# Patient Record
Sex: FEMALE | Race: WHITE | Employment: OTHER | URBAN - METROPOLITAN AREA
[De-identification: names, ages, dates, MRNs, and addresses within clinical notes are randomized per-mention and may not be internally consistent; named-entity substitution may affect disease eponyms.]

---

## 2017-11-16 PROBLEM — G82.50 SPASTIC QUADRIPARESIS (HCC): Status: ACTIVE | Noted: 2017-11-16

## 2018-03-12 RX ORDER — DIMETHYL FUMARATE 240 MG/1
240 CAPSULE ORAL 2 TIMES DAILY
Qty: 180 CAPSULE | Refills: 3 | Status: SHIPPED | OUTPATIENT
Start: 2018-03-12 | End: 2018-11-28 | Stop reason: SDUPTHER

## 2018-05-16 ENCOUNTER — OFFICE VISIT (OUTPATIENT)
Dept: NEUROLOGY | Age: 73
End: 2018-05-16
Payer: MEDICARE

## 2018-05-16 VITALS
HEIGHT: 60 IN | SYSTOLIC BLOOD PRESSURE: 125 MMHG | DIASTOLIC BLOOD PRESSURE: 59 MMHG | HEART RATE: 81 BPM | WEIGHT: 161 LBS | RESPIRATION RATE: 18 BRPM | BODY MASS INDEX: 31.61 KG/M2 | TEMPERATURE: 98 F

## 2018-05-16 VITALS
HEIGHT: 61 IN | DIASTOLIC BLOOD PRESSURE: 59 MMHG | HEART RATE: 81 BPM | BODY MASS INDEX: 30.4 KG/M2 | WEIGHT: 161 LBS | TEMPERATURE: 98.8 F | RESPIRATION RATE: 18 BRPM | SYSTOLIC BLOOD PRESSURE: 125 MMHG

## 2018-05-16 DIAGNOSIS — G82.50 SPASTIC QUADRIPARESIS (HCC): ICD-10-CM

## 2018-05-16 DIAGNOSIS — G35 MS (MULTIPLE SCLEROSIS) (HCC): Primary | ICD-10-CM

## 2018-05-16 PROCEDURE — 1036F TOBACCO NON-USER: CPT | Performed by: PHYSICAL MEDICINE & REHABILITATION

## 2018-05-16 PROCEDURE — 1123F ACP DISCUSS/DSCN MKR DOCD: CPT | Performed by: PHYSICAL MEDICINE & REHABILITATION

## 2018-05-16 PROCEDURE — G8417 CALC BMI ABV UP PARAM F/U: HCPCS | Performed by: CLINICAL NURSE SPECIALIST

## 2018-05-16 PROCEDURE — G8400 PT W/DXA NO RESULTS DOC: HCPCS | Performed by: CLINICAL NURSE SPECIALIST

## 2018-05-16 PROCEDURE — G8417 CALC BMI ABV UP PARAM F/U: HCPCS | Performed by: PHYSICAL MEDICINE & REHABILITATION

## 2018-05-16 PROCEDURE — 4040F PNEUMOC VAC/ADMIN/RCVD: CPT | Performed by: PHYSICAL MEDICINE & REHABILITATION

## 2018-05-16 PROCEDURE — G8427 DOCREV CUR MEDS BY ELIG CLIN: HCPCS | Performed by: PHYSICAL MEDICINE & REHABILITATION

## 2018-05-16 PROCEDURE — 99213 OFFICE O/P EST LOW 20 MIN: CPT | Performed by: CLINICAL NURSE SPECIALIST

## 2018-05-16 PROCEDURE — 4040F PNEUMOC VAC/ADMIN/RCVD: CPT | Performed by: CLINICAL NURSE SPECIALIST

## 2018-05-16 PROCEDURE — 99213 OFFICE O/P EST LOW 20 MIN: CPT | Performed by: PHYSICAL MEDICINE & REHABILITATION

## 2018-05-16 PROCEDURE — G8427 DOCREV CUR MEDS BY ELIG CLIN: HCPCS | Performed by: CLINICAL NURSE SPECIALIST

## 2018-05-16 PROCEDURE — 99214 OFFICE O/P EST MOD 30 MIN: CPT | Performed by: CLINICAL NURSE SPECIALIST

## 2018-05-16 PROCEDURE — 3017F COLORECTAL CA SCREEN DOC REV: CPT | Performed by: CLINICAL NURSE SPECIALIST

## 2018-05-16 PROCEDURE — G8400 PT W/DXA NO RESULTS DOC: HCPCS | Performed by: PHYSICAL MEDICINE & REHABILITATION

## 2018-05-16 PROCEDURE — 1090F PRES/ABSN URINE INCON ASSESS: CPT | Performed by: PHYSICAL MEDICINE & REHABILITATION

## 2018-05-16 PROCEDURE — 1090F PRES/ABSN URINE INCON ASSESS: CPT | Performed by: CLINICAL NURSE SPECIALIST

## 2018-05-16 PROCEDURE — 3017F COLORECTAL CA SCREEN DOC REV: CPT | Performed by: PHYSICAL MEDICINE & REHABILITATION

## 2018-05-16 PROCEDURE — 1036F TOBACCO NON-USER: CPT | Performed by: CLINICAL NURSE SPECIALIST

## 2018-05-16 PROCEDURE — 1123F ACP DISCUSS/DSCN MKR DOCD: CPT | Performed by: CLINICAL NURSE SPECIALIST

## 2018-05-16 RX ORDER — LEVOTHYROXINE SODIUM 0.07 MG/1
75 TABLET ORAL
COMMUNITY
End: 2019-07-31 | Stop reason: SDUPTHER

## 2018-05-16 RX ORDER — LANOLIN ALCOHOL/MO/W.PET/CERES
1000 CREAM (GRAM) TOPICAL
COMMUNITY
End: 2019-03-20

## 2018-05-16 RX ORDER — MULTIVITAMIN WITH IRON
250 TABLET ORAL EVERY MORNING
COMMUNITY

## 2018-09-19 ENCOUNTER — OFFICE VISIT (OUTPATIENT)
Dept: NEUROLOGY | Age: 73
End: 2018-09-19
Payer: MEDICARE

## 2018-09-19 VITALS
BODY MASS INDEX: 31.44 KG/M2 | SYSTOLIC BLOOD PRESSURE: 135 MMHG | DIASTOLIC BLOOD PRESSURE: 60 MMHG | HEART RATE: 87 BPM | RESPIRATION RATE: 18 BRPM | OXYGEN SATURATION: 94 % | HEIGHT: 60 IN | TEMPERATURE: 98.1 F

## 2018-09-19 VITALS
BODY MASS INDEX: 31.61 KG/M2 | OXYGEN SATURATION: 95 % | SYSTOLIC BLOOD PRESSURE: 135 MMHG | TEMPERATURE: 98.7 F | HEIGHT: 60 IN | HEART RATE: 85 BPM | DIASTOLIC BLOOD PRESSURE: 60 MMHG | WEIGHT: 161 LBS

## 2018-09-19 DIAGNOSIS — G82.50 SPASTIC QUADRIPARESIS (HCC): ICD-10-CM

## 2018-09-19 DIAGNOSIS — G35 MS (MULTIPLE SCLEROSIS) (HCC): Primary | ICD-10-CM

## 2018-09-19 LAB
ALBUMIN SERPL-MCNC: 4.4 G/DL
ALP BLD-CCNC: NORMAL U/L
ALT SERPL-CCNC: NORMAL U/L
ANION GAP SERPL CALCULATED.3IONS-SCNC: NORMAL MMOL/L
AST SERPL-CCNC: NORMAL U/L
BASOPHILS ABSOLUTE: 0.1 /ΜL
BASOPHILS RELATIVE PERCENT: 1 %
BILIRUB SERPL-MCNC: NORMAL MG/DL (ref 0.1–1.4)
BUN BLDV-MCNC: 14 MG/DL
CALCIUM SERPL-MCNC: 9.7 MG/DL
CHLORIDE BLD-SCNC: 103 MMOL/L
CO2: 28 MMOL/L
CREAT SERPL-MCNC: 0.6 MG/DL
EOSINOPHILS ABSOLUTE: 0.1 /ΜL
EOSINOPHILS RELATIVE PERCENT: 2.3 %
GFR CALCULATED: NORMAL
GLUCOSE BLD-MCNC: 104 MG/DL
HCT VFR BLD CALC: 40.7 % (ref 36–46)
HEMOGLOBIN: 13.6 G/DL (ref 12–16)
LYMPHOCYTES ABSOLUTE: 1 /ΜL
LYMPHOCYTES RELATIVE PERCENT: 19.6 %
MCH RBC QN AUTO: NORMAL PG
MCHC RBC AUTO-ENTMCNC: NORMAL G/DL
MCV RBC AUTO: 95.1 FL
MONOCYTES ABSOLUTE: 0.4 /ΜL
MONOCYTES RELATIVE PERCENT: 7.4 %
NEUTROPHILS ABSOLUTE: 3.4 /ΜL
NEUTROPHILS RELATIVE PERCENT: 69.7 %
PDW BLD-RTO: NORMAL %
PLATELET # BLD: 259 K/ΜL
PMV BLD AUTO: NORMAL FL
POTASSIUM SERPL-SCNC: 4.4 MMOL/L
RBC # BLD: 4.28 10^6/ΜL
SODIUM BLD-SCNC: 140 MMOL/L
TOTAL PROTEIN: 7.5
WBC # BLD: 4.9 10^3/ML

## 2018-09-19 PROCEDURE — 99213 OFFICE O/P EST LOW 20 MIN: CPT | Performed by: PSYCHIATRY & NEUROLOGY

## 2018-09-19 PROCEDURE — 1036F TOBACCO NON-USER: CPT | Performed by: PSYCHIATRY & NEUROLOGY

## 2018-09-19 PROCEDURE — 3017F COLORECTAL CA SCREEN DOC REV: CPT | Performed by: PSYCHIATRY & NEUROLOGY

## 2018-09-19 PROCEDURE — 1123F ACP DISCUSS/DSCN MKR DOCD: CPT | Performed by: PHYSICAL MEDICINE & REHABILITATION

## 2018-09-19 PROCEDURE — G8400 PT W/DXA NO RESULTS DOC: HCPCS | Performed by: PHYSICAL MEDICINE & REHABILITATION

## 2018-09-19 PROCEDURE — G8427 DOCREV CUR MEDS BY ELIG CLIN: HCPCS | Performed by: PSYCHIATRY & NEUROLOGY

## 2018-09-19 PROCEDURE — 1090F PRES/ABSN URINE INCON ASSESS: CPT | Performed by: PHYSICAL MEDICINE & REHABILITATION

## 2018-09-19 PROCEDURE — 1101F PT FALLS ASSESS-DOCD LE1/YR: CPT | Performed by: PHYSICAL MEDICINE & REHABILITATION

## 2018-09-19 PROCEDURE — 1101F PT FALLS ASSESS-DOCD LE1/YR: CPT | Performed by: PSYCHIATRY & NEUROLOGY

## 2018-09-19 PROCEDURE — 4040F PNEUMOC VAC/ADMIN/RCVD: CPT | Performed by: PHYSICAL MEDICINE & REHABILITATION

## 2018-09-19 PROCEDURE — G8417 CALC BMI ABV UP PARAM F/U: HCPCS | Performed by: PHYSICAL MEDICINE & REHABILITATION

## 2018-09-19 PROCEDURE — G8400 PT W/DXA NO RESULTS DOC: HCPCS | Performed by: PSYCHIATRY & NEUROLOGY

## 2018-09-19 PROCEDURE — 1123F ACP DISCUSS/DSCN MKR DOCD: CPT | Performed by: PSYCHIATRY & NEUROLOGY

## 2018-09-19 PROCEDURE — 1036F TOBACCO NON-USER: CPT | Performed by: PHYSICAL MEDICINE & REHABILITATION

## 2018-09-19 PROCEDURE — 99215 OFFICE O/P EST HI 40 MIN: CPT | Performed by: PSYCHIATRY & NEUROLOGY

## 2018-09-19 PROCEDURE — G8427 DOCREV CUR MEDS BY ELIG CLIN: HCPCS | Performed by: PHYSICAL MEDICINE & REHABILITATION

## 2018-09-19 PROCEDURE — 4040F PNEUMOC VAC/ADMIN/RCVD: CPT | Performed by: PSYCHIATRY & NEUROLOGY

## 2018-09-19 PROCEDURE — 1090F PRES/ABSN URINE INCON ASSESS: CPT | Performed by: PSYCHIATRY & NEUROLOGY

## 2018-09-19 PROCEDURE — 99213 OFFICE O/P EST LOW 20 MIN: CPT | Performed by: PHYSICAL MEDICINE & REHABILITATION

## 2018-09-19 PROCEDURE — 3017F COLORECTAL CA SCREEN DOC REV: CPT | Performed by: PHYSICAL MEDICINE & REHABILITATION

## 2018-09-19 PROCEDURE — G8417 CALC BMI ABV UP PARAM F/U: HCPCS | Performed by: PSYCHIATRY & NEUROLOGY

## 2018-09-24 DIAGNOSIS — G35 MS (MULTIPLE SCLEROSIS) (HCC): ICD-10-CM

## 2018-09-27 DIAGNOSIS — G35 MS (MULTIPLE SCLEROSIS) (HCC): ICD-10-CM

## 2018-11-13 ENCOUNTER — TELEPHONE (OUTPATIENT)
Dept: NEUROLOGY | Age: 73
End: 2018-11-13

## 2018-11-13 NOTE — TELEPHONE ENCOUNTER
Patient's  Kristen Mistry called in wanting results of the MRI cervical and brain done on 9/24/2018. He is wanting to know if his wife should stop taking her Tecf[employer]a, if they should come in sooner than their appointment in March 2019 or what they should do. They would like a call back ASAP so they know what is going on and what they should do. Kristen Mistry said that Dr. Trenton Hernandez wanted to take her off of the Tecfidera pending the results of the MRIs. MA informed patient that this would be forwarded to Dr. Trenton Hernandez for advisement. Kristen Mistry can be reached at [phone].

## 2018-11-28 DIAGNOSIS — G35 MULTIPLE SCLEROSIS (HCC): Primary | ICD-10-CM

## 2018-11-28 RX ORDER — DIMETHYL FUMARATE 240 MG/1
240 CAPSULE ORAL DAILY
Qty: 30 CAPSULE | Refills: 0 | Status: SHIPPED | OUTPATIENT
Start: 2018-11-28 | End: 2019-03-20 | Stop reason: ALTCHOICE

## 2019-03-12 LAB
ALBUMIN SERPL-MCNC: 4.1 G/DL
ALP BLD-CCNC: 99 U/L
ALT SERPL-CCNC: 81 U/L
ANION GAP SERPL CALCULATED.3IONS-SCNC: NORMAL MMOL/L
AST SERPL-CCNC: 100 U/L
BASOPHILS ABSOLUTE: 0.1 /ΜL
BASOPHILS RELATIVE PERCENT: 0.9 %
BILIRUB SERPL-MCNC: 0.7 MG/DL (ref 0.1–1.4)
BUN BLDV-MCNC: 15 MG/DL
CALCIUM SERPL-MCNC: 9.6 MG/DL
CHLORIDE BLD-SCNC: 101 MMOL/L
CHOLESTEROL, TOTAL: 201 MG/DL
CHOLESTEROL/HDL RATIO: NORMAL
CO2: 28 MMOL/L
CREAT SERPL-MCNC: 0.7 MG/DL
EOSINOPHILS ABSOLUTE: 0.1 /ΜL
EOSINOPHILS RELATIVE PERCENT: 2.2 %
FOLATE: NORMAL
GFR CALCULATED: 82
GLUCOSE BLD-MCNC: 118 MG/DL
HCT VFR BLD CALC: 40.3 % (ref 36–46)
HDLC SERPL-MCNC: 45 MG/DL (ref 35–70)
HEMOGLOBIN: 13.9 G/DL (ref 12–16)
LDL CHOLESTEROL CALCULATED: 131 MG/DL (ref 0–160)
LYMPHOCYTES ABSOLUTE: 1.2 /ΜL
LYMPHOCYTES RELATIVE PERCENT: 18.9 %
MCH RBC QN AUTO: 33.4 PG
MCHC RBC AUTO-ENTMCNC: 34.4 G/DL
MCV RBC AUTO: 97 FL
MONOCYTES ABSOLUTE: 0.4 /ΜL
MONOCYTES RELATIVE PERCENT: 6.1 %
NEUTROPHILS ABSOLUTE: 4.6 /ΜL
NEUTROPHILS RELATIVE PERCENT: 71.9 %
PLATELET # BLD: 262 K/ΜL
PMV BLD AUTO: 8.9 FL
POTASSIUM SERPL-SCNC: 4.3 MMOL/L
RBC # BLD: 4.16 10^6/ΜL
SODIUM BLD-SCNC: 140 MMOL/L
TOTAL PROTEIN: 7.3
TRIGL SERPL-MCNC: 282 MG/DL
TSH SERPL DL<=0.05 MIU/L-ACNC: 2.91 UIU/ML
VITAMIN B-12: 691
VLDLC SERPL CALC-MCNC: NORMAL MG/DL
WBC # BLD: 6.4 10^3/ML

## 2019-03-20 ENCOUNTER — TELEPHONE (OUTPATIENT)
Dept: NEUROLOGY | Age: 74
End: 2019-03-20

## 2019-03-20 ENCOUNTER — APPOINTMENT (OUTPATIENT)
Dept: GENERAL RADIOLOGY | Age: 74
DRG: 481 | End: 2019-03-20
Payer: MEDICARE

## 2019-03-20 ENCOUNTER — HOSPITAL ENCOUNTER (INPATIENT)
Age: 74
LOS: 3 days | Discharge: NURSING, REHAB OR LONG-TERM CARE | DRG: 481 | End: 2019-03-23
Attending: EMERGENCY MEDICINE | Admitting: INTERNAL MEDICINE
Payer: MEDICARE

## 2019-03-20 ENCOUNTER — ANESTHESIA EVENT (OUTPATIENT)
Dept: OPERATING ROOM | Age: 74
DRG: 481 | End: 2019-03-20
Payer: MEDICARE

## 2019-03-20 DIAGNOSIS — S72.002A CLOSED FRACTURE OF LEFT HIP, INITIAL ENCOUNTER (HCC): Primary | ICD-10-CM

## 2019-03-20 DIAGNOSIS — G35 MULTIPLE SCLEROSIS (HCC): ICD-10-CM

## 2019-03-20 LAB
ABO/RH: NORMAL
ABO/RH: NORMAL
ANION GAP SERPL CALCULATED.3IONS-SCNC: 13 MMOL/L (ref 7–16)
ANTIBODY SCREEN: NORMAL
BASOPHILS ABSOLUTE: 0.04 E9/L (ref 0–0.2)
BASOPHILS RELATIVE PERCENT: 0.3 % (ref 0–2)
BUN BLDV-MCNC: 21 MG/DL (ref 8–23)
CALCIUM SERPL-MCNC: 10.7 MG/DL (ref 8.6–10.2)
CHLORIDE BLD-SCNC: 98 MMOL/L (ref 98–107)
CO2: 29 MMOL/L (ref 22–29)
CREAT SERPL-MCNC: 0.8 MG/DL (ref 0.5–1)
EOSINOPHILS ABSOLUTE: 0.24 E9/L (ref 0.05–0.5)
EOSINOPHILS RELATIVE PERCENT: 1.9 % (ref 0–6)
GFR AFRICAN AMERICAN: >60
GFR NON-AFRICAN AMERICAN: >60 ML/MIN/1.73
GLUCOSE BLD-MCNC: 166 MG/DL (ref 74–99)
HCT VFR BLD CALC: 30.3 % (ref 34–48)
HEMOGLOBIN: 9.5 G/DL (ref 11.5–15.5)
IMMATURE GRANULOCYTES #: 0.04 E9/L
IMMATURE GRANULOCYTES %: 0.3 % (ref 0–5)
INR BLD: 1
LYMPHOCYTES ABSOLUTE: 1.33 E9/L (ref 1.5–4)
LYMPHOCYTES RELATIVE PERCENT: 10.6 % (ref 20–42)
MCH RBC QN AUTO: 32.5 PG (ref 26–35)
MCHC RBC AUTO-ENTMCNC: 31.4 % (ref 32–34.5)
MCV RBC AUTO: 103.8 FL (ref 80–99.9)
MONOCYTES ABSOLUTE: 0.91 E9/L (ref 0.1–0.95)
MONOCYTES RELATIVE PERCENT: 7.2 % (ref 2–12)
NEUTROPHILS ABSOLUTE: 10 E9/L (ref 1.8–7.3)
NEUTROPHILS RELATIVE PERCENT: 79.7 % (ref 43–80)
PDW BLD-RTO: 14.2 FL (ref 11.5–15)
PLATELET # BLD: 286 E9/L (ref 130–450)
PMV BLD AUTO: 11 FL (ref 7–12)
POTASSIUM SERPL-SCNC: 4.8 MMOL/L (ref 3.5–5)
PROTHROMBIN TIME: 11.7 SEC (ref 9.3–12.4)
RBC # BLD: 2.92 E12/L (ref 3.5–5.5)
SODIUM BLD-SCNC: 140 MMOL/L (ref 132–146)
WBC # BLD: 12.6 E9/L (ref 4.5–11.5)

## 2019-03-20 PROCEDURE — 73560 X-RAY EXAM OF KNEE 1 OR 2: CPT

## 2019-03-20 PROCEDURE — 93005 ELECTROCARDIOGRAM TRACING: CPT | Performed by: EMERGENCY MEDICINE

## 2019-03-20 PROCEDURE — 80048 BASIC METABOLIC PNL TOTAL CA: CPT

## 2019-03-20 PROCEDURE — 71045 X-RAY EXAM CHEST 1 VIEW: CPT

## 2019-03-20 PROCEDURE — 86901 BLOOD TYPING SEROLOGIC RH(D): CPT

## 2019-03-20 PROCEDURE — 99222 1ST HOSP IP/OBS MODERATE 55: CPT | Performed by: INTERNAL MEDICINE

## 2019-03-20 PROCEDURE — 86850 RBC ANTIBODY SCREEN: CPT

## 2019-03-20 PROCEDURE — 6370000000 HC RX 637 (ALT 250 FOR IP): Performed by: EMERGENCY MEDICINE

## 2019-03-20 PROCEDURE — 36415 COLL VENOUS BLD VENIPUNCTURE: CPT

## 2019-03-20 PROCEDURE — 85610 PROTHROMBIN TIME: CPT

## 2019-03-20 PROCEDURE — 73552 X-RAY EXAM OF FEMUR 2/>: CPT

## 2019-03-20 PROCEDURE — 51702 INSERT TEMP BLADDER CATH: CPT

## 2019-03-20 PROCEDURE — 85025 COMPLETE CBC W/AUTO DIFF WBC: CPT

## 2019-03-20 PROCEDURE — 6360000002 HC RX W HCPCS: Performed by: ORTHOPAEDIC SURGERY

## 2019-03-20 PROCEDURE — 86900 BLOOD TYPING SEROLOGIC ABO: CPT

## 2019-03-20 PROCEDURE — APPSS45 APP SPLIT SHARED TIME 31-45 MINUTES: Performed by: PHYSICIAN ASSISTANT

## 2019-03-20 PROCEDURE — 73502 X-RAY EXAM HIP UNI 2-3 VIEWS: CPT

## 2019-03-20 PROCEDURE — 99285 EMERGENCY DEPT VISIT HI MDM: CPT

## 2019-03-20 PROCEDURE — 1200000000 HC SEMI PRIVATE

## 2019-03-20 RX ORDER — ONDANSETRON 2 MG/ML
4 INJECTION INTRAMUSCULAR; INTRAVENOUS EVERY 6 HOURS PRN
Status: DISCONTINUED | OUTPATIENT
Start: 2019-03-20 | End: 2019-03-21 | Stop reason: SDUPTHER

## 2019-03-20 RX ORDER — MELATONIN
1 EVERY MORNING
COMMUNITY

## 2019-03-20 RX ORDER — NEOMYCIN/POLYMYXIN B/PRAMOXINE 3.5-10K-1
1 CREAM (GRAM) TOPICAL NIGHTLY
Status: ON HOLD | COMMUNITY
End: 2020-05-01 | Stop reason: HOSPADM

## 2019-03-20 RX ORDER — BACLOFEN 20 MG/1
20 TABLET ORAL 3 TIMES DAILY
Status: DISCONTINUED | OUTPATIENT
Start: 2019-03-20 | End: 2019-03-23 | Stop reason: HOSPADM

## 2019-03-20 RX ORDER — BACLOFEN 10 MG/1
20 TABLET ORAL 4 TIMES DAILY
Status: DISCONTINUED | OUTPATIENT
Start: 2019-03-20 | End: 2019-03-20

## 2019-03-20 RX ORDER — CEFAZOLIN SODIUM 2 G/50ML
2 SOLUTION INTRAVENOUS
Status: COMPLETED | OUTPATIENT
Start: 2019-03-20 | End: 2019-03-20

## 2019-03-20 RX ORDER — HEPARIN SODIUM 10000 [USP'U]/ML
5000 INJECTION, SOLUTION INTRAVENOUS; SUBCUTANEOUS ONCE
Status: DISCONTINUED | OUTPATIENT
Start: 2019-03-20 | End: 2019-03-20

## 2019-03-20 RX ORDER — DANTROLENE SODIUM 25 MG/1
25 CAPSULE ORAL EVERY MORNING
Status: DISCONTINUED | OUTPATIENT
Start: 2019-03-21 | End: 2019-03-23 | Stop reason: HOSPADM

## 2019-03-20 RX ORDER — ACETAMINOPHEN 325 MG/1
650 TABLET ORAL EVERY 4 HOURS PRN
Status: DISCONTINUED | OUTPATIENT
Start: 2019-03-20 | End: 2019-03-23 | Stop reason: HOSPADM

## 2019-03-20 RX ORDER — EZETIMIBE 10 MG/1
10 TABLET ORAL EVERY MORNING
Status: DISCONTINUED | OUTPATIENT
Start: 2019-03-21 | End: 2019-03-23 | Stop reason: HOSPADM

## 2019-03-20 RX ORDER — AMOXICILLIN 250 MG
1 CAPSULE ORAL NIGHTLY
COMMUNITY
End: 2019-06-19 | Stop reason: ALTCHOICE

## 2019-03-20 RX ORDER — NORTRIPTYLINE HYDROCHLORIDE 25 MG/1
25 CAPSULE ORAL EVERY MORNING
Status: DISCONTINUED | OUTPATIENT
Start: 2019-03-21 | End: 2019-03-23 | Stop reason: HOSPADM

## 2019-03-20 RX ORDER — POLYETHYLENE GLYCOL 3350 17 G/17G
7.3 POWDER, FOR SOLUTION ORAL DAILY
COMMUNITY

## 2019-03-20 RX ORDER — MULTIVITAMIN WITH IRON
250 TABLET ORAL DAILY
Status: DISCONTINUED | OUTPATIENT
Start: 2019-03-21 | End: 2019-03-23 | Stop reason: HOSPADM

## 2019-03-20 RX ORDER — ATORVASTATIN CALCIUM 10 MG/1
10 TABLET, FILM COATED ORAL
Status: DISCONTINUED | OUTPATIENT
Start: 2019-03-22 | End: 2019-03-23 | Stop reason: HOSPADM

## 2019-03-20 RX ORDER — LYSINE HCL 500 MG
1 TABLET ORAL NIGHTLY
COMMUNITY

## 2019-03-20 RX ORDER — LANOLIN ALCOHOL/MO/W.PET/CERES
400 CREAM (GRAM) TOPICAL EVERY MORNING
Status: DISCONTINUED | OUTPATIENT
Start: 2019-03-21 | End: 2019-03-20

## 2019-03-20 RX ORDER — LEVOTHYROXINE SODIUM 0.07 MG/1
75 TABLET ORAL
Status: DISCONTINUED | OUTPATIENT
Start: 2019-03-21 | End: 2019-03-23 | Stop reason: HOSPADM

## 2019-03-20 RX ORDER — CITALOPRAM 20 MG/1
20 TABLET ORAL EVERY MORNING
Status: DISCONTINUED | OUTPATIENT
Start: 2019-03-21 | End: 2019-03-23 | Stop reason: HOSPADM

## 2019-03-20 RX ORDER — SODIUM CHLORIDE 0.9 % (FLUSH) 0.9 %
10 SYRINGE (ML) INJECTION PRN
Status: DISCONTINUED | OUTPATIENT
Start: 2019-03-20 | End: 2019-03-21 | Stop reason: SDUPTHER

## 2019-03-20 RX ORDER — OYSTER SHELL CALCIUM WITH VITAMIN D 500; 200 MG/1; [IU]/1
1 TABLET, FILM COATED ORAL NIGHTLY
Status: DISCONTINUED | OUTPATIENT
Start: 2019-03-20 | End: 2019-03-20

## 2019-03-20 RX ORDER — DANTROLENE SODIUM 25 MG/1
75 CAPSULE ORAL NIGHTLY
COMMUNITY
End: 2019-05-08 | Stop reason: SDUPTHER

## 2019-03-20 RX ORDER — SODIUM CHLORIDE 0.9 % (FLUSH) 0.9 %
10 SYRINGE (ML) INJECTION EVERY 12 HOURS SCHEDULED
Status: DISCONTINUED | OUTPATIENT
Start: 2019-03-20 | End: 2019-03-21 | Stop reason: SDUPTHER

## 2019-03-20 RX ORDER — DANTROLENE SODIUM 25 MG/1
75 CAPSULE ORAL NIGHTLY
Status: DISCONTINUED | OUTPATIENT
Start: 2019-03-20 | End: 2019-03-23 | Stop reason: HOSPADM

## 2019-03-20 RX ORDER — ACETAMINOPHEN 500 MG
1000 TABLET ORAL ONCE
Status: COMPLETED | OUTPATIENT
Start: 2019-03-20 | End: 2019-03-20

## 2019-03-20 RX ADMIN — ACETAMINOPHEN 1000 MG: 500 TABLET ORAL at 13:13

## 2019-03-20 RX ADMIN — CEFAZOLIN SODIUM 2 G: 2 SOLUTION INTRAVENOUS at 14:30

## 2019-03-20 ASSESSMENT — PAIN DESCRIPTION - FREQUENCY: FREQUENCY: CONTINUOUS

## 2019-03-20 ASSESSMENT — PAIN - FUNCTIONAL ASSESSMENT: PAIN_FUNCTIONAL_ASSESSMENT: PREVENTS OR INTERFERES WITH MANY ACTIVE NOT PASSIVE ACTIVITIES

## 2019-03-20 ASSESSMENT — PAIN DESCRIPTION - ONSET: ONSET: SUDDEN

## 2019-03-20 ASSESSMENT — PAIN DESCRIPTION - PROGRESSION: CLINICAL_PROGRESSION: GRADUALLY WORSENING

## 2019-03-20 ASSESSMENT — PAIN SCALES - GENERAL
PAINLEVEL_OUTOF10: 0
PAINLEVEL_OUTOF10: 5
PAINLEVEL_OUTOF10: 0

## 2019-03-20 ASSESSMENT — PAIN DESCRIPTION - DESCRIPTORS: DESCRIPTORS: ACHING

## 2019-03-20 ASSESSMENT — PAIN DESCRIPTION - ORIENTATION: ORIENTATION: LEFT

## 2019-03-20 ASSESSMENT — PAIN DESCRIPTION - PAIN TYPE: TYPE: ACUTE PAIN

## 2019-03-21 ENCOUNTER — ANESTHESIA (OUTPATIENT)
Dept: OPERATING ROOM | Age: 74
DRG: 481 | End: 2019-03-21
Payer: MEDICARE

## 2019-03-21 ENCOUNTER — TELEPHONE (OUTPATIENT)
Dept: ORTHOPEDIC SURGERY | Age: 74
End: 2019-03-21

## 2019-03-21 ENCOUNTER — APPOINTMENT (OUTPATIENT)
Dept: GENERAL RADIOLOGY | Age: 74
DRG: 481 | End: 2019-03-21
Payer: MEDICARE

## 2019-03-21 VITALS
SYSTOLIC BLOOD PRESSURE: 101 MMHG | TEMPERATURE: 97.2 F | DIASTOLIC BLOOD PRESSURE: 52 MMHG | OXYGEN SATURATION: 100 % | RESPIRATION RATE: 4 BRPM

## 2019-03-21 LAB
ALBUMIN SERPL-MCNC: 3.1 G/DL (ref 3.5–5.2)
ALP BLD-CCNC: 86 U/L (ref 35–104)
ALT SERPL-CCNC: 22 U/L (ref 0–32)
ANION GAP SERPL CALCULATED.3IONS-SCNC: 13 MMOL/L (ref 7–16)
AST SERPL-CCNC: 28 U/L (ref 0–31)
BILIRUB SERPL-MCNC: 0.7 MG/DL (ref 0–1.2)
BUN BLDV-MCNC: 25 MG/DL (ref 8–23)
CALCIUM SERPL-MCNC: 9.3 MG/DL (ref 8.6–10.2)
CHLORIDE BLD-SCNC: 101 MMOL/L (ref 98–107)
CO2: 24 MMOL/L (ref 22–29)
CREAT SERPL-MCNC: 0.8 MG/DL (ref 0.5–1)
EKG ATRIAL RATE: 36 BPM
EKG Q-T INTERVAL: 524 MS
EKG QRS DURATION: 68 MS
EKG QTC CALCULATION (BAZETT): 679 MS
EKG R AXIS: -3 DEGREES
EKG T AXIS: 78 DEGREES
EKG VENTRICULAR RATE: 101 BPM
GFR AFRICAN AMERICAN: >60
GFR NON-AFRICAN AMERICAN: >60 ML/MIN/1.73
GLUCOSE BLD-MCNC: 170 MG/DL (ref 74–99)
HCT VFR BLD CALC: 25.8 % (ref 34–48)
HEMOGLOBIN: 8.3 G/DL (ref 11.5–15.5)
LV EF: 60 %
LVEF MODALITY: NORMAL
POTASSIUM REFLEX MAGNESIUM: 4 MMOL/L (ref 3.5–5)
SODIUM BLD-SCNC: 138 MMOL/L (ref 132–146)
TOTAL PROTEIN: 5.9 G/DL (ref 6.4–8.3)

## 2019-03-21 PROCEDURE — 6360000002 HC RX W HCPCS: Performed by: STUDENT IN AN ORGANIZED HEALTH CARE EDUCATION/TRAINING PROGRAM

## 2019-03-21 PROCEDURE — 6360000002 HC RX W HCPCS: Performed by: NURSE ANESTHETIST, CERTIFIED REGISTERED

## 2019-03-21 PROCEDURE — 3700000000 HC ANESTHESIA ATTENDED CARE: Performed by: ORTHOPAEDIC SURGERY

## 2019-03-21 PROCEDURE — 85018 HEMOGLOBIN: CPT

## 2019-03-21 PROCEDURE — 80053 COMPREHEN METABOLIC PANEL: CPT

## 2019-03-21 PROCEDURE — 36415 COLL VENOUS BLD VENIPUNCTURE: CPT

## 2019-03-21 PROCEDURE — 2580000003 HC RX 258: Performed by: INTERNAL MEDICINE

## 2019-03-21 PROCEDURE — 2500000003 HC RX 250 WO HCPCS: Performed by: NURSE ANESTHETIST, CERTIFIED REGISTERED

## 2019-03-21 PROCEDURE — 7100000001 HC PACU RECOVERY - ADDTL 15 MIN: Performed by: ORTHOPAEDIC SURGERY

## 2019-03-21 PROCEDURE — 3600000013 HC SURGERY LEVEL 3 ADDTL 15MIN: Performed by: ORTHOPAEDIC SURGERY

## 2019-03-21 PROCEDURE — 85014 HEMATOCRIT: CPT

## 2019-03-21 PROCEDURE — 27245 TREAT THIGH FRACTURE: CPT | Performed by: ORTHOPAEDIC SURGERY

## 2019-03-21 PROCEDURE — 2580000003 HC RX 258: Performed by: NURSE ANESTHETIST, CERTIFIED REGISTERED

## 2019-03-21 PROCEDURE — 99222 1ST HOSP IP/OBS MODERATE 55: CPT | Performed by: ORTHOPAEDIC SURGERY

## 2019-03-21 PROCEDURE — 73552 X-RAY EXAM OF FEMUR 2/>: CPT

## 2019-03-21 PROCEDURE — 7100000000 HC PACU RECOVERY - FIRST 15 MIN: Performed by: ORTHOPAEDIC SURGERY

## 2019-03-21 PROCEDURE — 1200000000 HC SEMI PRIVATE

## 2019-03-21 PROCEDURE — 6370000000 HC RX 637 (ALT 250 FOR IP): Performed by: STUDENT IN AN ORGANIZED HEALTH CARE EDUCATION/TRAINING PROGRAM

## 2019-03-21 PROCEDURE — 3209999900 FLUORO FOR SURGICAL PROCEDURES

## 2019-03-21 PROCEDURE — 93306 TTE W/DOPPLER COMPLETE: CPT

## 2019-03-21 PROCEDURE — 2500000003 HC RX 250 WO HCPCS: Performed by: ORTHOPAEDIC SURGERY

## 2019-03-21 PROCEDURE — 87081 CULTURE SCREEN ONLY: CPT

## 2019-03-21 PROCEDURE — C1713 ANCHOR/SCREW BN/BN,TIS/BN: HCPCS | Performed by: ORTHOPAEDIC SURGERY

## 2019-03-21 PROCEDURE — 2709999900 HC NON-CHARGEABLE SUPPLY: Performed by: ORTHOPAEDIC SURGERY

## 2019-03-21 PROCEDURE — 3600000003 HC SURGERY LEVEL 3 BASE: Performed by: ORTHOPAEDIC SURGERY

## 2019-03-21 PROCEDURE — C1769 GUIDE WIRE: HCPCS | Performed by: ORTHOPAEDIC SURGERY

## 2019-03-21 PROCEDURE — 0QS706Z REPOSITION LEFT UPPER FEMUR WITH INTRAMEDULLARY INTERNAL FIXATION DEVICE, OPEN APPROACH: ICD-10-PCS | Performed by: ORTHOPAEDIC SURGERY

## 2019-03-21 PROCEDURE — 6360000002 HC RX W HCPCS: Performed by: ANESTHESIOLOGY

## 2019-03-21 PROCEDURE — 99232 SBSQ HOSP IP/OBS MODERATE 35: CPT | Performed by: INTERNAL MEDICINE

## 2019-03-21 PROCEDURE — 6360000002 HC RX W HCPCS: Performed by: PHYSICIAN ASSISTANT

## 2019-03-21 PROCEDURE — 3700000001 HC ADD 15 MINUTES (ANESTHESIA): Performed by: ORTHOPAEDIC SURGERY

## 2019-03-21 PROCEDURE — C1776 JOINT DEVICE (IMPLANTABLE): HCPCS | Performed by: ORTHOPAEDIC SURGERY

## 2019-03-21 PROCEDURE — 2720000010 HC SURG SUPPLY STERILE: Performed by: ORTHOPAEDIC SURGERY

## 2019-03-21 PROCEDURE — 73502 X-RAY EXAM HIP UNI 2-3 VIEWS: CPT

## 2019-03-21 DEVICE — SCREW BNE L50MM DIA5MM COR DIA4.3MM TIB LT GRN TI ALLY ST: Type: IMPLANTABLE DEVICE | Site: HIP | Status: FUNCTIONAL

## 2019-03-21 DEVICE — SCREW BNE L75MM DIA7.3MM THRD L32MM CANC S STL SELF DRL ST: Type: IMPLANTABLE DEVICE | Site: HIP | Status: FUNCTIONAL

## 2019-03-21 DEVICE — SCREW BNE L95MM DIA10.35MM PROX FEM G TI CANN FOR TFN ADV: Type: IMPLANTABLE DEVICE | Site: HIP | Status: FUNCTIONAL

## 2019-03-21 DEVICE — NAIL IM L340MM DIA11MM 125DEG LNG L PROX FEM TROCHANTERIC: Type: IMPLANTABLE DEVICE | Site: HIP | Status: FUNCTIONAL

## 2019-03-21 DEVICE — SCREW BNE L42MM DIA5MM TIB LT GRN TI ST CANN LOK FULL THRD: Type: IMPLANTABLE DEVICE | Site: HIP | Status: FUNCTIONAL

## 2019-03-21 RX ORDER — MORPHINE SULFATE 2 MG/ML
4 INJECTION, SOLUTION INTRAMUSCULAR; INTRAVENOUS EVERY 4 HOURS PRN
Status: DISCONTINUED | OUTPATIENT
Start: 2019-03-21 | End: 2019-03-23 | Stop reason: HOSPADM

## 2019-03-21 RX ORDER — MEPERIDINE HYDROCHLORIDE 25 MG/ML
12.5 INJECTION INTRAMUSCULAR; INTRAVENOUS; SUBCUTANEOUS EVERY 5 MIN PRN
Status: DISCONTINUED | OUTPATIENT
Start: 2019-03-21 | End: 2019-03-21 | Stop reason: HOSPADM

## 2019-03-21 RX ORDER — FENTANYL CITRATE 50 UG/ML
INJECTION, SOLUTION INTRAMUSCULAR; INTRAVENOUS PRN
Status: DISCONTINUED | OUTPATIENT
Start: 2019-03-21 | End: 2019-03-21 | Stop reason: SDUPTHER

## 2019-03-21 RX ORDER — MORPHINE SULFATE 2 MG/ML
2 INJECTION, SOLUTION INTRAMUSCULAR; INTRAVENOUS EVERY 4 HOURS PRN
Status: DISCONTINUED | OUTPATIENT
Start: 2019-03-21 | End: 2019-03-23 | Stop reason: HOSPADM

## 2019-03-21 RX ORDER — DIPHENHYDRAMINE HYDROCHLORIDE 50 MG/ML
12.5 INJECTION INTRAMUSCULAR; INTRAVENOUS
Status: DISCONTINUED | OUTPATIENT
Start: 2019-03-21 | End: 2019-03-21 | Stop reason: HOSPADM

## 2019-03-21 RX ORDER — MIDAZOLAM HYDROCHLORIDE 1 MG/ML
INJECTION INTRAMUSCULAR; INTRAVENOUS PRN
Status: DISCONTINUED | OUTPATIENT
Start: 2019-03-21 | End: 2019-03-21 | Stop reason: SDUPTHER

## 2019-03-21 RX ORDER — SODIUM CHLORIDE, SODIUM LACTATE, POTASSIUM CHLORIDE, CALCIUM CHLORIDE 600; 310; 30; 20 MG/100ML; MG/100ML; MG/100ML; MG/100ML
INJECTION, SOLUTION INTRAVENOUS ONCE
Status: COMPLETED | OUTPATIENT
Start: 2019-03-21 | End: 2019-03-21

## 2019-03-21 RX ORDER — ONDANSETRON 2 MG/ML
INJECTION INTRAMUSCULAR; INTRAVENOUS PRN
Status: DISCONTINUED | OUTPATIENT
Start: 2019-03-21 | End: 2019-03-21 | Stop reason: SDUPTHER

## 2019-03-21 RX ORDER — HYDROCODONE BITARTRATE AND ACETAMINOPHEN 5; 325 MG/1; MG/1
1 TABLET ORAL EVERY 4 HOURS PRN
Status: DISCONTINUED | OUTPATIENT
Start: 2019-03-21 | End: 2019-03-23 | Stop reason: HOSPADM

## 2019-03-21 RX ORDER — PROMETHAZINE HYDROCHLORIDE 25 MG/ML
6.25 INJECTION, SOLUTION INTRAMUSCULAR; INTRAVENOUS
Status: DISCONTINUED | OUTPATIENT
Start: 2019-03-21 | End: 2019-03-21 | Stop reason: HOSPADM

## 2019-03-21 RX ORDER — SODIUM CHLORIDE 9 MG/ML
INJECTION, SOLUTION INTRAVENOUS CONTINUOUS PRN
Status: DISCONTINUED | OUTPATIENT
Start: 2019-03-21 | End: 2019-03-21 | Stop reason: SDUPTHER

## 2019-03-21 RX ORDER — DOCUSATE SODIUM 100 MG/1
100 CAPSULE, LIQUID FILLED ORAL 2 TIMES DAILY
Status: DISCONTINUED | OUTPATIENT
Start: 2019-03-21 | End: 2019-03-23 | Stop reason: HOSPADM

## 2019-03-21 RX ORDER — NEOSTIGMINE METHYLSULFATE 1 MG/ML
INJECTION, SOLUTION INTRAVENOUS PRN
Status: DISCONTINUED | OUTPATIENT
Start: 2019-03-21 | End: 2019-03-21 | Stop reason: SDUPTHER

## 2019-03-21 RX ORDER — FENTANYL CITRATE 50 UG/ML
25 INJECTION, SOLUTION INTRAMUSCULAR; INTRAVENOUS EVERY 5 MIN PRN
Status: COMPLETED | OUTPATIENT
Start: 2019-03-21 | End: 2019-03-21

## 2019-03-21 RX ORDER — CEFAZOLIN SODIUM 2 G/50ML
2 SOLUTION INTRAVENOUS ONCE
Status: COMPLETED | OUTPATIENT
Start: 2019-03-21 | End: 2019-03-21

## 2019-03-21 RX ORDER — LIDOCAINE HYDROCHLORIDE 20 MG/ML
INJECTION, SOLUTION EPIDURAL; INFILTRATION; INTRACAUDAL; PERINEURAL PRN
Status: DISCONTINUED | OUTPATIENT
Start: 2019-03-21 | End: 2019-03-21 | Stop reason: SDUPTHER

## 2019-03-21 RX ORDER — ONDANSETRON 2 MG/ML
4 INJECTION INTRAMUSCULAR; INTRAVENOUS EVERY 6 HOURS PRN
Status: DISCONTINUED | OUTPATIENT
Start: 2019-03-21 | End: 2019-03-23 | Stop reason: HOSPADM

## 2019-03-21 RX ORDER — PROPOFOL 10 MG/ML
INJECTION, EMULSION INTRAVENOUS PRN
Status: DISCONTINUED | OUTPATIENT
Start: 2019-03-21 | End: 2019-03-21 | Stop reason: SDUPTHER

## 2019-03-21 RX ORDER — HYDROCODONE BITARTRATE AND ACETAMINOPHEN 5; 325 MG/1; MG/1
1 TABLET ORAL EVERY 4 HOURS PRN
Qty: 42 TABLET | Refills: 0 | Status: SHIPPED | OUTPATIENT
Start: 2019-03-21 | End: 2019-03-23 | Stop reason: SDUPTHER

## 2019-03-21 RX ORDER — OXYCODONE HYDROCHLORIDE AND ACETAMINOPHEN 5; 325 MG/1; MG/1
1 TABLET ORAL
Status: DISCONTINUED | OUTPATIENT
Start: 2019-03-21 | End: 2019-03-21 | Stop reason: HOSPADM

## 2019-03-21 RX ORDER — FENTANYL CITRATE 50 UG/ML
50 INJECTION, SOLUTION INTRAMUSCULAR; INTRAVENOUS EVERY 5 MIN PRN
Status: DISCONTINUED | OUTPATIENT
Start: 2019-03-21 | End: 2019-03-21 | Stop reason: HOSPADM

## 2019-03-21 RX ORDER — DEXAMETHASONE SODIUM PHOSPHATE 4 MG/ML
INJECTION, SOLUTION INTRA-ARTICULAR; INTRALESIONAL; INTRAMUSCULAR; INTRAVENOUS; SOFT TISSUE PRN
Status: DISCONTINUED | OUTPATIENT
Start: 2019-03-21 | End: 2019-03-21 | Stop reason: SDUPTHER

## 2019-03-21 RX ORDER — SODIUM CHLORIDE 0.9 % (FLUSH) 0.9 %
10 SYRINGE (ML) INJECTION PRN
Status: DISCONTINUED | OUTPATIENT
Start: 2019-03-21 | End: 2019-03-23 | Stop reason: HOSPADM

## 2019-03-21 RX ORDER — SODIUM CHLORIDE 0.9 % (FLUSH) 0.9 %
10 SYRINGE (ML) INJECTION EVERY 12 HOURS SCHEDULED
Status: DISCONTINUED | OUTPATIENT
Start: 2019-03-21 | End: 2019-03-23 | Stop reason: HOSPADM

## 2019-03-21 RX ORDER — CEFAZOLIN SODIUM 2 G/50ML
2 SOLUTION INTRAVENOUS EVERY 8 HOURS
Status: COMPLETED | OUTPATIENT
Start: 2019-03-21 | End: 2019-03-22

## 2019-03-21 RX ORDER — FENTANYL CITRATE 50 UG/ML
25 INJECTION, SOLUTION INTRAMUSCULAR; INTRAVENOUS EVERY 5 MIN PRN
Status: DISCONTINUED | OUTPATIENT
Start: 2019-03-21 | End: 2019-03-21 | Stop reason: HOSPADM

## 2019-03-21 RX ORDER — ASPIRIN 325 MG
325 TABLET, DELAYED RELEASE (ENTERIC COATED) ORAL 2 TIMES DAILY
Qty: 56 TABLET | Refills: 0 | Status: SHIPPED | OUTPATIENT
Start: 2019-03-21 | End: 2019-05-08

## 2019-03-21 RX ORDER — ROCURONIUM BROMIDE 10 MG/ML
INJECTION, SOLUTION INTRAVENOUS PRN
Status: DISCONTINUED | OUTPATIENT
Start: 2019-03-21 | End: 2019-03-21 | Stop reason: SDUPTHER

## 2019-03-21 RX ORDER — HYDROCODONE BITARTRATE AND ACETAMINOPHEN 5; 325 MG/1; MG/1
2 TABLET ORAL EVERY 4 HOURS PRN
Status: DISCONTINUED | OUTPATIENT
Start: 2019-03-21 | End: 2019-03-23 | Stop reason: HOSPADM

## 2019-03-21 RX ORDER — GLYCOPYRROLATE 1 MG/5 ML
SYRINGE (ML) INTRAVENOUS PRN
Status: DISCONTINUED | OUTPATIENT
Start: 2019-03-21 | End: 2019-03-21 | Stop reason: SDUPTHER

## 2019-03-21 RX ADMIN — Medication 3 MG: at 13:26

## 2019-03-21 RX ADMIN — PHENYLEPHRINE HYDROCHLORIDE 100 MCG: 10 INJECTION INTRAVENOUS at 13:12

## 2019-03-21 RX ADMIN — CEFAZOLIN SODIUM 2 G: 2 SOLUTION INTRAVENOUS at 10:49

## 2019-03-21 RX ADMIN — FENTANYL CITRATE 50 MCG: 50 INJECTION, SOLUTION INTRAMUSCULAR; INTRAVENOUS at 11:57

## 2019-03-21 RX ADMIN — MIDAZOLAM HYDROCHLORIDE 1 MG: 1 INJECTION, SOLUTION INTRAMUSCULAR; INTRAVENOUS at 10:49

## 2019-03-21 RX ADMIN — PHENYLEPHRINE HYDROCHLORIDE 100 MCG: 10 INJECTION INTRAVENOUS at 12:54

## 2019-03-21 RX ADMIN — FENTANYL CITRATE 25 MCG: 50 INJECTION, SOLUTION INTRAMUSCULAR; INTRAVENOUS at 09:57

## 2019-03-21 RX ADMIN — ROCURONIUM BROMIDE 30 MG: 10 SOLUTION INTRAVENOUS at 10:58

## 2019-03-21 RX ADMIN — ONDANSETRON HYDROCHLORIDE 4 MG: 2 INJECTION, SOLUTION INTRAMUSCULAR; INTRAVENOUS at 12:56

## 2019-03-21 RX ADMIN — ASPIRIN 325 MG: 325 TABLET, DELAYED RELEASE ORAL at 21:10

## 2019-03-21 RX ADMIN — FENTANYL CITRATE 25 MCG: 50 INJECTION, SOLUTION INTRAMUSCULAR; INTRAVENOUS at 09:24

## 2019-03-21 RX ADMIN — SODIUM CHLORIDE, POTASSIUM CHLORIDE, SODIUM LACTATE AND CALCIUM CHLORIDE: 600; 310; 30; 20 INJECTION, SOLUTION INTRAVENOUS at 15:53

## 2019-03-21 RX ADMIN — Medication 0.6 MG: at 13:26

## 2019-03-21 RX ADMIN — DOCUSATE SODIUM 100 MG: 100 CAPSULE, LIQUID FILLED ORAL at 21:10

## 2019-03-21 RX ADMIN — SODIUM CHLORIDE: 9 INJECTION, SOLUTION INTRAVENOUS at 12:56

## 2019-03-21 RX ADMIN — SODIUM CHLORIDE: 9 INJECTION, SOLUTION INTRAVENOUS at 10:30

## 2019-03-21 RX ADMIN — PHENYLEPHRINE HYDROCHLORIDE 100 MCG: 10 INJECTION INTRAVENOUS at 11:48

## 2019-03-21 RX ADMIN — CEFAZOLIN SODIUM 2 G: 2 SOLUTION INTRAVENOUS at 18:24

## 2019-03-21 RX ADMIN — DANTROLENE SODIUM 75 MG: 25 CAPSULE ORAL at 21:10

## 2019-03-21 RX ADMIN — LIDOCAINE HYDROCHLORIDE 80 MG: 20 INJECTION, SOLUTION EPIDURAL; INFILTRATION; INTRACAUDAL; PERINEURAL at 10:57

## 2019-03-21 RX ADMIN — PROPOFOL 100 MG: 10 INJECTION, EMULSION INTRAVENOUS at 10:57

## 2019-03-21 RX ADMIN — FENTANYL CITRATE 50 MCG: 50 INJECTION, SOLUTION INTRAMUSCULAR; INTRAVENOUS at 10:57

## 2019-03-21 RX ADMIN — DEXAMETHASONE SODIUM PHOSPHATE 8 MG: 4 INJECTION, SOLUTION INTRAMUSCULAR; INTRAVENOUS at 11:15

## 2019-03-21 RX ADMIN — PHENYLEPHRINE HYDROCHLORIDE 100 MCG: 10 INJECTION INTRAVENOUS at 12:06

## 2019-03-21 RX ADMIN — BACLOFEN 20 MG: 20 TABLET ORAL at 21:10

## 2019-03-21 RX ADMIN — PHENYLEPHRINE HYDROCHLORIDE 100 MCG: 10 INJECTION INTRAVENOUS at 11:06

## 2019-03-21 ASSESSMENT — PULMONARY FUNCTION TESTS
PIF_VALUE: 27
PIF_VALUE: 18
PIF_VALUE: 3
PIF_VALUE: 19
PIF_VALUE: 20
PIF_VALUE: 24
PIF_VALUE: 27
PIF_VALUE: 26
PIF_VALUE: 25
PIF_VALUE: 26
PIF_VALUE: 19
PIF_VALUE: 22
PIF_VALUE: 18
PIF_VALUE: 22
PIF_VALUE: 23
PIF_VALUE: 15
PIF_VALUE: 26
PIF_VALUE: 23
PIF_VALUE: 23
PIF_VALUE: 31
PIF_VALUE: 23
PIF_VALUE: 15
PIF_VALUE: 19
PIF_VALUE: 22
PIF_VALUE: 4
PIF_VALUE: 26
PIF_VALUE: 20
PIF_VALUE: 0
PIF_VALUE: 23
PIF_VALUE: 26
PIF_VALUE: 25
PIF_VALUE: 25
PIF_VALUE: 26
PIF_VALUE: 22
PIF_VALUE: 22
PIF_VALUE: 25
PIF_VALUE: 22
PIF_VALUE: 26
PIF_VALUE: 22
PIF_VALUE: 19
PIF_VALUE: 0
PIF_VALUE: 32
PIF_VALUE: 23
PIF_VALUE: 19
PIF_VALUE: 24
PIF_VALUE: 26
PIF_VALUE: 26
PIF_VALUE: 16
PIF_VALUE: 22
PIF_VALUE: 19
PIF_VALUE: 25
PIF_VALUE: 16
PIF_VALUE: 19
PIF_VALUE: 24
PIF_VALUE: 22
PIF_VALUE: 24
PIF_VALUE: 20
PIF_VALUE: 26
PIF_VALUE: 24
PIF_VALUE: 15
PIF_VALUE: 23
PIF_VALUE: 24
PIF_VALUE: 26
PIF_VALUE: 22
PIF_VALUE: 22
PIF_VALUE: 24
PIF_VALUE: 26
PIF_VALUE: 16
PIF_VALUE: 22
PIF_VALUE: 0
PIF_VALUE: 26
PIF_VALUE: 23
PIF_VALUE: 22
PIF_VALUE: 26
PIF_VALUE: 18
PIF_VALUE: 27
PIF_VALUE: 21
PIF_VALUE: 18
PIF_VALUE: 3
PIF_VALUE: 16
PIF_VALUE: 30
PIF_VALUE: 3
PIF_VALUE: 18
PIF_VALUE: 23
PIF_VALUE: 23
PIF_VALUE: 20
PIF_VALUE: 26
PIF_VALUE: 27
PIF_VALUE: 23
PIF_VALUE: 23
PIF_VALUE: 26
PIF_VALUE: 25
PIF_VALUE: 22
PIF_VALUE: 23
PIF_VALUE: 26
PIF_VALUE: 26
PIF_VALUE: 18
PIF_VALUE: 26
PIF_VALUE: 27
PIF_VALUE: 14
PIF_VALUE: 14
PIF_VALUE: 23
PIF_VALUE: 16
PIF_VALUE: 2
PIF_VALUE: 27
PIF_VALUE: 23
PIF_VALUE: 25
PIF_VALUE: 22
PIF_VALUE: 26
PIF_VALUE: 26
PIF_VALUE: 22
PIF_VALUE: 19
PIF_VALUE: 22
PIF_VALUE: 0
PIF_VALUE: 22
PIF_VALUE: 14
PIF_VALUE: 14
PIF_VALUE: 22
PIF_VALUE: 20
PIF_VALUE: 26
PIF_VALUE: 26
PIF_VALUE: 20
PIF_VALUE: 13
PIF_VALUE: 26
PIF_VALUE: 22
PIF_VALUE: 1
PIF_VALUE: 23
PIF_VALUE: 26
PIF_VALUE: 19
PIF_VALUE: 16
PIF_VALUE: 19
PIF_VALUE: 22
PIF_VALUE: 23
PIF_VALUE: 26
PIF_VALUE: 14
PIF_VALUE: 18
PIF_VALUE: 20
PIF_VALUE: 19
PIF_VALUE: 22
PIF_VALUE: 28
PIF_VALUE: 26
PIF_VALUE: 20
PIF_VALUE: 24
PIF_VALUE: 19
PIF_VALUE: 23
PIF_VALUE: 22
PIF_VALUE: 0
PIF_VALUE: 31
PIF_VALUE: 25
PIF_VALUE: 23
PIF_VALUE: 20
PIF_VALUE: 26
PIF_VALUE: 19
PIF_VALUE: 22
PIF_VALUE: 23
PIF_VALUE: 24
PIF_VALUE: 20
PIF_VALUE: 26
PIF_VALUE: 26
PIF_VALUE: 0
PIF_VALUE: 19
PIF_VALUE: 23
PIF_VALUE: 20
PIF_VALUE: 24
PIF_VALUE: 22
PIF_VALUE: 20
PIF_VALUE: 16
PIF_VALUE: 19
PIF_VALUE: 19
PIF_VALUE: 22
PIF_VALUE: 26
PIF_VALUE: 20
PIF_VALUE: 21
PIF_VALUE: 26
PIF_VALUE: 26
PIF_VALUE: 24
PIF_VALUE: 20
PIF_VALUE: 23
PIF_VALUE: 20
PIF_VALUE: 26
PIF_VALUE: 22

## 2019-03-21 ASSESSMENT — PAIN SCALES - GENERAL
PAINLEVEL_OUTOF10: 0
PAINLEVEL_OUTOF10: 0
PAINLEVEL_OUTOF10: 10
PAINLEVEL_OUTOF10: 0
PAINLEVEL_OUTOF10: 10
PAINLEVEL_OUTOF10: 0

## 2019-03-21 ASSESSMENT — ENCOUNTER SYMPTOMS: SHORTNESS OF BREATH: 1

## 2019-03-22 LAB
ANION GAP SERPL CALCULATED.3IONS-SCNC: 11 MMOL/L (ref 7–16)
BASOPHILS ABSOLUTE: 0.01 E9/L (ref 0–0.2)
BASOPHILS RELATIVE PERCENT: 0.1 % (ref 0–2)
BLOOD BANK DISPENSE STATUS: NORMAL
BLOOD BANK DISPENSE STATUS: NORMAL
BLOOD BANK PRODUCT CODE: NORMAL
BLOOD BANK PRODUCT CODE: NORMAL
BPU ID: NORMAL
BPU ID: NORMAL
BUN BLDV-MCNC: 22 MG/DL (ref 8–23)
CALCIUM SERPL-MCNC: 8.2 MG/DL (ref 8.6–10.2)
CHLORIDE BLD-SCNC: 103 MMOL/L (ref 98–107)
CO2: 26 MMOL/L (ref 22–29)
CREAT SERPL-MCNC: 0.7 MG/DL (ref 0.5–1)
DESCRIPTION BLOOD BANK: NORMAL
DESCRIPTION BLOOD BANK: NORMAL
EOSINOPHILS ABSOLUTE: 0 E9/L (ref 0.05–0.5)
EOSINOPHILS RELATIVE PERCENT: 0 % (ref 0–6)
GFR AFRICAN AMERICAN: >60
GFR NON-AFRICAN AMERICAN: >60 ML/MIN/1.73
GLUCOSE BLD-MCNC: 185 MG/DL (ref 74–99)
HCT VFR BLD CALC: 20.2 % (ref 34–48)
HEMOGLOBIN: 6.4 G/DL (ref 11.5–15.5)
IMMATURE GRANULOCYTES #: 0.07 E9/L
IMMATURE GRANULOCYTES %: 0.7 % (ref 0–5)
LYMPHOCYTES ABSOLUTE: 1.32 E9/L (ref 1.5–4)
LYMPHOCYTES RELATIVE PERCENT: 13.7 % (ref 20–42)
MCH RBC QN AUTO: 33 PG (ref 26–35)
MCHC RBC AUTO-ENTMCNC: 31.7 % (ref 32–34.5)
MCV RBC AUTO: 104.1 FL (ref 80–99.9)
MONOCYTES ABSOLUTE: 0.85 E9/L (ref 0.1–0.95)
MONOCYTES RELATIVE PERCENT: 8.8 % (ref 2–12)
MRSA CULTURE ONLY: NORMAL
NEUTROPHILS ABSOLUTE: 7.38 E9/L (ref 1.8–7.3)
NEUTROPHILS RELATIVE PERCENT: 76.7 % (ref 43–80)
PDW BLD-RTO: 14.7 FL (ref 11.5–15)
PLATELET # BLD: 339 E9/L (ref 130–450)
PMV BLD AUTO: 10.9 FL (ref 7–12)
POTASSIUM SERPL-SCNC: 4.7 MMOL/L (ref 3.5–5)
RBC # BLD: 1.94 E12/L (ref 3.5–5.5)
SODIUM BLD-SCNC: 140 MMOL/L (ref 132–146)
WBC # BLD: 9.6 E9/L (ref 4.5–11.5)

## 2019-03-22 PROCEDURE — 36430 TRANSFUSION BLD/BLD COMPNT: CPT

## 2019-03-22 PROCEDURE — 2580000003 HC RX 258: Performed by: STUDENT IN AN ORGANIZED HEALTH CARE EDUCATION/TRAINING PROGRAM

## 2019-03-22 PROCEDURE — 97530 THERAPEUTIC ACTIVITIES: CPT

## 2019-03-22 PROCEDURE — 80048 BASIC METABOLIC PNL TOTAL CA: CPT

## 2019-03-22 PROCEDURE — 6360000002 HC RX W HCPCS: Performed by: INTERNAL MEDICINE

## 2019-03-22 PROCEDURE — 2580000003 HC RX 258: Performed by: INTERNAL MEDICINE

## 2019-03-22 PROCEDURE — 1200000000 HC SEMI PRIVATE

## 2019-03-22 PROCEDURE — 6360000002 HC RX W HCPCS: Performed by: STUDENT IN AN ORGANIZED HEALTH CARE EDUCATION/TRAINING PROGRAM

## 2019-03-22 PROCEDURE — 99233 SBSQ HOSP IP/OBS HIGH 50: CPT | Performed by: INTERNAL MEDICINE

## 2019-03-22 PROCEDURE — 97166 OT EVAL MOD COMPLEX 45 MIN: CPT

## 2019-03-22 PROCEDURE — 36415 COLL VENOUS BLD VENIPUNCTURE: CPT

## 2019-03-22 PROCEDURE — 97161 PT EVAL LOW COMPLEX 20 MIN: CPT

## 2019-03-22 PROCEDURE — P9016 RBC LEUKOCYTES REDUCED: HCPCS

## 2019-03-22 PROCEDURE — 6370000000 HC RX 637 (ALT 250 FOR IP): Performed by: STUDENT IN AN ORGANIZED HEALTH CARE EDUCATION/TRAINING PROGRAM

## 2019-03-22 PROCEDURE — 86923 COMPATIBILITY TEST ELECTRIC: CPT

## 2019-03-22 PROCEDURE — 85025 COMPLETE CBC W/AUTO DIFF WBC: CPT

## 2019-03-22 RX ORDER — 0.9 % SODIUM CHLORIDE 0.9 %
250 INTRAVENOUS SOLUTION INTRAVENOUS ONCE
Status: COMPLETED | OUTPATIENT
Start: 2019-03-22 | End: 2019-03-22

## 2019-03-22 RX ORDER — 0.9 % SODIUM CHLORIDE 0.9 %
250 INTRAVENOUS SOLUTION INTRAVENOUS ONCE
Status: COMPLETED | OUTPATIENT
Start: 2019-03-22 | End: 2019-03-23

## 2019-03-22 RX ORDER — FUROSEMIDE 10 MG/ML
10 INJECTION INTRAMUSCULAR; INTRAVENOUS ONCE
Status: COMPLETED | OUTPATIENT
Start: 2019-03-22 | End: 2019-03-22

## 2019-03-22 RX ADMIN — BACLOFEN 20 MG: 20 TABLET ORAL at 20:44

## 2019-03-22 RX ADMIN — CITALOPRAM 20 MG: 20 TABLET ORAL at 10:01

## 2019-03-22 RX ADMIN — FUROSEMIDE 10 MG: 10 INJECTION, SOLUTION INTRAVENOUS at 14:08

## 2019-03-22 RX ADMIN — DOCUSATE SODIUM 100 MG: 100 CAPSULE, LIQUID FILLED ORAL at 08:58

## 2019-03-22 RX ADMIN — SODIUM CHLORIDE 250 ML: 9 INJECTION, SOLUTION INTRAVENOUS at 11:49

## 2019-03-22 RX ADMIN — Medication 250 MG: at 10:01

## 2019-03-22 RX ADMIN — NORTRIPTYLINE HYDROCHLORIDE 25 MG: 25 CAPSULE ORAL at 10:00

## 2019-03-22 RX ADMIN — CEFAZOLIN SODIUM 2 G: 2 SOLUTION INTRAVENOUS at 02:59

## 2019-03-22 RX ADMIN — EZETIMIBE 10 MG: 10 TABLET ORAL at 10:02

## 2019-03-22 RX ADMIN — BACLOFEN 20 MG: 20 TABLET ORAL at 10:00

## 2019-03-22 RX ADMIN — DOCUSATE SODIUM 100 MG: 100 CAPSULE, LIQUID FILLED ORAL at 20:44

## 2019-03-22 RX ADMIN — LEVOTHYROXINE SODIUM 75 MCG: 0.07 TABLET ORAL at 10:02

## 2019-03-22 RX ADMIN — ASPIRIN 325 MG: 325 TABLET, DELAYED RELEASE ORAL at 20:45

## 2019-03-22 RX ADMIN — BACLOFEN 20 MG: 20 TABLET ORAL at 14:15

## 2019-03-22 RX ADMIN — DANTROLENE SODIUM 25 MG: 25 CAPSULE ORAL at 09:58

## 2019-03-22 RX ADMIN — DANTROLENE SODIUM 75 MG: 25 CAPSULE ORAL at 20:44

## 2019-03-22 RX ADMIN — ASPIRIN 325 MG: 325 TABLET, DELAYED RELEASE ORAL at 09:00

## 2019-03-22 RX ADMIN — SODIUM CHLORIDE 250 ML: 9 INJECTION, SOLUTION INTRAVENOUS at 07:30

## 2019-03-22 RX ADMIN — ATORVASTATIN CALCIUM 10 MG: 10 TABLET, FILM COATED ORAL at 10:02

## 2019-03-22 RX ADMIN — Medication 10 ML: at 20:45

## 2019-03-22 RX ADMIN — HYDROCODONE BITARTRATE AND ACETAMINOPHEN 1 TABLET: 5; 325 TABLET ORAL at 06:06

## 2019-03-22 ASSESSMENT — PAIN SCALES - GENERAL: PAINLEVEL_OUTOF10: 6

## 2019-03-23 VITALS
BODY MASS INDEX: 38.5 KG/M2 | SYSTOLIC BLOOD PRESSURE: 130 MMHG | HEIGHT: 60 IN | RESPIRATION RATE: 16 BRPM | WEIGHT: 196.1 LBS | DIASTOLIC BLOOD PRESSURE: 56 MMHG | OXYGEN SATURATION: 94 % | HEART RATE: 107 BPM | TEMPERATURE: 97.8 F

## 2019-03-23 LAB
ANION GAP SERPL CALCULATED.3IONS-SCNC: 12 MMOL/L (ref 7–16)
BASOPHILS ABSOLUTE: 0.03 E9/L (ref 0–0.2)
BASOPHILS RELATIVE PERCENT: 0.4 % (ref 0–2)
BUN BLDV-MCNC: 20 MG/DL (ref 8–23)
CALCIUM SERPL-MCNC: 8.3 MG/DL (ref 8.6–10.2)
CHLORIDE BLD-SCNC: 102 MMOL/L (ref 98–107)
CO2: 25 MMOL/L (ref 22–29)
CREAT SERPL-MCNC: 0.6 MG/DL (ref 0.5–1)
EOSINOPHILS ABSOLUTE: 0.13 E9/L (ref 0.05–0.5)
EOSINOPHILS RELATIVE PERCENT: 1.7 % (ref 0–6)
GFR AFRICAN AMERICAN: >60
GFR NON-AFRICAN AMERICAN: >60 ML/MIN/1.73
GLUCOSE BLD-MCNC: 154 MG/DL (ref 74–99)
HCT VFR BLD CALC: 32.1 % (ref 34–48)
HEMOGLOBIN: 10.6 G/DL (ref 11.5–15.5)
IMMATURE GRANULOCYTES #: 0.1 E9/L
IMMATURE GRANULOCYTES %: 1.3 % (ref 0–5)
LYMPHOCYTES ABSOLUTE: 0.89 E9/L (ref 1.5–4)
LYMPHOCYTES RELATIVE PERCENT: 12 % (ref 20–42)
MCH RBC QN AUTO: 31.6 PG (ref 26–35)
MCHC RBC AUTO-ENTMCNC: 33 % (ref 32–34.5)
MCV RBC AUTO: 95.8 FL (ref 80–99.9)
MONOCYTES ABSOLUTE: 0.48 E9/L (ref 0.1–0.95)
MONOCYTES RELATIVE PERCENT: 6.5 % (ref 2–12)
NEUTROPHILS ABSOLUTE: 5.8 E9/L (ref 1.8–7.3)
NEUTROPHILS RELATIVE PERCENT: 78.1 % (ref 43–80)
PDW BLD-RTO: 17.2 FL (ref 11.5–15)
PLATELET # BLD: 311 E9/L (ref 130–450)
PMV BLD AUTO: 10.6 FL (ref 7–12)
POTASSIUM SERPL-SCNC: 3.6 MMOL/L (ref 3.5–5)
RBC # BLD: 3.35 E12/L (ref 3.5–5.5)
SODIUM BLD-SCNC: 139 MMOL/L (ref 132–146)
WBC # BLD: 7.4 E9/L (ref 4.5–11.5)

## 2019-03-23 PROCEDURE — 99239 HOSP IP/OBS DSCHRG MGMT >30: CPT | Performed by: INTERNAL MEDICINE

## 2019-03-23 PROCEDURE — 6370000000 HC RX 637 (ALT 250 FOR IP): Performed by: INTERNAL MEDICINE

## 2019-03-23 PROCEDURE — APPSS45 APP SPLIT SHARED TIME 31-45 MINUTES: Performed by: PHYSICIAN ASSISTANT

## 2019-03-23 PROCEDURE — 2580000003 HC RX 258: Performed by: STUDENT IN AN ORGANIZED HEALTH CARE EDUCATION/TRAINING PROGRAM

## 2019-03-23 PROCEDURE — 6370000000 HC RX 637 (ALT 250 FOR IP): Performed by: STUDENT IN AN ORGANIZED HEALTH CARE EDUCATION/TRAINING PROGRAM

## 2019-03-23 PROCEDURE — 85025 COMPLETE CBC W/AUTO DIFF WBC: CPT

## 2019-03-23 PROCEDURE — 80048 BASIC METABOLIC PNL TOTAL CA: CPT

## 2019-03-23 PROCEDURE — 36415 COLL VENOUS BLD VENIPUNCTURE: CPT

## 2019-03-23 RX ORDER — HYDROCODONE BITARTRATE AND ACETAMINOPHEN 5; 325 MG/1; MG/1
1 TABLET ORAL EVERY 6 HOURS PRN
Qty: 20 TABLET | Refills: 0 | Status: SHIPPED | OUTPATIENT
Start: 2019-03-23 | End: 2019-03-28

## 2019-03-23 RX ORDER — POTASSIUM CHLORIDE 20 MEQ/1
20 TABLET, EXTENDED RELEASE ORAL DAILY
Status: DISCONTINUED | OUTPATIENT
Start: 2019-03-23 | End: 2019-03-23 | Stop reason: HOSPADM

## 2019-03-23 RX ORDER — FUROSEMIDE 20 MG/1
20 TABLET ORAL DAILY
Status: DISCONTINUED | OUTPATIENT
Start: 2019-03-23 | End: 2019-03-23 | Stop reason: HOSPADM

## 2019-03-23 RX ORDER — DANTROLENE SODIUM 25 MG/1
CAPSULE ORAL
Qty: 360 CAPSULE | Refills: 0 | Status: SHIPPED | OUTPATIENT
Start: 2019-03-23 | End: 2019-05-08

## 2019-03-23 RX ADMIN — EZETIMIBE 10 MG: 10 TABLET ORAL at 09:02

## 2019-03-23 RX ADMIN — POTASSIUM CHLORIDE 20 MEQ: 20 TABLET, EXTENDED RELEASE ORAL at 09:43

## 2019-03-23 RX ADMIN — FUROSEMIDE 20 MG: 20 TABLET ORAL at 09:43

## 2019-03-23 RX ADMIN — NORTRIPTYLINE HYDROCHLORIDE 25 MG: 25 CAPSULE ORAL at 09:03

## 2019-03-23 RX ADMIN — Medication 10 ML: at 09:04

## 2019-03-23 RX ADMIN — ASPIRIN 325 MG: 325 TABLET, DELAYED RELEASE ORAL at 09:02

## 2019-03-23 RX ADMIN — BACLOFEN 20 MG: 20 TABLET ORAL at 09:03

## 2019-03-23 RX ADMIN — CITALOPRAM 20 MG: 20 TABLET ORAL at 09:03

## 2019-03-23 RX ADMIN — Medication 250 MG: at 09:02

## 2019-03-23 RX ADMIN — DANTROLENE SODIUM 25 MG: 25 CAPSULE ORAL at 09:03

## 2019-03-23 RX ADMIN — DOCUSATE SODIUM 100 MG: 100 CAPSULE, LIQUID FILLED ORAL at 09:02

## 2019-03-24 ENCOUNTER — APPOINTMENT (OUTPATIENT)
Dept: GENERAL RADIOLOGY | Age: 74
DRG: 193 | End: 2019-03-24
Payer: MEDICARE

## 2019-03-24 ENCOUNTER — APPOINTMENT (OUTPATIENT)
Dept: ULTRASOUND IMAGING | Age: 74
DRG: 193 | End: 2019-03-24
Payer: MEDICARE

## 2019-03-24 ENCOUNTER — APPOINTMENT (OUTPATIENT)
Dept: CT IMAGING | Age: 74
DRG: 193 | End: 2019-03-24
Payer: MEDICARE

## 2019-03-24 ENCOUNTER — HOSPITAL ENCOUNTER (INPATIENT)
Age: 74
LOS: 3 days | Discharge: NURSING, REHAB OR LONG-TERM CARE | DRG: 193 | End: 2019-03-27
Attending: EMERGENCY MEDICINE | Admitting: INTERNAL MEDICINE
Payer: MEDICARE

## 2019-03-24 DIAGNOSIS — J11.1 INFLUENZA: Primary | ICD-10-CM

## 2019-03-24 DIAGNOSIS — R91.8 LUNG MASS: ICD-10-CM

## 2019-03-24 LAB
ALBUMIN SERPL-MCNC: 3.7 G/DL (ref 3.5–5.2)
ALP BLD-CCNC: 118 U/L (ref 35–104)
ALT SERPL-CCNC: 71 U/L (ref 0–32)
ANION GAP SERPL CALCULATED.3IONS-SCNC: 11 MMOL/L (ref 7–16)
ANISOCYTOSIS: ABNORMAL
APTT: 22.2 SEC (ref 24.5–35.1)
AST SERPL-CCNC: 406 U/L (ref 0–31)
BASOPHILIC STIPPLING: ABNORMAL
BASOPHILS ABSOLUTE: 0.03 E9/L (ref 0–0.2)
BASOPHILS RELATIVE PERCENT: 0.5 % (ref 0–2)
BILIRUB SERPL-MCNC: 0.9 MG/DL (ref 0–1.2)
BUN BLDV-MCNC: 15 MG/DL (ref 8–23)
CALCIUM SERPL-MCNC: 8.7 MG/DL (ref 8.6–10.2)
CHLORIDE BLD-SCNC: 101 MMOL/L (ref 98–107)
CO2: 25 MMOL/L (ref 22–29)
CREAT SERPL-MCNC: 0.6 MG/DL (ref 0.5–1)
D DIMER: 1724 NG/ML DDU
EOSINOPHILS ABSOLUTE: 0.03 E9/L (ref 0.05–0.5)
EOSINOPHILS RELATIVE PERCENT: 0.5 % (ref 0–6)
GFR AFRICAN AMERICAN: >60
GFR NON-AFRICAN AMERICAN: >60 ML/MIN/1.73
GLUCOSE BLD-MCNC: 205 MG/DL (ref 74–99)
HCT VFR BLD CALC: 33.3 % (ref 34–48)
HEMOGLOBIN: 11 G/DL (ref 11.5–15.5)
IMMATURE GRANULOCYTES #: 0.07 E9/L
IMMATURE GRANULOCYTES %: 1.2 % (ref 0–5)
INFLUENZA A BY PCR: DETECTED
INFLUENZA B BY PCR: NOT DETECTED
INR BLD: 1
LACTIC ACID: 1.4 MMOL/L (ref 0.5–2.2)
LYMPHOCYTES ABSOLUTE: 0.54 E9/L (ref 1.5–4)
LYMPHOCYTES RELATIVE PERCENT: 8.9 % (ref 20–42)
MCH RBC QN AUTO: 32.3 PG (ref 26–35)
MCHC RBC AUTO-ENTMCNC: 33 % (ref 32–34.5)
MCV RBC AUTO: 97.7 FL (ref 80–99.9)
MONOCYTES ABSOLUTE: 0.31 E9/L (ref 0.1–0.95)
MONOCYTES RELATIVE PERCENT: 5.1 % (ref 2–12)
NEUTROPHILS ABSOLUTE: 5.08 E9/L (ref 1.8–7.3)
NEUTROPHILS RELATIVE PERCENT: 83.8 % (ref 43–80)
PDW BLD-RTO: 16.5 FL (ref 11.5–15)
PLATELET # BLD: 308 E9/L (ref 130–450)
PMV BLD AUTO: 9.7 FL (ref 7–12)
POLYCHROMASIA: ABNORMAL
POTASSIUM SERPL-SCNC: 4.6 MMOL/L (ref 3.5–5)
PRO-BNP: 252 PG/ML (ref 0–125)
PROTHROMBIN TIME: 11.1 SEC (ref 9.3–12.4)
RBC # BLD: 3.41 E12/L (ref 3.5–5.5)
SODIUM BLD-SCNC: 137 MMOL/L (ref 132–146)
TOTAL PROTEIN: 6.4 G/DL (ref 6.4–8.3)
TROPONIN: <0.01 NG/ML (ref 0–0.03)
WBC # BLD: 6.1 E9/L (ref 4.5–11.5)

## 2019-03-24 PROCEDURE — 6360000004 HC RX CONTRAST MEDICATION: Performed by: RADIOLOGY

## 2019-03-24 PROCEDURE — 85378 FIBRIN DEGRADE SEMIQUANT: CPT

## 2019-03-24 PROCEDURE — 85610 PROTHROMBIN TIME: CPT

## 2019-03-24 PROCEDURE — 83880 ASSAY OF NATRIURETIC PEPTIDE: CPT

## 2019-03-24 PROCEDURE — 2580000003 HC RX 258: Performed by: RADIOLOGY

## 2019-03-24 PROCEDURE — 99285 EMERGENCY DEPT VISIT HI MDM: CPT

## 2019-03-24 PROCEDURE — 87040 BLOOD CULTURE FOR BACTERIA: CPT

## 2019-03-24 PROCEDURE — 93005 ELECTROCARDIOGRAM TRACING: CPT

## 2019-03-24 PROCEDURE — 71275 CT ANGIOGRAPHY CHEST: CPT

## 2019-03-24 PROCEDURE — 1200000000 HC SEMI PRIVATE

## 2019-03-24 PROCEDURE — 2700000000 HC OXYGEN THERAPY PER DAY

## 2019-03-24 PROCEDURE — 84484 ASSAY OF TROPONIN QUANT: CPT

## 2019-03-24 PROCEDURE — 85730 THROMBOPLASTIN TIME PARTIAL: CPT

## 2019-03-24 PROCEDURE — 36415 COLL VENOUS BLD VENIPUNCTURE: CPT

## 2019-03-24 PROCEDURE — 83605 ASSAY OF LACTIC ACID: CPT

## 2019-03-24 PROCEDURE — 85025 COMPLETE CBC W/AUTO DIFF WBC: CPT

## 2019-03-24 PROCEDURE — 87502 INFLUENZA DNA AMP PROBE: CPT

## 2019-03-24 PROCEDURE — 93970 EXTREMITY STUDY: CPT

## 2019-03-24 PROCEDURE — 71045 X-RAY EXAM CHEST 1 VIEW: CPT

## 2019-03-24 PROCEDURE — 6370000000 HC RX 637 (ALT 250 FOR IP): Performed by: EMERGENCY MEDICINE

## 2019-03-24 PROCEDURE — 80053 COMPREHEN METABOLIC PANEL: CPT

## 2019-03-24 RX ORDER — SODIUM CHLORIDE 0.9 % (FLUSH) 0.9 %
10 SYRINGE (ML) INJECTION PRN
Status: COMPLETED | OUTPATIENT
Start: 2019-03-24 | End: 2019-03-24

## 2019-03-24 RX ORDER — 0.9 % SODIUM CHLORIDE 0.9 %
1000 INTRAVENOUS SOLUTION INTRAVENOUS ONCE
Status: DISCONTINUED | OUTPATIENT
Start: 2019-03-24 | End: 2019-03-27 | Stop reason: HOSPADM

## 2019-03-24 RX ORDER — OSELTAMIVIR PHOSPHATE 75 MG/1
75 CAPSULE ORAL ONCE
Status: COMPLETED | OUTPATIENT
Start: 2019-03-24 | End: 2019-03-24

## 2019-03-24 RX ADMIN — Medication 10 ML: at 18:24

## 2019-03-24 RX ADMIN — OSELTAMIVIR PHOSPHATE 75 MG: 75 CAPSULE ORAL at 19:16

## 2019-03-24 RX ADMIN — IOPAMIDOL 70 ML: 755 INJECTION, SOLUTION INTRAVENOUS at 18:27

## 2019-03-24 NOTE — ED PROVIDER NOTES
HISTORY OF PRESENT ILLNESS:  (Nurses Notes Reviewed)    Chief Complaint:  Shortness of Breath (discharged yesterday for recent femur sx on 3/21/19; left femur; started having SOB this morning)         Maria Ines Prince is a 69 y.o. old female with history of severe progressive MS for which she is bedbound and wheelchair-bound. The patient had femur fracture 2 weeks ago due to with ORIF. She is presenting to the emergency department for new onset shortness of breath started last night associated with orthopnea and wheezing, which began 1 day(s) prior to arrival.  The patient stated that she was not placed on any anticoagulation after the surgery as her hemoglobin dropped during the surgery and she was hypotensive. Since onset the symptoms have been persistent and moderate in severity. There was no history of cough, fever, chills, pleuritic chest pain or dizziness. The patient denies any sputum production. No hematemesis. REVIEW OF SYSTEMS  Unless otherwise stated in this report or unable to obtain because of the patient's clinical or mental status as evidenced by the medical record, this patients's positive and negative responses for Review of Systems, constitutional, psych, eyes, ENT, cardiovascular, respiratory, gastrointestinal, neurological, genitourinary, musculoskeletal, integument systems and systems related to the presenting problem are either stated in the preceding or were not pertinent or were negative for the symptoms and/or complaints related to the medical problem. At least 10 organ systems were reviewed and pertinent negative findings below:    Consitutional: No fever chills change or unexplained weight loss.   Eye: No vision changes or diplopia  ENT: No dysphagia or ear drainage  Head: No head injury, rashes or headaches  Neck No neck rigidity deformity or masses  Lungs: No cough wheezing or hemoptysis  Heart: no palpitation diaphoresis or syncope  Abdomen No Abdominal pain or hematemesis. Genitourinary: No discharge or genital lesions reported. Neurologic: No weakness numbness or gait abnormalities. Psychiatric; No hallucination, delusion homicidal or suicidal thoughts. Physical Exam:    General:  No acute distress noted. Patient is well nourished and well developed. Vital signs and nurses assesement reviewed. Head: Normocephalic, atraumatic, oral exam showed no abnormalities. Eyes: PERRLA, EOMI, No scleral icterus or papliedema. Neck: No thyroid masses, carotid bruits, tracheal deviation or significant adenopathy. Lungs: Clear to auscultation bilaterally. No rales, rhonchi or pleural rubs. Heart: Normal PMI, No gallop, murmurs, rubs or abnormal heart sounds. Abdomen: Soft without tenderness rigidity, rebound tenderness, organ enlargement or masses. Normal bowel sounds activity. Extremities: No dependent edema, cyanosis, calf tenderness or deformities. Edmund's sign negative bilaterally. Peripheral pulses were normal and symmetrical.    Neurologic: Awake and alert, with normal speech and comprehension. Cranial nerve grossly intact, normal muscle power, tone and deep tendon reflexes. No sensory loss. Normal gait and co-ordination. Skin: No rashes, ulcers, cyanosis or pallor. Capillary refill normal.          --------------------------------------------- PAST HISTORY ---------------------------------------------      Past Medical History:  has a past medical history of Asthma, Hepatitis, High cholesterol, Hypertension, Hypothyroidism, MS (multiple sclerosis) ([facility] [address] Osteoporosis, and Sarcoidosis. Past Surgical History:  has a past surgical history that includes knee surgery; Hysterectomy; First rib removal; Cholecystectomy (1990s); and Femur fracture surgery (Left, 3/21/2019). Social History:  reports that she has never smoked. She has never used smokeless tobacco. She reports that she does not drink alcohol or use drugs.     Family History: family history includes Cancer in her father; Heart Disease in her mother; Stroke in her mother. The patients home medications have been reviewed. Allergies: Patient has no known allergies.     -------------------------------------------------- RESULTS -------------------------------------------------      Results for orders placed or performed during the hospital encounter of 03/24/19   Rapid influenza A/B antigens   Result Value Ref Range    Influenza A by PCR DETECTED (A) Not Detected    Influenza B by PCR Not Detected Not Detected   CBC Auto Differential   Result Value Ref Range    WBC 6.1 4.5 - 11.5 E9/L    RBC 3.41 (L) 3.50 - 5.50 E12/L    Hemoglobin 11.0 (L) 11.5 - 15.5 g/dL    Hematocrit 33.3 (L) 34.0 - 48.0 %    MCV 97.7 80.0 - 99.9 fL    MCH 32.3 26.0 - 35.0 pg    MCHC 33.0 32.0 - 34.5 %    RDW 16.5 (H) 11.5 - 15.0 fL    Platelets 992 566 - 340 E9/L    MPV 9.7 7.0 - 12.0 fL    Neutrophils % 83.8 (H) 43.0 - 80.0 %    Immature Granulocytes % 1.2 0.0 - 5.0 %    Lymphocytes % 8.9 (L) 20.0 - 42.0 %    Monocytes % 5.1 2.0 - 12.0 %    Eosinophils % 0.5 0.0 - 6.0 %    Basophils % 0.5 0.0 - 2.0 %    Neutrophils # 5.08 1.80 - 7.30 E9/L    Immature Granulocytes # 0.07 E9/L    Lymphocytes # 0.54 (L) 1.50 - 4.00 E9/L    Monocytes # 0.31 0.10 - 0.95 E9/L    Eosinophils # 0.03 (L) 0.05 - 0.50 E9/L    Basophils # 0.03 0.00 - 0.20 E9/L    Anisocytosis 1+     Polychromasia 1+     Basophilic Stippling 1+    Comprehensive Metabolic Panel   Result Value Ref Range    Sodium 137 132 - 146 mmol/L    Potassium 4.6 3.5 - 5.0 mmol/L    Chloride 101 98 - 107 mmol/L    CO2 25 22 - 29 mmol/L    Anion Gap 11 7 - 16 mmol/L    Glucose 205 (H) 74 - 99 mg/dL    BUN 15 8 - 23 mg/dL    CREATININE 0.6 0.5 - 1.0 mg/dL    GFR Non-African American >60 >=60 mL/min/1.73    GFR African American >60     Calcium 8.7 8.6 - 10.2 mg/dL    Total Protein 6.4 6.4 - 8.3 g/dL    Alb 3.7 3.5 - 5.2 g/dL    Total Bilirubin 0.9 0.0 - 1.2 mg/dL    Alkaline Phosphatase 118 (H) 35 - 104 U/L    ALT 71 (H) 0 - 32 U/L     (H) 0 - 31 U/L   Protime-INR   Result Value Ref Range    Protime 11.1 9.3 - 12.4 sec    INR 1.0    APTT   Result Value Ref Range    aPTT 22.2 (L) 24.5 - 35.1 sec   Brain Natriuretic Peptide   Result Value Ref Range    Pro- (H) 0 - 125 pg/mL   D-Dimer, Quantitative   Result Value Ref Range    D-Dimer, Quant 1724 ng/mL DDU   Lactic Acid, Plasma   Result Value Ref Range    Lactic Acid 1.4 0.5 - 2.2 mmol/L   Troponin   Result Value Ref Range    Troponin <0.01 0.00 - 0.03 ng/mL   EKG 12 Lead   Result Value Ref Range    Ventricular Rate 107 BPM    Atrial Rate 107 BPM    P-R Interval 174 ms    QRS Duration 98 ms    Q-T Interval 344 ms    QTc Calculation (Bazett) 459 ms    P Axis 49 degrees    R Axis -2 degrees    T Axis 73 degrees     CTA PULMONARY W CONTRAST   Final Result      1. NORMAL CT OF THE THORAX WITH CTA OF THE PULMONARY ARTERIES WITH   CONTRAST ENHANCEMENT. 2. NO EVIDENCE OF PULMONARY EMBOLUS. 3. Right apical spiculated mass measuring 1.7 x 1.6 cm. Cyst is   amenable for CT-guided biopsy for histological sampling. US DUP LOWER EXTREMITIES BILATERAL VENOUS   Final Result   PATENT DEEP VENOUS SYSTEM OF THE BILATERAL LOWER   EXTREMITY. NO EVIDENCE FOR DVT. XR CHEST PORTABLE   Final Result      Mild pulmonary venous congestion      No evidence of airspace consolidation or pulmonary venous congestion. EKG Interpretation    Interpreted by me    Rhythm: normal sinus   Rate: normal  Axis: normal  Ectopy: none  Conduction: normal  ST Segments: no acute change  T Waves: no acute change  Q Waves: none    Clinical Impression: no acute changes and normal EKG      All above test results were reviewed in details. ------------------------- NURSING NOTES AND VITALS REVIEWED ---------------------------   The nursing notes within the ED encounter and vital signs as below have been reviewed.    /62   Pulse 97

## 2019-03-24 NOTE — ED NOTES
Assumed care of patient. Pt lying in bed in no apparent distress.  and daughter at bedside.      Ajit Lopes RN  03/24/19 1919

## 2019-03-25 ENCOUNTER — APPOINTMENT (OUTPATIENT)
Dept: GENERAL RADIOLOGY | Age: 74
DRG: 193 | End: 2019-03-25
Payer: MEDICARE

## 2019-03-25 ENCOUNTER — TELEPHONE (OUTPATIENT)
Dept: ORTHOPEDIC SURGERY | Age: 74
End: 2019-03-25

## 2019-03-25 PROBLEM — E03.9 HYPOTHYROIDISM: Chronic | Status: ACTIVE | Noted: 2019-03-25

## 2019-03-25 PROBLEM — Z86.2 H/O SARCOIDOSIS: Chronic | Status: ACTIVE | Noted: 2019-03-25

## 2019-03-25 PROBLEM — G82.50 SPASTIC QUADRIPARESIS (HCC): Chronic | Status: ACTIVE | Noted: 2017-11-16

## 2019-03-25 PROBLEM — I10 ESSENTIAL HYPERTENSION: Chronic | Status: ACTIVE | Noted: 2019-03-25

## 2019-03-25 PROBLEM — J10.1 INFLUENZA A: Status: ACTIVE | Noted: 2019-03-25

## 2019-03-25 LAB
ANION GAP SERPL CALCULATED.3IONS-SCNC: 12 MMOL/L (ref 7–16)
BASOPHILS ABSOLUTE: 0.03 E9/L (ref 0–0.2)
BASOPHILS RELATIVE PERCENT: 0.5 % (ref 0–2)
BUN BLDV-MCNC: 16 MG/DL (ref 8–23)
CALCIUM SERPL-MCNC: 8.3 MG/DL (ref 8.6–10.2)
CHLORIDE BLD-SCNC: 104 MMOL/L (ref 98–107)
CO2: 25 MMOL/L (ref 22–29)
CREAT SERPL-MCNC: 0.6 MG/DL (ref 0.5–1)
EOSINOPHILS ABSOLUTE: 0.09 E9/L (ref 0.05–0.5)
EOSINOPHILS RELATIVE PERCENT: 1.6 % (ref 0–6)
GFR AFRICAN AMERICAN: >60
GFR NON-AFRICAN AMERICAN: >60 ML/MIN/1.73
GLUCOSE BLD-MCNC: 116 MG/DL (ref 74–99)
HCT VFR BLD CALC: 33 % (ref 34–48)
HEMOGLOBIN: 10.3 G/DL (ref 11.5–15.5)
IMMATURE GRANULOCYTES #: 0.06 E9/L
IMMATURE GRANULOCYTES %: 1.1 % (ref 0–5)
LYMPHOCYTES ABSOLUTE: 1.22 E9/L (ref 1.5–4)
LYMPHOCYTES RELATIVE PERCENT: 22.1 % (ref 20–42)
MCH RBC QN AUTO: 31.5 PG (ref 26–35)
MCHC RBC AUTO-ENTMCNC: 31.2 % (ref 32–34.5)
MCV RBC AUTO: 100.9 FL (ref 80–99.9)
MONOCYTES ABSOLUTE: 0.49 E9/L (ref 0.1–0.95)
MONOCYTES RELATIVE PERCENT: 8.9 % (ref 2–12)
NEUTROPHILS ABSOLUTE: 3.63 E9/L (ref 1.8–7.3)
NEUTROPHILS RELATIVE PERCENT: 65.8 % (ref 43–80)
PDW BLD-RTO: 16.6 FL (ref 11.5–15)
PLATELET # BLD: 337 E9/L (ref 130–450)
PMV BLD AUTO: 10.1 FL (ref 7–12)
POTASSIUM REFLEX MAGNESIUM: 3.8 MMOL/L (ref 3.5–5)
PROCALCITONIN: 0.27 NG/ML (ref 0–0.08)
RBC # BLD: 3.27 E12/L (ref 3.5–5.5)
SODIUM BLD-SCNC: 141 MMOL/L (ref 132–146)
WBC # BLD: 5.5 E9/L (ref 4.5–11.5)

## 2019-03-25 PROCEDURE — 94640 AIRWAY INHALATION TREATMENT: CPT

## 2019-03-25 PROCEDURE — 6370000000 HC RX 637 (ALT 250 FOR IP): Performed by: INTERNAL MEDICINE

## 2019-03-25 PROCEDURE — 36415 COLL VENOUS BLD VENIPUNCTURE: CPT

## 2019-03-25 PROCEDURE — 6360000002 HC RX W HCPCS: Performed by: INTERNAL MEDICINE

## 2019-03-25 PROCEDURE — 85025 COMPLETE CBC W/AUTO DIFF WBC: CPT

## 2019-03-25 PROCEDURE — 1200000000 HC SEMI PRIVATE

## 2019-03-25 PROCEDURE — 94667 MNPJ CHEST WALL 1ST: CPT

## 2019-03-25 PROCEDURE — 2580000003 HC RX 258: Performed by: INTERNAL MEDICINE

## 2019-03-25 PROCEDURE — 99222 1ST HOSP IP/OBS MODERATE 55: CPT | Performed by: INTERNAL MEDICINE

## 2019-03-25 PROCEDURE — 84145 PROCALCITONIN (PCT): CPT

## 2019-03-25 PROCEDURE — 2700000000 HC OXYGEN THERAPY PER DAY

## 2019-03-25 PROCEDURE — 71045 X-RAY EXAM CHEST 1 VIEW: CPT

## 2019-03-25 PROCEDURE — 80048 BASIC METABOLIC PNL TOTAL CA: CPT

## 2019-03-25 RX ORDER — M-VIT,TX,IRON,MINS/CALC/FOLIC 27MG-0.4MG
1 TABLET ORAL DAILY
Status: DISCONTINUED | OUTPATIENT
Start: 2019-03-26 | End: 2019-03-27 | Stop reason: HOSPADM

## 2019-03-25 RX ORDER — LEVOTHYROXINE SODIUM 0.07 MG/1
75 TABLET ORAL
Status: DISCONTINUED | OUTPATIENT
Start: 2019-03-26 | End: 2019-03-27 | Stop reason: HOSPADM

## 2019-03-25 RX ORDER — MULTIVITAMIN/IRON/FOLIC ACID 18MG-0.4MG
250 TABLET ORAL EVERY MORNING
Status: DISCONTINUED | OUTPATIENT
Start: 2019-03-25 | End: 2019-03-27 | Stop reason: HOSPADM

## 2019-03-25 RX ORDER — LYSINE HCL 500 MG
1 TABLET ORAL NIGHTLY
Status: DISCONTINUED | OUTPATIENT
Start: 2019-03-25 | End: 2019-03-27 | Stop reason: HOSPADM

## 2019-03-25 RX ORDER — ALBUTEROL SULFATE 2.5 MG/3ML
2.5 SOLUTION RESPIRATORY (INHALATION) EVERY 4 HOURS
Status: DISCONTINUED | OUTPATIENT
Start: 2019-03-25 | End: 2019-03-27 | Stop reason: HOSPADM

## 2019-03-25 RX ORDER — SODIUM CHLORIDE 0.9 % (FLUSH) 0.9 %
10 SYRINGE (ML) INJECTION PRN
Status: DISCONTINUED | OUTPATIENT
Start: 2019-03-25 | End: 2019-03-27 | Stop reason: HOSPADM

## 2019-03-25 RX ORDER — BACLOFEN 20 MG/1
20 TABLET ORAL 4 TIMES DAILY
Status: DISCONTINUED | OUTPATIENT
Start: 2019-03-25 | End: 2019-03-27 | Stop reason: HOSPADM

## 2019-03-25 RX ORDER — ATORVASTATIN CALCIUM 10 MG/1
10 TABLET, FILM COATED ORAL
Status: DISCONTINUED | OUTPATIENT
Start: 2019-03-25 | End: 2019-03-27 | Stop reason: HOSPADM

## 2019-03-25 RX ORDER — ONDANSETRON 2 MG/ML
4 INJECTION INTRAMUSCULAR; INTRAVENOUS EVERY 6 HOURS PRN
Status: DISCONTINUED | OUTPATIENT
Start: 2019-03-25 | End: 2019-03-27 | Stop reason: HOSPADM

## 2019-03-25 RX ORDER — ACETAMINOPHEN 325 MG/1
650 TABLET ORAL EVERY 4 HOURS PRN
Status: DISCONTINUED | OUTPATIENT
Start: 2019-03-25 | End: 2019-03-27 | Stop reason: HOSPADM

## 2019-03-25 RX ORDER — DANTROLENE SODIUM 25 MG/1
25 CAPSULE ORAL DAILY
Status: DISCONTINUED | OUTPATIENT
Start: 2019-03-25 | End: 2019-03-27 | Stop reason: HOSPADM

## 2019-03-25 RX ORDER — MULTIVITAMIN WITH IRON
250 TABLET ORAL EVERY MORNING
Status: DISCONTINUED | OUTPATIENT
Start: 2019-03-25 | End: 2019-03-25 | Stop reason: CLARIF

## 2019-03-25 RX ORDER — CITALOPRAM 20 MG/1
20 TABLET ORAL EVERY MORNING
Status: DISCONTINUED | OUTPATIENT
Start: 2019-03-25 | End: 2019-03-27 | Stop reason: HOSPADM

## 2019-03-25 RX ORDER — SODIUM CHLORIDE 0.9 % (FLUSH) 0.9 %
10 SYRINGE (ML) INJECTION EVERY 12 HOURS SCHEDULED
Status: DISCONTINUED | OUTPATIENT
Start: 2019-03-25 | End: 2019-03-27 | Stop reason: HOSPADM

## 2019-03-25 RX ORDER — NORTRIPTYLINE HYDROCHLORIDE 25 MG/1
25 CAPSULE ORAL EVERY MORNING
Status: DISCONTINUED | OUTPATIENT
Start: 2019-03-25 | End: 2019-03-27 | Stop reason: HOSPADM

## 2019-03-25 RX ORDER — POLYETHYLENE GLYCOL 3350 17 G/17G
17 POWDER, FOR SOLUTION ORAL DAILY PRN
Status: DISCONTINUED | OUTPATIENT
Start: 2019-03-25 | End: 2019-03-27 | Stop reason: HOSPADM

## 2019-03-25 RX ORDER — DOXYCYCLINE HYCLATE 100 MG/1
100 CAPSULE ORAL EVERY 12 HOURS SCHEDULED
Status: DISCONTINUED | OUTPATIENT
Start: 2019-03-25 | End: 2019-03-27 | Stop reason: HOSPADM

## 2019-03-25 RX ORDER — NEOMYCIN/POLYMYXIN B/PRAMOXINE 3.5-10K-1
1 CREAM (GRAM) TOPICAL NIGHTLY
Status: DISCONTINUED | OUTPATIENT
Start: 2019-03-25 | End: 2019-03-25 | Stop reason: CLARIF

## 2019-03-25 RX ORDER — DANTROLENE SODIUM 25 MG/1
75 CAPSULE ORAL NIGHTLY
Status: DISCONTINUED | OUTPATIENT
Start: 2019-03-25 | End: 2019-03-27 | Stop reason: HOSPADM

## 2019-03-25 RX ORDER — FAMOTIDINE 20 MG/1
20 TABLET, FILM COATED ORAL 2 TIMES DAILY
Status: DISCONTINUED | OUTPATIENT
Start: 2019-03-25 | End: 2019-03-27 | Stop reason: HOSPADM

## 2019-03-25 RX ORDER — BACLOFEN 10 MG/1
20 TABLET ORAL 4 TIMES DAILY
Status: DISCONTINUED | OUTPATIENT
Start: 2019-03-25 | End: 2019-03-25 | Stop reason: CLARIF

## 2019-03-25 RX ORDER — OYSTER SHELL CALCIUM WITH VITAMIN D 500; 200 MG/1; [IU]/1
1 TABLET, FILM COATED ORAL NIGHTLY
Status: DISCONTINUED | OUTPATIENT
Start: 2019-03-25 | End: 2019-03-25 | Stop reason: SDUPTHER

## 2019-03-25 RX ORDER — SODIUM CHLORIDE 9 MG/ML
INJECTION, SOLUTION INTRAVENOUS CONTINUOUS
Status: DISCONTINUED | OUTPATIENT
Start: 2019-03-25 | End: 2019-03-25

## 2019-03-25 RX ORDER — M-VIT,TX,IRON,MINS/CALC/FOLIC 27MG-0.4MG
1 TABLET ORAL EVERY MORNING
Status: DISCONTINUED | OUTPATIENT
Start: 2019-03-25 | End: 2019-03-25

## 2019-03-25 RX ORDER — PREDNISONE 10 MG/1
TABLET ORAL
Status: ON HOLD | COMMUNITY
End: 2019-03-27 | Stop reason: HOSPADM

## 2019-03-25 RX ORDER — SENNA AND DOCUSATE SODIUM 50; 8.6 MG/1; MG/1
1 TABLET, FILM COATED ORAL NIGHTLY
Status: DISCONTINUED | OUTPATIENT
Start: 2019-03-25 | End: 2019-03-27 | Stop reason: HOSPADM

## 2019-03-25 RX ORDER — LYSINE HCL 500 MG
1 TABLET ORAL NIGHTLY
Status: DISCONTINUED | OUTPATIENT
Start: 2019-03-25 | End: 2019-03-25 | Stop reason: CLARIF

## 2019-03-25 RX ADMIN — Medication 10 ML: at 21:00

## 2019-03-25 RX ADMIN — DANTROLENE SODIUM 25 MG: 25 CAPSULE ORAL at 12:49

## 2019-03-25 RX ADMIN — FAMOTIDINE 20 MG: 20 TABLET ORAL at 20:59

## 2019-03-25 RX ADMIN — ALBUTEROL SULFATE 2.5 MG: 2.5 SOLUTION RESPIRATORY (INHALATION) at 15:59

## 2019-03-25 RX ADMIN — SODIUM CHLORIDE: 9 INJECTION, SOLUTION INTRAVENOUS at 11:29

## 2019-03-25 RX ADMIN — BACLOFEN 20 MG: 20 TABLET ORAL at 20:59

## 2019-03-25 RX ADMIN — Medication 10 ML: at 11:29

## 2019-03-25 RX ADMIN — Medication 250 MG: at 12:50

## 2019-03-25 RX ADMIN — FAMOTIDINE 20 MG: 20 TABLET ORAL at 12:51

## 2019-03-25 RX ADMIN — DANTROLENE SODIUM 75 MG: 25 CAPSULE ORAL at 20:59

## 2019-03-25 RX ADMIN — ATORVASTATIN CALCIUM 10 MG: 10 TABLET, FILM COATED ORAL at 12:50

## 2019-03-25 RX ADMIN — ASPIRIN 325 MG: 325 TABLET, DELAYED RELEASE ORAL at 12:51

## 2019-03-25 RX ADMIN — DOXYCYCLINE HYCLATE 100 MG: 100 CAPSULE ORAL at 20:59

## 2019-03-25 RX ADMIN — CITALOPRAM 20 MG: 20 TABLET ORAL at 12:50

## 2019-03-25 RX ADMIN — ASPIRIN 325 MG: 325 TABLET, DELAYED RELEASE ORAL at 20:59

## 2019-03-25 RX ADMIN — NORTRIPTYLINE HYDROCHLORIDE 25 MG: 25 CAPSULE ORAL at 12:50

## 2019-03-25 RX ADMIN — ENOXAPARIN SODIUM 40 MG: 40 INJECTION SUBCUTANEOUS at 12:51

## 2019-03-25 RX ADMIN — BACLOFEN 20 MG: 10 TABLET ORAL at 12:50

## 2019-03-25 RX ADMIN — BACLOFEN 20 MG: 10 TABLET ORAL at 17:00

## 2019-03-25 RX ADMIN — SENNOSIDES AND DOCUSATE SODIUM 1 TABLET: 8.6; 5 TABLET ORAL at 20:59

## 2019-03-25 RX ADMIN — ALBUTEROL SULFATE 2.5 MG: 2.5 SOLUTION RESPIRATORY (INHALATION) at 11:43

## 2019-03-25 RX ADMIN — Medication 1 TABLET: at 20:59

## 2019-03-25 RX ADMIN — ALBUTEROL SULFATE 2.5 MG: 2.5 SOLUTION RESPIRATORY (INHALATION) at 19:35

## 2019-03-25 ASSESSMENT — PAIN SCALES - GENERAL
PAINLEVEL_OUTOF10: 0

## 2019-03-25 NOTE — PROGRESS NOTES
Patient and family would like us to use home medications that they brought in. Per Dr. Kat Williamson it is fine that pt uses home meds. Will send down to pharmacy to verify.

## 2019-03-25 NOTE — CONSULTS
[facility]                                                                 Division of Pulmonary, [address] [facility]                                                                       Pulmonary &health [address] [facility]                                                                   Pulmonary Consult Note            Patient: Bhavana Tom  MRN: [id number]  : 1945      Date of Admission: .3/24/2019  3:10 PM        Reason for Consultation:Lung nodule   CC :   HPI:   Bhavana Tom is a 69 y.o. y/o female with past medical history significant for MS and sarcoid and she has left hip pain after fall that happen last   and she went to rehab and had surgery before that mat rehab started cough and SOB and not feeling well and she was brought to [facility]    She was diagnosed with sarcoid  and she is not on any bronchoscopy and biopsy     She did not smoke but had second smoking more than 10 years from parents       PAST MEDICAL HISTORY:   Past Medical History:   Diagnosis Date    Asthma     Hepatitis     High cholesterol     Hypertension     Hypothyroidism     MS [facility]multiple sclerosis) ([facility] [address]     Osteoporosis     Sarcoidosis        PAST SURGICAL HISTORY:   Past Surgical History:   Procedure Laterality Date    CHOLECYSTECTOMY  36s    FEMUR FRACTURE SURGERY Left 3/21/2019    LEFT FEMUR CEPHALLOMEDULLARY NAIL performed by Manan Aguilar MD at [facility]      KNEE SURGERY      plate in lt knee       FAMILY HISTORY:   Family History   Problem Relation Age of Onset    Stroke Mother     Heart Disease Mother     Cancer Father        SOCIAL HISTORY:   Social History     Socioeconomic History    Marital status:      Spouse name: Not on file    Number of children: Not on file    Years of education: Not on file    Highest education level: Not on file   Occupational History    Not on file   Social Needs    Financial resource strain: Not on file    Food insecurity:     Worry: Not on file     Inability: Not on file    Transportation needs:     Medical: Not on file     Non-medical: Not on file   Tobacco Use    Smoking status: Never Smoker    Smokeless tobacco: Never Used   Substance and Sexual Activity    Alcohol use: No     Alcohol/week: 0.0 oz     Comment: Ocassionally    Drug use: No    Sexual activity: Yes     Partners: Male   Lifestyle    Physical activity:     Days per week: Not on file     Minutes per session: Not on file    Stress: Not on file   Relationships    Social connections:     Talks on phone: Not on file     Gets together: Not on file     Attends Congregational service: Not on file     Active member of club or organization: Not on file     Attends meetings of clubs or organizations: Not on file     Relationship status: Not on file    Intimate partner violence:     Fear of current or ex partner: Not on file     Emotionally abused: Not on file     Physically abused: Not on file     Forced sexual activity: Not on file   Other Topics Concern    Not on file   Social History Narrative    Not on file     Social History     Tobacco Use   Smoking Status Never Smoker   Smokeless Tobacco Never Used     Social History     Substance and Sexual Activity   Alcohol Use No    Alcohol/week: 0.0 oz    Comment: Ocassionally     Social History     Substance and Sexual Activity   Drug Use No       OCCUPATIONAL HISTORY:    - no asbestos, silica, beryllium exposures     HISTORY:    HOME MEDICATIONS:  Prior to Admission medications    Medication Sig Start Date End Date Taking?  Authorizing Provider   predniSONE 10 MG (21) TBPK Take by mouth   Yes Historical Provider, MD   dantrolene (DANTRIUM) 25 MG capsule TAKE 1 CAPSULE BY MOUTH EVERY MORNING 3/23/19  Yes Troy Roman PA-C   Calcium Carbonate-Vit D-Min (CALCIUM 600+D PLUS MINERALS) 600-400 MG-UNIT TABS Take 1 tablet by mouth nightly    Yes Historical Provider, MD   Mirabegron ER (MYRBETRIQ) 25 MG TB24 Take 2 tablets by mouth every morning    Yes Historical Provider, MD   dantrolene (DANTRIUM) 25 MG capsule Take 75 mg by mouth nightly   Yes Historical Provider, MD   Cholecalciferol (VITAMIN D3) 5000 units TABS Take 1 tablet by mouth every morning   Yes Historical Provider, MD   Potassium 99 MG TABS Take 1 tablet by mouth every morning   Yes Historical Provider, MD   Omega-3 Fatty Acids (FISH OIL PEARLS) 300 MG CAPS Take 1 capsule by mouth nightly   Yes Historical Provider, MD   senna-docusate (PERICOLACE) 8.6-50 MG per tablet Take 1 tablet by mouth nightly   Yes Historical Provider, MD   levothyroxine (SYNTHROID) 75 MCG tablet Take 75 mcg by mouth every morning (before breakfast)    Yes Historical Provider, MD   magnesium (MAGNESIUM-OXIDE) 250 MG TABS tablet Take 250 mg by mouth every morning    Yes Historical Provider, MD   atorvastatin (LIPITOR) 10 MG tablet Take 10 mg by mouth three times a week Indications: Monday, Wednesday, Friday  10/30/17  Yes Historical Provider, MD   baclofen (LIORESAL) 20 MG tablet Take 1 tablet by mouth 4 times daily. 4/30/14  Yes Luis Antonio Strange MD   ezetimibe (ZETIA) 10 MG tablet Take 10 mg by mouth every morning    Yes Historical Provider, MD   citalopram (CELEXA) 20 MG tablet Take 20 mg by mouth every morning  10/27/10  Yes Historical Provider, MD   nortriptyline (PAMELOR) 25 MG capsule Take 25 mg by mouth every morning  11/22/10  Yes Historical Provider, MD   Multiple Vitamins-Minerals (MULTIVITAMIN & MINERAL PO) Take 1 tablet by mouth every morning    Yes Historical Provider, MD   HYDROcodone-acetaminophen (NORCO) 5-325 MG per tablet Take 1 tablet by mouth every 6 hours as needed for Pain for up to 5 days. Intended supply: 7 days.  Take lowest dose possible to manage pain 3/23/19 3/28/19  Emma Riedel, MD   aspirin 325 MG EC tablet Take 1 tablet by mouth 2 times SYSTEMS:  General ROS:  No weight loss ,no fatigue     ENT ROS:   No Sore throat ,no lymphoadenopathy,no nasal stuffiness     Hematological and Lymphatic ROS:   No ecchymosis ,no tendency to bleed  Respiratory ROS:   Cough ,SOB   Cardiovascular ROS:   No CP,No Palpitation   Gastrointestinal ROS:   No Gi bleed,no nausea or vomiting      - Musculoskeletal ROS:      - no joint swelling ,no joint pain   Neurological ROS:     -no weakness or numbness    Dermatological ROS:   No skin rash ,no urticaria     PHYSICAL EXAMINATION:     VITAL SIGNS:  BP (!) 98/42   Pulse 104   Temp 98.1 °F (36.7 °C) (Oral)   Resp 18   Ht 5' (1.524 m)   Wt 186 lb 8 oz (84.6 kg)   SpO2 98%   BMI 36.42 kg/m²   Wt Readings from Last 3 Encounters:   03/25/19 186 lb 8 oz (84.6 kg)   03/23/19 196 lb 1.6 oz (89 kg)   09/19/18 161 lb (73 kg)     Temp Readings from Last 3 Encounters:   03/25/19 98.1 °F (36.7 °C) (Oral)   03/23/19 97.8 °F (36.6 °C) (Oral)   03/21/19 97.2 °F (36.2 °C)     TMAX:  BP Readings from Last 3 Encounters:   03/25/19 (!) 98/42   03/23/19 (!) 130/56   03/21/19 (!) 101/52     Pulse Readings from Last 3 Encounters:   03/25/19 104   03/23/19 107   09/19/18 85           INTAKE/OUTPUTS:  I/O last 3 completed shifts:   In: 180 [P.O.:180]  Out: 1100 [Urine:1100]    Intake/Output Summary (Last 24 hours) at 3/25/2019 1542  Last data filed at 3/25/2019 1238  Gross per 24 hour   Intake 180 ml   Output 1100 ml   Net -920 ml       General Appearance: alert and oriented to person, place and time, well-developed and   well-nourished, in no acute distress   Eyes: pupils equal, round, and reactive to light, extraocular eye movements intact, conjunctivae normal and sclera anicteric   Neck: neck supple and non tender without mass, no thyromegaly, no thyroid nodules and no cervical adenopathy   Pulmonary/Chest:rhonchi bilateral  Cardiovascular: normal rate, regular rhythm, normal S1 and S2, no murmurs, rubs, clicks or gallops, distal pulses intact, no carotid bruits, no murmurs, no gallops, no carotid bruits and no JVD   Abdomen: obese, soft, non-tender, non-distended, normal bowel sounds, no masses or organomegaly   Extremities:no edema   Musculoskeletal: normal range of motion, no joint swelling, deformity or tenderness   Neurologic: reflexes normal and symmetric, no cranial nerve deficit noted    LABS/IMAGING:    CBC:  Lab Results   Component Value Date    WBC 5.5 03/25/2019    HGB 10.3 (L) 03/25/2019    HCT 33.0 (L) 03/25/2019    .9 (H) 03/25/2019     03/25/2019    LYMPHOPCT 22.1 03/25/2019    RBC 3.27 (L) 03/25/2019    MCH 31.5 03/25/2019    MCHC 31.2 (L) 03/25/2019    RDW 16.6 (H) 03/25/2019    NEUTOPHILPCT 65.8 03/25/2019    MONOPCT 8.9 03/25/2019    EOSPCT 2.2 03/12/2019    BASOPCT 0.5 03/25/2019    NEUTROABS 3.63 03/25/2019    LYMPHSABS 1.22 (L) 03/25/2019    MONOSABS 0.49 03/25/2019    EOSABS 0.09 03/25/2019    BASOSABS 0.03 03/25/2019       Recent Labs     03/25/19  1140 03/24/19  1559 03/23/19  0800   WBC 5.5 6.1 7.4   HGB 10.3* 11.0* 10.6*   HCT 33.0* 33.3* 32.1*   .9* 97.7 95.8    308 311       BMP:   Recent Labs     03/23/19  0800 03/24/19  1559 03/25/19  1140    137 141   K 3.6 4.6 3.8    101 104   CO2 25 25 25   BUN 20 15 16   CREATININE 0.6 0.6 0.6       MG: No results found for: MG  Ca/Phos:   Lab Results   Component Value Date    CALCIUM 8.3 (L) 03/25/2019     Amylase: No results found for: AMYLASE  Lipase: No results found for: LIPASE  LIVER PROFILE:   Recent Labs     03/24/19  1559   *   ALT 71*   BILITOT 0.9   ALKPHOS 118*       PT/INR:   Recent Labs     03/24/19  1559   PROTIME 11.1   INR 1.0     APTT:   Recent Labs     03/24/19  1559   APTT 22.2*       Cardiac Enzymes:  Lab Results   Component Value Date    TROPONINI <0.01 03/24/2019       Hgb A1C: No results found for: LABA1C  No results found for: EAG  BARRY: No results found for: BARRY  ESR: No results found for: SEDRATE  CRP: No results found for: CRP  D Dimer:   Lab Results   Component Value Date    DDIMER 1724 03/24/2019     Folate and B12:   Lab Results   Component Value Date    VCPGRLTR31 566 03/12/2019   , No results found for: FOLATE              PROBLEM LIST:  Patient Active Problem List   Diagnosis    Gait abnormality    Hepatitis    MS (multiple sclerosis) ([address]    Spastic quadriparesis ([address]    Closed fracture of left hip ([address]    Lung mass    Influenza A    Essential hypertension    Hypothyroidism    H/O sarcoidosis               ASSESSMENT:  1.) Flu A  2.)Trachebronchitis   3.)Sarcoid ,lung  4. )RUL scar /noule   5.)Hip fracture /left  ^ MS       PLAN:  *-The nodule seems more scar than malignancy and I believe  it could be from her sarcoid,no images to compare=  *-will need PET scan as OP and if + then I will do Navigation and biopsy ,if negative then follow up in 3-6 CT chest   *-Erika Flu   *-BD   *_incentive spirometer  *-Watch fr secondary infection wean Fiow  *- Flutter   *- add doxy,X 5 days   *- procalcitonin     Thank you very much for allowing me to participate in the care of this pleasant patient , should you have any questions ,please do not hesitate to contact me    Majo  and [facility]

## 2019-03-25 NOTE — PROGRESS NOTES
Wilma Crews was ordered Mirabegron ER (Myrbetriq) which is a nonformulary medication. The patient has indicated that the home supply of this medication will be brought in to the hospital for inpatient use. If the medication has not been administered by 1400 on the following day from the time the order was placed, a pharmacist will follow-up with the nurse of the patient to assess the capability of the patient to bring in the medication. If it is determined that the patient cannot supply the medication and it is not available to be dispensed from the pharmacy, a call will be placed to the ordering provider to discuss alternative options.       Cesar Carballo, Jean-PierreD, [facility] 3/25/2019 11:05 AM

## 2019-03-25 NOTE — PROGRESS NOTES
Pharmacy Note    Aria Douglas was ordered [employer]. As per the [address], herbals and certain dietary supplements will be discontinued.   The herbal or dietary supplement may be continued after discharge from the hospital.    Adam Doan, PharmD, [facility] 3/25/2019 10:59 AM

## 2019-03-25 NOTE — ED NOTES
LORENAAR faxed to [facility] ; receipt confirmed with Milena and fax confirmation. Updated that pt is Flu A+.      Ajit Lopes RN  03/24/19 [id number]

## 2019-03-25 NOTE — PATIENT CARE CONFERENCE
[facility] Quality Flow/Interdisciplinary Rounds Progress Note        Quality Flow Rounds held on March 25, 2019    Disciplines Attending:  Bedside Nurse, ,  and Nursing Unit Leadership    Ofelia Lucas was admitted on 3/24/2019  3:10 PM    Anticipated Discharge Date:  Expected Discharge Date: 03/29/19    Disposition:    Gary Score:  Gary Scale Score: 17    Readmission Risk              Risk of Unplanned Readmission:        11           Discussed patient goal for the day, patient clinical progression, and barriers to discharge.   The following Goal(s) of the Day/Commitment(s) have been identified:  Diagnostics - Report Results      Alexander Cordova  March 25, 2019

## 2019-03-25 NOTE — DISCHARGE INSTR - COC
Continuity of Care Form    Patient Name: Michelle Villafana   :  1945  MRN:  [id number]    Admit date:  3/24/2019  Discharge date:  2019    Code Status Order: Prior   Advance Directives:   [facility] Documentation     Date/Time Healthcare Directive Type of Healthcare Directive Copy in [facility] [address] Agent's Name Healthcare Agent's Phone Number    [phone]  Yes, patient has an advance directive for healthcare treatment  Living will;Durable power of  for health care; Health care treatment directive  --  Spouse  Abhilash Smith  --          Admitting Physician:  Laine Ventura MD  PCP: Dom Castro MD    Discharging Nurse: Faina Landeros RN  [address] Unit/Room#: 9544/5305-O  Discharging Unit Phone Number: [phone]    Emergency Contact:   Extended Emergency Contact Information  Primary Emergency Contact: Owen Douglas  Address: [address] [place] [address] [facility] Phone: [phone]  Mobile Phone: [phone]  Relation: Spouse   needed? No  Secondary Emergency Contact: Gate Phone: [phone]  Mobile Phone: [phone]  Relation: Child   needed?  No    Past Surgical History:  Past Surgical History:   Procedure Laterality Date    CHOLECYSTECTOMY  1990s    FEMUR FRACTURE SURGERY Left 3/21/2019    LEFT FEMUR CEPHALLOMEDULLARY NAIL performed by Dianna Freedman MD at [facility]      KNEE SURGERY      plate in  knee       Immunization History:   Immunization History   Administered Date(s) Administered    Influenza Virus Vaccine 12/15/2010, 10/27/2014    Influenza Whole 10/21/2015    Influenza, Orvis Ruth, 3 Years and older, IM (Fluzone 3 yrs and older or Afluria 5 yrs and older) 10/02/2017    Pneumococcal 13-valent Conjugate ([id number]) 2015    Pneumococcal Polysaccharide ([id number]) 10/02/2013 Active Problems:  Patient Active Problem List   Diagnosis Code    Gait abnormality R26.9    Hepatitis K75.9    MS (multiple sclerosis) ([facility] [address] G35    Spastic quadriparesis (HCC) G82.50    Closed fracture of left hip ([facility] [address] S72.002A    Lung mass R91.8       Isolation/Infection:   Isolation          Droplet        Patient Infection Status     Infection Encounter Level? Onset Date Added Added By Resolved Resolved By Review Date    INFLUENZA Yes 03/24/19 03/24/19 Rapid influenza A/B antigens   03/31/19          Nurse Assessment:  Last Vital Signs: BP (!) 122/56   Pulse 101   Temp 98.4 °F (36.9 °C) (Oral)   Resp 18   Ht 5' (1.524 m)   Wt 186 lb 8 oz (84.6 kg)   SpO2 96%   BMI 36.42 kg/m²     Last documented pain score (0-10 scale): Pain Level: 0  Last Weight:   Wt Readings from Last 1 Encounters:   03/25/19 186 lb 8 oz (84.6 kg)     Mental Status:  oriented and alert    IV Access:  - None    Nursing Mobility/ADLs:  Walking   Dependent  Transfer  Dependent  Bathing  Dependent  Dressing  Dependent  Toileting  Dependent  Feeding  Assisted  Med Admin  Assisted  Med Delivery   whole    Wound Care Documentation and Therapy:        Elimination:  Continence:   · Bowel: Yes  · Bladder: Yes  Urinary Catheter: Insertion Date: 03/20/2019   Colostomy/Ileostomy/Ileal Conduit: No       Date of Last BM: 03/27/2019    Intake/Output Summary (Last 24 hours) at 3/25/2019 0941  Last data filed at 3/25/2019 0835  Gross per 24 hour   Intake --   Output 1100 ml   Net -1100 ml     No intake/output data recorded.     Safety Concerns:     History of Falls (last 30 days)    Impairments/Disabilities:      Contractures - BLE    Nutrition Therapy:  Current Nutrition Therapy:   - Oral Diet:  General    Routes of Feeding: Oral  Liquids: No Restrictions  Daily Fluid Restriction: no  Last Modified Barium Swallow with Video (Video Swallowing Test): not done    Treatments at the Time of Hospital Discharge:   Respiratory Treatments:

## 2019-03-25 NOTE — PATIENT CARE CONFERENCE
[facility] Quality Flow/Interdisciplinary Rounds Progress Note        Quality Flow Rounds held on March 25, 2019    Disciplines Attending:  Bedside Nurse, ,  and Nursing Unit Leadership    Prakash Graza was admitted on 3/24/2019  3:10 PM    Anticipated Discharge Date:  Expected Discharge Date: 03/29/19    Disposition:    Gary Score:  Gary Scale Score: 17    Readmission Risk              Risk of Unplanned Readmission:        11           Discussed patient goal for the day, patient clinical progression, and barriers to discharge.   The following Goal(s) of the Day/Commitment(s) have been identified:  Pulmonology consult        Miranda Ocampo  March 25, 2019

## 2019-03-25 NOTE — H&P
Hypertension     Hypothyroidism     MS (multiple sclerosis) ([facility] [address]     Osteoporosis     Sarcoidosis      Past Surgical History:   Procedure Laterality Date    CHOLECYSTECTOMY  1990s   Arv[place] FEMUR FRACTURE SURGERY Left 3/21/2019    LEFT FEMUR CEPHALLOMEDULLARY NAIL performed by Faina Wilburn MD at [facility]      KNEE SURGERY      plate in lt knee     Medications Prior to Admission:    Medications Prior to Admission: predniSONE 10 MG (21) TBPK, Take by mouth  dantrolene (DANTRIUM) 25 MG capsule, TAKE 1 CAPSULE BY MOUTH EVERY MORNING  Calcium Carbonate-Vit D-Min (CALCIUM 600+D PLUS MINERALS) 600-400 MG-UNIT TABS, Take 1 tablet by mouth nightly   Mirabegron ER (MYRBETRIQ) 25 MG TB24, Take 2 tablets by mouth every morning   dantrolene (DANTRIUM) 25 MG capsule, Take 75 mg by mouth nightly  Cholecalciferol (VITAMIN D3) 5000 units TABS, Take 1 tablet by mouth every morning  Potassium 99 MG TABS, Take 1 tablet by mouth every morning  Omega-3 Fatty Acids (FISH OIL PEARLS) 300 MG CAPS, Take 1 capsule by mouth nightly  senna-docusate (PERICOLACE) 8.6-50 MG per tablet, Take 1 tablet by mouth nightly  levothyroxine (SYNTHROID) 75 MCG tablet, Take 75 mcg by mouth every morning (before breakfast)   magnesium (MAGNESIUM-OXIDE) 250 MG TABS tablet, Take 250 mg by mouth every morning   atorvastatin (LIPITOR) 10 MG tablet, Take 10 mg by mouth three times a week Indications: Monday, Wednesday, Friday   baclofen (LIORESAL) 20 MG tablet, Take 1 tablet by mouth 4 times daily.   ezetimibe (ZETIA) 10 MG tablet, Take 10 mg by mouth every morning   citalopram (CELEXA) 20 MG tablet, Take 20 mg by mouth every morning   nortriptyline (PAMELOR) 25 MG capsule, Take 25 mg by mouth every morning   Multiple Vitamins-Minerals (MULTIVITAMIN & MINERAL PO), Take 1 tablet by mouth every morning   HYDROcodone-acetaminophen (NORCO) 5-325 MG per tablet, Take 1 tablet by mouth every 6 hours as needed for Pain for up to 5 days. Intended supply: 7 days. Take lowest dose possible to manage pain  aspirin 325 MG EC tablet, Take 1 tablet by mouth 2 times daily for 28 days  polyethylene glycol (MIRALAX) PACK packet, Take 17 g by mouth daily as needed     Allergies:    Patient has no known allergies. Social History:    reports that she has never smoked. She has never used smokeless tobacco. She reports that she does not drink alcohol or use drugs. Housewife. Family History:   family history includes Cancer in her father; Heart Disease in her mother; Stroke in her mother. Father was a smoker and  of lung cancer. Sibs: One younger sister  of multiple sclerosis. Other sibs are ok. Children: 4, ok. PHYSICAL EXAM:    Vitals:  BP (!) 122/56   Pulse 101   Temp 98.4 °F (36.9 °C) (Oral)   Resp 18   Ht 5' (1.524 m)   Wt 186 lb 8 oz (84.6 kg)   SpO2 96%   BMI 36.42 kg/m²     At my exam, P 92/m, normal volume, regular; Resp unlabored. General condition: Middle aged female lying comfortably in bed in no acute distress. HEENT: Atraumatic. Pupils equal, round, reactive to light. No pallor, icterus or cataract. No conjunctival injection. No nasal congestion or discharge. No pharyngeal erythema or pus points. Faucial aperture: Mallampati 3. Neck:  Supple. Normal RoM. No JVD, hepato-jugular reflux, lymphadenopathy, thyroid enlargement, tracheal deviation, bruit. Chest: Symmetric. Equal AE b/l with vesicular breath sounds. No rales. No rhonchi. Heart: Distant, muffled, regular ht sounds. No murmur. No rub or gallop. Abdomen: Non-distended; obese; soft; non-tender; no palpable organomegaly or mass; bowel sounds present and normal.  Extremities: LLE s/p surgery. Heels in protective foam boots. No clubbing, cyanosis, edema or calf tenderness. Feet dry and warm. Skin: Warm and dry, no open lesions or rash. Neuro: Pt is awake, alert and oriented to time, place and person.  Speech and cognition normal. Cranial nerves intact Us Dup Lower Extremities Bilateral Venous  Result Date: 3/24/2019  Patient MRN:  [id number] : 1945 Age: 69 years Gender: Female Order Date:  3/24/2019 3:45 PM EXAM: US DUP LOWER EXTREMITIES BILATERAL VENOUS NUMBER OF IMAGES:  43 INDICATION:  post op SOB post op SOB COMPARISON: None TECHNIQUE:Multiple gray scale and color Doppler images were obtained of the deep venous system of the bilateral  lower extremity. Doppler wave forms and augmentation were performed. FINDINGS: There is patency of the bilateral external iliac veins. There is normal compressibility, phasic flow pattern, and augmentation demonstrated for the bilateral common femoral, superficial femoral, and popliteal veins. There is patency of the veins in the calf. PATENT DEEP VENOUS SYSTEM OF THE BILATERAL LOWER EXTREMITY. NO EVIDENCE FOR DVT. EKG 3/24/2019 1654 hrs: Sinus tachycardia, 107/m. Nonspecific ST and T wave abnormality. Abnormal ECG. When compared with ECG of 20-MAR-2019 14:15, NJ interval has increased; QRS duration has increased; QT has shortened. ASSESSMENT:      Principal Problem:    Lung mass  Active Problems:    Influenza A    H/O sarcoidosis    Closed fracture of left hip (HCC)    MS (multiple sclerosis) (HCC)    Spastic quadriparesis (HCC)    Essential hypertension    Hypothyroidism  Resolved Problems:    * No resolved hospital problems. *      PLAN:    · Pulm consult re further investigation of lung mass. · Influenza A: Continue Tamiflu. · IVF  mL/h and f/u I/O, labs. · S/p femoral / hip fx: PT / OT and pain control. · Multiple sclerosis and related issues: Continue meds as prior. · General supportive care wrt quadriparesis. · HTN: Continue meds as prior. F/u BP, renal fx. · Hypothyroidism: Continue levothyroxine. · DVT prophylaxis - Lovenox. · GI bleed prophylaxis - Pepcid. Pt was evaluated in the presence of her .   Please note that over 50 minutes was spent in evaluating the

## 2019-03-26 ENCOUNTER — APPOINTMENT (OUTPATIENT)
Dept: GENERAL RADIOLOGY | Age: 74
DRG: 193 | End: 2019-03-26
Payer: MEDICARE

## 2019-03-26 PROBLEM — J11.1 INFLUENZA: Status: ACTIVE | Noted: 2019-03-25

## 2019-03-26 LAB
ANION GAP SERPL CALCULATED.3IONS-SCNC: 10 MMOL/L (ref 7–16)
BASOPHILS ABSOLUTE: 0.03 E9/L (ref 0–0.2)
BASOPHILS RELATIVE PERCENT: 0.5 % (ref 0–2)
BUN BLDV-MCNC: 18 MG/DL (ref 8–23)
CALCIUM SERPL-MCNC: 8.4 MG/DL (ref 8.6–10.2)
CHLORIDE BLD-SCNC: 105 MMOL/L (ref 98–107)
CO2: 25 MMOL/L (ref 22–29)
CREAT SERPL-MCNC: 0.6 MG/DL (ref 0.5–1)
EOSINOPHILS ABSOLUTE: 0.1 E9/L (ref 0.05–0.5)
EOSINOPHILS RELATIVE PERCENT: 1.8 % (ref 0–6)
GFR AFRICAN AMERICAN: >60
GFR NON-AFRICAN AMERICAN: >60 ML/MIN/1.73
GLUCOSE BLD-MCNC: 127 MG/DL (ref 74–99)
HCT VFR BLD CALC: 31.8 % (ref 34–48)
HEMOGLOBIN: 10.1 G/DL (ref 11.5–15.5)
IMMATURE GRANULOCYTES #: 0.05 E9/L
IMMATURE GRANULOCYTES %: 0.9 % (ref 0–5)
LYMPHOCYTES ABSOLUTE: 1.59 E9/L (ref 1.5–4)
LYMPHOCYTES RELATIVE PERCENT: 28.1 % (ref 20–42)
MAGNESIUM: 2.2 MG/DL (ref 1.6–2.6)
MCH RBC QN AUTO: 31.8 PG (ref 26–35)
MCHC RBC AUTO-ENTMCNC: 31.8 % (ref 32–34.5)
MCV RBC AUTO: 100 FL (ref 80–99.9)
MONOCYTES ABSOLUTE: 0.56 E9/L (ref 0.1–0.95)
MONOCYTES RELATIVE PERCENT: 9.9 % (ref 2–12)
NEUTROPHILS ABSOLUTE: 3.33 E9/L (ref 1.8–7.3)
NEUTROPHILS RELATIVE PERCENT: 58.8 % (ref 43–80)
PDW BLD-RTO: 16.4 FL (ref 11.5–15)
PLATELET # BLD: 333 E9/L (ref 130–450)
PMV BLD AUTO: 9.7 FL (ref 7–12)
POTASSIUM REFLEX MAGNESIUM: 4 MMOL/L (ref 3.5–5)
RBC # BLD: 3.18 E12/L (ref 3.5–5.5)
SODIUM BLD-SCNC: 140 MMOL/L (ref 132–146)
WBC # BLD: 5.7 E9/L (ref 4.5–11.5)

## 2019-03-26 PROCEDURE — 97162 PT EVAL MOD COMPLEX 30 MIN: CPT

## 2019-03-26 PROCEDURE — 83735 ASSAY OF MAGNESIUM: CPT

## 2019-03-26 PROCEDURE — 74018 RADEX ABDOMEN 1 VIEW: CPT

## 2019-03-26 PROCEDURE — 99233 SBSQ HOSP IP/OBS HIGH 50: CPT | Performed by: INTERNAL MEDICINE

## 2019-03-26 PROCEDURE — 6370000000 HC RX 637 (ALT 250 FOR IP): Performed by: INTERNAL MEDICINE

## 2019-03-26 PROCEDURE — 2700000000 HC OXYGEN THERAPY PER DAY

## 2019-03-26 PROCEDURE — 97166 OT EVAL MOD COMPLEX 45 MIN: CPT

## 2019-03-26 PROCEDURE — APPSS30 APP SPLIT SHARED TIME 16-30 MINUTES: Performed by: PHYSICIAN ASSISTANT

## 2019-03-26 PROCEDURE — 85025 COMPLETE CBC W/AUTO DIFF WBC: CPT

## 2019-03-26 PROCEDURE — 94668 MNPJ CHEST WALL SBSQ: CPT

## 2019-03-26 PROCEDURE — 6360000002 HC RX W HCPCS: Performed by: INTERNAL MEDICINE

## 2019-03-26 PROCEDURE — 1200000000 HC SEMI PRIVATE

## 2019-03-26 PROCEDURE — 36415 COLL VENOUS BLD VENIPUNCTURE: CPT

## 2019-03-26 PROCEDURE — 94640 AIRWAY INHALATION TREATMENT: CPT

## 2019-03-26 PROCEDURE — 80048 BASIC METABOLIC PNL TOTAL CA: CPT

## 2019-03-26 PROCEDURE — 94669 MECHANICAL CHEST WALL OSCILL: CPT

## 2019-03-26 PROCEDURE — 2580000003 HC RX 258: Performed by: INTERNAL MEDICINE

## 2019-03-26 PROCEDURE — 97110 THERAPEUTIC EXERCISES: CPT

## 2019-03-26 RX ADMIN — Medication 1 TABLET: at 09:19

## 2019-03-26 RX ADMIN — BACLOFEN 20 MG: 20 TABLET ORAL at 09:14

## 2019-03-26 RX ADMIN — POLYETHYLENE GLYCOL 3350 17 G: 17 POWDER, FOR SOLUTION ORAL at 13:28

## 2019-03-26 RX ADMIN — Medication 1 TABLET: at 21:20

## 2019-03-26 RX ADMIN — DANTROLENE SODIUM 75 MG: 25 CAPSULE ORAL at 21:20

## 2019-03-26 RX ADMIN — BACLOFEN 20 MG: 20 TABLET ORAL at 21:19

## 2019-03-26 RX ADMIN — BACLOFEN 20 MG: 20 TABLET ORAL at 13:23

## 2019-03-26 RX ADMIN — ALBUTEROL SULFATE 2.5 MG: 2.5 SOLUTION RESPIRATORY (INHALATION) at 23:50

## 2019-03-26 RX ADMIN — NORTRIPTYLINE HYDROCHLORIDE 25 MG: 25 CAPSULE ORAL at 09:16

## 2019-03-26 RX ADMIN — LEVOTHYROXINE SODIUM 75 MCG: 0.07 TABLET ORAL at 06:27

## 2019-03-26 RX ADMIN — ASPIRIN 325 MG: 325 TABLET, DELAYED RELEASE ORAL at 21:21

## 2019-03-26 RX ADMIN — FAMOTIDINE 20 MG: 20 TABLET ORAL at 21:19

## 2019-03-26 RX ADMIN — SENNOSIDES AND DOCUSATE SODIUM 1 TABLET: 8.6; 5 TABLET ORAL at 21:19

## 2019-03-26 RX ADMIN — Medication 10 ML: at 09:18

## 2019-03-26 RX ADMIN — ALBUTEROL SULFATE 2.5 MG: 2.5 SOLUTION RESPIRATORY (INHALATION) at 16:40

## 2019-03-26 RX ADMIN — ALBUTEROL SULFATE 2.5 MG: 2.5 SOLUTION RESPIRATORY (INHALATION) at 04:01

## 2019-03-26 RX ADMIN — ASPIRIN 325 MG: 325 TABLET, DELAYED RELEASE ORAL at 09:13

## 2019-03-26 RX ADMIN — ALBUTEROL SULFATE 2.5 MG: 2.5 SOLUTION RESPIRATORY (INHALATION) at 12:13

## 2019-03-26 RX ADMIN — ALBUTEROL SULFATE 2.5 MG: 2.5 SOLUTION RESPIRATORY (INHALATION) at 20:10

## 2019-03-26 RX ADMIN — Medication 250 MG: at 09:16

## 2019-03-26 RX ADMIN — FAMOTIDINE 20 MG: 20 TABLET ORAL at 09:20

## 2019-03-26 RX ADMIN — ALBUTEROL SULFATE 2.5 MG: 2.5 SOLUTION RESPIRATORY (INHALATION) at 00:40

## 2019-03-26 RX ADMIN — BACLOFEN 20 MG: 20 TABLET ORAL at 17:29

## 2019-03-26 RX ADMIN — CITALOPRAM 20 MG: 20 TABLET ORAL at 09:14

## 2019-03-26 RX ADMIN — DANTROLENE SODIUM 25 MG: 25 CAPSULE ORAL at 09:14

## 2019-03-26 RX ADMIN — DOXYCYCLINE HYCLATE 100 MG: 100 CAPSULE ORAL at 09:20

## 2019-03-26 RX ADMIN — DOXYCYCLINE HYCLATE 100 MG: 100 CAPSULE ORAL at 21:19

## 2019-03-26 RX ADMIN — ENOXAPARIN SODIUM 40 MG: 40 INJECTION SUBCUTANEOUS at 09:20

## 2019-03-26 RX ADMIN — ALBUTEROL SULFATE 2.5 MG: 2.5 SOLUTION RESPIRATORY (INHALATION) at 08:36

## 2019-03-26 RX ADMIN — Medication 10 ML: at 21:21

## 2019-03-26 ASSESSMENT — PAIN SCALES - GENERAL: PAINLEVEL_OUTOF10: 0

## 2019-03-26 NOTE — PROGRESS NOTES
Hearing: WFL   Sensation:  No c/o numbness or tingling                             Comments/Treatment: Upon arrival, patient lyign in bed . At end of session, patient in bed  with call light and phone within reach, all lines and tubes intact. Pt would benefit from continued skilled OT to increase safety and independence with completion of ADL/IADL tasks for functional independence and quality of life. ALARM ON     Eval Complexity: Mod  · History: Expanded review of medical records and additional review of physical, cognitive, or psychosocial history related to current functional performance  ·     Exam: 3+ performance deficits, MS, weight bearing precautions, history of fall at home, Dependent level status for transfers, risk of caregiver/pt injury with transfers  · Assistance/Modification: max  assistance or modifications required to perform tasks. May have comorbidities that affect occupational performance.           Assessment of current deficits   Functional mobility [x]  ADLs [x] Strength [x]  Cognition []  Functional transfers  [x] IADLs [x] Safety Awareness [x]  Endurance [x]  Fine Motor Coordination [] Balance [x] Vision/perception [] Sensation []   Gross Motor Coordination [] ROM [] Delirium []                  Motor Control []    Plan of Care:   ADL retraining [x]   Equipment needs [x]   Neuromuscular re-education [x] Energy Conservation Techniques [x]  Functional Transfer training [x] Patient and/or Family Education [x]  Functional Mobility training [x]  Environmental Modifications [x]  Cognitive re-training []   Compensatory techniques for ADLs [x]  Splinting Needs []   Positioning to improve overall function [x]   Therapeutic Activity [x]  Therapeutic Exercise  [x]  Visual/Perceptual: []    Delirium prevention/treatment  []   Other:  []    Rehab Potential: Good for established goals     Patient / Family Goal: return home      Patient and/or family were instructed on functional diagnosis,

## 2019-03-26 NOTE — PROGRESS NOTES
Physical Therapy    Facility/Department: [facility] MED SURG  Initial Assessment    NAME: Jason Huggins  : 1945  MRN: [id number]    Date of Service: 3/26/2019    Evaluating Therapist: Amina Molina. Lena Mclaughlin, P.T. Room #: 0611/4026-H  DIAGNOSIS: Lung Mass, Influenza A  PMH: 3/21/19 L femur ORIF, Multiple Sclerosis (has not walked for about 14 years)  PRECAUTIONS: Droplet precautions, WB on LLE for transfers only, otherwise NWB LLE    Social:  Pt lives with  in a 1 floor plan no steps and no rails to enter. Prior to admission pt is non-ambulatory for about 14 years now and is dependent for all transfers.  just lifts the pt. She has and uses a transfer board for car transfers. Initial Evaluation  Date: 3/26/19 Treatment      Short Term/ Long Term   Goals   Was pt agreeable to Eval/treatment? yes     Does pt have pain? No c/o pain     Bed Mobility  Rolling: dependent  Supine to sit: dependent  Sit to supine: dependent  Scooting: dependent  Dependent   Transfers Sit to stand: NA  Stand to sit: NA  Stand pivot: NA  Dependent   Ambulation    NA  NA   Stair negotiation: ascended and descended NA  NA   AM-PAC Raw Score  24       BLE ROM is WFL. BLE strength is grossly 0/5. Balance: sitting EOB MIN A/MOD A  Endurance: fair     ASSESSMENT  Pt displays functional ability as noted in the objective portion of this evaluation. Comments/Treatment:  Pt has been WC dependent for about 14 years now and is dependent at home for all transfers. She recently fractured her L proximal femur and had ORIF 3/21/19. Pt is at her functional baseline for mobility at this time, but will keep pt on caseload to continue to perform core strengthening, BLE ROM, and postioning to decreased risk for bed ulcers and help promote bowel motility by sitting upright.    Pt was instructed in/performed the following exercises:  She sat EOB x 10 min working on sitting balance and core strength, while lying in bed PROM BLE heel slides and quad sets were performed 7 reps each, L hip ABD/ADD was performed 8x, and B ankle PROM DF/PF with gentle stretch into DF was performed 5x each. Pt while sitting in chair position was shown/performed in front of her  how to reach her UE across her body to opposite bed rail to stretch her arm and low back. Pt was left sitting up in chair position in bed with call light in reach and  present. Patient education  Pt educated on above exercises. Patient response to education:   Pt verbalized understanding Pt demonstrated skill Pt requires further education in this area   yes Yes with assist yes     Rehab potential is Good for reaching above PT goals. Pts/ family goals   1. To feel better and go home. Patient and or family understand(s) diagnosis, prognosis, and plan of care. PLAN  PT care will be provided in accordance with the objectives noted above. Whenever appropriate, clear delegation orders will be provided for nursing staff. Exercises and functional mobility practice will be used as well as appropriate assistive devices or modalities to obtain goals. Patient and family education will also be administered as needed. Frequency of treatments will be 2-5x/week x 5-7 days. Time in: 11:05  Time out: 11:35    Kenn Valles., P.T.    License number:  [id number]

## 2019-03-26 NOTE — PROGRESS NOTES
[facility] Hospitalist   Progress Note    Admitting Date and Time: 3/24/2019  3:10 PM  Admit Dx: Lung mass [R91.8]  Lung mass [R91.8]    Subjective: patient lying in bed, in no acute distress.  at bedside. Patient denies any pain this am. She states she still feels short of breath at this time. On room air, in no respiratory distress. Patient was admitted with Lung mass [R91.8]  Lung mass [R91.8]. ROS: denies fever, chills, cp, sob, n/v, HA unless stated above.      aspirin  325 mg Oral BID    atorvastatin  10 mg Oral Once per day on Mon Wed Fri    citalopram  20 mg Oral QAM    dantrolene  75 mg Oral Nightly    dantrolene  25 mg Oral Daily    levothyroxine  75 mcg Oral QAM AC    nortriptyline  25 mg Oral QAM    sennosides-docusate sodium  1 tablet Oral Nightly    sodium chloride flush  10 mL Intravenous 2 times per day    enoxaparin  40 mg Subcutaneous Daily    famotidine  20 mg Oral BID    albuterol  2.5 mg Nebulization Q4H    Magnesium Oxide  250 mg Oral QAM    CALCIUM 600+D PLUS MINERALS  1 tablet Oral Nightly    Mirabegron ER  50 mg Oral QAM    therapeutic multivitamin-minerals  1 tablet Oral Daily    doxycycline  100 mg Oral 2 times per day    baclofen  20 mg Oral 4x Daily    sodium chloride  1,000 mL Intravenous Once       polyethylene glycol 17 g Daily PRN   sodium chloride flush 10 mL PRN   magnesium hydroxide 30 mL Daily PRN   ondansetron 4 mg Q6H PRN   acetaminophen 650 mg Q4H PRN        Objective:    /60   Pulse 95   Temp 98.2 °F (36.8 °C) (Oral)   Resp 18   Ht 5' (1.524 m)   Wt 189 lb 4.8 oz (85.9 kg)   SpO2 96%   BMI 36.97 kg/m²   General Appearance: alert and oriented to person, place and time, well-developed and well-nourished, in no acute distress  Skin: warm and dry, no rash or erythema  Head: normocephalic and atraumatic  Pulmonary/Chest: diffuse rhonchi bilaterally   Cardiovascular: normal rate, normal S1 and S2, no gallops and intact distal pulses  Abdomen: soft, non-tender, non-distended, normal bowel sounds, no masses or organomegaly  Extremities: no cyanosis, no clubbing and no edema  Musculoskeletal: normal range of motion, no joint swelling, deformity or tenderness  Neurologic: cranial nerves grossly intact, speech normal       Recent Labs     03/24/19  1559 03/25/19  1140 03/26/19  0440    141 140   K 4.6 3.8 4.0    104 105   CO2 25 25 25   BUN 15 16 18   CREATININE 0.6 0.6 0.6   GLUCOSE 205* 116* 127*   CALCIUM 8.7 8.3* 8.4*       Recent Labs     03/24/19  1559 03/25/19  1140 03/26/19  0440   WBC 6.1 5.5 5.7   RBC 3.41* 3.27* 3.18*   HGB 11.0* 10.3* 10.1*   HCT 33.3* 33.0* 31.8*   MCV 97.7 100.9* 100.0*   MCH 32.3 31.5 31.8   MCHC 33.0 31.2* 31.8*   RDW 16.5* 16.6* 16.4*    337 333   MPV 9.7 10.1 9.7            Radiology:   XR CHEST PORTABLE   Final Result   No acute cardiopulmonary findings. CTA PULMONARY W CONTRAST   Final Result      1. NORMAL CT OF THE THORAX WITH CTA OF THE PULMONARY ARTERIES WITH   CONTRAST ENHANCEMENT. 2. NO EVIDENCE OF PULMONARY EMBOLUS. 3. Right apical spiculated mass measuring 1.7 x 1.6 cm. Cyst is   amenable for CT-guided biopsy for histological sampling. US DUP LOWER EXTREMITIES BILATERAL VENOUS   Final Result   PATENT DEEP VENOUS SYSTEM OF THE BILATERAL LOWER   EXTREMITY. NO EVIDENCE FOR DVT. XR CHEST PORTABLE   Final Result      Mild pulmonary venous congestion      No evidence of airspace consolidation or pulmonary venous congestion. Assessment:    Principal Problem:    Lung mass  Active Problems:    Influenza A    H/O sarcoidosis    Essential hypertension    Hypothyroidism    MS (multiple sclerosis) (HCC)    Spastic quadriparesis (HCC)    Closed fracture of left hip (HCC)  Resolved Problems:    * No resolved hospital problems. *      Plan:  1. Influenza A +: continue tamiflu day 2. Supportive treatment.  Chest vest. No respiratory distress, remains on room air. 2. Lung mass: pulm following. Nodule more likely scar per pulm. Will need PET scan as outpatient for further workup. 3. MS: continue home medications   4. History of left hip fracture: continue pain control. PT/OT. 5. HTN: continue home meds. BP controlled   6. Dispo: social work following for placement to acute rehab.  Will continue to follow        Electronically signed by ANTHONY Murphy on 3/26/2019 at 10:19 AM

## 2019-03-26 NOTE — PLAN OF CARE
Problem: Falls - Risk of:  Goal: Will remain free from falls  Description  Will remain free from falls  Outcome: Met This Shift     Problem: Risk for Impaired Skin Integrity  Goal: Tissue integrity - skin and mucous membranes  Description  Structural intactness and normal physiological function of skin and  mucous membranes.   Outcome: Met This Shift     Problem: Breathing Pattern - Ineffective:  Goal: Ability to achieve and maintain a regular respiratory rate will improve  Description  Ability to achieve and maintain a regular respiratory rate will improve  Outcome: Ongoing

## 2019-03-26 NOTE — PATIENT CARE CONFERENCE
[facility] Quality Flow/Interdisciplinary Rounds Progress Note        Quality Flow Rounds held on March 26, 2019    Disciplines Attending:  Bedside Nurse, ,  and Nursing Unit Leadership    Hever Ochoa was admitted on 3/24/2019  3:10 PM    Anticipated Discharge Date:  Expected Discharge Date: 03/29/19    Disposition:    Gary Score:  Gary Scale Score: 18    Readmission Risk              Risk of Unplanned Readmission:        19           Discussed patient goal for the day, patient clinical progression, and barriers to discharge.   The following Goal(s) of the Day/Commitment(s) have been identified:  Labs - Report Results      Shahida Chin  March 26, 2019

## 2019-03-27 ENCOUNTER — TELEPHONE (OUTPATIENT)
Dept: PULMONOLOGY | Age: 74
End: 2019-03-27

## 2019-03-27 VITALS
DIASTOLIC BLOOD PRESSURE: 61 MMHG | SYSTOLIC BLOOD PRESSURE: 117 MMHG | HEIGHT: 60 IN | WEIGHT: 181.31 LBS | OXYGEN SATURATION: 94 % | TEMPERATURE: 98.5 F | HEART RATE: 97 BPM | RESPIRATION RATE: 20 BRPM | BODY MASS INDEX: 35.6 KG/M2

## 2019-03-27 LAB
ANION GAP SERPL CALCULATED.3IONS-SCNC: 10 MMOL/L (ref 7–16)
BASOPHILS ABSOLUTE: 0.04 E9/L (ref 0–0.2)
BASOPHILS RELATIVE PERCENT: 0.6 % (ref 0–2)
BUN BLDV-MCNC: 16 MG/DL (ref 8–23)
CALCIUM SERPL-MCNC: 8.7 MG/DL (ref 8.6–10.2)
CHLORIDE BLD-SCNC: 104 MMOL/L (ref 98–107)
CO2: 24 MMOL/L (ref 22–29)
CREAT SERPL-MCNC: 0.5 MG/DL (ref 0.5–1)
EOSINOPHILS ABSOLUTE: 0.13 E9/L (ref 0.05–0.5)
EOSINOPHILS RELATIVE PERCENT: 2.1 % (ref 0–6)
GFR AFRICAN AMERICAN: >60
GFR NON-AFRICAN AMERICAN: >60 ML/MIN/1.73
GLUCOSE BLD-MCNC: 145 MG/DL (ref 74–99)
HCT VFR BLD CALC: 33 % (ref 34–48)
HEMOGLOBIN: 10.3 G/DL (ref 11.5–15.5)
IMMATURE GRANULOCYTES #: 0.07 E9/L
IMMATURE GRANULOCYTES %: 1.1 % (ref 0–5)
LYMPHOCYTES ABSOLUTE: 1.47 E9/L (ref 1.5–4)
LYMPHOCYTES RELATIVE PERCENT: 23.3 % (ref 20–42)
MCH RBC QN AUTO: 32.1 PG (ref 26–35)
MCHC RBC AUTO-ENTMCNC: 31.2 % (ref 32–34.5)
MCV RBC AUTO: 102.8 FL (ref 80–99.9)
MONOCYTES ABSOLUTE: 0.54 E9/L (ref 0.1–0.95)
MONOCYTES RELATIVE PERCENT: 8.6 % (ref 2–12)
NEUTROPHILS ABSOLUTE: 4.06 E9/L (ref 1.8–7.3)
NEUTROPHILS RELATIVE PERCENT: 64.3 % (ref 43–80)
PDW BLD-RTO: 16.5 FL (ref 11.5–15)
PLATELET # BLD: 340 E9/L (ref 130–450)
PMV BLD AUTO: 9.9 FL (ref 7–12)
POTASSIUM REFLEX MAGNESIUM: 4 MMOL/L (ref 3.5–5)
RBC # BLD: 3.21 E12/L (ref 3.5–5.5)
SODIUM BLD-SCNC: 138 MMOL/L (ref 132–146)
WBC # BLD: 6.3 E9/L (ref 4.5–11.5)

## 2019-03-27 PROCEDURE — 6370000000 HC RX 637 (ALT 250 FOR IP): Performed by: INTERNAL MEDICINE

## 2019-03-27 PROCEDURE — 97110 THERAPEUTIC EXERCISES: CPT

## 2019-03-27 PROCEDURE — 94669 MECHANICAL CHEST WALL OSCILL: CPT

## 2019-03-27 PROCEDURE — 97530 THERAPEUTIC ACTIVITIES: CPT

## 2019-03-27 PROCEDURE — 94640 AIRWAY INHALATION TREATMENT: CPT

## 2019-03-27 PROCEDURE — 85025 COMPLETE CBC W/AUTO DIFF WBC: CPT

## 2019-03-27 PROCEDURE — 2580000003 HC RX 258: Performed by: INTERNAL MEDICINE

## 2019-03-27 PROCEDURE — 99239 HOSP IP/OBS DSCHRG MGMT >30: CPT | Performed by: INTERNAL MEDICINE

## 2019-03-27 PROCEDURE — 6360000002 HC RX W HCPCS: Performed by: INTERNAL MEDICINE

## 2019-03-27 PROCEDURE — 80048 BASIC METABOLIC PNL TOTAL CA: CPT

## 2019-03-27 PROCEDURE — 36415 COLL VENOUS BLD VENIPUNCTURE: CPT

## 2019-03-27 RX ORDER — LACTULOSE 10 G/15ML
20 SOLUTION ORAL ONCE
Status: COMPLETED | OUTPATIENT
Start: 2019-03-27 | End: 2019-03-27

## 2019-03-27 RX ORDER — DOXYCYCLINE HYCLATE 100 MG/1
100 CAPSULE ORAL EVERY 12 HOURS SCHEDULED
Qty: 14 CAPSULE | Refills: 0 | Status: SHIPPED | OUTPATIENT
Start: 2019-03-27 | End: 2019-04-03

## 2019-03-27 RX ORDER — OSELTAMIVIR PHOSPHATE 75 MG/1
75 CAPSULE ORAL 2 TIMES DAILY
Status: DISCONTINUED | OUTPATIENT
Start: 2019-03-27 | End: 2019-03-27 | Stop reason: HOSPADM

## 2019-03-27 RX ORDER — OSELTAMIVIR PHOSPHATE 75 MG/1
75 CAPSULE ORAL 2 TIMES DAILY
Qty: 6 CAPSULE | Refills: 0 | Status: SHIPPED | OUTPATIENT
Start: 2019-03-27 | End: 2019-03-30

## 2019-03-27 RX ORDER — ALBUTEROL SULFATE 2.5 MG/3ML
2.5 SOLUTION RESPIRATORY (INHALATION) EVERY 4 HOURS
Qty: 120 EACH | Refills: 3 | Status: SHIPPED | OUTPATIENT
Start: 2019-03-27 | End: 2019-05-15

## 2019-03-27 RX ADMIN — ATORVASTATIN CALCIUM 10 MG: 10 TABLET, FILM COATED ORAL at 09:01

## 2019-03-27 RX ADMIN — Medication 250 MG: at 08:58

## 2019-03-27 RX ADMIN — CITALOPRAM 20 MG: 20 TABLET ORAL at 08:58

## 2019-03-27 RX ADMIN — LEVOTHYROXINE SODIUM 75 MCG: 0.07 TABLET ORAL at 06:39

## 2019-03-27 RX ADMIN — NORTRIPTYLINE HYDROCHLORIDE 25 MG: 25 CAPSULE ORAL at 08:58

## 2019-03-27 RX ADMIN — BACLOFEN 20 MG: 20 TABLET ORAL at 17:44

## 2019-03-27 RX ADMIN — FAMOTIDINE 20 MG: 20 TABLET ORAL at 08:56

## 2019-03-27 RX ADMIN — ALBUTEROL SULFATE 2.5 MG: 2.5 SOLUTION RESPIRATORY (INHALATION) at 03:42

## 2019-03-27 RX ADMIN — DANTROLENE SODIUM 25 MG: 25 CAPSULE ORAL at 08:58

## 2019-03-27 RX ADMIN — Medication 10 ML: at 09:00

## 2019-03-27 RX ADMIN — POLYETHYLENE GLYCOL 3350 17 G: 17 POWDER, FOR SOLUTION ORAL at 08:56

## 2019-03-27 RX ADMIN — BACLOFEN 20 MG: 20 TABLET ORAL at 09:01

## 2019-03-27 RX ADMIN — OSELTAMIVIR PHOSPHATE 75 MG: 75 CAPSULE ORAL at 10:38

## 2019-03-27 RX ADMIN — BACLOFEN 20 MG: 20 TABLET ORAL at 13:20

## 2019-03-27 RX ADMIN — ENOXAPARIN SODIUM 40 MG: 40 INJECTION SUBCUTANEOUS at 09:00

## 2019-03-27 RX ADMIN — LACTULOSE 20 G: 20 SOLUTION ORAL at 10:38

## 2019-03-27 RX ADMIN — Medication 1 TABLET: at 08:56

## 2019-03-27 RX ADMIN — DOXYCYCLINE HYCLATE 100 MG: 100 CAPSULE ORAL at 08:56

## 2019-03-27 RX ADMIN — ASPIRIN 325 MG: 325 TABLET, DELAYED RELEASE ORAL at 08:56

## 2019-03-27 RX ADMIN — ALBUTEROL SULFATE 2.5 MG: 2.5 SOLUTION RESPIRATORY (INHALATION) at 15:47

## 2019-03-27 RX ADMIN — ACETAMINOPHEN 650 MG: 325 TABLET ORAL at 16:21

## 2019-03-27 RX ADMIN — ACETAMINOPHEN 650 MG: 325 TABLET ORAL at 06:39

## 2019-03-27 ASSESSMENT — PAIN SCALES - GENERAL
PAINLEVEL_OUTOF10: 7
PAINLEVEL_OUTOF10: 9
PAINLEVEL_OUTOF10: 8
PAINLEVEL_OUTOF10: 0

## 2019-03-27 ASSESSMENT — PAIN DESCRIPTION - LOCATION: LOCATION: HIP;KNEE;LEG

## 2019-03-27 ASSESSMENT — PAIN DESCRIPTION - PAIN TYPE: TYPE: SURGICAL PAIN

## 2019-03-27 ASSESSMENT — PAIN DESCRIPTION - DESCRIPTORS: DESCRIPTORS: ACHING;SORE;SPASM

## 2019-03-27 ASSESSMENT — PAIN DESCRIPTION - ORIENTATION: ORIENTATION: LEFT

## 2019-03-27 ASSESSMENT — PAIN DESCRIPTION - FREQUENCY: FREQUENCY: INTERMITTENT

## 2019-03-27 ASSESSMENT — PAIN DESCRIPTION - ONSET: ONSET: ON-GOING

## 2019-03-27 ASSESSMENT — PAIN - FUNCTIONAL ASSESSMENT: PAIN_FUNCTIONAL_ASSESSMENT: PREVENTS OR INTERFERES SOME ACTIVE ACTIVITIES AND ADLS

## 2019-03-27 ASSESSMENT — PAIN DESCRIPTION - PROGRESSION: CLINICAL_PROGRESSION: GRADUALLY WORSENING

## 2019-03-27 NOTE — CARE COORDINATION
Social Work / Discharge Planning : SW spoke to Jessica Llamas from [place]  to cordinate patient coming to [place] [facility] today. Patient will need a pet scan and per patient/ family, they are in agreement to schedule PET scan after patient is discharged home from her [facility]. stay. Jessica Llamas from [place] will need Doctor to document OK for patient to have PET SCAN after she has finished her ST rehab. from [place]. Charge RN updated and await documentation. Once received, then SW can set up ambulance for transport. SW to follow. Electronically signed by STEVENSON Castorena on 3/27/19 at 10:17 AM    Addendum :CM has made several calls to get PET SCAN clarified through DR DOAN. CM await return call. [place] [id number] SNF will NOT accept until they have documentation regarding PET SCAN. Jessica Princeeling from [place] also updated that still waiting on clarification and their requested documentation. SW to follow. Electronically signed by STEVENSON Castorena on 3/27/19 at 12:30 PM      Addendum : CM received call from DR office and CM discussed what is being requested in order for [address] to accept patient. Dr. Good Manan be available in an hour or so. Will follow up then per office. Await documentation. SW to follow. Electronically signed by STEVENSON Castorena on 3/27/19 at 2:05 PM    Addendum :EDWIN updated DR argentina add information requested by [place]. Jessica Llamas from [place] updated. Patient will be discharged to  [address] today at 5:30 via [facility] ambulance. Patient, spouse, charge RN, RN and Brentlea PrinceMuriel from [place] updated on discharge and time of transport. SW to follow.  Electronically signed by STEVENSON Castorena on 3/27/19 at 2:24 PM

## 2019-03-27 NOTE — PROGRESS NOTES
Left saldana cath in at d/c bc pt and  stated she follows with a uroligist, Dr Elida Moreno, in [place] [address] and they have an appt on 4/8/19 to possibly take out or replace saldana.  Electronically signed by Hawa Benitez RN on 3/27/2019 at 5:22 PM

## 2019-03-27 NOTE — PATIENT CARE CONFERENCE
[facility] Quality Flow/Interdisciplinary Rounds Progress Note        Quality Flow Rounds held on March 27, 2019    Disciplines Attending:  Bedside Nurse, ,  and Nursing Unit Leadership    Marielle Chatterjee was admitted on 3/24/2019  3:10 PM    Anticipated Discharge Date:  Expected Discharge Date: 03/29/19    Disposition:    Gary Score:  Gary Scale Score: 16    Readmission Risk              Risk of Unplanned Readmission:        17           Discussed patient goal for the day, patient clinical progression, and barriers to discharge.   The following Goal(s) of the Day/Commitment(s) have been identified:  Discharge - Obtain Order      Jaya Colorado  March 27, 2019

## 2019-03-27 NOTE — PROGRESS NOTES
Physical Therapy    Facility/Department: [facility] MED SURG  Treatment Note  NAME: Bhavana Tom  : 1945  MRN: [id number]    Date of Service: 3/27/2019    Evaluating Therapist: Mirna Mina. Mary Benton P.THari Room #: 0062/9001-Q  DIAGNOSIS: Lung Mass, Influenza A  PMH: 3/21/19 L femur ORIF, Multiple Sclerosis (has not walked for about 14 years)  PRECAUTIONS: Droplet precautions, WB on LLE for transfers only, otherwise NWB LLE    Social:  Pt lives with  in a 1 floor plan no steps and no rails to enter. Prior to admission pt is non-ambulatory for about 14 years now and is dependent for all transfers.  just lifts the pt. She has and uses a transfer board for car transfers. Initial Evaluation  Date: 3/26/19 Treatment      Short Term/ Long Term   Goals   Was pt agreeable to Eval/treatment? yes yes    Does pt have pain? No c/o pain No c/o pain    Bed Mobility  Rolling: dependent  Supine to sit: dependent  Sit to supine: dependent  Scooting: dependent Rolling: dependent  Supine to sit: dependent  Sit to supine: dependent  Scooting: dependent Dependent   Transfers Sit to stand: NA  Stand to sit: NA  Stand pivot: NA NA Dependent   Ambulation    NA NA NA   Stair negotiation: ascended and descended NA NA NA   AM-PAC Raw Score        BLE ROM is WFL. BLE strength is grossly 0/5. Balance: sitting EOB MIN A and using BUE at times for support. Endurance: fair    Pt was instructed in/performed the following exercises:  She sat EOB x 12 min working on sitting dynamic and static balance and core strength, while lying supine in bed PROM BLE heel slides and quad sets were performed 2x10 reps each, L hip ABD/ADD was performed 2x10, and B ankle PROM DF/PF with gentle stretch into DF was performed 8x each. Pt while sitting in chair position performed reaching her UE across her body to opposite bed rail to stretch her arm and low back 3x each side.        Patient education  Pt educated on above exercises. Patient response to education:   Pt verbalized understanding Pt demonstrated skill Pt requires further education in this area   yes Yes with assist yes     Additional Comments: Pt's sitting balance was improved compared to yesterday and did not need as much assist to maintain her balance. Pt was left sitting up in chair position in bed with call light in reach and  present. Time in: 09:45  Time out: 10:15    Pt is making good progress toward established Physical Therapy goals. Continue with physical therapy current plan of care. Lorna Pierson Manual., P.T.   License Number: [id number]

## 2019-03-27 NOTE — DISCHARGE SUMMARY
[facility] Physician Discharge Summary       MD [address] [place] [address]  [phone]          [address] [place] [address] [address] MD  [address] [facility] [address]  Gee Ogden MD  [address] [facility] MD Shant Basilio [facility] [address]  [phone]            Activity level: as tolerated    Diet: DIET GENERAL; No Caffeine    Dispo: SONIA     Condition on discharge: satmaxi    Patient ID:  Ruddy Meyer  [id number]  69 y.o.  1945    Admit date: 3/24/2019    Discharge date and time:  3/27/2019  6:04 PM    Admission Diagnoses: Principal Problem:    Lung mass  Active Problems:    Influenza    H/O sarcoidosis    Essential hypertension    Hypothyroidism    MS (multiple sclerosis) ([address]    Spastic quadriparesis ([address]    Closed fracture of left hip ([address]  Resolved Problems:    * No resolved hospital problems. *      Discharge Diagnoses: Principal Problem:    Lung mass  Active Problems:    Influenza    H/O sarcoidosis    Essential hypertension    Hypothyroidism    MS (multiple sclerosis) (HCC)    Spastic quadriparesis (HCC)    Closed fracture of left hip (HCC)  Resolved Problems:    * No resolved hospital problems. *      Consults:  IP CONSULT TO PULMONOLOGY    Hospital Course:   Patient Ruddy Meyer is a 69 y.o. presented with shortness of breath Lung mass [R91.8]  Lung mass [R91.8]    1.  Influenza A bronchitis, continue on tamiflu, chest vest  2.  Lung mass, for a PET scan as outpatient to be scheduled at some point in the future by pulmonary. 3.  Left hip fracture, recent, will need SONIA  4. Hx of MS, bedbound and at baseline. 5.  HTN, BP is acceptable, continue home meds  6. Dispo, SONIA appreciate PT/OT input.           Discharge Exam:    General Appearance: alert and oriented to person, spiculated mass measuring 1.7 x 1.6 cm. Cyst is amenable for CT-guided biopsy for histological sampling. Us Dup Lower Extremities Bilateral Venous    Result Date: 3/24/2019  Patient MRN:  [id number] : 1945 Age: 69 years Gender: Female Order Date:  3/24/2019 3:45 PM EXAM: US DUP LOWER EXTREMITIES BILATERAL VENOUS NUMBER OF IMAGES:  43 INDICATION:  post op SOB post op SOB COMPARISON: None TECHNIQUE:Multiple gray scale and color Doppler images were obtained of the deep venous system of the bilateral  lower extremity. Doppler wave forms and augmentation were performed. FINDINGS: There is patency of the bilateral external iliac veins. There is normal compressibility, phasic flow pattern, and augmentation demonstrated for the bilateral common femoral, superficial femoral, and popliteal veins. There is patency of the veins in the calf. PATENT DEEP VENOUS SYSTEM OF THE BILATERAL LOWER EXTREMITY. NO EVIDENCE FOR DVT. Patient Instructions:      Medication List      START taking these medications    albuterol (2.5 MG/3ML) 0.083% nebulizer solution  Commonly known as:  PROVENTIL  Take 3 mLs by nebulization every 4 hours     doxycycline hyclate 100 MG capsule  Commonly known as:  VIBRAMYCIN  Take 1 capsule by mouth every 12 hours for 7 days     oseltamivir 75 MG capsule  Commonly known as:  TAMIFLU  Take 1 capsule by mouth 2 times daily for 3 days        CONTINUE taking these medications    aspirin 325 MG EC tablet  Take 1 tablet by mouth 2 times daily for 28 days     atorvastatin 10 MG tablet  Commonly known as:  LIPITOR     baclofen 20 MG tablet  Commonly known as:  LIORESAL  Take 1 tablet by mouth 4 times daily.      CALCIUM 600+D PLUS MINERALS 600-400 MG-UNIT Tabs     citalopram 20 MG tablet  Commonly known as:  CELEXA     * dantrolene 25 MG capsule  Commonly known as:  DANTRIUM  TAKE 1 CAPSULE BY MOUTH EVERY MORNING     * dantrolene 25 MG capsule  Commonly known as:  DANTRIUM     ezetimibe 10 MG

## 2019-03-28 LAB
EKG ATRIAL RATE: 107 BPM
EKG P AXIS: 49 DEGREES
EKG P-R INTERVAL: 174 MS
EKG Q-T INTERVAL: 344 MS
EKG QRS DURATION: 98 MS
EKG QTC CALCULATION (BAZETT): 459 MS
EKG R AXIS: -2 DEGREES
EKG T AXIS: 73 DEGREES
EKG VENTRICULAR RATE: 107 BPM

## 2019-03-29 LAB
BLOOD CULTURE, ROUTINE: NORMAL
CULTURE, BLOOD 2: NORMAL

## 2019-04-03 ENCOUNTER — OFFICE VISIT (OUTPATIENT)
Dept: ORTHOPEDIC SURGERY | Age: 74
End: 2019-04-03

## 2019-04-03 VITALS
DIASTOLIC BLOOD PRESSURE: 62 MMHG | SYSTOLIC BLOOD PRESSURE: 110 MMHG | HEIGHT: 60 IN | TEMPERATURE: 98.1 F | BODY MASS INDEX: 36.12 KG/M2 | WEIGHT: 184 LBS | HEART RATE: 92 BPM

## 2019-04-03 DIAGNOSIS — Z87.81 S/P LEFT HIP FRACTURE: Primary | ICD-10-CM

## 2019-04-03 DIAGNOSIS — Z87.81 S/P LEFT HIP FRACTURE: ICD-10-CM

## 2019-04-03 PROCEDURE — 99024 POSTOP FOLLOW-UP VISIT: CPT | Performed by: ORTHOPAEDIC SURGERY

## 2019-04-03 NOTE — PROGRESS NOTES
Post Operative Visit     Surgery: Left hip ORIF  Date: 3/21/2019    Subjective:    Cris Douglas is here for follow up visit s/p above procedure. She is doing well. She has been at a nursing facility. She was readmitted briefly for pneumonia. She is feeling much better. She is having no pain in her hip    Controlled Substances Monitoring:        Physical Exam:    Height: 5' (1.524 m), Weight: 184 lb (83.5 kg), BP: 110/62    On exam of the hip, incisions are intact. Passive range of motion is not painful. There is mild swelling which is resolving      Imaging: X-rays of left hip and femur were obtained including 3 views of the hip and 2 views of the femur. These show maintained alignment of her fracture status post ORIF with long cephalo-medullary nail. Impression: Maintained alignment status post ORIF with long cephalo-medullary nail    Assessment and Plan:  Two-week status post left hip ORIF  She is doing well. She is nonambulatory at baseline but she can be toe touch for transfers as needed. Incisions can be left open to air.   We will check her back in 6 weeks for repeat x-rays of the left hip    Enzo Taylor MD  Orthopaedic Surgery   4/3/19  11:20 AM

## 2019-04-16 ENCOUNTER — TELEPHONE (OUTPATIENT)
Dept: NEUROLOGY | Age: 74
End: 2019-04-16

## 2019-04-16 NOTE — TELEPHONE ENCOUNTER
Pt  called requesting to speak with Dr Mikala Reed regarding the patients medication Tecfidera 240mg 1 capsule PO daily. Dr Mikala Reed took patient off of this medication in December 2018 and requested she have an MRI. Patient had since fallen and was placed into a nursing home. She is now back home with visiting nurses.  would like to know if he should put her back on the Tecfidera or not?  states he has been trying to reach someone at the [facility] for a few weeks with no return phone calls. MA will schedule at the first available appt.

## 2019-04-17 NOTE — TELEPHONE ENCOUNTER
Please have Michaela stay off Tecfidera---most patients in their 76s do not need any drugs for MS. We do not need to see her right away.  Thanks

## 2019-04-29 DIAGNOSIS — R91.1 LUNG NODULE: Primary | ICD-10-CM

## 2019-04-30 ENCOUNTER — TELEPHONE (OUTPATIENT)
Dept: PULMONOLOGY | Age: 74
End: 2019-04-30

## 2019-04-30 NOTE — TELEPHONE ENCOUNTER
Mailed a letter to patient informing her that her CT Chest is scheduled for 6-12-19 at 9:00 am at the [facility]. She must arrive by 8:30 am. There is no prep for this test

## 2019-05-01 ENCOUNTER — TELEPHONE (OUTPATIENT)
Dept: PULMONOLOGY | Age: 74
End: 2019-05-01

## 2019-05-01 DIAGNOSIS — R91.1 LUNG NODULE: Primary | ICD-10-CM

## 2019-05-01 DIAGNOSIS — Z86.2 H/O SARCOIDOSIS: Chronic | ICD-10-CM

## 2019-05-02 ENCOUNTER — TELEPHONE (OUTPATIENT)
Dept: PULMONOLOGY | Age: 74
End: 2019-05-02

## 2019-05-02 NOTE — TELEPHONE ENCOUNTER
Mailed a letter to patient informing her that her PET scan is scheduled for 5-9-19 at 10:00 am at the [facility]. She must arrive by 9:30 am. I included a tip sheet for her diet.

## 2019-05-08 ENCOUNTER — OFFICE VISIT (OUTPATIENT)
Dept: ORTHOPEDIC SURGERY | Age: 74
End: 2019-05-08

## 2019-05-08 ENCOUNTER — OFFICE VISIT (OUTPATIENT)
Dept: NEUROLOGY | Age: 74
End: 2019-05-08
Payer: MEDICARE

## 2019-05-08 VITALS
DIASTOLIC BLOOD PRESSURE: 62 MMHG | OXYGEN SATURATION: 96 % | RESPIRATION RATE: 18 BRPM | HEART RATE: 103 BPM | BODY MASS INDEX: 35.53 KG/M2 | SYSTOLIC BLOOD PRESSURE: 128 MMHG | HEIGHT: 60 IN | WEIGHT: 181 LBS

## 2019-05-08 VITALS
DIASTOLIC BLOOD PRESSURE: 72 MMHG | SYSTOLIC BLOOD PRESSURE: 124 MMHG | HEART RATE: 100 BPM | BODY MASS INDEX: 36.12 KG/M2 | WEIGHT: 184 LBS | HEIGHT: 60 IN

## 2019-05-08 DIAGNOSIS — Z87.81 S/P LEFT HIP FRACTURE: ICD-10-CM

## 2019-05-08 DIAGNOSIS — G82.50 SPASTIC QUADRIPARESIS (HCC): ICD-10-CM

## 2019-05-08 DIAGNOSIS — S72.302S CLOSED FRACTURE OF SHAFT OF LEFT FEMUR, UNSPECIFIED FRACTURE MORPHOLOGY, SEQUELA: ICD-10-CM

## 2019-05-08 DIAGNOSIS — G35 MULTIPLE SCLEROSIS (HCC): Primary | ICD-10-CM

## 2019-05-08 DIAGNOSIS — Z87.81 S/P LEFT HIP FRACTURE: Primary | ICD-10-CM

## 2019-05-08 PROCEDURE — 1123F ACP DISCUSS/DSCN MKR DOCD: CPT | Performed by: PSYCHIATRY & NEUROLOGY

## 2019-05-08 PROCEDURE — G8417 CALC BMI ABV UP PARAM F/U: HCPCS | Performed by: PSYCHIATRY & NEUROLOGY

## 2019-05-08 PROCEDURE — G8427 DOCREV CUR MEDS BY ELIG CLIN: HCPCS | Performed by: PSYCHIATRY & NEUROLOGY

## 2019-05-08 PROCEDURE — 3017F COLORECTAL CA SCREEN DOC REV: CPT | Performed by: PSYCHIATRY & NEUROLOGY

## 2019-05-08 PROCEDURE — G8400 PT W/DXA NO RESULTS DOC: HCPCS | Performed by: PSYCHIATRY & NEUROLOGY

## 2019-05-08 PROCEDURE — 1036F TOBACCO NON-USER: CPT | Performed by: PSYCHIATRY & NEUROLOGY

## 2019-05-08 PROCEDURE — 99215 OFFICE O/P EST HI 40 MIN: CPT | Performed by: PSYCHIATRY & NEUROLOGY

## 2019-05-08 PROCEDURE — 1090F PRES/ABSN URINE INCON ASSESS: CPT | Performed by: PSYCHIATRY & NEUROLOGY

## 2019-05-08 PROCEDURE — 4040F PNEUMOC VAC/ADMIN/RCVD: CPT | Performed by: PSYCHIATRY & NEUROLOGY

## 2019-05-08 PROCEDURE — 99024 POSTOP FOLLOW-UP VISIT: CPT | Performed by: ORTHOPAEDIC SURGERY

## 2019-05-08 RX ORDER — DANTROLENE SODIUM 50 MG/1
50 CAPSULE ORAL
Qty: 120 CAPSULE | Refills: 11 | Status: SHIPPED
Start: 2019-05-08 | End: 2020-03-17 | Stop reason: SDUPTHER

## 2019-05-08 RX ORDER — IPRATROPIUM BROMIDE AND ALBUTEROL SULFATE 2.5; .5 MG/3ML; MG/3ML
1 SOLUTION RESPIRATORY (INHALATION) EVERY 4 HOURS
COMMUNITY
End: 2019-10-30 | Stop reason: SDUPTHER

## 2019-05-08 NOTE — PROGRESS NOTES
Post Operative Visit     Surgery: Left hip ORIF  Date: 3/21/2019      Subjective:    Missy Douglas is here for follow up visit s/p above procedure. She is doing well. She is not experiencing much in terms of pain. She is nonweightbearing at baseline. Controlled Substances Monitoring:        Physical Exam:    Height: 5' (1.524 m), Weight: 184 lb (83.5 kg), BP: 124/72    On exam of the hip, there is no pain on range of motion. Incisions are healed. There is no tenderness around the hip      Imaging: X-rays of the hip including AP pelvis, AP and lateral hip, as well as AP and lateral of the femur were obtained. These show good alignment of her hardware. There is interval callus formation. There is no evidence of subsidence or loosening. Impression: Maintained alignment of hardware with interval callus formation    Assessment and Plan:  She is 6 weeks out from left hip ORIF for subtrochanteric fracture  She is doing well. She will continue with exercises. We will see her back in 6 weeks with repeat x-rays.     Nik Wilson MD  Orthopaedic Surgery   5/8/19  11:09 AM

## 2019-05-08 NOTE — PROGRESS NOTES
Subjective:     Bhavana Tom is a 69 y.o. right handed woman who suffers from long-standing multiple sclerosis. Her EDSS is 8.5. She continues well off Tecfidera without additional neurological decline. She also takes Dantrium and baclofen for spasticity. Baclofen was reduced in 20 mg 3 times daily as 4 times daily by her PCP. Afterwards, her spasms worsened. She now only transferred by sliding board. She is unable to stand or with her 's help. Unfortunately, 6 weeks ago she broke her left femur after falling off the toilet. She required a shanti and a hip replacement. She is now recovering from that surgery. A catheter was placed in her bladder since her  no longer easily put her on the toilet. She denies other neurological issues. She denies new medical issues. She suffers from sarcoidosis---she is finally seeing a local pulmonologist for that disorder. A PET scan has been scheduled. She continues well at home with her 's assistance. Her  is finding it more difficult to care for. Review of systems is otherwise negative. Prior to Visit Medications    Medication Sig Taking?  Authorizing Provider   albuterol (PROVENTIL) (2.5 MG/3ML) 0.083% nebulizer solution Take 3 mLs by nebulization every 4 hours  Eva Camarena MD   dantrolene (DANTRIUM) 25 MG capsule TAKE 1 CAPSULE BY MOUTH EVERY MORNING  Darcy Garcia PA-C   Calcium Carbonate-Vit D-Min (CALCIUM 600+D PLUS MINERALS) 600-400 MG-UNIT TABS Take 1 tablet by mouth nightly   Historical Provider, MD   polyethylene glycol (MIRALAX) PACK packet Take 17 g by mouth daily as needed   Historical Provider, MD   Mirabegron ER (MYRBETRIQ) 25 MG TB24 Take 2 tablets by mouth every morning   Historical Provider, MD   dantrolene (DANTRIUM) 25 MG capsule Take 75 mg by mouth nightly  Historical Provider, MD   Cholecalciferol (VITAMIN D3) 5000 units TABS Take 1 tablet by mouth every morning  Historical Provider, MD   Potassium 99 MG TABS Take 1 tablet by mouth every morning  Historical Provider, MD   Omega-3 Fatty Acids (FISH OIL PEARLS) 300 MG CAPS Take 1 capsule by mouth nightly  Historical Provider, MD   senna-docusate (Diana Blower) 8.6-50 MG per tablet Take 1 tablet by mouth nightly  Historical Provider, MD   levothyroxine (SYNTHROID) 75 MCG tablet Take 75 mcg by mouth every morning (before breakfast)   Historical Provider, MD   magnesium (MAGNESIUM-OXIDE) 250 MG TABS tablet Take 250 mg by mouth every morning   Historical Provider, MD   atorvastatin (LIPITOR) 10 MG tablet Take 10 mg by mouth three times a week Indications: Monday, Wednesday, Friday   Historical Provider, MD   baclofen (LIORESAL) 20 MG tablet Take 1 tablet by mouth 4 times daily.   Genesis Lemus MD   ezetimibe (ZETIA) 10 MG tablet Take 10 mg by mouth every morning   Historical Provider, MD   citalopram (CELEXA) 20 MG tablet Take 20 mg by mouth every morning   Historical Provider, MD   nortriptyline (PAMELOR) 25 MG capsule Take 25 mg by mouth every morning   Historical Provider, MD   Multiple Vitamins-Minerals (MULTIVITAMIN & MINERAL PO) Take 1 tablet by mouth every morning   Historical Provider, MD       Objective:     /62 (Site: Right Upper Arm, Position: Sitting, Cuff Size: Medium Adult)   Pulse 103   Resp 18   Ht 5' (1.524 m)   Wt 181 lb (82.1 kg)   SpO2 96%   BMI 35.35 kg/m²      Head: normocephalic, without obvious abnormality, atraumatic---she is wheelchair bound; she is heavier and previously  Throat: lips, mucosa, and tongue normal; teeth and gums normal  Neck: no adenopathy, no carotid bruit, no JVD, supple, symmetrical, trachea midline and thyroid not enlarged, symmetric, no tenderness/mass/nodules full ROM of neck without tenderness  Lungs: clear to auscultation bilaterally  Heart: regular rate and rhythm, S1, S2 normal, no murmur, click, rub or gallop  Pulses: 1+ and symmetric  Skin: color, texture, turgor normal; no rashes or lesions Detailed Neurological Exam:    Mental Status: alert, oriented, thought content appropriate; pleasant---I find no cognitive issues    Cranial Nerves:  I: smell    II: visual acuity     II: visual fields Full   II: pupils DAX   III,VII: ptosis none   III,IV,VI: extraocular muscles  Full ROM   V: mastication Normal   V: facial light touch sensation  Normal   V,VII: corneal reflex  Present   VII: facial muscle function - upper     VII: facial muscle function - lower Normal   VIII: hearing Normal   IX: soft palate elevation  Normal   IX,X: gag reflex Present   XI: trapezius strength  5/5   XI: sternocleidomastoid strength 5/5   XI: neck flexion strength  5/5   XII: tongue strength  Normal     No red desaturation  No ADRIA pupil    Motor:  No drift in arms  Right   +4/5              Left   4/5               Right Bicep  4/5           Left Bicep  4/5              Right Triceps   4/5       Left Trceps  4/5          Right Deltoid  4/5     Left Deltoid  4/5         Right IPS  0/5            Left IPS  0/5               Right Quadriceps  0/5          Left Quadriceps    0/5           Right Gastrocnemius    0/5    Left Gastrocnemius   0/5  Right Ant Tibialis   0/5  Left Ant Tibialis   0/5    Normal bulk  Moderate to marked spasticity in both legs---especially the thigh adductors    Sensory:  Slightly decreased vibratory sensation to both knees  Sensory level not present over her spinous processes    Gait:  In a wheelchair ----unwilling to stand    Coordination:   FFM and GERARD slightly decreased in both arms  FN decreased right side     DTR:   Right Brachioradialis reflex 1+  Left Brachioradialis reflex 1+  Right Biceps reflex 1+  Left Biceps reflex 1+  +2 at both knees but absent at the ankles    Her neurological examination now displays increasing spasticity in both legs    Recent CMP was unremarkable; CBC with differential was also unrevealing---with normal absolute lymphocyte counts since discontinuing

## 2019-05-09 ENCOUNTER — HOSPITAL ENCOUNTER (OUTPATIENT)
Dept: PET IMAGING | Age: 74
Discharge: HOME | End: 2019-05-11
Payer: MEDICARE

## 2019-05-09 DIAGNOSIS — R91.1 LUNG NODULE: ICD-10-CM

## 2019-05-09 DIAGNOSIS — Z86.2 H/O SARCOIDOSIS: Chronic | ICD-10-CM

## 2019-05-09 LAB
METER GLUCOSE: 144 MG/DL (ref 74–99)
METER GLUCOSE: 144 MG/DL (ref 74–99)

## 2019-05-09 PROCEDURE — 82962 GLUCOSE BLOOD TEST: CPT

## 2019-05-09 PROCEDURE — A9552 F18 FDG: HCPCS | Performed by: RADIOLOGY

## 2019-05-09 PROCEDURE — 78815 PET IMAGE W/CT SKULL-THIGH: CPT

## 2019-05-09 PROCEDURE — 3430000000 HC RX DIAGNOSTIC RADIOPHARMACEUTICAL: Performed by: RADIOLOGY

## 2019-05-09 RX ORDER — FLUDEOXYGLUCOSE F 18 200 MCI/ML
15 INJECTION, SOLUTION INTRAVENOUS
Status: COMPLETED | OUTPATIENT
Start: 2019-05-09 | End: 2019-05-09

## 2019-05-09 RX ADMIN — FLUDEOXYGLUCOSE F 18 15 MILLICURIE: 200 INJECTION, SOLUTION INTRAVENOUS at 11:05

## 2019-05-15 ENCOUNTER — OFFICE VISIT (OUTPATIENT)
Dept: PULMONOLOGY | Age: 74
End: 2019-05-15
Payer: MEDICARE

## 2019-05-15 VITALS
BODY MASS INDEX: 35.53 KG/M2 | HEART RATE: 101 BPM | SYSTOLIC BLOOD PRESSURE: 133 MMHG | RESPIRATION RATE: 18 BRPM | HEIGHT: 60 IN | WEIGHT: 181 LBS | DIASTOLIC BLOOD PRESSURE: 62 MMHG

## 2019-05-15 DIAGNOSIS — R91.8 LUNG MASS: ICD-10-CM

## 2019-05-15 DIAGNOSIS — Z86.2 H/O SARCOIDOSIS: Chronic | ICD-10-CM

## 2019-05-15 PROCEDURE — 99213 OFFICE O/P EST LOW 20 MIN: CPT | Performed by: INTERNAL MEDICINE

## 2019-05-15 PROCEDURE — G8400 PT W/DXA NO RESULTS DOC: HCPCS | Performed by: INTERNAL MEDICINE

## 2019-05-15 PROCEDURE — 99214 OFFICE O/P EST MOD 30 MIN: CPT | Performed by: INTERNAL MEDICINE

## 2019-05-15 PROCEDURE — 3017F COLORECTAL CA SCREEN DOC REV: CPT | Performed by: INTERNAL MEDICINE

## 2019-05-15 PROCEDURE — G8417 CALC BMI ABV UP PARAM F/U: HCPCS | Performed by: INTERNAL MEDICINE

## 2019-05-15 PROCEDURE — G8427 DOCREV CUR MEDS BY ELIG CLIN: HCPCS | Performed by: INTERNAL MEDICINE

## 2019-05-15 PROCEDURE — 1090F PRES/ABSN URINE INCON ASSESS: CPT | Performed by: INTERNAL MEDICINE

## 2019-05-15 PROCEDURE — 4040F PNEUMOC VAC/ADMIN/RCVD: CPT | Performed by: INTERNAL MEDICINE

## 2019-05-15 PROCEDURE — 1123F ACP DISCUSS/DSCN MKR DOCD: CPT | Performed by: INTERNAL MEDICINE

## 2019-05-15 PROCEDURE — 1036F TOBACCO NON-USER: CPT | Performed by: INTERNAL MEDICINE

## 2019-05-15 RX ORDER — IPRATROPIUM BROMIDE AND ALBUTEROL SULFATE 2.5; .5 MG/3ML; MG/3ML
1 SOLUTION RESPIRATORY (INHALATION) EVERY 6 HOURS PRN
Qty: 360 ML | Refills: 6 | Status: SHIPPED
Start: 2019-05-15 | End: 2020-09-23 | Stop reason: SDUPTHER

## 2019-05-15 NOTE — PROGRESS NOTES
Hospital follow up in office today. Pt here with  and states she is feeling better since last hospitalization. Pt given script for Duoneb aerosols and encouraged to use. Plan for trip out [address] and pt to take nebulizer meds. Dr. Medley Res discussed Lovenox for travel; pt declined and was advised to move every 2 hours during car ride and air travel for trip planned. Follow up in office in 6 mos; appt given.

## 2019-05-15 NOTE — PROGRESS NOTES
[facility]                                                                 Division of Pulmonary, [address] [facility]                                                                       Pulmonary &health [facility]                                                                   Pulmonary Consult Note            Patient: Lesa Niño  MRN: [id number]  : 1945      Date of Admission: . No admission date for patient encounter.         Reason for Consultation:Lung nodule   CC :   HPI:   Lesa Niño is a 69 y.o. y/o female with past medical history significant for MS and sarcoid and she has left hip pain after fall that happen around 2019 and she went to rehab and had surgery before that mat rehab started cough and SOB and not feeling well and she was brought to Psychiatric and she ahs been doing well ,no cough and SOB and Duo neb helps a lot     She was diagnosed with sarcoid  and she is not on any bronchoscopy and biopsy ,iN [facility] but it has been stable as Per CT/PET     She did not smoke but had second smoking more than 10 years from parents     She had recent PET scan  shows  Just scar but no active disease and small Right hilar lymph nodes that is calcified      She is going for Trip across the country       PAST MEDICAL HISTORY:     Past Medical History:   Diagnosis Date    Asthma     Hepatitis     High cholesterol     Hypertension     Hypothyroidism     MS (multiple sclerosis) ([facility] [address]     Osteoporosis     Sarcoidosis        PAST SURGICAL HISTORY:   Past Surgical History:   Procedure Laterality Date    CHOLECYSTECTOMY  36s    FEMUR FRACTURE SURGERY Left 3/21/2019    LEFT FEMUR CEPHALLOMEDULLARY NAIL performed by Fox Murray MD at [address] [facility] [address]      plate in lt knee       FAMILY HISTORY:   Family History   Problem Relation Age of Onset    Stroke Mother     Heart Disease Mother     Cancer Father        SOCIAL HISTORY:   Social History     Socioeconomic History    Marital status:      Spouse name: Not on file    Number of children: Not on file    Years of education: Not on file    Highest education level: Not on file   Occupational History    Not on file   Social Needs    Financial resource strain: Not on file    Food insecurity:     Worry: Not on file     Inability: Not on file    Transportation needs:     Medical: Not on file     Non-medical: Not on file   Tobacco Use    Smoking status: Never Smoker    Smokeless tobacco: Never Used   Substance and Sexual Activity    Alcohol use: No     Alcohol/week: 0.0 oz     Comment: Ocassionally    Drug use: No    Sexual activity: Yes     Partners: Male   Lifestyle    Physical activity:     Days per week: Not on file     Minutes per session: Not on file    Stress: Not on file   Relationships    Social connections:     Talks on phone: Not on file     Gets together: Not on file     Attends Holiness service: Not on file     Active member of club or organization: Not on file     Attends meetings of clubs or organizations: Not on file     Relationship status: Not on file    Intimate partner violence:     Fear of current or ex partner: Not on file     Emotionally abused: Not on file     Physically abused: Not on file     Forced sexual activity: Not on file   Other Topics Concern    Not on file   Social History Narrative    Not on file     Social History     Tobacco Use   Smoking Status Never Smoker   Smokeless Tobacco Never Used     Social History     Substance and Sexual Activity   Alcohol Use No    Alcohol/week: 0.0 oz    Comment: Ocassionally     Social History     Substance and Sexual Activity   Drug Use No       OCCUPATIONAL HISTORY:    - no asbestos, silica, beryllium exposures     HISTORY:    HOME MEDICATIONS:  Prior to Admission medications Medication Sig Start Date End Date Taking? Authorizing Provider   dantrolene (DANTRIUM) 50 MG capsule Take 1 capsule by mouth 5 times daily 5/8/19  Yes Nikita Schmitz MD   Calcium Carbonate-Vit D-Min (CALCIUM 600+D PLUS MINERALS) 600-400 MG-UNIT TABS Take 1 tablet by mouth nightly    Yes Historical Provider, MD   polyethylene glycol (MIRALAX) PACK packet Take 17 g by mouth daily as needed    Yes Historical Provider, MD   Mirabegron ER (MYRBETRIQ) 25 MG TB24 Take 2 tablets by mouth every morning    Yes Historical Provider, MD   Cholecalciferol (VITAMIN D3) 5000 units TABS Take 1 tablet by mouth every morning   Yes Historical Provider, MD   Potassium 99 MG TABS Take 1 tablet by mouth every morning   Yes Historical Provider, MD   Omega-3 Fatty Acids (FISH OIL PEARLS) 300 MG CAPS Take 1 capsule by mouth nightly   Yes Historical Provider, MD   senna-docusate (PERICOLACE) 8.6-50 MG per tablet Take 1 tablet by mouth nightly   Yes Historical Provider, MD   levothyroxine (SYNTHROID) 75 MCG tablet Take 75 mcg by mouth every morning (before breakfast)    Yes Historical Provider, MD   magnesium (MAGNESIUM-OXIDE) 250 MG TABS tablet Take 250 mg by mouth every morning    Yes Historical Provider, MD   atorvastatin (LIPITOR) 10 MG tablet Take 10 mg by mouth three times a week Indications: Monday, Wednesday, Friday  10/30/17  Yes Historical Provider, MD   baclofen (LIORESAL) 20 MG tablet Take 1 tablet by mouth 4 times daily.  4/30/14  Yes Brad Berumen MD   ezetimibe (ZETIA) 10 MG tablet Take 10 mg by mouth every morning    Yes Historical Provider, MD   citalopram (CELEXA) 20 MG tablet Take 20 mg by mouth every morning  10/27/10  Yes Historical Provider, MD   Multiple Vitamins-Minerals (MULTIVITAMIN & MINERAL PO) Take 1 tablet by mouth every morning    Yes Historical Provider, MD   ipratropium-albuterol (DUONEB) 0.5-2.5 (3) MG/3ML SOLN nebulizer solution Inhale 1 vial into the lungs every 4 hours    Historical Provider, MD       CURRENT MEDICATIONS:  No current facility-administered medications for this visit. IV MEDICATIONS:      ALLERGIES:  No Known Allergies    REVIEW OF SYSTEMS:  General ROS:  No weight loss ,no fatigue     ENT ROS:   No Sore throat ,no lymphoadenopathy,no nasal stuffiness     Hematological and Lymphatic ROS:   No ecchymosis ,no tendency to bleed  Respiratory ROS:   Cough ,SOB   Cardiovascular ROS:   No CP,No Palpitation   Gastrointestinal ROS:   No Gi bleed,no nausea or vomiting      - Musculoskeletal ROS:      - no joint swelling ,no joint pain   Neurological ROS:     -no weakness or numbness    Dermatological ROS:   No skin rash ,no urticaria     PHYSICAL EXAMINATION:     VITAL SIGNS:  /62   Pulse 101   Resp 18   Ht 5' (1.524 m)   Wt 181 lb (82.1 kg)   BMI 35.35 kg/m²   Wt Readings from Last 3 Encounters:   05/15/19 181 lb (82.1 kg)   05/08/19 181 lb (82.1 kg)   05/08/19 184 lb (83.5 kg)     Temp Readings from Last 3 Encounters:   04/03/19 98.1 °F (36.7 °C)   03/27/19 98.5 °F (36.9 °C) (Oral)   03/23/19 97.8 °F (36.6 °C) (Oral)     TMAX:  BP Readings from Last 3 Encounters:   05/15/19 133/62   05/08/19 128/62   05/08/19 124/72     Pulse Readings from Last 3 Encounters:   05/15/19 101   05/08/19 103   05/08/19 100           INTAKE/OUTPUTS:  No intake/output data recorded.   No intake or output data in the 24 hours ending 05/15/19 1105    General Appearance: alert and oriented to person, place and time, well-developed and   well-nourished, in no acute distress   Eyes: pupils equal, round, and reactive to light, extraocular eye movements intact, conjunctivae normal and sclera anicteric   Neck: neck supple and non tender without mass, no thyromegaly, no thyroid nodules and no cervical adenopathy   Pulmonary/Chest:rhonchi bilateral  Cardiovascular: normal rate, regular rhythm, normal S1 and S2, no murmurs, rubs, clicks or gallops, distal pulses intact, no carotid bruits, no murmurs, no gallops, no carotid bruits and no JVD   Abdomen: obese, soft, non-tender, non-distended, normal bowel sounds, no masses or organomegaly   Extremities:no edema   Musculoskeletal: normal range of motion, no joint swelling, deformity or tenderness   Neurologic: reflexes normal and symmetric, no cranial nerve deficit noted    LABS/IMAGING:    CBC:  Lab Results   Component Value Date    WBC 6.3 03/27/2019    HGB 10.3 (L) 03/27/2019    HCT 33.0 (L) 03/27/2019    .8 (H) 03/27/2019     03/27/2019    LYMPHOPCT 23.3 03/27/2019    RBC 3.21 (L) 03/27/2019    MCH 32.1 03/27/2019    MCHC 31.2 (L) 03/27/2019    RDW 16.5 (H) 03/27/2019    NEUTOPHILPCT 64.3 03/27/2019    MONOPCT 8.6 03/27/2019    EOSPCT 2.2 03/12/2019    BASOPCT 0.6 03/27/2019    NEUTROABS 4.06 03/27/2019    LYMPHSABS 1.47 (L) 03/27/2019    MONOSABS 0.54 03/27/2019    EOSABS 0.13 03/27/2019    BASOSABS 0.04 03/27/2019       No results for input(s): WBC, HGB, HCT, MCV, PLT in the last 720 hours. BMP:   No results for input(s): NA, K, CL, CO2, PHOS, BUN, CREATININE in the last 72 hours. Invalid input(s): CA    MG:   Lab Results   Component Value Date    MG 2.2 03/26/2019     Ca/Phos:   Lab Results   Component Value Date    CALCIUM 8.7 03/27/2019     Amylase: No results found for: AMYLASE  Lipase: No results found for: LIPASE  LIVER PROFILE:   No results for input(s): AST, ALT, LIPASE, BILIDIR, BILITOT, ALKPHOS in the last 72 hours. Invalid input(s): AMYLASE,  ALB    PT/INR:   No results for input(s): PROTIME, INR in the last 72 hours. APTT:   No results for input(s): APTT in the last 72 hours.     Cardiac Enzymes:  Lab Results   Component Value Date    TROPONINI <0.01 03/24/2019       Hgb A1C: No results found for: LABA1C  No results found for: EAG  BARRY: No results found for: BARRY  ESR: No results found for: SEDRATE  CRP: No results found for: CRP  D Dimer:   Lab Results   Component Value Date    DDIMER 1724 03/24/2019     Folate and B12:   Lab Results

## 2019-05-16 PROBLEM — J44.9 COPD (CHRONIC OBSTRUCTIVE PULMONARY DISEASE) (HCC): Status: ACTIVE | Noted: 2019-05-15

## 2019-05-16 PROBLEM — J45.909 ASTHMA: Status: ACTIVE | Noted: 2019-05-15

## 2019-05-24 PROBLEM — J10.1 INFLUENZA A: Status: RESOLVED | Noted: 2019-03-25 | Resolved: 2019-05-24

## 2019-06-06 ENCOUNTER — TELEPHONE (OUTPATIENT)
Dept: FAMILY MEDICINE CLINIC | Age: 74
End: 2019-06-06

## 2019-06-06 DIAGNOSIS — G35 MS (MULTIPLE SCLEROSIS) (HCC): Primary | Chronic | ICD-10-CM

## 2019-06-06 DIAGNOSIS — S72.002S CLOSED FRACTURE OF LEFT HIP, SEQUELA: ICD-10-CM

## 2019-06-06 NOTE — TELEPHONE ENCOUNTER
Osiris Montaño called from [facility]. They have been seeing the pt for PT post left femur Fx. He would like to know if you would order a brace to hold the left leg in the proper position. This is an issue not only b/c of the Fx but also b.c of pt's MS.      Please advise      Fax [phone]

## 2019-06-07 NOTE — TELEPHONE ENCOUNTER
I am ok with this - not sure where to go to get this - would maybe recommend [facility] - they may be able to help get correct brace

## 2019-06-10 ENCOUNTER — TELEPHONE (OUTPATIENT)
Dept: FAMILY MEDICINE CLINIC | Age: 74
End: 2019-06-10

## 2019-06-10 NOTE — TELEPHONE ENCOUNTER
I believe that the physical therapist wanted the order faxed to them b/c he provided a fax #. Can you put in an order for me to fax?

## 2019-06-11 ENCOUNTER — TELEPHONE (OUTPATIENT)
Dept: NEUROLOGY | Age: 74
End: 2019-06-11

## 2019-06-11 NOTE — TELEPHONE ENCOUNTER
Patients  Kendall Mercado called in stating he believes patient is going through a flare up of her MS. Kendall Mercado states patient is \"not making much sense when she speaks\" also she is having problems with her movement in arms and hands. Kendall Mercado states patient has stopped feeding herself also. Kendall Mercado states he is concerned and wants to know what he should do for her. MA routing message to Dr Darleen Barone for advisement.     Electronically signed by Juan Carlos Soriano on 6/11/19 at 8:25 AM

## 2019-06-13 NOTE — TELEPHONE ENCOUNTER
I informed her  that I doubt recurrent MS. She is already better. If needed, MRIs can be repeated. He will call if her issues continue.

## 2019-06-19 ENCOUNTER — OFFICE VISIT (OUTPATIENT)
Dept: ORTHOPEDIC SURGERY | Age: 74
End: 2019-06-19

## 2019-06-19 VITALS
WEIGHT: 184 LBS | HEIGHT: 60 IN | SYSTOLIC BLOOD PRESSURE: 108 MMHG | DIASTOLIC BLOOD PRESSURE: 65 MMHG | BODY MASS INDEX: 36.12 KG/M2 | HEART RATE: 96 BPM

## 2019-06-19 DIAGNOSIS — Z87.81 S/P LEFT HIP FRACTURE: Primary | ICD-10-CM

## 2019-06-19 DIAGNOSIS — Z87.81 S/P LEFT HIP FRACTURE: ICD-10-CM

## 2019-06-19 PROCEDURE — 99024 POSTOP FOLLOW-UP VISIT: CPT | Performed by: ORTHOPAEDIC SURGERY

## 2019-06-19 RX ORDER — NORTRIPTYLINE HYDROCHLORIDE 25 MG/1
25 CAPSULE ORAL NIGHTLY
COMMUNITY
End: 2019-12-20 | Stop reason: SDUPTHER

## 2019-06-19 RX ORDER — DOCUSATE SODIUM 100 MG/1
100 CAPSULE, LIQUID FILLED ORAL 2 TIMES DAILY
COMMUNITY
End: 2019-10-30 | Stop reason: ALTCHOICE

## 2019-06-19 NOTE — PROGRESS NOTES
Post Operative Visit     Surgery: Left hip ORIF  Date: 3/21/2019      Subjective:    Linda Senior is here for follow up visit s/p above procedure. She is doing well. She has been tolerating transfers which is her baseline    Controlled Substances Monitoring:        Physical Exam:    Height: 5' (1.524 m), Weight: 184 lb (83.5 kg), BP: 108/65    On exam, there is no tenderness around the thigh. There is good range of motion without pain. There is intact motor and sensory function of the foot      Imaging: X-rays of the left hip including AP pelvis and AP and lateral of the hip, as well as 2 views of the femur were obtained and reviewed. These show maintained alignment of her long cephalo-medullary nail for subtrochanteric fracture. There is interval healing with abundant bone formation    Impression: Interval healing with maintained hardware alignment of intertrochanteric/subtrochanteric fracture    Assessment and Plan: She is 3 months out from her left hip fracture managed with long cephalo-medullary nail  She is doing well. She will continue with transfers as tolerated and can place her weight of her leg on the ground as needed.   We will see her back in 3 months for final x-ray    Katherine Chow MD  Orthopaedic Surgery   6/19/19  10:35 AM

## 2019-06-20 ENCOUNTER — TELEPHONE (OUTPATIENT)
Dept: FAMILY MEDICINE CLINIC | Age: 74
End: 2019-06-20

## 2019-06-20 ENCOUNTER — OFFICE VISIT (OUTPATIENT)
Dept: FAMILY MEDICINE CLINIC | Age: 74
End: 2019-06-20
Payer: MEDICARE

## 2019-06-20 VITALS
HEIGHT: 60 IN | OXYGEN SATURATION: 99 % | SYSTOLIC BLOOD PRESSURE: 124 MMHG | BODY MASS INDEX: 35.53 KG/M2 | HEART RATE: 100 BPM | DIASTOLIC BLOOD PRESSURE: 82 MMHG | WEIGHT: 181 LBS

## 2019-06-20 DIAGNOSIS — Z00.00 ROUTINE GENERAL MEDICAL EXAMINATION AT A HEALTH CARE FACILITY: Primary | ICD-10-CM

## 2019-06-20 PROCEDURE — G0438 PPPS, INITIAL VISIT: HCPCS | Performed by: PHYSICIAN ASSISTANT

## 2019-06-20 ASSESSMENT — PATIENT HEALTH QUESTIONNAIRE - PHQ9
SUM OF ALL RESPONSES TO PHQ QUESTIONS 1-9: 2
2. FEELING DOWN, DEPRESSED OR HOPELESS: 1
SUM OF ALL RESPONSES TO PHQ9 QUESTIONS 1 & 2: 2
SUM OF ALL RESPONSES TO PHQ QUESTIONS 1-9: 2
1. LITTLE INTEREST OR PLEASURE IN DOING THINGS: 1

## 2019-06-20 ASSESSMENT — ANXIETY QUESTIONNAIRES: GAD7 TOTAL SCORE: 0

## 2019-06-20 ASSESSMENT — LIFESTYLE VARIABLES: HOW OFTEN DO YOU HAVE A DRINK CONTAINING ALCOHOL: 0

## 2019-06-20 NOTE — TELEPHONE ENCOUNTER
Can we please get a copy of her most recent mammogram done at [facility] in 2018, ordered by prev PCP, Dr. Lloyd Stallings?   Thanks

## 2019-06-20 NOTE — PROGRESS NOTES
Medicare Annual Wellness Visit  Name: Rin Eric Date: 2019   MRN: [id number] Sex: Female   Age: 69 y.o. Ethnicity: Non-/Non    : 1945 Race: Terri Kohler is here for [employer]    She lives with her , Is rarely alone and carries a cell to call for help if she were to fall  She has fallen, has just completed PT and continues with HEP  Admits mild depression. PHQ2=2 today. She requires help with her ADLS and home management,  \"does it all\"  She is safe in home and car  She has an Advanced Directive in place, will bring a copy for our records to her next OV  Her last mammogram was in 2018 at [facility] for Dr. Jada Neil. We will call for a copy of that report  She had a DEXA \"long ago\", it is due  Last colonoscopy was , is due this year  Labs are UTD  She has had both Pneumonia vaccinations, is due for Shingrix and Tetanus    Screenings for behavioral, psychosocial and functional/safety risks, and cognitive dysfunction are all negative except as indicated below. These results, as well as other patient data from the [address] form, are documented in Flowsheets linked to this Encounter. No Known Allergies  Prior to Visit Medications    Medication Sig Taking?  Authorizing Provider   nortriptyline (PAMELOR) 25 MG capsule Take 25 mg by mouth nightly Yes Historical Provider, MD   docusate sodium (COLACE) 100 MG capsule Take 100 mg by mouth 2 times daily Yes Historical Provider, MD   Cyanocobalamin (VITAMIN B 12 PO) Take by mouth Yes Historical Provider, MD   ipratropium-albuterol (DUONEB) 0.5-2.5 (3) MG/3ML SOLN nebulizer solution Inhale 3 mLs into the lungs every 6 hours as needed for Shortness of Breath Yes Jose Brice MD   ipratropium-albuterol (DUONEB) 0.5-2.5 (3) MG/3ML SOLN nebulizer solution Inhale 1 vial into the lungs every 4 hours Yes Historical Provider, MD   dantrolene (DANTRIUM) 50 MG capsule Take 1 capsule by mouth 5 times daily Yes Samm Fairchild MD   Calcium Carbonate-Vit D-Min (CALCIUM 600+D PLUS MINERALS) 600-400 MG-UNIT TABS Take 1 tablet by mouth nightly  Yes Historical Provider, MD   polyethylene glycol (MIRALAX) PACK packet Take 17 g by mouth daily as needed  Yes Historical Provider, MD   Mirabegron ER (MYRBETRIQ) 25 MG TB24 Take 2 tablets by mouth every morning  Yes Historical Provider, MD   Cholecalciferol (VITAMIN D3) 5000 units TABS Take 1 tablet by mouth every morning Yes Historical Provider, MD   Potassium 99 MG TABS Take 1 tablet by mouth every morning Yes Historical Provider, MD   Omega-3 Fatty Acids (FISH OIL PEARLS) 300 MG CAPS Take 1 capsule by mouth nightly Yes Historical Provider, MD   levothyroxine (SYNTHROID) 75 MCG tablet Take 75 mcg by mouth every morning (before breakfast)  Yes Historical Provider, MD   magnesium (MAGNESIUM-OXIDE) 250 MG TABS tablet Take 250 mg by mouth every morning  Yes Historical Provider, MD   atorvastatin (LIPITOR) 10 MG tablet Take 10 mg by mouth three times a week Indications: Monday, Wednesday, Friday  Yes Historical Provider, MD   baclofen (LIORESAL) 20 MG tablet Take 1 tablet by mouth 4 times daily.  Yes Fabio Godfrey MD   ezetimibe (ZETIA) 10 MG tablet Take 10 mg by mouth every morning  Yes Historical Provider, MD   citalopram (CELEXA) 20 MG tablet Take 20 mg by mouth every morning  Yes Historical Provider, MD   Multiple Vitamins-Minerals (MULTIVITAMIN & MINERAL PO) Take 1 tablet by mouth every morning  Yes Historical Provider, MD     Past Medical History:   Diagnosis Date    Hepatitis     High cholesterol     Hypertension     Hypothyroidism     MS (multiple sclerosis) ([facility] [address]     Osteoporosis     Sarcoidosis      Past Surgical History:   Procedure Laterality Date    CHOLECYSTECTOMY  1990s    FEMUR FRACTURE SURGERY Left 3/21/2019    LEFT FEMUR CEPHALLOMEDULLARY NAIL [facility]cide [facility]worth[facility]     HYSTERECTOMY      KNEE SURGERY      plate in lt knee     Family History   Problem Relation Age of Onset    Stroke Mother     Heart Disease Mother     Cancer Father         lung    Mult Sclerosis Sister     No Known Problems Brother     No Known Problems Sister     Arthritis Sister     Cervical Cancer Sister        CareTeam (Including outside providers/suppliers regularly involved in providing care):   Patient Care Team:  Alfredo Manuel MD as PCP - General (Orthopedic Surgery)  Krystyna Fletcher MD as PCP - [facility] Empaneled Provider  Daphney Zhu MD as Consulting Physician (Pulmonology)  Henrique Edmonds MD as Consulting Physician (Neurology)    Wt Readings from Last 3 Encounters:   06/20/19 181 lb (82.1 kg)   06/19/19 184 lb (83.5 kg)   05/15/19 181 lb (82.1 kg)     Vitals:    06/20/19 1336   BP: 124/82   Pulse: 100   SpO2: 99%   Weight: 181 lb (82.1 kg)   Height: 5' (1.524 m)     Body mass index is 35.35 kg/m². Based upon direct observation of the patient, evaluation of cognition reveals difficulty with STM. Could recall 1/3 items. Resp:  Respirations are regular and unlabored. Respirations rate is normal Lungs are clear bilaterally  Cv: Rate is regular. Rhythm is regular. S1 is normal. S2 is normal.  Extremities: Mild edema of the BLE, L>R  Musculoskeletal: unable to ambulate, is wheelchair bound  Neuro: Displays comfort and cooperation during the encounter. Affect is normal.  Alert and Oriented x3. Able to name current President. Was able to recall 1/3 objects with accuracy. Clock drawing test=2      Patient's complete Health Risk Assessment and screening values have been reviewed and are found in Flowsheets. The following problems were reviewed today and where indicated follow up appointments were made and/or referrals ordered.     Positive Risk Factor Screenings with Interventions:     Health Habits/Nutrition:  Health Habits/Nutrition  Do you exercise for at least 20 minutes 2-3 times per week?: Yes  Have you lost any weight without trying in the past 3 months?: No  Do you eat fewer than 2 meals per day?: No  Have you seen a dentist within the past year?: Yes  Body mass index is 35.35 kg/m². Health Habits/Nutrition Interventions:  · She is continuing her HEP as designed by PT    Hearing/Vision:  Hearing/Vision  Do you or your family notice any trouble with your hearing?: No  Do you have difficulty driving, watching TV, or doing any of your daily activities because of your eyesight?: No  Have you had an eye exam within the past year?: (!) No  Hearing/Vision Interventions:  · Vision concerns:  patient encouraged to make appointment with his/her eye specialist    ADL:  ADLs  In the past 7 days, did you need help from others to perform any of the following everyday activities? Eating, dressing, grooming, bathing, toileting, or walking/balance?: (!) Bathing, Eating, Dressing, Grooming, Walking/Balance, Toileting  In the past 7 days, did you need help from others to take care of any of the following?  Laundry, housekeeping, banking/finances, shopping, telephone use, food preparation, transportation, or taking medications?: [employer], Housekeeping, Banking/Finances, Shopping, Telephone Use, Food Preparation, Transportation  ADL Interventions:  · Patient declines any further evaluation/treatment for this issue  · Her  helps with ADLS and home management    Personalized Preventive Plan   Current Health Maintenance Status  Immunization History   Administered Date(s) Administered    Influenza 12/15/2010    Influenza Virus Vaccine 10/27/2014    Influenza Whole 10/21/2015    Influenza, Meg Hayden, 3 Years and older, IM (Fluzone 3 yrs and older or Afluria 5 yrs and older) 10/02/2017    Influenza, Triv, inactivated, subunit, adjuvanted, IM (Fluad 65 yrs and older) 09/19/2018    Pneumococcal Conjugate 13-valent (Janene Roberts) 09/21/2015, 09/18/2018, 09/19/2018    Pneumococcal Polysaccharide ([id number]) 09/18/2013        Health Maintenance   Topic Date Due    TSH testing  1945    DTaP/Tdap/Td vaccine (1 - Tdap) 03/05/1965    Lipid screen  03/05/1986    Breast cancer screen  03/05/1996    Shingles Vaccine (1 of 2) 03/05/1996    Colon cancer screen colonoscopy  03/05/1996    Annual Wellness Visit (AWV)  03/05/2009    DEXA (modify frequency per FRAX score)  03/05/2011    Potassium monitoring  03/27/2020    Creatinine monitoring  03/27/2020    Flu vaccine  Completed    Pneumococcal 65+ years Vaccine  Completed    Hepatitis C screen  Completed     Recommendations for Preventive Services Due: see orders and patient instructions/AVS.  .   Recommended screening schedule for the next 5-10 years is provided to the patient in written form: see Patient Instructions/AVS.

## 2019-06-20 NOTE — PATIENT INSTRUCTIONS
Personalized Preventive Plan for Ruby Handy - 6/20/2019  Medicare offers a range of preventive health benefits. Some of the tests and screenings are paid in full while other may be subject to a deductible, co-insurance, and/or copay. Some of these benefits include a comprehensive review of your medical history including lifestyle, illnesses that may run in your family, and various assessments and screenings as appropriate. After reviewing your medical record and screening and assessments performed today your provider may have ordered immunizations, labs, imaging, and/or referrals for you. A list of these orders (if applicable) as well as your Preventive Care list are included within your After Visit Summary for your review. Other Preventive Recommendations:    · A preventive eye exam performed by an eye specialist is recommended every 1-2 years to screen for glaucoma; cataracts, macular degeneration, and other eye disorders. · A preventive dental visit is recommended every 6 months. · Try to get at least 150 minutes of exercise per week or 10,000 steps per day on a pedometer . · Order or download the FREE \"Exercise & Physical Activity: Your Everyday Guide\" from [employer]. Call 0-381.488.8603 or search [employer] online. · You need 8753-8347 mg of calcium and 8941-0031 IU of vitamin D per day. It is possible to meet your calcium requirement with diet alone, but a vitamin D supplement is usually necessary to meet this goal.  · When exposed to the sun, use a sunscreen that protects against both UVA and UVB radiation with an SPF of 30 or greater. Reapply every 2 to 3 hours or after sweating, drying off with a towel, or swimming. · Always wear a seat belt when traveling in a car. Always wear a helmet when riding a bicycle or motorcycle.   · Please continue with Home exercise program developed by your Physical Therapist  · Please bring a copy of your Living Will to your next appointment  · Please schedule an eye exam at your convenience  · We will call for a copy of your most recent mammogram at [address] in 2018  · You are due for DEXA and colonoscopy  · Please check with your Pharmacy about the Shingrix (Shingles) and tetanus vaccinations

## 2019-07-02 ENCOUNTER — TELEPHONE (OUTPATIENT)
Dept: FAMILY MEDICINE CLINIC | Age: 74
End: 2019-07-02

## 2019-07-02 DIAGNOSIS — E78.2 MIXED HYPERLIPIDEMIA: Primary | ICD-10-CM

## 2019-07-23 ENCOUNTER — TELEPHONE (OUTPATIENT)
Dept: FAMILY MEDICINE CLINIC | Age: 74
End: 2019-07-23

## 2019-07-23 NOTE — TELEPHONE ENCOUNTER
left message asking we fax lab orders to [facility] [phone]. I printed orders and hand faxed.  I informed

## 2019-07-24 DIAGNOSIS — E78.2 MIXED HYPERLIPIDEMIA: ICD-10-CM

## 2019-07-24 LAB
ALBUMIN SERPL-MCNC: 4.1 G/DL
ALP BLD-CCNC: 167 U/L
ALT SERPL-CCNC: 26 U/L
ANION GAP SERPL CALCULATED.3IONS-SCNC: NORMAL MMOL/L
AST SERPL-CCNC: 31 U/L
BILIRUB SERPL-MCNC: 1.1 MG/DL (ref 0.1–1.4)
BUN BLDV-MCNC: 14 MG/DL
CALCIUM SERPL-MCNC: 10.1 MG/DL
CHLORIDE BLD-SCNC: 97 MMOL/L
CHOLESTEROL, FASTING: 170
CO2: 30 MMOL/L
CREAT SERPL-MCNC: 0.7 MG/DL
GFR CALCULATED: NORMAL
GLUCOSE BLD-MCNC: 189 MG/DL
HCT VFR BLD CALC: 38.9 % (ref 36–46)
HDLC SERPL-MCNC: 41 MG/DL (ref 35–70)
HEMOGLOBIN: 13.3 G/DL (ref 12–16)
LDL CHOLESTEROL CALCULATED: 101 MG/DL (ref 0–160)
MAGNESIUM: 1.6 MG/DL
PLATELET # BLD: 305 K/ΜL
POTASSIUM SERPL-SCNC: 4.4 MMOL/L
SODIUM BLD-SCNC: 137 MMOL/L
TOTAL PROTEIN: 7.9
TRIGLYCERIDE, FASTING: 220
WBC # BLD: 10.8 10^3/ML

## 2019-07-25 DIAGNOSIS — R73.01 IMPAIRED FASTING GLUCOSE: Primary | ICD-10-CM

## 2019-07-26 LAB
AVERAGE GLUCOSE: NORMAL
HBA1C MFR BLD: 7.8 %

## 2019-07-31 ENCOUNTER — OFFICE VISIT (OUTPATIENT)
Dept: FAMILY MEDICINE CLINIC | Age: 74
End: 2019-07-31
Payer: MEDICARE

## 2019-07-31 ENCOUNTER — HOSPITAL ENCOUNTER (OUTPATIENT)
Age: 74
Discharge: HOME | End: 2019-08-02
Payer: MEDICARE

## 2019-07-31 VITALS
SYSTOLIC BLOOD PRESSURE: 120 MMHG | HEART RATE: 100 BPM | BODY MASS INDEX: 33.11 KG/M2 | TEMPERATURE: 98.5 F | HEIGHT: 62 IN | OXYGEN SATURATION: 95 % | DIASTOLIC BLOOD PRESSURE: 76 MMHG

## 2019-07-31 DIAGNOSIS — N31.9 NEUROGENIC BLADDER: ICD-10-CM

## 2019-07-31 DIAGNOSIS — K59.09 OTHER CONSTIPATION: ICD-10-CM

## 2019-07-31 DIAGNOSIS — E03.9 HYPOTHYROIDISM, UNSPECIFIED TYPE: Primary | Chronic | ICD-10-CM

## 2019-07-31 DIAGNOSIS — G35 MS (MULTIPLE SCLEROSIS) (HCC): Chronic | ICD-10-CM

## 2019-07-31 DIAGNOSIS — F33.0 MILD EPISODE OF RECURRENT MAJOR DEPRESSIVE DISORDER (HCC): ICD-10-CM

## 2019-07-31 DIAGNOSIS — E03.9 HYPOTHYROIDISM, UNSPECIFIED TYPE: Chronic | ICD-10-CM

## 2019-07-31 DIAGNOSIS — M85.89 OSTEOPENIA OF MULTIPLE SITES: ICD-10-CM

## 2019-07-31 DIAGNOSIS — Z12.11 SCREEN FOR COLON CANCER: ICD-10-CM

## 2019-07-31 DIAGNOSIS — R73.01 IMPAIRED FASTING GLUCOSE: ICD-10-CM

## 2019-07-31 DIAGNOSIS — K75.81 NASH (NONALCOHOLIC STEATOHEPATITIS): ICD-10-CM

## 2019-07-31 DIAGNOSIS — E78.2 MIXED HYPERLIPIDEMIA: ICD-10-CM

## 2019-07-31 LAB
HBA1C MFR BLD: 7.7 % (ref 4–5.6)
TSH SERPL DL<=0.05 MIU/L-ACNC: 15.68 UIU/ML (ref 0.27–4.2)

## 2019-07-31 PROCEDURE — 83036 HEMOGLOBIN GLYCOSYLATED A1C: CPT

## 2019-07-31 PROCEDURE — 99214 OFFICE O/P EST MOD 30 MIN: CPT | Performed by: INTERNAL MEDICINE

## 2019-07-31 PROCEDURE — 36415 COLL VENOUS BLD VENIPUNCTURE: CPT

## 2019-07-31 PROCEDURE — 84443 ASSAY THYROID STIM HORMONE: CPT

## 2019-07-31 RX ORDER — LEVOTHYROXINE SODIUM 0.07 MG/1
75 TABLET ORAL
Qty: 90 TABLET | Refills: 1 | Status: SHIPPED | OUTPATIENT
Start: 2019-07-31 | End: 2019-11-18 | Stop reason: SDUPTHER

## 2019-07-31 RX ORDER — ATORVASTATIN CALCIUM 10 MG/1
10 TABLET, FILM COATED ORAL DAILY
Qty: 90 TABLET | Refills: 1 | Status: SHIPPED | OUTPATIENT
Start: 2019-07-31 | End: 2020-01-17 | Stop reason: SDUPTHER

## 2019-07-31 RX ORDER — CITALOPRAM 20 MG/1
20 TABLET ORAL EVERY MORNING
Qty: 90 TABLET | Refills: 1 | Status: SHIPPED
Start: 2019-07-31 | End: 2020-05-18 | Stop reason: SDUPTHER

## 2019-07-31 ASSESSMENT — ENCOUNTER SYMPTOMS
CONSTIPATION: 0
DIARRHEA: 0
COUGH: 0
SORE THROAT: 0
BLOOD IN STOOL: 0
SHORTNESS OF BREATH: 0
CHEST TIGHTNESS: 0
EYE PAIN: 0
VOMITING: 0
RHINORRHEA: 0
NAUSEA: 0
ABDOMINAL PAIN: 0

## 2019-07-31 NOTE — PROGRESS NOTES
[facility]) Physicians   Internal Medicine     2019  Cielo Douglas : 1945 Sex: female  Age:77 y.o. Chief Complaint   Patient presents with    Hyperlipidemia        HPI:   Patient presents to office for review and management of chronic medical conditions.    - States had hip fracture s/p left ORIF. States has been to physical therapy. Not walking. States having issues with left knee and leg. To follow up with ortho. - States found lung mass and needs pulmonary follow up 2019     - States has multiple sclerosis. States follows with neurology. States stopped ticferdia. Follow up every 6 months. States on baclofen 20mg 4x per day and dantrolene 50mg 5x per day. - States has overactive bladder. Stopped oxybutynin. now has Barger cath. Following with urology.    - States has high cholesterol. States on Zetia and Lipitor. No reported side effects.    - States has hypothyroidism. on levothyroxine.    - States has had elevated liver function tests. States has seen GI. Has had biopsy. Last lab stable. - States has depression. On citalopram. Stable on medications. No reported side effects. No suicidal thoughts.    - States has constipation. On senokot. On miralax as needed     Health Maintenance   - immunizations:   Influenza Vaccination - (2018)  Pneumonia Vaccination - (2013) - Pneumococcal. (2018) - prevnar, RA salem  Shingrix Vaccine (Shingles) - declines currently  Tetanus Vaccination    - Screenings:   Bone Density Scan   Pelvic/Pap Exam  Mammogram - (12/10/2018)    Colonoscopy  - due 2019    ROS:  Review of Systems   Constitutional: Negative for appetite change, chills, fever and unexpected weight change. HENT: Negative for congestion, rhinorrhea and sore throat. Eyes: Negative for pain and visual disturbance. Respiratory: Negative for cough, chest tightness and shortness of breath. Cardiovascular: Negative for chest pain and palpitations.    Gastrointestinal: Negative for units TABS, Take 1 tablet by mouth every morning, Disp: , Rfl:     Potassium 99 MG TABS, Take 1 tablet by mouth every morning, Disp: , Rfl:     Omega-3 Fatty Acids (FISH OIL PEARLS) 300 MG CAPS, Take 1 capsule by mouth nightly, Disp: , Rfl:     magnesium (MAGNESIUM-OXIDE) 250 MG TABS tablet, Take 250 mg by mouth every morning , Disp: , Rfl:     baclofen (LIORESAL) 20 MG tablet, Take 1 tablet by mouth 4 times daily. , Disp: 120 tablet, Rfl: 5    ezetimibe (ZETIA) 10 MG tablet, Take 10 mg by mouth every morning , Disp: , Rfl:     Multiple Vitamins-Minerals (MULTIVITAMIN & MINERAL PO), Take 1 tablet by mouth every morning , Disp: , Rfl:     No Known Allergies    Past Medical History:   Diagnosis Date    Hepatitis     High cholesterol     Hypertension     Hypothyroidism     MS (multiple sclerosis) ([facility] [address]     Osteoporosis     Sarcoidosis        Past Surgical History:   Procedure Laterality Date    CHOLECYSTECTOMY  1990s    FEMUR FRACTURE SURGERY Left 3/21/2019    LEFT FEMUR CEPHALLOMEDULLARY NAIL performed by [facility] Bilateral     HYSTERECTOMY      KNEE SURGERY      plate in lt knee       Family History   Problem Relation Age of Onset    Stroke Mother     Heart Disease Mother     Cancer Father         lung    Mult Sclerosis Sister     No Known Problems Brother     No Known Problems Sister     Arthritis Sister     Cervical Cancer Sister        Social History     Socioeconomic History    Marital status:      Spouse name: Not on file    Number of children: Not on file    Years of education: Not on file    Highest education level: Not on file   Occupational History    Not on file   Social Needs    Financial resource strain: Not on file    Food insecurity:     Worry: Not on file     Inability: Not on file    Transportation needs:     Medical: Not on file     Non-medical: Not on file   Tobacco Use    Smoking status: Never Smoker   

## 2019-08-01 ENCOUNTER — TELEPHONE (OUTPATIENT)
Dept: PRIMARY CARE CLINIC | Age: 74
End: 2019-08-01

## 2019-08-01 DIAGNOSIS — E03.8 OTHER SPECIFIED HYPOTHYROIDISM: Primary | Chronic | ICD-10-CM

## 2019-09-09 RX ORDER — BACLOFEN 20 MG/1
20 TABLET ORAL 4 TIMES DAILY
Qty: 120 TABLET | Refills: 5 | Status: SHIPPED | OUTPATIENT
Start: 2019-09-09 | End: 2019-10-07 | Stop reason: SDUPTHER

## 2019-09-09 NOTE — TELEPHONE ENCOUNTER
Daniel Bermudez calling to get a new script for Baclofen send to [facility].     Last Appointment:  Visit date not found  Future Appointments   Date Time Provider Marisol Richardsoni   9/18/2019 11:10 AM Ekta Vasquez MD [facility]   9/18/2019  2:40 PM Samy Perez MD [facility]   10/30/2019  2:15 PM Nelli Walsh MD [facility]   11/15/2019 11:00 AM Yamil Morley MD [facility]

## 2019-09-18 ENCOUNTER — OFFICE VISIT (OUTPATIENT)
Dept: NEUROLOGY | Age: 74
End: 2019-09-18
Payer: MEDICARE

## 2019-09-18 ENCOUNTER — OFFICE VISIT (OUTPATIENT)
Dept: ORTHOPEDIC SURGERY | Age: 74
End: 2019-09-18
Payer: MEDICARE

## 2019-09-18 VITALS
BODY MASS INDEX: 36.32 KG/M2 | WEIGHT: 185 LBS | DIASTOLIC BLOOD PRESSURE: 60 MMHG | SYSTOLIC BLOOD PRESSURE: 134 MMHG | HEART RATE: 100 BPM | HEIGHT: 60 IN

## 2019-09-18 VITALS — WEIGHT: 185 LBS | HEIGHT: 60 IN | BODY MASS INDEX: 36.32 KG/M2

## 2019-09-18 DIAGNOSIS — Z87.81 S/P LEFT HIP FRACTURE: Primary | ICD-10-CM

## 2019-09-18 DIAGNOSIS — Z87.81 S/P LEFT HIP FRACTURE: ICD-10-CM

## 2019-09-18 DIAGNOSIS — G35 MULTIPLE SCLEROSIS (HCC): Primary | ICD-10-CM

## 2019-09-18 DIAGNOSIS — Z72.820 SLEEP DEPRIVATION: ICD-10-CM

## 2019-09-18 PROCEDURE — G8417 CALC BMI ABV UP PARAM F/U: HCPCS | Performed by: ORTHOPAEDIC SURGERY

## 2019-09-18 PROCEDURE — 1090F PRES/ABSN URINE INCON ASSESS: CPT | Performed by: PSYCHIATRY & NEUROLOGY

## 2019-09-18 PROCEDURE — G8400 PT W/DXA NO RESULTS DOC: HCPCS | Performed by: ORTHOPAEDIC SURGERY

## 2019-09-18 PROCEDURE — 99213 OFFICE O/P EST LOW 20 MIN: CPT | Performed by: ORTHOPAEDIC SURGERY

## 2019-09-18 PROCEDURE — 4040F PNEUMOC VAC/ADMIN/RCVD: CPT | Performed by: ORTHOPAEDIC SURGERY

## 2019-09-18 PROCEDURE — G8417 CALC BMI ABV UP PARAM F/U: HCPCS | Performed by: PSYCHIATRY & NEUROLOGY

## 2019-09-18 PROCEDURE — 4040F PNEUMOC VAC/ADMIN/RCVD: CPT | Performed by: PSYCHIATRY & NEUROLOGY

## 2019-09-18 PROCEDURE — 1090F PRES/ABSN URINE INCON ASSESS: CPT | Performed by: ORTHOPAEDIC SURGERY

## 2019-09-18 PROCEDURE — G8427 DOCREV CUR MEDS BY ELIG CLIN: HCPCS | Performed by: PSYCHIATRY & NEUROLOGY

## 2019-09-18 PROCEDURE — 1123F ACP DISCUSS/DSCN MKR DOCD: CPT | Performed by: PSYCHIATRY & NEUROLOGY

## 2019-09-18 PROCEDURE — G8427 DOCREV CUR MEDS BY ELIG CLIN: HCPCS | Performed by: ORTHOPAEDIC SURGERY

## 2019-09-18 PROCEDURE — 3017F COLORECTAL CA SCREEN DOC REV: CPT | Performed by: PSYCHIATRY & NEUROLOGY

## 2019-09-18 PROCEDURE — 3017F COLORECTAL CA SCREEN DOC REV: CPT | Performed by: ORTHOPAEDIC SURGERY

## 2019-09-18 PROCEDURE — 99215 OFFICE O/P EST HI 40 MIN: CPT | Performed by: PSYCHIATRY & NEUROLOGY

## 2019-09-18 PROCEDURE — 1123F ACP DISCUSS/DSCN MKR DOCD: CPT | Performed by: ORTHOPAEDIC SURGERY

## 2019-09-18 PROCEDURE — G8400 PT W/DXA NO RESULTS DOC: HCPCS | Performed by: PSYCHIATRY & NEUROLOGY

## 2019-09-18 PROCEDURE — 1036F TOBACCO NON-USER: CPT | Performed by: ORTHOPAEDIC SURGERY

## 2019-09-18 PROCEDURE — 1036F TOBACCO NON-USER: CPT | Performed by: PSYCHIATRY & NEUROLOGY

## 2019-09-18 PROCEDURE — 3014F SCREEN MAMMO DOC REV: CPT | Performed by: ORTHOPAEDIC SURGERY

## 2019-09-18 PROCEDURE — 3014F SCREEN MAMMO DOC REV: CPT | Performed by: PSYCHIATRY & NEUROLOGY

## 2019-09-18 RX ORDER — ALBUTEROL SULFATE 2.5 MG/3ML
SOLUTION RESPIRATORY (INHALATION)
COMMUNITY
End: 2019-10-30

## 2019-10-07 RX ORDER — BACLOFEN 20 MG/1
20 TABLET ORAL 4 TIMES DAILY
Qty: 360 TABLET | Refills: 0 | Status: SHIPPED | OUTPATIENT
Start: 2019-10-07 | End: 2019-12-20 | Stop reason: SDUPTHER

## 2019-10-30 ENCOUNTER — HOSPITAL ENCOUNTER (OUTPATIENT)
Age: 74
Discharge: HOME | End: 2019-11-01
Payer: MEDICARE

## 2019-10-30 ENCOUNTER — OFFICE VISIT (OUTPATIENT)
Dept: FAMILY MEDICINE CLINIC | Age: 74
End: 2019-10-30
Payer: MEDICARE

## 2019-10-30 VITALS
OXYGEN SATURATION: 99 % | SYSTOLIC BLOOD PRESSURE: 120 MMHG | TEMPERATURE: 97.7 F | DIASTOLIC BLOOD PRESSURE: 72 MMHG | HEART RATE: 97 BPM

## 2019-10-30 DIAGNOSIS — F33.0 MILD EPISODE OF RECURRENT MAJOR DEPRESSIVE DISORDER (HCC): ICD-10-CM

## 2019-10-30 DIAGNOSIS — K75.81 NASH (NONALCOHOLIC STEATOHEPATITIS): ICD-10-CM

## 2019-10-30 DIAGNOSIS — K59.09 OTHER CONSTIPATION: ICD-10-CM

## 2019-10-30 DIAGNOSIS — E11.65 TYPE 2 DIABETES MELLITUS WITH HYPERGLYCEMIA, WITHOUT LONG-TERM CURRENT USE OF INSULIN (HCC): ICD-10-CM

## 2019-10-30 DIAGNOSIS — G35 MS (MULTIPLE SCLEROSIS) (HCC): Chronic | ICD-10-CM

## 2019-10-30 DIAGNOSIS — E78.2 MIXED HYPERLIPIDEMIA: ICD-10-CM

## 2019-10-30 DIAGNOSIS — J44.9 CHRONIC OBSTRUCTIVE PULMONARY DISEASE, UNSPECIFIED COPD TYPE (HCC): ICD-10-CM

## 2019-10-30 DIAGNOSIS — Z86.2 H/O SARCOIDOSIS: Chronic | ICD-10-CM

## 2019-10-30 DIAGNOSIS — E03.8 OTHER SPECIFIED HYPOTHYROIDISM: Chronic | ICD-10-CM

## 2019-10-30 DIAGNOSIS — I10 ESSENTIAL HYPERTENSION: Chronic | ICD-10-CM

## 2019-10-30 DIAGNOSIS — M85.89 OSTEOPENIA OF MULTIPLE SITES: Primary | ICD-10-CM

## 2019-10-30 DIAGNOSIS — N31.9 NEUROGENIC BLADDER: ICD-10-CM

## 2019-10-30 DIAGNOSIS — R91.8 LUNG MASS: ICD-10-CM

## 2019-10-30 PROBLEM — J45.909 ASTHMA: Status: RESOLVED | Noted: 2019-05-15 | Resolved: 2019-10-30

## 2019-10-30 LAB
ALBUMIN SERPL-MCNC: 4 G/DL (ref 3.5–5.2)
ALP BLD-CCNC: 155 U/L (ref 35–104)
ALT SERPL-CCNC: 36 U/L (ref 0–32)
ANION GAP SERPL CALCULATED.3IONS-SCNC: 15 MMOL/L (ref 7–16)
AST SERPL-CCNC: 56 U/L (ref 0–31)
BASOPHILS ABSOLUTE: 0.04 E9/L (ref 0–0.2)
BASOPHILS RELATIVE PERCENT: 0.5 % (ref 0–2)
BILIRUB SERPL-MCNC: 0.3 MG/DL (ref 0–1.2)
BILIRUBIN URINE: NEGATIVE
BLOOD, URINE: ABNORMAL
BUN BLDV-MCNC: 14 MG/DL (ref 8–23)
CALCIUM SERPL-MCNC: 9.5 MG/DL (ref 8.6–10.2)
CHLORIDE BLD-SCNC: 97 MMOL/L (ref 98–107)
CHOLESTEROL, FASTING: 225 MG/DL (ref 0–199)
CLARITY: ABNORMAL
CO2: 21 MMOL/L (ref 22–29)
COLOR: YELLOW
CREAT SERPL-MCNC: 0.6 MG/DL (ref 0.5–1)
EOSINOPHILS ABSOLUTE: 0.12 E9/L (ref 0.05–0.5)
EOSINOPHILS RELATIVE PERCENT: 1.6 % (ref 0–6)
GFR AFRICAN AMERICAN: >60
GFR NON-AFRICAN AMERICAN: >60 ML/MIN/1.73
GLUCOSE BLD-MCNC: 197 MG/DL (ref 74–99)
GLUCOSE URINE: 100 MG/DL
HBA1C MFR BLD: 7 % (ref 4–5.6)
HCT VFR BLD CALC: 44.1 % (ref 34–48)
HDLC SERPL-MCNC: 34 MG/DL
HEMOGLOBIN: 13.7 G/DL (ref 11.5–15.5)
IMMATURE GRANULOCYTES #: 0.05 E9/L
IMMATURE GRANULOCYTES %: 0.7 % (ref 0–5)
KETONES, URINE: NEGATIVE MG/DL
LDL CHOLESTEROL CALCULATED: 115 MG/DL (ref 0–99)
LEUKOCYTE ESTERASE, URINE: ABNORMAL
LYMPHOCYTES ABSOLUTE: 1.35 E9/L (ref 1.5–4)
LYMPHOCYTES RELATIVE PERCENT: 17.8 % (ref 20–42)
MAGNESIUM: 2.4 MG/DL (ref 1.6–2.6)
MCH RBC QN AUTO: 31.9 PG (ref 26–35)
MCHC RBC AUTO-ENTMCNC: 31.1 % (ref 32–34.5)
MCV RBC AUTO: 102.6 FL (ref 80–99.9)
MICROALBUMIN UR-MCNC: 49.5 MG/L
MONOCYTES ABSOLUTE: 0.39 E9/L (ref 0.1–0.95)
MONOCYTES RELATIVE PERCENT: 5.1 % (ref 2–12)
NEUTROPHILS ABSOLUTE: 5.65 E9/L (ref 1.8–7.3)
NEUTROPHILS RELATIVE PERCENT: 74.3 % (ref 43–80)
NITRITE, URINE: NEGATIVE
PDW BLD-RTO: 15.2 FL (ref 11.5–15)
PH UA: 7 (ref 5–9)
PLATELET # BLD: 322 E9/L (ref 130–450)
PMV BLD AUTO: 12.2 FL (ref 7–12)
POTASSIUM SERPL-SCNC: 4.4 MMOL/L (ref 3.5–5)
PROTEIN UA: ABNORMAL MG/DL
RBC # BLD: 4.3 E12/L (ref 3.5–5.5)
SODIUM BLD-SCNC: 133 MMOL/L (ref 132–146)
SPECIFIC GRAVITY UA: <=1.005 (ref 1–1.03)
TOTAL PROTEIN: 7.5 G/DL (ref 6.4–8.3)
TRIGLYCERIDE, FASTING: 380 MG/DL (ref 0–149)
TSH SERPL DL<=0.05 MIU/L-ACNC: 6.94 UIU/ML (ref 0.27–4.2)
UROBILINOGEN, URINE: 0.2 E.U./DL
VLDLC SERPL CALC-MCNC: 76 MG/DL
WBC # BLD: 7.6 E9/L (ref 4.5–11.5)

## 2019-10-30 PROCEDURE — 82044 UR ALBUMIN SEMIQUANTITATIVE: CPT

## 2019-10-30 PROCEDURE — 36415 COLL VENOUS BLD VENIPUNCTURE: CPT

## 2019-10-30 PROCEDURE — G8484 FLU IMMUNIZE NO ADMIN: HCPCS | Performed by: INTERNAL MEDICINE

## 2019-10-30 PROCEDURE — 83036 HEMOGLOBIN GLYCOSYLATED A1C: CPT

## 2019-10-30 PROCEDURE — G8417 CALC BMI ABV UP PARAM F/U: HCPCS | Performed by: INTERNAL MEDICINE

## 2019-10-30 PROCEDURE — 1036F TOBACCO NON-USER: CPT | Performed by: INTERNAL MEDICINE

## 2019-10-30 PROCEDURE — G9899 SCRN MAM PERF RSLTS DOC: HCPCS | Performed by: INTERNAL MEDICINE

## 2019-10-30 PROCEDURE — 84443 ASSAY THYROID STIM HORMONE: CPT

## 2019-10-30 PROCEDURE — 3023F SPIROM DOC REV: CPT | Performed by: INTERNAL MEDICINE

## 2019-10-30 PROCEDURE — 3017F COLORECTAL CA SCREEN DOC REV: CPT | Performed by: INTERNAL MEDICINE

## 2019-10-30 PROCEDURE — 1123F ACP DISCUSS/DSCN MKR DOCD: CPT | Performed by: INTERNAL MEDICINE

## 2019-10-30 PROCEDURE — G8926 SPIRO NO PERF OR DOC: HCPCS | Performed by: INTERNAL MEDICINE

## 2019-10-30 PROCEDURE — 1090F PRES/ABSN URINE INCON ASSESS: CPT | Performed by: INTERNAL MEDICINE

## 2019-10-30 PROCEDURE — 81001 URINALYSIS AUTO W/SCOPE: CPT

## 2019-10-30 PROCEDURE — 2022F DILAT RTA XM EVC RTNOPTHY: CPT | Performed by: INTERNAL MEDICINE

## 2019-10-30 PROCEDURE — G8400 PT W/DXA NO RESULTS DOC: HCPCS | Performed by: INTERNAL MEDICINE

## 2019-10-30 PROCEDURE — 99214 OFFICE O/P EST MOD 30 MIN: CPT | Performed by: INTERNAL MEDICINE

## 2019-10-30 PROCEDURE — 80061 LIPID PANEL: CPT

## 2019-10-30 PROCEDURE — G8427 DOCREV CUR MEDS BY ELIG CLIN: HCPCS | Performed by: INTERNAL MEDICINE

## 2019-10-30 PROCEDURE — 80053 COMPREHEN METABOLIC PANEL: CPT

## 2019-10-30 PROCEDURE — 85025 COMPLETE CBC W/AUTO DIFF WBC: CPT

## 2019-10-30 PROCEDURE — 4040F PNEUMOC VAC/ADMIN/RCVD: CPT | Performed by: INTERNAL MEDICINE

## 2019-10-30 PROCEDURE — 83735 ASSAY OF MAGNESIUM: CPT

## 2019-10-30 RX ORDER — BACLOFEN 20 MG/1
20 TABLET ORAL 4 TIMES DAILY
Qty: 360 TABLET | Refills: 0 | Status: CANCELLED | OUTPATIENT
Start: 2019-10-30

## 2019-10-30 ASSESSMENT — ENCOUNTER SYMPTOMS
SORE THROAT: 0
BLOOD IN STOOL: 0
CONSTIPATION: 1
CHEST TIGHTNESS: 0
RHINORRHEA: 0
ABDOMINAL PAIN: 0
SHORTNESS OF BREATH: 0
COUGH: 0
NAUSEA: 0
DIARRHEA: 0
VOMITING: 0
EYE PAIN: 0

## 2019-10-31 LAB
BACTERIA: ABNORMAL /HPF
EPITHELIAL CELLS, UA: ABNORMAL /HPF
RBC UA: ABNORMAL /HPF (ref 0–2)
WBC UA: >20 /HPF (ref 0–5)

## 2019-11-02 ENCOUNTER — TELEPHONE (OUTPATIENT)
Dept: FAMILY MEDICINE CLINIC | Age: 74
End: 2019-11-02

## 2019-11-05 ENCOUNTER — TELEPHONE (OUTPATIENT)
Dept: FAMILY MEDICINE CLINIC | Age: 74
End: 2019-11-05

## 2019-11-15 ENCOUNTER — OFFICE VISIT (OUTPATIENT)
Dept: PULMONOLOGY | Age: 74
End: 2019-11-15
Payer: MEDICARE

## 2019-11-15 VITALS
RESPIRATION RATE: 14 BRPM | HEART RATE: 90 BPM | BODY MASS INDEX: 31.61 KG/M2 | WEIGHT: 161 LBS | SYSTOLIC BLOOD PRESSURE: 127 MMHG | HEIGHT: 60 IN | DIASTOLIC BLOOD PRESSURE: 66 MMHG | OXYGEN SATURATION: 100 % | TEMPERATURE: 97 F

## 2019-11-15 DIAGNOSIS — R91.8 LUNG MASS: ICD-10-CM

## 2019-11-15 DIAGNOSIS — Z86.2 H/O SARCOIDOSIS: Chronic | ICD-10-CM

## 2019-11-15 PROCEDURE — G8400 PT W/DXA NO RESULTS DOC: HCPCS | Performed by: INTERNAL MEDICINE

## 2019-11-15 PROCEDURE — G8484 FLU IMMUNIZE NO ADMIN: HCPCS | Performed by: INTERNAL MEDICINE

## 2019-11-15 PROCEDURE — G9899 SCRN MAM PERF RSLTS DOC: HCPCS | Performed by: INTERNAL MEDICINE

## 2019-11-15 PROCEDURE — 1123F ACP DISCUSS/DSCN MKR DOCD: CPT | Performed by: INTERNAL MEDICINE

## 2019-11-15 PROCEDURE — 4040F PNEUMOC VAC/ADMIN/RCVD: CPT | Performed by: INTERNAL MEDICINE

## 2019-11-15 PROCEDURE — 1090F PRES/ABSN URINE INCON ASSESS: CPT | Performed by: INTERNAL MEDICINE

## 2019-11-15 PROCEDURE — 1036F TOBACCO NON-USER: CPT | Performed by: INTERNAL MEDICINE

## 2019-11-15 PROCEDURE — 99213 OFFICE O/P EST LOW 20 MIN: CPT | Performed by: INTERNAL MEDICINE

## 2019-11-15 PROCEDURE — G8427 DOCREV CUR MEDS BY ELIG CLIN: HCPCS | Performed by: INTERNAL MEDICINE

## 2019-11-15 PROCEDURE — 3017F COLORECTAL CA SCREEN DOC REV: CPT | Performed by: INTERNAL MEDICINE

## 2019-11-15 PROCEDURE — G8417 CALC BMI ABV UP PARAM F/U: HCPCS | Performed by: INTERNAL MEDICINE

## 2019-11-18 RX ORDER — LEVOTHYROXINE SODIUM 0.07 MG/1
75 TABLET ORAL DAILY
Qty: 30 TABLET | Refills: 2 | Status: SHIPPED | OUTPATIENT
Start: 2019-11-18 | End: 2020-01-17 | Stop reason: DRUGHIGH

## 2019-12-12 RX ORDER — LEVOTHYROXINE SODIUM 88 UG/1
88 TABLET ORAL DAILY
Qty: 90 TABLET | Refills: 1 | Status: SHIPPED
Start: 2019-12-12 | End: 2020-03-17 | Stop reason: SDUPTHER

## 2019-12-20 RX ORDER — NORTRIPTYLINE HYDROCHLORIDE 25 MG/1
25 CAPSULE ORAL NIGHTLY
Qty: 90 CAPSULE | Refills: 0 | Status: SHIPPED | OUTPATIENT
Start: 2019-12-20 | End: 2020-01-17 | Stop reason: SDUPTHER

## 2019-12-20 RX ORDER — BACLOFEN 20 MG/1
20 TABLET ORAL 4 TIMES DAILY
Qty: 360 TABLET | Refills: 0 | Status: SHIPPED | OUTPATIENT
Start: 2019-12-20 | End: 2020-01-17 | Stop reason: SDUPTHER

## 2020-01-10 ENCOUNTER — TELEPHONE (OUTPATIENT)
Dept: FAMILY MEDICINE CLINIC | Age: 75
End: 2020-01-10

## 2020-01-10 NOTE — TELEPHONE ENCOUNTER
Orders Placed This Encounter   Procedures   Dominick Reyes MD, Endocrinology, [place]     Referral Priority:   Routine     Referral Type:   Eval and Treat     Referral Reason:   Specialty Services Required     Referred to Provider:   Iliana Yousif MD     Requested Specialty:   Endocrinology     Number of Visits Requested:   1     Referral placed

## 2020-01-10 NOTE — TELEPHONE ENCOUNTER
Jaison Alexandre calling about a referral.  He was sure that you wanted her to see someone for her diabetes, but cant remember who. Please advise.

## 2020-01-17 ENCOUNTER — OFFICE VISIT (OUTPATIENT)
Dept: FAMILY MEDICINE CLINIC | Age: 75
End: 2020-01-17
Payer: MEDICARE

## 2020-01-17 VITALS
HEART RATE: 97 BPM | OXYGEN SATURATION: 98 % | BODY MASS INDEX: 31.44 KG/M2 | SYSTOLIC BLOOD PRESSURE: 114 MMHG | HEIGHT: 60 IN | TEMPERATURE: 97.9 F | DIASTOLIC BLOOD PRESSURE: 62 MMHG

## 2020-01-17 PROCEDURE — G9899 SCRN MAM PERF RSLTS DOC: HCPCS | Performed by: INTERNAL MEDICINE

## 2020-01-17 PROCEDURE — 3017F COLORECTAL CA SCREEN DOC REV: CPT | Performed by: INTERNAL MEDICINE

## 2020-01-17 PROCEDURE — G8400 PT W/DXA NO RESULTS DOC: HCPCS | Performed by: INTERNAL MEDICINE

## 2020-01-17 PROCEDURE — 1036F TOBACCO NON-USER: CPT | Performed by: INTERNAL MEDICINE

## 2020-01-17 PROCEDURE — G8484 FLU IMMUNIZE NO ADMIN: HCPCS | Performed by: INTERNAL MEDICINE

## 2020-01-17 PROCEDURE — 3046F HEMOGLOBIN A1C LEVEL >9.0%: CPT | Performed by: INTERNAL MEDICINE

## 2020-01-17 PROCEDURE — G8417 CALC BMI ABV UP PARAM F/U: HCPCS | Performed by: INTERNAL MEDICINE

## 2020-01-17 PROCEDURE — 4040F PNEUMOC VAC/ADMIN/RCVD: CPT | Performed by: INTERNAL MEDICINE

## 2020-01-17 PROCEDURE — G8427 DOCREV CUR MEDS BY ELIG CLIN: HCPCS | Performed by: INTERNAL MEDICINE

## 2020-01-17 PROCEDURE — 1090F PRES/ABSN URINE INCON ASSESS: CPT | Performed by: INTERNAL MEDICINE

## 2020-01-17 PROCEDURE — 1123F ACP DISCUSS/DSCN MKR DOCD: CPT | Performed by: INTERNAL MEDICINE

## 2020-01-17 PROCEDURE — 99214 OFFICE O/P EST MOD 30 MIN: CPT | Performed by: INTERNAL MEDICINE

## 2020-01-17 PROCEDURE — 2022F DILAT RTA XM EVC RTNOPTHY: CPT | Performed by: INTERNAL MEDICINE

## 2020-01-17 RX ORDER — ATORVASTATIN CALCIUM 10 MG/1
10 TABLET, FILM COATED ORAL DAILY
Qty: 90 TABLET | Refills: 1 | Status: SHIPPED
Start: 2020-01-17 | End: 2020-06-23 | Stop reason: SDUPTHER

## 2020-01-17 RX ORDER — NORTRIPTYLINE HYDROCHLORIDE 25 MG/1
25 CAPSULE ORAL NIGHTLY
Qty: 90 CAPSULE | Refills: 1 | Status: SHIPPED
Start: 2020-01-17 | End: 2020-03-17 | Stop reason: SDUPTHER

## 2020-01-17 RX ORDER — BACLOFEN 20 MG/1
20 TABLET ORAL 4 TIMES DAILY
Qty: 360 TABLET | Refills: 1 | Status: SHIPPED
Start: 2020-01-17 | End: 2020-03-17 | Stop reason: SDUPTHER

## 2020-01-17 ASSESSMENT — ENCOUNTER SYMPTOMS
CHEST TIGHTNESS: 0
ABDOMINAL PAIN: 0
EYE PAIN: 0
CONSTIPATION: 1
VOMITING: 0
BLOOD IN STOOL: 0
SHORTNESS OF BREATH: 0
RHINORRHEA: 0
DIARRHEA: 0
SORE THROAT: 0
NAUSEA: 0
COUGH: 0

## 2020-01-17 NOTE — PROGRESS NOTES
[facility]) Physicians   Internal Medicine     2020  Sunitha Douglas : 1945 Sex: female  Age:77 y.o. Chief Complaint   Patient presents with    Hypothyroidism    Depression     Pt stopped Celexa \"awhile\" ago. States it was causing weight gain.  Other     Pt no longer using a cath. Started on Myrbetriq and also had a Botox injection.  Diabetes     Has an appt in Endocrinology in April         HPI:   Patient presents to office for review and management of chronic medical conditions.    - States having dizziness. States worse when lying down. States room spins. States some nausea. - States had bladder issues and overactive bladder. Stopped oxybutynin. now has Barger cath. Following with urology. States needed chronic catheter. States was following with urology. Was given botox treatment and catheter was removed 1 month. States placed on myrbetric. States for follow up. - Last lab showed elevated A1c to 7.7, most recent A1c (10/2019) was 7.0.  - not known to be diabetic, Suggested metformin but declined. To see endocrinology.     - States had hip fracture s/p left ORIF. States has been to physical therapy. Not walking. States having issues with left knee and leg. Released from ortho. - States found lung mass and needs pulmonary follow up yearly (10/2020)      - States has multiple sclerosis. States follows with neurology. States stopped ticferdia. Follow up every 6 months. States on baclofen 20mg 4x per day and dantrolene 50mg 4x per day. Spasms stable. States weakness to hands b/l - unable to feed self at times.     - States has high cholesterol. States Lipitor. No reported side effects. Stopped zetia. - States has hypothyroidism. on levothyroxine. More compliant.     - States has had elevated liver function tests. States has seen GI. Has had biopsy. Last lab stable. - States has depression. On citalopram. Stable on medications. No reported side effects.  No suicidal mouth daily , Disp: , Rfl:     ipratropium-albuterol (DUONEB) 0.5-2.5 (3) MG/3ML SOLN nebulizer solution, Inhale 3 mLs into the lungs every 6 hours as needed for Shortness of Breath, Disp: 360 mL, Rfl: 6    dantrolene (DANTRIUM) 50 MG capsule, Take 1 capsule by mouth 5 times daily (Patient taking differently: Take 50 mg by mouth 1 po q am, 1 po q noon, 2 po q pm), Disp: 120 capsule, Rfl: 11    Calcium Carbonate-Vit D-Min (CALCIUM 600+D PLUS MINERALS) 600-400 MG-UNIT TABS, Take 1 tablet by mouth nightly , Disp: , Rfl:     polyethylene glycol (MIRALAX) PACK packet, Take 17 g by mouth daily as needed , Disp: , Rfl:     Cholecalciferol (VITAMIN D3) 5000 units TABS, Take 1 tablet by mouth every morning, Disp: , Rfl:     Potassium 99 MG TABS, Take 1 tablet by mouth every morning, Disp: , Rfl:     Omega-3 Fatty Acids (FISH OIL PEARLS) 300 MG CAPS, Take 1 capsule by mouth nightly, Disp: , Rfl:     magnesium (MAGNESIUM-OXIDE) 250 MG TABS tablet, Take 250 mg by mouth every morning , Disp: , Rfl:     Multiple Vitamins-Minerals (MULTIVITAMIN & MINERAL PO), Take 1 tablet by mouth every morning , Disp: , Rfl:     citalopram (CELEXA) 20 MG tablet, Take 1 tablet by mouth every morning (Patient not taking: Reported on 1/17/2020), Disp: 90 tablet, Rfl: 1    No Known Allergies    Past Medical History:   Diagnosis Date    Hepatitis     High cholesterol     Hypertension     Hypothyroidism     MS (multiple sclerosis) ([facility] [address]     Osteoporosis     Sarcoidosis     Type 2 diabetes mellitus with hyperglycemia, without long-term current use of insulin ([facility] [address] 10/30/2019       Past Surgical History:   Procedure Laterality Date    CHOLECYSTECTOMY  1990s    FEMUR FRACTURE SURGERY Left 3/21/2019    LEFT FEMUR CEPHALLOMEDULLARY NAIL [facility] Bilateral     HYSTERECTOMY      KNEE SURGERY      plate in lt knee       Family History   Problem Relation Age of Onset    Stroke Mother     Heart Disease Mother     Cancer Father         lung    Mult Sclerosis Sister     No Known Problems Brother     No Known Problems Sister     Arthritis Sister     Cervical Cancer Sister        Social History     Socioeconomic History    Marital status:      Spouse name: Not on file    Number of children: Not on file    Years of education: Not on file    Highest education level: Not on file   Occupational History    Not on file   Social Needs    Financial resource strain: Not on file    Food insecurity:     Worry: Not on file     Inability: Not on file    Transportation needs:     Medical: Not on file     Non-medical: Not on file   Tobacco Use    Smoking status: Never Smoker    Smokeless tobacco: Never Used   Substance and Sexual Activity    Alcohol use: No     Alcohol/week: 0.0 standard drinks     Comment: Ocassionally    Drug use: No    Sexual activity: Yes     Partners: Male   Lifestyle    Physical activity:     Days per week: Not on file     Minutes per session: Not on file    Stress: Not on file   Relationships    Social connections:     Talks on phone: Not on file     Gets together: Not on file     Attends Sikh service: Not on file     Active member of club or organization: Not on file     Attends meetings of clubs or organizations: Not on file     Relationship status: Not on file    Intimate partner violence:     Fear of current or ex partner: Not on file     Emotionally abused: Not on file     Physically abused: Not on file     Forced sexual activity: Not on file   Other Topics Concern    Not on file   Social History Narrative    Not on file       Vitals:    01/17/20 1406   BP: 114/62   Site: Left Upper Arm   Cuff Size: Medium Adult   Pulse: 97   Temp: 97.9 °F (36.6 °C)   SpO2: 98%   Weight: Comment: wc   Height: 5' (1.524 m)       Exam:  Physical Exam  Constitutional:       Appearance: She is well-developed. HENT:      Head: Normocephalic and atraumatic. Right Ear: External ear normal.      Left Ear: External ear normal.   Eyes:      Pupils: Pupils are equal, round, and reactive to light. Neck:      Musculoskeletal: Normal range of motion and neck supple. Thyroid: No thyromegaly. Cardiovascular:      Rate and Rhythm: Normal rate and regular rhythm. Heart sounds: Normal heart sounds. No murmur. Pulmonary:      Effort: Pulmonary effort is normal.      Breath sounds: Normal breath sounds. No wheezing. Abdominal:      General: Bowel sounds are normal. There is no distension. Palpations: Abdomen is soft. Tenderness: There is no tenderness. There is no guarding or rebound. Musculoskeletal: Normal range of motion. Lymphadenopathy:      Cervical: No cervical adenopathy. Skin:     General: Skin is warm and dry. Neurological:      Mental Status: She is alert and oriented to person, place, and time. Cranial Nerves: No cranial nerve deficit. Motor: Abnormal muscle tone present. Comments: Paralysis  Nonambulatory - in wheelchair    Psychiatric:         Judgment: Judgment normal.         Assessment and Plan:    Raiza Sanchez was seen today for hyperlipidemia.     Diagnoses and all orders for this visit:    Type 2 diabetes mellitus with hyperglycemia, without long-term current use of insulin ([facility] [address]  - Continue present treatment   - watch diet   - declined medications   - last A1c was 7.0  - recheck levels     Hypothyroidism, unspecified type  - continue present treatment  - on levothyroxine  - labs were underactive (8/2019) - declines increase (8/2019)   - recheck labs    MS (multiple sclerosis) ([facility] [address]  - continue present treatment  - ticierda on hold  - on baclofen and dantrolene  - non ambulatory  - stable    Spastic quadriparesis ([address]  - following with neurology   - on  baclofen and dantrolene    Mixed hyperlipidemia  - continue present treatment  - watch diet  - on atrovastatin  - follow up labs    Neurogenic bladder  - continue present

## 2020-03-17 RX ORDER — LEVOTHYROXINE SODIUM 88 UG/1
88 TABLET ORAL DAILY
Qty: 90 TABLET | Refills: 0 | Status: SHIPPED
Start: 2020-03-17 | End: 2020-10-26 | Stop reason: SDUPTHER

## 2020-03-17 RX ORDER — DANTROLENE SODIUM 50 MG/1
50 CAPSULE ORAL
Qty: 120 CAPSULE | Refills: 0 | Status: SHIPPED
Start: 2020-03-17 | End: 2020-05-11 | Stop reason: SDUPTHER

## 2020-03-17 RX ORDER — BACLOFEN 20 MG/1
20 TABLET ORAL 4 TIMES DAILY
Qty: 360 TABLET | Refills: 0 | Status: SHIPPED
Start: 2020-03-17 | End: 2020-05-04 | Stop reason: DRUGHIGH

## 2020-03-17 RX ORDER — NORTRIPTYLINE HYDROCHLORIDE 25 MG/1
25 CAPSULE ORAL NIGHTLY
Qty: 90 CAPSULE | Refills: 0 | Status: SHIPPED
Start: 2020-03-17 | End: 2021-01-20 | Stop reason: SDUPTHER

## 2020-04-21 ENCOUNTER — APPOINTMENT (OUTPATIENT)
Dept: CT IMAGING | Age: 75
End: 2020-04-21
Payer: MEDICARE

## 2020-04-21 ENCOUNTER — HOSPITAL ENCOUNTER (EMERGENCY)
Age: 75
Discharge: ANOTHER HOSPITAL | End: 2020-04-21
Attending: EMERGENCY MEDICINE
Payer: MEDICARE

## 2020-04-21 ENCOUNTER — APPOINTMENT (OUTPATIENT)
Dept: GENERAL RADIOLOGY | Age: 75
End: 2020-04-21
Payer: MEDICARE

## 2020-04-21 ENCOUNTER — APPOINTMENT (OUTPATIENT)
Dept: CT IMAGING | Age: 75
DRG: 246 | End: 2020-04-21
Attending: INTERNAL MEDICINE
Payer: MEDICARE

## 2020-04-21 ENCOUNTER — HOSPITAL ENCOUNTER (INPATIENT)
Age: 75
LOS: 10 days | Discharge: HOME HEALTH | DRG: 246 | End: 2020-05-01
Attending: INTERNAL MEDICINE | Admitting: INTERNAL MEDICINE
Payer: MEDICARE

## 2020-04-21 ENCOUNTER — TELEPHONE (OUTPATIENT)
Dept: ADMINISTRATIVE | Age: 75
End: 2020-04-21

## 2020-04-21 ENCOUNTER — HOSPITAL ENCOUNTER (OUTPATIENT)
Age: 75
Discharge: HOME | End: 2020-04-21
Payer: MEDICARE

## 2020-04-21 VITALS
DIASTOLIC BLOOD PRESSURE: 62 MMHG | RESPIRATION RATE: 18 BRPM | SYSTOLIC BLOOD PRESSURE: 122 MMHG | BODY MASS INDEX: 31.61 KG/M2 | HEIGHT: 60 IN | TEMPERATURE: 98.2 F | OXYGEN SATURATION: 98 % | WEIGHT: 161 LBS | HEART RATE: 104 BPM

## 2020-04-21 PROBLEM — M85.89 OSTEOPENIA OF MULTIPLE SITES: Status: RESOLVED | Noted: 2019-07-31 | Resolved: 2020-04-21

## 2020-04-21 PROBLEM — S72.002A CLOSED FRACTURE OF LEFT HIP (HCC): Status: RESOLVED | Noted: 2019-03-20 | Resolved: 2020-04-21

## 2020-04-21 PROBLEM — F33.0 MILD EPISODE OF RECURRENT MAJOR DEPRESSIVE DISORDER (HCC): Status: RESOLVED | Noted: 2019-07-31 | Resolved: 2020-04-21

## 2020-04-21 PROBLEM — I25.10 CAD (CORONARY ARTERY DISEASE): Status: ACTIVE | Noted: 2020-04-21

## 2020-04-21 PROBLEM — I21.3 STEMI (ST ELEVATION MYOCARDIAL INFARCTION) (HCC): Status: ACTIVE | Noted: 2020-04-21

## 2020-04-21 PROBLEM — K59.09 OTHER CONSTIPATION: Status: RESOLVED | Noted: 2019-07-31 | Resolved: 2020-04-21

## 2020-04-21 PROBLEM — R91.8 LUNG MASS: Status: RESOLVED | Noted: 2019-03-24 | Resolved: 2020-04-21

## 2020-04-21 PROBLEM — E78.5 HYPERLIPIDEMIA LDL GOAL <100: Chronic | Status: ACTIVE | Noted: 2019-07-31

## 2020-04-21 PROBLEM — Z20.822 SUSPECTED COVID-19 VIRUS INFECTION: Status: ACTIVE | Noted: 2020-04-21

## 2020-04-21 LAB
ABO/RH: NORMAL
ALBUMIN SERPL-MCNC: 3.5 G/DL (ref 3.5–5.2)
ALP BLD-CCNC: 115 U/L (ref 35–104)
ALT SERPL-CCNC: 18 U/L (ref 0–32)
ANION GAP SERPL CALCULATED.3IONS-SCNC: 16 MMOL/L (ref 7–16)
ANION GAP SERPL CALCULATED.3IONS-SCNC: 16 MMOL/L (ref 7–16)
ANTIBODY SCREEN: NORMAL
AST SERPL-CCNC: 67 U/L (ref 0–31)
BASOPHILS ABSOLUTE: 0.05 E9/L (ref 0–0.2)
BASOPHILS ABSOLUTE: 0.06 E9/L (ref 0–0.2)
BASOPHILS RELATIVE PERCENT: 0.3 % (ref 0–2)
BASOPHILS RELATIVE PERCENT: 0.5 % (ref 0–2)
BILIRUB SERPL-MCNC: 0.4 MG/DL (ref 0–1.2)
BUN BLDV-MCNC: 15 MG/DL (ref 8–23)
BUN BLDV-MCNC: 15 MG/DL (ref 8–23)
CALCIUM SERPL-MCNC: 10.2 MG/DL (ref 8.6–10.2)
CALCIUM SERPL-MCNC: 8.8 MG/DL (ref 8.6–10.2)
CHLORIDE BLD-SCNC: 102 MMOL/L (ref 98–107)
CHLORIDE BLD-SCNC: 96 MMOL/L (ref 98–107)
CK MB: 27.7 NG/ML (ref 0–4.3)
CO2: 19 MMOL/L (ref 22–29)
CO2: 24 MMOL/L (ref 22–29)
CREAT SERPL-MCNC: 0.6 MG/DL (ref 0.5–1)
CREAT SERPL-MCNC: 0.6 MG/DL (ref 0.5–1)
EOSINOPHILS ABSOLUTE: 0.11 E9/L (ref 0.05–0.5)
EOSINOPHILS ABSOLUTE: 0.18 E9/L (ref 0.05–0.5)
EOSINOPHILS RELATIVE PERCENT: 1 % (ref 0–6)
EOSINOPHILS RELATIVE PERCENT: 1.2 % (ref 0–6)
GFR AFRICAN AMERICAN: >60
GFR AFRICAN AMERICAN: >60
GFR NON-AFRICAN AMERICAN: >60 ML/MIN/1.73
GFR NON-AFRICAN AMERICAN: >60 ML/MIN/1.73
GLUCOSE BLD-MCNC: 255 MG/DL (ref 74–99)
GLUCOSE BLD-MCNC: 351 MG/DL (ref 74–99)
HCT VFR BLD CALC: 39.5 % (ref 34–48)
HCT VFR BLD CALC: 47.5 % (ref 34–48)
HEMOGLOBIN: 12.3 G/DL (ref 11.5–15.5)
HEMOGLOBIN: 15.4 G/DL (ref 11.5–15.5)
IMMATURE GRANULOCYTES #: 0.03 E9/L
IMMATURE GRANULOCYTES #: 0.1 E9/L
IMMATURE GRANULOCYTES %: 0.3 % (ref 0–5)
IMMATURE GRANULOCYTES %: 0.5 % (ref 0–5)
INR BLD: 1
LACTIC ACID: 3.9 MMOL/L (ref 0.5–2.2)
LIPASE: 49 U/L (ref 13–60)
LYMPHOCYTES ABSOLUTE: 1.89 E9/L (ref 1.5–4)
LYMPHOCYTES ABSOLUTE: 3.4 E9/L (ref 1.5–4)
LYMPHOCYTES RELATIVE PERCENT: 18.6 % (ref 20–42)
LYMPHOCYTES RELATIVE PERCENT: 20.3 % (ref 20–42)
MAGNESIUM: 2.2 MG/DL (ref 1.6–2.6)
MCH RBC QN AUTO: 31.5 PG (ref 26–35)
MCH RBC QN AUTO: 31.8 PG (ref 26–35)
MCHC RBC AUTO-ENTMCNC: 31.1 % (ref 32–34.5)
MCHC RBC AUTO-ENTMCNC: 32.4 % (ref 32–34.5)
MCV RBC AUTO: 101 FL (ref 80–99.9)
MCV RBC AUTO: 97.9 FL (ref 80–99.9)
METER GLUCOSE: 153 MG/DL (ref 74–99)
MONOCYTES ABSOLUTE: 0.55 E9/L (ref 0.1–0.95)
MONOCYTES ABSOLUTE: 0.77 E9/L (ref 0.1–0.95)
MONOCYTES RELATIVE PERCENT: 4.2 % (ref 2–12)
MONOCYTES RELATIVE PERCENT: 5.9 % (ref 2–12)
NEUTROPHILS ABSOLUTE: 13.81 E9/L (ref 1.8–7.3)
NEUTROPHILS ABSOLUTE: 6.67 E9/L (ref 1.8–7.3)
NEUTROPHILS RELATIVE PERCENT: 71.8 % (ref 43–80)
NEUTROPHILS RELATIVE PERCENT: 75.4 % (ref 43–80)
PDW BLD-RTO: 14.5 FL (ref 11.5–15)
PDW BLD-RTO: 14.6 FL (ref 11.5–15)
PHOSPHORUS: 4.9 MG/DL (ref 2.5–4.5)
PLATELET # BLD: 392 E9/L (ref 130–450)
PLATELET # BLD: 441 E9/L (ref 130–450)
PMV BLD AUTO: 11.3 FL (ref 7–12)
PMV BLD AUTO: 11.4 FL (ref 7–12)
POTASSIUM SERPL-SCNC: 5 MMOL/L (ref 3.5–5)
POTASSIUM SERPL-SCNC: 5.1 MMOL/L (ref 3.5–5)
PRO-BNP: 8107 PG/ML (ref 0–450)
PROTHROMBIN TIME: 11.5 SEC (ref 9.3–12.4)
RBC # BLD: 3.91 E12/L (ref 3.5–5.5)
RBC # BLD: 4.85 E12/L (ref 3.5–5.5)
REASON FOR REJECTION: NORMAL
REJECTED TEST: NORMAL
SODIUM BLD-SCNC: 136 MMOL/L (ref 132–146)
SODIUM BLD-SCNC: 137 MMOL/L (ref 132–146)
TOTAL CK: 260 U/L (ref 20–180)
TOTAL PROTEIN: 6.4 G/DL (ref 6.4–8.3)
TROPONIN: 0.12 NG/ML (ref 0–0.03)
TROPONIN: 0.83 NG/ML (ref 0–0.03)
WBC # BLD: 18.3 E9/L (ref 4.5–11.5)
WBC # BLD: 9.3 E9/L (ref 4.5–11.5)

## 2020-04-21 PROCEDURE — 36592 COLLECT BLOOD FROM PICC: CPT

## 2020-04-21 PROCEDURE — 71275 CT ANGIOGRAPHY CHEST: CPT

## 2020-04-21 PROCEDURE — 6370000000 HC RX 637 (ALT 250 FOR IP): Performed by: EMERGENCY MEDICINE

## 2020-04-21 PROCEDURE — 83880 ASSAY OF NATRIURETIC PEPTIDE: CPT

## 2020-04-21 PROCEDURE — 82553 CREATINE MB FRACTION: CPT

## 2020-04-21 PROCEDURE — 85025 COMPLETE CBC W/AUTO DIFF WBC: CPT

## 2020-04-21 PROCEDURE — 6370000000 HC RX 637 (ALT 250 FOR IP): Performed by: STUDENT IN AN ORGANIZED HEALTH CARE EDUCATION/TRAINING PROGRAM

## 2020-04-21 PROCEDURE — 36620 INSERTION CATHETER ARTERY: CPT

## 2020-04-21 PROCEDURE — B2151ZZ FLUOROSCOPY OF LEFT HEART USING LOW OSMOLAR CONTRAST: ICD-10-PCS | Performed by: INTERNAL MEDICINE

## 2020-04-21 PROCEDURE — 84145 PROCALCITONIN (PCT): CPT

## 2020-04-21 PROCEDURE — 80053 COMPREHEN METABOLIC PANEL: CPT

## 2020-04-21 PROCEDURE — 6370000000 HC RX 637 (ALT 250 FOR IP)

## 2020-04-21 PROCEDURE — C9606 PERC D-E COR REVASC W AMI S: HCPCS | Performed by: INTERNAL MEDICINE

## 2020-04-21 PROCEDURE — 2580000003 HC RX 258: Performed by: INTERNAL MEDICINE

## 2020-04-21 PROCEDURE — 96375 TX/PRO/DX INJ NEW DRUG ADDON: CPT

## 2020-04-21 PROCEDURE — 2500000003 HC RX 250 WO HCPCS

## 2020-04-21 PROCEDURE — A0427 ALS1-EMERGENCY: HCPCS

## 2020-04-21 PROCEDURE — 6360000002 HC RX W HCPCS

## 2020-04-21 PROCEDURE — C1769 GUIDE WIRE: HCPCS

## 2020-04-21 PROCEDURE — 85347 COAGULATION TIME ACTIVATED: CPT

## 2020-04-21 PROCEDURE — 4A133J1 MONITORING OF ARTERIAL PULSE, PERIPHERAL, PERCUTANEOUS APPROACH: ICD-10-PCS | Performed by: SURGERY

## 2020-04-21 PROCEDURE — 93458 L HRT ARTERY/VENTRICLE ANGIO: CPT | Performed by: INTERNAL MEDICINE

## 2020-04-21 PROCEDURE — C1894 INTRO/SHEATH, NON-LASER: HCPCS

## 2020-04-21 PROCEDURE — 2580000003 HC RX 258

## 2020-04-21 PROCEDURE — 84484 ASSAY OF TROPONIN QUANT: CPT

## 2020-04-21 PROCEDURE — 2709999900 HC NON-CHARGEABLE SUPPLY

## 2020-04-21 PROCEDURE — 2500000003 HC RX 250 WO HCPCS: Performed by: STUDENT IN AN ORGANIZED HEALTH CARE EDUCATION/TRAINING PROGRAM

## 2020-04-21 PROCEDURE — 93005 ELECTROCARDIOGRAM TRACING: CPT | Performed by: STUDENT IN AN ORGANIZED HEALTH CARE EDUCATION/TRAINING PROGRAM

## 2020-04-21 PROCEDURE — 93005 ELECTROCARDIOGRAM TRACING: CPT | Performed by: INTERNAL MEDICINE

## 2020-04-21 PROCEDURE — A0425 GROUND MILEAGE: HCPCS

## 2020-04-21 PROCEDURE — 4A133B1 MONITORING OF ARTERIAL PRESSURE, PERIPHERAL, PERCUTANEOUS APPROACH: ICD-10-PCS | Performed by: SURGERY

## 2020-04-21 PROCEDURE — 85610 PROTHROMBIN TIME: CPT

## 2020-04-21 PROCEDURE — 36415 COLL VENOUS BLD VENIPUNCTURE: CPT

## 2020-04-21 PROCEDURE — 83690 ASSAY OF LIPASE: CPT

## 2020-04-21 PROCEDURE — 82962 GLUCOSE BLOOD TEST: CPT

## 2020-04-21 PROCEDURE — 99291 CRITICAL CARE FIRST HOUR: CPT | Performed by: SURGERY

## 2020-04-21 PROCEDURE — 93005 ELECTROCARDIOGRAM TRACING: CPT | Performed by: NURSE PRACTITIONER

## 2020-04-21 PROCEDURE — C1725 CATH, TRANSLUMIN NON-LASER: HCPCS

## 2020-04-21 PROCEDURE — 2580000003 HC RX 258: Performed by: RADIOLOGY

## 2020-04-21 PROCEDURE — 83735 ASSAY OF MAGNESIUM: CPT

## 2020-04-21 PROCEDURE — 2580000003 HC RX 258: Performed by: STUDENT IN AN ORGANIZED HEALTH CARE EDUCATION/TRAINING PROGRAM

## 2020-04-21 PROCEDURE — C1874 STENT, COATED/COV W/DEL SYS: HCPCS

## 2020-04-21 PROCEDURE — 2000000000 HC ICU R&B

## 2020-04-21 PROCEDURE — 84100 ASSAY OF PHOSPHORUS: CPT

## 2020-04-21 PROCEDURE — 86850 RBC ANTIBODY SCREEN: CPT

## 2020-04-21 PROCEDURE — 86900 BLOOD TYPING SEROLOGIC ABO: CPT

## 2020-04-21 PROCEDURE — 99285 EMERGENCY DEPT VISIT HI MDM: CPT

## 2020-04-21 PROCEDURE — 71045 X-RAY EXAM CHEST 1 VIEW: CPT

## 2020-04-21 PROCEDURE — U0002 COVID-19 LAB TEST NON-CDC: HCPCS

## 2020-04-21 PROCEDURE — 83605 ASSAY OF LACTIC ACID: CPT

## 2020-04-21 PROCEDURE — 96365 THER/PROPH/DIAG IV INF INIT: CPT

## 2020-04-21 PROCEDURE — 6360000004 HC RX CONTRAST MEDICATION: Performed by: RADIOLOGY

## 2020-04-21 PROCEDURE — 36620 INSERTION CATHETER ARTERY: CPT | Performed by: SURGERY

## 2020-04-21 PROCEDURE — 92928 PRQ TCAT PLMT NTRAC ST 1 LES: CPT | Performed by: INTERNAL MEDICINE

## 2020-04-21 PROCEDURE — B2111ZZ FLUOROSCOPY OF MULTIPLE CORONARY ARTERIES USING LOW OSMOLAR CONTRAST: ICD-10-PCS | Performed by: INTERNAL MEDICINE

## 2020-04-21 PROCEDURE — 36556 INSERT NON-TUNNEL CV CATH: CPT

## 2020-04-21 PROCEDURE — 82550 ASSAY OF CK (CPK): CPT

## 2020-04-21 PROCEDURE — 86901 BLOOD TYPING SEROLOGIC RH(D): CPT

## 2020-04-21 PROCEDURE — C1887 CATHETER, GUIDING: HCPCS

## 2020-04-21 PROCEDURE — 4A023N7 MEASUREMENT OF CARDIAC SAMPLING AND PRESSURE, LEFT HEART, PERCUTANEOUS APPROACH: ICD-10-PCS | Performed by: INTERNAL MEDICINE

## 2020-04-21 PROCEDURE — 027034Z DILATION OF CORONARY ARTERY, ONE ARTERY WITH DRUG-ELUTING INTRALUMINAL DEVICE, PERCUTANEOUS APPROACH: ICD-10-PCS | Performed by: INTERNAL MEDICINE

## 2020-04-21 PROCEDURE — 80048 BASIC METABOLIC PNL TOTAL CA: CPT

## 2020-04-21 PROCEDURE — 36556 INSERT NON-TUNNEL CV CATH: CPT | Performed by: SURGERY

## 2020-04-21 PROCEDURE — 2580000003 HC RX 258: Performed by: NURSE PRACTITIONER

## 2020-04-21 PROCEDURE — 6360000002 HC RX W HCPCS: Performed by: STUDENT IN AN ORGANIZED HEALTH CARE EDUCATION/TRAINING PROGRAM

## 2020-04-21 PROCEDURE — 96376 TX/PRO/DX INJ SAME DRUG ADON: CPT

## 2020-04-21 PROCEDURE — 99291 CRITICAL CARE FIRST HOUR: CPT | Performed by: INTERNAL MEDICINE

## 2020-04-21 PROCEDURE — 74174 CTA ABD&PLVS W/CONTRAST: CPT

## 2020-04-21 RX ORDER — SODIUM CHLORIDE 0.9 % (FLUSH) 0.9 %
10 SYRINGE (ML) INJECTION PRN
Status: DISCONTINUED | OUTPATIENT
Start: 2020-04-21 | End: 2020-05-01 | Stop reason: HOSPADM

## 2020-04-21 RX ORDER — ACETAMINOPHEN 325 MG/1
650 TABLET ORAL EVERY 4 HOURS PRN
Status: DISCONTINUED | OUTPATIENT
Start: 2020-04-21 | End: 2020-04-21 | Stop reason: SDUPTHER

## 2020-04-21 RX ORDER — ETOMIDATE 2 MG/ML
INJECTION INTRAVENOUS
Status: COMPLETED
Start: 2020-04-21 | End: 2020-04-22

## 2020-04-21 RX ORDER — BACLOFEN 10 MG/1
20 TABLET ORAL 4 TIMES DAILY
Status: DISCONTINUED | OUTPATIENT
Start: 2020-04-21 | End: 2020-05-01 | Stop reason: HOSPADM

## 2020-04-21 RX ORDER — HEPARIN SODIUM 1000 [USP'U]/ML
4000 INJECTION, SOLUTION INTRAVENOUS; SUBCUTANEOUS PRN
Status: DISCONTINUED | OUTPATIENT
Start: 2020-04-21 | End: 2020-04-21 | Stop reason: HOSPADM

## 2020-04-21 RX ORDER — SODIUM CHLORIDE 9 MG/ML
INJECTION, SOLUTION INTRAVENOUS CONTINUOUS
Status: ACTIVE | OUTPATIENT
Start: 2020-04-21 | End: 2020-04-22

## 2020-04-21 RX ORDER — MIDAZOLAM HYDROCHLORIDE 1 MG/ML
INJECTION INTRAMUSCULAR; INTRAVENOUS
Status: DISPENSED
Start: 2020-04-21 | End: 2020-04-22

## 2020-04-21 RX ORDER — DEXTROSE MONOHYDRATE 50 MG/ML
100 INJECTION, SOLUTION INTRAVENOUS PRN
Status: DISCONTINUED | OUTPATIENT
Start: 2020-04-21 | End: 2020-05-01 | Stop reason: HOSPADM

## 2020-04-21 RX ORDER — NICOTINE POLACRILEX 4 MG
15 LOZENGE BUCCAL PRN
Status: DISCONTINUED | OUTPATIENT
Start: 2020-04-21 | End: 2020-05-01 | Stop reason: HOSPADM

## 2020-04-21 RX ORDER — ASPIRIN 81 MG/1
TABLET, CHEWABLE ORAL DAILY PRN
Status: COMPLETED | OUTPATIENT
Start: 2020-04-21 | End: 2020-04-21

## 2020-04-21 RX ORDER — SODIUM CHLORIDE 0.9 % (FLUSH) 0.9 %
10 SYRINGE (ML) INJECTION EVERY 12 HOURS SCHEDULED
Status: DISCONTINUED | OUTPATIENT
Start: 2020-04-21 | End: 2020-05-01 | Stop reason: HOSPADM

## 2020-04-21 RX ORDER — BUDESONIDE AND FORMOTEROL FUMARATE DIHYDRATE 80; 4.5 UG/1; UG/1
2 AEROSOL RESPIRATORY (INHALATION) 2 TIMES DAILY
Status: DISCONTINUED | OUTPATIENT
Start: 2020-04-21 | End: 2020-05-01 | Stop reason: HOSPADM

## 2020-04-21 RX ORDER — ASPIRIN 81 MG/1
162 TABLET, CHEWABLE ORAL ONCE
Status: COMPLETED | OUTPATIENT
Start: 2020-04-21 | End: 2020-04-21

## 2020-04-21 RX ORDER — LEVOTHYROXINE SODIUM 88 UG/1
88 TABLET ORAL DAILY
Status: DISCONTINUED | OUTPATIENT
Start: 2020-04-22 | End: 2020-05-01 | Stop reason: HOSPADM

## 2020-04-21 RX ORDER — DANTROLENE SODIUM 25 MG/1
50 CAPSULE ORAL
Status: DISCONTINUED | OUTPATIENT
Start: 2020-04-21 | End: 2020-05-01 | Stop reason: HOSPADM

## 2020-04-21 RX ORDER — DEXTROSE MONOHYDRATE 25 G/50ML
12.5 INJECTION, SOLUTION INTRAVENOUS PRN
Status: DISCONTINUED | OUTPATIENT
Start: 2020-04-21 | End: 2020-05-01 | Stop reason: HOSPADM

## 2020-04-21 RX ORDER — LISINOPRIL 5 MG/1
2.5 TABLET ORAL DAILY
Status: DISCONTINUED | OUTPATIENT
Start: 2020-04-21 | End: 2020-05-01 | Stop reason: HOSPADM

## 2020-04-21 RX ORDER — FENTANYL CITRATE 50 UG/ML
25 INJECTION, SOLUTION INTRAMUSCULAR; INTRAVENOUS ONCE
Status: COMPLETED | OUTPATIENT
Start: 2020-04-21 | End: 2020-04-21

## 2020-04-21 RX ORDER — ATORVASTATIN CALCIUM 10 MG/1
10 TABLET, FILM COATED ORAL DAILY
Status: DISCONTINUED | OUTPATIENT
Start: 2020-04-22 | End: 2020-04-21 | Stop reason: SDUPTHER

## 2020-04-21 RX ORDER — MORPHINE SULFATE 2 MG/ML
INJECTION, SOLUTION INTRAMUSCULAR; INTRAVENOUS
Status: COMPLETED
Start: 2020-04-21 | End: 2020-04-21

## 2020-04-21 RX ORDER — HEPARIN SODIUM 10000 [USP'U]/100ML
12 INJECTION, SOLUTION INTRAVENOUS CONTINUOUS
Status: DISCONTINUED | OUTPATIENT
Start: 2020-04-21 | End: 2020-04-21

## 2020-04-21 RX ORDER — ONDANSETRON 2 MG/ML
4 INJECTION INTRAMUSCULAR; INTRAVENOUS EVERY 6 HOURS PRN
Status: DISCONTINUED | OUTPATIENT
Start: 2020-04-21 | End: 2020-04-22

## 2020-04-21 RX ORDER — OXYCODONE HYDROCHLORIDE AND ACETAMINOPHEN 5; 325 MG/1; MG/1
1 TABLET ORAL EVERY 4 HOURS PRN
Status: DISCONTINUED | OUTPATIENT
Start: 2020-04-21 | End: 2020-05-01 | Stop reason: HOSPADM

## 2020-04-21 RX ORDER — ACETAMINOPHEN 650 MG/1
650 SUPPOSITORY RECTAL EVERY 6 HOURS PRN
Status: DISCONTINUED | OUTPATIENT
Start: 2020-04-21 | End: 2020-05-01 | Stop reason: HOSPADM

## 2020-04-21 RX ORDER — MORPHINE SULFATE 2 MG/ML
1 INJECTION, SOLUTION INTRAMUSCULAR; INTRAVENOUS ONCE
Status: COMPLETED | OUTPATIENT
Start: 2020-04-21 | End: 2020-04-21

## 2020-04-21 RX ORDER — HYDROXYCHLOROQUINE SULFATE 200 MG/1
400 TABLET, FILM COATED ORAL EVERY 12 HOURS
Status: DISCONTINUED | OUTPATIENT
Start: 2020-04-21 | End: 2020-04-22

## 2020-04-21 RX ORDER — METOPROLOL SUCCINATE 50 MG/1
25 TABLET, EXTENDED RELEASE ORAL DAILY
Status: DISCONTINUED | OUTPATIENT
Start: 2020-04-21 | End: 2020-04-24

## 2020-04-21 RX ORDER — ATORVASTATIN CALCIUM 40 MG/1
40 TABLET, FILM COATED ORAL NIGHTLY
Status: DISCONTINUED | OUTPATIENT
Start: 2020-04-21 | End: 2020-05-01 | Stop reason: HOSPADM

## 2020-04-21 RX ORDER — HYDROXYCHLOROQUINE SULFATE 200 MG/1
200 TABLET, FILM COATED ORAL EVERY 12 HOURS
Status: DISCONTINUED | OUTPATIENT
Start: 2020-04-22 | End: 2020-04-22

## 2020-04-21 RX ORDER — SODIUM CHLORIDE 0.9 % (FLUSH) 0.9 %
10 SYRINGE (ML) INJECTION ONCE
Status: COMPLETED | OUTPATIENT
Start: 2020-04-21 | End: 2020-04-21

## 2020-04-21 RX ORDER — ACETAMINOPHEN 325 MG/1
650 TABLET ORAL EVERY 6 HOURS PRN
Status: DISCONTINUED | OUTPATIENT
Start: 2020-04-21 | End: 2020-05-01 | Stop reason: HOSPADM

## 2020-04-21 RX ORDER — NORTRIPTYLINE HYDROCHLORIDE 25 MG/1
25 CAPSULE ORAL NIGHTLY
Status: DISCONTINUED | OUTPATIENT
Start: 2020-04-21 | End: 2020-05-01 | Stop reason: HOSPADM

## 2020-04-21 RX ORDER — PROMETHAZINE HYDROCHLORIDE 25 MG/1
12.5 TABLET ORAL EVERY 6 HOURS PRN
Status: DISCONTINUED | OUTPATIENT
Start: 2020-04-21 | End: 2020-04-22

## 2020-04-21 RX ORDER — HEPARIN SODIUM 1000 [USP'U]/ML
2000 INJECTION, SOLUTION INTRAVENOUS; SUBCUTANEOUS PRN
Status: DISCONTINUED | OUTPATIENT
Start: 2020-04-21 | End: 2020-04-21 | Stop reason: HOSPADM

## 2020-04-21 RX ORDER — HEPARIN SODIUM 1000 [USP'U]/ML
4000 INJECTION, SOLUTION INTRAVENOUS; SUBCUTANEOUS ONCE
Status: COMPLETED | OUTPATIENT
Start: 2020-04-21 | End: 2020-04-21

## 2020-04-21 RX ORDER — NITROGLYCERIN 0.4 MG/1
0.4 TABLET SUBLINGUAL EVERY 5 MIN PRN
Status: DISCONTINUED | OUTPATIENT
Start: 2020-04-21 | End: 2020-04-21 | Stop reason: HOSPADM

## 2020-04-21 RX ORDER — AZITHROMYCIN 250 MG/1
250 TABLET, FILM COATED ORAL DAILY
Status: DISCONTINUED | OUTPATIENT
Start: 2020-04-22 | End: 2020-04-22

## 2020-04-21 RX ORDER — ASPIRIN 81 MG/1
81 TABLET ORAL DAILY
Status: DISCONTINUED | OUTPATIENT
Start: 2020-04-21 | End: 2020-05-01 | Stop reason: HOSPADM

## 2020-04-21 RX ORDER — PROPOFOL 10 MG/ML
INJECTION, EMULSION INTRAVENOUS
Status: COMPLETED
Start: 2020-04-21 | End: 2020-04-22

## 2020-04-21 RX ORDER — 0.9 % SODIUM CHLORIDE 0.9 %
1000 INTRAVENOUS SOLUTION INTRAVENOUS ONCE
Status: COMPLETED | OUTPATIENT
Start: 2020-04-21 | End: 2020-04-21

## 2020-04-21 RX ADMIN — MORPHINE SULFATE 1 MG: 2 INJECTION, SOLUTION INTRAMUSCULAR; INTRAVENOUS at 23:22

## 2020-04-21 RX ADMIN — Medication 10 ML: at 23:00

## 2020-04-21 RX ADMIN — ASPIRIN 162 MG: 81 TABLET, CHEWABLE ORAL at 16:20

## 2020-04-21 RX ADMIN — IOPAMIDOL 110 ML: 755 INJECTION, SOLUTION INTRAVENOUS at 23:49

## 2020-04-21 RX ADMIN — ASPIRIN 81 MG 162 MG: 81 TABLET ORAL at 16:29

## 2020-04-21 RX ADMIN — HEPARIN SODIUM 4000 UNITS: 1000 INJECTION INTRAVENOUS; SUBCUTANEOUS at 16:22

## 2020-04-21 RX ADMIN — SODIUM CHLORIDE: 9 INJECTION, SOLUTION INTRAVENOUS at 21:16

## 2020-04-21 RX ADMIN — FENTANYL CITRATE 25 MCG: 50 INJECTION, SOLUTION INTRAMUSCULAR; INTRAVENOUS at 16:35

## 2020-04-21 RX ADMIN — Medication 10 ML: at 23:49

## 2020-04-21 RX ADMIN — HEPARIN SODIUM 12 UNITS/KG/HR: 10000 INJECTION, SOLUTION INTRAVENOUS at 16:27

## 2020-04-21 RX ADMIN — Medication 10 MCG/MIN: at 23:00

## 2020-04-21 RX ADMIN — METOPROLOL TARTRATE 25 MG: 25 TABLET ORAL at 16:30

## 2020-04-21 RX ADMIN — SODIUM CHLORIDE 1000 ML: 9 INJECTION, SOLUTION INTRAVENOUS at 15:12

## 2020-04-21 ASSESSMENT — ENCOUNTER SYMPTOMS
COUGH: 0
CONSTIPATION: 0
NAUSEA: 0
COLOR CHANGE: 0
DIARRHEA: 0
SHORTNESS OF BREATH: 0
WHEEZING: 0
VOMITING: 0
ABDOMINAL PAIN: 0

## 2020-04-21 ASSESSMENT — PAIN SCALES - GENERAL
PAINLEVEL_OUTOF10: 5
PAINLEVEL_OUTOF10: 3
PAINLEVEL_OUTOF10: 3
PAINLEVEL_OUTOF10: 0

## 2020-04-21 ASSESSMENT — PAIN DESCRIPTION - PAIN TYPE: TYPE: ACUTE PAIN

## 2020-04-21 ASSESSMENT — PAIN DESCRIPTION - LOCATION: LOCATION: CHEST

## 2020-04-21 ASSESSMENT — PAIN DESCRIPTION - ORIENTATION: ORIENTATION: ANTERIOR

## 2020-04-21 NOTE — ED PROVIDER NOTES
Attending; patient was seen along with the emergency medicine resident. Patient was stable initially complained of mild chest discomfort. Initial EKG did not show any ST segment elevation. Troponin returned elevated at 0.12. Repeat EKG was obtained. This was consistent with acute anteroseptal MI. There was ST elevation in the anterior leads especially V2 and V3. Patient reported more chest discomfort initially rating it a 5. Patient was given aspirin in addition to the aspirin she was given at home. Patient was started on low-dose heparin protocol. Interventional cardiology was called. Patient was also given beta-blocker and fentanyl for pain. Patient remained stable. Patient was transferred by mobile intensive care unit to the Cath Lab at the [facility]. Dr. Juanita Renae was excepting interventional cardiologist.  .Please note that the withdrawal or failure to initiate urgent interventions for this patient would likely result in a life threatening deterioration or permanent disability. Accordingly this patient received 30 minutes of critical care time, excluding separately billable procedures. Please note that the withdrawal or failure to initiate urgent interventions for this patient would likely result in a life threatening deterioration or permanent disability. Accordingly this patient received 30 minutes of critical care time, excluding separately billable procedures.      Jennifer Agustin MD  05/01/20 4300

## 2020-04-21 NOTE — TELEPHONE ENCOUNTER
Pt's  called in, Pete Johnson is having chest pains, he wanted to bring her to [facility] [place], I advised him she needs to go to ER and he was compliant.

## 2020-04-21 NOTE — ED PROVIDER NOTES
systems reviewed and are negative. Physical Exam  Vitals signs and nursing note reviewed. Constitutional:       General: She is not in acute distress. Appearance: She is well-developed. She is not diaphoretic. HENT:      Head: Normocephalic and atraumatic. Right Ear: External ear normal.      Left Ear: External ear normal.      Mouth/Throat:      Pharynx: No oropharyngeal exudate. Eyes:      Pupils: Pupils are equal, round, and reactive to light. Neck:      Musculoskeletal: Normal range of motion. Cardiovascular:      Rate and Rhythm: Normal rate and regular rhythm. Heart sounds: Normal heart sounds. No murmur. No friction rub. No gallop. Pulmonary:      Effort: Pulmonary effort is normal. No respiratory distress. Breath sounds: Normal breath sounds. No wheezing or rales. Chest:      Chest wall: No tenderness. Abdominal:      General: Bowel sounds are normal. There is no distension. Palpations: Abdomen is soft. There is no mass. Tenderness: There is no abdominal tenderness. There is no guarding or rebound. Hernia: No hernia is present. Musculoskeletal: Normal range of motion. General: No tenderness or deformity. Lymphadenopathy:      Cervical: No cervical adenopathy. Skin:     General: Skin is warm and dry. Capillary Refill: Capillary refill takes less than 2 seconds. Coloration: Skin is not pale. Findings: No erythema or rash. Neurological:      Mental Status: She is alert and oriented to person, place, and time. Cranial Nerves: No cranial nerve deficit. Psychiatric:         Behavior: Behavior normal.         Thought Content: Thought content normal.         Judgment: Judgment normal.          Procedures   ED Course as of Apr 21 1643   Tue Apr 21, 2020   1539 EKG: This EKG is signed and interpreted by me.     Rate: 111  Rhythm: Sinus  Interpretation: sinus tachycardia, maybe some mild T wave inversions in the high lateral leads. Comparison: changes compared to previous EKG      [KS]   7917 Patient resting comfortably. Discussed with findings with patient. Currently obtaining a PT/INR. Repeat EKG in process. [KS]   9612 Discussed with Dr. Karo Klein who states to place patient on lopressor. Start sublingual nitro, heparin and hold on brilinta. [KS]   9963 Call for mobile cath. [KS]      ED Course User Index  [KS] Estela Dodge, DO       --------------------------------------------- PAST HISTORY ---------------------------------------------  Past Medical History:  has a past medical history of Hepatitis, High cholesterol, Hypertension, Hypothyroidism, MS (multiple sclerosis) ([facility] [address] Osteoporosis, Sarcoidosis, and Type 2 diabetes mellitus with hyperglycemia, without long-term current use of insulin ([facility] [address] Past Surgical History:  has a past surgical history that includes knee surgery; Hysterectomy; First rib removal (Bilateral); Cholecystectomy (1990s); and Femur fracture surgery (Left, 3/21/2019). Social History:  reports that she has never smoked. She has never used smokeless tobacco. She reports that she does not drink alcohol or use drugs. Family History: family history includes Arthritis in her sister; Cancer in her father; Cervical Cancer in her sister; Heart Disease in her mother; Mult Sclerosis in her sister; No Known Problems in her brother and sister; Stroke in her mother. The patients home medications have been reviewed. Allergies: Patient has no known allergies.     -------------------------------------------------- RESULTS -------------------------------------------------    Lab  Results for orders placed or performed during the hospital encounter of 04/21/20   CBC auto differential   Result Value Ref Range    WBC 9.3 4.5 - 11.5 E9/L    RBC 4.85 3.50 - 5.50 E12/L    Hemoglobin 15.4 11.5 - 15.5 g/dL    Hematocrit 47.5 34.0 - 48.0 %    MCV 97.9 80.0 - 99.9 fL    MCH 31.8 26.0 - 35.0 pg    MCHC 32.4 disposition, multiple bedside re-evaluations, IV medications, cardiac monitoring, continuous pulse oximetry and complex medical decision making and emergency management    This patient has remained hemodynamically stable during their ED course. Please note that the withdrawal or failure to initiate urgent interventions for this patient would likely result in a life threatening deterioration or permanent disability. Accordingly this patient received 30 minutes of critical care time, excluding separately billable procedures. Clinical Impression  1. Chest pain, unspecified type    2. ST elevation myocardial infarction involving left anterior descending (LAD) coronary artery ([address]          Disposition  Patient's disposition: Transfer to [facility]. Transferred by: Mobile intensive. Patient's condition is serious. MDM  Number of Diagnoses or Management Options  Chest pain, unspecified type:   ST elevation myocardial infarction involving left anterior descending (LAD) coronary artery [facility]):   Diagnosis management comments: Patient is a 43-year-old female who presents the ED for chest pain. Patient evaluated, found to have a septal STEMI, and initially when she came in, did not have any evidence of acute STEMI on EKG. Patient continued to have worsening chest pain, and repeat EKG showed that she had an acute STEMI. Patient was started on heparin, Lopressor, and given sublingual nitrogen as well as fentanyl. Patient will be transferred at this time to [facility] for cardiac catheterization. Agreeable with cardiology, to transfer the patient. Second IV line was placed, and patient was started on heparin bolus as well as IV drip. Chest x-ray did show some bilateral infiltrates, however does appear to be chronic and stable. She does not meet any COVID-19 other risk factors. However, patient will be swab for COVID-19 to rule out as well. Patient otherwise resting comfortably.   CTA was

## 2020-04-22 ENCOUNTER — ANESTHESIA EVENT (OUTPATIENT)
Dept: ICU | Age: 75
DRG: 246 | End: 2020-04-22
Payer: MEDICARE

## 2020-04-22 ENCOUNTER — APPOINTMENT (OUTPATIENT)
Dept: GENERAL RADIOLOGY | Age: 75
DRG: 246 | End: 2020-04-22
Attending: INTERNAL MEDICINE
Payer: MEDICARE

## 2020-04-22 ENCOUNTER — ANESTHESIA (OUTPATIENT)
Dept: ICU | Age: 75
DRG: 246 | End: 2020-04-22
Payer: MEDICARE

## 2020-04-22 PROBLEM — N13.30 HYDRONEPHROSIS: Status: ACTIVE | Noted: 2020-04-22

## 2020-04-22 LAB
AADO2: 232.7 MMHG
AADO2: 367.2 MMHG
ADENOVIRUS BY PCR: NOT DETECTED
APTT: 28.8 SEC (ref 24.5–35.1)
APTT: 58.8 SEC (ref 24.5–35.1)
APTT: 68.9 SEC (ref 24.5–35.1)
B.E.: -4.4 MMOL/L (ref -3–3)
B.E.: -6.1 MMOL/L (ref -3–3)
BACTERIA: ABNORMAL /HPF
BILIRUBIN URINE: NEGATIVE
BLOOD, URINE: ABNORMAL
BORDETELLA PARAPERTUSSIS BY PCR: NOT DETECTED
BORDETELLA PERTUSSIS BY PCR: NOT DETECTED
CHLAMYDOPHILIA PNEUMONIAE BY PCR: NOT DETECTED
CK MB: 18.6 NG/ML (ref 0–4.3)
CK MB: 31.3 NG/ML (ref 0–4.3)
CLARITY: ABNORMAL
COHB: 0 % (ref 0–1.5)
COHB: 0.5 % (ref 0–1.5)
COLOR: YELLOW
CORONAVIRUS 229E BY PCR: NOT DETECTED
CORONAVIRUS HKU1 BY PCR: NOT DETECTED
CORONAVIRUS NL63 BY PCR: NOT DETECTED
CORONAVIRUS OC43 BY PCR: NOT DETECTED
CORTISOL TOTAL: 24.79 MCG/DL (ref 2.68–18.4)
CRITICAL: ABNORMAL
CRITICAL: ABNORMAL
DATE ANALYZED: ABNORMAL
DATE ANALYZED: ABNORMAL
DATE OF COLLECTION: ABNORMAL
DATE OF COLLECTION: ABNORMAL
EKG ATRIAL RATE: 104 BPM
EKG ATRIAL RATE: 111 BPM
EKG ATRIAL RATE: 79 BPM
EKG ATRIAL RATE: 86 BPM
EKG P AXIS: 57 DEGREES
EKG P AXIS: 57 DEGREES
EKG P AXIS: 58 DEGREES
EKG P AXIS: 59 DEGREES
EKG P-R INTERVAL: 194 MS
EKG P-R INTERVAL: 196 MS
EKG P-R INTERVAL: 208 MS
EKG P-R INTERVAL: 216 MS
EKG Q-T INTERVAL: 318 MS
EKG Q-T INTERVAL: 364 MS
EKG Q-T INTERVAL: 438 MS
EKG Q-T INTERVAL: 472 MS
EKG QRS DURATION: 100 MS
EKG QRS DURATION: 104 MS
EKG QRS DURATION: 88 MS
EKG QRS DURATION: 98 MS
EKG QTC CALCULATION (BAZETT): 432 MS
EKG QTC CALCULATION (BAZETT): 478 MS
EKG QTC CALCULATION (BAZETT): 524 MS
EKG QTC CALCULATION (BAZETT): 541 MS
EKG R AXIS: -8 DEGREES
EKG R AXIS: 1 DEGREES
EKG R AXIS: 25 DEGREES
EKG R AXIS: 27 DEGREES
EKG T AXIS: 102 DEGREES
EKG T AXIS: 123 DEGREES
EKG T AXIS: 124 DEGREES
EKG T AXIS: 99 DEGREES
EKG VENTRICULAR RATE: 104 BPM
EKG VENTRICULAR RATE: 111 BPM
EKG VENTRICULAR RATE: 79 BPM
EKG VENTRICULAR RATE: 86 BPM
FIO2: 100 %
FIO2: 60 %
GLUCOSE URINE: NEGATIVE MG/DL
HCO3: 18.8 MMOL/L (ref 22–26)
HCO3: 20.7 MMOL/L (ref 22–26)
HCT VFR BLD CALC: 40.2 % (ref 34–48)
HEMOGLOBIN: 13.2 G/DL (ref 11.5–15.5)
HHB: 0.8 % (ref 0–5)
HHB: 1.4 % (ref 0–5)
HUMAN METAPNEUMOVIRUS BY PCR: NOT DETECTED
HUMAN RHINOVIRUS/ENTEROVIRUS BY PCR: NOT DETECTED
INFLUENZA A BY PCR: NOT DETECTED
INFLUENZA B BY PCR: NOT DETECTED
KETONES, URINE: NEGATIVE MG/DL
LAB: ABNORMAL
LAB: ABNORMAL
LACTIC ACID: 2 MMOL/L (ref 0.5–2.2)
LACTIC ACID: 2.4 MMOL/L (ref 0.5–2.2)
LEUKOCYTE ESTERASE, URINE: ABNORMAL
LV EF: 23 %
LVEF MODALITY: NORMAL
Lab: ABNORMAL
Lab: ABNORMAL
MCH RBC QN AUTO: 31.9 PG (ref 26–35)
MCHC RBC AUTO-ENTMCNC: 32.8 % (ref 32–34.5)
MCV RBC AUTO: 97.1 FL (ref 80–99.9)
METER GLUCOSE: 128 MG/DL (ref 74–99)
METER GLUCOSE: 130 MG/DL (ref 74–99)
METER GLUCOSE: 178 MG/DL (ref 74–99)
METER GLUCOSE: 182 MG/DL (ref 74–99)
METER GLUCOSE: 185 MG/DL (ref 74–99)
METER GLUCOSE: 281 MG/DL (ref 74–99)
METHB: 0.6 % (ref 0–1.5)
METHB: 0.7 % (ref 0–1.5)
MODE: AC
MODE: AC
MYCOPLASMA PNEUMONIAE BY PCR: NOT DETECTED
NITRITE, URINE: NEGATIVE
O2 CONTENT: 17.5 ML/DL
O2 CONTENT: 19.8 ML/DL
O2 SATURATION: 98.6 % (ref 92–98.5)
O2 SATURATION: 99.2 % (ref 92–98.5)
O2HB: 98 % (ref 94–97)
O2HB: 98 % (ref 94–97)
OPERATOR ID: 1023
OPERATOR ID: ABNORMAL
PARAINFLUENZA VIRUS 1 BY PCR: NOT DETECTED
PARAINFLUENZA VIRUS 2 BY PCR: NOT DETECTED
PARAINFLUENZA VIRUS 3 BY PCR: NOT DETECTED
PARAINFLUENZA VIRUS 4 BY PCR: NOT DETECTED
PATIENT TEMP: 37 C
PATIENT TEMP: 37 C
PCO2: 29.5 MMHG (ref 35–45)
PCO2: 46 MMHG (ref 35–45)
PDW BLD-RTO: 14.6 FL (ref 11.5–15)
PEEP/CPAP: 8 CMH2O
PEEP/CPAP: 8 CMH2O
PFO2: 2.46 MMHG/%
PFO2: 2.75 MMHG/%
PH BLOOD GAS: 7.27 (ref 7.35–7.45)
PH BLOOD GAS: 7.42 (ref 7.35–7.45)
PH UA: 5 (ref 5–9)
PLATELET # BLD: 453 E9/L (ref 130–450)
PMV BLD AUTO: 11.1 FL (ref 7–12)
PO2: 147.7 MMHG (ref 75–100)
PO2: 274.8 MMHG (ref 75–100)
POC ACT LR: 234 SECONDS
POC ACT LR: 239 SECONDS
POC ACT LR: 288 SECONDS
PRO-BNP: ABNORMAL PG/ML (ref 0–450)
PROCALCITONIN: 0.19 NG/ML (ref 0–0.08)
PROTEIN UA: NEGATIVE MG/DL
RBC # BLD: 4.14 E12/L (ref 3.5–5.5)
RBC UA: ABNORMAL /HPF (ref 0–2)
RESPIRATORY SYNCYTIAL VIRUS BY PCR: NOT DETECTED
RI(T): 1.34
RI(T): 1.58
RR MECHANICAL: 14 B/MIN
RR MECHANICAL: 18 B/MIN
SARS-COV-2, NAAT: NOT DETECTED
SARS-COV-2, NAAT: NOT DETECTED
SOURCE, BLOOD GAS: ABNORMAL
SOURCE, BLOOD GAS: ABNORMAL
SPECIFIC GRAVITY UA: 1.01 (ref 1–1.03)
THB: 12.5 G/DL (ref 11.5–16.5)
THB: 13.9 G/DL (ref 11.5–16.5)
TIME ANALYZED: 345
TIME ANALYZED: 859
TOTAL CK: 204 U/L (ref 20–180)
TOTAL CK: 294 U/L (ref 20–180)
TROPONIN: 1.15 NG/ML (ref 0–0.03)
TROPONIN: 1.17 NG/ML (ref 0–0.03)
UROBILINOGEN, URINE: 0.2 E.U./DL
VT MECHANICAL: 400 ML
VT MECHANICAL: 400 ML
WBC # BLD: 18.2 E9/L (ref 4.5–11.5)
WBC UA: >20 /HPF (ref 0–5)

## 2020-04-22 PROCEDURE — 0D9670Z DRAINAGE OF STOMACH WITH DRAINAGE DEVICE, VIA NATURAL OR ARTIFICIAL OPENING: ICD-10-PCS | Performed by: INTERNAL MEDICINE

## 2020-04-22 PROCEDURE — 6370000000 HC RX 637 (ALT 250 FOR IP): Performed by: NURSE PRACTITIONER

## 2020-04-22 PROCEDURE — 87040 BLOOD CULTURE FOR BACTERIA: CPT

## 2020-04-22 PROCEDURE — 93005 ELECTROCARDIOGRAM TRACING: CPT | Performed by: INTERNAL MEDICINE

## 2020-04-22 PROCEDURE — 6370000000 HC RX 637 (ALT 250 FOR IP): Performed by: UROLOGY

## 2020-04-22 PROCEDURE — 83605 ASSAY OF LACTIC ACID: CPT

## 2020-04-22 PROCEDURE — 82550 ASSAY OF CK (CPK): CPT

## 2020-04-22 PROCEDURE — 2500000003 HC RX 250 WO HCPCS

## 2020-04-22 PROCEDURE — 6370000000 HC RX 637 (ALT 250 FOR IP): Performed by: INTERNAL MEDICINE

## 2020-04-22 PROCEDURE — 83880 ASSAY OF NATRIURETIC PEPTIDE: CPT

## 2020-04-22 PROCEDURE — 74018 RADEX ABDOMEN 1 VIEW: CPT

## 2020-04-22 PROCEDURE — 99223 1ST HOSP IP/OBS HIGH 75: CPT | Performed by: INTERNAL MEDICINE

## 2020-04-22 PROCEDURE — 87186 SC STD MICRODIL/AGAR DIL: CPT

## 2020-04-22 PROCEDURE — 2580000003 HC RX 258: Performed by: NURSE PRACTITIONER

## 2020-04-22 PROCEDURE — 0100U HC RESPIRPTHGN MULT REV TRANS & AMP PRB TECH 21 TRGT: CPT

## 2020-04-22 PROCEDURE — 51702 INSERT TEMP BLADDER CATH: CPT

## 2020-04-22 PROCEDURE — 81001 URINALYSIS AUTO W/SCOPE: CPT

## 2020-04-22 PROCEDURE — 93010 ELECTROCARDIOGRAM REPORT: CPT | Performed by: INTERNAL MEDICINE

## 2020-04-22 PROCEDURE — 82553 CREATINE MB FRACTION: CPT

## 2020-04-22 PROCEDURE — 31500 INSERT EMERGENCY AIRWAY: CPT | Performed by: ANESTHESIOLOGY

## 2020-04-22 PROCEDURE — 82962 GLUCOSE BLOOD TEST: CPT

## 2020-04-22 PROCEDURE — 82533 TOTAL CORTISOL: CPT

## 2020-04-22 PROCEDURE — 94002 VENT MGMT INPAT INIT DAY: CPT

## 2020-04-22 PROCEDURE — 87147 CULTURE TYPE IMMUNOLOGIC: CPT

## 2020-04-22 PROCEDURE — 5A1955Z RESPIRATORY VENTILATION, GREATER THAN 96 CONSECUTIVE HOURS: ICD-10-PCS | Performed by: ANESTHESIOLOGY

## 2020-04-22 PROCEDURE — 71045 X-RAY EXAM CHEST 1 VIEW: CPT

## 2020-04-22 PROCEDURE — 85730 THROMBOPLASTIN TIME PARTIAL: CPT

## 2020-04-22 PROCEDURE — 6360000002 HC RX W HCPCS: Performed by: UROLOGY

## 2020-04-22 PROCEDURE — 6360000002 HC RX W HCPCS: Performed by: INTERNAL MEDICINE

## 2020-04-22 PROCEDURE — 84484 ASSAY OF TROPONIN QUANT: CPT

## 2020-04-22 PROCEDURE — C9113 INJ PANTOPRAZOLE SODIUM, VIA: HCPCS | Performed by: INTERNAL MEDICINE

## 2020-04-22 PROCEDURE — 94003 VENT MGMT INPAT SUBQ DAY: CPT

## 2020-04-22 PROCEDURE — 36415 COLL VENOUS BLD VENIPUNCTURE: CPT

## 2020-04-22 PROCEDURE — 87206 SMEAR FLUORESCENT/ACID STAI: CPT

## 2020-04-22 PROCEDURE — 2580000003 HC RX 258: Performed by: INTERNAL MEDICINE

## 2020-04-22 PROCEDURE — 82805 BLOOD GASES W/O2 SATURATION: CPT

## 2020-04-22 PROCEDURE — 94640 AIRWAY INHALATION TREATMENT: CPT

## 2020-04-22 PROCEDURE — 37799 UNLISTED PX VASCULAR SURGERY: CPT

## 2020-04-22 PROCEDURE — 87088 URINE BACTERIA CULTURE: CPT

## 2020-04-22 PROCEDURE — 2500000003 HC RX 250 WO HCPCS: Performed by: INTERNAL MEDICINE

## 2020-04-22 PROCEDURE — 2500000003 HC RX 250 WO HCPCS: Performed by: NURSE ANESTHETIST, CERTIFIED REGISTERED

## 2020-04-22 PROCEDURE — 93306 TTE W/DOPPLER COMPLETE: CPT

## 2020-04-22 PROCEDURE — 31500 INSERT EMERGENCY AIRWAY: CPT

## 2020-04-22 PROCEDURE — 85027 COMPLETE CBC AUTOMATED: CPT

## 2020-04-22 PROCEDURE — 87450 HC DIRECT STREP B ANTIGEN: CPT

## 2020-04-22 PROCEDURE — 2580000003 HC RX 258: Performed by: UROLOGY

## 2020-04-22 PROCEDURE — 87070 CULTURE OTHR SPECIMN AEROBIC: CPT

## 2020-04-22 PROCEDURE — 2000000000 HC ICU R&B

## 2020-04-22 PROCEDURE — 6360000002 HC RX W HCPCS

## 2020-04-22 PROCEDURE — 2500000003 HC RX 250 WO HCPCS: Performed by: STUDENT IN AN ORGANIZED HEALTH CARE EDUCATION/TRAINING PROGRAM

## 2020-04-22 PROCEDURE — 0BH17EZ INSERTION OF ENDOTRACHEAL AIRWAY INTO TRACHEA, VIA NATURAL OR ARTIFICIAL OPENING: ICD-10-PCS | Performed by: ANESTHESIOLOGY

## 2020-04-22 PROCEDURE — 6360000004 HC RX CONTRAST MEDICATION: Performed by: INTERNAL MEDICINE

## 2020-04-22 RX ORDER — FUROSEMIDE 10 MG/ML
40 INJECTION INTRAMUSCULAR; INTRAVENOUS ONCE
Status: COMPLETED | OUTPATIENT
Start: 2020-04-22 | End: 2020-04-22

## 2020-04-22 RX ORDER — 0.9 % SODIUM CHLORIDE 0.9 %
500 INTRAVENOUS SOLUTION INTRAVENOUS ONCE
Status: COMPLETED | OUTPATIENT
Start: 2020-04-22 | End: 2020-04-22

## 2020-04-22 RX ORDER — ETOMIDATE 2 MG/ML
INJECTION, SOLUTION INTRAVENOUS PRN
Status: DISCONTINUED | OUTPATIENT
Start: 2020-04-22 | End: 2020-04-22

## 2020-04-22 RX ORDER — HEPARIN SODIUM 1000 [USP'U]/ML
60 INJECTION, SOLUTION INTRAVENOUS; SUBCUTANEOUS ONCE
Status: COMPLETED | OUTPATIENT
Start: 2020-04-22 | End: 2020-04-22

## 2020-04-22 RX ORDER — ROCURONIUM BROMIDE 10 MG/ML
7 INJECTION, SOLUTION INTRAVENOUS ONCE
Status: COMPLETED | OUTPATIENT
Start: 2020-04-22 | End: 2020-04-22

## 2020-04-22 RX ORDER — SODIUM CHLORIDE 9 MG/ML
10 INJECTION INTRAVENOUS DAILY
Status: DISCONTINUED | OUTPATIENT
Start: 2020-04-22 | End: 2020-04-28

## 2020-04-22 RX ORDER — SODIUM CHLORIDE 0.9 % (FLUSH) 0.9 %
10 SYRINGE (ML) INJECTION PRN
Status: DISCONTINUED | OUTPATIENT
Start: 2020-04-22 | End: 2020-04-23 | Stop reason: HOSPADM

## 2020-04-22 RX ORDER — HEPARIN SODIUM 1000 [USP'U]/ML
30 INJECTION, SOLUTION INTRAVENOUS; SUBCUTANEOUS PRN
Status: DISCONTINUED | OUTPATIENT
Start: 2020-04-22 | End: 2020-04-23 | Stop reason: ALTCHOICE

## 2020-04-22 RX ORDER — PANTOPRAZOLE SODIUM 40 MG/10ML
40 INJECTION, POWDER, LYOPHILIZED, FOR SOLUTION INTRAVENOUS DAILY
Status: DISCONTINUED | OUTPATIENT
Start: 2020-04-22 | End: 2020-04-28

## 2020-04-22 RX ORDER — PROPOFOL 10 MG/ML
10 INJECTION, EMULSION INTRAVENOUS
Status: DISCONTINUED | OUTPATIENT
Start: 2020-04-22 | End: 2020-04-24

## 2020-04-22 RX ORDER — ROCURONIUM BROMIDE 10 MG/ML
INJECTION, SOLUTION INTRAVENOUS PRN
Status: DISCONTINUED | OUTPATIENT
Start: 2020-04-22 | End: 2020-04-22

## 2020-04-22 RX ORDER — MIDAZOLAM HYDROCHLORIDE 1 MG/ML
4 INJECTION INTRAMUSCULAR; INTRAVENOUS ONCE
Status: DISCONTINUED | OUTPATIENT
Start: 2020-04-22 | End: 2020-04-27

## 2020-04-22 RX ORDER — SODIUM CHLORIDE 0.9 % (FLUSH) 0.9 %
10 SYRINGE (ML) INJECTION EVERY 12 HOURS SCHEDULED
Status: DISCONTINUED | OUTPATIENT
Start: 2020-04-22 | End: 2020-04-23 | Stop reason: HOSPADM

## 2020-04-22 RX ORDER — CHLORHEXIDINE GLUCONATE 0.12 MG/ML
15 RINSE ORAL 2 TIMES DAILY
Status: DISCONTINUED | OUTPATIENT
Start: 2020-04-22 | End: 2020-04-27 | Stop reason: ALTCHOICE

## 2020-04-22 RX ORDER — 0.9 % SODIUM CHLORIDE 0.9 %
1000 INTRAVENOUS SOLUTION INTRAVENOUS ONCE
Status: COMPLETED | OUTPATIENT
Start: 2020-04-22 | End: 2020-04-22

## 2020-04-22 RX ORDER — CIPROFLOXACIN 2 MG/ML
400 INJECTION, SOLUTION INTRAVENOUS
Status: COMPLETED | OUTPATIENT
Start: 2020-04-22 | End: 2020-04-22

## 2020-04-22 RX ORDER — HEPARIN SODIUM 10000 [USP'U]/100ML
12 INJECTION, SOLUTION INTRAVENOUS CONTINUOUS
Status: DISCONTINUED | OUTPATIENT
Start: 2020-04-22 | End: 2020-04-23

## 2020-04-22 RX ORDER — HEPARIN SODIUM 1000 [USP'U]/ML
60 INJECTION, SOLUTION INTRAVENOUS; SUBCUTANEOUS PRN
Status: DISCONTINUED | OUTPATIENT
Start: 2020-04-22 | End: 2020-04-23 | Stop reason: ALTCHOICE

## 2020-04-22 RX ADMIN — AZITHROMYCIN MONOHYDRATE 250 MG: 250 TABLET ORAL at 08:34

## 2020-04-22 RX ADMIN — PERFLUTREN 1.65 MG: 6.52 INJECTION, SUSPENSION INTRAVENOUS at 16:31

## 2020-04-22 RX ADMIN — SODIUM CHLORIDE 500 ML: 9 INJECTION, SOLUTION INTRAVENOUS at 17:53

## 2020-04-22 RX ADMIN — CHLORHEXIDINE GLUCONATE 0.12% ORAL RINSE 15 ML: 1.2 LIQUID ORAL at 14:09

## 2020-04-22 RX ADMIN — TICAGRELOR 90 MG: 90 TABLET ORAL at 20:39

## 2020-04-22 RX ADMIN — INSULIN LISPRO 6 UNITS: 100 INJECTION, SOLUTION INTRAVENOUS; SUBCUTANEOUS at 03:29

## 2020-04-22 RX ADMIN — DANTROLENE SODIUM 50 MG: 25 CAPSULE ORAL at 10:47

## 2020-04-22 RX ADMIN — DANTROLENE SODIUM 50 MG: 25 CAPSULE ORAL at 23:09

## 2020-04-22 RX ADMIN — BACLOFEN 20 MG: 10 TABLET ORAL at 20:09

## 2020-04-22 RX ADMIN — LEVOTHYROXINE SODIUM 88 MCG: 0.09 TABLET ORAL at 08:36

## 2020-04-22 RX ADMIN — PROPOFOL INJECTABLE EMULSION 10 MG: 10 INJECTION, EMULSION INTRAVENOUS at 00:38

## 2020-04-22 RX ADMIN — DANTROLENE SODIUM 50 MG: 25 CAPSULE ORAL at 08:35

## 2020-04-22 RX ADMIN — FUROSEMIDE 40 MG: 10 INJECTION, SOLUTION INTRAMUSCULAR; INTRAVENOUS at 02:43

## 2020-04-22 RX ADMIN — SODIUM CHLORIDE 500 ML: 9 INJECTION, SOLUTION INTRAVENOUS at 12:57

## 2020-04-22 RX ADMIN — SODIUM CHLORIDE 500 ML: 9 INJECTION, SOLUTION INTRAVENOUS at 16:53

## 2020-04-22 RX ADMIN — PANTOPRAZOLE SODIUM 40 MG: 40 INJECTION, POWDER, FOR SOLUTION INTRAVENOUS at 14:10

## 2020-04-22 RX ADMIN — SODIUM CHLORIDE, PRESERVATIVE FREE 10 ML: 5 INJECTION INTRAVENOUS at 14:10

## 2020-04-22 RX ADMIN — BUDESONIDE AND FORMOTEROL FUMARATE DIHYDRATE 2 PUFF: 80; 4.5 AEROSOL RESPIRATORY (INHALATION) at 21:24

## 2020-04-22 RX ADMIN — PROPOFOL 10 MG: 10 INJECTION, EMULSION INTRAVENOUS at 00:38

## 2020-04-22 RX ADMIN — HEPARIN SODIUM 4080 UNITS: 1000 INJECTION, SOLUTION INTRAVENOUS; SUBCUTANEOUS at 03:15

## 2020-04-22 RX ADMIN — SODIUM CHLORIDE, PRESERVATIVE FREE 10 ML: 5 INJECTION INTRAVENOUS at 20:23

## 2020-04-22 RX ADMIN — INSULIN LISPRO 2 UNITS: 100 INJECTION, SOLUTION INTRAVENOUS; SUBCUTANEOUS at 23:58

## 2020-04-22 RX ADMIN — INSULIN LISPRO 2 UNITS: 100 INJECTION, SOLUTION INTRAVENOUS; SUBCUTANEOUS at 11:25

## 2020-04-22 RX ADMIN — BACLOFEN 20 MG: 10 TABLET ORAL at 12:37

## 2020-04-22 RX ADMIN — ROCURONIUM BROMIDE 7 MG: 10 INJECTION INTRAVENOUS at 01:17

## 2020-04-22 RX ADMIN — ROCURONIUM BROMIDE 70 MG: 10 INJECTION INTRAVENOUS at 00:09

## 2020-04-22 RX ADMIN — BUDESONIDE AND FORMOTEROL FUMARATE DIHYDRATE 2 PUFF: 80; 4.5 AEROSOL RESPIRATORY (INHALATION) at 09:45

## 2020-04-22 RX ADMIN — ACETAMINOPHEN 650 MG: 325 TABLET, FILM COATED ORAL at 15:27

## 2020-04-22 RX ADMIN — INSULIN LISPRO 2 UNITS: 100 INJECTION, SOLUTION INTRAVENOUS; SUBCUTANEOUS at 20:23

## 2020-04-22 RX ADMIN — Medication 10 ML: at 20:28

## 2020-04-22 RX ADMIN — ETOMIDATE 20 MG: 20 INJECTION, SOLUTION INTRAVENOUS at 00:00

## 2020-04-22 RX ADMIN — HEPARIN SODIUM 12 UNITS/KG/HR: 10000 INJECTION, SOLUTION INTRAVENOUS at 03:14

## 2020-04-22 RX ADMIN — ATORVASTATIN CALCIUM 40 MG: 40 TABLET, FILM COATED ORAL at 20:09

## 2020-04-22 RX ADMIN — Medication 10 ML: at 08:53

## 2020-04-22 RX ADMIN — HYDROXYCHLOROQUINE SULFATE 400 MG: 200 TABLET, FILM COATED ORAL at 08:36

## 2020-04-22 RX ADMIN — CEFTRIAXONE SODIUM 1 G: 1 INJECTION, POWDER, FOR SOLUTION INTRAMUSCULAR; INTRAVENOUS at 01:30

## 2020-04-22 RX ADMIN — BACLOFEN 20 MG: 10 TABLET ORAL at 08:33

## 2020-04-22 RX ADMIN — DANTROLENE SODIUM 50 MG: 25 CAPSULE ORAL at 18:10

## 2020-04-22 RX ADMIN — ETOMIDATE 14 MG: 2 INJECTION, SOLUTION INTRAVENOUS at 00:09

## 2020-04-22 RX ADMIN — CHLORHEXIDINE GLUCONATE 0.12% ORAL RINSE 15 ML: 1.2 LIQUID ORAL at 20:18

## 2020-04-22 RX ADMIN — PROPOFOL 15 MCG/KG/MIN: 10 INJECTION, EMULSION INTRAVENOUS at 14:03

## 2020-04-22 RX ADMIN — CIPROFLOXACIN 400 MG: 2 INJECTION, SOLUTION INTRAVENOUS at 20:18

## 2020-04-22 RX ADMIN — SODIUM CHLORIDE 1000 ML: 9 INJECTION, SOLUTION INTRAVENOUS at 10:44

## 2020-04-22 RX ADMIN — DANTROLENE SODIUM 50 MG: 25 CAPSULE ORAL at 14:06

## 2020-04-22 RX ADMIN — ASPIRIN 81 MG: 81 TABLET, COATED ORAL at 08:35

## 2020-04-22 RX ADMIN — BACLOFEN 20 MG: 10 TABLET ORAL at 16:42

## 2020-04-22 RX ADMIN — TICAGRELOR 90 MG: 90 TABLET ORAL at 08:33

## 2020-04-22 ASSESSMENT — PULMONARY FUNCTION TESTS
PIF_VALUE: 23
PIF_VALUE: 22
PIF_VALUE: 23
PIF_VALUE: 22
PIF_VALUE: 26
PIF_VALUE: 22
PIF_VALUE: 24
PIF_VALUE: 28
PIF_VALUE: 21
PIF_VALUE: 24
PIF_VALUE: 24
PIF_VALUE: 22
PIF_VALUE: 26
PIF_VALUE: 27
PIF_VALUE: 23
PIF_VALUE: 22
PIF_VALUE: 23
PIF_VALUE: 22
PIF_VALUE: 21
PIF_VALUE: 23
PIF_VALUE: 22
PIF_VALUE: 22
PIF_VALUE: 23
PIF_VALUE: 23
PIF_VALUE: 18
PIF_VALUE: 25

## 2020-04-22 ASSESSMENT — PAIN SCALES - GENERAL
PAINLEVEL_OUTOF10: 0

## 2020-04-22 ASSESSMENT — ENCOUNTER SYMPTOMS: SHORTNESS OF BREATH: 1

## 2020-04-22 NOTE — PROGRESS NOTES
Physical Therapy    PT consult to evaluate/treat received and appreciated. Pt chart reviewed and evaluation attempted. Pt is currently on hold per nursing. Will check back as able. Thank you.         Star Wilkinson, PT, DPT   [id number]

## 2020-04-22 NOTE — FLOWSHEET NOTE
Pt continues to pull at ETT despite verbal redirection. Bilateral soft wrist restraints applied. Will continue restraints for patient safety.  notified.

## 2020-04-22 NOTE — PROCEDURES
[address]                  [address] [facility],  [facility]                            CARDIAC CATHETERIZATION    PATIENT NAME: Jamey Page                    :        1945  MED REC NO:   [id number]                            ROOM:       6422  ACCOUNT NO:   [de-identified]                           ADMIT DATE: 2020  PROVIDER:     Tamera Sheppard DO    DATE OF PROCEDURE:  2020    HEART CATHETERIZATION, ANGIOPLASTY, STENTING PROCEDURE NOTE    PROCEDURES:  1. Left heart catheterization. 2.  Percutaneous coronary intervention with a 3.0 x 22 mm drug-eluting  stent to the proximal LAD. The proximal to mid section was postdilated  with a 3.5 mm noncompliant balloon. This all resulted in 0% residual  stenosis and excellent TAM-3 flow. COMPLICATIONS:  None. SEDATION:  Intravenous Versed. ANESTHESIA:  2% Xylocaine locally over the right radial artery,  intravenous fentanyl. SEDATION TIME:  I was present for sedation administration at 1635 and I  ended sedation at 1830 for a total face-to-face sedation time of _____  minutes. HEMODYNAMICS:  1. Opening aortic pressure 96/60 with a mean of 75.  2.  Left ventricular systolic pressure 324. 3.  LVEDP 32.  4.  No significant gradient across the aortic valve. ANGIOGRAPHIC RESULTS:  LEFT MAIN:  No angiographically significant stenosis. LAD:  Proximal 100% occlusion with TAM-0 flow. CIRCUMFLEX:  Mild diffuse luminal irregularities. OM1:  Tiny vessel. OM2:  Proximal sequential diffuse 30% stenoses. Distal 90% stenosis. This is a 1.7 mm vessel at this location. OM3:  Tiny vessel. OM4:  Small vessel. No angiographically significant stenosis. OM5:  Small vessel. No angiographically significant stenosis. RIGHT CORONARY ARTERY:  Mid diffuse 90% stenosis with TAM-1 flow. There are left-to-right collaterals supplying the PDA and posterolateral  branches.     INTERVENTION RESULTS:  1. Successful pre-dilatation of the proximal LAD with a 2.5 mm balloon. 2.  Successful stenting of the proximal LAD with a 3.0 x 22 mm Gordon  drug-eluting stent. The proximal and midportions were postdilated with  a 3.5 mm noncompliant balloon. This all resulted in 0% residual  stenosis and excellent TAM-3 flow. SUMMARY OF PROCEDURE:  The patient presented to [facility]  Emergency Room initially pain-free with a non-ST-elevation myocardial  infarction and no ST elevations. She subsequently developed recurrent  chest discomfort and then ST elevations later after she had been in the  emergency room for a while. Therefore, total balloon time is negated. She was then transferred to our cath lab. Upon arrival here, she was  actually pain-free once again. The area of the right radial artery was  prepped and draped in sterile fashion. With ultrasound guidance, it was  demonstrated that her right radial and right ulnar arteries were both  very small caliber. Therefore, I called for a 5-Haitian Slender sheath  and checked the lab to see if we had any 5-Haitian guides. We did have  some 5-Haitian guides. Therefore, I proceeded with a 5-Haitian Slender  Benjamin  in the right radial artery using a micropuncture technique. However, this further delayed her total balloon time. A 5-Haitian L3.5  guiding catheter was then advanced over the wire to the root of the  aorta. This was aspirated and flushed with saline. Pressures were  obtained. This was then manipulated into the left ventricle. Pressures  were obtained. Pullback pressures were then obtained across the aortic  valve. This was then unable to manipulate into the left main coronary  artery further delaying balloon time. We then tried an EBU 3.5 guiding catheter. This actually initially  manipulated into just at the ostium of the right coronary artery and an  Bulgarian projection of the right coronary artery was obtained.   After several  maneuvers, this was able to manipulate into the left main coronary  artery. Four orthogonal views were obtained. ACT was maintained  greater than 250. She was loaded with Brilinta and was already on  aspirin. A Luge wire was successfully manipulated across the occluded  LAD and into the apical LAD. The lesion was then crossed and predilated  with a 2.5 mm balloon. The lesion was then crossed and stented with a  3.0 x 22 mm drug-eluting stent to 14 atmospheres of pressure. The area  where the proximal stent was had some aneurysmal formation, appeared to  be a little bit larger than the diameter of the stent. Therefore, the  proximal portions of the stent were postdilated with a 3.5 mm balloon. The balloon and wire were removed. Followup angiogram was performed. This demonstrated 0% residual stenosis and excellent TAM-3 flow. A 4-Serbian R4 diagnostic catheter was advanced and manipulated in the  right coronary artery. This caused some dampening because the vessel  was a small caliber vessel. An KEANU projection was obtained with a  shooting at the very ostium of the vessels so as not to dampen. This  demonstrated a subtotal mid right coronary artery. However, she did  have left-to-right collaterals and was completely asymptomatic. The  caliber of this vessel appears to be about 1.8- to 2-mm vessel. Therefore, given all this, it was decided to treat this medically at  this time. The catheter was removed. A 4-Serbian angled pigtail was  then advanced and manipulated into the left ventricle. The catheter was  aspirated and flushed with saline once again. Pressures were obtained. This demonstrated an elevated LVEDP. Therefore, no left ventriculogram  was performed. Pullback pressures across the aortic valve were  obtained. The right radial artery sheath has been removed and a TR Band  placed successfully with good patent hemostasis.   She tolerated the  procedure well with no complications.         Juan J العلي DO    D: 04/21/2020 19:08:08       T: 04/21/2020 19:59:06     [email or web address]  Job#: [id number]     Doc#: [id number]    CC:  Lynda Rainey MD

## 2020-04-22 NOTE — CONSULTS
[facility] cardiology/the [address]    Name: Irene Mcclure    Age: 77 y.o. Date of Admission: 4/21/2020  7:46 PM    Date of Service: 4/22/2020    Reason for Consultation: Anterior wall myocardial infarction April 21, 2020    Referring Physician:     History of Present Illness: The patient is a 77y.o. year old female presented to the [facility] emergency room yesterday with a 5 to 7-day history of intermittent chest symptoms and back discomfort with a known history of sarcoid, hyperlipidemia. Initial EKG yesterday did not show distinct ST elevation although there was subtle J-point elevation in the precordial leads but then with recurrent chest pain had more pronounced ST elevation especially in lead V2 she went to the Cath Lab yesterday afternoon and had a drug-eluting stent placed to an occluded proximal LAD. The right coronary artery is small caliber vessel approximately 1.6 mm and had a 95% mid stenosis. Last evening  respiratory decompensation and ended up being placed on the ventilator. Earlier this on levo fed infusion and this morning 8 mics but currently 2 mics a minute. Appears to be comfortable, sedated on the ventilator. Past Medical History: Sarcoidosis and hyperlipidemia. Past surgical history: Hysterectomy, cholecystectomy. Review of Systems:     Constitutional: No fever, chills, sweats  Cardiac: As per HPI  Pulmonary: No cough, wheeze, hemoptysis  HEENT: No visual disturbances or difficult swallowing  GI: No nausea, vomiting, diarrhea, abdominal pain, rectal bleeding  : No dysuria or hematuria  Endocrine: No excessive thirst, heat or cold intolerance. Musculoskeletal: No joint pain or muscle aches.  No claudication  Skin: No skin breakdown or rashes  Neuro: No headache, confusion, or seizures  Psych: No depression, anxiety      Family History:  Family History   Problem Relation Age of Onset    Stroke Mother     Heart Disease Mother     Cancer Father         lung    Mult Sclerosis Sister     No Known Problems Brother     No Known Problems Sister     Arthritis Sister     Cervical Cancer Sister        Social History:  Social History     Socioeconomic History    Marital status:      Spouse name: Not on file    Number of children: Not on file    Years of education: Not on file    Highest education level: Not on file   Occupational History    Not on file   Social Needs    Financial resource strain: Not on file    Food insecurity     Worry: Not on file     Inability: Not on file   [employer] needs     Medical: Not on file     Non-medical: Not on file   Tobacco Use    Smoking status: Never Smoker    Smokeless tobacco: Never Used   Substance and Sexual Activity    Alcohol use: No     Alcohol/week: 0.0 standard drinks     Comment: Ocassionally    Drug use: No    Sexual activity: Yes     Partners: Male   Lifestyle    Physical activity     Days per week: Not on file     Minutes per session: Not on file    Stress: Not on file   Relationships    Social connections     Talks on phone: Not on file     Gets together: Not on file     Attends Yarsanism service: Not on file     Active member of club or organization: Not on file     Attends meetings of clubs or organizations: Not on file     Relationship status: Not on file    Intimate partner violence     Fear of current or ex partner: Not on file     Emotionally abused: Not on file     Physically abused: Not on file     Forced sexual activity: Not on file   Other Topics Concern    Not on file   Social History Narrative    Not on file       Allergies:  No Known Allergies    Current Medications:  Current Facility-Administered Medications   Medication Dose Route Frequency Provider Last Rate Last Dose    propofol injection  10 mcg/kg/min Intravenous Titrated Theresa Ballard MD 6.1 mL/hr at 04/22/20 0400 15 mcg/kg/min at 04/22/20 0400    cefTRIAXone (ROCEPHIN) 1 g in sterile Lorastella ELIE - CRNA   14 mg at 04/22/20 0009    rocuronium [facility]) injection    PRN Sohan MuñizELIE - CRNA   70 mg at 04/22/20 0009      propofol 15 mcg/kg/min (04/22/20 0400)    heparin (porcine) 12 Units/kg/hr (04/22/20 0400)    dextrose      norepinephrine 4 mcg/min (04/22/20 1132)       Physical Exam:  /68   Pulse 90   Temp 100.2 °F (37.9 °C) (Bladder)   Resp 18   Wt 149 lb 14.6 oz (68 kg)   SpO2 100%   BMI 29.28 kg/m²   Weight change: Wt Readings from Last 3 Encounters:   04/22/20 149 lb 14.6 oz (68 kg)   04/21/20 161 lb (73 kg)   11/15/19 161 lb (73 kg)         General: Awake, alert, oriented x3, no acute distress  HEENT: Unremarkable  Neck: No JVD or bruits. Cardiac: Regular rate and rhythm, normal S1 and S2, no extra heart sounds, murmurs, heaves, thrills  Resp: Lungs clear without wheezing or crackles. No accessory muscle use or retraction  Abdomen: soft, nontender, nondistended, no gross organomegaly or mass  Skin: Warm and dry, no cyanosis. Musculoskeletal: normal tone and strength in the upper and lower extremities bilaterally  Neuro: Grossly unremarkable  Psych: Cooperative, and normal affect    Intake/Output:    Intake/Output Summary (Last 24 hours) at 4/22/2020 1218  Last data filed at 4/22/2020 1200  Gross per 24 hour   Intake 326 ml   Output 1120 ml   Net -794 ml     I/O this shift:   In: 90 [NG/GT:90]  Out: 220 [Urine:220]    Laboratory Tests:  Lab Results   Component Value Date    CREATININE 0.6 04/21/2020    BUN 15 04/21/2020     04/21/2020    K 5.0 04/21/2020     04/21/2020    CO2 19 (L) 04/21/2020     Recent Labs     04/21/20  2300 04/22/20  0630 04/22/20  1130   CKTOTAL 260* 294* 204*   CKMB 27.7* 31.3* 18.6*     No results found for: BNP  Lab Results   Component Value Date    WBC 18.2 04/22/2020    RBC 4.14 04/22/2020    HGB 13.2 04/22/2020    HCT 40.2 04/22/2020    MCV 97.1 04/22/2020    MCH 31.9 04/22/2020    MCHC 32.8 04/22/2020    RDW 14.6 04/22/2020     04/22/2020    MPV 11.1 04/22/2020     Recent Labs     04/21/20  2300   ALKPHOS 115*   ALT 18   AST 67*   PROT 6.4   BILITOT 0.4   LABALBU 3.5     Lab Results   Component Value Date    MG 2.2 04/21/2020     Lab Results   Component Value Date    PROTIME 11.5 04/21/2020    INR 1.0 04/21/2020     Lab Results   Component Value Date    TSH 6.940 10/30/2019     No components found for: CHLPL  Lab Results   Component Value Date    TRIG 282 03/12/2019     Lab Results   Component Value Date    HDL 34 10/30/2019    HDL 41 07/24/2019    HDL 45 03/12/2019     Lab Results   Component Value Date    LDLCALC 115 (H) 10/30/2019    LDLCALC 101 07/24/2019    LDLCALC 131 03/12/2019     Lab Results   Component Value Date    INR 1.0 04/21/2020       All EKGs I have personally reviewed and EKG this morning shows as expected evolution of precordial T wave changes but no indication of acute ST elevation. I personally reviewed her telemetry and it shows normal sinus rhythm heart rate in the 90s this morning. ASSESSMENT / PLAN:    1. Late presentation anterior wall myocardial infarction and at heart catheterization had a proximally occluded LAD and underwent placement of a 3 mm x 22 mm drug-eluting tu resolute stent. Currently Brilinta, aspirin 81 mg daily and heparin infusion. Plan statin, ACE inhibitor and beta-blocker as blood pressure tolerates. Awaiting echocardiogram.  As expected troponin trended upward with LAD occlusion and I am pleasantly surprised that the CPK this morning is only 204 with an MB of 18.6 which is actually lower than I would think it would be. #2 hypotension and concern of evolving urologic considerations with white count yesterday of 9 up to 18 today. She would be considered high risk to go for urologic procedure but if deemed to be necessary from potential sepsis risk and ongoing hypotension would proceed with doing it.   Will defer timing to urology possibly later this

## 2020-04-22 NOTE — H&P
[address] [facility]  History and Physical      CHIEF COMPLAINT:     STEMI (ST elevation myocardial infarction) ([facility] [address]    History of Present Illness: The patient is intubated so I could not obtain history from her. Apparently she has had on and off chest pressure for the last month but she was afraid to seek medical attention due to coronavirus. This gradually worsened over the last couple of days and she presented to the hospital yesterday was found to have anterior STEMI. She went to the Cath Lab where she had high-grade lesion of the proximal LAD. She received drug-eluting stent to LAD. She was also found to have a high-grade lesion of the RCA but there was some collateral flow seen. She is currently intubated on vasopressors. Past Medical History:   Diagnosis Date    CAD (coronary artery disease) 4/21/2020    Hepatitis     High cholesterol     Hypertension     Hypothyroidism     MI (myocardial infarction) ([facility] [address] 04/21/2020    MS (multiple sclerosis) ([facility] [address] [facility]     Osteoporosis     Sarcoidosis     Type 2 diabetes mellitus with hyperglycemia, without long-term current use of insulin ([facility] [address] 10/30/2019         Past Surgical History:   Procedure Laterality Date    CHOLECYSTECTOMY  1990s    CORONARY ANGIOPLASTY WITH STENT PLACEMENT  04/21/2020    Dr. Savannah Conway   3.0 x 22mm Resolute MAAME to Prox LAD.   No LV    FEMUR FRACTURE SURGERY Left 3/21/2019    LEFT FEMUR CEPHALLOMEDULLARY NAIL performed by Antonino Tai MD at [facility] Bilateral     HYSTERECTOMY      KNEE SURGERY      plate in lt knee       Medications Prior to Admission:    Medications Prior to Admission: dantrolene (DANTRIUM) 50 MG capsule, Take 1 capsule by mouth 5 times daily  levothyroxine (SYNTHROID) 88 MCG tablet, Take 1 tablet by mouth daily  baclofen (LIORESAL) 20 MG tablet, Take 1 tablet by mouth 4 times daily  nortriptyline (PAMELOR) 25 MG capsule, Take 1 capsule by mouth nightly  atorvastatin

## 2020-04-22 NOTE — CONSULTS
ABDOMEN PELVIS W CONTRAST   Final Result      1. No evidence of abdominal aortic aneurysm or dissection. 2. Contrast material is present within the renal collecting systems on   unenhanced CT limiting this examination for assessment of renal   calculus, however, there is left hydronephrosis and two calculi   located within proximal left ureter. Short-term follow-up unenhanced   CT of abdomen and pelvis recommended for further evaluation. 3. Large amount of stool seen throughout colon. XR CHEST PORTABLE    (Results Pending)   XR ABDOMEN FOR NG/OG/NE TUBE PLACEMENT    (Results Pending)        ASSESSMENT  And PLAN:     1. Acute hypoxic respiratory failure  2. Chest pain  3. Shock-cardiogenic vs infectious  4. STEMI s/p PCI w/ stent placement in LAD  5. CAD  6. Obstructive renal calculi  7. Multiple Sclerosis  8. DM II  9. Hypothyoroidism  10.  HTN/HLD    Neurologic  · Intubated, sedated  · Currently on versed, propofol  · One dose rocuronium given for paralysis during intubation    Cardiovascular  STEMI s/p PCI w/ stent placement LAD/ CAD  · STEMI 4/21, no previous CAD history  · 100% stenosis LAD, PCI with stent placement  · RCA dominant 95% stenosis w/ collaterals, no stent placement  · Start ASA, Lasix  · Home med Lipitor increased from 10 mg QD to 40 mg  · Lisinopril, Toprol XL ordered but held following hypotension  · Start Brilinta  · Low dose heparin drip started    Chest pain  · Began eight hours after PCI with radiation to back, diaphoresis  · Patient also became SOB, hypoxic--> RRT called  · Patient transferred to MICU-1 at this time  · U/S negative for cardiac tamponade, pericardial effusion  · CTA chest, abdomen/pelvis negative for aortic aneurysm/dissection; does show pulmonary edema  · Repeat EKG in morning    HTN  · Not on medication at home    HLD  · Home Lipitor increased from 10 mg daily to 40 mg HS    Shock  · Patient noted to have hypotension when chest pain started on floor  · Cardiac

## 2020-04-22 NOTE — PROGRESS NOTES
Stage  CI HR MAP TPRI SVI   Baseline 2.4 100   24   Challenge 3.1 101   32   Result (%Ä) 28.1 1.3   33.1

## 2020-04-22 NOTE — BRIEF OP NOTE
Brief Postoperative Note      Patient: Mary Verdin  YOB: 1945  MRN: [id number]    Date of Procedure: 4/22/2020    Pre-Op Diagnosis: left ureteral calc, UTI with sepsis    Post-Op Diagnosis: Same       Procedure(s):  CYSTOSCOPY STENT INSERTION    Simon Avila DO    Assistant:  * No surgical staff found *    Anesthesia: Monitor Anesthesia Care    Estimated Blood Loss (mL): Minimal    Complications: None    Specimens:   * No specimens in log *    Implants:  * No implants in log *      Drains:   NG/OG/NJ/NE Tube Orogastric Center mouth (Active)   Surrounding Skin Dry; Intact 4/22/2020 11:00 AM   Securement device Yes 4/22/2020 11:00 AM   Status Clamped 4/22/2020 11:00 AM   Placement Verified by X-Ray (Initial) 4/22/2020 11:00 AM   NG/OG/NJ/NE External Measurement (cm) 61 cm 4/22/2020 11:00 AM   Drainage Appearance Other (Comment) 4/22/2020 12:00 AM   Free Water Flush (mL) 90 mL 4/22/2020 10:00 AM   Residual Volume (ml) 0 ml 4/22/2020 12:00 AM       Urethral Catheter (Active)   Catheter Indications Need for fluid management in critically ill patients in a critical care setting not able to be managed by other means such as BSC with hat, bedpan, urinal, condom catheter, or short term intermittent urethral catherization 4/22/2020 11:00 AM   Site Assessment No urethral drainage 4/22/2020 11:00 AM   Urine Color Yellow 4/22/2020 11:00 AM   Urine Appearance Clear 4/22/2020 11:00 AM   Output (mL) 100 mL 4/22/2020 10:00 AM       [REMOVED] Urethral Catheter Non-latex;Straight-tip 16 fr (Removed)       Findings: see operative report    Electronically signed by Pam Avila DO on 4/22/2020 at 11:45 AM

## 2020-04-22 NOTE — ANESTHESIA PRE PROCEDURE
Department of Anesthesiology  Preprocedure Note       Name:  Mary Verdin   Age:  77 y.o.  :  1945                                          MRN:  [id number]         Date:  2020      Surgeon: Desmond Mathews):  Simon Avila,     Procedure: CYSTOSCOPY RETROGRADE PYELOGRAM STENT INSERTION (Left )    Medications prior to admission:   Prior to Admission medications    Medication Sig Start Date End Date Taking?  Authorizing Provider   dantrolene (DANTRIUM) 50 MG capsule Take 1 capsule by mouth 5 times daily 3/17/20   Romina Stewart MD   levothyroxine (SYNTHROID) 88 MCG tablet Take 1 tablet by mouth daily 3/17/20   Romina Stewart MD   baclofen (LIORESAL) 20 MG tablet Take 1 tablet by mouth 4 times daily 3/17/20   Romina Stewart MD   nortriptyline [facility]) 25 MG capsule Take 1 capsule by mouth nightly 3/17/20 6/15/20  Romina Stewart MD   atorvastatin (LIPITOR) 10 MG tablet Take 1 tablet by mouth daily 20   Romina Stewart MD   Catheters (URETHRAL CATHETER) MISC by Does not apply route    Historical Provider, MD   citalopram (CELEXA) 20 MG tablet Take 1 tablet by mouth every morning  Patient not taking: Reported on 2020   Romina Stewart MD   Cyanocobalamin (VITAMIN B 12 PO) Take 500 mg by mouth daily     Historical Provider, MD   ipratropium-albuterol (DUONEB) 0.5-2.5 (3) MG/3ML SOLN nebulizer solution Inhale 3 mLs into the lungs every 6 hours as needed for Shortness of Breath 5/15/19   Slava Bear MD   Calcium Carbonate-Vit D-Min (CALCIUM 600+D PLUS MINERALS) 600-400 MG-UNIT TABS Take 1 tablet by mouth nightly     Historical Provider, MD   polyethylene glycol (MIRALAX) PACK packet Take 17 g by mouth daily as needed     Historical Provider, MD   Cholecalciferol (VITAMIN D3) 5000 units TABS Take 1 tablet by mouth every morning    Historical Provider, MD   Potassium 99 MG TABS Take 1 tablet by mouth every morning    Historical Provider, MD   Omega-3 Fatty Acids (FISH OIL PEARLS) 300 MG levothyroxine (SYNTHROID) tablet 88 mcg  88 mcg Oral Daily Dick Daly, APRN - CNP   88 mcg at 04/22/20 0836    nortriptyline (PAMELOR) capsule 25 mg  25 mg Oral Nightly Mikala N [place], APRN - CNP        sodium chloride flush 0.9 % injection 10 mL  10 mL Intravenous 2 times per day Dick Dulcedonna, APRN - CNP   10 mL at 04/22/20 0853    sodium chloride flush 0.9 % injection 10 mL  10 mL Intravenous PRN [address] [place], APRN - CNP        acetaminophen (TYLENOL) tablet 650 mg  650 mg Oral Q6H PRN [address] [place], APRN - CNP        Or    acetaminophen (TYLENOL) suppository 650 mg  650 mg Rectal Q6H PRN [address] [place], APRN - CNP        magnesium hydroxide (MILK OF MAGNESIA) 400 MG/5ML suspension 30 mL  30 mL Oral Daily PRN Mikala N [place], APRN - CNP        promethazine (PHENERGAN) tablet 12.5 mg  12.5 mg Oral Q6H PRN Mikala N [place], APRN - CNP        Or    ondansetron (ZOFRAN) injection 4 mg  4 mg Intravenous Q6H PRN Mikala N [place], APRN - CNP        glucose (GLUTOSE) 40 % oral gel 15 g  15 g Oral PRN Mikala N [place], APRN - CNP        dextrose 50 % IV solution  12.5 g Intravenous PRN Mikala N [place], APRN - CNP        glucagon (rDNA) injection 1 mg  1 mg Intramuscular PRN Mikala N [place], APRN - CNP        dextrose 5 % solution  100 mL/hr Intravenous PRN Mikala N [place], APRN - CNP        hydroxychloroquine (PLAQUENIL) tablet 400 mg  400 mg Oral Q12H Mikala N [place], APRN - CNP   400 mg at 04/22/20 6822    Followed by   Jie Stu hydroxychloroquine (PLAQUENIL) tablet 200 mg  200 mg Oral Q12H Mikala N [place], APRN - CNP        azithromycin (ZITHROMAX) tablet 250 mg  250 mg Oral Daily [address] [place], APRN - CNP   250 mg at 04/22/20 0834    budesonide-formoterol (SYMBICORT) 80-4.5 MCG/ACT inhaler 2 puff  2 puff Inhalation BID [address] [place], APRN - CNP   2 puff at 04/22/20 0945    atorvastatin (LIPITOR) tablet 40 mg

## 2020-04-22 NOTE — CONSULTS
4/22/2020 10:30 AM  Service: Urology  Group: [facility] ([place])    Luis Alberto Douglas  [id number]     Chief Complaint:    Left ureteral obstructing stone x 2     History of Present Illness: The patient is a 77 y.o. female patient who has PMH of multiple sclerosis, spastic quadriplegia, and sarcoidosis, and suspected COVID. She presented to the ED one day ago with complaints of chest pain radiating into her back. She was found to have STEMI and transferred from [facility] ED to cath lab in [facility] for catheterization. Following catheterization she was found to be hypotensive, began experiencing chest pain, back pain, and shortness of breath and an RRT was called. She was transferred to MICU, intubated, sedated, and placed on vasopressors. COVID negative x 1. She had CTA Abdomen Pelvis performed that shows left hydronephrosis and two obstructing left ureteral calculi and we were asked to evaluate. She is noted to have fever, leukocytosis, and creatinine is stable. She appears to be a patient of Dr. Corrine Martini with [facility]. Has history of urge incontinence and received bladder botox November 2019. Past Medical History:   Diagnosis Date    CAD (coronary artery disease) 4/21/2020    Hepatitis     High cholesterol     Hypertension     Hypothyroidism     MI (myocardial infarction) ([address] 04/21/2020    [facility]     Osteoporosis     Sarcoidosis     Type 2 diabetes mellitus with hyperglycemia, without long-term current use of insulin ([address] 10/30/2019         Past Surgical History:   Procedure Laterality Date    CHOLECYSTECTOMY  1990s    CORONARY ANGIOPLASTY WITH STENT PLACEMENT  04/21/2020    Dr. Juanita Renae   3.0 x 22mm Resolute MAAME to Prox LAD.   No LV    FEMUR FRACTURE SURGERY Left 3/21/2019    LEFT FEMUR CEPHALLOMEDULLARY NAIL performed by Gaurang Hayes MD at [facility] Bilateral     HYSTERECTOMY      KNEE SURGERY      plate in lt knee

## 2020-04-23 ENCOUNTER — TELEPHONE (OUTPATIENT)
Dept: NEUROLOGY | Age: 75
End: 2020-04-23

## 2020-04-23 ENCOUNTER — APPOINTMENT (OUTPATIENT)
Dept: GENERAL RADIOLOGY | Age: 75
DRG: 246 | End: 2020-04-23
Attending: INTERNAL MEDICINE
Payer: MEDICARE

## 2020-04-23 ENCOUNTER — ANESTHESIA (OUTPATIENT)
Dept: OPERATING ROOM | Age: 75
DRG: 246 | End: 2020-04-23
Payer: MEDICARE

## 2020-04-23 ENCOUNTER — ANESTHESIA EVENT (OUTPATIENT)
Dept: OPERATING ROOM | Age: 75
DRG: 246 | End: 2020-04-23
Payer: MEDICARE

## 2020-04-23 VITALS — OXYGEN SATURATION: 99 %

## 2020-04-23 PROBLEM — I25.5 ISCHEMIC CARDIOMYOPATHY: Status: ACTIVE | Noted: 2020-04-23

## 2020-04-23 PROBLEM — I50.22 CHRONIC SYSTOLIC (CONGESTIVE) HEART FAILURE (HCC): Chronic | Status: ACTIVE | Noted: 2020-04-23

## 2020-04-23 PROBLEM — R65.21 SEPTIC SHOCK (HCC): Status: ACTIVE | Noted: 2020-04-23

## 2020-04-23 PROBLEM — I51.3 LV (LEFT VENTRICULAR) MURAL THROMBUS: Chronic | Status: ACTIVE | Noted: 2020-04-23

## 2020-04-23 PROBLEM — A41.9 SEPTIC SHOCK (HCC): Status: ACTIVE | Noted: 2020-04-23

## 2020-04-23 LAB
AADO2: 105.7 MMHG
ALBUMIN SERPL-MCNC: 2.6 G/DL (ref 3.5–5.2)
ALP BLD-CCNC: 95 U/L (ref 35–104)
ALT SERPL-CCNC: 12 U/L (ref 0–32)
ANION GAP SERPL CALCULATED.3IONS-SCNC: 12 MMOL/L (ref 7–16)
ANION GAP SERPL CALCULATED.3IONS-SCNC: 14 MMOL/L (ref 7–16)
APTT: 55.3 SEC (ref 24.5–35.1)
AST SERPL-CCNC: 26 U/L (ref 0–31)
B.E.: -2.9 MMOL/L (ref -3–3)
BILIRUB SERPL-MCNC: 0.3 MG/DL (ref 0–1.2)
BUN BLDV-MCNC: 11 MG/DL (ref 8–23)
BUN BLDV-MCNC: 12 MG/DL (ref 8–23)
CALCIUM SERPL-MCNC: 7.7 MG/DL (ref 8.6–10.2)
CALCIUM SERPL-MCNC: 8.1 MG/DL (ref 8.6–10.2)
CHLORIDE BLD-SCNC: 104 MMOL/L (ref 98–107)
CHLORIDE BLD-SCNC: 106 MMOL/L (ref 98–107)
CO2: 17 MMOL/L (ref 22–29)
CO2: 19 MMOL/L (ref 22–29)
COHB: 0.3 % (ref 0–1.5)
CREAT SERPL-MCNC: 0.5 MG/DL (ref 0.5–1)
CREAT SERPL-MCNC: 0.6 MG/DL (ref 0.5–1)
CRITICAL: ABNORMAL
DATE ANALYZED: ABNORMAL
DATE OF COLLECTION: ABNORMAL
EKG ATRIAL RATE: 102 BPM
EKG ATRIAL RATE: 102 BPM
EKG P AXIS: 53 DEGREES
EKG P AXIS: 62 DEGREES
EKG P-R INTERVAL: 162 MS
EKG P-R INTERVAL: 174 MS
EKG Q-T INTERVAL: 318 MS
EKG Q-T INTERVAL: 374 MS
EKG QRS DURATION: 82 MS
EKG QRS DURATION: 90 MS
EKG QTC CALCULATION (BAZETT): 414 MS
EKG QTC CALCULATION (BAZETT): 487 MS
EKG R AXIS: 35 DEGREES
EKG R AXIS: 36 DEGREES
EKG T AXIS: 102 DEGREES
EKG T AXIS: 95 DEGREES
EKG VENTRICULAR RATE: 102 BPM
EKG VENTRICULAR RATE: 102 BPM
FIO2: 40 %
GFR AFRICAN AMERICAN: >60
GFR AFRICAN AMERICAN: >60
GFR NON-AFRICAN AMERICAN: >60 ML/MIN/1.73
GFR NON-AFRICAN AMERICAN: >60 ML/MIN/1.73
GLUCOSE BLD-MCNC: 136 MG/DL (ref 74–99)
GLUCOSE BLD-MCNC: 168 MG/DL (ref 74–99)
HCO3: 19.8 MMOL/L (ref 22–26)
HCT VFR BLD CALC: 35 % (ref 34–48)
HEMOGLOBIN: 11.6 G/DL (ref 11.5–15.5)
HHB: 1.5 % (ref 0–5)
L. PNEUMOPHILA SEROGP 1 UR AG: NORMAL
LAB: ABNORMAL
LACTATE DEHYDROGENASE: 233 U/L (ref 135–214)
LACTIC ACID: 1.2 MMOL/L (ref 0.5–2.2)
Lab: ABNORMAL
MAGNESIUM: 2 MG/DL (ref 1.6–2.6)
MCH RBC QN AUTO: 31.7 PG (ref 26–35)
MCHC RBC AUTO-ENTMCNC: 33.1 % (ref 32–34.5)
MCV RBC AUTO: 95.6 FL (ref 80–99.9)
METER GLUCOSE: 108 MG/DL (ref 74–99)
METER GLUCOSE: 127 MG/DL (ref 74–99)
METER GLUCOSE: 142 MG/DL (ref 74–99)
METER GLUCOSE: 147 MG/DL (ref 74–99)
METHB: 0.5 % (ref 0–1.5)
MODE: AC
O2 CONTENT: 17.8 ML/DL
O2 SATURATION: 98.5 % (ref 92–98.5)
O2HB: 97.7 % (ref 94–97)
OPERATOR ID: ABNORMAL
PATIENT TEMP: 37 C
PCO2: 29 MMHG (ref 35–45)
PDW BLD-RTO: 14.6 FL (ref 11.5–15)
PEEP/CPAP: 8 CMH2O
PFO2: 3.4 MMHG/%
PH BLOOD GAS: 7.45 (ref 7.35–7.45)
PLATELET # BLD: 310 E9/L (ref 130–450)
PMV BLD AUTO: 10.6 FL (ref 7–12)
PO2: 136.2 MMHG (ref 75–100)
POTASSIUM SERPL-SCNC: 3.2 MMOL/L (ref 3.5–5)
POTASSIUM SERPL-SCNC: 3.6 MMOL/L (ref 3.5–5)
RBC # BLD: 3.66 E12/L (ref 3.5–5.5)
RI(T): 0.78
RR MECHANICAL: 18 B/MIN
SODIUM BLD-SCNC: 135 MMOL/L (ref 132–146)
SODIUM BLD-SCNC: 137 MMOL/L (ref 132–146)
SOURCE, BLOOD GAS: ABNORMAL
STREP PNEUMONIAE ANTIGEN, URINE: NORMAL
THB: 12.8 G/DL (ref 11.5–16.5)
TIME ANALYZED: 425
TOTAL PROTEIN: 5.3 G/DL (ref 6.4–8.3)
VT MECHANICAL: 400 ML
WBC # BLD: 14.9 E9/L (ref 4.5–11.5)

## 2020-04-23 PROCEDURE — 6360000002 HC RX W HCPCS: Performed by: UROLOGY

## 2020-04-23 PROCEDURE — 6370000000 HC RX 637 (ALT 250 FOR IP): Performed by: INTERNAL MEDICINE

## 2020-04-23 PROCEDURE — 94640 AIRWAY INHALATION TREATMENT: CPT

## 2020-04-23 PROCEDURE — 83735 ASSAY OF MAGNESIUM: CPT

## 2020-04-23 PROCEDURE — 3600000013 HC SURGERY LEVEL 3 ADDTL 15MIN: Performed by: UROLOGY

## 2020-04-23 PROCEDURE — 85730 THROMBOPLASTIN TIME PARTIAL: CPT

## 2020-04-23 PROCEDURE — 3700000000 HC ANESTHESIA ATTENDED CARE: Performed by: UROLOGY

## 2020-04-23 PROCEDURE — 80053 COMPREHEN METABOLIC PANEL: CPT

## 2020-04-23 PROCEDURE — 3700000001 HC ADD 15 MINUTES (ANESTHESIA): Performed by: UROLOGY

## 2020-04-23 PROCEDURE — 85027 COMPLETE CBC AUTOMATED: CPT

## 2020-04-23 PROCEDURE — 83605 ASSAY OF LACTIC ACID: CPT

## 2020-04-23 PROCEDURE — 93005 ELECTROCARDIOGRAM TRACING: CPT | Performed by: UROLOGY

## 2020-04-23 PROCEDURE — C1769 GUIDE WIRE: HCPCS | Performed by: UROLOGY

## 2020-04-23 PROCEDURE — 82805 BLOOD GASES W/O2 SATURATION: CPT

## 2020-04-23 PROCEDURE — 83615 LACTATE (LD) (LDH) ENZYME: CPT

## 2020-04-23 PROCEDURE — C9113 INJ PANTOPRAZOLE SODIUM, VIA: HCPCS | Performed by: INTERNAL MEDICINE

## 2020-04-23 PROCEDURE — 6370000000 HC RX 637 (ALT 250 FOR IP): Performed by: UROLOGY

## 2020-04-23 PROCEDURE — 6360000002 HC RX W HCPCS: Performed by: NURSE ANESTHETIST, CERTIFIED REGISTERED

## 2020-04-23 PROCEDURE — C2617 STENT, NON-COR, TEM W/O DEL: HCPCS | Performed by: UROLOGY

## 2020-04-23 PROCEDURE — 0T778DZ DILATION OF LEFT URETER WITH INTRALUMINAL DEVICE, VIA NATURAL OR ARTIFICIAL OPENING ENDOSCOPIC: ICD-10-PCS | Performed by: UROLOGY

## 2020-04-23 PROCEDURE — 2580000003 HC RX 258: Performed by: UROLOGY

## 2020-04-23 PROCEDURE — 74420 UROGRAPHY RTRGR +-KUB: CPT

## 2020-04-23 PROCEDURE — 6360000002 HC RX W HCPCS: Performed by: STUDENT IN AN ORGANIZED HEALTH CARE EDUCATION/TRAINING PROGRAM

## 2020-04-23 PROCEDURE — C1758 CATHETER, URETERAL: HCPCS | Performed by: UROLOGY

## 2020-04-23 PROCEDURE — 80048 BASIC METABOLIC PNL TOTAL CA: CPT

## 2020-04-23 PROCEDURE — 99291 CRITICAL CARE FIRST HOUR: CPT | Performed by: INTERNAL MEDICINE

## 2020-04-23 PROCEDURE — 6360000002 HC RX W HCPCS: Performed by: INTERNAL MEDICINE

## 2020-04-23 PROCEDURE — 93010 ELECTROCARDIOGRAM REPORT: CPT | Performed by: INTERNAL MEDICINE

## 2020-04-23 PROCEDURE — 37799 UNLISTED PX VASCULAR SURGERY: CPT

## 2020-04-23 PROCEDURE — 3600000003 HC SURGERY LEVEL 3 BASE: Performed by: UROLOGY

## 2020-04-23 PROCEDURE — 2580000003 HC RX 258: Performed by: INTERNAL MEDICINE

## 2020-04-23 PROCEDURE — BT1F1ZZ FLUOROSCOPY OF LEFT KIDNEY, URETER AND BLADDER USING LOW OSMOLAR CONTRAST: ICD-10-PCS | Performed by: UROLOGY

## 2020-04-23 PROCEDURE — 6370000000 HC RX 637 (ALT 250 FOR IP)

## 2020-04-23 PROCEDURE — 2580000003 HC RX 258: Performed by: NURSE ANESTHETIST, CERTIFIED REGISTERED

## 2020-04-23 PROCEDURE — 87186 SC STD MICRODIL/AGAR DIL: CPT

## 2020-04-23 PROCEDURE — 87088 URINE BACTERIA CULTURE: CPT

## 2020-04-23 PROCEDURE — 36415 COLL VENOUS BLD VENIPUNCTURE: CPT

## 2020-04-23 PROCEDURE — 82962 GLUCOSE BLOOD TEST: CPT

## 2020-04-23 PROCEDURE — 94003 VENT MGMT INPAT SUBQ DAY: CPT

## 2020-04-23 PROCEDURE — 2000000000 HC ICU R&B

## 2020-04-23 PROCEDURE — 87077 CULTURE AEROBIC IDENTIFY: CPT

## 2020-04-23 PROCEDURE — 2709999900 HC NON-CHARGEABLE SUPPLY: Performed by: UROLOGY

## 2020-04-23 DEVICE — UNIVERSA FIRM URETERAL STENT AND POSITIONER WITH HYDROPHILIC COATING
Type: IMPLANTABLE DEVICE | Status: FUNCTIONAL
Brand: UNIVERSA

## 2020-04-23 RX ORDER — POTASSIUM CHLORIDE 29.8 MG/ML
40 INJECTION INTRAVENOUS ONCE
Status: DISCONTINUED | OUTPATIENT
Start: 2020-04-23 | End: 2020-04-23

## 2020-04-23 RX ORDER — HEPARIN SODIUM 1000 [USP'U]/ML
2000 INJECTION, SOLUTION INTRAVENOUS; SUBCUTANEOUS PRN
Status: DISCONTINUED | OUTPATIENT
Start: 2020-04-23 | End: 2020-04-24 | Stop reason: ALTCHOICE

## 2020-04-23 RX ORDER — FENTANYL CITRATE 50 UG/ML
INJECTION, SOLUTION INTRAMUSCULAR; INTRAVENOUS PRN
Status: DISCONTINUED | OUTPATIENT
Start: 2020-04-23 | End: 2020-04-23 | Stop reason: SDUPTHER

## 2020-04-23 RX ORDER — POTASSIUM CHLORIDE 29.8 MG/ML
40 INJECTION INTRAVENOUS ONCE
Status: COMPLETED | OUTPATIENT
Start: 2020-04-23 | End: 2020-04-23

## 2020-04-23 RX ORDER — ASPIRIN 81 MG/1
TABLET, CHEWABLE ORAL
Status: COMPLETED
Start: 2020-04-23 | End: 2020-04-23

## 2020-04-23 RX ORDER — SODIUM CHLORIDE 9 MG/ML
INJECTION, SOLUTION INTRAVENOUS CONTINUOUS PRN
Status: DISCONTINUED | OUTPATIENT
Start: 2020-04-23 | End: 2020-04-23 | Stop reason: SDUPTHER

## 2020-04-23 RX ORDER — HEPARIN SODIUM 1000 [USP'U]/ML
4000 INJECTION, SOLUTION INTRAVENOUS; SUBCUTANEOUS PRN
Status: DISCONTINUED | OUTPATIENT
Start: 2020-04-23 | End: 2020-04-24 | Stop reason: ALTCHOICE

## 2020-04-23 RX ORDER — DIMETHICONE, OXYBENZONE, AND PADIMATE O 2; 2.5; 6.6 G/100G; G/100G; G/100G
STICK TOPICAL
Status: COMPLETED
Start: 2020-04-23 | End: 2020-04-23

## 2020-04-23 RX ORDER — HEPARIN SODIUM 10000 [USP'U]/100ML
12 INJECTION, SOLUTION INTRAVENOUS CONTINUOUS
Status: DISCONTINUED | OUTPATIENT
Start: 2020-04-23 | End: 2020-04-24

## 2020-04-23 RX ADMIN — CEFTRIAXONE SODIUM 1 G: 1 INJECTION, POWDER, FOR SOLUTION INTRAMUSCULAR; INTRAVENOUS at 02:29

## 2020-04-23 RX ADMIN — ASPIRIN 81 MG 81 MG: 81 TABLET ORAL at 11:03

## 2020-04-23 RX ADMIN — DANTROLENE SODIUM 50 MG: 25 CAPSULE ORAL at 14:13

## 2020-04-23 RX ADMIN — SODIUM CHLORIDE, PRESERVATIVE FREE 10 ML: 5 INJECTION INTRAVENOUS at 08:20

## 2020-04-23 RX ADMIN — DANTROLENE SODIUM 50 MG: 25 CAPSULE ORAL at 20:32

## 2020-04-23 RX ADMIN — PHENYLEPHRINE HYDROCHLORIDE 100 MCG: 10 INJECTION INTRAVENOUS at 09:34

## 2020-04-23 RX ADMIN — BACLOFEN 20 MG: 10 TABLET ORAL at 17:00

## 2020-04-23 RX ADMIN — TICAGRELOR 90 MG: 90 TABLET ORAL at 11:03

## 2020-04-23 RX ADMIN — PHENYLEPHRINE HYDROCHLORIDE 200 MCG: 10 INJECTION INTRAVENOUS at 09:40

## 2020-04-23 RX ADMIN — BUDESONIDE AND FORMOTEROL FUMARATE DIHYDRATE 2 PUFF: 80; 4.5 AEROSOL RESPIRATORY (INHALATION) at 21:39

## 2020-04-23 RX ADMIN — DANTROLENE SODIUM 50 MG: 25 CAPSULE ORAL at 06:03

## 2020-04-23 RX ADMIN — BUDESONIDE AND FORMOTEROL FUMARATE DIHYDRATE 2 PUFF: 80; 4.5 AEROSOL RESPIRATORY (INHALATION) at 08:26

## 2020-04-23 RX ADMIN — PHENYLEPHRINE HYDROCHLORIDE 100 MCG: 10 INJECTION INTRAVENOUS at 09:42

## 2020-04-23 RX ADMIN — POTASSIUM CHLORIDE 40 MEQ: 29.8 INJECTION, SOLUTION INTRAVENOUS at 05:55

## 2020-04-23 RX ADMIN — PANTOPRAZOLE SODIUM 40 MG: 40 INJECTION, POWDER, FOR SOLUTION INTRAVENOUS at 08:20

## 2020-04-23 RX ADMIN — LEVOTHYROXINE SODIUM 88 MCG: 0.09 TABLET ORAL at 11:04

## 2020-04-23 RX ADMIN — INSULIN LISPRO 2 UNITS: 100 INJECTION, SOLUTION INTRAVENOUS; SUBCUTANEOUS at 04:47

## 2020-04-23 RX ADMIN — BACLOFEN 20 MG: 10 TABLET ORAL at 11:04

## 2020-04-23 RX ADMIN — PHENYLEPHRINE HYDROCHLORIDE 100 MCG: 10 INJECTION INTRAVENOUS at 09:44

## 2020-04-23 RX ADMIN — SODIUM CHLORIDE, PRESERVATIVE FREE 10 ML: 5 INJECTION INTRAVENOUS at 08:21

## 2020-04-23 RX ADMIN — TICAGRELOR 90 MG: 90 TABLET ORAL at 20:33

## 2020-04-23 RX ADMIN — CHLORHEXIDINE GLUCONATE 0.12% ORAL RINSE 15 ML: 1.2 LIQUID ORAL at 22:06

## 2020-04-23 RX ADMIN — FENTANYL CITRATE 50 MCG: 50 INJECTION, SOLUTION INTRAMUSCULAR; INTRAVENOUS at 09:25

## 2020-04-23 RX ADMIN — DANTROLENE SODIUM 50 MG: 25 CAPSULE ORAL at 11:04

## 2020-04-23 RX ADMIN — Medication: at 14:13

## 2020-04-23 RX ADMIN — BACLOFEN 20 MG: 10 TABLET ORAL at 20:32

## 2020-04-23 RX ADMIN — HEPARIN SODIUM 12 UNITS/KG/HR: 10000 INJECTION, SOLUTION INTRAVENOUS at 05:49

## 2020-04-23 RX ADMIN — PHENYLEPHRINE HYDROCHLORIDE 100 MCG: 10 INJECTION INTRAVENOUS at 09:38

## 2020-04-23 RX ADMIN — FENTANYL CITRATE 25 MCG: 50 INJECTION, SOLUTION INTRAMUSCULAR; INTRAVENOUS at 09:50

## 2020-04-23 RX ADMIN — PHENYLEPHRINE HYDROCHLORIDE 100 MCG: 10 INJECTION INTRAVENOUS at 09:36

## 2020-04-23 RX ADMIN — ATORVASTATIN CALCIUM 40 MG: 40 TABLET, FILM COATED ORAL at 22:06

## 2020-04-23 RX ADMIN — FENTANYL CITRATE 25 MCG: 50 INJECTION, SOLUTION INTRAMUSCULAR; INTRAVENOUS at 09:51

## 2020-04-23 RX ADMIN — PROPOFOL 10 MCG/KG/MIN: 10 INJECTION, EMULSION INTRAVENOUS at 16:50

## 2020-04-23 RX ADMIN — CHLORHEXIDINE GLUCONATE 0.12% ORAL RINSE 15 ML: 1.2 LIQUID ORAL at 08:19

## 2020-04-23 RX ADMIN — PROPOFOL 15 MCG/KG/MIN: 10 INJECTION, EMULSION INTRAVENOUS at 05:49

## 2020-04-23 RX ADMIN — Medication 10 ML: at 20:26

## 2020-04-23 RX ADMIN — SODIUM CHLORIDE: 9 INJECTION, SOLUTION INTRAVENOUS at 09:20

## 2020-04-23 ASSESSMENT — PULMONARY FUNCTION TESTS
PIF_VALUE: 13
PIF_VALUE: 24
PIF_VALUE: 19
PIF_VALUE: 22
PIF_VALUE: 0
PIF_VALUE: 13
PIF_VALUE: 22
PIF_VALUE: 12
PIF_VALUE: 22
PIF_VALUE: 13
PIF_VALUE: 13
PIF_VALUE: 12
PIF_VALUE: 19
PIF_VALUE: 22
PIF_VALUE: 19
PIF_VALUE: 21
PIF_VALUE: 13
PIF_VALUE: 23
PIF_VALUE: 19
PIF_VALUE: 13
PIF_VALUE: 25
PIF_VALUE: 13
PIF_VALUE: 27
PIF_VALUE: 23
PIF_VALUE: 19
PIF_VALUE: 23
PIF_VALUE: 2
PIF_VALUE: 13
PIF_VALUE: 19
PIF_VALUE: 19
PIF_VALUE: 0
PIF_VALUE: 0
PIF_VALUE: 12
PIF_VALUE: 12
PIF_VALUE: 19
PIF_VALUE: 21
PIF_VALUE: 13
PIF_VALUE: 24
PIF_VALUE: 0
PIF_VALUE: 11
PIF_VALUE: 23
PIF_VALUE: 22
PIF_VALUE: 1
PIF_VALUE: 19
PIF_VALUE: 24
PIF_VALUE: 22
PIF_VALUE: 25
PIF_VALUE: 22
PIF_VALUE: 21
PIF_VALUE: 21
PIF_VALUE: 24
PIF_VALUE: 19
PIF_VALUE: 24
PIF_VALUE: 22
PIF_VALUE: 22
PIF_VALUE: 25
PIF_VALUE: 12
PIF_VALUE: 23
PIF_VALUE: 21
PIF_VALUE: 13
PIF_VALUE: 19
PIF_VALUE: 12
PIF_VALUE: 19
PIF_VALUE: 21
PIF_VALUE: 45
PIF_VALUE: 21
PIF_VALUE: 26
PIF_VALUE: 23

## 2020-04-23 ASSESSMENT — PAIN SCALES - GENERAL
PAINLEVEL_OUTOF10: 0
PAINLEVEL_OUTOF10: 2
PAINLEVEL_OUTOF10: 0

## 2020-04-23 ASSESSMENT — ENCOUNTER SYMPTOMS: SHORTNESS OF BREATH: 1

## 2020-04-23 NOTE — PROGRESS NOTES
[facility] Cardiology    INPATIENT CARDIOLOGY FOLLOW-UP    Name: Klaudia Alvarez    Age: 77 y.o. Date of Admission: 4/21/2020  7:46 PM    Date of Service: 4/23/2020    Chief Complaint: Follow-up for anterior STEMI, cardiogenic/septic shock, ischemic cardiomyopathy, left ventricular thrombus. Interim History:  Remains on ventilator. Norepinephrine tapered off. Underwent left ureteral stent earlier today. Telemetry  reviewed and showed sinus rhythm  Creatinine 0.6, potassium 3.2. Peak troponin 1.2  Echo as below.       Review of Systems:   Unable to obtain due to ventilator status      Allergies:  No Known Allergies    Current Medications:  Current Facility-Administered Medications   Medication Dose Route Frequency Provider Last Rate Last Dose    medicated lip balm (BLISTEX/CARMEX) 2-2.5-6.6 % stick             aspirin 81 MG chewable tablet             propofol injection  10 mcg/kg/min Intravenous Titrated Viktor A [place], DO 6.1 mL/hr at 04/23/20 1000 15 mcg/kg/min at 04/23/20 1000    cefTRIAXone (ROCEPHIN) 1 g in sterile water 10 mL IV syringe  1 g Intravenous Q24H Viktor A [place], DO   1 g at 04/23/20 0229    midazolam (VERSED) injection 4 mg  4 mg Intravenous Once Viktor A [place], DO        insulin lispro (HUMALOG) injection vial 0-12 Units  0-12 Units Subcutaneous Q4H Viktor A [place], DO   Stopped at 04/23/20 0819    pantoprazole (PROTONIX) injection 40 mg  40 mg Intravenous Daily Norris Lewis MD   40 mg at 04/23/20 0820    And    sodium chloride (PF) 0.9 % injection 10 mL  10 mL Intravenous Daily Norris Lewis MD   10 mL at 04/23/20 0820    chlorhexidine (PERIDEX) 0.12 % solution 15 mL  15 mL Mouth/Throat BID Norris Lewis MD   15 mL at 04/23/20 0819    baclofen (LIORESAL) tablet 20 mg  20 mg Oral 4x Daily Viktor A [place], DO   Stopped at 04/23/20 0819    dantrolene (DANTRIUM) capsule 50 mg  50 mg Oral 5x Daily Viktor A [place], DO   50 mg at 04/23/20 0603    levothyroxine (SYNTHROID) tablet 88 mcg 0. 4   LABALBU 3.5     Lab Results   Component Value Date    MG 2.0 04/23/2020     Lab Results   Component Value Date    PROTIME 11.5 04/21/2020    INR 1.0 04/21/2020     Lab Results   Component Value Date    TSH 6.940 10/30/2019     No components found for: CHLPL  Lab Results   Component Value Date    TRIG 282 03/12/2019     Lab Results   Component Value Date    HDL 34 10/30/2019    HDL 41 07/24/2019    HDL 45 03/12/2019     Lab Results   Component Value Date    LDLCALC 115 (H) 10/30/2019    LDLCALC 101 07/24/2019    [address] 131 03/12/2019       Radiology:  XR CHEST PORTABLE   Final Result      1. Endotracheal tube is present with distal tip 3.4 cm above the   marlen. 2. New interstitial pulmonary edema. XR ABDOMEN FOR NG/OG/NE TUBE PLACEMENT   Final Result      Satisfactory placement of nasogastric tube. CTA CHEST W CONTRAST   Final Result      1. No evidence of thoracic aortic dissection or aneurysm. 2. Interstitial pulmonary edema. CTA ABDOMEN PELVIS W CONTRAST   Final Result      1. No evidence of abdominal aortic aneurysm or dissection. 2. Contrast material is present within the renal collecting systems on   unenhanced CT limiting this examination for assessment of renal   calculus, however, there is left hydronephrosis and two calculi   located within proximal left ureter. Short-term follow-up unenhanced   CT of abdomen and pelvis recommended for further evaluation. 3. Large amount of stool seen throughout colon. FL RETROGRADE PYELOGRAM W WO KUB    (Results Pending)      Echo summary   Severe anterior and septal hypo-akinesia, with apical dyskinesia. Ejection fraction is visually estimated at 20-25%. Stage I diastolic dysfunction. Small apical LV thrombus   Mild to moderate mitral regurgitation is present.       Signature      ----------------------------------------------------------------   Electronically signed by Milla Rivera MD(Interpreting

## 2020-04-23 NOTE — ANESTHESIA PRE PROCEDURE
Department of Anesthesiology  Preprocedure Note       Name:  Momo Diane   Age:  77 y.o.  :  1945                                          MRN:  [id number]         Date:  2020      Surgeon: Naye Nguyen):  DO Latrice Garcia MD    Procedure: CYSTOSCOPY LEFT RETROGRADE PYELOGRAM STENT INSERTION, STRATTON CATHETER (Left )    Medications prior to admission:   Prior to Admission medications    Medication Sig Start Date End Date Taking?  Authorizing Provider   dantrolene (DANTRIUM) 50 MG capsule Take 1 capsule by mouth 5 times daily 3/17/20   Thai Chamorro MD   levothyroxine (SYNTHROID) 88 MCG tablet Take 1 tablet by mouth daily 3/17/20   Thai Chamorro MD   baclofen (LIORESAL) 20 MG tablet Take 1 tablet by mouth 4 times daily 3/17/20   Thai Chamorro MD   nortriptyline [facility] AND JOINT [facility]) 25 MG capsule Take 1 capsule by mouth nightly 3/17/20 6/15/20  Thai Chamorro MD   atorvastatin (LIPITOR) 10 MG tablet Take 1 tablet by mouth daily 20   Thai Chamorro MD   Catheters (URETHRAL CATHETER) MISC by Does not apply route    Historical Provider, MD   citalopram (CELEXA) 20 MG tablet Take 1 tablet by mouth every morning  Patient not taking: Reported on 2020   Thai Chamorro MD   Cyanocobalamin (VITAMIN B 12 PO) Take 500 mg by mouth daily     Historical Provider, MD   ipratropium-albuterol (DUONEB) 0.5-2.5 (3) MG/3ML SOLN nebulizer solution Inhale 3 mLs into the lungs every 6 hours as needed for Shortness of Breath 5/15/19   Sophia Lopez MD   Calcium Carbonate-Vit D-Min (CALCIUM 600+D PLUS MINERALS) 600-400 MG-UNIT TABS Take 1 tablet by mouth nightly     Historical Provider, MD   polyethylene glycol (MIRALAX) PACK packet Take 17 g by mouth daily as needed     Historical Provider, MD   Cholecalciferol (VITAMIN D3) 5000 units TABS Take 1 tablet by mouth every morning    Historical Provider, MD   Potassium 99 MG TABS Take 1 tablet by mouth every morning    Historical Provider, MD  H/O sarcoidosis Z86.2    Hyperlipidemia LDL goal <100 E78.5    Neurogenic bladder N31.9    HUDSON (nonalcoholic steatohepatitis) K75.81    Type 2 diabetes mellitus with hyperglycemia, without long-term current use of insulin (HCC) E11.65    STEMI (ST elevation myocardial infarction) (HCC) I21.3    Suspected COVID-19 virus infection R68.89    CAD (coronary artery disease) I25.10    Postprocedural hypotension I95.81    Acute midline thoracic back pain M54.6    Cardiogenic shock (HCC) R57.0    Hydronephrosis N13.30    Chronic systolic (congestive) heart failure (HCC) I50.22    Left ventricular thrombus I51.3    Septic shock ([facility]) A41.9, R65.21    Ischemic cardiomyopathy I25.5       Past Medical History:        Diagnosis Date    CAD (coronary artery disease) 4/21/2020    Hepatitis     High cholesterol     Hypertension     Hypothyroidism     [facility]) [facility] 04/21/2020    MS (multiple sclerosis) ([facility]     Osteoporosis     Sarcoidosis     Type 2 diabetes mellitus with hyperglycemia, without long-term current use of insulin ([facility] 10/30/2019       Past Surgical History:        Procedure Laterality Date    CHOLECYSTECTOMY  1990s    CORONARY ANGIOPLASTY WITH STENT PLACEMENT  04/21/2020    Dr. Tashi Larkin   3.0 x 22mm Resolute MAAME to Prox LAD.   No LV    CYSTOSCOPY INSERTION / REMOVAL STENT / STONE Left 4/23/2020    CYSTOSCOPY LEFT RETROGRADE PYELOGRAM STENT INSERTION, STRATTON CATHETER performed by Saurav Avila DO at [address] [place] [address] Left 3/21/2019    LEFT FEMUR CEPHALLOMEDULLARY NAIL performed by Miki Villasenor MD at [facility] Bilateral     HYSTERECTOMY      KNEE SURGERY      plate in lt knee       Social History:    Social History     Tobacco Use    Smoking status: Never Smoker    Smokeless tobacco: Never Used   Substance Use Topics    Alcohol use: No     Alcohol/week: 0.0 standard drinks     Comment: Hany Galvan

## 2020-04-23 NOTE — PROGRESS NOTES
Physical Therapy    PT consult to evaluate/treat received and appreciated. Pt chart reviewed and evaluation attempted. Pt in OR at time of attempt. Will check back as able. Thank you.         Hayden Aragon, PT, DPT   [id number]

## 2020-04-23 NOTE — PROGRESS NOTES
Occupational Therapy    OT consult received to eval/treat and chart review complete. Patient in OR upon attempt. OT to re-attempt at a later time as able. Thank you.      Alta Najjar, OTR/L  [id number]

## 2020-04-23 NOTE — FLOWSHEET NOTE
Patient continues to reach for lines and tubes when able. Will continue restraint order at this time for patient safety.

## 2020-04-23 NOTE — TELEPHONE ENCOUNTER
Patient's  called in stating that [address] is in [address] [place] [address]. She had a heart attack and had a stent put in. He stated that she is in ICU. He just wanted to let Dr. Orville Steele know.

## 2020-04-23 NOTE — PROGRESS NOTES
Chief Complaint:  STEMI (ST elevation myocardial infarction) ([facility]     Subjective:    She remains intubated    Objective:    BP (!) 98/48   Pulse 104   Temp 99.7 °F (37.6 °C) (Bladder)   Resp 22   Wt 154 lb 9.6 oz (70.1 kg)   SpO2 97%   BMI 30.19 kg/m²     Current medications that patient is taking have been reviewed. General appearance: Intubated, nontoxic-appearing, drowsy but arousable to voice, makes eye contact  HEENT: AT/NC, MMM  Neck: FROM, supple  Lungs: Clear to auscultation anteriorly  CV: RRR, no MRGs  Abdomen: Soft, non-tender; no masses or HSM, +BS  Extremities: No peripheral edema or digital cyanosis  Skin: no rash, lesions or ulcers  Psych: Calm and cooperative  Neuro: RASS -1  TTE:     Severe anterior and septal hypo-akinesia, with apical dyskinesia.   Ejection fraction is visually estimated at 20-25%.   Stage I diastolic dysfunction.   Small apical LV thrombus   Mild to moderate mitral regurgitation is present.     Labs:  CBC:   Lab Results   Component Value Date    WBC 14.9 04/23/2020    RBC 3.66 04/23/2020    HGB 11.6 04/23/2020    HCT 35.0 04/23/2020    MCV 95.6 04/23/2020    MCH 31.7 04/23/2020    MCHC 33.1 04/23/2020    RDW 14.6 04/23/2020     04/23/2020    MPV 10.6 04/23/2020     BMP:    Lab Results   Component Value Date     04/23/2020    K 3.2 04/23/2020    K 4.0 03/27/2019     04/23/2020    CO2 19 04/23/2020    BUN 12 04/23/2020    LABALBU 3.5 04/21/2020    CREATININE 0.6 04/23/2020    CALCIUM 8.1 04/23/2020    GFRAA >60 04/23/2020    LABGLOM >60 04/23/2020    GLUCOSE 168 04/23/2020        Assessment/Plan:  Principal Problem:    STEMI (ST elevation myocardial infarction) ([facility]  Active Problems:    Essential hypertension    Hypothyroidism    MS (multiple sclerosis) ([facility]    Hyperlipidemia LDL goal <100    Neurogenic bladder    Type 2 diabetes mellitus with hyperglycemia, without long-term current use of insulin (HCC)    CAD (coronary artery disease) Postprocedural hypotension    Acute midline thoracic back pain    Shock (HCC)    Hydronephrosis    Chronic systolic (congestive) heart failure (HCC)    LV (left ventricular) mural thrombus  Resolved Problems:    * No resolved hospital problems. *     Continue aspirin, atorvastatin, Brilinta  Will need to be on anticoagulation for LV thrombus. Defer to cardiology team regarding selection of agent. Glucoses are well controlled    We will go for urologic stent due to hydronephrosis    Continue antibiotics for possible pneumonia    Ejection fraction is 20 to 25%. When dust settles, will start Entresto and beta-blocker.     Requires continued inpatient level of care   Crow Monroy    9:44 AM  4/23/2020  Cell: [phone]

## 2020-04-23 NOTE — PROGRESS NOTES
and PLan     Assessment:  1. STEMI s/p [facility], PCI with IRINA to LAD 4/21/2020  · Followed by cardiologist from [address] [place] [address], on aspirin, Brilinta, and heparin gtt. 2. Acute hypoxic respiratory failure 2/2 acute pulmonary edema from CHF after MI (COVID negative x2)  · Intubated and mechanically ventilated day #2, ABGs acceptable  3. Acute systolic heart failure with small apical LV thrombus  · 2D echo with EF 20 to 25%, anterior and septal hypokinesia with apical dyskinesia  · On ASA, statin, (ACE inhibitor and beta-blocker to be initiated as blood pressure tolerates)  4. Cardiogenic vs septic shock-resolving, now off pressors  5. Left hydronephrosis 2/2 2 left-sided stones   · S/p cystoscopy, retrograde pyelogram with left ureteral stenting 4/23/2020  6. Hypothyroidism  7. Hyperlipidemia  8. Hx of MS   9. Hx of sarcoidosis      Plan:  1. Discussed with cardiology, to resume dual antiplatelet therapy. 2. Also discussed with urology NP and okay to resume heparin GTT   3. Continue to monitor respiratory status, daily ABGs, monitor chest x-ray  4. Start ACE inhibitors and beta-blocker once blood pressure tolerates  5. Daily CBC, CMP  6. Continue all other meds    DVT/GI prophylaxis: Heparin/Protonix  Disposition: ONEAL Stauffer M.D. Internal Medicine Resident -PGY2    Attending Physician: Dr. Kenny Davis  Department of Internal Medicine  Division of Pulmonary, Critical Care & Sleep Medicine  ICU Attending    This patient has a high probability of sudden clinically significant deterioration, which requires the highest level of physician preparedness to intervene urgently. I managed/supervised life or organ supporting interventions that required frequent physician assessment. I devoted my full attention to the direct care of this patient for the period of time indicated below.  Time I spent with family of surrogate(s) is included only if the patient was incapable of providing the necessary information or participating in medical decision making. Time devoted to teaching and to any procedure. CCT 41 minutes    In addition Michaela Douglas was seen and examined. All imaging was independently reviewed. Patient discussed during comprehensive ICU rounds. Medications and testing was reviewed. Above findings corroborated, plan discussed and where needed changes made.   SHANNON

## 2020-04-23 NOTE — ANESTHESIA PRE PROCEDURE
OIL PEARLS) 300 MG CAPS Take 1 capsule by mouth nightly    Historical Provider, MD   magnesium (MAGNESIUM-OXIDE) 250 MG TABS tablet Take 250 mg by mouth every morning     Historical Provider, MD   Multiple Vitamins-Minerals (MULTIVITAMIN & MINERAL PO) Take 1 tablet by mouth every morning     Historical Provider, MD       Current medications:    No current facility-administered medications for this visit. No current outpatient medications on file.      Facility-Administered Medications Ordered in Other Visits   Medication Dose Route Frequency Provider Last Rate Last Dose    potassium chloride 40 mEq in 100 mL IVPB (Central Line)  40 mEq Intravenous Once [employer], DO   40 mEq at 04/23/20 0555    propofol injection  10 mcg/kg/min Intravenous Titrated Viktor A Austin, DO 6.1 mL/hr at 04/23/20 0549 15 mcg/kg/min at 04/23/20 0549    cefTRIAXone (ROCEPHIN) 1 g in sterile water 10 mL IV syringe  1 g Intravenous Q24H Viktor A Austin, DO   1 g at 04/23/20 0229    midazolam (VERSED) injection 4 mg  4 mg Intravenous Once Adelina Altamiranoe, DO        insulin lispro (HUMALOG) injection vial 0-12 Units  0-12 Units Subcutaneous Q4H Vikotr A Austin, DO   Stopped at 04/23/20 0819    sodium chloride flush 0.9 % injection 10 mL  10 mL Intravenous 2 times per day Viktor A Austin, DO   10 mL at 04/23/20 6115    sodium chloride flush 0.9 % injection 10 mL  10 mL Intravenous PRN Viktor A Austin, DO        pantoprazole (PROTONIX) injection 40 mg  40 mg Intravenous Daily Scott Jackson MD   40 mg at 04/23/20 0820    And    sodium chloride (PF) 0.9 % injection 10 mL  10 mL Intravenous Daily Scott Jackson MD   10 mL at 04/23/20 0820    chlorhexidine (PERIDEX) 0.12 % solution 15 mL  15 mL Mouth/Throat BID Scott Jackson MD   15 mL at 04/23/20 0819    baclofen (LIORESAL) tablet 20 mg  20 mg Oral 4x Daily Viktor A Austin, DO   Stopped at 04/23/20 6271    dantrolene (DANTRIUM) capsule 50 mg  50 mg Oral 5x Daily Viktor A Austin, DO   50 mg at 04/23/20 steatohepatitis):, liver disease:, renal disease: kidney stones,           Endo/Other:    (+) DiabetesType II DM, , hypothyroidism, blood dyscrasia ( on heparin gtt)::., .                  ROS comment: Osteoporosis   Abdominal:   (+) obese,         Vascular: negative vascular ROS. Anesthesia Plan      MAC     ASA 4 - emergent       Induction: intravenous. MIPS: Postoperative opioids intended and Prophylactic antiemetics administered. Plan/risks discussed with: pt intubated, no family present. Plan discussed with CRNA. ELIE Donovan - PAOLA   4/23/2020      Pt seen, examined, chart reviewed, plan discussed.   Renetta Link  4/23/2020  10:22 AM

## 2020-04-23 NOTE — FLOWSHEET NOTE
Patient continues to pull at lines and tubes when able. Will continue restraint order at this time for patient safety.

## 2020-04-24 ENCOUNTER — TELEPHONE (OUTPATIENT)
Dept: ADMINISTRATIVE | Age: 75
End: 2020-04-24

## 2020-04-24 ENCOUNTER — APPOINTMENT (OUTPATIENT)
Dept: GENERAL RADIOLOGY | Age: 75
DRG: 246 | End: 2020-04-24
Attending: INTERNAL MEDICINE
Payer: MEDICARE

## 2020-04-24 ENCOUNTER — TELEPHONE (OUTPATIENT)
Dept: PRIMARY CARE CLINIC | Age: 75
End: 2020-04-24

## 2020-04-24 LAB
AADO2: 136 MMHG
ALBUMIN SERPL-MCNC: 2.6 G/DL (ref 3.5–5.2)
ALP BLD-CCNC: 100 U/L (ref 35–104)
ALT SERPL-CCNC: 10 U/L (ref 0–32)
ANION GAP SERPL CALCULATED.3IONS-SCNC: 13 MMOL/L (ref 7–16)
APTT: 52.1 SEC (ref 24.5–35.1)
AST SERPL-CCNC: 21 U/L (ref 0–31)
B.E.: -3.2 MMOL/L (ref -3–3)
BILIRUB SERPL-MCNC: 0.2 MG/DL (ref 0–1.2)
BUN BLDV-MCNC: 9 MG/DL (ref 8–23)
CALCIUM SERPL-MCNC: 7.8 MG/DL (ref 8.6–10.2)
CHLORIDE BLD-SCNC: 104 MMOL/L (ref 98–107)
CO2: 18 MMOL/L (ref 22–29)
COHB: 0.3 % (ref 0–1.5)
CREAT SERPL-MCNC: 0.6 MG/DL (ref 0.5–1)
CRITICAL: ABNORMAL
CULTURE, RESPIRATORY: NORMAL
DATE ANALYZED: ABNORMAL
DATE OF COLLECTION: ABNORMAL
EKG ATRIAL RATE: 95 BPM
EKG P AXIS: 62 DEGREES
EKG P-R INTERVAL: 164 MS
EKG Q-T INTERVAL: 370 MS
EKG QRS DURATION: 92 MS
EKG QTC CALCULATION (BAZETT): 464 MS
EKG R AXIS: 32 DEGREES
EKG T AXIS: 106 DEGREES
EKG VENTRICULAR RATE: 95 BPM
FIO2: 40 %
GFR AFRICAN AMERICAN: >60
GFR NON-AFRICAN AMERICAN: >60 ML/MIN/1.73
GLUCOSE BLD-MCNC: 131 MG/DL (ref 74–99)
HCO3: 20 MMOL/L (ref 22–26)
HCT VFR BLD CALC: 31.3 % (ref 34–48)
HCT VFR BLD CALC: 31.9 % (ref 34–48)
HEMOGLOBIN: 10.1 G/DL (ref 11.5–15.5)
HEMOGLOBIN: 10.3 G/DL (ref 11.5–15.5)
HHB: 2.3 % (ref 0–5)
LAB: ABNORMAL
Lab: ABNORMAL
MAGNESIUM: 2 MG/DL (ref 1.6–2.6)
MCH RBC QN AUTO: 31.4 PG (ref 26–35)
MCHC RBC AUTO-ENTMCNC: 32.3 % (ref 32–34.5)
MCV RBC AUTO: 97.2 FL (ref 80–99.9)
METER GLUCOSE: 115 MG/DL (ref 74–99)
METER GLUCOSE: 117 MG/DL (ref 74–99)
METER GLUCOSE: 132 MG/DL (ref 74–99)
METER GLUCOSE: 157 MG/DL (ref 74–99)
METER GLUCOSE: 159 MG/DL (ref 74–99)
METER GLUCOSE: 171 MG/DL (ref 74–99)
METHB: 0.6 % (ref 0–1.5)
MODE: AC
O2 CONTENT: 15.7 ML/DL
O2 SATURATION: 97.7 % (ref 92–98.5)
O2HB: 96.8 % (ref 94–97)
OPERATOR ID: 2962
ORGANISM: ABNORMAL
PATIENT TEMP: 37 C
PCO2: 30 MMHG (ref 35–45)
PDW BLD-RTO: 14.8 FL (ref 11.5–15)
PEEP/CPAP: 8 CMH2O
PFO2: 2.62 MMHG/%
PH BLOOD GAS: 7.44 (ref 7.35–7.45)
PLATELET # BLD: 253 E9/L (ref 130–450)
PMV BLD AUTO: 11.3 FL (ref 7–12)
PO2: 104.7 MMHG (ref 75–100)
POTASSIUM SERPL-SCNC: 3.8 MMOL/L (ref 3.5–5)
RBC # BLD: 3.22 E12/L (ref 3.5–5.5)
RI(T): 1.3
RR MECHANICAL: 18 B/MIN
SMEAR, RESPIRATORY: NORMAL
SODIUM BLD-SCNC: 135 MMOL/L (ref 132–146)
SOURCE, BLOOD GAS: ABNORMAL
THB: 11.4 G/DL (ref 11.5–16.5)
TIME ANALYZED: 431
TOTAL PROTEIN: 5.5 G/DL (ref 6.4–8.3)
URINE CULTURE, ROUTINE: ABNORMAL
VT MECHANICAL: 400 ML
WBC # BLD: 12.1 E9/L (ref 4.5–11.5)

## 2020-04-24 PROCEDURE — 6360000002 HC RX W HCPCS: Performed by: UROLOGY

## 2020-04-24 PROCEDURE — 2580000003 HC RX 258: Performed by: INTERNAL MEDICINE

## 2020-04-24 PROCEDURE — 80053 COMPREHEN METABOLIC PANEL: CPT

## 2020-04-24 PROCEDURE — 93010 ELECTROCARDIOGRAM REPORT: CPT | Performed by: INTERNAL MEDICINE

## 2020-04-24 PROCEDURE — 36415 COLL VENOUS BLD VENIPUNCTURE: CPT

## 2020-04-24 PROCEDURE — 94640 AIRWAY INHALATION TREATMENT: CPT

## 2020-04-24 PROCEDURE — 2580000003 HC RX 258

## 2020-04-24 PROCEDURE — 6370000000 HC RX 637 (ALT 250 FOR IP): Performed by: UROLOGY

## 2020-04-24 PROCEDURE — 83735 ASSAY OF MAGNESIUM: CPT

## 2020-04-24 PROCEDURE — 2580000003 HC RX 258: Performed by: UROLOGY

## 2020-04-24 PROCEDURE — C9113 INJ PANTOPRAZOLE SODIUM, VIA: HCPCS | Performed by: UROLOGY

## 2020-04-24 PROCEDURE — 85027 COMPLETE CBC AUTOMATED: CPT

## 2020-04-24 PROCEDURE — 94003 VENT MGMT INPAT SUBQ DAY: CPT

## 2020-04-24 PROCEDURE — 2500000003 HC RX 250 WO HCPCS

## 2020-04-24 PROCEDURE — 37799 UNLISTED PX VASCULAR SURGERY: CPT

## 2020-04-24 PROCEDURE — 2000000000 HC ICU R&B

## 2020-04-24 PROCEDURE — 85014 HEMATOCRIT: CPT

## 2020-04-24 PROCEDURE — 93005 ELECTROCARDIOGRAM TRACING: CPT | Performed by: UROLOGY

## 2020-04-24 PROCEDURE — 71045 X-RAY EXAM CHEST 1 VIEW: CPT

## 2020-04-24 PROCEDURE — 99291 CRITICAL CARE FIRST HOUR: CPT | Performed by: INTERNAL MEDICINE

## 2020-04-24 PROCEDURE — 6360000002 HC RX W HCPCS

## 2020-04-24 PROCEDURE — 6360000002 HC RX W HCPCS: Performed by: STUDENT IN AN ORGANIZED HEALTH CARE EDUCATION/TRAINING PROGRAM

## 2020-04-24 PROCEDURE — 6370000000 HC RX 637 (ALT 250 FOR IP): Performed by: INTERNAL MEDICINE

## 2020-04-24 PROCEDURE — 82805 BLOOD GASES W/O2 SATURATION: CPT

## 2020-04-24 PROCEDURE — 82962 GLUCOSE BLOOD TEST: CPT

## 2020-04-24 PROCEDURE — 2500000003 HC RX 250 WO HCPCS: Performed by: INTERNAL MEDICINE

## 2020-04-24 PROCEDURE — 85018 HEMOGLOBIN: CPT

## 2020-04-24 PROCEDURE — 85730 THROMBOPLASTIN TIME PARTIAL: CPT

## 2020-04-24 RX ORDER — POTASSIUM CHLORIDE 29.8 MG/ML
20 INJECTION INTRAVENOUS ONCE
Status: COMPLETED | OUTPATIENT
Start: 2020-04-24 | End: 2020-04-24

## 2020-04-24 RX ORDER — METOPROLOL SUCCINATE 50 MG/1
25 TABLET, EXTENDED RELEASE ORAL DAILY
Status: DISCONTINUED | OUTPATIENT
Start: 2020-04-25 | End: 2020-04-24 | Stop reason: ALTCHOICE

## 2020-04-24 RX ORDER — CLOPIDOGREL BISULFATE 75 MG/1
75 TABLET ORAL DAILY
Status: COMPLETED | OUTPATIENT
Start: 2020-04-24 | End: 2020-04-29

## 2020-04-24 RX ORDER — 0.9 % SODIUM CHLORIDE 0.9 %
250 INTRAVENOUS SOLUTION INTRAVENOUS ONCE
Status: COMPLETED | OUTPATIENT
Start: 2020-04-24 | End: 2020-04-24

## 2020-04-24 RX ORDER — MIDODRINE HYDROCHLORIDE 5 MG/1
2.5 TABLET ORAL
Status: DISCONTINUED | OUTPATIENT
Start: 2020-04-24 | End: 2020-04-24 | Stop reason: ALTCHOICE

## 2020-04-24 RX ORDER — CARVEDILOL 3.12 MG/1
3.12 TABLET ORAL 2 TIMES DAILY
Status: DISCONTINUED | OUTPATIENT
Start: 2020-04-25 | End: 2020-04-25

## 2020-04-24 RX ADMIN — Medication 50 MCG/HR: at 17:27

## 2020-04-24 RX ADMIN — Medication 2 MCG/MIN: at 22:41

## 2020-04-24 RX ADMIN — DANTROLENE SODIUM 50 MG: 25 CAPSULE ORAL at 06:17

## 2020-04-24 RX ADMIN — INSULIN LISPRO 2 UNITS: 100 INJECTION, SOLUTION INTRAVENOUS; SUBCUTANEOUS at 00:23

## 2020-04-24 RX ADMIN — DANTROLENE SODIUM 50 MG: 25 CAPSULE ORAL at 00:25

## 2020-04-24 RX ADMIN — CEFTRIAXONE SODIUM 1 G: 1 INJECTION, POWDER, FOR SOLUTION INTRAMUSCULAR; INTRAVENOUS at 00:52

## 2020-04-24 RX ADMIN — ATORVASTATIN CALCIUM 40 MG: 40 TABLET, FILM COATED ORAL at 19:57

## 2020-04-24 RX ADMIN — APIXABAN 5 MG: 5 TABLET, FILM COATED ORAL at 19:57

## 2020-04-24 RX ADMIN — DANTROLENE SODIUM 50 MG: 25 CAPSULE ORAL at 18:02

## 2020-04-24 RX ADMIN — SODIUM CHLORIDE 250 ML: 9 INJECTION, SOLUTION INTRAVENOUS at 21:34

## 2020-04-24 RX ADMIN — Medication 10 ML: at 19:57

## 2020-04-24 RX ADMIN — LEVOTHYROXINE SODIUM 88 MCG: 0.09 TABLET ORAL at 08:06

## 2020-04-24 RX ADMIN — Medication 10 ML: at 08:06

## 2020-04-24 RX ADMIN — ASPIRIN 81 MG: 81 TABLET, COATED ORAL at 08:05

## 2020-04-24 RX ADMIN — PROPOFOL 10 MCG/KG/MIN: 10 INJECTION, EMULSION INTRAVENOUS at 16:07

## 2020-04-24 RX ADMIN — SODIUM CHLORIDE, PRESERVATIVE FREE 10 ML: 5 INJECTION INTRAVENOUS at 08:06

## 2020-04-24 RX ADMIN — DANTROLENE SODIUM 50 MG: 25 CAPSULE ORAL at 14:30

## 2020-04-24 RX ADMIN — INSULIN LISPRO 2 UNITS: 100 INJECTION, SOLUTION INTRAVENOUS; SUBCUTANEOUS at 20:45

## 2020-04-24 RX ADMIN — BACLOFEN 20 MG: 10 TABLET ORAL at 08:05

## 2020-04-24 RX ADMIN — LISINOPRIL 2.5 MG: 5 TABLET ORAL at 11:10

## 2020-04-24 RX ADMIN — TICAGRELOR 90 MG: 90 TABLET ORAL at 08:06

## 2020-04-24 RX ADMIN — DANTROLENE SODIUM 50 MG: 25 CAPSULE ORAL at 11:09

## 2020-04-24 RX ADMIN — BACLOFEN 20 MG: 10 TABLET ORAL at 11:09

## 2020-04-24 RX ADMIN — BACLOFEN 20 MG: 10 TABLET ORAL at 19:57

## 2020-04-24 RX ADMIN — BACLOFEN 20 MG: 10 TABLET ORAL at 16:06

## 2020-04-24 RX ADMIN — BUDESONIDE AND FORMOTEROL FUMARATE DIHYDRATE 2 PUFF: 80; 4.5 AEROSOL RESPIRATORY (INHALATION) at 21:27

## 2020-04-24 RX ADMIN — CHLORHEXIDINE GLUCONATE 0.12% ORAL RINSE 15 ML: 1.2 LIQUID ORAL at 19:56

## 2020-04-24 RX ADMIN — PANTOPRAZOLE SODIUM 40 MG: 40 INJECTION, POWDER, FOR SOLUTION INTRAVENOUS at 08:05

## 2020-04-24 RX ADMIN — POTASSIUM CHLORIDE 20 MEQ: 400 INJECTION, SOLUTION INTRAVENOUS at 06:15

## 2020-04-24 RX ADMIN — APIXABAN 5 MG: 5 TABLET, FILM COATED ORAL at 12:15

## 2020-04-24 RX ADMIN — INSULIN LISPRO 2 UNITS: 100 INJECTION, SOLUTION INTRAVENOUS; SUBCUTANEOUS at 08:12

## 2020-04-24 RX ADMIN — CLOPIDOGREL 75 MG: 75 TABLET, FILM COATED ORAL at 19:57

## 2020-04-24 RX ADMIN — BUDESONIDE AND FORMOTEROL FUMARATE DIHYDRATE 2 PUFF: 80; 4.5 AEROSOL RESPIRATORY (INHALATION) at 10:34

## 2020-04-24 RX ADMIN — CHLORHEXIDINE GLUCONATE 0.12% ORAL RINSE 15 ML: 1.2 LIQUID ORAL at 08:07

## 2020-04-24 ASSESSMENT — PULMONARY FUNCTION TESTS
PIF_VALUE: 26
PIF_VALUE: 17
PIF_VALUE: 26
PIF_VALUE: 24
PIF_VALUE: 18
PIF_VALUE: 22
PIF_VALUE: 17
PIF_VALUE: 21
PIF_VALUE: 21
PIF_VALUE: 17
PIF_VALUE: 17
PIF_VALUE: 22
PIF_VALUE: 17
PIF_VALUE: 21
PIF_VALUE: 26
PIF_VALUE: 30
PIF_VALUE: 24
PIF_VALUE: 22
PIF_VALUE: 23
PIF_VALUE: 26
PIF_VALUE: 22
PIF_VALUE: 37
PIF_VALUE: 17
PIF_VALUE: 26
PIF_VALUE: 23
PIF_VALUE: 22
PIF_VALUE: 22
PIF_VALUE: 23
PIF_VALUE: 17

## 2020-04-24 ASSESSMENT — PAIN SCALES - GENERAL
PAINLEVEL_OUTOF10: 0

## 2020-04-24 NOTE — TELEPHONE ENCOUNTER
Thank you for the update. Please let the  know that I was following hospital course. Was aware of the heart attack heart cardiac catheterization and subsequent complication with the respiratory status and being placed on a ventilator and moved to the ICU. Please have him keep us updated, please send our thoughts and her continued prayers that she has a good outcome and stays pain-free.   Thanks

## 2020-04-24 NOTE — PROGRESS NOTES
-- 97.2 °F (36.2 °C) -- 93 14 100 % -- --   04/23/20 1100 -- -- -- 93 18 100 % -- --         Intake/Output Summary (Last 24 hours) at 4/24/2020 1053  Last data filed at 4/24/2020 1000  Gross per 24 hour   Intake 833 ml   Output 1290 ml   Net -457 ml       Wt Readings from Last 3 Encounters:   04/24/20 151 lb (68.5 kg)   04/21/20 161 lb (73 kg)   11/15/19 161 lb (73 kg)       Telemetry: I personally reviewed and shows normal sinus rhythm heart rate in the 80s. Current meds: Scheduled Meds:   [Held by provider] midodrine  2.5 mg Oral TID WC    cefTRIAXone (ROCEPHIN) IV  1 g Intravenous Q24H    midazolam  4 mg Intravenous Once    insulin lispro  0-12 Units Subcutaneous Q4H    pantoprazole  40 mg Intravenous Daily    And    sodium chloride (PF)  10 mL Intravenous Daily    chlorhexidine  15 mL Mouth/Throat BID    baclofen  20 mg Oral 4x Daily    dantrolene  50 mg Oral 5x Daily    levothyroxine  88 mcg Oral Daily    [Held by provider] nortriptyline  25 mg Oral Nightly    sodium chloride flush  10 mL Intravenous 2 times per day    budesonide-formoterol  2 puff Inhalation BID    atorvastatin  40 mg Oral Nightly    aspirin  81 mg Oral Daily    ticagrelor  90 mg Oral BID    [Held by provider] lisinopril  2.5 mg Oral Daily    [Held by provider] metoprolol succinate  25 mg Oral Daily     Continuous Infusions:   heparin (porcine) 12 Units/kg/hr (04/24/20 0220)    propofol 10 mcg/kg/min (04/23/20 1650)    dextrose      norepinephrine 2 mcg/min (04/23/20 1050)     PRN Meds:.heparin (porcine), heparin (porcine), sodium chloride flush, acetaminophen **OR** acetaminophen, magnesium hydroxide, glucose, dextrose, glucagon (rDNA), dextrose, oxyCODONE-acetaminophen    Allergies: Patient has no known allergies.       Labs:   Recent Labs     04/22/20  0630 04/23/20  0358 04/24/20  0501   WBC 18.2* 14.9* 12.1*   HGB 13.2 11.6 10.1*   HCT 40.2 35.0 31.3*   MCV 97.1 95.6 97.2   * 310 253       Labs:   Recent Labs     04/21/20  2300 04/23/20  0358 04/23/20  1720 04/24/20  0400    135 137 135   K 5.0 3.2* 3.6 3.8    104 106 104   CO2 19* 19* 17* 18*   PHOS 4.9*  --   --   --    BUN 15 12 11 9   CREATININE 0.6 0.6 0.5 0.6       Labs:   Recent Labs     04/21/20  2300 04/22/20  0630 04/22/20  1130   CKTOTAL 260* 294* 204*   CKMB 27.7* 31.3* 18.6*   TROPONINI 0.83* 1.15* 1.17*       Labs: No results for input(s): BNP in the last 72 hours. Labs: No results for input(s): CHOL, HDL, TRIG in the last 72 hours. Invalid input(s): CHOLHDLR, LDLCALCU    Labs:   Recent Labs     04/21/20  1557 04/21/20  2300 04/23/20  1720 04/24/20  0400   PROT  --  6.4 5.3* 5.5*   INR 1.0  --   --   --        Review of systems: No reported significant weight gain or weight loss. no dysuria or frequency, no dizziness, falls or trauma, no change in bowel or bladder habits, no hematochezia, hemoptysis or hematuria. No fevers, chills, nausea or vomiting reported. No significant wheezing or sputum production. No headache or visual changes. The remainder of the 10 review of systems otherwise negative. Exam      General: Patient comfortable in no distress  on the ventilator currently. HEENT: Face symmetrical and no apparent cranial nerve deficit. Neck: No jugular venous distention, carotid bruit or thyromegaly. Lungs: Clear bilaterally without rales, wheezes or dullness. Cardiac: Regular rate and rhythm, no S3, S4, no rub or gallop. Abdomen: No rebound or guarding, no hepatosplenomegaly. Extremities: Without significant clubbing , cyanosis, or edema. Neuro:  No focal motor or sensory deficit apparent. Skin: No petechiae, no significant bruising. Assessment: See plan below        Plan: #1 late presentation AWMI. Significant LV systolic dysfunction by echocardiogram EF 25%. Continue supportive measures and wean ventilator as tolerated. Avoid volume overloading her.   If creatinine

## 2020-04-24 NOTE — PROGRESS NOTES
Chief Complaint:  STEMI (ST elevation myocardial infarction) ([facility]     Subjective:    She remains intubated. She is awake and interactive on the vent. She shakes her head no when I asked if she has any chest pain or abdominal pain. Objective:    BP (!) 106/46   Pulse 91   Temp 98.1 °F (36.7 °C)   Resp 21   Ht 5' (1.524 m)   Wt 151 lb (68.5 kg)   SpO2 100%   BMI 29.49 kg/m²     Current medications that patient is taking have been reviewed. General appearance: Intubated, awake, nontoxic and comfortable appearing  HEENT: AT/NC, MMM  Neck: FROM, supple  Lungs: Clear to auscultation anteriorly  CV: RRR, no MRGs  Abdomen: Soft, non-tender; no masses or HSM, +BS  Extremities: No peripheral edema or digital cyanosis  Skin: no rash, lesions or ulcers  Psych: Calm and cooperative  Neuro: RASS 0, communicative    TTE:     Severe anterior and septal hypo-akinesia, with apical dyskinesia.   Ejection fraction is visually estimated at 20-25%.   Stage I diastolic dysfunction.   Small apical LV thrombus   Mild to moderate mitral regurgitation is present.     Labs:  CBC:   Lab Results   Component Value Date    WBC 12.1 04/24/2020    RBC 3.22 04/24/2020    HGB 10.1 04/24/2020    HCT 31.3 04/24/2020    MCV 97.2 04/24/2020    MCH 31.4 04/24/2020    MCHC 32.3 04/24/2020    RDW 14.8 04/24/2020     04/24/2020    MPV 11.3 04/24/2020     BMP:    Lab Results   Component Value Date     04/24/2020    K 3.8 04/24/2020    K 4.0 03/27/2019     04/24/2020    CO2 18 04/24/2020    BUN 9 04/24/2020    LABALBU 2.6 04/24/2020    CREATININE 0.6 04/24/2020    CALCIUM 7.8 04/24/2020    GFRAA >60 04/24/2020    LABGLOM >60 04/24/2020    GLUCOSE 131 04/24/2020        Assessment/Plan:  Principal Problem:    STEMI (ST elevation myocardial infarction) ([facility] [address]  Active Problems:    Essential hypertension    Hypothyroidism    MS (multiple sclerosis) (HCC)    Hyperlipidemia LDL goal <100    Neurogenic bladder    Type 2 diabetes mellitus with hyperglycemia, without long-term current use of insulin (HCC)    CAD (coronary artery disease)    Postprocedural hypotension    Acute midline thoracic back pain    Cardiogenic shock (HCC)    Hydronephrosis    Chronic systolic (congestive) heart failure (HCC)    Left ventricular thrombus    Septic shock (HCC)    Ischemic cardiomyopathy  Resolved Problems:    * No resolved hospital problems. *     Continue aspirin, atorvastatin, Plavix for STEMI    Continue apixaban for LV thrombus    Glucoses are well controlled    Status post cystoscopy for hydronephrosis with multiple kidney stones. Continue ceftriaxone for UTI. Urine growing GBS. Ejection fraction is 20 to 25%. Cardiology started lisinopril and metoprolol. Would be candidate for Entresto down the road.     Requires continued inpatient level of care   Amanda Segovia    11:18 AM  4/24/2020  Cell: [phone]

## 2020-04-24 NOTE — CARE COORDINATION
Patient remains in MICU on vent with prop; current vent settings: PS, P5. Cystopanendoscopy with L stone manipulation performed 4/23. Covid (-) X 2. Discharge plan is home with [facility] vs [facility] at [facility] in [place] vs Jovanny Jeffries at [facility]. CM/SW will continue to follow for discharge planning.

## 2020-04-24 NOTE — TELEPHONE ENCOUNTER
Pt's  left voicemail message in the Pre Service Inbox 4/23/20; wanted Dr. Lolita Clay to know, patient had a heart attack, admitted to [facility], in ICU on a ventilator, not doing good.  Call him if you have any questions

## 2020-04-24 NOTE — PROGRESS NOTES
[facility]   Department of Internal Medicine   Internal Medicine Residency  MICU Progress Note    Patient:  Shira Shah 77 y.o. female   MRN: [id number]       Date of Service: 4/24/2020    Allergy: Patient has no known allergies. Subjective   Overnight events: Remains off pressors but mean arterial pressure remained between 64-65. As a result, neither ACE inhibitors nor ARBs were initiated. Had a bout of hematuria, repeat Hb down to 10.1 from 11.6. Patient seen and examined this morning. She is awake alert and oriented x3, she has no new complaints, remains chest pain-free. Vital signs are stable, labs are grossly unremarkable, ABG with pH 7.442, PCO2 30, PO2 104.7, HCO3 20,  on AC VC/400/18/8/40. CXR shows improving bilateral infiltrates. Objective     Vitals:    04/24/20 1000 04/24/20 1038 04/24/20 1100 04/24/20 1200   BP: (!) 106/46  (!) 106/47 (!) 116/52   Pulse: 89 91 90 89   Resp: 18 21 18 18   Temp: 98.4 °F (36.9 °C)  98.1 °F (36.7 °C) 98.4 °F (36.9 °C)   TempSrc: Bladder  Bladder Bladder   SpO2: 100% 100% 100% 100%   Weight:       Height:           Physical Exam:  I & O - 24hr:I/O this shift:  In: 190 [I.V.:10; NG/GT:180]  · Out: 710 [Urine:710]   Physical Exam  Constitutional:       General: She is not in acute distress. Appearance: She is well-developed. She is not diaphoretic. HENT:      Head: Normocephalic and atraumatic. Eyes:      General: No scleral icterus. Conjunctiva/sclera: Conjunctivae normal.      Pupils: Pupils are equal, round, and reactive to light. Cardiovascular:      Rate and Rhythm: Normal rate and regular rhythm. Pulses: Normal pulses. Heart sounds: Normal heart sounds, S1 normal and S2 normal. No murmur. Pulmonary:      Effort: Pulmonary effort is normal. No respiratory distress. Breath sounds: Normal breath sounds. No wheezing, rhonchi or rales.       Comments: Intubated and mechanically ventilated  Abdominal: General: Bowel sounds are normal. There is no distension. Palpations: Abdomen is soft. There is no mass. Tenderness: There is no abdominal tenderness. There is no guarding. Musculoskeletal:         General: No swelling or tenderness. Skin:     General: Skin is warm and dry. Coloration: Skin is not jaundiced or pale. Neurological:      Mental Status: She is alert. Cranial Nerves: No cranial nerve deficit. Motor: Weakness present.       Comments: Able to follow all commands  B/L LE extremity weakness (hx of MS)         Medications     Continuous Infusions:   propofol 5 mcg/kg/min (04/24/20 1113)    dextrose       Scheduled Meds:   [Held by provider] metoprolol succinate  25 mg Oral Daily    clopidogrel  75 mg Oral Daily    apixaban  5 mg Oral BID    cefTRIAXone (ROCEPHIN) IV  1 g Intravenous Q24H    midazolam  4 mg Intravenous Once    insulin lispro  0-12 Units Subcutaneous Q4H    pantoprazole  40 mg Intravenous Daily    And    sodium chloride (PF)  10 mL Intravenous Daily    chlorhexidine  15 mL Mouth/Throat BID    baclofen  20 mg Oral 4x Daily    dantrolene  50 mg Oral 5x Daily    levothyroxine  88 mcg Oral Daily    [Held by provider] nortriptyline  25 mg Oral Nightly    sodium chloride flush  10 mL Intravenous 2 times per day    budesonide-formoterol  2 puff Inhalation BID    atorvastatin  40 mg Oral Nightly    aspirin  81 mg Oral Daily    lisinopril  2.5 mg Oral Daily     PRN Meds: sodium chloride flush, acetaminophen **OR** acetaminophen, magnesium hydroxide, glucose, dextrose, glucagon (rDNA), dextrose, oxyCODONE-acetaminophen  Nutrition:       Labs and Imaging Studies     CBC:   Recent Labs     04/22/20  0630 04/23/20  0358 04/24/20  0501   WBC 18.2* 14.9* 12.1*   RBC 4.14 3.66 3.22*   HGB 13.2 11.6 10.1*   HCT 40.2 35.0 31.3*   MCV 97.1 95.6 97.2   MCH 31.9 31.7 31.4   MCHC 32.8 33.1 32.3   RDW 14.6 14.6 14.8   * 310 253   MPV 11.1 10.6 11.3       BMP: Recent Labs     04/21/20  2300 04/23/20  0358 04/23/20  1720 04/24/20  0400    135 137 135   K 5.0 3.2* 3.6 3.8    104 106 104   CO2 19* 19* 17* 18*   BUN 15 12 11 9   CREATININE 0.6 0.6 0.5 0.6   GLUCOSE 351* 168* 136* 131*   CALCIUM 8.8 8.1* 7.7* 7.8*   PROT 6.4  --  5.3* 5.5*   LABALBU 3.5  --  2.6* 2.6*   BILITOT 0.4  --  0.3 0.2   ALKPHOS 115*  --  95 100   AST 67*  --  26 21   ALT 18  --  12 10       LIVER PROFILE:   Recent Labs     04/21/20  2300 04/23/20  1720 04/24/20  0400   AST 67* 26 21   ALT 18 12 10   LIPASE 49  --   --    BILITOT 0.4 0.3 0.2   ALKPHOS 115* 95 100       PT/INR:   Recent Labs     04/21/20  1557   PROTIME 11.5   INR 1.0       APTT:   Recent Labs     04/22/20 2007 04/23/20  0210 04/24/20  0149   APTT 58.8* 55.3* 52.1*       Fasting Lipid Panel:    Lab Results   Component Value Date    CHOL 201 03/12/2019    TRIG 282 03/12/2019    HDL 34 10/30/2019       Cardiac Enzymes:    Lab Results   Component Value Date    CKTOTAL 204 (H) 04/22/2020    CKTOTAL 294 (H) 04/22/2020    CKTOTAL 260 (H) 04/21/2020    CKMB 18.6 (H) 04/22/2020    CKMB 31.3 (H) 04/22/2020    CKMB 27.7 (H) 04/21/2020    TROPONINI 1.17 (H) 04/22/2020    TROPONINI 1.15 (H) 04/22/2020    TROPONINI 0.83 (H) 04/21/2020       Notable Cultures:      Blood cultures   Blood Culture, Routine   Date Value Ref Range Status   04/22/2020 24 Hours- no growth  Preliminary     Respiratory cultures No results found for: RESPCULTURE No results found for: LABGRAM  Urine   Urine Culture, Routine   Date Value Ref Range Status   04/23/2020   Preliminary    Growth present, evaluating for:  Gram positive organism       Legionella No results found for: LABLEGI  C Diff PCR No results found for: CDIFPCR  Wound culture/abscess: No results for input(s): WNDABS in the last 72 hours. Tip culture:No results for input(s): CXCATHTIP in the last 72 hours.      Antibiotic  Days  Day started   Rocephin  3  4/22                           Oxygen:

## 2020-04-24 NOTE — PROGRESS NOTES
Nutrition Assessment    Type and Reason for Visit: Initial, Consult    Nutrition Recommendations: Start TF   Noted consult for cardiac-friendly formula w/ decreased volume  Recommend 1.5 Calorie w/ Fiber @ 35 ml/hr x 23 hr (hold 30 minutes before/after synthroid) + 1 protein modular daily to provide 805 ml tv, 1208 kcal, 51 gm pro, 612 ml free water (1312 kcal, 77 gm pro w/ daily pro mod)  Will provide 1365 total kcal w/ current propofol rate of 2.0 ml/hr    Nutrition Assessment: Pt nutritionally at risk w/ inability for PO intake 2/2 intubation s/p STEMI. Noted h/o MS, sarcoidosis. Will provide updated TF recs and monitor    Malnutrition Assessment:  · Malnutrition Status: Insufficient data  · Findings of the 6 clinical characteristics of malnutrition (Minimum of 2 out of 6 clinical characteristics is required to make the diagnosis of moderate or severe Protein Calorie Malnutrition based on AND/ASPEN Guidelines):  1. Energy Intake-Less than or equal to 50% of estimated energy requirement, (x3 days since adm)    2. Weight Loss-Unable to assess    3. Fat Loss-Unable to assess    4. Muscle Loss-Unable to assess    5. Fluid Accumulation-No significant fluid accumulation    6.   Strength-Not measured    Nutrition Risk Level: High    Nutrition Needs:  · Estimated Daily Total Kcal: (PS3B Tmax 37.6, MV 6.99, RMR 1339)  · Estimated Daily Protein (g): 70-80(1.5-1.8 gm/kg IBW)  · Estimated Daily Fluid (ml/day): per critical care management    Nutrition Diagnosis:   · Problem: Inadequate oral intake  · Etiology: related to Impaired respiratory function-inability to consume food     Signs and symptoms:  as evidenced by NPO status due to medical condition, Intubation, Nutrition support - EN    Objective Information:  · Nutrition-Focused Physical Findings: sedated on vent, MAP 79 off pressor, abd distention, hypoactive BS, +102 edema, +I/Os, OGT clamped pending EN initiation  · Wound Type: None  · Current

## 2020-04-25 ENCOUNTER — APPOINTMENT (OUTPATIENT)
Dept: GENERAL RADIOLOGY | Age: 75
DRG: 246 | End: 2020-04-25
Attending: INTERNAL MEDICINE
Payer: MEDICARE

## 2020-04-25 PROBLEM — I25.5 ISCHEMIC CARDIOMYOPATHY: Status: RESOLVED | Noted: 2020-04-23 | Resolved: 2020-04-25

## 2020-04-25 PROBLEM — Z20.822 SUSPECTED COVID-19 VIRUS INFECTION: Status: RESOLVED | Noted: 2020-04-21 | Resolved: 2020-04-25

## 2020-04-25 PROBLEM — J96.01 ACUTE RESPIRATORY FAILURE WITH HYPOXIA (HCC): Status: ACTIVE | Noted: 2020-04-25

## 2020-04-25 LAB
AADO2: 139.3 MMHG
AADO2: 150.6 MMHG
ALBUMIN SERPL-MCNC: 2.6 G/DL (ref 3.5–5.2)
ALP BLD-CCNC: 108 U/L (ref 35–104)
ALT SERPL-CCNC: 8 U/L (ref 0–32)
ANION GAP SERPL CALCULATED.3IONS-SCNC: 11 MMOL/L (ref 7–16)
AST SERPL-CCNC: 14 U/L (ref 0–31)
B.E.: -1 MMOL/L (ref -3–3)
B.E.: -4.3 MMOL/L (ref -3–3)
BASOPHILS ABSOLUTE: 0.04 E9/L (ref 0–0.2)
BASOPHILS RELATIVE PERCENT: 0.3 % (ref 0–2)
BILIRUB SERPL-MCNC: 0.2 MG/DL (ref 0–1.2)
BUN BLDV-MCNC: 8 MG/DL (ref 8–23)
CALCIUM SERPL-MCNC: 8.2 MG/DL (ref 8.6–10.2)
CHLORIDE BLD-SCNC: 107 MMOL/L (ref 98–107)
CO2: 19 MMOL/L (ref 22–29)
COHB: 0.2 % (ref 0–1.5)
COHB: 0.5 % (ref 0–1.5)
CREAT SERPL-MCNC: 0.5 MG/DL (ref 0.5–1)
CRITICAL: ABNORMAL
CRITICAL: NORMAL
DATE ANALYZED: ABNORMAL
DATE ANALYZED: NORMAL
DATE OF COLLECTION: ABNORMAL
DATE OF COLLECTION: NORMAL
EKG ATRIAL RATE: 99 BPM
EKG P AXIS: 69 DEGREES
EKG P-R INTERVAL: 200 MS
EKG Q-T INTERVAL: 338 MS
EKG QRS DURATION: 96 MS
EKG QTC CALCULATION (BAZETT): 433 MS
EKG R AXIS: 31 DEGREES
EKG T AXIS: 117 DEGREES
EKG VENTRICULAR RATE: 99 BPM
EOSINOPHILS ABSOLUTE: 0.2 E9/L (ref 0.05–0.5)
EOSINOPHILS RELATIVE PERCENT: 1.6 % (ref 0–6)
FIO2: 40 %
FIO2: 40 %
GFR AFRICAN AMERICAN: >60
GFR NON-AFRICAN AMERICAN: >60 ML/MIN/1.73
GLUCOSE BLD-MCNC: 195 MG/DL (ref 74–99)
HCO3: 19.6 MMOL/L (ref 22–26)
HCO3: 23.2 MMOL/L (ref 22–26)
HCT VFR BLD CALC: 30.2 % (ref 34–48)
HCT VFR BLD CALC: 31.6 % (ref 34–48)
HEMOGLOBIN: 10.1 G/DL (ref 11.5–15.5)
HEMOGLOBIN: 9.8 G/DL (ref 11.5–15.5)
HHB: 2.4 % (ref 0–5)
HHB: 3.5 % (ref 0–5)
IMMATURE GRANULOCYTES #: 0.05 E9/L
IMMATURE GRANULOCYTES %: 0.4 % (ref 0–5)
LAB: ABNORMAL
LAB: NORMAL
LYMPHOCYTES ABSOLUTE: 0.94 E9/L (ref 1.5–4)
LYMPHOCYTES RELATIVE PERCENT: 7.7 % (ref 20–42)
Lab: ABNORMAL
Lab: NORMAL
MCH RBC QN AUTO: 31.5 PG (ref 26–35)
MCHC RBC AUTO-ENTMCNC: 32 % (ref 32–34.5)
MCV RBC AUTO: 98.4 FL (ref 80–99.9)
METER GLUCOSE: 107 MG/DL (ref 74–99)
METER GLUCOSE: 148 MG/DL (ref 74–99)
METER GLUCOSE: 167 MG/DL (ref 74–99)
METER GLUCOSE: 192 MG/DL (ref 74–99)
METER GLUCOSE: 201 MG/DL (ref 74–99)
METHB: 0.4 % (ref 0–1.5)
METHB: 0.6 % (ref 0–1.5)
MODE: AC
MODE: NORMAL
MONOCYTES ABSOLUTE: 0.75 E9/L (ref 0.1–0.95)
MONOCYTES RELATIVE PERCENT: 6.1 % (ref 2–12)
NEUTROPHILS ABSOLUTE: 10.22 E9/L (ref 1.8–7.3)
NEUTROPHILS RELATIVE PERCENT: 83.9 % (ref 43–80)
O2 CONTENT: 15.9 ML/DL
O2 CONTENT: 16.3 ML/DL
O2 SATURATION: 96.5 % (ref 92–98.5)
O2 SATURATION: 97.6 % (ref 92–98.5)
O2HB: 95.9 % (ref 94–97)
O2HB: 96.5 % (ref 94–97)
OPERATOR ID: 1632
OPERATOR ID: 925
PATIENT TEMP: 37 C
PATIENT TEMP: 37 C
PCO2: 32.6 MMHG (ref 35–45)
PCO2: 37 MMHG (ref 35–45)
PDW BLD-RTO: 15.1 FL (ref 11.5–15)
PEEP/CPAP: 5 CMH2O
PEEP/CPAP: 5 CMH2O
PFO2: 2.05 MMHG/%
PFO2: 2.46 MMHG/%
PH BLOOD GAS: 7.4 (ref 7.35–7.45)
PH BLOOD GAS: 7.42 (ref 7.35–7.45)
PLATELET # BLD: 279 E9/L (ref 130–450)
PMV BLD AUTO: 11.1 FL (ref 7–12)
PO2: 82.1 MMHG (ref 75–100)
PO2: 98.4 MMHG (ref 75–100)
POTASSIUM SERPL-SCNC: 3.3 MMOL/L (ref 3.5–5)
PS: 5 CMH20
RBC # BLD: 3.21 E12/L (ref 3.5–5.5)
RI(T): 1.42
RI(T): 1.83
RR MECHANICAL: 18 B/MIN
SODIUM BLD-SCNC: 137 MMOL/L (ref 132–146)
SOURCE, BLOOD GAS: ABNORMAL
SOURCE, BLOOD GAS: NORMAL
THB: 11.7 G/DL (ref 11.5–16.5)
THB: 11.9 G/DL (ref 11.5–16.5)
TIME ANALYZED: 1738
TIME ANALYZED: 443
TOTAL PROTEIN: 5.5 G/DL (ref 6.4–8.3)
VT MECHANICAL: 400 ML
WBC # BLD: 12.5 E9/L (ref 4.5–11.5)

## 2020-04-25 PROCEDURE — 6360000002 HC RX W HCPCS

## 2020-04-25 PROCEDURE — 93010 ELECTROCARDIOGRAM REPORT: CPT | Performed by: INTERNAL MEDICINE

## 2020-04-25 PROCEDURE — 71045 X-RAY EXAM CHEST 1 VIEW: CPT

## 2020-04-25 PROCEDURE — 6370000000 HC RX 637 (ALT 250 FOR IP): Performed by: INTERNAL MEDICINE

## 2020-04-25 PROCEDURE — 6370000000 HC RX 637 (ALT 250 FOR IP): Performed by: UROLOGY

## 2020-04-25 PROCEDURE — 2580000003 HC RX 258: Performed by: UROLOGY

## 2020-04-25 PROCEDURE — 82805 BLOOD GASES W/O2 SATURATION: CPT

## 2020-04-25 PROCEDURE — 36415 COLL VENOUS BLD VENIPUNCTURE: CPT

## 2020-04-25 PROCEDURE — 6360000002 HC RX W HCPCS: Performed by: INTERNAL MEDICINE

## 2020-04-25 PROCEDURE — 36592 COLLECT BLOOD FROM PICC: CPT

## 2020-04-25 PROCEDURE — 94640 AIRWAY INHALATION TREATMENT: CPT

## 2020-04-25 PROCEDURE — 85014 HEMATOCRIT: CPT

## 2020-04-25 PROCEDURE — 2580000003 HC RX 258: Performed by: INTERNAL MEDICINE

## 2020-04-25 PROCEDURE — 85025 COMPLETE CBC W/AUTO DIFF WBC: CPT

## 2020-04-25 PROCEDURE — 36591 DRAW BLOOD OFF VENOUS DEVICE: CPT

## 2020-04-25 PROCEDURE — C9113 INJ PANTOPRAZOLE SODIUM, VIA: HCPCS | Performed by: UROLOGY

## 2020-04-25 PROCEDURE — 94003 VENT MGMT INPAT SUBQ DAY: CPT

## 2020-04-25 PROCEDURE — 85018 HEMOGLOBIN: CPT

## 2020-04-25 PROCEDURE — 6360000002 HC RX W HCPCS: Performed by: UROLOGY

## 2020-04-25 PROCEDURE — 82962 GLUCOSE BLOOD TEST: CPT

## 2020-04-25 PROCEDURE — 80053 COMPREHEN METABOLIC PANEL: CPT

## 2020-04-25 PROCEDURE — 99291 CRITICAL CARE FIRST HOUR: CPT | Performed by: INTERNAL MEDICINE

## 2020-04-25 PROCEDURE — 2000000000 HC ICU R&B

## 2020-04-25 PROCEDURE — 93005 ELECTROCARDIOGRAM TRACING: CPT | Performed by: UROLOGY

## 2020-04-25 RX ORDER — FUROSEMIDE 10 MG/ML
INJECTION INTRAMUSCULAR; INTRAVENOUS
Status: COMPLETED
Start: 2020-04-25 | End: 2020-04-25

## 2020-04-25 RX ORDER — POTASSIUM CHLORIDE 29.8 MG/ML
40 INJECTION INTRAVENOUS ONCE
Status: COMPLETED | OUTPATIENT
Start: 2020-04-25 | End: 2020-04-25

## 2020-04-25 RX ORDER — FUROSEMIDE 10 MG/ML
40 INJECTION INTRAMUSCULAR; INTRAVENOUS ONCE
Status: COMPLETED | OUTPATIENT
Start: 2020-04-25 | End: 2020-04-25

## 2020-04-25 RX ORDER — CARVEDILOL 3.12 MG/1
3.12 TABLET ORAL 2 TIMES DAILY
Status: DISCONTINUED | OUTPATIENT
Start: 2020-04-25 | End: 2020-04-30

## 2020-04-25 RX ORDER — 0.9 % SODIUM CHLORIDE 0.9 %
250 INTRAVENOUS SOLUTION INTRAVENOUS ONCE
Status: COMPLETED | OUTPATIENT
Start: 2020-04-25 | End: 2020-04-26

## 2020-04-25 RX ADMIN — CHLORHEXIDINE GLUCONATE 0.12% ORAL RINSE 15 ML: 1.2 LIQUID ORAL at 11:24

## 2020-04-25 RX ADMIN — LEVOTHYROXINE SODIUM 88 MCG: 0.09 TABLET ORAL at 09:06

## 2020-04-25 RX ADMIN — PANTOPRAZOLE SODIUM 40 MG: 40 INJECTION, POWDER, FOR SOLUTION INTRAVENOUS at 09:07

## 2020-04-25 RX ADMIN — DANTROLENE SODIUM 50 MG: 25 CAPSULE ORAL at 09:07

## 2020-04-25 RX ADMIN — CHLORHEXIDINE GLUCONATE 0.12% ORAL RINSE 15 ML: 1.2 LIQUID ORAL at 21:48

## 2020-04-25 RX ADMIN — BACLOFEN 20 MG: 10 TABLET ORAL at 11:28

## 2020-04-25 RX ADMIN — INSULIN LISPRO 2 UNITS: 100 INJECTION, SOLUTION INTRAVENOUS; SUBCUTANEOUS at 00:25

## 2020-04-25 RX ADMIN — INSULIN LISPRO 2 UNITS: 100 INJECTION, SOLUTION INTRAVENOUS; SUBCUTANEOUS at 16:53

## 2020-04-25 RX ADMIN — APIXABAN 5 MG: 5 TABLET, FILM COATED ORAL at 11:25

## 2020-04-25 RX ADMIN — CARVEDILOL 3.12 MG: 3.12 TABLET, FILM COATED ORAL at 16:51

## 2020-04-25 RX ADMIN — BACLOFEN 20 MG: 10 TABLET ORAL at 16:51

## 2020-04-25 RX ADMIN — Medication 10 ML: at 11:47

## 2020-04-25 RX ADMIN — CLOPIDOGREL 75 MG: 75 TABLET, FILM COATED ORAL at 09:06

## 2020-04-25 RX ADMIN — DANTROLENE SODIUM 50 MG: 25 CAPSULE ORAL at 06:36

## 2020-04-25 RX ADMIN — APIXABAN 5 MG: 5 TABLET, FILM COATED ORAL at 21:47

## 2020-04-25 RX ADMIN — Medication 10 ML: at 21:48

## 2020-04-25 RX ADMIN — BACLOFEN 20 MG: 10 TABLET ORAL at 21:47

## 2020-04-25 RX ADMIN — DANTROLENE SODIUM 50 MG: 25 CAPSULE ORAL at 16:52

## 2020-04-25 RX ADMIN — BUDESONIDE AND FORMOTEROL FUMARATE DIHYDRATE 2 PUFF: 80; 4.5 AEROSOL RESPIRATORY (INHALATION) at 22:31

## 2020-04-25 RX ADMIN — INSULIN LISPRO 2 UNITS: 100 INJECTION, SOLUTION INTRAVENOUS; SUBCUTANEOUS at 05:43

## 2020-04-25 RX ADMIN — CEFTRIAXONE SODIUM 1 G: 1 INJECTION, POWDER, FOR SOLUTION INTRAMUSCULAR; INTRAVENOUS at 01:50

## 2020-04-25 RX ADMIN — POTASSIUM CHLORIDE 40 MEQ: 29.8 INJECTION, SOLUTION INTRAVENOUS at 16:59

## 2020-04-25 RX ADMIN — SODIUM CHLORIDE 250 ML: 9 INJECTION, SOLUTION INTRAVENOUS at 22:00

## 2020-04-25 RX ADMIN — POTASSIUM CHLORIDE 40 MEQ: 29.8 INJECTION, SOLUTION INTRAVENOUS at 06:41

## 2020-04-25 RX ADMIN — ASPIRIN 81 MG: 81 TABLET, COATED ORAL at 09:07

## 2020-04-25 RX ADMIN — BUDESONIDE AND FORMOTEROL FUMARATE DIHYDRATE 2 PUFF: 80; 4.5 AEROSOL RESPIRATORY (INHALATION) at 08:56

## 2020-04-25 RX ADMIN — BACLOFEN 20 MG: 10 TABLET ORAL at 09:06

## 2020-04-25 RX ADMIN — DANTROLENE SODIUM 50 MG: 25 CAPSULE ORAL at 00:04

## 2020-04-25 RX ADMIN — ATORVASTATIN CALCIUM 40 MG: 40 TABLET, FILM COATED ORAL at 21:48

## 2020-04-25 RX ADMIN — INSULIN LISPRO 4 UNITS: 100 INJECTION, SOLUTION INTRAVENOUS; SUBCUTANEOUS at 11:33

## 2020-04-25 RX ADMIN — FUROSEMIDE 40 MG: 10 INJECTION, SOLUTION INTRAMUSCULAR; INTRAVENOUS at 12:33

## 2020-04-25 RX ADMIN — FUROSEMIDE 40 MG: 10 INJECTION INTRAMUSCULAR; INTRAVENOUS at 12:33

## 2020-04-25 RX ADMIN — DANTROLENE SODIUM 50 MG: 25 CAPSULE ORAL at 21:47

## 2020-04-25 ASSESSMENT — PULMONARY FUNCTION TESTS
PIF_VALUE: 14
PIF_VALUE: 16
PIF_VALUE: 24
PIF_VALUE: 16
PIF_VALUE: 33
PIF_VALUE: 51
PIF_VALUE: 16
PIF_VALUE: 34
PIF_VALUE: 16
PIF_VALUE: 32
PIF_VALUE: 23
PIF_VALUE: 26
PIF_VALUE: 39
PIF_VALUE: 16
PIF_VALUE: 27
PIF_VALUE: 16
PIF_VALUE: 30
PIF_VALUE: 26
PIF_VALUE: 26
PIF_VALUE: 30
PIF_VALUE: 35
PIF_VALUE: 15
PIF_VALUE: 28
PIF_VALUE: 16
PIF_VALUE: 37
PIF_VALUE: 39
PIF_VALUE: 28
PIF_VALUE: 15
PIF_VALUE: 11
PIF_VALUE: 16
PIF_VALUE: 24
PIF_VALUE: 26
PIF_VALUE: 21
PIF_VALUE: 11
PIF_VALUE: 16
PIF_VALUE: 25
PIF_VALUE: 15
PIF_VALUE: 16
PIF_VALUE: 35
PIF_VALUE: 29
PIF_VALUE: 15
PIF_VALUE: 16
PIF_VALUE: 15

## 2020-04-25 ASSESSMENT — PAIN SCALES - GENERAL
PAINLEVEL_OUTOF10: 0

## 2020-04-25 NOTE — PROGRESS NOTES
Oral 5x Daily    levothyroxine  88 mcg Oral Daily    [Held by provider] nortriptyline  25 mg Oral Nightly    sodium chloride flush  10 mL Intravenous 2 times per day    budesonide-formoterol  2 puff Inhalation BID    atorvastatin  40 mg Oral Nightly    aspirin  81 mg Oral Daily    [Held by provider] lisinopril  2.5 mg Oral Daily     PRN Meds: sodium chloride flush, acetaminophen **OR** acetaminophen, magnesium hydroxide, glucose, dextrose, glucagon (rDNA), dextrose, oxyCODONE-acetaminophen  Nutrition:       Labs and Imaging Studies     CBC:   Recent Labs     04/23/20  0358 04/24/20  0501 04/24/20  1420 04/25/20  0154 04/25/20  0420   WBC 14.9* 12.1*  --   --  12.5*   RBC 3.66 3.22*  --   --  3.21*   HGB 11.6 10.1* 10.3* 9.8* 10.1*   HCT 35.0 31.3* 31.9* 30.2* 31.6*   MCV 95.6 97.2  --   --  98.4   MCH 31.7 31.4  --   --  31.5   MCHC 33.1 32.3  --   --  32.0   RDW 14.6 14.8  --   --  15.1*    253  --   --  279   MPV 10.6 11.3  --   --  11.1       BMP:    Recent Labs     04/23/20  1720 04/24/20  0400 04/25/20  0420    135 137   K 3.6 3.8 3.3*    104 107   CO2 17* 18* 19*   BUN 11 9 8   CREATININE 0.5 0.6 0.5   GLUCOSE 136* 131* 195*   CALCIUM 7.7* 7.8* 8.2*   PROT 5.3* 5.5* 5.5*   LABALBU 2.6* 2.6* 2.6*   BILITOT 0.3 0.2 0.2   ALKPHOS 95 100 108*   AST 26 21 14   ALT 12 10 8       LIVER PROFILE:   Recent Labs     04/23/20  1720 04/24/20  0400 04/25/20  0420   AST 26 21 14   ALT 12 10 8   BILITOT 0.3 0.2 0.2   ALKPHOS 95 100 108*       PT/INR:   No results for input(s): PROTIME, INR in the last 72 hours.     APTT:   Recent Labs     04/22/20 2007 04/23/20  0210 04/24/20  0149   APTT 58.8* 55.3* 52.1*       Fasting Lipid Panel:    Lab Results   Component Value Date    CHOL 201 03/12/2019    TRIG 282 03/12/2019    HDL 34 10/30/2019       Cardiac Enzymes:    Lab Results   Component Value Date    CKTOTAL 204 (H) 04/22/2020    CKTOTAL 294 (H) 04/22/2020    CKTOTAL 260 (H) 04/21/2020    CKMB 18.6 (H)

## 2020-04-25 NOTE — PROGRESS NOTES
[facility] [address]    Name: Mika Argueta    Age: 77 y.o. PCP: Heaven Elias MD    Date of Admission: 4/21/2020  7:46 PM    Date of Service: 4/25/2020    Chief Complaint: Follow-up for CAD/late MI  Subjective: Seeing patient with somewhat late presentation anterior wall myocardial infarction and underwent heart catheterization and received stent to an occluded proximal LAD. Known history of sarcoid.     Currently sedated with propofol on the ventilator. Appears to be comfortable.     Echocardiogram this admission shows LVEF 25%, mild to moderate mitral valve regurgitation and small apical LV clot.     Thurs underwent a left ureteral stent by urology. Interim History:  No new overnight cardiac complaints. Currently with no complaints of CP, SOB, palpitations, dizziness, or lightheadedness. Still on vent, CXR looking a bit worse on right. On low dose of norepi- BB/ACE held for bp    Telemetry personally reviewed and showed sinus rhythm      Review of Systems:   Cardiac: As per HPI  General: No fever, chills  Pulmonary: No cough, wheeze, or shortness of breath  GI: No nausea, vomiting,or abdominal pain  Neuro: No headache or confusion    Problem List:  Principal Problem:    STEMI (ST elevation myocardial infarction) ([facility] [address]  Active Problems:    Essential hypertension    Hypothyroidism    MS (multiple sclerosis) (HCC)    Hyperlipidemia LDL goal <100    Neurogenic bladder    Type 2 diabetes mellitus with hyperglycemia, without long-term current use of insulin (HCC)    CAD (coronary artery disease)    Postprocedural hypotension    Acute midline thoracic back pain    Cardiogenic shock (HCC)    Hydronephrosis    Chronic systolic (congestive) heart failure (HCC)    Left ventricular thrombus    Septic shock (HCC)    Ischemic cardiomyopathy  Resolved Problems:    * No resolved hospital problems.  *      Past Medical History:  Past Medical History:   Diagnosis Date    CAD (coronary artery disease) 4/21/2020    Hepatitis     High cholesterol     Hypertension     Hypothyroidism     MI (myocardial infarction) ([facility] 04/21/2020    MS (multiple sclerosis) ([facility]     Osteoporosis     Sarcoidosis     Type 2 diabetes mellitus with hyperglycemia, without long-term current use of insulin ([facility] 10/30/2019       Allergies:  No Known Allergies    Current Medications:  Current Facility-Administered Medications   Medication Dose Route Frequency Provider Last Rate Last Dose    potassium chloride 40 mEq in 100 mL IVPB (Central Line)  40 mEq Intravenous Once Ruma Kelly MD   40 mEq at 04/25/20 0641    clopidogrel (PLAVIX) tablet 75 mg  75 mg Oral Daily Bill Taylor MD   75 mg at 04/25/20 1881    apixaban (ELIQUIS) tablet 5 mg  5 mg Oral BID Bill Taylor MD   5 mg at 04/24/20 1957    fentaNYL 5 mcg/ml in 0.9%  ml infusion  12.5 mcg/hr Intravenous Continuous Toni Klein MD 3 mL/hr at 04/24/20 2242 15 mcg/hr at 04/24/20 2242    [Held by provider] carvedilol (COREG) tablet 3.125 mg  3.125 mg Oral BID Chantelle Mckenzie MD        norepinephrine (LEVOPHED) 16 mg in dextrose 5% 250 mL infusion  2 mcg/min Intravenous Continuous Ruma Kelly MD 2.8 mL/hr at 04/25/20 0600 3 mcg/min at 04/25/20 0600    cefTRIAXone (ROCEPHIN) 1 g in sterile water 10 mL IV syringe  1 g Intravenous Q24H Quynh Avila MD   1 g at 04/25/20 0150    midazolam (VERSED) injection 4 mg  4 mg Intravenous Once Buddy Laureano MD        insulin lispro (HUMALOG) injection vial 0-12 Units  0-12 Units Subcutaneous Q4H Buddy Laureano MD   2 Units at 04/25/20 0543    pantoprazole (PROTONIX) injection 40 mg  40 mg Intravenous Daily Quynh Avila MD   40 mg at 04/25/20 0763    And    sodium chloride (PF) 0.9 % injection 10 mL  10 mL Intravenous Daily Quynh Avila MD   10 mL at 04/24/20 0806    chlorhexidine (PERIDEX) 0.12 % solution 15 mL  15 mL Mouth/Throat BID Buddy Laureano MD   15 mL at the   marlen. 2. New interstitial pulmonary edema. XR ABDOMEN FOR NG/OG/NE TUBE PLACEMENT   Final Result      Satisfactory placement of nasogastric tube. CTA CHEST W CONTRAST   Final Result      1. No evidence of thoracic aortic dissection or aneurysm. 2. Interstitial pulmonary edema. CTA ABDOMEN PELVIS W CONTRAST   Final Result      1. No evidence of abdominal aortic aneurysm or dissection. 2. Contrast material is present within the renal collecting systems on   unenhanced CT limiting this examination for assessment of renal   calculus, however, there is left hydronephrosis and two calculi   located within proximal left ureter. Short-term follow-up unenhanced   CT of abdomen and pelvis recommended for further evaluation. 3. Large amount of stool seen throughout colon. XR CHEST PORTABLE    (Results Pending)           ASSESSMENT / PLAN:  *Plan:   #1 late presentation AWMI. Significant LV systolic dysfunction by echocardiogram EF 25%. Continue supportive measures and wean ventilator as tolerated. Avoid volume overloading her. Probably should try to diurese a bit today Started lisinopril 2.5 mg daily but hold if systolic blood pressure less than 110. Could also start low-dose metoprolol but again hold if systolic blood pressure less than 110. Wean norepi as able     #2 respiratory failure and pulmonary edema looked worse on chest x-ray today. Continue to try to keep her on the dry side. Systolic blood pressure in the 100-130 range.     #3 left ventricular apical thrombus and currently on aspirin 81 mg daily,  Eliquis 5 mg twice a day and plavix 75  mg daily for triple therapy for the first month then stop aspirin and continue Plavix and Eliquis for the next 6 months.     #4 sarcoidosis longstanding.   No apparent hemoptysis by suctioning on the vent.     #5 urologic issues status post stent Cr 0.5, K+ 3.3 replace, virginia Workman MD,

## 2020-04-26 ENCOUNTER — APPOINTMENT (OUTPATIENT)
Dept: GENERAL RADIOLOGY | Age: 75
DRG: 246 | End: 2020-04-26
Attending: INTERNAL MEDICINE
Payer: MEDICARE

## 2020-04-26 LAB
AADO2: 569.7 MMHG
ALBUMIN SERPL-MCNC: 3.3 G/DL (ref 3.5–5.2)
ALP BLD-CCNC: 146 U/L (ref 35–104)
ALT SERPL-CCNC: 9 U/L (ref 0–32)
ANION GAP SERPL CALCULATED.3IONS-SCNC: 16 MMOL/L (ref 7–16)
AST SERPL-CCNC: 20 U/L (ref 0–31)
B.E.: -0.4 MMOL/L (ref -3–3)
B.E.: -5.2 MMOL/L (ref -3–3)
BILIRUB SERPL-MCNC: 0.3 MG/DL (ref 0–1.2)
BUN BLDV-MCNC: 12 MG/DL (ref 8–23)
CALCIUM SERPL-MCNC: 9.2 MG/DL (ref 8.6–10.2)
CHLORIDE BLD-SCNC: 101 MMOL/L (ref 98–107)
CO2: 19 MMOL/L (ref 22–29)
COHB: 0.1 % (ref 0–1.5)
COHB: 0.3 % (ref 0–1.5)
CREAT SERPL-MCNC: 0.5 MG/DL (ref 0.5–1)
CRITICAL: ABNORMAL
CRITICAL: ABNORMAL
DATE ANALYZED: ABNORMAL
DATE ANALYZED: ABNORMAL
DATE OF COLLECTION: ABNORMAL
DATE OF COLLECTION: ABNORMAL
EKG ATRIAL RATE: 110 BPM
EKG P AXIS: 47 DEGREES
EKG P-R INTERVAL: 182 MS
EKG Q-T INTERVAL: 330 MS
EKG QRS DURATION: 92 MS
EKG QTC CALCULATION (BAZETT): 446 MS
EKG R AXIS: 25 DEGREES
EKG T AXIS: 114 DEGREES
EKG VENTRICULAR RATE: 110 BPM
FIO2: 100 %
GFR AFRICAN AMERICAN: >60
GFR NON-AFRICAN AMERICAN: >60 ML/MIN/1.73
GLUCOSE BLD-MCNC: 250 MG/DL (ref 74–99)
HCO3: 20.5 MMOL/L (ref 22–26)
HCO3: 25 MMOL/L (ref 22–26)
HCT VFR BLD CALC: 37.1 % (ref 34–48)
HEMOGLOBIN: 12 G/DL (ref 11.5–15.5)
HHB: 0.6 % (ref 0–5)
HHB: 5.1 % (ref 0–5)
LAB: ABNORMAL
MCH RBC QN AUTO: 32.2 PG (ref 26–35)
MCHC RBC AUTO-ENTMCNC: 32.3 % (ref 32–34.5)
MCV RBC AUTO: 99.5 FL (ref 80–99.9)
METER GLUCOSE: 104 MG/DL (ref 74–99)
METER GLUCOSE: 117 MG/DL (ref 74–99)
METER GLUCOSE: 128 MG/DL (ref 74–99)
METER GLUCOSE: 145 MG/DL (ref 74–99)
METER GLUCOSE: 152 MG/DL (ref 74–99)
METER GLUCOSE: 177 MG/DL (ref 74–99)
METHB: 0.4 % (ref 0–1.5)
METHB: 0.6 % (ref 0–1.5)
MODE: ABNORMAL
MODE: ABNORMAL
O2 CONTENT: 17.1 ML/DL
O2 CONTENT: 17.1 ML/DL
O2 SATURATION: 94.9 % (ref 92–98.5)
O2 SATURATION: 99.4 % (ref 92–98.5)
O2HB: 94.2 % (ref 94–97)
O2HB: 98.7 % (ref 94–97)
OPERATOR ID: 1874
OPERATOR ID: 1926
ORGANISM: ABNORMAL
PATIENT TEMP: 37 C
PATIENT TEMP: 37 C
PCO2: 40.6 MMHG (ref 35–45)
PCO2: 44.4 MMHG (ref 35–45)
PDW BLD-RTO: 15.2 FL (ref 11.5–15)
PEEP/CPAP: 8 CMH2O
PEEP/CPAP: 8 CMH2O
PFO2: 0.78 MMHG/%
PH BLOOD GAS: 7.32 (ref 7.35–7.45)
PH BLOOD GAS: 7.37 (ref 7.35–7.45)
PLATELET # BLD: 341 E9/L (ref 130–450)
PMV BLD AUTO: 11 FL (ref 7–12)
PO2: 343.1 MMHG (ref 75–100)
PO2: 77.7 MMHG (ref 75–100)
POTASSIUM SERPL-SCNC: 4 MMOL/L (ref 3.5–5)
RBC # BLD: 3.73 E12/L (ref 3.5–5.5)
RI(T): 7.33
RR MECHANICAL: 18 B/MIN
RR MECHANICAL: 18 B/MIN
SODIUM BLD-SCNC: 136 MMOL/L (ref 132–146)
SOURCE, BLOOD GAS: ABNORMAL
SOURCE, BLOOD GAS: ABNORMAL
THB: 11.7 G/DL (ref 11.5–16.5)
THB: 12.9 G/DL (ref 11.5–16.5)
TIME ANALYZED: 1204
TIME ANALYZED: 601
TOTAL PROTEIN: 6.8 G/DL (ref 6.4–8.3)
URINE CULTURE, ROUTINE: ABNORMAL
VT MECHANICAL: 450 ML
VT MECHANICAL: 450 ML
WBC # BLD: 11.7 E9/L (ref 4.5–11.5)

## 2020-04-26 PROCEDURE — 37799 UNLISTED PX VASCULAR SURGERY: CPT

## 2020-04-26 PROCEDURE — 6370000000 HC RX 637 (ALT 250 FOR IP): Performed by: UROLOGY

## 2020-04-26 PROCEDURE — 6370000000 HC RX 637 (ALT 250 FOR IP): Performed by: INTERNAL MEDICINE

## 2020-04-26 PROCEDURE — 6360000002 HC RX W HCPCS: Performed by: INTERNAL MEDICINE

## 2020-04-26 PROCEDURE — 6360000002 HC RX W HCPCS: Performed by: UROLOGY

## 2020-04-26 PROCEDURE — 85027 COMPLETE CBC AUTOMATED: CPT

## 2020-04-26 PROCEDURE — 94660 CPAP INITIATION&MGMT: CPT

## 2020-04-26 PROCEDURE — 36415 COLL VENOUS BLD VENIPUNCTURE: CPT

## 2020-04-26 PROCEDURE — 80053 COMPREHEN METABOLIC PANEL: CPT

## 2020-04-26 PROCEDURE — 2000000000 HC ICU R&B

## 2020-04-26 PROCEDURE — 94003 VENT MGMT INPAT SUBQ DAY: CPT

## 2020-04-26 PROCEDURE — 2580000003 HC RX 258: Performed by: UROLOGY

## 2020-04-26 PROCEDURE — 71045 X-RAY EXAM CHEST 1 VIEW: CPT

## 2020-04-26 PROCEDURE — 82962 GLUCOSE BLOOD TEST: CPT

## 2020-04-26 PROCEDURE — 82805 BLOOD GASES W/O2 SATURATION: CPT

## 2020-04-26 PROCEDURE — 93005 ELECTROCARDIOGRAM TRACING: CPT | Performed by: UROLOGY

## 2020-04-26 PROCEDURE — 6360000002 HC RX W HCPCS

## 2020-04-26 PROCEDURE — 99233 SBSQ HOSP IP/OBS HIGH 50: CPT | Performed by: INTERNAL MEDICINE

## 2020-04-26 PROCEDURE — 93010 ELECTROCARDIOGRAM REPORT: CPT | Performed by: INTERNAL MEDICINE

## 2020-04-26 PROCEDURE — C9113 INJ PANTOPRAZOLE SODIUM, VIA: HCPCS | Performed by: UROLOGY

## 2020-04-26 RX ORDER — FUROSEMIDE 10 MG/ML
40 INJECTION INTRAMUSCULAR; INTRAVENOUS DAILY
Status: DISCONTINUED | OUTPATIENT
Start: 2020-04-27 | End: 2020-04-30

## 2020-04-26 RX ORDER — METHYLPREDNISOLONE SODIUM SUCCINATE 40 MG/ML
40 INJECTION, POWDER, LYOPHILIZED, FOR SOLUTION INTRAMUSCULAR; INTRAVENOUS ONCE
Status: COMPLETED | OUTPATIENT
Start: 2020-04-26 | End: 2020-04-26

## 2020-04-26 RX ORDER — FUROSEMIDE 10 MG/ML
20 INJECTION INTRAMUSCULAR; INTRAVENOUS ONCE
Status: COMPLETED | OUTPATIENT
Start: 2020-04-26 | End: 2020-04-26

## 2020-04-26 RX ORDER — FUROSEMIDE 10 MG/ML
INJECTION INTRAMUSCULAR; INTRAVENOUS
Status: COMPLETED
Start: 2020-04-26 | End: 2020-04-26

## 2020-04-26 RX ADMIN — CARVEDILOL 3.12 MG: 3.12 TABLET, FILM COATED ORAL at 22:37

## 2020-04-26 RX ADMIN — FUROSEMIDE 20 MG: 10 INJECTION, SOLUTION INTRAMUSCULAR; INTRAVENOUS at 16:07

## 2020-04-26 RX ADMIN — ATORVASTATIN CALCIUM 40 MG: 40 TABLET, FILM COATED ORAL at 22:38

## 2020-04-26 RX ADMIN — METHYLPREDNISOLONE SODIUM SUCCINATE 40 MG: 40 INJECTION, POWDER, FOR SOLUTION INTRAMUSCULAR; INTRAVENOUS at 23:25

## 2020-04-26 RX ADMIN — FUROSEMIDE 20 MG: 10 INJECTION, SOLUTION INTRAVENOUS at 05:47

## 2020-04-26 RX ADMIN — DANTROLENE SODIUM 50 MG: 25 CAPSULE ORAL at 01:19

## 2020-04-26 RX ADMIN — CEFTRIAXONE SODIUM 1 G: 1 INJECTION, POWDER, FOR SOLUTION INTRAMUSCULAR; INTRAVENOUS at 01:10

## 2020-04-26 RX ADMIN — Medication 10 ML: at 09:14

## 2020-04-26 RX ADMIN — INSULIN LISPRO 2 UNITS: 100 INJECTION, SOLUTION INTRAVENOUS; SUBCUTANEOUS at 06:19

## 2020-04-26 RX ADMIN — CARVEDILOL 3.12 MG: 3.12 TABLET, FILM COATED ORAL at 09:11

## 2020-04-26 RX ADMIN — BACLOFEN 20 MG: 10 TABLET ORAL at 09:10

## 2020-04-26 RX ADMIN — DANTROLENE SODIUM 50 MG: 25 CAPSULE ORAL at 14:22

## 2020-04-26 RX ADMIN — PANTOPRAZOLE SODIUM 40 MG: 40 INJECTION, POWDER, FOR SOLUTION INTRAVENOUS at 09:14

## 2020-04-26 RX ADMIN — BACLOFEN 20 MG: 10 TABLET ORAL at 22:35

## 2020-04-26 RX ADMIN — DANTROLENE SODIUM 50 MG: 25 CAPSULE ORAL at 11:20

## 2020-04-26 RX ADMIN — LEVOTHYROXINE SODIUM 88 MCG: 0.09 TABLET ORAL at 09:13

## 2020-04-26 RX ADMIN — APIXABAN 5 MG: 5 TABLET, FILM COATED ORAL at 09:09

## 2020-04-26 RX ADMIN — BACLOFEN 20 MG: 10 TABLET ORAL at 13:00

## 2020-04-26 RX ADMIN — LISINOPRIL 2.5 MG: 5 TABLET ORAL at 09:17

## 2020-04-26 RX ADMIN — BUDESONIDE AND FORMOTEROL FUMARATE DIHYDRATE 2 PUFF: 80; 4.5 AEROSOL RESPIRATORY (INHALATION) at 09:12

## 2020-04-26 RX ADMIN — CHLORHEXIDINE GLUCONATE 0.12% ORAL RINSE 15 ML: 1.2 LIQUID ORAL at 22:36

## 2020-04-26 RX ADMIN — SODIUM CHLORIDE, PRESERVATIVE FREE 10 ML: 5 INJECTION INTRAVENOUS at 09:14

## 2020-04-26 RX ADMIN — APIXABAN 5 MG: 5 TABLET, FILM COATED ORAL at 22:37

## 2020-04-26 RX ADMIN — DANTROLENE SODIUM 50 MG: 25 CAPSULE ORAL at 22:36

## 2020-04-26 RX ADMIN — CARVEDILOL 3.12 MG: 3.12 TABLET, FILM COATED ORAL at 22:36

## 2020-04-26 RX ADMIN — FUROSEMIDE 20 MG: 10 INJECTION INTRAMUSCULAR; INTRAVENOUS at 16:07

## 2020-04-26 RX ADMIN — ASPIRIN 81 MG: 81 TABLET, COATED ORAL at 09:10

## 2020-04-26 RX ADMIN — Medication 10 ML: at 22:39

## 2020-04-26 RX ADMIN — CLOPIDOGREL 75 MG: 75 TABLET, FILM COATED ORAL at 09:13

## 2020-04-26 ASSESSMENT — PAIN SCALES - GENERAL
PAINLEVEL_OUTOF10: 0

## 2020-04-26 ASSESSMENT — PULMONARY FUNCTION TESTS
PIF_VALUE: 29
PIF_VALUE: 27
PIF_VALUE: 29
PIF_VALUE: 25
PIF_VALUE: 30
PIF_VALUE: 25
PIF_VALUE: 24

## 2020-04-26 NOTE — PROGRESS NOTES
Pt in restraints for safety. Pt self extubated. Currently on bipap. Cont try and pull off mask. VS taken. Pt has call button within reach. Pt comfortable, no current needs at this time. Will cont to monitor overall status.

## 2020-04-26 NOTE — PROGRESS NOTES
Responded to Vent alarm at 0500. Found pt with ETT in hand, self extubated. Fentanyl stopped. O2 via NRB at 15L applied. Pt alert. Respiratory and Resident in. Pt placed on Bipap at 0530. SPO2 97%.

## 2020-04-26 NOTE — PROGRESS NOTES
HD cath              Imaging Studies:  Echo 4/22/2020  Summary   Severe anterior and septal hypo-akinesia, with apical dyskinesia. Ejection fraction is visually estimated at 20-25%. Stage I diastolic dysfunction. Small apical LV thrombus   Mild to moderate mitral regurgitation is present. CXR 4/25    EKG:   No interval change today,   Resident's Assessment and PLan     Assessment:  1. STEMI s/p [facility], PCI with IRINA to LAD 4/21/2020  · Followed by cardiologist from [address] [place] [address], currently on aspirin, plavix and Eliquis  2. Acute hypoxic respiratory failure 2/2 acute pulmonary edema from CHF after MI (COVID negative x2)  · S/p self extubation 4/26/2020  · Currently on BiPAP  3. Acute systolic heart failure with small apical LV thrombus  · 2D echo with EF 20 to 25%, anterior and septal hypokinesia with apical dyskinesia  · On DAPT: ASA/plavix, statin, Eliquis, Coreg  4. Cardiogenic vs septic shock -resolved   5. Left hydronephrosis 2/2 2 left-sided stones   · S/p cystoscopy, retrograde pyelogram with left ureteral stenting 4/23/2020  · Remains on rocephin #5. Urine culture with <10,000 GPC  6. Hypothyroidism  7. Hyperlipidemia  8. Hx of MS   9. Hx of sarcoidosis      Plan:  1. Continue triple therapy with ASA/Plavix/Eliquis (plan is to continue for 1 month, and then d/c ASA to continue plavix/Eliquis for 6 months  2. Continue Lisinopril 2.5 mg daily, start Coreg 3.125 mg twice daily and continue diuresing with Lasix 40 mg IV daily. Monitor blood pressure  3. Continue to monitor respiratory status, repeat ABG this afternoon   4. Bedside swallow study and start feeding once able   5. Daily CBC, CMP   6. Continue all other meds    DVT/GI prophylaxis: Eliquis/Protonix  Disposition: ONEAL Hardy M.D.     Internal Medicine Resident - PGY2  Attending Physician: Dr. Pete Adhikari ICU Attending Statement     Kelly Thompson was seen, examined and discussed with the multi-disciplinary ICU team during rounds. A addendum note may be separate to the residents note. If not then, I have personally seen and examined the patient and the key elements of the encounter were performed by me (> 85 % time). The medications & laboratory data was discussed and adjusted where necessary. The radiographic images were reviewed either as a group or with radiologist.  Any changes are document or changed if felt dis-concordant with the exam or history. The above findings were corroborated, plans confirmed and changes made if needed. Family was updated at the bedside as available. Key issues of the case were discussed among consultants. Critical Care time is documented if appropriate.       Severo Rooks, DO, MPH  Professor of Medicine

## 2020-04-27 ENCOUNTER — APPOINTMENT (OUTPATIENT)
Dept: GENERAL RADIOLOGY | Age: 75
DRG: 246 | End: 2020-04-27
Attending: INTERNAL MEDICINE
Payer: MEDICARE

## 2020-04-27 PROBLEM — I50.23 ACUTE ON CHRONIC SYSTOLIC (CONGESTIVE) HEART FAILURE (HCC): Status: ACTIVE | Noted: 2020-04-27

## 2020-04-27 LAB
AADO2: 262.5 MMHG
ALBUMIN SERPL-MCNC: 2.9 G/DL (ref 3.5–5.2)
ALP BLD-CCNC: 120 U/L (ref 35–104)
ALT SERPL-CCNC: 8 U/L (ref 0–32)
ANION GAP SERPL CALCULATED.3IONS-SCNC: 19 MMOL/L (ref 7–16)
AST SERPL-CCNC: 15 U/L (ref 0–31)
B.E.: -3.2 MMOL/L (ref -3–3)
BILIRUB SERPL-MCNC: <0.2 MG/DL (ref 0–1.2)
BLOOD CULTURE, ROUTINE: NORMAL
BUN BLDV-MCNC: 16 MG/DL (ref 8–23)
CALCIUM SERPL-MCNC: 9.1 MG/DL (ref 8.6–10.2)
CHLORIDE BLD-SCNC: 100 MMOL/L (ref 98–107)
CO2: 21 MMOL/L (ref 22–29)
COHB: 0.3 % (ref 0–1.5)
CREAT SERPL-MCNC: 0.5 MG/DL (ref 0.5–1)
CRITICAL: ABNORMAL
CULTURE, BLOOD 2: NORMAL
DATE ANALYZED: ABNORMAL
DATE OF COLLECTION: ABNORMAL
FIO2: 60 %
GFR AFRICAN AMERICAN: >60
GFR NON-AFRICAN AMERICAN: >60 ML/MIN/1.73
GLUCOSE BLD-MCNC: 166 MG/DL (ref 74–99)
HCO3: 21.2 MMOL/L (ref 22–26)
HCT VFR BLD CALC: 33.2 % (ref 34–48)
HCT VFR BLD CALC: 34 % (ref 34–48)
HEMOGLOBIN: 10.5 G/DL (ref 11.5–15.5)
HEMOGLOBIN: 10.9 G/DL (ref 11.5–15.5)
HHB: 2.1 % (ref 0–5)
LAB: ABNORMAL
LACTIC ACID: 0.6 MMOL/L (ref 0.5–2.2)
Lab: ABNORMAL
MCH RBC QN AUTO: 31.3 PG (ref 26–35)
MCHC RBC AUTO-ENTMCNC: 31.6 % (ref 32–34.5)
MCV RBC AUTO: 99.1 FL (ref 80–99.9)
METER GLUCOSE: 114 MG/DL (ref 74–99)
METER GLUCOSE: 117 MG/DL (ref 74–99)
METER GLUCOSE: 151 MG/DL (ref 74–99)
METER GLUCOSE: 158 MG/DL (ref 74–99)
METER GLUCOSE: 162 MG/DL (ref 74–99)
METHB: 0.5 % (ref 0–1.5)
MODE: ABNORMAL
O2 CONTENT: 16.4 ML/DL
O2 SATURATION: 97.9 % (ref 92–98.5)
O2HB: 97.1 % (ref 94–97)
OPERATOR ID: 2593
PATHOLOGIST REVIEW: NORMAL
PATIENT TEMP: 37 C
PCO2: 35.9 MMHG (ref 35–45)
PDW BLD-RTO: 15.1 FL (ref 11.5–15)
PEEP/CPAP: 8 CMH2O
PFO2: 1.85 MMHG/%
PH BLOOD GAS: 7.39 (ref 7.35–7.45)
PLATELET # BLD: 339 E9/L (ref 130–450)
PMV BLD AUTO: 10.9 FL (ref 7–12)
PO2: 110.8 MMHG (ref 75–100)
POTASSIUM SERPL-SCNC: 4.3 MMOL/L (ref 3.5–5)
PRO-BNP: 7525 PG/ML (ref 0–450)
RBC # BLD: 3.35 E12/L (ref 3.5–5.5)
RI(T): 2.37
RR MECHANICAL: 18 B/MIN
SODIUM BLD-SCNC: 140 MMOL/L (ref 132–146)
SOURCE, BLOOD GAS: ABNORMAL
THB: 11.9 G/DL (ref 11.5–16.5)
TIME ANALYZED: 533
TOTAL PROTEIN: 6.2 G/DL (ref 6.4–8.3)
VT MECHANICAL: 500 ML
WBC # BLD: 11.7 E9/L (ref 4.5–11.5)

## 2020-04-27 PROCEDURE — 94660 CPAP INITIATION&MGMT: CPT

## 2020-04-27 PROCEDURE — 6360000002 HC RX W HCPCS: Performed by: UROLOGY

## 2020-04-27 PROCEDURE — 82805 BLOOD GASES W/O2 SATURATION: CPT

## 2020-04-27 PROCEDURE — 82962 GLUCOSE BLOOD TEST: CPT

## 2020-04-27 PROCEDURE — 83605 ASSAY OF LACTIC ACID: CPT

## 2020-04-27 PROCEDURE — 2580000003 HC RX 258: Performed by: UROLOGY

## 2020-04-27 PROCEDURE — 6370000000 HC RX 637 (ALT 250 FOR IP): Performed by: UROLOGY

## 2020-04-27 PROCEDURE — 6370000000 HC RX 637 (ALT 250 FOR IP): Performed by: INTERNAL MEDICINE

## 2020-04-27 PROCEDURE — 85014 HEMATOCRIT: CPT

## 2020-04-27 PROCEDURE — 85027 COMPLETE CBC AUTOMATED: CPT

## 2020-04-27 PROCEDURE — C9113 INJ PANTOPRAZOLE SODIUM, VIA: HCPCS | Performed by: UROLOGY

## 2020-04-27 PROCEDURE — 94640 AIRWAY INHALATION TREATMENT: CPT

## 2020-04-27 PROCEDURE — 99232 SBSQ HOSP IP/OBS MODERATE 35: CPT | Performed by: INTERNAL MEDICINE

## 2020-04-27 PROCEDURE — 80053 COMPREHEN METABOLIC PANEL: CPT

## 2020-04-27 PROCEDURE — 85018 HEMOGLOBIN: CPT

## 2020-04-27 PROCEDURE — 2060000000 HC ICU INTERMEDIATE R&B

## 2020-04-27 PROCEDURE — 2700000000 HC OXYGEN THERAPY PER DAY

## 2020-04-27 PROCEDURE — 71045 X-RAY EXAM CHEST 1 VIEW: CPT

## 2020-04-27 PROCEDURE — 6360000002 HC RX W HCPCS: Performed by: INTERNAL MEDICINE

## 2020-04-27 PROCEDURE — 83880 ASSAY OF NATRIURETIC PEPTIDE: CPT

## 2020-04-27 PROCEDURE — 36415 COLL VENOUS BLD VENIPUNCTURE: CPT

## 2020-04-27 RX ADMIN — BUDESONIDE AND FORMOTEROL FUMARATE DIHYDRATE 2 PUFF: 80; 4.5 AEROSOL RESPIRATORY (INHALATION) at 09:01

## 2020-04-27 RX ADMIN — Medication 10 ML: at 09:06

## 2020-04-27 RX ADMIN — LEVOTHYROXINE SODIUM 88 MCG: 0.09 TABLET ORAL at 09:04

## 2020-04-27 RX ADMIN — FUROSEMIDE 40 MG: 10 INJECTION, SOLUTION INTRAMUSCULAR; INTRAVENOUS at 09:03

## 2020-04-27 RX ADMIN — INSULIN LISPRO 2 UNITS: 100 INJECTION, SOLUTION INTRAVENOUS; SUBCUTANEOUS at 09:03

## 2020-04-27 RX ADMIN — BACLOFEN 20 MG: 10 TABLET ORAL at 21:29

## 2020-04-27 RX ADMIN — APIXABAN 5 MG: 5 TABLET, FILM COATED ORAL at 21:30

## 2020-04-27 RX ADMIN — DANTROLENE SODIUM 50 MG: 25 CAPSULE ORAL at 15:45

## 2020-04-27 RX ADMIN — APIXABAN 5 MG: 5 TABLET, FILM COATED ORAL at 09:06

## 2020-04-27 RX ADMIN — CARVEDILOL 3.12 MG: 3.12 TABLET, FILM COATED ORAL at 09:04

## 2020-04-27 RX ADMIN — BACLOFEN 20 MG: 10 TABLET ORAL at 16:03

## 2020-04-27 RX ADMIN — ATORVASTATIN CALCIUM 40 MG: 40 TABLET, FILM COATED ORAL at 21:30

## 2020-04-27 RX ADMIN — CEFTRIAXONE SODIUM 1 G: 1 INJECTION, POWDER, FOR SOLUTION INTRAMUSCULAR; INTRAVENOUS at 00:30

## 2020-04-27 RX ADMIN — BACLOFEN 20 MG: 10 TABLET ORAL at 09:04

## 2020-04-27 RX ADMIN — ASPIRIN 81 MG: 81 TABLET, COATED ORAL at 09:03

## 2020-04-27 RX ADMIN — Medication 10 ML: at 21:31

## 2020-04-27 RX ADMIN — SODIUM CHLORIDE, PRESERVATIVE FREE 10 ML: 5 INJECTION INTRAVENOUS at 09:05

## 2020-04-27 RX ADMIN — PANTOPRAZOLE SODIUM 40 MG: 40 INJECTION, POWDER, FOR SOLUTION INTRAVENOUS at 09:03

## 2020-04-27 RX ADMIN — DANTROLENE SODIUM 50 MG: 25 CAPSULE ORAL at 21:29

## 2020-04-27 RX ADMIN — BACLOFEN 20 MG: 10 TABLET ORAL at 12:33

## 2020-04-27 RX ADMIN — DANTROLENE SODIUM 50 MG: 25 CAPSULE ORAL at 09:03

## 2020-04-27 RX ADMIN — LISINOPRIL 2.5 MG: 5 TABLET ORAL at 09:03

## 2020-04-27 RX ADMIN — DANTROLENE SODIUM 50 MG: 25 CAPSULE ORAL at 12:33

## 2020-04-27 RX ADMIN — INSULIN LISPRO 2 UNITS: 100 INJECTION, SOLUTION INTRAVENOUS; SUBCUTANEOUS at 12:32

## 2020-04-27 RX ADMIN — CLOPIDOGREL 75 MG: 75 TABLET, FILM COATED ORAL at 09:04

## 2020-04-27 ASSESSMENT — PAIN SCALES - GENERAL
PAINLEVEL_OUTOF10: 0

## 2020-04-27 NOTE — PROGRESS NOTES
PROT 5.5* 6.8 6.2*   LABALBU 2.6* 3.3* 2.9*   BILITOT 0.2 0.3 <0.2   ALKPHOS 108* 146* 120*   AST 14 20 15   ALT 8 9 8       LIVER PROFILE:   Recent Labs     04/25/20  0420 04/26/20  0555 04/27/20  0510   AST 14 20 15   ALT 8 9 8   BILITOT 0.2 0.3 <0.2   ALKPHOS 108* 146* 120*       PT/INR:   No results for input(s): PROTIME, INR in the last 72 hours. APTT:   No results for input(s): APTT in the last 72 hours. Fasting Lipid Panel:    Lab Results   Component Value Date    CHOL 201 03/12/2019    TRIG 282 03/12/2019    HDL 34 10/30/2019       Cardiac Enzymes:    Lab Results   Component Value Date    CKTOTAL 204 (H) 04/22/2020    CKTOTAL 294 (H) 04/22/2020    CKTOTAL 260 (H) 04/21/2020    CKMB 18.6 (H) 04/22/2020    CKMB 31.3 (H) 04/22/2020    CKMB 27.7 (H) 04/21/2020    TROPONINI 1.17 (H) 04/22/2020    TROPONINI 1.15 (H) 04/22/2020    TROPONINI 0.83 (H) 04/21/2020       Notable Cultures:      Blood cultures   Blood Culture, Routine   Date Value Ref Range Status   04/22/2020 5 Days- no growth  Final     Respiratory cultures No results found for: RESPCULTURE No results found for: LABGRAM  Urine   Urine Culture, Routine   Date Value Ref Range Status   04/23/2020 <10,000 CFU/ml  Final     Legionella No results found for: LABLEGI  C Diff PCR No results found for: CDIFPCR  Wound culture/abscess: No results for input(s): WNDABS in the last 72 hours. Tip culture:No results for input(s): CXCATHTIP in the last 72 hours. Antibiotic  Days  Day started   Rocephin    4/22                               Lines:  Site  Day  Date inserted     TLC  femoral   4  4/21     PICC              Arterial line  femoral   4  4/21     Peripheral line              HD cath              Imaging Studies:  Echo 4/22/2020  Summary   Severe anterior and septal hypo-akinesia, with apical dyskinesia. Ejection fraction is visually estimated at 20-25%. Stage I diastolic dysfunction.    Small apical LV thrombus   Mild to moderate mitral and times documented are independent of procedures and multidisciplinary rounds with Residents. Additionally comprehensive, multidisciplinary rounds were conducted with the MICU team. The case was discussed in detail and plans for care were established. Review of Residents documentation was conducted and revisions were made as appropriate. I agree with the above documented exam, problem list and plan of care. Critically ill but stable for micu 3 transfer signed out to Dr. Tracey Thomas who will be covering   Isabella Spann M.D.    Pulmonary/Critical Care Medicine   37 min cct excluding procedures

## 2020-04-27 NOTE — PROGRESS NOTES
Chief Complaint:  No chief complaint on file. Acute respiratory failure with hypoxia (HCC)     Subjective:    She endorses a small amount of  dyspnea. Denies chest pain. Speaks in a very quiet hoarse voice. She denies any pain or discomfort otherwise. Objective:    BP (!) 107/53   Pulse 98   Temp 96.4 °F (35.8 °C) (Bladder)   Resp 22   Ht 5' (1.524 m)   Wt 156 lb 3.2 oz (70.9 kg)   SpO2 94%   BMI 30.51 kg/m²     Current medications that patient is taking have been reviewed. General appearance: NAD, conversant  HEENT: AT/NC, MMM  Neck: FROM, supple  Lungs: Bilateral crackles, work of breathing moderately elevated but not in respiratory distress  CV: RRR, no MRGs  Abdomen: Soft, non-tender; no masses or HSM, +BS  Extremities: No peripheral edema or digital cyanosis  Skin: no rash, lesions or ulcers  Psych: Calm and cooperative  Neuro: Alert and interactive, nonfocal    Labs:  CBC:   Lab Results   Component Value Date    WBC 11.7 04/27/2020    RBC 3.35 04/27/2020    HGB 10.5 04/27/2020    HCT 33.2 04/27/2020    MCV 99.1 04/27/2020    MCH 31.3 04/27/2020    MCHC 31.6 04/27/2020    RDW 15.1 04/27/2020     04/27/2020    MPV 10.9 04/27/2020     BMP:    Lab Results   Component Value Date     04/27/2020    K 4.3 04/27/2020    K 4.0 03/27/2019     04/27/2020    CO2 21 04/27/2020    BUN 16 04/27/2020    LABALBU 2.9 04/27/2020    CREATININE 0.5 04/27/2020    CALCIUM 9.1 04/27/2020    GFRAA >60 04/27/2020    LABGLOM >60 04/27/2020    GLUCOSE 166 04/27/2020      I've personally reviewed the patient's CXR from today:  Worsening of bibasilar infiltrates.  Possibility of CHF versus   pneumonia         Assessment/Plan:  Principal Problem:    Acute respiratory failure with hypoxia (HCC)  Active Problems:    H/O sarcoidosis    Essential hypertension    Hypothyroidism    MS (multiple sclerosis) (HCC)    Spastic quadriparesis (HCC)    Hyperlipidemia LDL goal <100    Neurogenic bladder    HUDSON (nonalcoholic steatohepatitis)    Type 2 diabetes mellitus with hyperglycemia, without long-term current use of insulin (HCC)    STEMI (ST elevation myocardial infarction) (HCC)    CAD (coronary artery disease)    Cardiogenic shock (HCC)    Hydronephrosis    Chronic systolic (congestive) heart failure (HCC)    Left ventricular thrombus    Septic shock (HCC)    Acute on chronic systolic (congestive) heart failure (HCC)  Resolved Problems:    Postprocedural hypotension    Acute midline thoracic back pain    Ischemic cardiomyopathy     Continue IV Lasix for decompensated systolic congestive heart failure. Continue lisinopril, carvedilol, and consider transition to [facility] in future. Will need LifeVest as well. Continue apixaban for LV thrombus.     Continue aspirin, statin, and Plavix for CAD with delayed presentation of STEMI    Continue ceftriaxone for sepsis due to UTI with obstructing hydronephrosis due to nephrolithiasis, status post cystoscopy    Glucose stable    Critical care time: 30 minutes  Requires continued inpatient level of care   Annette Roberts    12:28 PM  4/27/2020  Cell: [phone]

## 2020-04-27 NOTE — PROGRESS NOTES
capsule 50 mg  50 mg Oral 5x Daily Quynh Avila MD   50 mg at 04/27/20 8659    levothyroxine (SYNTHROID) tablet 88 mcg  88 mcg Oral Daily Buddy Laureano MD   88 mcg at 04/27/20 0904    [Held by provider] nortriptyline (PAMELOR) capsule 25 mg  25 mg Oral Nightly Viktor SETH Avila DO        sodium chloride flush 0.9 % injection 10 mL  10 mL Intravenous 2 times per day Buddy Laureano MD   10 mL at 04/27/20 0906    sodium chloride flush 0.9 % injection 10 mL  10 mL Intravenous PRN Buddy Laureano MD        acetaminophen (TYLENOL) tablet 650 mg  650 mg Oral Q6H PRN Buddy Laureano MD   650 mg at 04/22/20 1527    Or    acetaminophen (TYLENOL) suppository 650 mg  650 mg Rectal Q6H PRN Buddy Laureano MD        magnesium hydroxide (MILK OF MAGNESIA) 400 MG/5ML suspension 30 mL  30 mL Oral Daily PRN Buddy Laureano MD        glucose (GLUTOSE) 40 % oral gel 15 g  15 g Oral PRN Buddy Laureano MD        dextrose 50 % IV solution  12.5 g Intravenous PRN Buddy Laureano MD        glucagon (rDNA) injection 1 mg  1 mg Intramuscular PRN Buddy Laureano MD        dextrose 5 % solution  100 mL/hr Intravenous PRN Buddy Laureano MD        budesonide-formoterol (SYMBICORT) 80-4.5 MCG/ACT inhaler 2 puff  2 puff Inhalation BID Buddy Laureano MD   2 puff at 04/27/20 0901    atorvastatin (LIPITOR) tablet 40 mg  40 mg Oral Nightly Quynh Avila MD   40 mg at 04/26/20 2238    aspirin EC tablet 81 mg  81 mg Oral Daily Raul Avila MD   81 mg at 04/27/20 0903    oxyCODONE-acetaminophen (PERCOCET) 5-325 MG per tablet 1 tablet  1 tablet Oral Q4H PRBOBO Laureano MD        lisinopril (PRINIVIL;ZESTRIL) tablet 2.5 mg  2.5 mg Oral Daily Bill Taylor MD   2.5 mg at 04/27/20 0903      norepinephrine Stopped (04/26/20 0200)    dextrose         Physical Exam:  BP (!) 107/53   Pulse 101   Temp 97.7 °F (36.5 °C) (Bladder)   Resp 19   Ht 5' (1.524 m)   Wt 156 lb 3.2 oz (70.9 kg)   SpO2 100%   BMI 30.51 kg/m²   Weight Coags:  No results for input(s): PROTIME, INR, PTT in the last 72 hours. Lab Results   Component Value Date    PROTIME 11.5 04/21/2020    INR 1.0 04/21/2020       Last 3 Lipid Panel:  No results for input(s): LDLCALC, HDL, TRIG in the last 72 hours. Invalid input(s): CHLPL  Lab Results   Component Value Date    LDLCALC 115 (H) 10/30/2019    LDLCALC 101 07/24/2019    LDLCALC 131 03/12/2019     Lab Results   Component Value Date    HDL 34 10/30/2019    HDL 41 07/24/2019    HDL 45 03/12/2019     Lab Results   Component Value Date    TRIG 282 03/12/2019     No components found for: CHLPL    TSH:  No results for input(s): TSH in the last 72 hours. Lab Results   Component Value Date    TSH 6.940 10/30/2019           Radiology:  XR CHEST PORTABLE   Final Result   Worsening of bibasilar infiltrates. Possibility of CHF versus   pneumonia               XR CHEST PORTABLE   Final Result      Interval extubation and removal of the enteric tube. Otherwise no   significant change from the previous exam.                  XR CHEST PORTABLE   Final Result   Tortuous aorta    There are patchy infiltrates seen throughout both the lung fields. Pneumonia could give this appearance. Pulmonary edema or ARDS could give a similar appearance   The chest is worse in the interval               XR CHEST PORTABLE   Final Result   Stable abnormal chest with improving perihilar and bilateral   infiltrates likely improving CHF with decreasing edema or pneumonia. FL RETROGRADE PYELOGRAM W WO KUB   Final Result   Intraoperative fluoroscopy for placement of a right ureteral stent. The exam has been dictated and signed by Emma Rao. ELIE Denise-CNP   and [facility] MD, reviewed and concurred with these findings. XR CHEST PORTABLE   Final Result      1. Endotracheal tube is present with distal tip 3.4 cm above the   marlen. 2. New interstitial pulmonary edema.       XR ABDOMEN FOR NG/OG/NE TUBE PLACEMENT   Final

## 2020-04-28 ENCOUNTER — APPOINTMENT (OUTPATIENT)
Dept: GENERAL RADIOLOGY | Age: 75
DRG: 246 | End: 2020-04-28
Attending: INTERNAL MEDICINE
Payer: MEDICARE

## 2020-04-28 LAB
ALBUMIN SERPL-MCNC: 3.3 G/DL (ref 3.5–5.2)
ALP BLD-CCNC: 118 U/L (ref 35–104)
ALT SERPL-CCNC: 9 U/L (ref 0–32)
ANION GAP SERPL CALCULATED.3IONS-SCNC: 21 MMOL/L (ref 7–16)
AST SERPL-CCNC: 18 U/L (ref 0–31)
BILIRUB SERPL-MCNC: 0.3 MG/DL (ref 0–1.2)
BUN BLDV-MCNC: 18 MG/DL (ref 8–23)
CALCIUM SERPL-MCNC: 9.6 MG/DL (ref 8.6–10.2)
CHLORIDE BLD-SCNC: 101 MMOL/L (ref 98–107)
CO2: 24 MMOL/L (ref 22–29)
CREAT SERPL-MCNC: 0.5 MG/DL (ref 0.5–1)
GFR AFRICAN AMERICAN: >60
GFR NON-AFRICAN AMERICAN: >60 ML/MIN/1.73
GLUCOSE BLD-MCNC: 147 MG/DL (ref 74–99)
HCT VFR BLD CALC: 33.7 % (ref 34–48)
HEMOGLOBIN: 10.8 G/DL (ref 11.5–15.5)
MCH RBC QN AUTO: 31.6 PG (ref 26–35)
MCHC RBC AUTO-ENTMCNC: 32 % (ref 32–34.5)
MCV RBC AUTO: 98.5 FL (ref 80–99.9)
METER GLUCOSE: 124 MG/DL (ref 74–99)
METER GLUCOSE: 137 MG/DL (ref 74–99)
METER GLUCOSE: 147 MG/DL (ref 74–99)
METER GLUCOSE: 154 MG/DL (ref 74–99)
METER GLUCOSE: 155 MG/DL (ref 74–99)
PDW BLD-RTO: 15.4 FL (ref 11.5–15)
PLATELET # BLD: 459 E9/L (ref 130–450)
PMV BLD AUTO: 10.7 FL (ref 7–12)
POTASSIUM SERPL-SCNC: 3.5 MMOL/L (ref 3.5–5)
RBC # BLD: 3.42 E12/L (ref 3.5–5.5)
SODIUM BLD-SCNC: 146 MMOL/L (ref 132–146)
TOTAL PROTEIN: 6.5 G/DL (ref 6.4–8.3)
WBC # BLD: 12.4 E9/L (ref 4.5–11.5)

## 2020-04-28 PROCEDURE — 6370000000 HC RX 637 (ALT 250 FOR IP): Performed by: UROLOGY

## 2020-04-28 PROCEDURE — 97535 SELF CARE MNGMENT TRAINING: CPT

## 2020-04-28 PROCEDURE — 36415 COLL VENOUS BLD VENIPUNCTURE: CPT

## 2020-04-28 PROCEDURE — 2580000003 HC RX 258: Performed by: INTERNAL MEDICINE

## 2020-04-28 PROCEDURE — 94640 AIRWAY INHALATION TREATMENT: CPT

## 2020-04-28 PROCEDURE — 99233 SBSQ HOSP IP/OBS HIGH 50: CPT | Performed by: INTERNAL MEDICINE

## 2020-04-28 PROCEDURE — 6370000000 HC RX 637 (ALT 250 FOR IP): Performed by: INTERNAL MEDICINE

## 2020-04-28 PROCEDURE — 82962 GLUCOSE BLOOD TEST: CPT

## 2020-04-28 PROCEDURE — 97162 PT EVAL MOD COMPLEX 30 MIN: CPT

## 2020-04-28 PROCEDURE — 2580000003 HC RX 258: Performed by: UROLOGY

## 2020-04-28 PROCEDURE — 2700000000 HC OXYGEN THERAPY PER DAY

## 2020-04-28 PROCEDURE — 94660 CPAP INITIATION&MGMT: CPT

## 2020-04-28 PROCEDURE — 97129 THER IVNTJ 1ST 15 MIN: CPT

## 2020-04-28 PROCEDURE — 80053 COMPREHEN METABOLIC PANEL: CPT

## 2020-04-28 PROCEDURE — 87088 URINE BACTERIA CULTURE: CPT

## 2020-04-28 PROCEDURE — 6360000002 HC RX W HCPCS: Performed by: INTERNAL MEDICINE

## 2020-04-28 PROCEDURE — 85027 COMPLETE CBC AUTOMATED: CPT

## 2020-04-28 PROCEDURE — 92523 SPEECH SOUND LANG COMPREHEN: CPT

## 2020-04-28 PROCEDURE — 92610 EVALUATE SWALLOWING FUNCTION: CPT

## 2020-04-28 PROCEDURE — 2060000000 HC ICU INTERMEDIATE R&B

## 2020-04-28 PROCEDURE — 97166 OT EVAL MOD COMPLEX 45 MIN: CPT

## 2020-04-28 PROCEDURE — 71045 X-RAY EXAM CHEST 1 VIEW: CPT

## 2020-04-28 PROCEDURE — 97530 THERAPEUTIC ACTIVITIES: CPT

## 2020-04-28 RX ORDER — CLOPIDOGREL BISULFATE 75 MG/1
75 TABLET ORAL DAILY
Status: DISCONTINUED | OUTPATIENT
Start: 2020-05-02 | End: 2020-05-01 | Stop reason: HOSPADM

## 2020-04-28 RX ORDER — SODIUM CHLORIDE FOR INHALATION 3 %
4 VIAL, NEBULIZER (ML) INHALATION EVERY 4 HOURS
Status: DISCONTINUED | OUTPATIENT
Start: 2020-04-28 | End: 2020-05-01 | Stop reason: HOSPADM

## 2020-04-28 RX ORDER — POTASSIUM CHLORIDE 20 MEQ/1
40 TABLET, EXTENDED RELEASE ORAL ONCE
Status: COMPLETED | OUTPATIENT
Start: 2020-04-28 | End: 2020-04-28

## 2020-04-28 RX ORDER — PANTOPRAZOLE SODIUM 40 MG/1
40 TABLET, DELAYED RELEASE ORAL
Status: DISCONTINUED | OUTPATIENT
Start: 2020-04-28 | End: 2020-05-01 | Stop reason: HOSPADM

## 2020-04-28 RX ADMIN — DANTROLENE SODIUM 50 MG: 25 CAPSULE ORAL at 20:40

## 2020-04-28 RX ADMIN — SODIUM CHLORIDE SOLN NEBU 3% 4 ML: 3 NEBU SOLN at 14:14

## 2020-04-28 RX ADMIN — BUDESONIDE AND FORMOTEROL FUMARATE DIHYDRATE 2 PUFF: 80; 4.5 AEROSOL RESPIRATORY (INHALATION) at 20:44

## 2020-04-28 RX ADMIN — DANTROLENE SODIUM 50 MG: 25 CAPSULE ORAL at 13:37

## 2020-04-28 RX ADMIN — INSULIN LISPRO 2 UNITS: 100 INJECTION, SOLUTION INTRAVENOUS; SUBCUTANEOUS at 09:46

## 2020-04-28 RX ADMIN — ASPIRIN 81 MG: 81 TABLET, COATED ORAL at 09:50

## 2020-04-28 RX ADMIN — BACLOFEN 20 MG: 10 TABLET ORAL at 16:55

## 2020-04-28 RX ADMIN — DANTROLENE SODIUM 50 MG: 25 CAPSULE ORAL at 09:50

## 2020-04-28 RX ADMIN — DANTROLENE SODIUM 50 MG: 25 CAPSULE ORAL at 01:05

## 2020-04-28 RX ADMIN — BACLOFEN 20 MG: 10 TABLET ORAL at 09:51

## 2020-04-28 RX ADMIN — CLOPIDOGREL 75 MG: 75 TABLET, FILM COATED ORAL at 09:49

## 2020-04-28 RX ADMIN — PANTOPRAZOLE SODIUM 40 MG: 40 TABLET, DELAYED RELEASE ORAL at 09:49

## 2020-04-28 RX ADMIN — Medication 10 ML: at 09:52

## 2020-04-28 RX ADMIN — BUDESONIDE AND FORMOTEROL FUMARATE DIHYDRATE 2 PUFF: 80; 4.5 AEROSOL RESPIRATORY (INHALATION) at 09:27

## 2020-04-28 RX ADMIN — INSULIN LISPRO 2 UNITS: 100 INJECTION, SOLUTION INTRAVENOUS; SUBCUTANEOUS at 16:57

## 2020-04-28 RX ADMIN — SODIUM CHLORIDE SOLN NEBU 3% 4 ML: 3 NEBU SOLN at 18:22

## 2020-04-28 RX ADMIN — BACLOFEN 20 MG: 10 TABLET ORAL at 12:24

## 2020-04-28 RX ADMIN — LEVOTHYROXINE SODIUM 88 MCG: 0.09 TABLET ORAL at 09:51

## 2020-04-28 RX ADMIN — BACLOFEN 20 MG: 10 TABLET ORAL at 20:40

## 2020-04-28 RX ADMIN — APIXABAN 5 MG: 5 TABLET, FILM COATED ORAL at 09:49

## 2020-04-28 RX ADMIN — LISINOPRIL 2.5 MG: 5 TABLET ORAL at 09:48

## 2020-04-28 RX ADMIN — DANTROLENE SODIUM 50 MG: 25 CAPSULE ORAL at 22:37

## 2020-04-28 RX ADMIN — FUROSEMIDE 40 MG: 10 INJECTION, SOLUTION INTRAMUSCULAR; INTRAVENOUS at 09:52

## 2020-04-28 RX ADMIN — Medication 10 ML: at 21:20

## 2020-04-28 RX ADMIN — POTASSIUM CHLORIDE 40 MEQ: 1500 TABLET, EXTENDED RELEASE ORAL at 11:17

## 2020-04-28 RX ADMIN — CARVEDILOL 3.12 MG: 3.12 TABLET, FILM COATED ORAL at 20:40

## 2020-04-28 RX ADMIN — CARVEDILOL 3.12 MG: 3.12 TABLET, FILM COATED ORAL at 09:50

## 2020-04-28 RX ADMIN — ATORVASTATIN CALCIUM 40 MG: 40 TABLET, FILM COATED ORAL at 20:40

## 2020-04-28 RX ADMIN — SODIUM CHLORIDE SOLN NEBU 3% 4 ML: 3 NEBU SOLN at 22:21

## 2020-04-28 RX ADMIN — CEFTRIAXONE SODIUM 1 G: 1 INJECTION, POWDER, FOR SOLUTION INTRAMUSCULAR; INTRAVENOUS at 01:05

## 2020-04-28 ASSESSMENT — PAIN SCALES - GENERAL
PAINLEVEL_OUTOF10: 0
PAINLEVEL_OUTOF10: 0

## 2020-04-28 NOTE — PROGRESS NOTES
Impaired     CLINICAL OBSERVATIONS NOTED DURING THE EVALUATION  Latent responses and Cueing was required                  Prognosis for improvements is good  This plan will be re-evaluated and revised in 1 week  if warranted. Patient stated goals: Agreed with above  Treatment goals discussed with Patient   The Patient understand(s) the diagnosis, prognosis and plan of care       CPT code:    95361  eval speech sound lang comprehension      The admitting diagnosis and active problem list, as listed below have been reviewed prior to initiation of this evaluation.      ADMITTING DIAGNOSIS: STEMI (ST elevation myocardial infarction) ([facility] [address] [I21.3]     ACTIVE PROBLEM LIST:   Patient Active Problem List   Diagnosis    MS (multiple sclerosis) ([address]    Spastic quadriparesis ([address]    Essential hypertension    Hypothyroidism    H/O sarcoidosis    Hyperlipidemia LDL goal <100    Neurogenic bladder    HUDSON (nonalcoholic steatohepatitis)    Type 2 diabetes mellitus with hyperglycemia, without long-term current use of insulin (HCC)    STEMI (ST elevation myocardial infarction) ([address]    CAD (coronary artery disease)    Cardiogenic shock (HCC)    Hydronephrosis    Chronic systolic (congestive) heart failure (HCC)    Left ventricular thrombus    Septic shock (HCC)    Acute respiratory failure with hypoxia (HCC)    Acute on chronic systolic (congestive) heart failure ([address]

## 2020-04-28 NOTE — PROGRESS NOTES
Spoke with Dr. Lilian Donnelly ok to hold Eliquis and can hold plavix for one day for thoracentesis.

## 2020-04-28 NOTE — PROGRESS NOTES
Chief Complaint:  No chief complaint on file. Acute respiratory failure with hypoxia (HCC)     Subjective:    She endorses a small amount of  dyspnea. Denies chest pain. Speaks in a very quiet hoarse voice. She denies any pain or discomfort otherwise. Objective:    BP (!) 108/51   Pulse 103   Temp 97.5 °F (36.4 °C)   Resp 21   Ht 5' (1.524 m)   Wt 151 lb 14.4 oz (68.9 kg)   SpO2 94%   BMI 29.67 kg/m²     Current medications that patient is taking have been reviewed. I/O net -1.5 L  156 -> 151 lbs today    General appearance: NAD, conversant  HEENT: AT/NC, MMM  Neck: FROM, supple  Lungs: Bilateral crackles, slight dullness R hemithorax, work of breathing mildly elevated but not in respiratory distress, improved WOB compared to yesterday  CV: RRR, no MRGs  Abdomen: Soft, non-tender; no masses or HSM, +BS  Extremities: No peripheral edema or digital cyanosis  : Barger in place, dark-colored urine  Skin: no rash, lesions or ulcers  Psych: Calm and cooperative  Neuro: Alert and interactive, nonfocal    Labs:  CBC:   Lab Results   Component Value Date    WBC 12.4 04/28/2020    RBC 3.42 04/28/2020    HGB 10.8 04/28/2020    HCT 33.7 04/28/2020    MCV 98.5 04/28/2020    MCH 31.6 04/28/2020    MCHC 32.0 04/28/2020    RDW 15.4 04/28/2020     04/28/2020    MPV 10.7 04/28/2020     BMP:    Lab Results   Component Value Date     04/28/2020    K 3.5 04/28/2020    K 4.0 03/27/2019     04/28/2020    CO2 24 04/28/2020    BUN 18 04/28/2020    LABALBU 3.3 04/28/2020    CREATININE 0.5 04/28/2020    CALCIUM 9.6 04/28/2020    GFRAA >60 04/28/2020    LABGLOM >60 04/28/2020    GLUCOSE 147 04/28/2020      I've personally reviewed the patient's CXR from today:       Worsening opacification/collapse of the right hemithorax. There does  appear to be some mediastinal shift to the right.     Left lower lobe atelectasis/infiltrate not significantly changed       Assessment/Plan:  Principal Problem:    Acute respiratory failure with hypoxia (HCC)  Active Problems:    H/O sarcoidosis    Essential hypertension    Hypothyroidism    MS (multiple sclerosis) (HCC)    Spastic quadriparesis (HCC)    Hyperlipidemia LDL goal <100    Neurogenic bladder    HUDSON (nonalcoholic steatohepatitis)    Type 2 diabetes mellitus with hyperglycemia, without long-term current use of insulin (HCC)    STEMI (ST elevation myocardial infarction) (HCC)    CAD (coronary artery disease)    Cardiogenic shock (HCC)    Hydronephrosis    Chronic systolic (congestive) heart failure ([facility])    Left ventricular thrombus    Septic shock (HCC)    Acute on chronic systolic (congestive) heart failure ([facility])  Resolved Problems:    Postprocedural hypotension    Acute midline thoracic back pain    Ischemic cardiomyopathy     Continue IV Lasix for decompensated systolic congestive heart failure. Continue lisinopril, carvedilol, and I will switch to [facility] before discharge. Will need LifeVest as well. Continue apixaban for LV thrombus.     Bronch versus airway clearance techniques for complete R opacification, probably mucous plugging/atelectasis    Continue aspirin, statin, and Plavix for CAD with delayed presentation of STEMI    Continue ceftriaxone for sepsis due to UTI with obstructing hydronephrosis due to nephrolithiasis, status post cystoscopy    Glucose stable    Critical care time: 30 minutes  Requires continued inpatient level of care   Juan José Hwang    9:30 AM  4/28/2020  Cell: [phone]

## 2020-04-28 NOTE — PROGRESS NOTES
[address] [facility]   Department of Internal Medicine   Internal Medicine Residency  MICU Progress Note    Patient:  Andreina Keene 77 y.o. female   MRN: [id number]       Date of Service: 4/28/2020    Allergy: Patient has no known allergies. Subjective     The patient was seen this morning. 24 hour events  -Patient remains stable overnight, BiPAP alternating with high flow nasal cannula.  -Hemoglobin stable at 10.8 this morning  -Chest x-ray now showing worsening appearance of the right lung field, concerning for pleural effusion and possible atelectasis. Objective     Vitals:    04/28/20 0026 04/28/20 0100 04/28/20 0400 04/28/20 0500   BP:  (!) 94/58 (!) 108/51    Pulse:  99 103    Resp: 23 22 21    Temp:   97.5 °F (36.4 °C)    TempSrc:       SpO2: 90% 95% 94%    Weight:    151 lb 14.4 oz (68.9 kg)   Height:           Physical Exam:  · I & O - 24hr:No intake/output data recorded. Physical Exam  Constitutional:       General: She is not in acute distress. Appearance: Normal appearance. She is well-developed. She is not diaphoretic. HENT:      Head: Normocephalic and atraumatic. Eyes:      General: No scleral icterus. Conjunctiva/sclera: Conjunctivae normal.      Pupils: Pupils are equal, round, and reactive to light. Cardiovascular:      Rate and Rhythm: Normal rate and regular rhythm. Heart sounds: Normal heart sounds, S1 normal and S2 normal. No murmur. Pulmonary:      Effort: Pulmonary effort is normal. No respiratory distress. Breath sounds: Normal breath sounds. No wheezing or rales. Abdominal:      General: Bowel sounds are normal. There is no distension. Palpations: Abdomen is soft. There is no mass. Tenderness: There is no abdominal tenderness. There is no guarding. Musculoskeletal:         General: No swelling or tenderness. Right lower leg: No edema. Left lower leg: No edema. Skin:     General: Skin is warm and dry.       Coloration: LIVER PROFILE:   Recent Labs     04/26/20  0555 04/27/20  0510 04/28/20  0530   AST 20 15 18   ALT 9 8 9   BILITOT 0.3 <0.2 0.3   ALKPHOS 146* 120* 118*       PT/INR:   No results for input(s): PROTIME, INR in the last 72 hours. APTT:   No results for input(s): APTT in the last 72 hours. Fasting Lipid Panel:    Lab Results   Component Value Date    CHOL 201 03/12/2019    TRIG 282 03/12/2019    HDL 34 10/30/2019       Cardiac Enzymes:    Lab Results   Component Value Date    CKTOTAL 204 (H) 04/22/2020    CKTOTAL 294 (H) 04/22/2020    CKTOTAL 260 (H) 04/21/2020    CKMB 18.6 (H) 04/22/2020    CKMB 31.3 (H) 04/22/2020    CKMB 27.7 (H) 04/21/2020    TROPONINI 1.17 (H) 04/22/2020    TROPONINI 1.15 (H) 04/22/2020    TROPONINI 0.83 (H) 04/21/2020       Notable Cultures:      Blood cultures   Blood Culture, Routine   Date Value Ref Range Status   04/22/2020 5 Days- no growth  Final     Respiratory cultures No results found for: RESPCULTURE No results found for: LABGRAM  Urine   Urine Culture, Routine   Date Value Ref Range Status   04/23/2020 <10,000 CFU/ml  Final     Legionella No results found for: LABLEGI  C Diff PCR No results found for: CDIFPCR  Wound culture/abscess: No results for input(s): WNDABS in the last 72 hours. Tip culture:No results for input(s): CXCATHTIP in the last 72 hours. Antibiotic  Days  Day started   Rocephin    4/22                               Lines:  Site  Day  Date inserted     TLC  femoral d/c   4  4/21     PICC              Arterial line  femoral d/c   4  4/21     Peripheral line              HD cath              Imaging Studies:  Echo 4/22/2020  Summary   Severe anterior and septal hypo-akinesia, with apical dyskinesia. Ejection fraction is visually estimated at 20-25%. Stage I diastolic dysfunction. Small apical LV thrombus   Mild to moderate mitral regurgitation is present.       CXR 4/26/20      CXR 4/26/20        EKG:   No interval change    Resident's Assessment and PLan     Assessment:  1. STEMI s/p [facility], PCI with IRINA to LAD 4/21/2020  · Followed by cardiologist from [facility], currently on aspirin, plavix and Eliquis  2. Acute hypoxic respiratory failure 2/2 acute pulmonary edema from CHF after MI (COVID negative x2)  · S/p self extubation 4/26/2020  · Currently on BiPAP alternating with high flow nasal cannula and saturating in the 90s  · CXR with worsening right sided infiltrates concerning for pleural effusion versus atelectasis  3. Acute systolic heart failure with small apical LV thrombus  · 2D echo with EF 20 to 25%, anterior and septal hypokinesia with apical dyskinesia  · On DAPT: ASA/plavix, statin, Eliquis, Coreg, lisinopril  · Ongoing diuresis with 40 mg IV Lasix and output of 1.630 L in the last 24 hours  4. Cardiogenic vs septic shock -resolved   5. Left hydronephrosis 2/2 2 left-sided stones   · S/p cystoscopy, retrograde pyelogram with left ureteral stenting 4/23/2020  · On rocephin #7. Urine culture strep agalactiae with <10,000  6. Acute anemia, stable Hb 10.8 this morning  7. Hypothyroidism  8. Hyperlipidemia  9. Hx of MS   10. Hx of sarcoidosis      Plan:  1. Continue triple therapy with ASA/Plavix/Eliquis (plan is to continue for 1 month, and then d/c ASA to continue plavix/Eliquis for 6 months)  2. Continue Lisinopril 2.5 mg daily, Coreg 3.125 mg twice daily, Lasix 40 mg IV daily. Monitor blood pressure, urine output and proBNP every other day  3. Continue BiPAP alternating with high flow nasal cannula and wean as able  4. Patient may require thoracentesis, will defer to pulmonology. Last dose of Eliquis 4/28 9 AM.  5. To start EZ Pap and Mucomyst  6. Continue to monitor respiratory status   7. DC Rocephin after dose today to complete 7 days   8. Follow-up repeat urine culture  9. Daily CBC, CMP   10. Continue all other meds    DVT/GI prophylaxis: Eliquis/Protonix  Disposition: ONEAL Juarez M.D.     Internal Medicine Resident -

## 2020-04-28 NOTE — PROGRESS NOTES
efficiency of treatment session. Treatment:  OT services provided including Facilitation of bed mobility (rolling, supine<>sit) and sitting balance at EOB (6-8 minutes; addressing posture, weightshifting and light reaching; + desaturation requiring return to bed) - skilled cuing on sequencing, hand placement, posture, body mechanics, energy conservation techniques and safety. Therapist facilitated B UE ther ex 2x10 addressing anabel, form and O2 sats). Therapist facilitated self-care retraining: simulated UB/LB self-care tasks, attempted toileting task on bed pan (+ hygiene) and grooming task while educating pt on modified techniques, posture, safety and energy conservation techniques. Therapist facilitated skilled repositioning in bed addressing joint and skin integrity. Skilled positioning for optimal vitals. Skilled monitoring of HR, O2 sats and pts response to treatment - cuing on pursed lip breathing techniques (see above). Pt required frequent rest breaks throughout session due to fatigue and for monitoring of sats.      Eval Complexity: mod  Profile and History- med (extensive chart review)  Assessment of Occupational Performance and Identification of Deficits- med  Clinical Decision Making- med       (Evaluation time includes thorough review of current medical information, gathering information on past medical history/social history and prior level of function, completion of standardized testing/informal observation of tasks, assessment of data, and development of POC/Goals.)      Assessment of current deficits   Functional mobility [x]  ADLs [x] Strength [x]  Cognition []  Functional transfers  [x] IADLs [x] Safety Awareness [x]  Endurance [x]  Fine Motor Coordination [x] Balance [x] Vision/perception [] Sensation [x]   Gross Motor Coordination [x] ROM [x] Delirium []                  Motor Control [x]    Plan of Care: OT for 5-7 days   ADL retraining [x]   Equipment needs [x]   Neuromuscular re-education [x] Energy Conservation Techniques [x]  Functional Transfer training [] Patient and/or Family Education [x]  Functional Mobility training [x]  Environmental Modifications [x]  Cognitive re-training []   Compensatory techniques for ADLs [x]  Splinting Needs []   Positioning to improve overall function [x]   Therapeutic Activity [x]  Therapeutic Exercise  [x]  Visual/Perceptual: []    Delirium prevention/treatment  []   Other:  []    Rehab Potential: fair for established goals    LTG: maximize independence with ADLs     Patient / Family Goal:  Not stated     Patient and/or family were instructed diagnosis, prognosis/goals and plan of care. Demonstrated Fair understanding. [] Malnutrition indicators have been identified and nursing has been notified to ensure a dietitian consult is ordered.        AM-PAC Daily Activity Inpatient   How much help for putting on and taking off regular lower body clothing?: Total  How much help for Bathing?: A Lot  How much help for Toileting?: Total  How much help for putting on and taking off regular upper body clothing?: A Lot  How much help for taking care of personal grooming?: A Little  How much help for eating meals?: Total  AM-PAC Inpatient Daily Activity Raw Score: 10  AM-PAC Inpatient ADL T-Scale Score : 27.31  ADL Inpatient CMS 0-100% Score: 74.7  ADL Inpatient CMS G-Code Modifier : CL       mod Evaluation +     Treatment Time In:850            Treatment Time Out: 930              Treatment Charges: Mins Units   Ther Ex  94755     Manual Therapy 76165     Thera Activities 21277 29 2   ADL/Home Mgt 89944 11 1   Neuro Re-ed 47026     Group Therapy      Orthotic manage/training  72819     Non-Billable Time     Total Timed Treatment 40 3

## 2020-04-28 NOTE — PROGRESS NOTES
Mental status is at baseline. Cranial Nerves: No cranial nerve deficit. Motor: Weakness present. Gait: Gait abnormal.       Labs and Imaging Studies     CBC:   Recent Labs     04/26/20  0555 04/27/20  0510 04/27/20  1230 04/28/20  0530   WBC 11.7* 11.7*  --  12.4*   RBC 3.73 3.35*  --  3.42*   HGB 12.0 10.5* 10.9* 10.8*   HCT 37.1 33.2* 34.0 33.7*   MCV 99.5 99.1  --  98.5   MCH 32.2 31.3  --  31.6   MCHC 32.3 31.6*  --  32.0   RDW 15.2* 15.1*  --  15.4*    339  --  459*   MPV 11.0 10.9  --  10.7       BMP:    Recent Labs     04/26/20  0555 04/27/20  0510 04/28/20  0530    140 146   K 4.0 4.3 3.5    100 101   CO2 19* 21* 24   BUN 12 16 18   CREATININE 0.5 0.5 0.5   GLUCOSE 250* 166* 147*   CALCIUM 9.2 9.1 9.6   PROT 6.8 6.2* 6.5   LABALBU 3.3* 2.9* 3.3*   BILITOT 0.3 <0.2 0.3   ALKPHOS 146* 120* 118*   AST 20 15 18   ALT 9 8 9       LIVER PROFILE:   Recent Labs     04/26/20  0555 04/27/20  0510 04/28/20  0530   AST 20 15 18   ALT 9 8 9   BILITOT 0.3 <0.2 0.3   ALKPHOS 146* 120* 118*       PT/INR:   No results for input(s): PROTIME, INR in the last 72 hours. APTT:   No results for input(s): APTT in the last 72 hours. Fasting Lipid Panel:    Lab Results   Component Value Date    CHOL 201 03/12/2019    TRIG 282 03/12/2019    HDL 34 10/30/2019       Cardiac Enzymes:    Lab Results   Component Value Date    CKTOTAL 204 (H) 04/22/2020    CKTOTAL 294 (H) 04/22/2020    CKTOTAL 260 (H) 04/21/2020    CKMB 18.6 (H) 04/22/2020    CKMB 31.3 (H) 04/22/2020    CKMB 27.7 (H) 04/21/2020    TROPONINI 1.17 (H) 04/22/2020    TROPONINI 1.15 (H) 04/22/2020    TROPONINI 0.83 (H) 04/21/2020     Imaging Studies:  Echo 4/22/2020  Summary   Severe anterior and septal hypo-akinesia, with apical dyskinesia. Ejection fraction is visually estimated at 20-25%. Stage I diastolic dysfunction. Small apical LV thrombus   Mild to moderate mitral regurgitation is present.     CXR 4/27      CXR

## 2020-04-28 NOTE — PROGRESS NOTES
systolic (congestive) heart failure (HCC)  Resolved Problems:    Postprocedural hypotension    Acute midline thoracic back pain    Ischemic cardiomyopathy      Past Medical History:  Past Medical History:   Diagnosis Date    CAD (coronary artery disease) 4/21/2020    Hepatitis     High cholesterol     Hypertension     Hypothyroidism     MI (myocardial infarction) ([facility] 04/21/2020    MS (multiple sclerosis) ([facility]     Osteoporosis     Sarcoidosis     Type 2 diabetes mellitus with hyperglycemia, without long-term current use of insulin ([facility] 10/30/2019       Allergies:  No Known Allergies    Current Medications:  Current Facility-Administered Medications   Medication Dose Route Frequency Provider Last Rate Last Dose    pantoprazole (PROTONIX) tablet 40 mg  40 mg Oral QAM AC Geoff Strong MD   40 mg at 04/28/20 0949    sodium chloride (Inhalant) 3 % nebulizer solution 4 mL  4 mL Nebulization Q4H Geoff Strong MD        potassium chloride (KLOR-CON M) extended release tablet 40 mEq  40 mEq Oral Once Jese Her MD        furosemide (LASIX) injection 40 mg  40 mg Intravenous Daily Geoff Strong MD   40 mg at 04/28/20 0952    carvedilol (COREG) tablet 3.125 mg  3.125 mg Oral BID Chavez Ramos MD   3.125 mg at 04/28/20 0950    clopidogrel (PLAVIX) tablet 75 mg  75 mg Oral Daily Agustín Myers MD   75 mg at 04/28/20 0949    [Held by provider] apixaban (ELIQUIS) tablet 5 mg  5 mg Oral BID Agustín Myers MD   5 mg at 04/28/20 0949    insulin lispro (HUMALOG) injection vial 0-12 Units  0-12 Units Subcutaneous Q4H Hayley Bryan MD   2 Units at 04/28/20 0946    baclofen (LIORESAL) tablet 20 mg  20 mg Oral 4x Daily Michelle Avila MD   20 mg at 04/28/20 9849    dantrolene (DANTRIUM) capsule 50 mg  50 mg Oral 5x Daily Hayley Bryan MD   50 mg at 04/28/20 0950    levothyroxine (SYNTHROID) tablet 88 mcg  88 mcg Oral Daily Hayley Bryan MD   88 mcg at 04/28/20 0951    [Held by provider] Panel:  No results for input(s): LDLCALC, HDL, TRIG in the last 72 hours. Invalid input(s): CHLPL  Lab Results   Component Value Date    LDLCALC 115 (H) 10/30/2019    LDLCALC 101 07/24/2019    LDLCALC 131 03/12/2019     Lab Results   Component Value Date    HDL 34 10/30/2019    HDL 41 07/24/2019    HDL 45 03/12/2019     Lab Results   Component Value Date    TRIG 282 03/12/2019     No components found for: CHLPL    TSH:  No results for input(s): TSH in the last 72 hours. Lab Results   Component Value Date    TSH 6.940 10/30/2019           Radiology:  XR CHEST PORTABLE   Final Result   Worsening opacification/collapse of the right hemithorax. There does   appear to be some mediastinal shift to the right. Left lower lobe atelectasis/infiltrate not significantly changed               XR CHEST PORTABLE   Final Result   Worsening of bibasilar infiltrates. Possibility of CHF versus   pneumonia               XR CHEST PORTABLE   Final Result      Interval extubation and removal of the enteric tube. Otherwise no   significant change from the previous exam.                  XR CHEST PORTABLE   Final Result   Tortuous aorta    There are patchy infiltrates seen throughout both the lung fields. Pneumonia could give this appearance. Pulmonary edema or ARDS could give a similar appearance   The chest is worse in the interval               XR CHEST PORTABLE   Final Result   Stable abnormal chest with improving perihilar and bilateral   infiltrates likely improving CHF with decreasing edema or pneumonia. FL RETROGRADE PYELOGRAM W WO KUB   Final Result   Intraoperative fluoroscopy for placement of a right ureteral stent. The exam has been dictated and signed by Chen Coronado. ELIE Cash-NEO   and Dora OLEARY [facility] MD, reviewed and concurred with these findings. XR CHEST PORTABLE   Final Result      1. Endotracheal tube is present with distal tip 3.4 cm above the   marlen.       2. New interstitial pulmonary

## 2020-04-28 NOTE — PROGRESS NOTES
Physical Therapy    Physical Therapy Initial Assessment     Name: Luis De La Paz  : 1945  MRN: [id number]    Referring Provider:  ELIE Fuentes CNP    Date of Service: 2020    Evaluating PT:  Gisele Girard, PT, DPT [id number]     Room #:  6194/8477-W  Diagnosis:  STEMI  PMHx/PSHx:  CAD, Hepatitis, HTN, hypothyroidism, MI, MS, osteoporosis, sarcoidosis, type II DM   Procedure/Surgery:  Intubated , Left Heart Catheterization, coronary angiography, left ventriculography, percutaneous coronary intervention to LAD , Cysto with stents , Extubated    Precautions:  Falls, Droplet plus isolation, Covid (-), Hx quadriparesis d/t MS  Equipment Needs:  NA    SUBJECTIVE:    Pt lives with  in a 1 story home with ramp entry. Bedroom and bathroom are on the main level. Pt is non-ambulatory. Pt's  transfers her to wheelchair. Low pivot to wheelchair. Slide board transfer to car.  transfers wheelchair to shower chair. Able to upper body dress and perform upper body ADLs. Owns hospital bed but does not use it. Manual and power wheelchair. Requires assist for mobility around the house in the manual wheelchair. Owns eliseo lift as well. OBJECTIVE:   Initial Evaluation  Date: 20 Treatment Short Term/ Long Term   Goals   AM-PAC 6 Clicks 0/46     Was pt agreeable to Eval/treatment? Yes     Does pt have pain? No c/o pain      Bed Mobility  Rolling: Max A  Supine to sit: Dep x2  Sit to supine: Dep x2  Scooting: Dep x2  Rolling: Mod A  Supine to sit: Max A  Sit to supine: Max A  Scooting: Max A   Transfers Sit to stand: NT  Stand to sit: NT  Stand pivot: NT  Sit to stand: NA  Stand to sit: NA  Stand pivot:  Max A with AAD   Ambulation    NT  NA   Stair negotiation: ascended and descended  NT  NT   ROM BUE:  Per OT eval  BLE:  WFL     Strength BUE:  Per OT eval   BLE:  Grossly 0/5 at ankle, 2-/5 at knee (R>L), 1/5 at hip     Balance Sitting EOB:  Max with knee block  Dynamic Standing:  NT  Sitting EOB:  Min A  Dynamic Standing:  NA     Pt is A & O x 4  RASS:  0  CAM-ICU:  Negative   Sensation:  Pt denies numbness and tingling to extremities. Diminished  lt touch in BLE   Edema:  Unremarkable     Vitals:  Blood Pressure at rest 112/60 Blood Pressure post session 115/63   Heart Rate at rest 110 bpm Heart Rate post session 111 bpm   SPO2 at rest 91% on 4 L  SPO2 post session 93% on 8 L    SpO2 dropped to 81% at EOB on 4 L NC -- only recovering to 83% with PLB;  BP /74  SpO2 85-86% after increase to 8 L HFNC -- returned to supine d/t inability to maintain oxygen saturation   SpO2  after return to supine on 8 L HFNC     Functional Status Score-Intensive Care Unit (FSS-ICU)   Rolling 2/7   Supine to sit transfer 1/7   Unsupported sitting  2/7   Sit to stand transfers 0/7   Ambulation 0/7   Total  5/35     Therapeutic Exercises:     BLE PROM   B hip/knee bends with AAROM x5 reps   B calf stretching 2x25 seconds     Patient education  Pt educated on safety during functional mobility, sitting balance and posture, deep breathing and PLB, positioning     Patient response to education:   Pt verbalized understanding Pt demonstrated skill Pt requires further education in this area   Yes  With cues  Yes      ASSESSMENT:    Comments:  Pt received supine and agreeable to PT evaluation with OT collaboration. Pt cleared for participation by RN prior to session. Vitals monitored during session. Requires assist during rolling L<>R to remove bed pan and hygiene. Performed supine>sit with assist of trunk and BLE. SpO2 dropped at EOB, unable to recover with deep breathing. Increased O2 and placed high flow cannula. Pt still not maintaining good oxygen saturation. Required knee block at EOB and assist d/t posterior lean. Returned to supine when fatigued and d/t poor oxygen saturation. Scooted up in bed and re-positioned. Rolled L<>R again from pad positioning.   Scooted

## 2020-04-29 ENCOUNTER — APPOINTMENT (OUTPATIENT)
Dept: CT IMAGING | Age: 75
DRG: 246 | End: 2020-04-29
Attending: INTERNAL MEDICINE
Payer: MEDICARE

## 2020-04-29 ENCOUNTER — APPOINTMENT (OUTPATIENT)
Dept: GENERAL RADIOLOGY | Age: 75
DRG: 246 | End: 2020-04-29
Attending: INTERNAL MEDICINE
Payer: MEDICARE

## 2020-04-29 LAB
ALBUMIN SERPL-MCNC: 3.9 G/DL (ref 3.5–5.2)
ALP BLD-CCNC: 123 U/L (ref 35–104)
ALT SERPL-CCNC: 8 U/L (ref 0–32)
ANION GAP SERPL CALCULATED.3IONS-SCNC: 21 MMOL/L (ref 7–16)
AST SERPL-CCNC: 17 U/L (ref 0–31)
B.E.: 4.8 MMOL/L (ref -3–3)
BILIRUB SERPL-MCNC: 0.3 MG/DL (ref 0–1.2)
BUN BLDV-MCNC: 17 MG/DL (ref 8–23)
CALCIUM SERPL-MCNC: 10 MG/DL (ref 8.6–10.2)
CHLORIDE BLD-SCNC: 100 MMOL/L (ref 98–107)
CO2: 25 MMOL/L (ref 22–29)
COHB: 0.1 % (ref 0–1.5)
CREAT SERPL-MCNC: 0.5 MG/DL (ref 0.5–1)
CRITICAL: ABNORMAL
DATE ANALYZED: ABNORMAL
DATE OF COLLECTION: ABNORMAL
GFR AFRICAN AMERICAN: >60
GFR NON-AFRICAN AMERICAN: >60 ML/MIN/1.73
GLUCOSE BLD-MCNC: 166 MG/DL (ref 74–99)
HCO3: 29.9 MMOL/L (ref 22–26)
HHB: 4.5 % (ref 0–5)
LAB: ABNORMAL
Lab: ABNORMAL
METER GLUCOSE: 115 MG/DL (ref 74–99)
METER GLUCOSE: 135 MG/DL (ref 74–99)
METER GLUCOSE: 150 MG/DL (ref 74–99)
METER GLUCOSE: 163 MG/DL (ref 74–99)
METER GLUCOSE: 169 MG/DL (ref 74–99)
METER GLUCOSE: 231 MG/DL (ref 74–99)
METHB: 0.5 % (ref 0–1.5)
MODE: ABNORMAL
O2 CONTENT: 16.7 ML/DL
O2 SATURATION: 95.5 % (ref 92–98.5)
O2HB: 94.9 % (ref 94–97)
OPERATOR ID: 7874
PATIENT TEMP: 37 C
PCO2: 46.5 MMHG (ref 35–45)
PH BLOOD GAS: 7.43 (ref 7.35–7.45)
PO2: 80.5 MMHG (ref 75–100)
POTASSIUM SERPL-SCNC: 3.7 MMOL/L (ref 3.5–5)
PRO-BNP: ABNORMAL PG/ML (ref 0–450)
SODIUM BLD-SCNC: 146 MMOL/L (ref 132–146)
SOURCE, BLOOD GAS: ABNORMAL
THB: 12.5 G/DL (ref 11.5–16.5)
TIME ANALYZED: 1252
TOTAL PROTEIN: 7.2 G/DL (ref 6.4–8.3)

## 2020-04-29 PROCEDURE — 80053 COMPREHEN METABOLIC PANEL: CPT

## 2020-04-29 PROCEDURE — 82805 BLOOD GASES W/O2 SATURATION: CPT

## 2020-04-29 PROCEDURE — 36415 COLL VENOUS BLD VENIPUNCTURE: CPT

## 2020-04-29 PROCEDURE — 2580000003 HC RX 258: Performed by: INTERNAL MEDICINE

## 2020-04-29 PROCEDURE — 6360000002 HC RX W HCPCS: Performed by: INTERNAL MEDICINE

## 2020-04-29 PROCEDURE — 97530 THERAPEUTIC ACTIVITIES: CPT

## 2020-04-29 PROCEDURE — 94667 MNPJ CHEST WALL 1ST: CPT

## 2020-04-29 PROCEDURE — 6370000000 HC RX 637 (ALT 250 FOR IP): Performed by: INTERNAL MEDICINE

## 2020-04-29 PROCEDURE — 71045 X-RAY EXAM CHEST 1 VIEW: CPT

## 2020-04-29 PROCEDURE — 94660 CPAP INITIATION&MGMT: CPT

## 2020-04-29 PROCEDURE — 6370000000 HC RX 637 (ALT 250 FOR IP): Performed by: UROLOGY

## 2020-04-29 PROCEDURE — 70450 CT HEAD/BRAIN W/O DYE: CPT

## 2020-04-29 PROCEDURE — 2060000000 HC ICU INTERMEDIATE R&B

## 2020-04-29 PROCEDURE — 94640 AIRWAY INHALATION TREATMENT: CPT

## 2020-04-29 PROCEDURE — 99233 SBSQ HOSP IP/OBS HIGH 50: CPT | Performed by: INTERNAL MEDICINE

## 2020-04-29 PROCEDURE — 82962 GLUCOSE BLOOD TEST: CPT

## 2020-04-29 PROCEDURE — 2580000003 HC RX 258: Performed by: UROLOGY

## 2020-04-29 PROCEDURE — 71250 CT THORAX DX C-: CPT

## 2020-04-29 PROCEDURE — 83880 ASSAY OF NATRIURETIC PEPTIDE: CPT

## 2020-04-29 PROCEDURE — 2700000000 HC OXYGEN THERAPY PER DAY

## 2020-04-29 PROCEDURE — 94669 MECHANICAL CHEST WALL OSCILL: CPT

## 2020-04-29 RX ORDER — FUROSEMIDE 10 MG/ML
20 INJECTION INTRAMUSCULAR; INTRAVENOUS ONCE
Status: COMPLETED | OUTPATIENT
Start: 2020-04-29 | End: 2020-04-29

## 2020-04-29 RX ORDER — IPRATROPIUM BROMIDE AND ALBUTEROL SULFATE 2.5; .5 MG/3ML; MG/3ML
1 SOLUTION RESPIRATORY (INHALATION) EVERY 4 HOURS PRN
Status: DISCONTINUED | OUTPATIENT
Start: 2020-04-29 | End: 2020-05-01 | Stop reason: HOSPADM

## 2020-04-29 RX ADMIN — BACLOFEN 20 MG: 10 TABLET ORAL at 15:50

## 2020-04-29 RX ADMIN — CLOPIDOGREL 75 MG: 75 TABLET, FILM COATED ORAL at 08:11

## 2020-04-29 RX ADMIN — DANTROLENE SODIUM 50 MG: 25 CAPSULE ORAL at 08:10

## 2020-04-29 RX ADMIN — IPRATROPIUM BROMIDE AND ALBUTEROL SULFATE 1 AMPULE: 2.5; .5 SOLUTION RESPIRATORY (INHALATION) at 01:34

## 2020-04-29 RX ADMIN — DANTROLENE SODIUM 50 MG: 25 CAPSULE ORAL at 23:47

## 2020-04-29 RX ADMIN — FUROSEMIDE 40 MG: 10 INJECTION, SOLUTION INTRAMUSCULAR; INTRAVENOUS at 08:11

## 2020-04-29 RX ADMIN — IPRATROPIUM BROMIDE AND ALBUTEROL SULFATE 1 AMPULE: 2.5; .5 SOLUTION RESPIRATORY (INHALATION) at 09:09

## 2020-04-29 RX ADMIN — CARVEDILOL 3.12 MG: 3.12 TABLET, FILM COATED ORAL at 08:12

## 2020-04-29 RX ADMIN — SODIUM CHLORIDE SOLN NEBU 3% 4 ML: 3 NEBU SOLN at 09:05

## 2020-04-29 RX ADMIN — SODIUM CHLORIDE SOLN NEBU 3% 4 ML: 3 NEBU SOLN at 16:30

## 2020-04-29 RX ADMIN — INSULIN LISPRO 4 UNITS: 100 INJECTION, SOLUTION INTRAVENOUS; SUBCUTANEOUS at 20:27

## 2020-04-29 RX ADMIN — SODIUM CHLORIDE SOLN NEBU 3% 4 ML: 3 NEBU SOLN at 04:04

## 2020-04-29 RX ADMIN — SODIUM CHLORIDE SOLN NEBU 3% 4 ML: 3 NEBU SOLN at 23:45

## 2020-04-29 RX ADMIN — FUROSEMIDE 20 MG: 10 INJECTION, SOLUTION INTRAMUSCULAR; INTRAVENOUS at 03:45

## 2020-04-29 RX ADMIN — PANTOPRAZOLE SODIUM 40 MG: 40 TABLET, DELAYED RELEASE ORAL at 08:11

## 2020-04-29 RX ADMIN — LISINOPRIL 2.5 MG: 5 TABLET ORAL at 08:12

## 2020-04-29 RX ADMIN — BUDESONIDE AND FORMOTEROL FUMARATE DIHYDRATE 2 PUFF: 80; 4.5 AEROSOL RESPIRATORY (INHALATION) at 20:24

## 2020-04-29 RX ADMIN — OXYCODONE HYDROCHLORIDE AND ACETAMINOPHEN 1 TABLET: 5; 325 TABLET ORAL at 08:27

## 2020-04-29 RX ADMIN — DANTROLENE SODIUM 50 MG: 25 CAPSULE ORAL at 19:09

## 2020-04-29 RX ADMIN — DANTROLENE SODIUM 50 MG: 25 CAPSULE ORAL at 15:51

## 2020-04-29 RX ADMIN — SODIUM CHLORIDE SOLN NEBU 3% 4 ML: 3 NEBU SOLN at 19:32

## 2020-04-29 RX ADMIN — BACLOFEN 20 MG: 10 TABLET ORAL at 20:23

## 2020-04-29 RX ADMIN — LEVOTHYROXINE SODIUM 88 MCG: 0.09 TABLET ORAL at 08:12

## 2020-04-29 RX ADMIN — ATORVASTATIN CALCIUM 40 MG: 40 TABLET, FILM COATED ORAL at 20:23

## 2020-04-29 RX ADMIN — BACLOFEN 20 MG: 10 TABLET ORAL at 08:11

## 2020-04-29 RX ADMIN — BUDESONIDE AND FORMOTEROL FUMARATE DIHYDRATE 2 PUFF: 80; 4.5 AEROSOL RESPIRATORY (INHALATION) at 08:13

## 2020-04-29 RX ADMIN — Medication 10 ML: at 08:10

## 2020-04-29 RX ADMIN — Medication 10 ML: at 20:23

## 2020-04-29 RX ADMIN — CARVEDILOL 3.12 MG: 3.12 TABLET, FILM COATED ORAL at 20:22

## 2020-04-29 RX ADMIN — ASPIRIN 81 MG: 81 TABLET, COATED ORAL at 08:12

## 2020-04-29 RX ADMIN — INSULIN LISPRO 2 UNITS: 100 INJECTION, SOLUTION INTRAVENOUS; SUBCUTANEOUS at 08:27

## 2020-04-29 ASSESSMENT — PAIN DESCRIPTION - LOCATION
LOCATION: CHEST
LOCATION: SHOULDER
LOCATION: SHOULDER

## 2020-04-29 ASSESSMENT — PAIN DESCRIPTION - PAIN TYPE
TYPE: ACUTE PAIN

## 2020-04-29 ASSESSMENT — PAIN SCALES - GENERAL
PAINLEVEL_OUTOF10: 0
PAINLEVEL_OUTOF10: 0
PAINLEVEL_OUTOF10: 8
PAINLEVEL_OUTOF10: 0
PAINLEVEL_OUTOF10: 5
PAINLEVEL_OUTOF10: 5
PAINLEVEL_OUTOF10: 0

## 2020-04-29 ASSESSMENT — PAIN DESCRIPTION - ORIENTATION
ORIENTATION: ANTERIOR
ORIENTATION: RIGHT
ORIENTATION: RIGHT

## 2020-04-29 NOTE — PROGRESS NOTES
capsule 50 mg  50 mg Oral 5x Daily Victor Hugo Avila MD   50 mg at 04/29/20 0810    levothyroxine (SYNTHROID) tablet 88 mcg  88 mcg Oral Daily Mark Campbell MD   88 mcg at 04/29/20 5955    [Held by provider] nortriptyline (PAMELOR) capsule 25 mg  25 mg Oral Nightly Viktor Avila DO        sodium chloride flush 0.9 % injection 10 mL  10 mL Intravenous 2 times per day Mark Campbell MD   10 mL at 04/29/20 0810    sodium chloride flush 0.9 % injection 10 mL  10 mL Intravenous PRN Mark Campbell MD        acetaminophen (TYLENOL) tablet 650 mg  650 mg Oral Q6H PRN Mark Campbell MD   650 mg at 04/22/20 1527    Or    acetaminophen (TYLENOL) suppository 650 mg  650 mg Rectal Q6H PRN Mark Campbell MD        magnesium hydroxide (MILK OF MAGNESIA) 400 MG/5ML suspension 30 mL  30 mL Oral Daily PRN Mark Campbell MD        glucose (GLUTOSE) 40 % oral gel 15 g  15 g Oral PRN Mark Campbell MD        dextrose 50 % IV solution  12.5 g Intravenous PRN Mark Campbell MD        glucagon (rDNA) injection 1 mg  1 mg Intramuscular PRN Mark Campbell MD        dextrose 5 % solution  100 mL/hr Intravenous PRN Mark Campbell MD        budesonide-formoterol (SYMBICORT) 80-4.5 MCG/ACT inhaler 2 puff  2 puff Inhalation BID Mark Campbell MD   2 puff at 04/29/20 0813    atorvastatin (LIPITOR) tablet 40 mg  40 mg Oral Nightly Victor Hugo Avila MD   40 mg at 04/28/20 2040    aspirin EC tablet 81 mg  81 mg Oral Daily Victor Hugo Avila MD   81 mg at 04/29/20 0812    oxyCODONE-acetaminophen (PERCOCET) 5-325 MG per tablet 1 tablet  1 tablet Oral Q4H PRN Mark Campbell MD   1 tablet at 04/29/20 0827    lisinopril (PRINIVIL;ZESTRIL) tablet 2.5 mg  2.5 mg Oral Daily Denise Liang MD   2.5 mg at 04/29/20 0812      dextrose         Physical Exam:  BP (!) 126/59   Pulse 107   Temp 97.8 °F (36.6 °C) (Temporal)   Resp 23   Ht 5' (1.524 m)   Wt 151 lb 14.4 oz (68.9 kg)   SpO2 92%   BMI 29.67 kg/m²   Weight change:    Wt XR CHEST PORTABLE   Final Result      1. Endotracheal tube is present with distal tip 3.4 cm above the   marlen. 2. New interstitial pulmonary edema. XR ABDOMEN FOR NG/OG/NE TUBE PLACEMENT   Final Result      Satisfactory placement of nasogastric tube. CTA CHEST W CONTRAST   Final Result      1. No evidence of thoracic aortic dissection or aneurysm. 2. Interstitial pulmonary edema. CTA ABDOMEN PELVIS W CONTRAST   Final Result      1. No evidence of abdominal aortic aneurysm or dissection. 2. Contrast material is present within the renal collecting systems on   unenhanced CT limiting this examination for assessment of renal   calculus, however, there is left hydronephrosis and two calculi   located within proximal left ureter. Short-term follow-up unenhanced   CT of abdomen and pelvis recommended for further evaluation. 3. Large amount of stool seen throughout colon. XR CHEST PORTABLE    (Results Pending)   XR CHEST PORTABLE    (Results Pending)   XR CHEST PORTABLE    (Results Pending)     CXR: extubated right side looks a bit better      ASSESSMENT / PLAN:  *Plan:   #1 late presentation AWMI. Significant LV systolic dysfunction by echocardiogram EF 25%. Continue supportive measures and wean ventilator as tolerated. Avoid volume overloading her. Should try to continue to diurese a bit more today Started lisinopril 2.5 mg daily but hold if systolic blood pressure less than 110. If sbp continue to stay over 110 for more than a day would try to titrate up the lisinopril. On low dose coreg 3.125 mg tolerating ok.      #2 respiratory failure and pulmonary edema - diuresed 920cc yesterday,net  Neg about 4.8 L BNP drpped 90693+> 7525. CXR looks worse on the right  may need to consider tap- eliquis temporarily on hold- just need to hold 48 hr for procedure. .     #3 left ventricular apical thrombus and currently on aspirin 81 mg daily,  Eliquis 5 mg twice a day and

## 2020-04-29 NOTE — PROGRESS NOTES
Called to b/s due to spo2 88-90% on NIV at 60%, fio2  Breath sounds diminished throughout left>right base, NAD, fio2 increased to 100% with spo2 to 9-92%, RN at bedside

## 2020-04-29 NOTE — PROGRESS NOTES
Attempted to see patient three times: sleeping first time, drowsy/sleepy  second time/physician also in to see her, third time having a CAT scan.

## 2020-04-29 NOTE — PROGRESS NOTES
,  wt now trending back down w/ -I/Os   · Ideal Body Wt: 100 lb (45.4 kg), % Ideal Body 149%(using adm wt)  · BMI Classification: BMI 25.0 - 29.9 Overweight(adm BMI = 29.1)    Nutrition Interventions:   Continue current diet, Start ONS  Continued Inpatient Monitoring    Nutrition Evaluation:   · Evaluation: Goals set   · Goals: consume 50% or more of most meals/ONS     · Monitoring: Meal Intake, Supplement Intake, Diet Tolerance, Nutrition Progression, Skin Integrity, Wound Healing, I&O, Mental Status/Confusion, Pertinent Labs, Weight, Chewing/Swallowing, Monitor Bowel Function      Electronically signed by Mckinley Pablo, BRANDYN, LD on 4/29/20 at 11:32 AM EDT    Contact Number: x [phone]

## 2020-04-29 NOTE — PROGRESS NOTES
knee (R>L), 1/5 at hip     Balance Sitting EOB:  Max with knee block  Dynamic Standing:  NT Sitting EOB: maxA  Dynamic standing: NT Sitting EOB:  Min A  Dynamic Standing:  NA     Pt is A & O x 4  Sensation:  Pt denies numbness and tingling to extremities  Edema:  unremarkable    Vitals:  Spo2 monitored throughout session on 8L NC:  97% at rest  85% at EOB with activity but recovered to 92% with PLB in <1 minute  95% at end of session in L side lying    Patient education  Pt educated on role of PT intervention. Educated on health benefits of continued functional mobility training. Patient response to education:   Pt verbalized understanding Pt demonstrated skill Pt requires further education in this area   yes yes yes     ASSESSMENT:    Comments:  Pt received supine in bed and agreeable to PT intervention at this time. Pt performed all functional mobility as noted above. Pt sat EOB x 15 minutes with max A and max VC to promote improved trunk control and static seated balance. Pt found to be very diaphoretic with gown and bedding damp. At end of session pt returned to supine position, gown and linen changed, and pt placed in L side lying to promote improved cardiorespiratory function. Pt left with all needs met and call light in reach. RN informed of pt performance and status. Pt would benefit from continued skilled PT intervention for the purpose of restoring PLOF. Treatment:  Patient practiced and was instructed in the following treatment:     Therapeutic Activities Completed:  o Functional mobility as noted above:   - Bed mobility: maxA/dependent. maxA at EOB with max VC and TC to promote use of BUE and postural muscle for improved static sitting balance. Pt tolerated 15 minutes at EOB before reporting significant fatigue. SPo2 remained WNL throughout with cues for PLB  o Skilled repositioning in L side lying with HOB elevated for comfort and improved cardiorespiratory function.   o Pt education as

## 2020-04-30 ENCOUNTER — APPOINTMENT (OUTPATIENT)
Dept: GENERAL RADIOLOGY | Age: 75
DRG: 246 | End: 2020-04-30
Attending: INTERNAL MEDICINE
Payer: MEDICARE

## 2020-04-30 ENCOUNTER — APPOINTMENT (OUTPATIENT)
Dept: ULTRASOUND IMAGING | Age: 75
DRG: 246 | End: 2020-04-30
Attending: INTERNAL MEDICINE
Payer: MEDICARE

## 2020-04-30 PROBLEM — G93.41 METABOLIC ENCEPHALOPATHY: Status: ACTIVE | Noted: 2020-04-30

## 2020-04-30 LAB
ALBUMIN SERPL-MCNC: 3.4 G/DL (ref 3.5–5.2)
ALP BLD-CCNC: 122 U/L (ref 35–104)
ALT SERPL-CCNC: 9 U/L (ref 0–32)
ANION GAP SERPL CALCULATED.3IONS-SCNC: 18 MMOL/L (ref 7–16)
AST SERPL-CCNC: 18 U/L (ref 0–31)
B.E.: 6.1 MMOL/L (ref -3–3)
BILIRUB SERPL-MCNC: 0.4 MG/DL (ref 0–1.2)
BUN BLDV-MCNC: 20 MG/DL (ref 8–23)
CALCIUM SERPL-MCNC: 10.2 MG/DL (ref 8.6–10.2)
CHLORIDE BLD-SCNC: 103 MMOL/L (ref 98–107)
CO2: 29 MMOL/L (ref 22–29)
COHB: 0.2 % (ref 0–1.5)
CREAT SERPL-MCNC: 0.5 MG/DL (ref 0.5–1)
CRITICAL: ABNORMAL
DATE ANALYZED: ABNORMAL
DATE OF COLLECTION: ABNORMAL
GFR AFRICAN AMERICAN: >60
GFR NON-AFRICAN AMERICAN: >60 ML/MIN/1.73
GLUCOSE BLD-MCNC: 152 MG/DL (ref 74–99)
HCO3: 29.3 MMOL/L (ref 22–26)
HHB: 6.3 % (ref 0–5)
LAB: ABNORMAL
Lab: ABNORMAL
METER GLUCOSE: 103 MG/DL (ref 74–99)
METER GLUCOSE: 128 MG/DL (ref 74–99)
METER GLUCOSE: 136 MG/DL (ref 74–99)
METER GLUCOSE: 139 MG/DL (ref 74–99)
METER GLUCOSE: 149 MG/DL (ref 74–99)
METER GLUCOSE: 203 MG/DL (ref 74–99)
METHB: 0.4 % (ref 0–1.5)
MODE: ABNORMAL
O2 CONTENT: 16.8 ML/DL
O2 SATURATION: 93.7 % (ref 92–98.5)
O2HB: 93.1 % (ref 94–97)
OPERATOR ID: 882
PATIENT TEMP: 37 C
PCO2: 37.6 MMHG (ref 35–45)
PH BLOOD GAS: 7.51 (ref 7.35–7.45)
PO2: 64.6 MMHG (ref 75–100)
POTASSIUM SERPL-SCNC: 3.9 MMOL/L (ref 3.5–5)
SODIUM BLD-SCNC: 150 MMOL/L (ref 132–146)
SOURCE, BLOOD GAS: ABNORMAL
THB: 12.8 G/DL (ref 11.5–16.5)
TIME ANALYZED: 1001
TOTAL PROTEIN: 7.2 G/DL (ref 6.4–8.3)
URINE CULTURE, ROUTINE: NORMAL

## 2020-04-30 PROCEDURE — 76604 US EXAM CHEST: CPT

## 2020-04-30 PROCEDURE — 6370000000 HC RX 637 (ALT 250 FOR IP): Performed by: UROLOGY

## 2020-04-30 PROCEDURE — 2580000003 HC RX 258: Performed by: UROLOGY

## 2020-04-30 PROCEDURE — 94668 MNPJ CHEST WALL SBSQ: CPT

## 2020-04-30 PROCEDURE — 99233 SBSQ HOSP IP/OBS HIGH 50: CPT | Performed by: INTERNAL MEDICINE

## 2020-04-30 PROCEDURE — 82805 BLOOD GASES W/O2 SATURATION: CPT

## 2020-04-30 PROCEDURE — 97130 THER IVNTJ EA ADDL 15 MIN: CPT

## 2020-04-30 PROCEDURE — 94640 AIRWAY INHALATION TREATMENT: CPT

## 2020-04-30 PROCEDURE — 2580000003 HC RX 258: Performed by: INTERNAL MEDICINE

## 2020-04-30 PROCEDURE — 82962 GLUCOSE BLOOD TEST: CPT

## 2020-04-30 PROCEDURE — 93005 ELECTROCARDIOGRAM TRACING: CPT | Performed by: UROLOGY

## 2020-04-30 PROCEDURE — 6370000000 HC RX 637 (ALT 250 FOR IP): Performed by: INTERNAL MEDICINE

## 2020-04-30 PROCEDURE — 2060000000 HC ICU INTERMEDIATE R&B

## 2020-04-30 PROCEDURE — 36415 COLL VENOUS BLD VENIPUNCTURE: CPT

## 2020-04-30 PROCEDURE — 71045 X-RAY EXAM CHEST 1 VIEW: CPT

## 2020-04-30 PROCEDURE — 6360000002 HC RX W HCPCS: Performed by: INTERNAL MEDICINE

## 2020-04-30 PROCEDURE — 2700000000 HC OXYGEN THERAPY PER DAY

## 2020-04-30 PROCEDURE — 80053 COMPREHEN METABOLIC PANEL: CPT

## 2020-04-30 PROCEDURE — 97129 THER IVNTJ 1ST 15 MIN: CPT

## 2020-04-30 RX ORDER — CARVEDILOL 6.25 MG/1
6.25 TABLET ORAL 2 TIMES DAILY
Status: DISCONTINUED | OUTPATIENT
Start: 2020-04-30 | End: 2020-05-01 | Stop reason: HOSPADM

## 2020-04-30 RX ORDER — BUMETANIDE 1 MG/1
1 TABLET ORAL DAILY
Status: DISCONTINUED | OUTPATIENT
Start: 2020-05-01 | End: 2020-05-01 | Stop reason: HOSPADM

## 2020-04-30 RX ADMIN — SODIUM CHLORIDE SOLN NEBU 3% 4 ML: 3 NEBU SOLN at 12:05

## 2020-04-30 RX ADMIN — DANTROLENE SODIUM 50 MG: 25 CAPSULE ORAL at 12:02

## 2020-04-30 RX ADMIN — BACLOFEN 20 MG: 10 TABLET ORAL at 21:08

## 2020-04-30 RX ADMIN — DANTROLENE SODIUM 50 MG: 25 CAPSULE ORAL at 23:06

## 2020-04-30 RX ADMIN — BACLOFEN 20 MG: 10 TABLET ORAL at 13:10

## 2020-04-30 RX ADMIN — ACETAMINOPHEN 650 MG: 325 TABLET, FILM COATED ORAL at 04:33

## 2020-04-30 RX ADMIN — FUROSEMIDE 40 MG: 10 INJECTION, SOLUTION INTRAMUSCULAR; INTRAVENOUS at 08:12

## 2020-04-30 RX ADMIN — Medication 10 ML: at 08:12

## 2020-04-30 RX ADMIN — BUDESONIDE AND FORMOTEROL FUMARATE DIHYDRATE 2 PUFF: 80; 4.5 AEROSOL RESPIRATORY (INHALATION) at 21:08

## 2020-04-30 RX ADMIN — ATORVASTATIN CALCIUM 40 MG: 40 TABLET, FILM COATED ORAL at 21:08

## 2020-04-30 RX ADMIN — SODIUM CHLORIDE SOLN NEBU 3% 4 ML: 3 NEBU SOLN at 04:30

## 2020-04-30 RX ADMIN — LEVOTHYROXINE SODIUM 88 MCG: 0.09 TABLET ORAL at 08:11

## 2020-04-30 RX ADMIN — BUDESONIDE AND FORMOTEROL FUMARATE DIHYDRATE 2 PUFF: 80; 4.5 AEROSOL RESPIRATORY (INHALATION) at 08:12

## 2020-04-30 RX ADMIN — INSULIN LISPRO 4 UNITS: 100 INJECTION, SOLUTION INTRAVENOUS; SUBCUTANEOUS at 16:30

## 2020-04-30 RX ADMIN — Medication 10 ML: at 21:08

## 2020-04-30 RX ADMIN — DANTROLENE SODIUM 50 MG: 25 CAPSULE ORAL at 15:02

## 2020-04-30 RX ADMIN — SODIUM CHLORIDE SOLN NEBU 3% 4 ML: 3 NEBU SOLN at 08:23

## 2020-04-30 RX ADMIN — PANTOPRAZOLE SODIUM 40 MG: 40 TABLET, DELAYED RELEASE ORAL at 06:38

## 2020-04-30 RX ADMIN — SODIUM CHLORIDE SOLN NEBU 3% 4 ML: 3 NEBU SOLN at 17:00

## 2020-04-30 RX ADMIN — CARVEDILOL 6.25 MG: 6.25 TABLET, FILM COATED ORAL at 21:08

## 2020-04-30 RX ADMIN — BACLOFEN 20 MG: 10 TABLET ORAL at 08:11

## 2020-04-30 RX ADMIN — SODIUM CHLORIDE SOLN NEBU 3% 4 ML: 3 NEBU SOLN at 19:49

## 2020-04-30 RX ADMIN — DANTROLENE SODIUM 50 MG: 25 CAPSULE ORAL at 18:51

## 2020-04-30 RX ADMIN — BACLOFEN 20 MG: 10 TABLET ORAL at 16:30

## 2020-04-30 RX ADMIN — INSULIN LISPRO 2 UNITS: 100 INJECTION, SOLUTION INTRAVENOUS; SUBCUTANEOUS at 04:29

## 2020-04-30 RX ADMIN — DANTROLENE SODIUM 50 MG: 25 CAPSULE ORAL at 06:37

## 2020-04-30 ASSESSMENT — PAIN SCALES - GENERAL
PAINLEVEL_OUTOF10: 0
PAINLEVEL_OUTOF10: 0
PAINLEVEL_OUTOF10: 4

## 2020-04-30 NOTE — PROGRESS NOTES
Chief Complaint:  Acute respiratory failure with hypoxia (HCC)     Subjective:    She is less drowsy again today, mental status seems a little better, still looks very fatigued but awake, makes eye contact, conversant, seems to be mentating normally. She tells me she is still quite short of breath. Objective:    BP (!) 105/54   Pulse 86   Temp 97 °F (36.1 °C) (Temporal)   Resp 18   Ht 5' (1.524 m)   Wt 148 lb 12.8 oz (67.5 kg)   SpO2 97%   BMI 29.06 kg/m²     Current medications that patient is taking have been reviewed. Weight is down to 148 pounds today. Bed scale. General appearance: NAD, nontoxic appearing  HEENT: AT/NC, MMM  Neck: FROM, supple  Lungs: Clear to auscultation bilaterally anteriorly. Work of breathing better today but still mildly tachypneic  CV: RRR, no MRGs  Abdomen: Soft, non-tender; no masses or HSM, +BS  Extremities: No peripheral edema or digital cyanosis  Skin: no rash, lesions or ulcers  Psych: Calm and cooperative  Neuro: Awake and conversant today, speaks in a soft voice, looks very tired but alert. Labs:  CBC:   Lab Results   Component Value Date    WBC 12.4 04/28/2020    RBC 3.42 04/28/2020    HGB 10.8 04/28/2020    HCT 33.7 04/28/2020    MCV 98.5 04/28/2020    MCH 31.6 04/28/2020    MCHC 32.0 04/28/2020    RDW 15.4 04/28/2020     04/28/2020    MPV 10.7 04/28/2020     BMP:    Lab Results   Component Value Date     04/30/2020    K 3.9 04/30/2020    K 4.0 03/27/2019     04/30/2020    CO2 29 04/30/2020    BUN 20 04/30/2020    LABALBU 3.4 04/30/2020    CREATININE 0.5 04/30/2020    CALCIUM 10.2 04/30/2020    GFRAA >60 04/30/2020    LABGLOM >60 04/30/2020    GLUCOSE 152 04/30/2020        Imaging:  I've personally reviewed the patient's repeat CXR today:   Much improved aeration of the right lung    I have personally reviewed the patient's CT chest and CT head  Diffuse atrophy likely age related   Findings compatible with small vessel ischemic changes.

## 2020-04-30 NOTE — PLAN OF CARE
Problem: Falls - Risk of:  Goal: Will remain free from falls  Description: Will remain free from falls  4/24/2020 0203 by Deirdre Sharma RN  Outcome: Met This Shift    Goal: Absence of physical injury  Description: Absence of physical injury  4/24/2020 0203 by Deirdre Sharma RN  Outcome: Met This Shift       Problem: Restraint Use - Nonviolent/Non-Self-Destructive Behavior:  Goal: Absence of restraint-related injury  Description: Absence of restraint-related injury  4/24/2020 0203 by Deirdre Sharma RN  Outcome: Met This Shift       Problem: Restraint Use - Nonviolent/Non-Self-Destructive Behavior:  Goal: Absence of restraint indications  Description: Absence of restraint indications  4/24/2020 0203 by Deirdre Sharma RN  Outcome: Not Met This Shift
Problem: Falls - Risk of:  Goal: Will remain free from falls  Description: Will remain free from falls  4/25/2020 0050 by Melvin Bettencourt RN  Outcome: Met This Shift    Goal: Absence of physical injury  Description: Absence of physical injury  4/25/2020 0050 by Melvin Bettencourt RN  Outcome: Met This Shift       Problem: Restraint Use - Nonviolent/Non-Self-Destructive Behavior:  Goal: Absence of restraint-related injury  Description: Absence of restraint-related injury  4/25/2020 0050 by Melvin Bettencourt RN  Outcome: Met This Shift    Problem: Restraint Use - Nonviolent/Non-Self-Destructive Behavior:  Goal: Absence of restraint indications  Description: Absence of restraint indications  4/25/2020 0050 by Melvin Bettencourt RN  Outcome: Not Met This Shift
Problem: Falls - Risk of:  Goal: Will remain free from falls  Description: Will remain free from falls  4/25/2020 2200  Problem: Falls - Risk of:  Goal: Absence of physical injury  Description: Absence of physical injury  4/25/2020 2200  Problem: Airway Clearance - Ineffective:  Goal: Ability to maintain a clear airway will improve  Description: Ability to maintain a clear airway will improve  4/25/2020 2200  Problem: Gas Exchange - Impaired:  Goal: Levels of oxygenation will improve  Description: Levels of oxygenation will improve  Outcome: Met This Shift
Problem: Falls - Risk of:  Goal: Will remain free from falls  Description: Will remain free from falls  4/29/2020 2248 by Ross Vieyra RN  Outcome: Met This Shift  4/29/2020 1013 by Shala Umanzor  Outcome: Met This Shift  Goal: Absence of physical injury  Description: Absence of physical injury  4/29/2020 2248 by Ross Vieyra RN  Outcome: Met This Shift  4/29/2020 1013 by Shala Umanzor  Outcome: Met This Shift     Problem: Restraint Use - Nonviolent/Non-Self-Destructive Behavior:  Goal: Absence of restraint indications  Description: Absence of restraint indications  4/29/2020 1013 by Shala Umanzor  Outcome: Met This Shift  Goal: Absence of restraint-related injury  Description: Absence of restraint-related injury  4/29/2020 1013 by Jen Umanzor  Outcome: Met This Shift     Problem: Airway Clearance - Ineffective:  Goal: Ability to maintain a clear airway will improve  Description: Ability to maintain a clear airway will improve  4/29/2020 1013 by Shala Umanzor  Outcome: Met This Shift     Problem: Aspiration:  Goal: Absence of aspiration  Description: Absence of aspiration  4/29/2020 1013 by Jen Umanzor  Outcome: Met This Shift     Problem:  Bowel Function - Altered:  Goal: Bowel elimination is within specified parameters  Description: Bowel elimination is within specified parameters  4/29/2020 1013 by Shala Umanzor  Outcome: Met This Shift     Problem: Cardiac Output - Decreased:  Goal: Hemodynamic stability will improve  Description: Hemodynamic stability will improve  4/29/2020 1013 by Jen Umanzor  Outcome: Met This Shift     Problem: Fluid Volume - Imbalance:  Goal: Absence of imbalanced fluid volume signs and symptoms  Description: Absence of imbalanced fluid volume signs and symptoms  4/29/2020 1013 by Jen Umanzor  Outcome: Met This Shift     Problem: Gas Exchange - Impaired:  Goal: Levels of oxygenation will improve  Description: Levels of oxygenation will
Problem: Falls - Risk of:  Goal: Will remain free from falls  Description: Will remain free from falls  Outcome: Met This Shift     Problem: Falls - Risk of:  Goal: Absence of physical injury  Description: Absence of physical injury  Outcome: Met This Shift
Problem: Falls - Risk of:  Goal: Will remain free from falls  Description: Will remain free from falls  Outcome: Met This Shift  Goal: Absence of physical injury  Description: Absence of physical injury  Outcome: Met This Shift     Problem: Airway Clearance - Ineffective:  Goal: Ability to maintain a clear airway will improve  Description: Ability to maintain a clear airway will improve  Outcome: Met This Shift     Problem: Aspiration:  Goal: Absence of aspiration  Description: Absence of aspiration  Outcome: Met This Shift
Problem: Falls - Risk of:  Goal: Will remain free from falls  Description: Will remain free from falls  Outcome: Met This Shift  Goal: Absence of physical injury  Description: Absence of physical injury  Outcome: Met This Shift     Problem: Airway Clearance - Ineffective:  Goal: Ability to maintain a clear airway will improve  Description: Ability to maintain a clear airway will improve  Outcome: Met This Shift     Problem: Aspiration:  Goal: Absence of aspiration  Description: Absence of aspiration  Outcome: Met This Shift     Problem: Gas Exchange - Impaired:  Goal: Levels of oxygenation will improve  Description: Levels of oxygenation will improve  Outcome: Met This Shift     Problem: Restraint Use - Nonviolent/Non-Self-Destructive Behavior:  Goal: Absence of restraint indications  Description: Absence of restraint indications  Outcome: Completed  Goal: Absence of restraint-related injury  Description: Absence of restraint-related injury  Outcome: Completed
Problem: Falls - Risk of:  Goal: Will remain free from falls  Description: Will remain free from falls  Outcome: Met This Shift  Goal: Absence of physical injury  Description: Absence of physical injury  Outcome: Met This Shift     Problem: Restraint Use - Nonviolent/Non-Self-Destructive Behavior:  Goal: Absence of restraint-related injury  Description: Absence of restraint-related injury  4/23/2020 2242 by Hilda Woodruff RN  Outcome: Met This Shift  4/23/2020 0943 by Alejandro Whitmore RN  Outcome: Met This Shift     Problem: Restraint Use - Nonviolent/Non-Self-Destructive Behavior:  Goal: Absence of restraint indications  Description: Absence of restraint indications  4/23/2020 2242 by Hilda Woodruff RN  Outcome: Not Met This Shift  4/23/2020 0943 by Alejandro Whitmore RN  Outcome: Not Met This Shift
Problem: Falls - Risk of:  Goal: Will remain free from falls  Description: Will remain free from falls  Outcome: Met This Shift  Goal: Absence of physical injury  Description: Absence of physical injury  Outcome: Met This Shift     Problem: Restraint Use - Nonviolent/Non-Self-Destructive Behavior:  Goal: Absence of restraint-related injury  Description: Absence of restraint-related injury  4/24/2020 2309 by Lorrie Albarran RN  Outcome: Met This Shift       Problem: Restraint Use - Nonviolent/Non-Self-Destructive Behavior:  Goal: Absence of restraint indications  Description: Absence of restraint indications  4/24/2020 2309 by Lorrie Albarran RN  Outcome: Not Met This Shift
Problem: Restraint Use - Nonviolent/Non-Self-Destructive Behavior:  Goal: Absence of restraint indications  Description: Absence of restraint indications  Outcome: Ongoing  Goal: Absence of restraint-related injury  Description: Absence of restraint-related injury  Outcome: Met This Shift
Problem: Restraint Use - Nonviolent/Non-Self-Destructive Behavior:  Goal: Absence of restraint-related injury  Description: Absence of restraint-related injury  4/23/2020 0943 by Maximo Lange RN  Outcome: Met This Shift     Problem: Restraint Use - Nonviolent/Non-Self-Destructive Behavior:  Goal: Absence of restraint indications  Description: Absence of restraint indications  4/23/2020 0943 by Maximo Lange RN  Outcome: Not Met This Shift
Spoken with on call cardiologist regarding patient's elevated troponin 0.83 up from 0.12 on presentation with CK-MB/CK ratio of 11%. Most recent EKG showed resolution of ST elevation on V1-V3 comparing to EKG on 21-APR-2020 16:13:45    Cardiology recommended continue DAPT and start low dose heparin gtt.  Repeat EKG in AM.    Jude Woodruff MD  04/22/20  1:42 AM
if the patient was incapable of providing the necessary information or participating in medical decision making. Time devoted to teaching and to any procedure. CCT 41 minutes    In addition Michaela Douglas was seen and examined. All imaging was independently reviewed. Patient discussed during comprehensive ICU rounds. Medications and testing was reviewed. Above findings corroborated, plan discussed and where needed changes made.

## 2020-04-30 NOTE — PROGRESS NOTES
General: No focal deficit present. Mental Status: She is alert and oriented to person, place, and time. Mental status is at baseline. Cranial Nerves: No cranial nerve deficit. Motor: Weakness present. Gait: Gait abnormal.       Labs and Imaging Studies     CBC:   Recent Labs     04/28/20 0530   WBC 12.4*   RBC 3.42*   HGB 10.8*   HCT 33.7*   MCV 98.5   MCH 31.6   MCHC 32.0   RDW 15.4*   *   MPV 10.7       BMP:    Recent Labs     04/28/20 0530 04/29/20 0458 04/30/20 0445    146 150*   K 3.5 3.7 3.9    100 103   CO2 24 25 29   BUN 18 17 20   CREATININE 0.5 0.5 0.5   GLUCOSE 147* 166* 152*   CALCIUM 9.6 10.0 10.2   PROT 6.5 7.2 7.2   LABALBU 3.3* 3.9 3.4*   BILITOT 0.3 0.3 0.4   ALKPHOS 118* 123* 122*   AST 18 17 18   ALT 9 8 9       LIVER PROFILE:   Recent Labs     04/28/20 0530 04/29/20 0458 04/30/20  0445   AST 18 17 18   ALT 9 8 9   BILITOT 0.3 0.3 0.4   ALKPHOS 118* 123* 122*       PT/INR:   No results for input(s): PROTIME, INR in the last 72 hours. APTT:   No results for input(s): APTT in the last 72 hours. Fasting Lipid Panel:    Lab Results   Component Value Date    CHOL 201 03/12/2019    TRIG 282 03/12/2019    HDL 34 10/30/2019       Cardiac Enzymes:    Lab Results   Component Value Date    CKTOTAL 204 (H) 04/22/2020    CKTOTAL 294 (H) 04/22/2020    CKTOTAL 260 (H) 04/21/2020    CKMB 18.6 (H) 04/22/2020    CKMB 31.3 (H) 04/22/2020    CKMB 27.7 (H) 04/21/2020    TROPONINI 1.17 (H) 04/22/2020    TROPONINI 1.15 (H) 04/22/2020    TROPONINI 0.83 (H) 04/21/2020     Imaging Studies:  Echo 4/22/2020  Summary   Severe anterior and septal hypo-akinesia, with apical dyskinesia. Ejection fraction is visually estimated at 20-25%. Stage I diastolic dysfunction. Small apical LV thrombus   Mild to moderate mitral regurgitation is present. EKG:   No interval change today,    Assessment and PLan     Assessment:    1.  Acute hypoxic respiratory

## 2020-04-30 NOTE — PROGRESS NOTES
Date: 4/29/2020    Time: 11:50 PM    Patient Placed On BIPAP/CPAP/ Non-Invasive Ventilation? Pt already on bipap    If no must comment. Facial area red/color change? No           If YES are Blister/Lesion present? No   If yes must notify nursing staff  BIPAP/CPAP skin barrier?   Yes    Skin barrier type:mepilex       Comments:        Jauqeline Betts

## 2020-05-01 ENCOUNTER — APPOINTMENT (OUTPATIENT)
Dept: GENERAL RADIOLOGY | Age: 75
DRG: 246 | End: 2020-05-01
Attending: INTERNAL MEDICINE
Payer: MEDICARE

## 2020-05-01 ENCOUNTER — TELEPHONE (OUTPATIENT)
Dept: FAMILY MEDICINE CLINIC | Age: 75
End: 2020-05-01

## 2020-05-01 VITALS
DIASTOLIC BLOOD PRESSURE: 54 MMHG | OXYGEN SATURATION: 98 % | TEMPERATURE: 97.4 F | SYSTOLIC BLOOD PRESSURE: 111 MMHG | RESPIRATION RATE: 16 BRPM | WEIGHT: 143.5 LBS | HEIGHT: 60 IN | BODY MASS INDEX: 28.17 KG/M2 | HEART RATE: 78 BPM

## 2020-05-01 LAB
ALBUMIN SERPL-MCNC: 3.5 G/DL (ref 3.5–5.2)
ALP BLD-CCNC: 112 U/L (ref 35–104)
ALT SERPL-CCNC: 8 U/L (ref 0–32)
ANION GAP SERPL CALCULATED.3IONS-SCNC: 16 MMOL/L (ref 7–16)
AST SERPL-CCNC: 20 U/L (ref 0–31)
BILIRUB SERPL-MCNC: 0.4 MG/DL (ref 0–1.2)
BUN BLDV-MCNC: 23 MG/DL (ref 8–23)
CALCIUM SERPL-MCNC: 10.1 MG/DL (ref 8.6–10.2)
CHLORIDE BLD-SCNC: 101 MMOL/L (ref 98–107)
CO2: 34 MMOL/L (ref 22–29)
CREAT SERPL-MCNC: 0.6 MG/DL (ref 0.5–1)
EKG ATRIAL RATE: 93 BPM
EKG P AXIS: 30 DEGREES
EKG P-R INTERVAL: 174 MS
EKG Q-T INTERVAL: 356 MS
EKG QRS DURATION: 92 MS
EKG QTC CALCULATION (BAZETT): 442 MS
EKG R AXIS: -15 DEGREES
EKG T AXIS: 129 DEGREES
EKG VENTRICULAR RATE: 93 BPM
GFR AFRICAN AMERICAN: >60
GFR NON-AFRICAN AMERICAN: >60 ML/MIN/1.73
GLUCOSE BLD-MCNC: 155 MG/DL (ref 74–99)
METER GLUCOSE: 132 MG/DL (ref 74–99)
METER GLUCOSE: 140 MG/DL (ref 74–99)
METER GLUCOSE: 155 MG/DL (ref 74–99)
POTASSIUM SERPL-SCNC: 3.3 MMOL/L (ref 3.5–5)
PRO-BNP: 6351 PG/ML (ref 0–450)
SODIUM BLD-SCNC: 151 MMOL/L (ref 132–146)
TOTAL PROTEIN: 6.9 G/DL (ref 6.4–8.3)

## 2020-05-01 PROCEDURE — 6370000000 HC RX 637 (ALT 250 FOR IP): Performed by: INTERNAL MEDICINE

## 2020-05-01 PROCEDURE — 71045 X-RAY EXAM CHEST 1 VIEW: CPT

## 2020-05-01 PROCEDURE — 36415 COLL VENOUS BLD VENIPUNCTURE: CPT

## 2020-05-01 PROCEDURE — 97530 THERAPEUTIC ACTIVITIES: CPT

## 2020-05-01 PROCEDURE — 94640 AIRWAY INHALATION TREATMENT: CPT

## 2020-05-01 PROCEDURE — 2580000003 HC RX 258: Performed by: UROLOGY

## 2020-05-01 PROCEDURE — 82962 GLUCOSE BLOOD TEST: CPT

## 2020-05-01 PROCEDURE — 80053 COMPREHEN METABOLIC PANEL: CPT

## 2020-05-01 PROCEDURE — 2700000000 HC OXYGEN THERAPY PER DAY

## 2020-05-01 PROCEDURE — 97129 THER IVNTJ 1ST 15 MIN: CPT

## 2020-05-01 PROCEDURE — 6370000000 HC RX 637 (ALT 250 FOR IP): Performed by: UROLOGY

## 2020-05-01 PROCEDURE — 94660 CPAP INITIATION&MGMT: CPT

## 2020-05-01 PROCEDURE — 2580000003 HC RX 258: Performed by: INTERNAL MEDICINE

## 2020-05-01 PROCEDURE — 97130 THER IVNTJ EA ADDL 15 MIN: CPT

## 2020-05-01 PROCEDURE — 99233 SBSQ HOSP IP/OBS HIGH 50: CPT | Performed by: INTERNAL MEDICINE

## 2020-05-01 PROCEDURE — 94669 MECHANICAL CHEST WALL OSCILL: CPT

## 2020-05-01 PROCEDURE — 83880 ASSAY OF NATRIURETIC PEPTIDE: CPT

## 2020-05-01 RX ORDER — LISINOPRIL 2.5 MG/1
2.5 TABLET ORAL DAILY
Qty: 30 TABLET | Refills: 3 | Status: ON HOLD | OUTPATIENT
Start: 2020-05-02 | End: 2020-06-16 | Stop reason: HOSPADM

## 2020-05-01 RX ORDER — CARVEDILOL 6.25 MG/1
6.25 TABLET ORAL 2 TIMES DAILY
Qty: 60 TABLET | Refills: 3 | Status: SHIPPED | OUTPATIENT
Start: 2020-05-01 | End: 2020-10-21 | Stop reason: SDUPTHER

## 2020-05-01 RX ORDER — BUMETANIDE 1 MG/1
1 TABLET ORAL DAILY
Qty: 30 TABLET | Refills: 3 | Status: ON HOLD | OUTPATIENT
Start: 2020-05-02 | End: 2020-06-16 | Stop reason: HOSPADM

## 2020-05-01 RX ORDER — ASPIRIN 81 MG/1
81 TABLET ORAL DAILY
Qty: 30 TABLET | Refills: 3 | Status: SHIPPED | OUTPATIENT
Start: 2020-05-02 | End: 2020-06-23

## 2020-05-01 RX ORDER — CLOPIDOGREL BISULFATE 75 MG/1
75 TABLET ORAL DAILY
Qty: 30 TABLET | Refills: 3 | Status: SHIPPED | OUTPATIENT
Start: 2020-05-02 | End: 2020-08-31 | Stop reason: SDUPTHER

## 2020-05-01 RX ADMIN — ACETAMINOPHEN 650 MG: 325 TABLET, FILM COATED ORAL at 02:58

## 2020-05-01 RX ADMIN — CARVEDILOL 6.25 MG: 6.25 TABLET, FILM COATED ORAL at 08:34

## 2020-05-01 RX ADMIN — BACLOFEN 20 MG: 10 TABLET ORAL at 08:34

## 2020-05-01 RX ADMIN — LISINOPRIL 2.5 MG: 5 TABLET ORAL at 08:34

## 2020-05-01 RX ADMIN — BUDESONIDE AND FORMOTEROL FUMARATE DIHYDRATE 2 PUFF: 80; 4.5 AEROSOL RESPIRATORY (INHALATION) at 09:38

## 2020-05-01 RX ADMIN — DANTROLENE SODIUM 50 MG: 25 CAPSULE ORAL at 05:46

## 2020-05-01 RX ADMIN — DANTROLENE SODIUM 50 MG: 25 CAPSULE ORAL at 14:16

## 2020-05-01 RX ADMIN — DANTROLENE SODIUM 50 MG: 25 CAPSULE ORAL at 11:15

## 2020-05-01 RX ADMIN — SODIUM CHLORIDE SOLN NEBU 3% 4 ML: 3 NEBU SOLN at 08:06

## 2020-05-01 RX ADMIN — PANTOPRAZOLE SODIUM 40 MG: 40 TABLET, DELAYED RELEASE ORAL at 05:46

## 2020-05-01 RX ADMIN — INSULIN LISPRO 2 UNITS: 100 INJECTION, SOLUTION INTRAVENOUS; SUBCUTANEOUS at 11:15

## 2020-05-01 RX ADMIN — LEVOTHYROXINE SODIUM 88 MCG: 0.09 TABLET ORAL at 08:37

## 2020-05-01 RX ADMIN — SODIUM CHLORIDE SOLN NEBU 3% 4 ML: 3 NEBU SOLN at 12:19

## 2020-05-01 RX ADMIN — BACLOFEN 20 MG: 10 TABLET ORAL at 14:16

## 2020-05-01 RX ADMIN — Medication 10 ML: at 08:34

## 2020-05-01 RX ADMIN — BUMETANIDE 1 MG: 1 TABLET ORAL at 08:34

## 2020-05-01 RX ADMIN — ASPIRIN 81 MG: 81 TABLET, COATED ORAL at 08:34

## 2020-05-01 ASSESSMENT — PAIN SCALES - GENERAL
PAINLEVEL_OUTOF10: 3
PAINLEVEL_OUTOF10: 0

## 2020-05-01 NOTE — DISCHARGE SUMMARY
Summary:  POSTOPERATIVE DIAGNOSES:  Left obstructing ureteral calculus with hydronephrosis and pyelonephrosis with bacteremia, sepsis, and hypotensive shock plus an impacted stone at L4 and a more proximal stone pushed into the renal pelvis. They are each about 7 mm in greatest dimension, radiodense. OPERATIONS PERFORMED:  Cystopanendoscopy; insertion of ureteral catheter, left, with stone manipulation; and retrograde pyelogram.    Hospital Course: The patient presented with STEMI in a delayed fashion. Apparently she had chest pain for several days to a week prior to presentation. She went emergently to the Cath Lab and had stent placed to LAD. She remained intubated on pressors after her cath. She was incidentally found to have obstructing hydronephrosis due to a stone, and underwent stent placement with urology. She grew GBS on urine culture. She was weaned off pressors and weaned off the ventilator, and extubated successfully. She has an ejection fraction of about 20 to 25% with an LV thrombus. She has been started on aspirin, Plavix, and apixaban. Cardiology did note that there is some emerging evidence that warfarin may be better than apixaban for LV thrombus. However, given the patient's current goals of care, her family thought apixaban was a better choice for convenience. The patient has been diuresed after her cath. Additionally she suffered right sided atelectasis which has improved with chest physiotherapy. She has a moderate right-sided pleural effusion. Thoracentesis was attempted but she is too sick to tolerate it. She is incredibly deconditioned. Now with an ejection fraction in the 20s, her prognosis is probably poor. Her family was interested in hospice. However, they have not fully decided on this yet. The patient is confused. I try to get her opinion on the matter.   She tells me she hopes to get better, but her short-term memory is so impaired that she cannot remember simple concepts such as the difference between comfort care versus full medical treatment. At this time, her family would like her to go home rather than have rehab at a skilled nursing facility. They would like her to stay on usual medical therapy for now. If she develops further complications or fails to thrive, they may consider transition to formal hospice at some point in the near future. Please note, cardiology would like her on triple anticoagulation for 1 month, and then transition to Eliquis and Plavix alone after 1 month.      Discharge Exam:  Vitals:    05/01/20 0046 05/01/20 0050 05/01/20 0633 05/01/20 0745   BP:  (!) 96/50  (!) 113/54   Pulse:  70  82   Resp:  18  16   Temp:  97.5 °F (36.4 °C)  96.3 °F (35.7 °C)   TempSrc:  Oral  Temporal   SpO2:  99% 98% 98%   Weight: 143 lb 8 oz (65.1 kg)      Height:          Pleasant very frail/deconditioned female in NAD  CV: RRR no m/r/g\  Lungs: Normal WOB, mildly crackly in the bases b/l  Abd: +BS soft NT ND no R/G  LE: No LE edema    Condition:  Stable but very ill    Disposition: home    Patient Instructions:   Current Discharge Medication List      START taking these medications    Details   aspirin 81 MG EC tablet Take 1 tablet by mouth daily  Qty: 30 tablet, Refills: 3      apixaban (ELIQUIS) 5 MG TABS tablet Take 1 tablet by mouth 2 times daily  Qty: 60 tablet, Refills: 1      lisinopril (PRINIVIL;ZESTRIL) 2.5 MG tablet Take 1 tablet by mouth daily  Qty: 30 tablet, Refills: 3      carvedilol (COREG) 6.25 MG tablet Take 1 tablet by mouth 2 times daily  Qty: 60 tablet, Refills: 3      bumetanide (BUMEX) 1 MG tablet Take 1 tablet by mouth daily  Qty: 30 tablet, Refills: 3      clopidogrel (PLAVIX) 75 MG tablet Take 1 tablet by mouth daily  Qty: 30 tablet, Refills: 3         CONTINUE these medications which have NOT CHANGED    Details   dantrolene (DANTRIUM) 50 MG capsule Take 1 capsule by mouth 5 times daily  Qty: 120 capsule, Refills: 0 preparing discharge papers, discussing discharge with patient, medication review, etc.    Signed:  Ameena Borrero    5/1/2020  9:13 AM

## 2020-05-01 NOTE — PROGRESS NOTES
Pulmonary [address] [facility]                             Pulmonary Consult/Progress Note :  CC follow SOB     Subjective     Doing better  More awake/  No fever or chills  No chest pain     Objective     Vitals:    05/01/20 0046 05/01/20 0050 05/01/20 0633 05/01/20 0745   BP:  (!) 96/50  (!) 113/54   Pulse:  70  82   Resp:  18  16   Temp:  97.5 °F (36.4 °C)  96.3 °F (35.7 °C)   TempSrc:  Oral  Temporal   SpO2:  99% 98% 98%   Weight: 143 lb 8 oz (65.1 kg)      Height:           Physical Exam:  · I & O - 24hr:No intake/output data recorded. Physical Exam  Constitutional:       General: She is not in acute distress. Appearance: Normal appearance. She is well-developed. She is not diaphoretic. HENT:      Head: Normocephalic and atraumatic. Eyes:      General: No scleral icterus. Conjunctiva/sclera: Conjunctivae normal.      Pupils: Pupils are equal, round, and reactive to light. Cardiovascular:      Rate and Rhythm: Normal rate and regular rhythm. Heart sounds: Normal heart sounds, S1 normal and S2 normal. No murmur. Pulmonary:      Effort: Pulmonary effort is normal. No respiratory distress. Breath sounds: Normal breath sounds. No wheezing or rales. Abdominal:      General: Bowel sounds are normal. There is no distension. Palpations: Abdomen is soft. There is no mass. Tenderness: There is no abdominal tenderness. There is no guarding. Musculoskeletal:         General: No swelling or tenderness. Right lower leg: No edema. Left lower leg: No edema. Skin:     General: Skin is warm and dry. Coloration: Skin is not jaundiced or pale. Neurological:      General: No focal deficit present. Mental Status: She is alert and oriented to person, place, and time. Mental status is at baseline. Cranial Nerves: No cranial nerve deficit. Motor: Weakness present.       Gait: Gait abnormal.       Labs and Imaging Studies CBC:   No results for input(s): WBC, RBC, HGB, HCT, MCV, MCH, MCHC, RDW, PLT, MPV in the last 72 hours. BMP:    Recent Labs     04/29/20 0458 04/30/20 0445 05/01/20 0449    150* 151*   K 3.7 3.9 3.3*    103 101   CO2 25 29 34*   BUN 17 20 23   CREATININE 0.5 0.5 0.6   GLUCOSE 166* 152* 155*   CALCIUM 10.0 10.2 10.1   PROT 7.2 7.2 6.9   LABALBU 3.9 3.4* 3.5   BILITOT 0.3 0.4 0.4   ALKPHOS 123* 122* 112*   AST 17 18 20   ALT 8 9 8       LIVER PROFILE:   Recent Labs     04/29/20 0458 04/30/20 0445 05/01/20 0449   AST 17 18 20   ALT 8 9 8   BILITOT 0.3 0.4 0.4   ALKPHOS 123* 122* 112*       PT/INR:   No results for input(s): PROTIME, INR in the last 72 hours. APTT:   No results for input(s): APTT in the last 72 hours. Fasting Lipid Panel:    Lab Results   Component Value Date    CHOL 201 03/12/2019    TRIG 282 03/12/2019    HDL 34 10/30/2019       Cardiac Enzymes:    Lab Results   Component Value Date    CKTOTAL 204 (H) 04/22/2020    CKTOTAL 294 (H) 04/22/2020    CKTOTAL 260 (H) 04/21/2020    CKMB 18.6 (H) 04/22/2020    CKMB 31.3 (H) 04/22/2020    CKMB 27.7 (H) 04/21/2020    TROPONINI 1.17 (H) 04/22/2020    TROPONINI 1.15 (H) 04/22/2020    TROPONINI 0.83 (H) 04/21/2020     Imaging Studies:  Echo 4/22/2020  Summary   Severe anterior and septal hypo-akinesia, with apical dyskinesia. Ejection fraction is visually estimated at 20-25%. Stage I diastolic dysfunction. Small apical LV thrombus   Mild to moderate mitral regurgitation is present. EKG:   No interval change today,    Assessment and PLan     Assessment:    1. Acute hypoxic respiratory failure 2/2 acute pulmonary edema from CHF after MI               COVID negative x2)  · S/p self extubation 4/26/2020  · Currently on BiPAP alternating with high flow nasal cannula    2.  STEMI s/p [facility], PCI with IRINA to LAD 4/21/2020  · Followed by cardiologist from [facility], currently on aspirin, plavix and

## 2020-05-01 NOTE — PROGRESS NOTES
Physical Therapy  Treatment Note    Name: Zaina Negron  : 1945  MRN: [id number]    Referring Provider:  ELIE Thomas CNP    Date of Service: 2020    Evaluating PT:  Ping Pierce, PT, DPT [id number]     Room #:  3451/0051-J  Diagnosis:  STEMI  PMHx/PSHx:  CAD, Hepatitis, HTN, hypothyroidism, MI, MS, osteoporosis, sarcoidosis, type II DM   Procedure/Surgery:  Intubated , Left Heart Catheterization, coronary angiography, left ventriculography, percutaneous coronary intervention to LAD , Cysto with stents , Extubated    Precautions:  Falls,  Covid (-), Hx quadriparesis d/t MS, Purewick catheter  Equipment Needs:  NA    SUBJECTIVE:    Pt lives with  in a 1 story home with ramp entry. Bedroom and bathroom are on the main level. Pt is non-ambulatory. Pt's  transfers her to wheelchair. Low pivot to wheelchair. Slide board transfer to car.  transfers wheelchair to shower chair. Able to upper body dress and perform upper body ADLs. Owns hospital bed but does not use it. Manual and power wheelchair. Requires assist for mobility around the house in the manual wheelchair. Owns eliseo lift as well. OBJECTIVE:   Initial Evaluation  Date: 20 Treatment  Date: 2020 Short Term/ Long Term   Goals   AM-PAC 6 Clicks     Was pt agreeable to Eval/treatment? Yes Yes     Does pt have pain? No c/o pain  No c/o pain    Bed Mobility  Rolling: Max A  Supine to sit: Dep x2  Sit to supine: Dep x2  Scooting: Dep x2 Rolling: MaxA  Supine<>sit: Dep  Scooting: Dep Rolling: Mod A  Supine to sit: Max A  Sit to supine: Max A  Scooting: Max A   Transfers Sit to stand: NT  Stand to sit: NT  Stand pivot: NT NT Sit to stand: NA  Stand to sit: NA  Stand pivot:  Max A with AAD   Ambulation    NT NT NA   Stair negotiation: ascended and descended  NT NT NT   ROM BUE:  Per OT eval  BLE:  WFL     Strength BUE:  Per OT eval   BLE:  Grossly 0/5 at ankle, 2-/5 at knee (R>L), 1/5 at hip     Balance Sitting EOB:  Max with knee block  Dynamic Standing:  NT Sitting EOB: MaxA  Dynamic standing: NT Sitting EOB:  Min A  Dynamic Standing:  NA     Pt is A & O x 4  Sensation:  Pt denies numbness and tingling to extremities  Edema:  None noted    Vitals:  Spo2 monitored throughout session on 8L NC:  97% at rest  94% at EOB with activity   96[facility] at end of [facility]    Therapeutic exercise: Supine: PROM to BLEs all planes/joints 2x10 ea; Seated: core stabilization ex ant/post/laterally 10x ea    Patient education  Pt educated on importance of mobility, posture in seated, exercises    Patient response to education:   Pt verbalized understanding Pt demonstrated skill Pt requires further education in this area   Yes  Yes with verbal cues and assist Yes      ASSESSMENT:    Comments:  Patient found in [facility] just finishing breakfast. Patient agreeable to treatment. PROM performed to BLEs while in supine. Patient with no volitional movement of BLEs, but able to utilize BUEs to assist with rolling but not supporting self with seated activities. Patient dependently assisted to seated EOB and denied dizziness with positional change. Patient required assist to maintain seated posture as BUEs fatigued quickly. Patient sat EOB x 10 minutes prior to returning to supine. Patient positioned to the [facility] and pillows skillfully placed under L upper body for neutral positioning and under BLEs for positioning/skin integrity. HOB elevated to comfort with call light and tray table in reach. Treatment:  Patient practiced and was instructed in the following treatment:     Therapeutic Activities Completed:  o Bed mobility: Verbal/tactile cues for sequencing BUEs/BLEs to achieve seated EOB. o Therapeutic exercise: as noted above. Increased verbal cues/assist needed to perform all exercises. o Skilled positioning: utilized pillows to promote neutral posturing, skin integrity.   o Pt education as noted above.  o Skilled vitals monitoring as noted above. PLAN:    Patient is making fair progress towards established goals. Will continue with current POC.       Time in  0846  Time out  0931    Total Treatment Time 40 minutes     CPT codes:  [] Gait training 93099 0 minutes  [] Manual therapy 41859 0 minutes  [x] Therapeutic activities 81590 40 minutes  [] Therapeutic exercises 70009 0 minutes  [] Neuromuscular reeducation 38165 0 minutes    Quinn Salter, PT, DPT  License [id number]

## 2020-05-04 ENCOUNTER — TELEPHONE (OUTPATIENT)
Dept: FAMILY MEDICINE CLINIC | Age: 75
End: 2020-05-04

## 2020-05-04 ENCOUNTER — TELEPHONE (OUTPATIENT)
Dept: PHARMACY | Facility: CLINIC | Age: 75
End: 2020-05-04

## 2020-05-04 PROCEDURE — 1111F DSCHRG MED/CURRENT MED MERGE: CPT

## 2020-05-04 RX ORDER — ONDANSETRON 4 MG/1
4-8 TABLET, ORALLY DISINTEGRATING ORAL EVERY 8 HOURS PRN
COMMUNITY
End: 2020-10-28

## 2020-05-04 RX ORDER — BACLOFEN 20 MG/1
50 TABLET ORAL 4 TIMES DAILY
COMMUNITY
End: 2020-10-23 | Stop reason: SDUPTHER

## 2020-05-04 RX ORDER — VITAMIN E 268 MG
400 CAPSULE ORAL DAILY
COMMUNITY
End: 2020-06-23

## 2020-05-04 NOTE — TELEPHONE ENCOUNTER
[phone]  [phone], [phone]  =================================  CLINICAL PHARMACY NOTE   POST-DISCHARGE TELEPHONE FOLLOW-UP ADDENDUM    For Pharmacy Admin Tracking Only    TCM Call Made?: No  [facility] ([facility] Select Patient?: Yes  Total # of Interventions Recommended: 2 - Discontinued Medication #: 1  - Updated Order #: 1  Total # Interventions Accepted: 2  Intervention Severity:   - Level 1 Intervention Present?: No   - Level 2 #: 0   - Level 3 #: 1  Outreach Status: Review Complete  Care Coordinator Outreach to Patient?: No  Provider Contacted?: No  Time Spent (min): 45    Additional Documentation:

## 2020-05-04 NOTE — TELEPHONE ENCOUNTER
If she has home care can be please ask for a speech therapy evaluation to do a bedside evaluation on the patient at home, we can base the type of consistency of solids and liquids based on this to better guide them on what she can eat.     I would continue present diet of soft for now  Obviously we have to watch the amount of fluids given potential for congestive heart failure and trying to avoid salt and fatty foods

## 2020-05-05 NOTE — TELEPHONE ENCOUNTER
1 option would be to try magnesium oxide -start with a half a bottle x1    If that does not work may need to consider enema    Okay to continue with MiraLAX    Would add Colace 100 mg twice a day

## 2020-05-06 ENCOUNTER — VIRTUAL VISIT (OUTPATIENT)
Dept: PRIMARY CARE CLINIC | Age: 75
End: 2020-05-06
Payer: MEDICARE

## 2020-05-06 VITALS — SYSTOLIC BLOOD PRESSURE: 110 MMHG | OXYGEN SATURATION: 91 % | DIASTOLIC BLOOD PRESSURE: 54 MMHG | HEART RATE: 94 BPM

## 2020-05-06 PROCEDURE — 1123F ACP DISCUSS/DSCN MKR DOCD: CPT | Performed by: INTERNAL MEDICINE

## 2020-05-06 PROCEDURE — 2022F DILAT RTA XM EVC RTNOPTHY: CPT | Performed by: INTERNAL MEDICINE

## 2020-05-06 PROCEDURE — G9899 SCRN MAM PERF RSLTS DOC: HCPCS | Performed by: INTERNAL MEDICINE

## 2020-05-06 PROCEDURE — 1036F TOBACCO NON-USER: CPT | Performed by: INTERNAL MEDICINE

## 2020-05-06 PROCEDURE — 3017F COLORECTAL CA SCREEN DOC REV: CPT | Performed by: INTERNAL MEDICINE

## 2020-05-06 PROCEDURE — G8417 CALC BMI ABV UP PARAM F/U: HCPCS | Performed by: INTERNAL MEDICINE

## 2020-05-06 PROCEDURE — 1111F DSCHRG MED/CURRENT MED MERGE: CPT | Performed by: INTERNAL MEDICINE

## 2020-05-06 PROCEDURE — 99214 OFFICE O/P EST MOD 30 MIN: CPT | Performed by: INTERNAL MEDICINE

## 2020-05-06 PROCEDURE — 4040F PNEUMOC VAC/ADMIN/RCVD: CPT | Performed by: INTERNAL MEDICINE

## 2020-05-06 PROCEDURE — G8428 CUR MEDS NOT DOCUMENT: HCPCS | Performed by: INTERNAL MEDICINE

## 2020-05-06 PROCEDURE — 1090F PRES/ABSN URINE INCON ASSESS: CPT | Performed by: INTERNAL MEDICINE

## 2020-05-06 PROCEDURE — 3046F HEMOGLOBIN A1C LEVEL >9.0%: CPT | Performed by: INTERNAL MEDICINE

## 2020-05-06 PROCEDURE — G8400 PT W/DXA NO RESULTS DOC: HCPCS | Performed by: INTERNAL MEDICINE

## 2020-05-06 RX ORDER — SENNA PLUS 8.6 MG/1
1 TABLET ORAL 2 TIMES DAILY
COMMUNITY
End: 2020-10-28 | Stop reason: SDUPTHER

## 2020-05-06 ASSESSMENT — ENCOUNTER SYMPTOMS
CONSTIPATION: 0
CHEST TIGHTNESS: 0
SHORTNESS OF BREATH: 1
COUGH: 0
NAUSEA: 0
ABDOMINAL PAIN: 0
EYE PAIN: 0
DIARRHEA: 0
VOMITING: 0
SORE THROAT: 0
BLOOD IN STOOL: 0
RHINORRHEA: 0

## 2020-05-06 NOTE — PROGRESS NOTES
needs pulmonary follow up yearly (10/2020)       - States has multiple sclerosis. States follows with neurology. States stopped ticferdia. Follow up every 6 months. States on baclofen 20mg 4x per day and dantrolene 50mg 4x per day. Spasms stable. States weakness to hands b/l - unable to feed self at times.      - States has high cholesterol. States Lipitor. No reported side effects. Stopped zetia.      - States has hypothyroidism. on levothyroxine. More compliant.      - States has had elevated liver function tests. States has seen GI. Has had biopsy. Last lab stable.      - States has depression. On citalopram. Stable on medications. No reported side effects. No suicidal thoughts.     - States has constipation. On senokot. On miralax as needed     Review of Systems   Constitutional: Negative for appetite change, chills, fever and unexpected weight change. HENT: Negative for congestion, rhinorrhea and sore throat. Eyes: Negative for pain and visual disturbance. Respiratory: Positive for shortness of breath. Negative for cough and chest tightness. Cardiovascular: Negative for chest pain and palpitations. Gastrointestinal: Negative for abdominal pain, blood in stool, constipation, diarrhea, nausea and vomiting. Genitourinary: Negative for difficulty urinating, dysuria, frequency, pelvic pain, urgency and vaginal bleeding. Musculoskeletal: Negative for arthralgias and myalgias. Skin: Negative for rash. Neurological: Negative for dizziness, syncope, weakness, light-headedness, numbness and headaches. Hematological: Negative for adenopathy. Psychiatric/Behavioral: Positive for confusion. Negative for suicidal ideas. The patient is not nervous/anxious. Prior to Visit Medications    Medication Sig Taking?  Authorizing Provider   senna (SENOKOT) 8.6 MG tablet Take 1 tablet by mouth 2 times daily Yes Historical Provider, MD   ondansetron (ZOFRAN ODT) 4 MG disintegrating tablet Take 4-8 mg by mouth every 8 hours as needed for Nausea or Vomiting Yes Historical Provider, MD   baclofen (LIORESAL) 20 MG tablet Take 40 mg by mouth 2 times daily Yes Historical Provider, MD   aspirin 81 MG EC tablet Take 1 tablet by mouth daily Yes Farzad Yepez MD   apixaban (ELIQUIS) 5 MG TABS tablet Take 1 tablet by mouth 2 times daily Yes Farzad Yepez MD   lisinopril (PRINIVIL;ZESTRIL) 2.5 MG tablet Take 1 tablet by mouth daily  Patient taking differently: Take 2.5 mg by mouth daily If BP is 90/60 or higher Yes Farzad Yepez MD   carvedilol (COREG) 6.25 MG tablet Take 1 tablet by mouth 2 times daily  Patient taking differently: Take 6.25 mg by mouth 2 times daily If BP is over 90/60 Yes Farzad Yepez MD   bumetanide (BUMEX) 1 MG tablet Take 1 tablet by mouth daily  Patient taking differently: Take 1 mg by mouth daily If BP is over 80/50 Yes Farzad Yepez MD   clopidogrel (PLAVIX) 75 MG tablet Take 1 tablet by mouth daily Yes Farzad Yepez MD   dantrolene (DANTRIUM) 50 MG capsule Take 1 capsule by mouth 5 times daily  Patient taking differently: Take 50 mg by mouth 4 times daily  Yes Latoya Power MD   levothyroxine (SYNTHROID) 88 MCG tablet Take 1 tablet by mouth daily Yes Latoya Power MD   nortriptyline (PAMELOR) 25 MG capsule Take 1 capsule by mouth nightly Yes Latoya Power MD   atorvastatin (LIPITOR) 10 MG tablet Take 1 tablet by mouth daily Yes Latoya Power MD   Catheters (URETHRAL CATHETER) MISC by Does not apply route Yes Historical Provider, MD   citalopram (CELEXA) 20 MG tablet Take 1 tablet by mouth every morning Yes Latoya Power MD   Cyanocobalamin (VITAMIN B 12 PO) Take 500 mg by mouth daily  Yes Historical Provider, MD   ipratropium-albuterol (DUONEB) 0.5-2.5 (3) MG/3ML SOLN nebulizer solution Inhale 3 mLs into the lungs every 6 hours as needed for Shortness of Breath Yes Yamil Brar MD   Calcium Carbonate-Vit D-Min (CALCIUM 600+D PLUS MINERALS) 600-400 MG-UNIT TABS Take 1 tablet by mouth nightly  Yes Historical Provider, MD   polyethylene glycol (MIRALAX) PACK packet Take 17 g by mouth daily as needed  Yes Historical Provider, MD   Cholecalciferol (VITAMIN D3) 5000 units TABS Take 1 tablet by mouth every morning Yes Historical Provider, MD   Potassium 99 MG TABS Take 1 tablet by mouth every morning Yes Historical Provider, MD   vitamin E 400 UNIT capsule Take 400 Units by mouth daily  Historical Provider, MD   magnesium (MAGNESIUM-OXIDE) 250 MG TABS tablet Take 250 mg by mouth every morning   Historical Provider, MD       Social History     Tobacco Use    Smoking status: Never Smoker    Smokeless tobacco: Never Used   Substance Use Topics    Alcohol use: No     Alcohol/week: 0.0 standard drinks     Comment: Ocassionally    Drug use: No        No Known Allergies,   Past Medical History:   Diagnosis Date    CAD (coronary artery disease) 4/21/2020    Hepatitis     High cholesterol     Hypertension     Hypothyroidism     MI (myocardial infarction) ([facility] [address] 04/21/2020    MS (multiple sclerosis) ([facility] [address]     Osteoporosis     Sarcoidosis     Type 2 diabetes mellitus with hyperglycemia, without long-term current use of insulin ([facility] [address] 10/30/2019   ,   Past Surgical History:   Procedure Laterality Date    CHOLECYSTECTOMY  1990s    CORONARY ANGIOPLASTY WITH STENT PLACEMENT  04/21/2020    Dr. Renetta Fowler   3.0 x 22mm Resolute MAAME to Prox LAD.   No LV    CYSTOSCOPY INSERTION / REMOVAL STENT / STONE Left 4/23/2020    CYSTOSCOPY LEFT RETROGRADE PYELOGRAM STENT INSERTION, STRATTON CATHETER performed by Torri Avila DO at [facility] Left 3/21/2019    LEFT FEMUR CEPHALLOMEDULLARY NAIL performed by Angie Edwards MD at [facility] Bilateral     HYSTERECTOMY      KNEE SURGERY      plate in lt knee   ,   Social History     Tobacco Use    Smoking status: Never Smoker    Smokeless tobacco: Never Used   Substance Use Topics    Alcohol use: No     Alcohol/week: 0.0 standard drinks     Comment: Ocassionally    Drug use: No   ,   Immunization History   Administered Date(s) Administered    Influenza 12/15/2010    Influenza A ([id number]) Vaccine PF IM 12/02/2009    Influenza Vaccine, unspecified formulation 11/21/2008    Influenza Virus Vaccine 10/27/2014, 10/02/2017    Influenza Whole 10/02/2013, 10/27/2014, 10/21/2015    Influenza, High Dose (Fluzone 65 yrs and older) 10/10/2015, 09/11/2017    Influenza, Pollie Shanon, IM, (6 mo and older Fluzone, Flulaval, Fluarix and 3 yrs and older Afluria) 10/02/2017    Influenza, Triv, inactivated, subunit, adjuvanted, IM (Fluad 65 yrs and older) 09/19/2018    Pneumococcal Conjugate 13-valent (Vonna Simper) 01/15/2013, 09/23/2015, 09/18/2018, 09/19/2018    Pneumococcal Polysaccharide ([id number]) 09/18/2013   ,   Health Maintenance   Topic Date Due    Diabetic foot exam  03/05/1955    Diabetic retinal exam  03[birth year]05/1955    DTaP/Tdap/Td vaccine (1 - Tdap) 03/05/1965    Shingles Vaccine (1 of 2) 03/05/1996    DEXA (modify frequency per FRAX score)  03/05/2001    Annual Wellness Visit (AWV)  06/20/2020    A1C test (Diabetic or Prediabetic)  10/30/2020    Diabetic microalbuminuria test  10/30/2020    Lipid screen  10/30/2020    TSH testing  10/30/2020    Breast cancer screen  12/10/2020    Potassium monitoring  05/01/2021    Creatinine monitoring  05/01/2021    Colon cancer screen colonoscopy  11/04/2029    Flu vaccine  Completed    Pneumococcal 65+ years Vaccine  Completed    Hepatitis C screen  Completed    Hepatitis A vaccine  Aged Out    Hib vaccine  Aged Out    Meningococcal (ACWY) vaccine  Aged Out       PHYSICAL EXAMINATION:  [ INSTRUCTIONS:  \"[x]\" Indicates a positive item  \"[]\" Indicates a negative item  -- DELETE ALL ITEMS NOT EXAMINED]  Vital Signs: (As obtained by patient/caregiver or practitioner observation)    Blood pressure-  Heart rate-    Respiratory rate-    Temperature-  Pulse oximetry-     Constitutional: [x] Appears well-developed and well-nourished [x] No apparent distress      [] Abnormal-   Mental status  [x] Alert and awake  [] Oriented to person/place/time []Able to follow commands      Eyes:  EOM    [x]  Normal  [] Abnormal-  Sclera  []  Normal  [] Abnormal -         Discharge []  None visible  [] Abnormal -    HENT:   [x] Normocephalic, atraumatic.   [] Abnormal   [] Mouth/Throat: Mucous membranes are moist.     External Ears [x] Normal  [] Abnormal-     Neck: [x] No visualized mass     Pulmonary/Chest: [x] Respiratory effort normal.  [x] No visualized signs of difficulty breathing or respiratory distress        [] Abnormal-      Musculoskeletal:   [] Normal gait with no signs of ataxia         [] Normal range of motion of neck        [] Abnormal-       Neurological:        [x] No Facial Asymmetry (Cranial nerve 7 motor function) (limited exam to video visit)          [] No gaze palsy        [] Abnormal-         Skin:        [x] No significant exanthematous lesions or discoloration noted on facial skin         [] Abnormal-            Psychiatric:       [x] Normal Affect [] No Hallucinations        [] Abnormal-     Other pertinent observable physical exam findings-     ASSESSMENT/PLAN:     CAD (Coronary artery disease)   - s/p cath and stent to LAD  On lisinopril and coreg when able   - on eliquis, plavix and aspiein x 1 month, then switch to eliquis and plavix alone   - needs follow up with cardio  - follow up labs   - follow up chest xray     Chronic systolic congestive heart failure (HCC)  - EF 20% per echo (4/2020)   - on bumex   - on coreg and lisinopril if able     Type 2 diabetes mellitus with hyperglycemia, without long-term current use of insulin ([facility] [address]  - Continue present treatment   - watch diet   - declined medications   - last A1c was 7.0  - recheck levels      Hypothyroidism, unspecified type  - continue present treatment  - on levothyroxine  - labs were underactive (8/2019) - declines increase

## 2020-05-07 ENCOUNTER — TELEPHONE (OUTPATIENT)
Dept: FAMILY MEDICINE CLINIC | Age: 75
End: 2020-05-07

## 2020-05-07 ENCOUNTER — TELEPHONE (OUTPATIENT)
Dept: CARDIOLOGY CLINIC | Age: 75
End: 2020-05-07

## 2020-05-07 ENCOUNTER — TELEPHONE (OUTPATIENT)
Dept: PRIMARY CARE CLINIC | Age: 75
End: 2020-05-07

## 2020-05-07 NOTE — TELEPHONE ENCOUNTER
This was hand faxed yesterday. I also called a verbal order into [place]. I  Spoke w/ Genna about canceling the referral that was scheduled for wound care outside of the home.

## 2020-05-08 ENCOUNTER — TELEPHONE (OUTPATIENT)
Dept: FAMILY MEDICINE CLINIC | Age: 75
End: 2020-05-08

## 2020-05-08 NOTE — TELEPHONE ENCOUNTER
Orders Placed This Encounter   Procedures    Transport patient     Via ambulance    For physician follow up appointment     I am not sure if this is the correct way to order this but placed an order as above

## 2020-05-11 ENCOUNTER — TELEPHONE (OUTPATIENT)
Dept: FAMILY MEDICINE CLINIC | Age: 75
End: 2020-05-11

## 2020-05-11 RX ORDER — DANTROLENE SODIUM 50 MG/1
50 CAPSULE ORAL 4 TIMES DAILY
Qty: 360 CAPSULE | Refills: 0 | Status: SHIPPED
Start: 2020-05-11 | End: 2020-05-14 | Stop reason: SDUPTHER

## 2020-05-13 ENCOUNTER — TELEPHONE (OUTPATIENT)
Dept: FAMILY MEDICINE CLINIC | Age: 75
End: 2020-05-13

## 2020-05-13 LAB
A/G RATIO: 1 RATIO (ref 1.1–2.2)
ALBUMIN SERPL-MCNC: 3.2 G/DL (ref 3.4–4.8)
ALP BLD-CCNC: 106 U/L (ref 42–121)
ALT SERPL-CCNC: 23 U/L (ref 10–54)
ANION GAP SERPL CALCULATED.3IONS-SCNC: 9 MEQ/L (ref 3–11)
APPEARANCE, BODY FLUID: ABNORMAL
AST SERPL-CCNC: 58 U/L (ref 10–41)
BACTERIA, URINE: NORMAL /HPF
BASOPHILS ABSOLUTE: 0.1 K/UL (ref 0–0.2)
BASOPHILS RELATIVE PERCENT: 1.2 % (ref 0–1.5)
BILIRUB SERPL-MCNC: 0.5 MG/DL (ref 0.3–1.5)
BILIRUBIN URINE: NEGATIVE
BUN BLDV-MCNC: 17 MG/DL (ref 8–21)
CALCIUM OXALATE CRYSTALS: NORMAL /HPF
CALCIUM SERPL-MCNC: 9.2 MG/DL (ref 8.5–10.5)
CHLORIDE BLD-SCNC: 94 MEQ/L (ref 98–107)
CO2: 32 MEQ/L (ref 21–31)
COLOR, URINE: YELLOW
CREAT SERPL-MCNC: 0.5 MG/DL (ref 0.4–1)
CREATININE + EGFR PANEL: 146 ML/MIN
DIFFERENTIAL, MANUAL: ABNORMAL
EOSINOPHILS ABSOLUTE: 0.3 K/UL (ref 0–0.33)
EOSINOPHILS RELATIVE PERCENT: 4.1 % (ref 0–3)
ERYTHROCYTES URINE: NORMAL /HPF
GFR NON-AFRICAN AMERICAN: 121 ML/MIN
GLOBULIN: 3.1 G/DL (ref 1.9–3.9)
GLUCOSE BLD-MCNC: 148 MG/DL (ref 70–99)
GLUCOSE URINE: NEGATIVE MG/DL
HCT VFR BLD CALC: 24.2 % (ref 36–44)
HEMOGLOBIN: 8.5 G/DL (ref 12–15)
KETONES, URINE: NEGATIVE MG/DL
LYMPHOCYTES ABSOLUTE: 1.1 K/UL (ref 1.1–4.8)
LYMPHOCYTES RELATIVE PERCENT: 14.3 % (ref 24–44)
MAGNESIUM: 1.7 MEQ/L (ref 1.6–2.6)
MCH RBC QN AUTO: 32.9 PG (ref 28–34)
MCHC RBC AUTO-ENTMCNC: 34.9 G/DL (ref 33–37)
MCV RBC AUTO: 94.2 FL (ref 80–100)
MICROSCOPIC URINE: YES
MONOCYTES ABSOLUTE: 0.5 K/UL (ref 0.2–0.7)
MONOCYTES RELATIVE PERCENT: 6.7 % (ref 3.4–9)
MUCUS, URINE: NORMAL /HPF
NEUTROPHILS ABSOLUTE: 5.6 K/UL (ref 1.83–8.7)
NITRATE, UA: NEGATIVE
PDW BLD-RTO: 14 % (ref 10.9–14.3)
PH UA: 5 (ref 4.6–8)
PLATELET # BLD: 376 K/UL (ref 150–450)
PMV BLD AUTO: 9.8 FL (ref 7.4–10.4)
POTASSIUM SERPL-SCNC: 3.9 MEQ/L (ref 3.6–5)
PROTEIN UA: 30 MG/DL
RBC # BLD: 2.57 M/UL (ref 4–4.9)
RBC URINE: ABNORMAL
SEGMENTED NEUTROPHILS RELATIVE PERCENT: 73.7 % (ref 40–74)
SODIUM BLD-SCNC: 135 MEQ/L (ref 135–145)
SPECIFIC GRAVITY, URINE: 1.01 (ref 1–1.03)
TOTAL PROTEIN: 6.3 G/DL (ref 5.9–7.8)
TRANSITIONAL EPITHELIAL: NORMAL /HPF
TSH SERPL DL<=0.05 MIU/L-ACNC: 6.95 UIU/ML (ref 0.34–5.6)
URINE CULTURE REFLEX: ABNORMAL
UROBILINOGEN, URINE: NEGATIVE MG/DL
WBC # BLD: 7.6 K/UL (ref 4.5–11)
WBC CLUMPS, URINE: NORMAL /HPF
WBC COUNT, UR AUTO: NORMAL /HPF
WBC URINE: ABNORMAL

## 2020-05-13 NOTE — TELEPHONE ENCOUNTER
Pt  calling to ask a question concerning carvedilol she was given this in the hospital but this is the first time taking it at home she took it last night and began sweating and had hallucinations. She is fine today and took the medication again this morning with no side affects.  is wondering if this could be an allergic reaction or if it could be an interaction with one of her other medications.

## 2020-05-13 NOTE — TELEPHONE ENCOUNTER
I am uncertain if it is allergic reaction or medication interaction, would continue to monitor blood pressure as you have been doing and if it happens again would consider this being a medication interaction or side effect

## 2020-05-14 ENCOUNTER — TELEPHONE (OUTPATIENT)
Dept: FAMILY MEDICINE CLINIC | Age: 75
End: 2020-05-14

## 2020-05-14 LAB
ESTIMATED AVERAGE GLUCOSE: 146 MG/DL
HBA1C MFR BLD: 6.7 % (ref 4–6)

## 2020-05-14 RX ORDER — DANTROLENE SODIUM 50 MG/1
50 CAPSULE ORAL 4 TIMES DAILY
Qty: 120 CAPSULE | Refills: 0 | Status: SHIPPED
Start: 2020-05-14 | End: 2020-08-11 | Stop reason: SDUPTHER

## 2020-05-14 NOTE — TELEPHONE ENCOUNTER
Please let the patient and  know that blood work results showed    Hemoglobin or red blood cell count was much decreased when compared to last hospital hemoglobin. Concern is with the patient being on 3 blood thinners that there might be bleeding somewhere. Concern would be bleeding in the bowels black or bloody stools. Urine analysis did show blood in the urine analysis but not sure if actual gross blood in the urine that can be seen. Also concern for internal bleeding and would need to know if any new pains in the abdomen or back or other. We will need to repeat hemoglobin and would probably suggest repeating it tomorrow and also consider repeat on Monday    Thyroid labs were underactive. Would consider increasing dose to 100 mcg     Protein labs were borderline low and would recommend a protein supplement such as Glucerna or boost or other    1 liver enzyme was borderline elevated uncertain as to cause would continue to follow with repeat blood work    Electrolytes were improved    Sugar was mildly elevated    Other liver functions and kidney function values were normal    Orders Placed This Encounter   Procedures    CBC Auto Differential     Standing Status:   Future     Standing Expiration Date:   5/14/2021    Ferritin     Standing Status:   Future     Standing Expiration Date:   5/14/2021    Iron and TIBC     Standing Status:   Future     Standing Expiration Date:   5/14/2021     Order Specific Question:   Is Patient Fasting? Answer:   yes     Order Specific Question:   No of Hours?      Answer:   8    Vitamin B12 & Folate     Standing Status:   Future     Standing Expiration Date:   5/14/2021     Lab order was not placed for Monday    Thanks

## 2020-05-15 ENCOUNTER — TELEPHONE (OUTPATIENT)
Dept: FAMILY MEDICINE CLINIC | Age: 75
End: 2020-05-15

## 2020-05-15 ENCOUNTER — PREP FOR PROCEDURE (OUTPATIENT)
Dept: UROLOGY | Age: 75
End: 2020-05-15

## 2020-05-15 LAB
BASOPHILS ABSOLUTE: 0.1 K/UL (ref 0–0.2)
BASOPHILS RELATIVE PERCENT: 1.2 % (ref 0–1.5)
EOSINOPHILS ABSOLUTE: 0.3 K/UL (ref 0–0.33)
EOSINOPHILS RELATIVE PERCENT: 3.5 % (ref 0–3)
FERRITIN: 203.5 NG/ML (ref 11–307)
FOLATE: 17.2 NG/ML
HCT VFR BLD CALC: 25.6 % (ref 36–44)
HEMOGLOBIN: 8.5 G/DL (ref 12–15)
IRON SATURATION: 18 % (ref 15–55)
IRON: 67 UG/DL (ref 50–170)
LYMPHOCYTES ABSOLUTE: 1.2 K/UL (ref 1.1–4.8)
LYMPHOCYTES RELATIVE PERCENT: 15.8 % (ref 24–44)
MCH RBC QN AUTO: 31.7 PG (ref 28–34)
MCHC RBC AUTO-ENTMCNC: 33.2 G/DL (ref 33–37)
MCV RBC AUTO: 95.4 FL (ref 80–100)
MONOCYTES ABSOLUTE: 0.5 K/UL (ref 0.2–0.7)
MONOCYTES RELATIVE PERCENT: 6.7 % (ref 3.4–9)
NEUTROPHILS ABSOLUTE: 5.8 K/UL (ref 1.83–8.7)
PDW BLD-RTO: 14.9 % (ref 10.9–14.3)
PLATELET # BLD: 424 K/UL (ref 150–450)
PMV BLD AUTO: 9.8 FL (ref 7.4–10.4)
RBC # BLD: 2.68 M/UL (ref 4–4.9)
SEGMENTED NEUTROPHILS RELATIVE PERCENT: 72.8 % (ref 40–74)
TOTAL IRON BINDING CAPACITY: 372 UG/DL (ref 250–450)
TRANSFERRIN: 266 MG/DL (ref 192–382)
URINE CULTURE, ROUTINE: NORMAL
VITAMIN B-12: 1234 PG/ML (ref 180–914)
WBC # BLD: 7.9 K/UL (ref 4.5–11)

## 2020-05-15 RX ORDER — SODIUM CHLORIDE 9 MG/ML
INJECTION, SOLUTION INTRAVENOUS CONTINUOUS
Status: CANCELLED | OUTPATIENT
Start: 2020-05-15

## 2020-05-15 NOTE — TELEPHONE ENCOUNTER
Given the low blood pressures I would advise the following with her home medications.      Decrease Bumex to a half a tablet (0.5 mg) daily    Decrease carvedilol to a half a tablet (3.125mg) twice a day    Continue to hold lisinopril, carvedilol, and Bumex, if blood pressure is less than 95 systolic and or less than 50 diastolic    Please report back symptoms and blood pressures as further adjustments may need to be made    Please seek care if symptoms change or worsen    Thanks

## 2020-05-17 NOTE — TELEPHONE ENCOUNTER
Please let the patient and family know that blood work results showed    Hemoglobin, or red blood count was still low but same at 8.5 -no change    Iron levels were normal but low normal and would suggest iron supplement daily if not already taking but, need to be aware of constipation worsening    Vitamin L66 and folic acid levels were normal    We will need to follow blood counts closely given the blood thinners    Thanks

## 2020-05-18 LAB
BASOPHILS ABSOLUTE: 0.1 K/UL (ref 0–0.2)
BASOPHILS RELATIVE PERCENT: 0.8 % (ref 0–1.5)
EOSINOPHILS ABSOLUTE: 0.3 K/UL (ref 0–0.33)
EOSINOPHILS RELATIVE PERCENT: 4.3 % (ref 0–3)
HCT VFR BLD CALC: 25.9 % (ref 36–44)
HEMOGLOBIN: 8.9 G/DL (ref 12–15)
LYMPHOCYTES ABSOLUTE: 1.2 K/UL (ref 1.1–4.8)
LYMPHOCYTES RELATIVE PERCENT: 17 % (ref 24–44)
MCH RBC QN AUTO: 33.2 PG (ref 28–34)
MCHC RBC AUTO-ENTMCNC: 34.5 G/DL (ref 33–37)
MCV RBC AUTO: 96.4 FL (ref 80–100)
MONOCYTES ABSOLUTE: 0.5 K/UL (ref 0.2–0.7)
MONOCYTES RELATIVE PERCENT: 7.2 % (ref 3.4–9)
NEUTROPHILS ABSOLUTE: 4.9 K/UL (ref 1.83–8.7)
PDW BLD-RTO: 15.5 % (ref 10.9–14.3)
PLATELET # BLD: 435 K/UL (ref 150–450)
PMV BLD AUTO: 9.6 FL (ref 7.4–10.4)
RBC # BLD: 2.68 M/UL (ref 4–4.9)
SEGMENTED NEUTROPHILS RELATIVE PERCENT: 70.7 % (ref 40–74)
WBC # BLD: 7 K/UL (ref 4.5–11)

## 2020-05-18 RX ORDER — CITALOPRAM 20 MG/1
20 TABLET ORAL EVERY MORNING
Qty: 90 TABLET | Refills: 0 | Status: SHIPPED
Start: 2020-05-18 | End: 2020-07-09 | Stop reason: SDUPTHER

## 2020-05-18 RX ORDER — FERROUS SULFATE 325(65) MG
325 TABLET ORAL
Qty: 30 TABLET | Refills: 5 | Status: SHIPPED
Start: 2020-05-18 | End: 2020-09-02 | Stop reason: SDUPTHER

## 2020-05-18 RX ORDER — FERROUS SULFATE 325(65) MG
325 TABLET ORAL
COMMUNITY
End: 2020-05-18 | Stop reason: SDUPTHER

## 2020-05-19 ENCOUNTER — TELEPHONE (OUTPATIENT)
Dept: FAMILY MEDICINE CLINIC | Age: 75
End: 2020-05-19

## 2020-05-19 NOTE — TELEPHONE ENCOUNTER
Please let the patient know that repeat blood counts was slightly improved when compared to previous but overall similar    Would consider recheck in the next 10 to 14 days and continue present medications    Orders Placed This Encounter   Procedures    CBC Auto Differential     Standing Status:   Future     Standing Expiration Date:   5/19/2021     Thanks

## 2020-05-20 ENCOUNTER — TELEPHONE (OUTPATIENT)
Dept: FAMILY MEDICINE CLINIC | Age: 75
End: 2020-05-20

## 2020-06-02 ENCOUNTER — TELEPHONE (OUTPATIENT)
Dept: FAMILY MEDICINE CLINIC | Age: 75
End: 2020-06-02

## 2020-06-02 NOTE — TELEPHONE ENCOUNTER
Pema calling to tell you that they will get labs on her this week and asking if it would be ok to put in a cath for the urine sample?

## 2020-06-05 LAB
A/G RATIO: 1.4 RATIO (ref 1.1–2.2)
ALBUMIN SERPL-MCNC: 3.5 G/DL (ref 3.4–4.8)
ALP BLD-CCNC: 92 U/L (ref 42–121)
ALT SERPL-CCNC: 18 U/L (ref 10–54)
ANION GAP SERPL CALCULATED.3IONS-SCNC: 10 MEQ/L (ref 3–11)
APPEARANCE, BODY FLUID: ABNORMAL
AST SERPL-CCNC: 27 U/L (ref 10–41)
BACTERIA, URINE: NORMAL /HPF
BASOPHILS ABSOLUTE: 0.1 K/UL (ref 0–0.2)
BASOPHILS RELATIVE PERCENT: 0.8 % (ref 0–1.5)
BILIRUB SERPL-MCNC: 0.5 MG/DL (ref 0.3–1.5)
BILIRUBIN URINE: NEGATIVE
BUN BLDV-MCNC: 24 MG/DL (ref 8–21)
CALCIUM SERPL-MCNC: 9.5 MG/DL (ref 8.5–10.5)
CHLORIDE BLD-SCNC: 94 MEQ/L (ref 98–107)
CO2: 30 MEQ/L (ref 21–31)
COLOR, URINE: YELLOW
CREAT SERPL-MCNC: 0.7 MG/DL (ref 0.4–1)
CREATININE + EGFR PANEL: 99 ML/MIN
EOSINOPHILS ABSOLUTE: 0.2 K/UL (ref 0–0.33)
EOSINOPHILS RELATIVE PERCENT: 2.4 % (ref 0–3)
ERYTHROCYTES URINE: NORMAL /HPF
GFR NON-AFRICAN AMERICAN: 82 ML/MIN
GLOBULIN: 2.5 G/DL (ref 1.9–3.9)
GLUCOSE BLD-MCNC: 134 MG/DL (ref 70–99)
GLUCOSE URINE: NEGATIVE MG/DL
HCT VFR BLD CALC: 28.3 % (ref 36–44)
HEMOGLOBIN: 9.5 G/DL (ref 12–15)
KETONES, URINE: NEGATIVE MG/DL
LYMPHOCYTES ABSOLUTE: 1.1 K/UL (ref 1.1–4.8)
LYMPHOCYTES RELATIVE PERCENT: 16.4 % (ref 24–44)
MCH RBC QN AUTO: 33.2 PG (ref 28–34)
MCHC RBC AUTO-ENTMCNC: 33.8 G/DL (ref 33–37)
MCV RBC AUTO: 98.3 FL (ref 80–100)
MICROSCOPIC URINE: YES
MONOCYTES ABSOLUTE: 0.4 K/UL (ref 0.2–0.7)
MONOCYTES RELATIVE PERCENT: 6.5 % (ref 3.4–9)
NEUTROPHILS ABSOLUTE: 4.8 K/UL (ref 1.83–8.7)
NITRATE, UA: NEGATIVE
PDW BLD-RTO: 15.5 % (ref 10.9–14.3)
PH UA: 8 (ref 4.6–8)
PLATELET # BLD: 292 K/UL (ref 150–450)
PMV BLD AUTO: 9.2 FL (ref 7.4–10.4)
POTASSIUM SERPL-SCNC: 4.1 MEQ/L (ref 3.6–5)
PROTEIN UA: NEGATIVE MG/DL
RBC # BLD: 2.88 M/UL (ref 4–4.9)
RBC URINE: ABNORMAL
SEGMENTED NEUTROPHILS RELATIVE PERCENT: 73.9 % (ref 40–74)
SODIUM BLD-SCNC: 134 MEQ/L (ref 135–145)
SPECIFIC GRAVITY, URINE: 1 (ref 1–1.03)
TOTAL PROTEIN: 6 G/DL (ref 5.9–7.8)
TRANSITIONAL EPITHELIAL: NORMAL /HPF
URINE CULTURE REFLEX: ABNORMAL
UROBILINOGEN, URINE: NEGATIVE MG/DL
WBC # BLD: 6.4 K/UL (ref 4.5–11)
WBC CLUMPS, URINE: NORMAL /HPF
WBC COUNT, UR AUTO: >100 /HPF
WBC URINE: ABNORMAL

## 2020-06-06 ENCOUNTER — TELEPHONE (OUTPATIENT)
Dept: FAMILY MEDICINE CLINIC | Age: 75
End: 2020-06-06

## 2020-06-08 ENCOUNTER — TELEPHONE (OUTPATIENT)
Dept: FAMILY MEDICINE CLINIC | Age: 75
End: 2020-06-08

## 2020-06-08 LAB — URINE CULTURE, ROUTINE: NORMAL

## 2020-06-08 RX ORDER — NITROFURANTOIN MACROCRYSTALS 50 MG/1
50 CAPSULE ORAL 4 TIMES DAILY
Qty: 28 CAPSULE | Refills: 0 | Status: ON HOLD | OUTPATIENT
Start: 2020-06-08 | End: 2020-06-16 | Stop reason: HOSPADM

## 2020-06-08 NOTE — TELEPHONE ENCOUNTER
Requested Prescriptions     Signed Prescriptions Disp Refills    nitrofurantoin (MACRODANTIN) 50 MG capsule 28 capsule 0     Sig: Take 1 capsule by mouth 4 times daily for 7 days     Authorizing Provider: Larisa Israel     Sent     Orders Placed This Encounter   Procedures    Culture, Urine     Standing Status:   Future     Standing Expiration Date:   6/8/2021     Order Specific Question:   Specify (ex-cath, midstream, cysto, etc)?      Answer:   clean catch    URINALYSIS     Standing Status:   Future     Standing Expiration Date:   6/8/2021    DME Order for [facility]) as OP     - DME device ordered - [facility] orthotic for b/l hands   - Diagnosis: MS   - Length of Need: 12 Months     Signed   Thanks

## 2020-06-08 NOTE — TELEPHONE ENCOUNTER
Orders Placed This Encounter   Procedures    DME Order for (Specify) as OP     - DME device ordered - [facility] orthotic for b/l hands   - Diagnosis: MS   - Length of Need: 12 Months     Orthotic order placed     Requested Prescriptions     Pending Prescriptions Disp Refills    nitrofurantoin (MACRODANTIN) 50 MG capsule 28 capsule 0     Sig: Take 1 capsule by mouth 4 times daily for 7 days     Would treat with macrodantin x 7 days   Culture was positive for bacteria     Thanks Declined

## 2020-06-09 ENCOUNTER — APPOINTMENT (OUTPATIENT)
Dept: GENERAL RADIOLOGY | Age: 75
DRG: 871 | End: 2020-06-09
Payer: MEDICARE

## 2020-06-09 ENCOUNTER — HOSPITAL ENCOUNTER (INPATIENT)
Age: 75
LOS: 7 days | Discharge: HOSPICE | DRG: 871 | End: 2020-06-16
Attending: EMERGENCY MEDICINE | Admitting: FAMILY MEDICINE
Payer: MEDICARE

## 2020-06-09 ENCOUNTER — TELEPHONE (OUTPATIENT)
Dept: FAMILY MEDICINE CLINIC | Age: 75
End: 2020-06-09

## 2020-06-09 ENCOUNTER — APPOINTMENT (OUTPATIENT)
Dept: CT IMAGING | Age: 75
DRG: 871 | End: 2020-06-09
Payer: MEDICARE

## 2020-06-09 PROBLEM — A41.9 SEPSIS (HCC): Status: ACTIVE | Noted: 2020-06-09

## 2020-06-09 LAB
ALBUMIN SERPL-MCNC: 4.2 G/DL (ref 3.5–5.2)
ALP BLD-CCNC: 174 U/L (ref 35–104)
ALT SERPL-CCNC: 42 U/L (ref 0–32)
ANION GAP SERPL CALCULATED.3IONS-SCNC: 14 MMOL/L (ref 7–16)
ANISOCYTOSIS: ABNORMAL
AST SERPL-CCNC: 81 U/L (ref 0–31)
BASOPHILS ABSOLUTE: 0.16 E9/L (ref 0–0.2)
BASOPHILS RELATIVE PERCENT: 0.9 % (ref 0–2)
BILIRUB SERPL-MCNC: 0.4 MG/DL (ref 0–1.2)
BUN BLDV-MCNC: 20 MG/DL (ref 8–23)
CALCIUM SERPL-MCNC: 10 MG/DL (ref 8.6–10.2)
CHLORIDE BLD-SCNC: 95 MMOL/L (ref 98–107)
CO2: 30 MMOL/L (ref 22–29)
CREAT SERPL-MCNC: 0.6 MG/DL (ref 0.5–1)
EKG ATRIAL RATE: 120 BPM
EKG P AXIS: 55 DEGREES
EKG P-R INTERVAL: 170 MS
EKG Q-T INTERVAL: 322 MS
EKG QRS DURATION: 102 MS
EKG QTC CALCULATION (BAZETT): 455 MS
EKG R AXIS: 6 DEGREES
EKG T AXIS: 98 DEGREES
EKG VENTRICULAR RATE: 120 BPM
EOSINOPHILS ABSOLUTE: 0.16 E9/L (ref 0.05–0.5)
EOSINOPHILS RELATIVE PERCENT: 0.9 % (ref 0–6)
GFR AFRICAN AMERICAN: >60
GFR NON-AFRICAN AMERICAN: >60 ML/MIN/1.73
GLUCOSE BLD-MCNC: 168 MG/DL (ref 74–99)
HCT VFR BLD CALC: 35.7 % (ref 34–48)
HEMOGLOBIN: 11.1 G/DL (ref 11.5–15.5)
LACTIC ACID, SEPSIS: 1.6 MMOL/L (ref 0.5–1.9)
LACTIC ACID: 3 MMOL/L (ref 0.5–2.2)
LYMPHOCYTES ABSOLUTE: 0.18 E9/L (ref 1.5–4)
LYMPHOCYTES RELATIVE PERCENT: 0.9 % (ref 20–42)
MCH RBC QN AUTO: 32.1 PG (ref 26–35)
MCHC RBC AUTO-ENTMCNC: 31.1 % (ref 32–34.5)
MCV RBC AUTO: 103.2 FL (ref 80–99.9)
MONOCYTES ABSOLUTE: 0.55 E9/L (ref 0.1–0.95)
MONOCYTES RELATIVE PERCENT: 2.6 % (ref 2–12)
NEUTROPHILS ABSOLUTE: 17.29 E9/L (ref 1.8–7.3)
NEUTROPHILS RELATIVE PERCENT: 94.8 % (ref 43–80)
PDW BLD-RTO: 14.6 FL (ref 11.5–15)
PH VENOUS: 7.44 (ref 7.35–7.45)
PLATELET # BLD: 344 E9/L (ref 130–450)
PMV BLD AUTO: 11 FL (ref 7–12)
POIKILOCYTES: ABNORMAL
POLYCHROMASIA: ABNORMAL
POTASSIUM REFLEX MAGNESIUM: 4.6 MMOL/L (ref 3.5–5)
PRO-BNP: 830 PG/ML (ref 0–450)
PROCALCITONIN: 1.57 NG/ML (ref 0–0.08)
RBC # BLD: 3.46 E12/L (ref 3.5–5.5)
SARS-COV-2, NAAT: NOT DETECTED
SODIUM BLD-SCNC: 139 MMOL/L (ref 132–146)
SPHEROCYTES: ABNORMAL
STOMATOCYTES: ABNORMAL
TOTAL PROTEIN: 7.8 G/DL (ref 6.4–8.3)
TROPONIN: 0.03 NG/ML (ref 0–0.03)
WBC # BLD: 18.2 E9/L (ref 4.5–11.5)

## 2020-06-09 PROCEDURE — 6360000004 HC RX CONTRAST MEDICATION: Performed by: RADIOLOGY

## 2020-06-09 PROCEDURE — 6360000002 HC RX W HCPCS: Performed by: GENERAL PRACTICE

## 2020-06-09 PROCEDURE — 2580000003 HC RX 258: Performed by: GENERAL PRACTICE

## 2020-06-09 PROCEDURE — 2580000003 HC RX 258: Performed by: FAMILY MEDICINE

## 2020-06-09 PROCEDURE — 85025 COMPLETE CBC W/AUTO DIFF WBC: CPT

## 2020-06-09 PROCEDURE — 6360000002 HC RX W HCPCS: Performed by: FAMILY MEDICINE

## 2020-06-09 PROCEDURE — 36415 COLL VENOUS BLD VENIPUNCTURE: CPT

## 2020-06-09 PROCEDURE — 87088 URINE BACTERIA CULTURE: CPT

## 2020-06-09 PROCEDURE — U0002 COVID-19 LAB TEST NON-CDC: HCPCS

## 2020-06-09 PROCEDURE — 93005 ELECTROCARDIOGRAM TRACING: CPT | Performed by: GENERAL PRACTICE

## 2020-06-09 PROCEDURE — 2580000003 HC RX 258: Performed by: INTERNAL MEDICINE

## 2020-06-09 PROCEDURE — 71045 X-RAY EXAM CHEST 1 VIEW: CPT

## 2020-06-09 PROCEDURE — 87077 CULTURE AEROBIC IDENTIFY: CPT

## 2020-06-09 PROCEDURE — 83880 ASSAY OF NATRIURETIC PEPTIDE: CPT

## 2020-06-09 PROCEDURE — 74178 CT ABD&PLV WO CNTR FLWD CNTR: CPT

## 2020-06-09 PROCEDURE — 2580000003 HC RX 258: Performed by: RADIOLOGY

## 2020-06-09 PROCEDURE — 6370000000 HC RX 637 (ALT 250 FOR IP): Performed by: FAMILY MEDICINE

## 2020-06-09 PROCEDURE — 84484 ASSAY OF TROPONIN QUANT: CPT

## 2020-06-09 PROCEDURE — 80053 COMPREHEN METABOLIC PANEL: CPT

## 2020-06-09 PROCEDURE — 2060000000 HC ICU INTERMEDIATE R&B

## 2020-06-09 PROCEDURE — 82800 BLOOD PH: CPT

## 2020-06-09 PROCEDURE — 87040 BLOOD CULTURE FOR BACTERIA: CPT

## 2020-06-09 PROCEDURE — 70450 CT HEAD/BRAIN W/O DYE: CPT

## 2020-06-09 PROCEDURE — 99285 EMERGENCY DEPT VISIT HI MDM: CPT

## 2020-06-09 PROCEDURE — 83605 ASSAY OF LACTIC ACID: CPT

## 2020-06-09 PROCEDURE — 87186 SC STD MICRODIL/AGAR DIL: CPT

## 2020-06-09 PROCEDURE — 84145 PROCALCITONIN (PCT): CPT

## 2020-06-09 RX ORDER — ASPIRIN 81 MG/1
81 TABLET ORAL DAILY
Status: CANCELLED | OUTPATIENT
Start: 2020-06-09

## 2020-06-09 RX ORDER — SODIUM CHLORIDE 0.9 % (FLUSH) 0.9 %
10 SYRINGE (ML) INJECTION
Status: COMPLETED | OUTPATIENT
Start: 2020-06-09 | End: 2020-06-09

## 2020-06-09 RX ORDER — ACETAMINOPHEN 650 MG/1
650 SUPPOSITORY RECTAL EVERY 6 HOURS PRN
Status: DISCONTINUED | OUTPATIENT
Start: 2020-06-09 | End: 2020-06-16 | Stop reason: HOSPADM

## 2020-06-09 RX ORDER — SODIUM CHLORIDE 9 MG/ML
INJECTION, SOLUTION INTRAVENOUS CONTINUOUS
Status: DISCONTINUED | OUTPATIENT
Start: 2020-06-09 | End: 2020-06-10

## 2020-06-09 RX ORDER — LEVOTHYROXINE SODIUM 88 UG/1
88 TABLET ORAL DAILY
Status: DISCONTINUED | OUTPATIENT
Start: 2020-06-10 | End: 2020-06-13

## 2020-06-09 RX ORDER — DANTROLENE SODIUM 25 MG/1
50 CAPSULE ORAL 4 TIMES DAILY
Status: DISCONTINUED | OUTPATIENT
Start: 2020-06-09 | End: 2020-06-16 | Stop reason: HOSPADM

## 2020-06-09 RX ORDER — SODIUM CHLORIDE 9 MG/ML
125 INJECTION, SOLUTION INTRAVENOUS CONTINUOUS
Status: DISCONTINUED | OUTPATIENT
Start: 2020-06-09 | End: 2020-06-09

## 2020-06-09 RX ORDER — 0.9 % SODIUM CHLORIDE 0.9 %
1000 INTRAVENOUS SOLUTION INTRAVENOUS ONCE
Status: COMPLETED | OUTPATIENT
Start: 2020-06-09 | End: 2020-06-09

## 2020-06-09 RX ORDER — CLOPIDOGREL BISULFATE 75 MG/1
75 TABLET ORAL DAILY
Status: DISCONTINUED | OUTPATIENT
Start: 2020-06-09 | End: 2020-06-16 | Stop reason: HOSPADM

## 2020-06-09 RX ORDER — CITALOPRAM 20 MG/1
20 TABLET ORAL EVERY MORNING
Status: DISCONTINUED | OUTPATIENT
Start: 2020-06-10 | End: 2020-06-16 | Stop reason: HOSPADM

## 2020-06-09 RX ORDER — ACETAMINOPHEN 325 MG/1
650 TABLET ORAL EVERY 6 HOURS PRN
Status: DISCONTINUED | OUTPATIENT
Start: 2020-06-09 | End: 2020-06-16 | Stop reason: HOSPADM

## 2020-06-09 RX ORDER — FERROUS SULFATE 325(65) MG
325 TABLET ORAL
Status: DISCONTINUED | OUTPATIENT
Start: 2020-06-10 | End: 2020-06-16 | Stop reason: HOSPADM

## 2020-06-09 RX ORDER — SODIUM CHLORIDE 0.9 % (FLUSH) 0.9 %
10 SYRINGE (ML) INJECTION PRN
Status: DISCONTINUED | OUTPATIENT
Start: 2020-06-09 | End: 2020-06-16 | Stop reason: HOSPADM

## 2020-06-09 RX ORDER — IPRATROPIUM BROMIDE AND ALBUTEROL SULFATE 2.5; .5 MG/3ML; MG/3ML
1 SOLUTION RESPIRATORY (INHALATION) EVERY 6 HOURS PRN
Status: DISCONTINUED | OUTPATIENT
Start: 2020-06-09 | End: 2020-06-16 | Stop reason: HOSPADM

## 2020-06-09 RX ORDER — SODIUM CHLORIDE 9 MG/ML
INJECTION, SOLUTION INTRAVENOUS CONTINUOUS
Status: DISCONTINUED | OUTPATIENT
Start: 2020-06-09 | End: 2020-06-09

## 2020-06-09 RX ORDER — NORTRIPTYLINE HYDROCHLORIDE 25 MG/1
25 CAPSULE ORAL NIGHTLY
Status: DISCONTINUED | OUTPATIENT
Start: 2020-06-09 | End: 2020-06-16 | Stop reason: HOSPADM

## 2020-06-09 RX ORDER — ATORVASTATIN CALCIUM 10 MG/1
10 TABLET, FILM COATED ORAL DAILY
Status: DISCONTINUED | OUTPATIENT
Start: 2020-06-09 | End: 2020-06-16 | Stop reason: HOSPADM

## 2020-06-09 RX ORDER — SODIUM CHLORIDE 0.9 % (FLUSH) 0.9 %
10 SYRINGE (ML) INJECTION EVERY 12 HOURS SCHEDULED
Status: DISCONTINUED | OUTPATIENT
Start: 2020-06-09 | End: 2020-06-16 | Stop reason: HOSPADM

## 2020-06-09 RX ADMIN — DANTROLENE SODIUM 50 MG: 25 CAPSULE ORAL at 20:56

## 2020-06-09 RX ADMIN — CEFEPIME 2 G: 2 INJECTION, POWDER, FOR SOLUTION INTRAVENOUS at 20:56

## 2020-06-09 RX ADMIN — VANCOMYCIN HYDROCHLORIDE 1250 MG: 10 INJECTION, POWDER, LYOPHILIZED, FOR SOLUTION INTRAVENOUS at 20:56

## 2020-06-09 RX ADMIN — IOPAMIDOL 110 ML: 755 INJECTION, SOLUTION INTRAVENOUS at 14:56

## 2020-06-09 RX ADMIN — SODIUM CHLORIDE: 9 INJECTION, SOLUTION INTRAVENOUS at 23:18

## 2020-06-09 RX ADMIN — SODIUM CHLORIDE: 9 INJECTION, SOLUTION INTRAVENOUS at 18:15

## 2020-06-09 RX ADMIN — CEFEPIME HYDROCHLORIDE 2 G: 2 INJECTION, POWDER, FOR SOLUTION INTRAVENOUS at 12:47

## 2020-06-09 RX ADMIN — SODIUM CHLORIDE 1000 ML: 9 INJECTION, SOLUTION INTRAVENOUS at 15:35

## 2020-06-09 RX ADMIN — NORTRIPTYLINE HYDROCHLORIDE 25 MG: 25 CAPSULE ORAL at 20:58

## 2020-06-09 RX ADMIN — ATORVASTATIN CALCIUM 10 MG: 10 TABLET, FILM COATED ORAL at 20:56

## 2020-06-09 RX ADMIN — APIXABAN 5 MG: 5 TABLET, FILM COATED ORAL at 20:56

## 2020-06-09 RX ADMIN — SODIUM CHLORIDE: 9 INJECTION, SOLUTION INTRAVENOUS at 20:57

## 2020-06-09 RX ADMIN — SODIUM CHLORIDE 1000 ML: 9 INJECTION, SOLUTION INTRAVENOUS at 12:47

## 2020-06-09 RX ADMIN — SODIUM CHLORIDE 125 ML/HR: 9 INJECTION, SOLUTION INTRAVENOUS at 18:57

## 2020-06-09 RX ADMIN — Medication 10 ML: at 14:56

## 2020-06-09 ASSESSMENT — ENCOUNTER SYMPTOMS
NAUSEA: 0
COUGH: 0
SORE THROAT: 0
VOMITING: 0
CHOKING: 0
CHEST TIGHTNESS: 0
EYE PAIN: 0
SINUS PAIN: 0
WHEEZING: 0
ABDOMINAL PAIN: 0
DIARRHEA: 0
SHORTNESS OF BREATH: 0

## 2020-06-09 ASSESSMENT — PAIN SCALES - GENERAL
PAINLEVEL_OUTOF10: 0

## 2020-06-09 NOTE — H&P
History:        Diagnosis Date    CAD (coronary artery disease) 4/21/2020    Hepatitis     High cholesterol     Hypertension     Hypothyroidism     [facility] 04/21/2020    MS [facility]multiple sclerosis) ([facility]     Osteoporosis     Sarcoidosis     Type 2 diabetes mellitus with hyperglycemia, without long-term current use of insulin ([facility] 10/30/2019       Past Surgical History:        Procedure Laterality Date    CHOLECYSTECTOMY  1990s    CORONARY ANGIOPLASTY WITH STENT PLACEMENT  04/21/2020    Dr. Nicole Espinoza   3.0 x 22mm Resolute MAAME to Prox LAD. No LV    CYSTOSCOPY INSERTION / REMOVAL STENT / STONE Left 4/23/2020    CYSTOSCOPY LEFT RETROGRADE PYELOGRAM STENT INSERTION, STRATTON CATHETER performed by Cameron Avila DO at [facility] Left 3/21/2019    LEFT FEMUR CEPHALLOMEDULLARY NAIL performed by Marcela Ambrocio MD at [facility] Bilateral     HYSTERECTOMY      KNEE SURGERY      plate in lt knee       Medications Prior to Admission:      Prior to Admission medications    Medication Sig Start Date End Date Taking?  Authorizing Provider   nitrofurantoin (MACRODANTIN) 50 MG capsule Take 1 capsule by mouth 4 times daily for 7 days 6/8/20 6/15/20  Surjit Ceja MD   ferrous sulfate (IRON 325) 325 (65 Fe) MG tablet Take 1 tablet by mouth daily (with breakfast) 5/18/20   Surjit Ceja MD   citalopram (CELEXA) 20 MG tablet Take 1 tablet by mouth every morning 5/18/20   Surjit Ceja MD   dantrolene (DANTRIUM) 50 MG capsule Take 1 capsule by mouth 4 times daily 5/14/20   Surjit Ceja MD   senna (SENOKOT) 8.6 MG tablet Take 1 tablet by mouth 2 times daily    Historical Provider, MD   ondansetron (ZOFRAN ODT) 4 MG disintegrating tablet Take 4-8 mg by mouth every 8 hours as needed for Nausea or Vomiting    Historical Provider, MD   baclofen (LIORESAL) 20 MG tablet Take 40 mg by mouth 2 times daily    Historical Provider, MD   vitamin E 400 UNIT capsule Take 400 Units by mouth daily    Historical Provider, MD   aspirin 81 MG EC tablet Take 1 tablet by mouth daily 5/2/20   Chris Wright MD   apixaban (ELIQUIS) 5 MG TABS tablet Take 1 tablet by mouth 2 times daily 5/2/20   Chris Wright MD   lisinopril (PRINIVIL;ZESTRIL) 2.5 MG tablet Take 1 tablet by mouth daily  Patient taking differently: Take 2.5 mg by mouth daily If BP is 90/60 or higher 5/2/20   Chris Wright MD   carvedilol (COREG) 6.25 MG tablet Take 1 tablet by mouth 2 times daily  Patient taking differently: Take 6.25 mg by mouth 2 times daily If BP is over 90/60 5/1/20   Chris Wright MD   bumetanide (BUMEX) 1 MG tablet Take 1 tablet by mouth daily  Patient taking differently: Take 1 mg by mouth daily If BP is over 80/50 5/2/20   Chris Wright MD   clopidogrel (PLAVIX) 75 MG tablet Take 1 tablet by mouth daily 5/2/20   Chris Wright MD   levothyroxine (SYNTHROID) 88 MCG tablet Take 1 tablet by mouth daily 3/17/20   Aashish Aceves MD   nortriptyline [facility]) 25 MG capsule Take 1 capsule by mouth nightly 3/17/20 6/15/20  Aashish Aceves MD   atorvastatin (LIPITOR) 10 MG tablet Take 1 tablet by mouth daily 1/17/20   Aashish Aceves MD   Catheters (URETHRAL CATHETER) MISC by Does not apply route    Historical Provider, MD   Cyanocobalamin (VITAMIN B 12 PO) Take 500 mg by mouth daily     Historical Provider, MD   ipratropium-albuterol (DUONEB) 0.5-2.5 (3) MG/3ML SOLN nebulizer solution Inhale 3 mLs into the lungs every 6 hours as needed for Shortness of Breath 5/15/19   Leeann Orourke MD   Calcium Carbonate-Vit D-Min (CALCIUM 600+D PLUS MINERALS) 600-400 MG-UNIT TABS Take 1 tablet by mouth nightly     Historical Provider, MD   polyethylene glycol (MIRALAX) PACK packet Take 17 g by mouth daily as needed     Historical Provider, MD   Cholecalciferol (VITAMIN D3) 5000 units TABS Take 1 tablet by mouth every morning    Historical Provider, MD   Potassium 99 MG TABS Take 1 tablet by mouth every morning Historical Provider, MD   magnesium (MAGNESIUM-OXIDE) 250 MG TABS tablet Take 250 mg by mouth every morning     Historical Provider, MD       Allergies:  Patient has no known allergies. Social History:    TOBACCO:   reports that she has never smoked. She has never used smokeless tobacco.  ETOH:   reports no history of alcohol use. Family History:    Reviewed in detail and negative for DM, CAD, Cancer, CVA. Positive as follows\"      Problem Relation Age of Onset    Stroke Mother     Heart Disease Mother     Cancer Father         lung    Mult Sclerosis Sister     No Known Problems Brother     No Known Problems Sister     Arthritis Sister     Cervical Cancer Sister        REVIEW OF SYSTEMS:   Pertinent positives as noted in the HPI. All other systems reviewed and negative. PHYSICAL EXAM:  BP (!) 101/46   Pulse 104   Temp 99 °F (37.2 °C) (Oral)   Resp 27   Ht 5' (1.524 m)   Wt 143 lb (64.9 kg)   SpO2 100%   BMI 27.93 kg/m²   General appearance: No acute distress. Breathing with a difficulty  HEENT: Normal cephalic, atraumatic, facial features symmetrical.  EOMI  Neck: Supple, trachea midline  Respiratory: Clear to auscultation bilaterally  Cardiovascular: Tachycardic  Abdomen: Soft, nondistended nontender  Musculoskeletal: Normal range of motion no deformity  Skin: Normal skin color. No rashes or lesions. Neurologic:  Neurovascularly intact without any focal sensory/motor deficits. Cranial nerves: II-XII intact, grossly non-focal.  Psychiatric: Disoriented and confused.     Reviewed EKG and CXR personally    CBC:   Recent Labs     06/09/20  1225   WBC 18.2*   RBC 3.46*   HGB 11.1*   HCT 35.7   .2*   RDW 14.6        BMP:   Recent Labs     06/09/20  1225      K 4.6   CL 95*   CO2 30*   BUN 20   CREATININE 0.6     LFT:  Recent Labs     06/09/20  1225   PROT 7.8   ALKPHOS 174*   ALT 42*   AST 81*   BILITOT 0.4     CE:  Recent Labs     06/09/20  1225   TROPONINI 0.03 SPLEEN:Unremarkable. ADRENALS:Unremarkable. KIDNEYS: The left kidney is mildly atrophic. There is a 7 mm calculus in the proximal left ureter. Left ureteral stent in situ. There is mild left hydronephrosis. Multiple 1 mm nonobstructive calculus is seen in the midpole calyx of the right kidney. No ureteral calculus or hydronephrosis. PANCREAS:Unremarkable. BOWEL: Moderate fecal retention is seen in the colon indicating constipation. No bowel wall thickening or mechanical obstruction is seen. APPENDIX:Not visualized. No gross pericecal inflammatory changes to indicate acute appendicitis. BLADDER:Unremarkable. REPRODUCTIVE ORGANS: Status post hysterectomy. VASCULATURE:Moderate calcified atherosclerosis seen in the abdominal aorta. No aneurysm. LYMPH NODES:Unremarkable. BONES: No acute fracture is seen. The bones are demineralized. No bony destructive lesion is seen. An intramedullary shanti is seen in the left femur. Prominent heterotopic ossification is seen along the medial aspect of the proximal femur. Mild sclerosis through the articular surface of the right femoral head indicates avascular necrosis. MISCELLANEOUS:No additional finding. 1. Mild left hydronephrosis related to a 7 mm calculus in the proximal left ureter. Left ureteral stent in situ. 2. Constipation. No evidence of bowel obstruction. Ct Head Wo Contrast    Result Date: 2020  Patient MRN:  [id number] : 1945 Age: 77 years Gender: Female Order Date:  2020 2:45 PM EXAM: CT HEAD WO CONTRAST NUMBER OF IMAGES:  112 INDICATION:  AMS AMS COMPARISON: 2020 TECHNIQUE:   Noncontrast CT of the head was performed. Coronal and sagittal reconstructions were obtained. Dose optimization techniques utilized including automatic exposure control and clarity iterative reconstruction. FINDINGS:   No hydrocephalus or extra-axial fluid collection seen. There is generalized volume loss and chronic small vessel disease.  Brain parenchyma and orbits are otherwise unremarkable. Basilar cisterns are preserved. There is minimal opacification of all the visualized paranasal sinuses. Osseous structures are intact. Mastoid air cells are clear. No acute intracranial abnormality. Xr Chest Portable    Result Date: 2020  Patient MRN: [id number] : 1945 Age:  77 years Gender: Female Order Date: 2020 11:45 AM Exam: XR CHEST PORTABLE Number of Images: 1 view Indication:   septic septic Comparison: May 1, 2020 FINDINGS: There is atelectasis and/or infiltrate at the left lower lobe. Heart and pulmonary vascularity normal. Costophrenic angles sharp. Normal aorta. Atelectasis and/or infiltrate at the left lower lobe. ASSESSMENT:  1.  Sepsis, likely urinary source  2. Possible left-sided pneumonia  3. Lactic acidosis  4. Mild left hydronephrosis secondary 7 mm stone  5. Status post ureteral stent placement 1 week ago  6. Hypotension  7. Anxiety/depression  8. Hypothyroidism  9. Hypertension  10. Ischemic cardiomyopathy w// EF 20-25%  11. Coronary artery disease s/p STEMI 1 month ago      PLAN:  1. Admit to medicine service, stepdown unit  2. Pharmacy to dose vancomycin  3. Cefepime 2 grams IV BID  4. Consult infectious disease  5. Consult urology  6. Follow-up blood cultures  7. IV fluids normal saline at 75 mL's per hour  8. Low threshold to transfer the ICU. Pressures have been soft  9. Levophed infusion should map consistently stay below 65  10. Urinalysis pending  11. Continue home doses of Eliquis, aspirin, atorvastatin, citalopram, Plavix  12. Recheck lactic acid level in 6 hours  13. Trend procalcitonin  14. Monitor closely for fluid overload with EF of 20-25%  15. Consult Cardiology        Diet: No diet orders on file  Code Status: Prior    Surrogate decision maker confirmed with patient:  Primary Emergency Contact: [address], Home Phone: [phone]    DVT Prophylaxis: []Lovenox []Heparin []PCD [] [address]  []Encouraged ambulation    Disposition: []Med/Surg [] Intermediate [] ICU/CCU  Admit status: [] Observation [] Inpatient     +++++++++++++++++++++++++++++++++++++++++++++++++  [address], [place]  +++++++++++++++++++++++++++++++++++++++++++++++++  NOTE: This report was transcribed using voice recognition software. Every effort was made to ensure accuracy; however, inadvertent computerized transcription errors may be present.

## 2020-06-10 LAB
ABO/RH: NORMAL
ALBUMIN SERPL-MCNC: 3 G/DL (ref 3.5–5.2)
ALP BLD-CCNC: 124 U/L (ref 35–104)
ALT SERPL-CCNC: 45 U/L (ref 0–32)
ANION GAP SERPL CALCULATED.3IONS-SCNC: 10 MMOL/L (ref 7–16)
ANTIBODY SCREEN: NORMAL
AST SERPL-CCNC: 64 U/L (ref 0–31)
BASOPHILS ABSOLUTE: 0.02 E9/L (ref 0–0.2)
BASOPHILS RELATIVE PERCENT: 0.2 % (ref 0–2)
BILIRUB SERPL-MCNC: 0.3 MG/DL (ref 0–1.2)
BUN BLDV-MCNC: 19 MG/DL (ref 8–23)
CALCIUM SERPL-MCNC: 8.1 MG/DL (ref 8.6–10.2)
CHLORIDE BLD-SCNC: 101 MMOL/L (ref 98–107)
CO2: 27 MMOL/L (ref 22–29)
CREAT SERPL-MCNC: 0.5 MG/DL (ref 0.5–1)
EOSINOPHILS ABSOLUTE: 0.13 E9/L (ref 0.05–0.5)
EOSINOPHILS RELATIVE PERCENT: 1.5 % (ref 0–6)
GFR AFRICAN AMERICAN: >60
GFR NON-AFRICAN AMERICAN: >60 ML/MIN/1.73
GLUCOSE BLD-MCNC: 103 MG/DL (ref 74–99)
HCT VFR BLD CALC: 25.5 % (ref 34–48)
HCT VFR BLD CALC: 26.6 % (ref 34–48)
HEMOGLOBIN: 7.9 G/DL (ref 11.5–15.5)
HEMOGLOBIN: 8.2 G/DL (ref 11.5–15.5)
IMMATURE GRANULOCYTES #: 0.05 E9/L
IMMATURE GRANULOCYTES %: 0.6 % (ref 0–5)
LACTIC ACID, SEPSIS: 0.8 MMOL/L (ref 0.5–1.9)
LACTIC ACID, SEPSIS: 1 MMOL/L (ref 0.5–1.9)
LACTIC ACID, SEPSIS: 1.2 MMOL/L (ref 0.5–1.9)
LACTIC ACID, SEPSIS: 1.5 MMOL/L (ref 0.5–1.9)
LYMPHOCYTES ABSOLUTE: 0.65 E9/L (ref 1.5–4)
LYMPHOCYTES RELATIVE PERCENT: 7.6 % (ref 20–42)
MAGNESIUM: 2.2 MG/DL (ref 1.6–2.6)
MCH RBC QN AUTO: 32.2 PG (ref 26–35)
MCHC RBC AUTO-ENTMCNC: 31 % (ref 32–34.5)
MCV RBC AUTO: 104.1 FL (ref 80–99.9)
MONOCYTES ABSOLUTE: 0.42 E9/L (ref 0.1–0.95)
MONOCYTES RELATIVE PERCENT: 4.9 % (ref 2–12)
NEUTROPHILS ABSOLUTE: 7.3 E9/L (ref 1.8–7.3)
NEUTROPHILS RELATIVE PERCENT: 85.2 % (ref 43–80)
PDW BLD-RTO: 14.7 FL (ref 11.5–15)
PLATELET # BLD: 234 E9/L (ref 130–450)
PMV BLD AUTO: 11.3 FL (ref 7–12)
POTASSIUM SERPL-SCNC: 3.5 MMOL/L (ref 3.5–5)
RBC # BLD: 2.45 E12/L (ref 3.5–5.5)
SODIUM BLD-SCNC: 138 MMOL/L (ref 132–146)
TOTAL PROTEIN: 5.7 G/DL (ref 6.4–8.3)
WBC # BLD: 8.6 E9/L (ref 4.5–11.5)

## 2020-06-10 PROCEDURE — 83605 ASSAY OF LACTIC ACID: CPT

## 2020-06-10 PROCEDURE — 85018 HEMOGLOBIN: CPT

## 2020-06-10 PROCEDURE — 85025 COMPLETE CBC W/AUTO DIFF WBC: CPT

## 2020-06-10 PROCEDURE — 86901 BLOOD TYPING SEROLOGIC RH(D): CPT

## 2020-06-10 PROCEDURE — 6360000002 HC RX W HCPCS: Performed by: FAMILY MEDICINE

## 2020-06-10 PROCEDURE — 2700000000 HC OXYGEN THERAPY PER DAY

## 2020-06-10 PROCEDURE — 6370000000 HC RX 637 (ALT 250 FOR IP): Performed by: FAMILY MEDICINE

## 2020-06-10 PROCEDURE — 86900 BLOOD TYPING SEROLOGIC ABO: CPT

## 2020-06-10 PROCEDURE — 85014 HEMATOCRIT: CPT

## 2020-06-10 PROCEDURE — 2580000003 HC RX 258: Performed by: FAMILY MEDICINE

## 2020-06-10 PROCEDURE — 2580000003 HC RX 258: Performed by: INTERNAL MEDICINE

## 2020-06-10 PROCEDURE — 2060000000 HC ICU INTERMEDIATE R&B

## 2020-06-10 PROCEDURE — 83735 ASSAY OF MAGNESIUM: CPT

## 2020-06-10 PROCEDURE — 80053 COMPREHEN METABOLIC PANEL: CPT

## 2020-06-10 PROCEDURE — 86850 RBC ANTIBODY SCREEN: CPT

## 2020-06-10 PROCEDURE — 36415 COLL VENOUS BLD VENIPUNCTURE: CPT

## 2020-06-10 RX ADMIN — DANTROLENE SODIUM 50 MG: 25 CAPSULE ORAL at 13:43

## 2020-06-10 RX ADMIN — SODIUM CHLORIDE: 9 INJECTION, SOLUTION INTRAVENOUS at 09:32

## 2020-06-10 RX ADMIN — CLOPIDOGREL 75 MG: 75 TABLET, FILM COATED ORAL at 08:34

## 2020-06-10 RX ADMIN — Medication 10 ML: at 08:34

## 2020-06-10 RX ADMIN — VANCOMYCIN HYDROCHLORIDE 1250 MG: 10 INJECTION, POWDER, LYOPHILIZED, FOR SOLUTION INTRAVENOUS at 18:32

## 2020-06-10 RX ADMIN — DANTROLENE SODIUM 50 MG: 25 CAPSULE ORAL at 08:34

## 2020-06-10 RX ADMIN — DANTROLENE SODIUM 50 MG: 25 CAPSULE ORAL at 21:56

## 2020-06-10 RX ADMIN — CEFEPIME 2 G: 2 INJECTION, POWDER, FOR SOLUTION INTRAVENOUS at 05:02

## 2020-06-10 RX ADMIN — FERROUS SULFATE TAB 325 MG (65 MG ELEMENTAL FE) 325 MG: 325 (65 FE) TAB at 08:34

## 2020-06-10 RX ADMIN — NORTRIPTYLINE HYDROCHLORIDE 25 MG: 25 CAPSULE ORAL at 21:56

## 2020-06-10 RX ADMIN — Medication 10 ML: at 21:56

## 2020-06-10 RX ADMIN — CITALOPRAM 20 MG: 20 TABLET, FILM COATED ORAL at 08:34

## 2020-06-10 RX ADMIN — SODIUM CHLORIDE: 9 INJECTION, SOLUTION INTRAVENOUS at 05:03

## 2020-06-10 RX ADMIN — ATORVASTATIN CALCIUM 10 MG: 10 TABLET, FILM COATED ORAL at 21:56

## 2020-06-10 RX ADMIN — DANTROLENE SODIUM 50 MG: 25 CAPSULE ORAL at 16:55

## 2020-06-10 RX ADMIN — CEFEPIME 2 G: 2 INJECTION, POWDER, FOR SOLUTION INTRAVENOUS at 22:05

## 2020-06-10 RX ADMIN — LEVOTHYROXINE SODIUM 88 MCG: 0.09 TABLET ORAL at 06:33

## 2020-06-10 RX ADMIN — CEFEPIME 2 G: 2 INJECTION, POWDER, FOR SOLUTION INTRAVENOUS at 13:43

## 2020-06-10 ASSESSMENT — PAIN SCALES - GENERAL
PAINLEVEL_OUTOF10: 0

## 2020-06-10 NOTE — CARE COORDINATION
To clarify pt's code status per pt and spouse, yes intubation  for short-term ventilation, no compressions, yes to meds, yes to shock.

## 2020-06-10 NOTE — CONSULTS
History:  Family History   Problem Relation Age of Onset    Stroke Mother     Heart Disease Mother     Cancer Father         lung    Mult Sclerosis Sister     No Known Problems Brother     No Known Problems Sister     Arthritis Sister     Cervical Cancer Sister        Social History:  Social History     Socioeconomic History    Marital status:      Spouse name: Not on file    Number of children: Not on file    Years of education: Not on file    Highest education level: Not on file   Occupational History    Not on file   Social Needs    Financial resource strain: Not on file    Food insecurity     Worry: Not on file     Inability: Not on file   [employer] needs     Medical: Not on file     Non-medical: Not on file   Tobacco Use    Smoking status: Never Smoker    Smokeless tobacco: Never Used   Substance and Sexual Activity    Alcohol use: No     Alcohol/week: 0.0 standard drinks     Comment: Ocassionally    Drug use: No    Sexual activity: Yes     Partners: Male   Lifestyle    Physical activity     Days per week: Not on file     Minutes per session: Not on file    Stress: Not on file   Relationships    Social connections     Talks on phone: Not on file     Gets together: Not on file     Attends [employer] service: Not on file     Active member of club or organization: Not on file     Attends meetings of clubs or organizations: Not on file     Relationship status: Not on file    Intimate partner violence     Fear of current or ex partner: Not on file     Emotionally abused: Not on file     Physically abused: Not on file     Forced sexual activity: Not on file   Other Topics Concern    Not on file   Social History Narrative    Not on file       Allergies:  No Known Allergies    Current Medications:  Current Facility-Administered Medications   Medication Dose Route Frequency Provider Last Rate Last Dose    atorvastatin (LIPITOR) tablet 10 mg  10 mg Oral Daily Cruzito Worley DO   10 at bedside throughout the interview. She appears to be somewhat chronically ill   HEENT: Unremarkable  Neck: No JVD or bruits. Cardiac: Regular rate and rhythm, normal S1 and S2, no extra heart sounds, murmurs, heaves, thrills  Resp: Lungs clear without wheezing or crackles. No accessory muscle use or retraction  Abdomen: soft, nontender, nondistended, no gross organomegaly or mass  Skin: Warm and dry, no cyanosis. Musculoskeletal: normal tone and strength in the upper and lower extremities bilaterally  Neuro: Grossly unremarkable  Psych: Cooperative, and normal affect    Intake/Output:    Intake/Output Summary (Last 24 hours) at 6/10/2020 1217  Last data filed at 6/10/2020 1140  Gross per 24 hour   Intake 3111 ml   Output 800 ml   Net 2311 ml     I/O this shift: In: 76 [I.V.:25; IV Piggyback:50]  Out: -     Laboratory Tests:  Lab Results   Component Value Date    CREATININE 0.5 06/10/2020    BUN 19 06/10/2020     06/10/2020    K 3.5 06/10/2020     06/10/2020    CO2 27 06/10/2020     No results for input(s): CKTOTAL, CKMB in the last 72 hours.     Invalid input(s): TROPONONI  No results found for: BNP  Lab Results   Component Value Date    WBC 8.6 06/10/2020    RBC 2.45 06/10/2020    HGB 8.2 06/10/2020    HCT 26.6 06/10/2020    .1 06/10/2020    MCH 32.2 06/10/2020    MCHC 31.0 06/10/2020    RDW 14.7 06/10/2020     06/10/2020    MPV 11.3 06/10/2020     Recent Labs     06/09/20  1225 06/10/20  0334   ALKPHOS 174* 124*   ALT 42* 45*   AST 81* 64*   PROT 7.8 5.7*   BILITOT 0.4 0.3   LABALBU 4.2 3.0*     Lab Results   Component Value Date    MG 2.2 06/10/2020     Lab Results   Component Value Date    PROTIME 11.5 04/21/2020    INR 1.0 04/21/2020     Lab Results   Component Value Date    TSH 6.95 05/13/2020     No components found for: CHLPL  Lab Results   Component Value Date    TRIG 282 03/12/2019     Lab Results   Component Value Date    HDL 34 10/30/2019    HDL 41 07/24/2019    HDL 45

## 2020-06-10 NOTE — CONSULTS
6/10/2020 10:45 AM  Service: Urology  Group: [facility] ([place]    Beauty Sages Dattilio  [id number]     Chief Complaint:    Sepsis, Hydronephrosis s/p Stents, 7mm Stone     History of Present Illness: The patient is a 77 y.o. female patient who presented to the ED hospital 1 day ago with complaints of weakness, fever, altered mental status, and was admitted for sepsis. She is known to our practice and was seen during a previous admission on 4/22/20. At that time she was admitted in the ICU after suffering a STEMI. She was found to have an obstructing 7 mm left ureteral calculi with hydronephrosis at that time. She underwent cystoscopy and left ureteral stent insertion on 4/23/20 with  [facility] [place] after she was further stabilized in the ICU. She is scheduled to undergo a second procedure for her stone management on 6/19/20 with Dr. Divya Garcia. Her creatinine is currently stable at 0.5. She did have leukocytosis with WBC of 18.2 upon admission which has improved to 8.6 today after IV antibiotics. Her urine culture is currently evaluating for gram positive cocci. She did have a urine culture done on 6/5/20 that showed + Enterococcus Faecalis and she was placed on Macrobid by her PCP. She currently has a saldana catheter indwelling draining clear yellow urine. She denies any flank pain or discomfort. Her  is present. He states that she is bedridden at home. She does experience incontinence at home and is rarely continent of urine. She has multiple sclerosis as well and may be experiencing a neurogenic bladder.      Past Medical History:   Diagnosis Date    CAD (coronary artery disease) 4/21/2020    Hepatitis     High cholesterol     Hypertension     Hypothyroidism     [facility] 04/21/2020    MS [facility]multiple sclerosis) [facility]     Osteoporosis     Sarcoidosis     Type 2 diabetes mellitus with hyperglycemia, without long-term current use of insulin ([facility] 10/30/2019         Past Surgical History:   Procedure Laterality Date    CHOLECYSTECTOMY  1990s    CORONARY ANGIOPLASTY WITH STENT PLACEMENT  04/21/2020    Dr. Ramiro Arreguin   3.0 x 22mm Resolute MAAME to Prox LAD.   No LV    CYSTOSCOPY INSERTION / REMOVAL STENT / STONE Left 4/23/2020    CYSTOSCOPY LEFT RETROGRADE PYELOGRAM STENT INSERTION, STARTTON CATHETER performed by Corey Avila DO at [facility] Left 3/21/2019    LEFT FEMUR CEPHALLOMEDULLARY NAIL performed by Pippa Rodriguez MD at [facility] Bilateral     HYSTERECTOMY      KNEE SURGERY      plate in lt knee       Medications Prior to Admission:    Medications Prior to Admission: ferrous sulfate (IRON 325) 325 (65 Fe) MG tablet, Take 1 tablet by mouth daily (with breakfast)  citalopram (CELEXA) 20 MG tablet, Take 1 tablet by mouth every morning  dantrolene (DANTRIUM) 50 MG capsule, Take 1 capsule by mouth 4 times daily  senna (SENOKOT) 8.6 MG tablet, Take 1 tablet by mouth 2 times daily  baclofen (LIORESAL) 20 MG tablet, Take 40 mg by mouth 2 times daily  aspirin 81 MG EC tablet, Take 1 tablet by mouth daily  apixaban (ELIQUIS) 5 MG TABS tablet, Take 1 tablet by mouth 2 times daily  lisinopril (PRINIVIL;ZESTRIL) 2.5 MG tablet, Take 1 tablet by mouth daily (Patient taking differently: Take 2.5 mg by mouth daily If BP is 90/60 or higher)  carvedilol (COREG) 6.25 MG tablet, Take 1 tablet by mouth 2 times daily (Patient taking differently: Take 6.25 mg by mouth 2 times daily If BP is over 90/60)  bumetanide (BUMEX) 1 MG tablet, Take 1 tablet by mouth daily (Patient taking differently: Take 1 mg by mouth daily If BP is over 80/50)  clopidogrel (PLAVIX) 75 MG tablet, Take 1 tablet by mouth daily  levothyroxine (SYNTHROID) 88 MCG tablet, Take 1 tablet by mouth daily  nortriptyline (PAMELOR) 25 MG capsule, Take 1 capsule by mouth nightly  atorvastatin (LIPITOR) 10 MG tablet, Take 1 tablet by mouth daily  Cyanocobalamin (VITAMIN B 12 PO), Take 500 mg by mouth daily   ipratropium-albuterol (DUONEB) 0.5-2.5 (3) MG/3ML SOLN nebulizer solution, Inhale 3 mLs into the lungs every 6 hours as needed for Shortness of Breath  Calcium Carbonate-Vit D-Min (CALCIUM 600+D PLUS MINERALS) 600-400 MG-UNIT TABS, Take 1 tablet by mouth nightly   polyethylene glycol (MIRALAX) PACK packet, Take 17 g by mouth daily as needed   Cholecalciferol (VITAMIN D3) 5000 units TABS, Take 1 tablet by mouth every morning  Potassium 99 MG TABS, Take 1 tablet by mouth every morning  magnesium (MAGNESIUM-OXIDE) 250 MG TABS tablet, Take 250 mg by mouth every morning   nitrofurantoin (MACRODANTIN) 50 MG capsule, Take 1 capsule by mouth 4 times daily for 7 days  ondansetron (ZOFRAN ODT) 4 MG disintegrating tablet, Take 4-8 mg by mouth every 8 hours as needed for Nausea or Vomiting  vitamin E 400 UNIT capsule, Take 400 Units by mouth daily  Catheters (URETHRAL CATHETER) MISC, by Does not apply route    Allergies:    Patient has no known allergies. Social History:    reports that she has never smoked. She has never used smokeless tobacco. She reports that she does not drink alcohol or use drugs. Family History:   Non-contributory to this Urological problem  family history includes Arthritis in her sister; Cancer in her father; Cervical Cancer in her sister; Heart Disease in her mother; Mult Sclerosis in her sister; No Known Problems in her brother and sister; Stroke in her mother.     Review of Systems:  Constitutional: No fever or chills   Respiratory: negative for cough and hemoptysis  Cardiovascular: negative for chest pain and dyspnea  Gastrointestinal: negative for abdominal pain, diarrhea, nausea and vomiting   Derm: negative for rash and skin lesion(s)  Neurological: negative for seizures and tremors  Musculoskeletal: Negative    Psychiatric: Negative   : As above in the HPI, otherwise negative  All other reviews are negative    Physical Exam:     Vitals:  BP (!) 92/46   Pulse 95   Temp 97.8 °F

## 2020-06-10 NOTE — PROGRESS NOTES
Messaged Dr. Alayna Mendiola via [employer] re: reviewing home med list per family & pt. Patient not rx all home medications. Also, updated on Hgb.

## 2020-06-10 NOTE — PROGRESS NOTES
Date    CHOL 201 03/12/2019    TRIG 282 03/12/2019    HDL 34 10/30/2019    LDLCALC 115 (H) 10/30/2019    TSH 6.95 (H) 05/13/2020    INR 1.0 04/21/2020    LABA1C 6.7 (H) 05/13/2020       Radiology: REVIEWED DAILY    +++++++++++++++++++++++++++++++++++++++++++++++++  [address], [place]  +++++++++++++++++++++++++++++++++++++++++++++++++  NOTE: This report was transcribed using voice recognition software. Every effort was made to ensure accuracy; however, inadvertent computerized transcription errors may be present.

## 2020-06-11 LAB
ALBUMIN SERPL-MCNC: 3.5 G/DL (ref 3.5–5.2)
ALP BLD-CCNC: 170 U/L (ref 35–104)
ALT SERPL-CCNC: 47 U/L (ref 0–32)
ANION GAP SERPL CALCULATED.3IONS-SCNC: 12 MMOL/L (ref 7–16)
AST SERPL-CCNC: 42 U/L (ref 0–31)
BASOPHILS ABSOLUTE: 0.02 E9/L (ref 0–0.2)
BASOPHILS ABSOLUTE: 0.02 E9/L (ref 0–0.2)
BASOPHILS RELATIVE PERCENT: 0.3 % (ref 0–2)
BASOPHILS RELATIVE PERCENT: 0.3 % (ref 0–2)
BILIRUB SERPL-MCNC: 0.2 MG/DL (ref 0–1.2)
BUN BLDV-MCNC: 12 MG/DL (ref 8–23)
CALCIUM SERPL-MCNC: 8.6 MG/DL (ref 8.6–10.2)
CHLORIDE BLD-SCNC: 103 MMOL/L (ref 98–107)
CO2: 23 MMOL/L (ref 22–29)
CREAT SERPL-MCNC: 0.5 MG/DL (ref 0.5–1)
EOSINOPHILS ABSOLUTE: 0.15 E9/L (ref 0.05–0.5)
EOSINOPHILS ABSOLUTE: 0.23 E9/L (ref 0.05–0.5)
EOSINOPHILS RELATIVE PERCENT: 2 % (ref 0–6)
EOSINOPHILS RELATIVE PERCENT: 3.2 % (ref 0–6)
GFR AFRICAN AMERICAN: >60
GFR NON-AFRICAN AMERICAN: >60 ML/MIN/1.73
GLUCOSE BLD-MCNC: 163 MG/DL (ref 74–99)
HCT VFR BLD CALC: 28.7 % (ref 34–48)
HCT VFR BLD CALC: 29.3 % (ref 34–48)
HEMOGLOBIN: 9 G/DL (ref 11.5–15.5)
HEMOGLOBIN: 9 G/DL (ref 11.5–15.5)
IMMATURE GRANULOCYTES #: 0.02 E9/L
IMMATURE GRANULOCYTES #: 0.03 E9/L
IMMATURE GRANULOCYTES %: 0.3 % (ref 0–5)
IMMATURE GRANULOCYTES %: 0.4 % (ref 0–5)
LACTIC ACID, SEPSIS: 0.7 MMOL/L (ref 0.5–1.9)
LACTIC ACID, SEPSIS: 0.9 MMOL/L (ref 0.5–1.9)
LACTIC ACID, SEPSIS: 2.8 MMOL/L (ref 0.5–1.9)
LACTIC ACID: 1.6 MMOL/L (ref 0.5–2.2)
LYMPHOCYTES ABSOLUTE: 0.68 E9/L (ref 1.5–4)
LYMPHOCYTES ABSOLUTE: 0.68 E9/L (ref 1.5–4)
LYMPHOCYTES RELATIVE PERCENT: 9.1 % (ref 20–42)
LYMPHOCYTES RELATIVE PERCENT: 9.4 % (ref 20–42)
MAGNESIUM: 2.4 MG/DL (ref 1.6–2.6)
MCH RBC QN AUTO: 32.3 PG (ref 26–35)
MCH RBC QN AUTO: 32.3 PG (ref 26–35)
MCHC RBC AUTO-ENTMCNC: 30.7 % (ref 32–34.5)
MCHC RBC AUTO-ENTMCNC: 31.4 % (ref 32–34.5)
MCV RBC AUTO: 102.9 FL (ref 80–99.9)
MCV RBC AUTO: 105 FL (ref 80–99.9)
MONOCYTES ABSOLUTE: 0.39 E9/L (ref 0.1–0.95)
MONOCYTES ABSOLUTE: 0.41 E9/L (ref 0.1–0.95)
MONOCYTES RELATIVE PERCENT: 5.4 % (ref 2–12)
MONOCYTES RELATIVE PERCENT: 5.5 % (ref 2–12)
NEUTROPHILS ABSOLUTE: 5.86 E9/L (ref 1.8–7.3)
NEUTROPHILS ABSOLUTE: 6.19 E9/L (ref 1.8–7.3)
NEUTROPHILS RELATIVE PERCENT: 81.3 % (ref 43–80)
NEUTROPHILS RELATIVE PERCENT: 82.8 % (ref 43–80)
ORGANISM: ABNORMAL
PDW BLD-RTO: 14.2 FL (ref 11.5–15)
PDW BLD-RTO: 14.6 FL (ref 11.5–15)
PLATELET # BLD: 288 E9/L (ref 130–450)
PLATELET # BLD: 297 E9/L (ref 130–450)
PMV BLD AUTO: 11 FL (ref 7–12)
PMV BLD AUTO: 11.2 FL (ref 7–12)
POTASSIUM SERPL-SCNC: 4 MMOL/L (ref 3.5–5)
PROCALCITONIN: 0.27 NG/ML (ref 0–0.08)
RBC # BLD: 2.79 E12/L (ref 3.5–5.5)
RBC # BLD: 2.79 E12/L (ref 3.5–5.5)
SODIUM BLD-SCNC: 138 MMOL/L (ref 132–146)
TOTAL PROTEIN: 6.3 G/DL (ref 6.4–8.3)
URINE CULTURE, ROUTINE: ABNORMAL
WBC # BLD: 7.2 E9/L (ref 4.5–11.5)
WBC # BLD: 7.5 E9/L (ref 4.5–11.5)

## 2020-06-11 PROCEDURE — 85025 COMPLETE CBC W/AUTO DIFF WBC: CPT

## 2020-06-11 PROCEDURE — 6360000002 HC RX W HCPCS: Performed by: INTERNAL MEDICINE

## 2020-06-11 PROCEDURE — 93306 TTE W/DOPPLER COMPLETE: CPT

## 2020-06-11 PROCEDURE — 2060000000 HC ICU INTERMEDIATE R&B

## 2020-06-11 PROCEDURE — 2580000003 HC RX 258: Performed by: INTERNAL MEDICINE

## 2020-06-11 PROCEDURE — 84145 PROCALCITONIN (PCT): CPT

## 2020-06-11 PROCEDURE — 83605 ASSAY OF LACTIC ACID: CPT

## 2020-06-11 PROCEDURE — 36415 COLL VENOUS BLD VENIPUNCTURE: CPT

## 2020-06-11 PROCEDURE — 94660 CPAP INITIATION&MGMT: CPT

## 2020-06-11 PROCEDURE — 6370000000 HC RX 637 (ALT 250 FOR IP): Performed by: FAMILY MEDICINE

## 2020-06-11 PROCEDURE — 2580000003 HC RX 258: Performed by: FAMILY MEDICINE

## 2020-06-11 PROCEDURE — 83735 ASSAY OF MAGNESIUM: CPT

## 2020-06-11 PROCEDURE — 87450 HC DIRECT STREP B ANTIGEN: CPT

## 2020-06-11 PROCEDURE — 80053 COMPREHEN METABOLIC PANEL: CPT

## 2020-06-11 PROCEDURE — 6360000002 HC RX W HCPCS: Performed by: FAMILY MEDICINE

## 2020-06-11 RX ORDER — DIPHENHYDRAMINE HCL 25 MG
25 TABLET ORAL EVERY 4 HOURS PRN
Status: DISCONTINUED | OUTPATIENT
Start: 2020-06-11 | End: 2020-06-16 | Stop reason: HOSPADM

## 2020-06-11 RX ORDER — CARVEDILOL 3.12 MG/1
3.12 TABLET ORAL 2 TIMES DAILY WITH MEALS
Status: DISCONTINUED | OUTPATIENT
Start: 2020-06-12 | End: 2020-06-16 | Stop reason: HOSPADM

## 2020-06-11 RX ADMIN — CEFEPIME 2 G: 2 INJECTION, POWDER, FOR SOLUTION INTRAVENOUS at 05:06

## 2020-06-11 RX ADMIN — CEFEPIME 2 G: 2 INJECTION, POWDER, FOR SOLUTION INTRAVENOUS at 13:25

## 2020-06-11 RX ADMIN — SODIUM CHLORIDE: 9 INJECTION, SOLUTION INTRAVENOUS at 17:09

## 2020-06-11 RX ADMIN — AMPICILLIN SODIUM AND SULBACTAM SODIUM 3 G: 2; 1 INJECTION, POWDER, FOR SOLUTION INTRAMUSCULAR; INTRAVENOUS at 18:48

## 2020-06-11 RX ADMIN — Medication 10 ML: at 10:23

## 2020-06-11 RX ADMIN — CITALOPRAM 20 MG: 20 TABLET, FILM COATED ORAL at 13:23

## 2020-06-11 RX ADMIN — AMPICILLIN SODIUM AND SULBACTAM SODIUM 3 G: 2; 1 INJECTION, POWDER, FOR SOLUTION INTRAMUSCULAR; INTRAVENOUS at 23:24

## 2020-06-11 RX ADMIN — LEVOTHYROXINE SODIUM 88 MCG: 0.09 TABLET ORAL at 06:20

## 2020-06-11 RX ADMIN — DIPHENHYDRAMINE HCL 25 MG: 25 TABLET ORAL at 18:54

## 2020-06-11 RX ADMIN — ATORVASTATIN CALCIUM 10 MG: 10 TABLET, FILM COATED ORAL at 23:16

## 2020-06-11 RX ADMIN — DANTROLENE SODIUM 50 MG: 25 CAPSULE ORAL at 13:23

## 2020-06-11 RX ADMIN — DANTROLENE SODIUM 50 MG: 25 CAPSULE ORAL at 10:15

## 2020-06-11 RX ADMIN — SODIUM CHLORIDE: 9 INJECTION, SOLUTION INTRAVENOUS at 10:22

## 2020-06-11 RX ADMIN — FERROUS SULFATE TAB 325 MG (65 MG ELEMENTAL FE) 325 MG: 325 (65 FE) TAB at 10:15

## 2020-06-11 RX ADMIN — DANTROLENE SODIUM 50 MG: 25 CAPSULE ORAL at 23:16

## 2020-06-11 RX ADMIN — DANTROLENE SODIUM 50 MG: 25 CAPSULE ORAL at 17:09

## 2020-06-11 RX ADMIN — CLOPIDOGREL 75 MG: 75 TABLET, FILM COATED ORAL at 10:15

## 2020-06-11 RX ADMIN — DIPHENHYDRAMINE HCL 25 MG: 25 TABLET ORAL at 10:21

## 2020-06-11 ASSESSMENT — PAIN SCALES - GENERAL
PAINLEVEL_OUTOF10: 0

## 2020-06-11 NOTE — PROGRESS NOTES
Hospitalist Progress Note      SYNOPSIS: Patient admitted on 2020 with weakness, fever and altered mental status. Patient had urinary stents placed recently. Initially hypotensive. Tachycardic. Lactic acid 3.0 and a white count of 18,000. Was recently started on antibiotic for urinary tract infection. CT abdomen pelvis shows mild left hydronephrosis related to a 7 mm stone in the proximal left ureter. Chest x-ray showed possible infiltrate in the lower left lobe. Overnight hemoglobin dropped to 7.9 from an initial value of 11.1. Recheck this morning is now 9.0    Lactic acid has  Increased to 2.8    SUBJECTIVE:    Patient seen and examined  Records reviewed. Clinically improved this morning. Denies any pain or discomfort. Stable overnight. No other overnight issues reported. Temp (24hrs), Av.8 °F (36.6 °C), Min:96.8 °F (36 °C), Max:98.9 °F (37.2 °C)    DIET: DIET CARDIAC;  CODE: Limited    Intake/Output Summary (Last 24 hours) at 2020 1124  Last data filed at 2020 0944  Gross per 24 hour   Intake 1392 ml   Output 2150 ml   Net -758 ml       OBJECTIVE:    BP (!) 102/51   Pulse 96   Temp 97.6 °F (36.4 °C) (Temporal)   Resp 18   Ht 5' (1.524 m)   Wt 165 lb (74.8 kg)   SpO2 96%   BMI 32.22 kg/m²     General appearance: No apparent distress, appears stated age and cooperative. HEENT:  Conjunctivae/corneas clear. Neck: Supple. No jugular venous distention. Respiratory: Clear to auscultation bilaterally, normal respiratory effort  Cardiovascular: Regular rate rhythm, normal S1-S2  Abdomen: Soft, nontender, nondistended  Musculoskeletal: No clubbing, cyanosis, no bilateral lower extremity edema. Brisk capillary refill. Skin:  No rashes  on visible skin  Neurologic: awake, alert and following commands     ASSESSMENT:  1.  Sepsis likely secondary to urinary source  2. Suspected left-sided community-acquired pneumonia  3. Lactic acidosis  4.   Left hydronephrosis

## 2020-06-11 NOTE — PLAN OF CARE
Problem: Falls - Risk of:  Goal: Will remain free from falls  Description: Will remain free from falls  Outcome: Met This Shift  Goal: Absence of physical injury  Description: Absence of physical injury  Outcome: Met This Shift     Problem: Skin Integrity:  Goal: Will show no infection signs and symptoms  Description: Will show no infection signs and symptoms  Outcome: Met This Shift  Goal: Absence of new skin breakdown  Description: Absence of new skin breakdown  Outcome: Met This Shift

## 2020-06-11 NOTE — CONSULTS
[address] [place] [address] [facility]    Consultation Note     Admit Date: 6/9/2020 11:39 AM    Reason for Consult:   Pneumonia vs urinary tract infection    Attending Physician:  Al Owusu DO     Chief Complaint: weakness     HISTORY OF PRESENT ILLNESS:   The patient is a 77 y.o.  female known to the Infectious Diseases service. The patient has the below past medical history. She has a history of CAD. He also has multiiple sclerosis. History is from her and her  who was in the room. She was seen by urology tooy   Left obstructing ureteral calculus with hydronephrosis  S/p cystoscopy, retrograde pyelogram and left ureteral stent  On April 23. She now has a urine culture of enterococcus and was on vanco and cefepime. She was seen by urology. They recommended continue the left ureteral stent and saldana catheter. She is planned to have second stone procedure on June 19 2020. She would return for this procedure. They also suggested that maybe she has neurogenic bladder. She has an anterior wall MI and stent to LAD  April 21, 2020  LVEF at that time 25% and left ventricullar apical thrombus,   She is afebrile with Normal WBC 7200    CT abdomen shows mild left hydronephrosis related to a 7mm calculus in the proximal left ureter. Left ureteral stent in situ. CXR shows atelectasis and /or infiltrates at the left lower lobe. She had grp b strep in her uirine in April   This all fits with neurogenic bladder associated with MS.      Past Medical History:        Diagnosis Date    CAD (coronary artery disease) 4/21/2020    Hepatitis     High cholesterol     Hypertension     Hypothyroidism     MI (myocardial infarction) ([address] 04/21/2020    [facility]     Osteoporosis     Sarcoidosis     Type 2 diabetes mellitus with hyperglycemia, without long-term current use of insulin ([address] 10/30/2019     Past Surgical History:        Procedure Laterality Date    · Evaluate tomorrow   · The unasyn lends itself to oral augmentin  · But clinically in my opinion she is too weak to go home   Even given her MS  · Long talk with both of them  · Check cultures  · Baseline ESR, CRP  · Monitor labs  · Will follow with you    Thank you for having us see this patient in consultation. I will be discussing this case with the treating physicians.       Electronically signed by Santa Carballo MD on 6/11/2020 at 5:15 PM

## 2020-06-11 NOTE — PROGRESS NOTES
Dr. Gerda Abraham notified of the patient's lactic acid of 2.8 and the family requesting a pulmonology consult. Awaiting new orders.

## 2020-06-12 ENCOUNTER — APPOINTMENT (OUTPATIENT)
Dept: CT IMAGING | Age: 75
DRG: 871 | End: 2020-06-12
Payer: MEDICARE

## 2020-06-12 LAB
ALBUMIN SERPL-MCNC: 3.1 G/DL (ref 3.5–5.2)
ALP BLD-CCNC: 174 U/L (ref 35–104)
ALT SERPL-CCNC: 36 U/L (ref 0–32)
ANION GAP SERPL CALCULATED.3IONS-SCNC: 10 MMOL/L (ref 7–16)
AST SERPL-CCNC: 25 U/L (ref 0–31)
BASOPHILS ABSOLUTE: 0.02 E9/L (ref 0–0.2)
BASOPHILS RELATIVE PERCENT: 0.3 % (ref 0–2)
BILIRUB SERPL-MCNC: 0.2 MG/DL (ref 0–1.2)
BUN BLDV-MCNC: 10 MG/DL (ref 8–23)
CALCIUM SERPL-MCNC: 8.7 MG/DL (ref 8.6–10.2)
CHLORIDE BLD-SCNC: 102 MMOL/L (ref 98–107)
CO2: 23 MMOL/L (ref 22–29)
CREAT SERPL-MCNC: 0.5 MG/DL (ref 0.5–1)
EKG ATRIAL RATE: 94 BPM
EKG P AXIS: 63 DEGREES
EKG P-R INTERVAL: 190 MS
EKG Q-T INTERVAL: 384 MS
EKG QRS DURATION: 98 MS
EKG QTC CALCULATION (BAZETT): 480 MS
EKG R AXIS: -2 DEGREES
EKG T AXIS: 92 DEGREES
EKG VENTRICULAR RATE: 94 BPM
EOSINOPHILS ABSOLUTE: 0.12 E9/L (ref 0.05–0.5)
EOSINOPHILS RELATIVE PERCENT: 1.9 % (ref 0–6)
GFR AFRICAN AMERICAN: >60
GFR NON-AFRICAN AMERICAN: >60 ML/MIN/1.73
GLUCOSE BLD-MCNC: 134 MG/DL (ref 74–99)
HCT VFR BLD CALC: 27.3 % (ref 34–48)
HEMOGLOBIN: 8.6 G/DL (ref 11.5–15.5)
IMMATURE GRANULOCYTES #: 0.01 E9/L
IMMATURE GRANULOCYTES %: 0.2 % (ref 0–5)
L. PNEUMOPHILA SEROGP 1 UR AG: NORMAL
LACTIC ACID, SEPSIS: 0.6 MMOL/L (ref 0.5–1.9)
LACTIC ACID, SEPSIS: 1 MMOL/L (ref 0.5–1.9)
LACTIC ACID, SEPSIS: 1 MMOL/L (ref 0.5–1.9)
LACTIC ACID, SEPSIS: 1.2 MMOL/L (ref 0.5–1.9)
LYMPHOCYTES ABSOLUTE: 0.74 E9/L (ref 1.5–4)
LYMPHOCYTES RELATIVE PERCENT: 11.7 % (ref 20–42)
MAGNESIUM: 2.2 MG/DL (ref 1.6–2.6)
MCH RBC QN AUTO: 32.3 PG (ref 26–35)
MCHC RBC AUTO-ENTMCNC: 31.5 % (ref 32–34.5)
MCV RBC AUTO: 102.6 FL (ref 80–99.9)
MONOCYTES ABSOLUTE: 0.4 E9/L (ref 0.1–0.95)
MONOCYTES RELATIVE PERCENT: 6.3 % (ref 2–12)
NEUTROPHILS ABSOLUTE: 5.02 E9/L (ref 1.8–7.3)
NEUTROPHILS RELATIVE PERCENT: 79.6 % (ref 43–80)
PDW BLD-RTO: 14.2 FL (ref 11.5–15)
PLATELET # BLD: 283 E9/L (ref 130–450)
PMV BLD AUTO: 11 FL (ref 7–12)
POTASSIUM SERPL-SCNC: 3.7 MMOL/L (ref 3.5–5)
RBC # BLD: 2.66 E12/L (ref 3.5–5.5)
SODIUM BLD-SCNC: 135 MMOL/L (ref 132–146)
STREP PNEUMONIAE ANTIGEN, URINE: NORMAL
TOTAL PROTEIN: 6.2 G/DL (ref 6.4–8.3)
WBC # BLD: 6.3 E9/L (ref 4.5–11.5)

## 2020-06-12 PROCEDURE — 83735 ASSAY OF MAGNESIUM: CPT

## 2020-06-12 PROCEDURE — 85025 COMPLETE CBC W/AUTO DIFF WBC: CPT

## 2020-06-12 PROCEDURE — 2060000000 HC ICU INTERMEDIATE R&B

## 2020-06-12 PROCEDURE — 70450 CT HEAD/BRAIN W/O DYE: CPT

## 2020-06-12 PROCEDURE — 6370000000 HC RX 637 (ALT 250 FOR IP): Performed by: FAMILY MEDICINE

## 2020-06-12 PROCEDURE — 36415 COLL VENOUS BLD VENIPUNCTURE: CPT

## 2020-06-12 PROCEDURE — 2580000003 HC RX 258: Performed by: INTERNAL MEDICINE

## 2020-06-12 PROCEDURE — 99223 1ST HOSP IP/OBS HIGH 75: CPT | Performed by: INTERNAL MEDICINE

## 2020-06-12 PROCEDURE — 83605 ASSAY OF LACTIC ACID: CPT

## 2020-06-12 PROCEDURE — 93005 ELECTROCARDIOGRAM TRACING: CPT | Performed by: INTERNAL MEDICINE

## 2020-06-12 PROCEDURE — 6370000000 HC RX 637 (ALT 250 FOR IP): Performed by: INTERNAL MEDICINE

## 2020-06-12 PROCEDURE — 94660 CPAP INITIATION&MGMT: CPT

## 2020-06-12 PROCEDURE — 2580000003 HC RX 258: Performed by: FAMILY MEDICINE

## 2020-06-12 PROCEDURE — 6360000002 HC RX W HCPCS: Performed by: INTERNAL MEDICINE

## 2020-06-12 PROCEDURE — 99222 1ST HOSP IP/OBS MODERATE 55: CPT | Performed by: INTERNAL MEDICINE

## 2020-06-12 PROCEDURE — 80053 COMPREHEN METABOLIC PANEL: CPT

## 2020-06-12 RX ORDER — HEPARIN SODIUM 10000 [USP'U]/ML
5000 INJECTION, SOLUTION INTRAVENOUS; SUBCUTANEOUS EVERY 8 HOURS
Status: DISCONTINUED | OUTPATIENT
Start: 2020-06-12 | End: 2020-06-12

## 2020-06-12 RX ORDER — QUETIAPINE FUMARATE 25 MG/1
25 TABLET, FILM COATED ORAL 2 TIMES DAILY
Status: DISCONTINUED | OUTPATIENT
Start: 2020-06-12 | End: 2020-06-14

## 2020-06-12 RX ORDER — LISINOPRIL 5 MG/1
2.5 TABLET ORAL DAILY
Status: DISCONTINUED | OUTPATIENT
Start: 2020-06-12 | End: 2020-06-13

## 2020-06-12 RX ADMIN — CITALOPRAM 20 MG: 20 TABLET, FILM COATED ORAL at 08:25

## 2020-06-12 RX ADMIN — DANTROLENE SODIUM 50 MG: 25 CAPSULE ORAL at 14:07

## 2020-06-12 RX ADMIN — CLOPIDOGREL 75 MG: 75 TABLET, FILM COATED ORAL at 08:25

## 2020-06-12 RX ADMIN — AMPICILLIN SODIUM AND SULBACTAM SODIUM 3 G: 2; 1 INJECTION, POWDER, FOR SOLUTION INTRAMUSCULAR; INTRAVENOUS at 17:44

## 2020-06-12 RX ADMIN — NORTRIPTYLINE HYDROCHLORIDE 25 MG: 25 CAPSULE ORAL at 20:29

## 2020-06-12 RX ADMIN — ATORVASTATIN CALCIUM 10 MG: 10 TABLET, FILM COATED ORAL at 20:29

## 2020-06-12 RX ADMIN — NORTRIPTYLINE HYDROCHLORIDE 25 MG: 25 CAPSULE ORAL at 00:48

## 2020-06-12 RX ADMIN — AMPICILLIN SODIUM AND SULBACTAM SODIUM 3 G: 2; 1 INJECTION, POWDER, FOR SOLUTION INTRAMUSCULAR; INTRAVENOUS at 11:40

## 2020-06-12 RX ADMIN — QUETIAPINE FUMARATE 25 MG: 25 TABLET ORAL at 20:29

## 2020-06-12 RX ADMIN — DANTROLENE SODIUM 50 MG: 25 CAPSULE ORAL at 17:44

## 2020-06-12 RX ADMIN — Medication 10 ML: at 20:30

## 2020-06-12 RX ADMIN — Medication 10 ML: at 00:49

## 2020-06-12 RX ADMIN — FERROUS SULFATE TAB 325 MG (65 MG ELEMENTAL FE) 325 MG: 325 (65 FE) TAB at 08:25

## 2020-06-12 RX ADMIN — Medication 10 ML: at 08:25

## 2020-06-12 RX ADMIN — AMPICILLIN SODIUM AND SULBACTAM SODIUM 3 G: 2; 1 INJECTION, POWDER, FOR SOLUTION INTRAMUSCULAR; INTRAVENOUS at 22:45

## 2020-06-12 RX ADMIN — CARVEDILOL 3.12 MG: 3.12 TABLET, FILM COATED ORAL at 17:47

## 2020-06-12 RX ADMIN — DANTROLENE SODIUM 50 MG: 25 CAPSULE ORAL at 08:25

## 2020-06-12 RX ADMIN — AMPICILLIN SODIUM AND SULBACTAM SODIUM 3 G: 2; 1 INJECTION, POWDER, FOR SOLUTION INTRAMUSCULAR; INTRAVENOUS at 04:56

## 2020-06-12 RX ADMIN — CARVEDILOL 3.12 MG: 3.12 TABLET, FILM COATED ORAL at 08:25

## 2020-06-12 RX ADMIN — LISINOPRIL 2.5 MG: 5 TABLET ORAL at 15:30

## 2020-06-12 RX ADMIN — HEPARIN SODIUM 5000 UNITS: 10000 INJECTION INTRAVENOUS; SUBCUTANEOUS at 11:41

## 2020-06-12 RX ADMIN — DANTROLENE SODIUM 50 MG: 25 CAPSULE ORAL at 20:29

## 2020-06-12 RX ADMIN — QUETIAPINE FUMARATE 25 MG: 25 TABLET ORAL at 15:30

## 2020-06-12 RX ADMIN — LEVOTHYROXINE SODIUM 88 MCG: 0.09 TABLET ORAL at 06:22

## 2020-06-12 ASSESSMENT — PAIN SCALES - GENERAL
PAINLEVEL_OUTOF10: 0

## 2020-06-12 NOTE — PROGRESS NOTES
Enterococcus faecalis UTI  2. Suspected left-sided community-acquired pneumonia-however no pneumonia seen on CT Abdo/pelvis in the left lower lobe  3. Lactic acidosis  4. Left hydronephrosis due to nephrolithiasis Status post ureteral stent placement  5. Ischemic cardiomyopathy, EF 20-25%  6. Hypotension  7. History of anxiety/depression  8. Hypothyroidism  9. Chronic anemia  10. Coronary artery disease, S/P STEMI 1 month ago  11. Hallucinations-likely related to sundowning     PLAN:  -Continue Unasyn as per ID. No growth on blood cultures.  -Urology recommendations noted. Maintain left ureteral stent and Barger catheter. Planned  surgery for stone on 6/19/2020  -Cardiology recommendations noted. Eliquis is currently on hold due to anemia. Repeating echocardiogram to evaluate for LV thrombus.  -Check FOBT for anemia and will consult oncology  -Continue to hold diuretics and ACE inhibitor due to sepsis/hypotension  -Cont home trilogy qhs and nasal 02 3L NC. Family requests Pulmonology consult.  -Continue dantrolene for MS  -Seroquel for hallucinations. CT head if no improvement. Addendum:Eliquis restarted by Cardiology    Addendum:Per  she has more weakness in left upper ext. I will start with CT head.     I discussed with  at bedside and daughter Jimbo Michaud on the phone. I reviewed all medical issues. Addressed all concerns.     DVT Prophylaxis: Heparin  Diet: DIET CARDIAC;  Code Status: Limited    PT/OT Eval Status: Ordered    Dispo -Home in 1 to 2 days    Dearl MD Carter

## 2020-06-12 NOTE — CARE COORDINATION
Discharge plan remains unchanged, verified with pt's spouse Aditya Nash outside pt's room (d/t pt hallucinating). Per Aditya Nash,  pt is for CT of the head and they are going to check her Hg. Discharge plan remains home with spouse.

## 2020-06-12 NOTE — PROGRESS NOTES
[facility] PROGRESS NOTE  [facility]        Subjective: Confusion on admission likely urologic related infection/sepsis with ampicillin sensitive enterococcus, with known CAD stent LAD April 2020, LVEF 25% at that time, left ventricular apical thrombus. Today conversant and pleasant no distress    Longstanding history of multiple sclerosis with very limited functional capacity, generalized weakness. Objective: Labs chart medications and telemetry are reviewed    Patient Vitals for the past 24 hrs:   BP Temp Temp src Pulse Resp SpO2   06/12/20 0809 (!) 118/55 97.4 °F (36.3 °C) Temporal 98 16 100 %   06/12/20 0200 134/68 98 °F (36.7 °C) Temporal 96 16 98 %   06/11/20 2300 129/62 97.5 °F (36.4 °C) Temporal 95 20 97 %   06/11/20 2148 -- -- -- -- 26 --   06/11/20 1530 (!) 112/54 97.4 °F (36.3 °C) Temporal 94 16 100 %         Intake/Output Summary (Last 24 hours) at 6/12/2020 1252  Last data filed at 6/12/2020 0559  Gross per 24 hour   Intake 140 ml   Output 1625 ml   Net -1485 ml       Wt Readings from Last 3 Encounters:   06/11/20 165 lb (74.8 kg)   05/01/20 143 lb 8 oz (65.1 kg)   04/21/20 161 lb (73 kg)       Telemetry: I personally reviewed and shows normal sinus rhythm heart rate in the 90s.   Current meds: Scheduled Meds:   heparin (porcine)  5,000 Units Subcutaneous Q8H    carvedilol  3.125 mg Oral BID WC    ampicillin-sulbactam  3 g Intravenous Q6H    atorvastatin  10 mg Oral Daily    citalopram  20 mg Oral QAM    clopidogrel  75 mg Oral Daily    dantrolene  50 mg Oral 4x Daily    ferrous sulfate  325 mg Oral Daily with breakfast    levothyroxine  88 mcg Oral Daily    nortriptyline  25 mg Oral Nightly    sodium chloride flush  10 mL Intravenous 2 times per day     Continuous Infusions:  PRN Meds:.diphenhydrAMINE, perflutren lipid microspheres, ipratropium-albuterol, sodium chloride flush, acetaminophen **OR** acetaminophen    Allergies: Patient has no known allergies. Labs:   Recent Labs     06/11/20  0805 06/11/20  2149 06/12/20  0341   WBC 7.2 7.5 6.3   HGB 9.0* 9.0* 8.6*   HCT 29.3* 28.7* 27.3*   .0* 102.9* 102.6*    297 283       Labs:   Recent Labs     06/10/20  0334 06/11/20  0805 06/12/20  0341    138 135   K 3.5 4.0 3.7    103 102   CO2 27 23 23   BUN 19 12 10   CREATININE 0.5 0.5 0.5       Labs: No results for input(s): CKTOTAL, CKMB, CKMBINDEX, TROPONINI in the last 72 hours. Labs: No results for input(s): BNP in the last 72 hours. Labs: No results for input(s): CHOL, HDL, TRIG in the last 72 hours. Invalid input(s): Becky De Los Santos    Labs:   Recent Labs     06/10/20  0334 06/11/20  0805 06/12/20  0341   PROT 5.7* 6.3* 6.2*       Review of systems: No reported significant weight gain or weight loss. no dysuria or frequency, no dizziness, falls or trauma, no change in bowel or bladder habits, no hematochezia, hemoptysis or hematuria. No fevers, chills, nausea or vomiting reported. No significant wheezing or sputum production. No headache or visual changes. The remainder of the 10 review of systems otherwise negative. Exam    Appears to be chronically ill and debilitated. General: Patient comfortable in no distress and currently denies any chest pain. HEENT: Face symmetrical and no apparent cranial nerve deficit. Neck: No jugular venous distention, carotid bruit or thyromegaly. Lungs: Clear bilaterally without rales, wheezes or dullness. Cardiac: Regular rate and rhythm, no S3, S4, no rub or gallop. Abdomen: No rebound or guarding, no hepatosplenomegaly. Extremities: Without significant clubbing , cyanosis, or edema. Neuro:  No focal motor or sensory deficit apparent. Skin: No petechiae, no significant bruising. See plan below  Assessment:         Plan: #1 anemia and hemoglobin stable at 8.6 today.   Would restart Eliquis 5 mg twice a day and no reported overt

## 2020-06-12 NOTE — PROGRESS NOTES
[address] [place] [address] [facility]  Progress Note      Chief Complaint   Patient presents with    Urinary Tract Infection     recent urinary stents that need removed, patient febrile and hypotensive       SUBJECTIVE:      Patient is tolerating medications. No reported adverse drug reactions. No problems overnight. Review of systems:    As stated above in the chief complaint, otherwise negative. Medications:    Scheduled Meds:   heparin (porcine)  5,000 Units Subcutaneous Q8H    carvedilol  3.125 mg Oral BID WC    ampicillin-sulbactam  3 g Intravenous Q6H    atorvastatin  10 mg Oral Daily    citalopram  20 mg Oral QAM    clopidogrel  75 mg Oral Daily    dantrolene  50 mg Oral 4x Daily    ferrous sulfate  325 mg Oral Daily with breakfast    levothyroxine  88 mcg Oral Daily    nortriptyline  25 mg Oral Nightly    sodium chloride flush  10 mL Intravenous 2 times per day     Continuous Infusions:  PRN Meds:diphenhydrAMINE, perflutren lipid microspheres, ipratropium-albuterol, sodium chloride flush, acetaminophen **OR** acetaminophen  Prior to Admission medications    Medication Sig Start Date End Date Taking?  Authorizing Provider   ferrous sulfate (IRON 325) 325 (65 Fe) MG tablet Take 1 tablet by mouth daily (with breakfast) 5/18/20  Yes Sujata Pope MD   citalopram (CELEXA) 20 MG tablet Take 1 tablet by mouth every morning 5/18/20  Yes Sujata Pope MD   dantrolene (DANTRIUM) 50 MG capsule Take 1 capsule by mouth 4 times daily 5/14/20  Yes Sujata Pope MD   senna (SENOKOT) 8.6 MG tablet Take 1 tablet by mouth 2 times daily   Yes Historical Provider, MD   baclofen (LIORESAL) 20 MG tablet Take 40 mg by mouth 2 times daily   Yes Historical Provider, MD   aspirin 81 MG EC tablet Take 1 tablet by mouth daily 5/2/20  Yes May Norris MD   apixaban (ELIQUIS) 5 MG TABS tablet Take 1 tablet by mouth 2 times daily 5/2/20  Yes May Norris MD   lisinopril (PRINIVIL;ZESTRIL) 2.5 MG tablet Take 1 tablet by mouth daily  Patient taking differently: Take 2.5 mg by mouth daily If BP is 90/60 or higher 5/2/20  Yes Royal Yeung MD   carvedilol (COREG) 6.25 MG tablet Take 1 tablet by mouth 2 times daily  Patient taking differently: Take 6.25 mg by mouth 2 times daily If BP is over 90/60 5/1/20  Yes Royal Yeung MD   bumetanide (BUMEX) 1 MG tablet Take 1 tablet by mouth daily  Patient taking differently: Take 1 mg by mouth daily If BP is over 80/50 5/2/20  Yes Royal Yeung MD   clopidogrel (PLAVIX) 75 MG tablet Take 1 tablet by mouth daily 5/2/20  Yes Royal Yeung MD   levothyroxine (SYNTHROID) 88 MCG tablet Take 1 tablet by mouth daily 3/17/20  Yes Jules Colin MD   nortriptyline (PAMELOR) 25 MG capsule Take 1 capsule by mouth nightly 3/17/20 6/15/20 Yes Jules Colin MD   atorvastatin (LIPITOR) 10 MG tablet Take 1 tablet by mouth daily 1/17/20  Yes Jules Colin MD   Cyanocobalamin (VITAMIN B 12 PO) Take 500 mg by mouth daily    Yes Historical Provider, MD   ipratropium-albuterol (DUONEB) 0.5-2.5 (3) MG/3ML SOLN nebulizer solution Inhale 3 mLs into the lungs every 6 hours as needed for Shortness of Breath 5/15/19  Yes Yamil Gill MD   Calcium Carbonate-Vit D-Min (CALCIUM 600+D PLUS MINERALS) 600-400 MG-UNIT TABS Take 1 tablet by mouth nightly    Yes Historical Provider, MD   polyethylene glycol (MIRALAX) PACK packet Take 17 g by mouth daily as needed    Yes Historical Provider, MD   Cholecalciferol (VITAMIN D3) 5000 units TABS Take 1 tablet by mouth every morning   Yes Historical Provider, MD   Potassium 99 MG TABS Take 1 tablet by mouth every morning   Yes Historical Provider, MD   magnesium (MAGNESIUM-OXIDE) 250 MG TABS tablet Take 250 mg by mouth every morning    Yes Historical Provider, MD   nitrofurantoin (MACRODANTIN) 50 MG capsule Take 1 capsule by mouth 4 times daily for 7 days 6/8/20 6/15/20  Jules Colin MD   ondansetron (ZOFRAN ODT) 4 MG disintegrating tablet Take 06/12/2020    GLUCOSE 134 06/12/2020    PROT 6.2 06/12/2020    LABALBU 3.1 06/12/2020    CALCIUM 8.7 06/12/2020    BILITOT 0.2 06/12/2020    ALKPHOS 174 06/12/2020    AST 25 06/12/2020    ALT 36 06/12/2020     No results found for: CRP  No results found for: [address]    Radiology:    CT ABDOMEN PELVIS W WO CONTRAST Additional Contrast? None   Final Result         1. Mild left hydronephrosis related to a 7 mm calculus in the proximal   left ureter. Left ureteral stent in situ. 2. Constipation. No evidence of bowel obstruction. CT Head WO Contrast   Final Result   No acute intracranial abnormality. XR CHEST PORTABLE   Final Result   Atelectasis and/or infiltrate at the left lower lobe.                 Microbiology:   Lab Results   Component Value Date    BC 24 Hours no growth 06/09/2020    BC 5 Days- no growth 04/22/2020    BC 5 Days- no growth 03/24/2019    ORG Enterococcus faecalis 06/09/2020    ORG Strep agalactiae (Beta Strep Group B) 04/23/2020    ORG Strep agalactiae (Beta Strep Group B) 04/22/2020     Lab Results   Component Value Date    BLOODCULT2 24 Hours no growth 06/09/2020    BLOODCULT2 5 Days- no growth 04/22/2020    BLOODCULT2 5 Days- no growth 03/24/2019    ORG Enterococcus faecalis 06/09/2020    ORG Strep agalactiae (Beta Strep Group B) 04/23/2020    ORG Strep agalactiae (Beta Strep Group B) 04/22/2020     No results found for: WNDABS  Smear, Respiratory   Date Value Ref Range Status   04/22/2020   Final    Group 6: <25 PMN's/LPF and <25 Epithelial cells/LPF  Polymorphonuclear leukocytes not seen  Epithelial cells not seen  Rare Gram negative rods       No results found for: MPNEUMO, CLAMYDCU, LABLEGI, AFBCX, FUNGSM, LABFUNG  CULTURE, RESPIRATORY   Date Value Ref Range Status   04/22/2020 Oral Pharyngeal Britta present  Final     No results found for: CXCATHTIP  No results found for: BFCS  No results found for: CXSURG  Urine Culture, Routine   Date Value Ref Range Status 06/09/2020 >100,000 CFU/ml  Final   06/05/2020   Final     Comment:     Urine CultureORGANISM ID: [id number] - Enterococcus faecalis   05/13/2020   Final     Comment:     Urine CultureBacteria Ur Cult     MRSA Culture Only   Date Value Ref Range Status   03/21/2019 Methicillin resistant Staph aureus not isolated  Final       Problem list:    Active Problems:    Sepsis ([address]  Resolved Problems:    * No resolved hospital problems. *      ASSESSMENT:    Multiple sclerosis   Recent MI with stent   Ureteral obstruction with stent   UTI with ampicllin sensitive enterococcus   BC are neg   CXR showing atelectasis vs infiltrates at the LLL   She denies productive cough but she does intermittently have a cough   Few crackles at the left base  ?  Rash   But  says she had rash at home  Afebrile   Legionella neg s  Strep ag neg   Plan:   My bias would be to change her to unasyn which would cover the enterococcus and can be used for CAP Although she was recently in the hospital so technically it could be a HAP   Will get strep urine ag and legionella urine ag   She is quite weak   The unasyn lends itself to oral augmentin   But clinically in my opinion she is too weak to go home Even given her MS   Long talk with both of them   Check cultures   Baseline ESR, CRP   Monitor labs   Will follow with you      Gianna Silva DO    11:58 AM  6/12/2020

## 2020-06-12 NOTE — PROGRESS NOTES
6/12/2020 10:59 AM  Service: Urology  Group: [facility] ([place]    Sondra Douglas  [id number]    Subjective:    She is confused and hallucinating today  Her  is at her bedside  She has remained afebrile   Saldana intact draining yellow urine     Review of Systems  Unable to accurately obtain today due to confusion       Scheduled Meds:   heparin (porcine)  5,000 Units Subcutaneous Q8H    carvedilol  3.125 mg Oral BID WC    ampicillin-sulbactam  3 g Intravenous Q6H    atorvastatin  10 mg Oral Daily    citalopram  20 mg Oral QAM    clopidogrel  75 mg Oral Daily    dantrolene  50 mg Oral 4x Daily    ferrous sulfate  325 mg Oral Daily with breakfast    levothyroxine  88 mcg Oral Daily    nortriptyline  25 mg Oral Nightly    sodium chloride flush  10 mL Intravenous 2 times per day       Objective:  Vitals:    06/12/20 0809   BP: (!) 118/55   Pulse: 98   Resp: 16   Temp: 97.4 °F (36.3 °C)   SpO2: 100%         Allergies: Patient has no known allergies. General Appearance: awake and alert, weak , confused   Skin: no rash or erythema  Head: normocephalic and atraumatic  Pulmonary/Chest: normal air movement, no respiratory distress  Abdomen: soft, non-tender, non-distended  Genitourinary: saldana intact draining yellow urine   Extremities: no cyanosis, clubbing or edema         Labs:     Recent Labs     06/12/20  0341      K 3.7      CO2 23   BUN 10   CREATININE 0.5   GLUCOSE 134*   CALCIUM 8.7       Lab Results   Component Value Date    HGB 8.6 06/12/2020    HCT 27.3 06/12/2020         Assessment/Plan:  Left obstructing ureteral calculus with hydronephrosis s/p cystoscopy, retrograde pyelogram, and left ureteral stent insertion on 4/23/20   Urosepsis   Possible Neurogenic Bladder?  Secondary to MS    Urine culture + Enterococcus faecalis  Blood cultures no growth to date   Antibiotics per ID, currently on Unasyn   Creatinine remains stable   Cont the left ureteral stent  Cont the saldana catheter  She should be discharged with this  She is planned to have second stone procedure on 6/19/20. If she stabilizes prior to this she would be ok for discharge and then would return for surgery. This may need to be postponed as well if she does not improve prior to surgery.   We will cont to follow her during her hospital stay           ELIE Aden - CNP   [facility]

## 2020-06-13 LAB
ALBUMIN SERPL-MCNC: 3.5 G/DL (ref 3.5–5.2)
ALP BLD-CCNC: 157 U/L (ref 35–104)
ALT SERPL-CCNC: 23 U/L (ref 0–32)
ANION GAP SERPL CALCULATED.3IONS-SCNC: 13 MMOL/L (ref 7–16)
AST SERPL-CCNC: 14 U/L (ref 0–31)
BASOPHILS ABSOLUTE: 0.03 E9/L (ref 0–0.2)
BASOPHILS RELATIVE PERCENT: 0.5 % (ref 0–2)
BILIRUB SERPL-MCNC: <0.2 MG/DL (ref 0–1.2)
BUN BLDV-MCNC: 9 MG/DL (ref 8–23)
CALCIUM SERPL-MCNC: 8.9 MG/DL (ref 8.6–10.2)
CHLORIDE BLD-SCNC: 100 MMOL/L (ref 98–107)
CO2: 24 MMOL/L (ref 22–29)
CREAT SERPL-MCNC: 0.5 MG/DL (ref 0.5–1)
EOSINOPHILS ABSOLUTE: 0.14 E9/L (ref 0.05–0.5)
EOSINOPHILS RELATIVE PERCENT: 2.4 % (ref 0–6)
GFR AFRICAN AMERICAN: >60
GFR NON-AFRICAN AMERICAN: >60 ML/MIN/1.73
GLUCOSE BLD-MCNC: 133 MG/DL (ref 74–99)
HCT VFR BLD CALC: 27.8 % (ref 34–48)
HEMOGLOBIN: 8.6 G/DL (ref 11.5–15.5)
IMMATURE GRANULOCYTES #: 0.02 E9/L
IMMATURE GRANULOCYTES %: 0.3 % (ref 0–5)
LACTIC ACID, SEPSIS: 0.6 MMOL/L (ref 0.5–1.9)
LACTIC ACID, SEPSIS: 0.7 MMOL/L (ref 0.5–1.9)
LACTIC ACID, SEPSIS: 0.8 MMOL/L (ref 0.5–1.9)
LYMPHOCYTES ABSOLUTE: 0.84 E9/L (ref 1.5–4)
LYMPHOCYTES RELATIVE PERCENT: 14.7 % (ref 20–42)
MAGNESIUM: 2 MG/DL (ref 1.6–2.6)
MCH RBC QN AUTO: 31.5 PG (ref 26–35)
MCHC RBC AUTO-ENTMCNC: 30.9 % (ref 32–34.5)
MCV RBC AUTO: 101.8 FL (ref 80–99.9)
MONOCYTES ABSOLUTE: 0.43 E9/L (ref 0.1–0.95)
MONOCYTES RELATIVE PERCENT: 7.5 % (ref 2–12)
NEUTROPHILS ABSOLUTE: 4.26 E9/L (ref 1.8–7.3)
NEUTROPHILS RELATIVE PERCENT: 74.6 % (ref 43–80)
PDW BLD-RTO: 14 FL (ref 11.5–15)
PLATELET # BLD: 330 E9/L (ref 130–450)
PMV BLD AUTO: 10.6 FL (ref 7–12)
POTASSIUM SERPL-SCNC: 4.4 MMOL/L (ref 3.5–5)
PROCALCITONIN: 0.16 NG/ML (ref 0–0.08)
RBC # BLD: 2.73 E12/L (ref 3.5–5.5)
SODIUM BLD-SCNC: 137 MMOL/L (ref 132–146)
TOTAL PROTEIN: 6.2 G/DL (ref 6.4–8.3)
WBC # BLD: 5.7 E9/L (ref 4.5–11.5)

## 2020-06-13 PROCEDURE — 6360000002 HC RX W HCPCS: Performed by: INTERNAL MEDICINE

## 2020-06-13 PROCEDURE — 2700000000 HC OXYGEN THERAPY PER DAY

## 2020-06-13 PROCEDURE — 85025 COMPLETE CBC W/AUTO DIFF WBC: CPT

## 2020-06-13 PROCEDURE — 2060000000 HC ICU INTERMEDIATE R&B

## 2020-06-13 PROCEDURE — 6370000000 HC RX 637 (ALT 250 FOR IP): Performed by: FAMILY MEDICINE

## 2020-06-13 PROCEDURE — 6370000000 HC RX 637 (ALT 250 FOR IP): Performed by: INTERNAL MEDICINE

## 2020-06-13 PROCEDURE — 83735 ASSAY OF MAGNESIUM: CPT

## 2020-06-13 PROCEDURE — 80053 COMPREHEN METABOLIC PANEL: CPT

## 2020-06-13 PROCEDURE — 83605 ASSAY OF LACTIC ACID: CPT

## 2020-06-13 PROCEDURE — 2580000003 HC RX 258: Performed by: FAMILY MEDICINE

## 2020-06-13 PROCEDURE — 36415 COLL VENOUS BLD VENIPUNCTURE: CPT

## 2020-06-13 PROCEDURE — 84165 PROTEIN E-PHORESIS SERUM: CPT

## 2020-06-13 PROCEDURE — 2580000003 HC RX 258: Performed by: INTERNAL MEDICINE

## 2020-06-13 PROCEDURE — 84145 PROCALCITONIN (PCT): CPT

## 2020-06-13 PROCEDURE — 86334 IMMUNOFIX E-PHORESIS SERUM: CPT

## 2020-06-13 PROCEDURE — 94660 CPAP INITIATION&MGMT: CPT

## 2020-06-13 RX ORDER — LISINOPRIL 5 MG/1
1.25 TABLET ORAL DAILY
Status: DISCONTINUED | OUTPATIENT
Start: 2020-06-14 | End: 2020-06-16 | Stop reason: HOSPADM

## 2020-06-13 RX ORDER — LEVOTHYROXINE SODIUM 0.1 MG/1
100 TABLET ORAL DAILY
Status: DISCONTINUED | OUTPATIENT
Start: 2020-06-14 | End: 2020-06-16 | Stop reason: HOSPADM

## 2020-06-13 RX ADMIN — NORTRIPTYLINE HYDROCHLORIDE 25 MG: 25 CAPSULE ORAL at 21:43

## 2020-06-13 RX ADMIN — ATORVASTATIN CALCIUM 10 MG: 10 TABLET, FILM COATED ORAL at 21:44

## 2020-06-13 RX ADMIN — QUETIAPINE FUMARATE 25 MG: 25 TABLET ORAL at 08:17

## 2020-06-13 RX ADMIN — QUETIAPINE FUMARATE 25 MG: 25 TABLET ORAL at 21:43

## 2020-06-13 RX ADMIN — AMPICILLIN SODIUM AND SULBACTAM SODIUM 3 G: 2; 1 INJECTION, POWDER, FOR SOLUTION INTRAMUSCULAR; INTRAVENOUS at 22:44

## 2020-06-13 RX ADMIN — DANTROLENE SODIUM 50 MG: 25 CAPSULE ORAL at 16:49

## 2020-06-13 RX ADMIN — Medication 10 ML: at 08:17

## 2020-06-13 RX ADMIN — FERROUS SULFATE TAB 325 MG (65 MG ELEMENTAL FE) 325 MG: 325 (65 FE) TAB at 08:16

## 2020-06-13 RX ADMIN — APIXABAN 5 MG: 5 TABLET, FILM COATED ORAL at 21:44

## 2020-06-13 RX ADMIN — AMPICILLIN SODIUM AND SULBACTAM SODIUM 3 G: 2; 1 INJECTION, POWDER, FOR SOLUTION INTRAMUSCULAR; INTRAVENOUS at 04:55

## 2020-06-13 RX ADMIN — CARVEDILOL 3.12 MG: 3.12 TABLET, FILM COATED ORAL at 16:49

## 2020-06-13 RX ADMIN — CARVEDILOL 3.12 MG: 3.12 TABLET, FILM COATED ORAL at 08:16

## 2020-06-13 RX ADMIN — CITALOPRAM 20 MG: 20 TABLET, FILM COATED ORAL at 08:16

## 2020-06-13 RX ADMIN — DANTROLENE SODIUM 50 MG: 25 CAPSULE ORAL at 21:44

## 2020-06-13 RX ADMIN — APIXABAN 5 MG: 5 TABLET, FILM COATED ORAL at 08:16

## 2020-06-13 RX ADMIN — LISINOPRIL 2.5 MG: 5 TABLET ORAL at 08:16

## 2020-06-13 RX ADMIN — AMPICILLIN SODIUM AND SULBACTAM SODIUM 3 G: 2; 1 INJECTION, POWDER, FOR SOLUTION INTRAMUSCULAR; INTRAVENOUS at 16:49

## 2020-06-13 RX ADMIN — DANTROLENE SODIUM 50 MG: 25 CAPSULE ORAL at 14:18

## 2020-06-13 RX ADMIN — AMPICILLIN SODIUM AND SULBACTAM SODIUM 3 G: 2; 1 INJECTION, POWDER, FOR SOLUTION INTRAMUSCULAR; INTRAVENOUS at 11:25

## 2020-06-13 RX ADMIN — DANTROLENE SODIUM 50 MG: 25 CAPSULE ORAL at 08:16

## 2020-06-13 RX ADMIN — CLOPIDOGREL 75 MG: 75 TABLET, FILM COATED ORAL at 08:16

## 2020-06-13 RX ADMIN — Medication 10 ML: at 22:56

## 2020-06-13 RX ADMIN — LEVOTHYROXINE SODIUM 88 MCG: 0.09 TABLET ORAL at 06:40

## 2020-06-13 ASSESSMENT — PAIN SCALES - GENERAL
PAINLEVEL_OUTOF10: 0

## 2020-06-13 NOTE — PROGRESS NOTES
[address] [place] [address] [facility]  Progress Note      Chief Complaint   Patient presents with    Urinary Tract Infection     recent urinary stents that need removed, patient febrile and hypotensive       SUBJECTIVE:      Patient is tolerating medications. No reported adverse drug reactions. No problems overnight. Review of systems:    As stated above in the chief complaint, otherwise negative. Medications:    Scheduled Meds:   lisinopril  2.5 mg Oral Daily    apixaban  5 mg Oral BID    QUEtiapine  25 mg Oral BID    carvedilol  3.125 mg Oral BID WC    ampicillin-sulbactam  3 g Intravenous Q6H    atorvastatin  10 mg Oral Daily    citalopram  20 mg Oral QAM    clopidogrel  75 mg Oral Daily    dantrolene  50 mg Oral 4x Daily    ferrous sulfate  325 mg Oral Daily with breakfast    levothyroxine  88 mcg Oral Daily    nortriptyline  25 mg Oral Nightly    sodium chloride flush  10 mL Intravenous 2 times per day     Continuous Infusions:  PRN Meds:diphenhydrAMINE, perflutren lipid microspheres, ipratropium-albuterol, sodium chloride flush, acetaminophen **OR** acetaminophen  Prior to Admission medications    Medication Sig Start Date End Date Taking?  Authorizing Provider   ferrous sulfate (IRON 325) 325 (65 Fe) MG tablet Take 1 tablet by mouth daily (with breakfast) 5/18/20  Yes Naveen Negrete MD   citalopram (CELEXA) 20 MG tablet Take 1 tablet by mouth every morning 5/18/20  Yes Naveen Negrete MD   dantrolene (DANTRIUM) 50 MG capsule Take 1 capsule by mouth 4 times daily 5/14/20  Yes Naveen Negrete MD   senna (SENOKOT) 8.6 MG tablet Take 1 tablet by mouth 2 times daily   Yes Historical Provider, MD   baclofen (LIORESAL) 20 MG tablet Take 40 mg by mouth 2 times daily   Yes Historical Provider, MD   aspirin 81 MG EC tablet Take 1 tablet by mouth daily 5/2/20  Yes Nolon Snellen, MD   apixaban (ELIQUIS) 5 MG TABS tablet Take 1 tablet by mouth 2 times daily 5/2/20  Yes Pedrito Lopez

## 2020-06-13 NOTE — PROGRESS NOTES
Hospitalist Progress Note      Synopsis: Patient admitted on 6/9/2020     Subjective     she has a BiPAP in place. She recognizes people to the BiPAP and tries to make conversation but the  in the room states that she is confused  Exam:  /62   Pulse 103   Temp 97.7 °F (36.5 °C) (Temporal)   Resp 18   Ht 5' (1.524 m)   Wt 166 lb (75.3 kg)   SpO2 98%   BMI 32.42 kg/m²   General appearance: No apparent distress, appears stated age and cooperative. HEENT: Pupils equal, round, and reactive to light. Conjunctivae/corneas clear. Neck: Supple. No jugular venous distention. Trachea midline. Respiratory:  Normal respiratory effort. Clear to auscultation, bilaterally without Rales/Wheezes/Rhonchi. Cardiovascular: Regular rate and rhythm with normal S1/S2 without murmurs, rubs or gallops. Abdomen: Soft, non-tender, non-distended with normal bowel sounds. Musculoskeletal: No clubbing, cyanosis or edema bilaterally. Brisk capillary refill. 2+ lower extremity pulses (dorsalis pedis).    Skin:  No rashes    Neurologic: Not moving much but lately she is not following directions because of the noise of the BiPAP  Medications:  Reviewed    Infusion Medications   Scheduled Medications    [START ON 6/14/2020] lisinopril  1.25 mg Oral Daily    [START ON 6/14/2020] levothyroxine  100 mcg Oral Daily    apixaban  5 mg Oral BID    QUEtiapine  25 mg Oral BID    carvedilol  3.125 mg Oral BID WC    ampicillin-sulbactam  3 g Intravenous Q6H    atorvastatin  10 mg Oral Daily    citalopram  20 mg Oral QAM    clopidogrel  75 mg Oral Daily    dantrolene  50 mg Oral 4x Daily    ferrous sulfate  325 mg Oral Daily with breakfast    nortriptyline  25 mg Oral Nightly    sodium chloride flush  10 mL Intravenous 2 times per day     PRN Meds: diphenhydrAMINE, perflutren lipid microspheres, ipratropium-albuterol, sodium chloride flush, acetaminophen **OR** acetaminophen    I/O    Intake/Output Summary (Last 24 through the articular surface of the right femoral head indicates avascular necrosis. MISCELLANEOUS:No additional finding. 1. Mild left hydronephrosis related to a 7 mm calculus in the proximal left ureter. Left ureteral stent in situ. 2. Constipation. No evidence of bowel obstruction. Ct Head Wo Contrast    Result Date: 2020  Patient MRN:  [id number] : 1945 Age: 77 years Gender: Female Order Date:  2020 1:15 PM EXAM: CT HEAD WO CONTRAST INDICATION:  evaluate for CVA evaluate for CVA  COMPARISON: CT head without contrast, 2020 FINDINGS: PARENCHYMA:No mass effect, edema or hemorrhage. Mild volume loss is seen in the cerebrum with mild chronic microvascular changes. VENTRICLES:No hydrocephalus. CALVARIUM:The calvarium is intact without fracture or focal lesion. SINUSES:The visualized paranasal sinuses and mastoids are clear. No acute intracranial abnormality. No interval change as of the previous CT from 2020. Ct Head Wo Contrast    Result Date: 2020  Patient MRN:  [id number] : 1945 Age: 77 years Gender: Female Order Date:  2020 2:45 PM EXAM: CT HEAD WO CONTRAST NUMBER OF IMAGES:  112 INDICATION:  AMS AMS COMPARISON: 2020 TECHNIQUE:   Noncontrast CT of the head was performed. Coronal and sagittal reconstructions were obtained. Dose optimization techniques utilized including automatic exposure control and clarity iterative reconstruction. FINDINGS:   No hydrocephalus or extra-axial fluid collection seen. There is generalized volume loss and chronic small vessel disease. Brain parenchyma and orbits are otherwise unremarkable. Basilar cisterns are preserved. There is minimal opacification of all the visualized paranasal sinuses. Osseous structures are intact. Mastoid air cells are clear. No acute intracranial abnormality.      Xr Chest Portable    Result Date: 2020  Patient MRN: [id number] : 1945 Age:  77 years Gender: Female Order Date: 2020 11:45 AM Exam: XR CHEST PORTABLE Number of Images: 1 view Indication:   septic septic Comparison: May 1, 2020 FINDINGS: There is atelectasis and/or infiltrate at the left lower lobe. Heart and pulmonary vascularity normal. Costophrenic angles sharp. Normal aorta. Atelectasis and/or infiltrate at the left lower lobe. ASSESSMENT:    Active Problems:    Sepsis ([address]  Resolved Problems:    * No resolved hospital problems. *  UTI  Hypotension  Hyperlipidemia  Hypothyroidism  Agitation  Multiple sclerosis  Cad  Recent  Stent for kidney stone  PLAN:    1. Antibiotics per infectious disease for UTI  2. Decrease her ACE inhibitor as per 's wishes  3 monitor hemoglobin and transfuse as needed  4. History of multiple sclerosis and significant debilitation from any decompensation from infection  5. Suspect LV dysfunction impairment per cardiology  6. Adjust thyroid medications for abnormal TSH        Diet: DIET CARDIAC;  Code Status: Limited  PT/OT Eval Status:   Once stable   DVT Prophylaxis:   eliquis   Recommended disposition at discharge:    snf  +++++++++++++++++++++++++++++++++++++++++++++++++  Ross Mendes MD   [facility].  +++++++++++++++++++++++++++++++++++++++++++++++++  NOTE: This report was transcribed using voice recognition software. Every effort was made to ensure accuracy; however, inadvertent computerized transcription errors may be present.

## 2020-06-13 NOTE — CONSULTS
daily If BP is 90/60 or higher 5/2/20  Yes Shravan Jimenez MD   carvedilol (COREG) 6.25 MG tablet Take 1 tablet by mouth 2 times daily  Patient taking differently: Take 6.25 mg by mouth 2 times daily If BP is over 90/60 5/1/20  Yes Shravan Jimenez MD   bumetanide (BUMEX) 1 MG tablet Take 1 tablet by mouth daily  Patient taking differently: Take 1 mg by mouth daily If BP is over 80/50 5/2/20  Yes Shravan Jimenez MD   clopidogrel (PLAVIX) 75 MG tablet Take 1 tablet by mouth daily 5/2/20  Yes Shravan Jimenez MD   levothyroxine (SYNTHROID) 88 MCG tablet Take 1 tablet by mouth daily 3/17/20  Yes Ivanna Jang MD   nortriptyline (PAMELOR) 25 MG capsule Take 1 capsule by mouth nightly 3/17/20 6/15/20 Yes Ivanna Jang MD   atorvastatin (LIPITOR) 10 MG tablet Take 1 tablet by mouth daily 1/17/20  Yes Ivanna Jang MD   Cyanocobalamin (VITAMIN B 12 PO) Take 500 mg by mouth daily    Yes Historical Provider, MD   ipratropium-albuterol (DUONEB) 0.5-2.5 (3) MG/3ML SOLN nebulizer solution Inhale 3 mLs into the lungs every 6 hours as needed for Shortness of Breath 5/15/19  Yes Yamil Nunes MD   Calcium Carbonate-Vit D-Min (CALCIUM 600+D PLUS MINERALS) 600-400 MG-UNIT TABS Take 1 tablet by mouth nightly    Yes Historical Provider, MD   polyethylene glycol (MIRALAX) PACK packet Take 17 g by mouth daily as needed    Yes Historical Provider, MD   Cholecalciferol (VITAMIN D3) 5000 units TABS Take 1 tablet by mouth every morning   Yes Historical Provider, MD   Potassium 99 MG TABS Take 1 tablet by mouth every morning   Yes Historical Provider, MD   magnesium (MAGNESIUM-OXIDE) 250 MG TABS tablet Take 250 mg by mouth every morning    Yes Historical Provider, MD   nitrofurantoin (MACRODANTIN) 50 MG capsule Take 1 capsule by mouth 4 times daily for 7 days 6/8/20 6/15/20  Ivanna Jang MD   ondansetron (ZOFRAN ODT) 4 MG disintegrating tablet Take 4-8 mg by mouth every 8 hours as needed for Nausea or Vomiting    Historical Provider, MD noted    LABS/IMAGING:    CBC:  Lab Results   Component Value Date    WBC 6.3 06/12/2020    HGB 8.6 (L) 06/12/2020    HCT 27.3 (L) 06/12/2020    .6 (H) 06/12/2020     06/12/2020    LYMPHOPCT 11.7 (L) 06/12/2020    RBC 2.66 (L) 06/12/2020    MCH 32.3 06/12/2020    MCHC 31.5 (L) 06/12/2020    RDW 14.2 06/12/2020    NEUTOPHILPCT 79.6 06/12/2020    MONOPCT 6.3 06/12/2020    EOSPCT 2.2 03/12/2019    BASOPCT 0.3 06/12/2020    NEUTROABS 5.02 06/12/2020    LYMPHSABS 0.74 (L) 06/12/2020    MONOSABS 0.40 06/12/2020    EOSABS 0.12 06/12/2020    BASOSABS 0.02 06/12/2020       Recent Labs     06/12/20  0341 06/11/20  2149 06/11/20  0805   WBC 6.3 7.5 7.2   HGB 8.6* 9.0* 9.0*   HCT 27.3* 28.7* 29.3*   .6* 102.9* 105.0*    297 288       BMP:   Recent Labs     06/10/20  0334 06/11/20  0805 06/12/20  0341    138 135   K 3.5 4.0 3.7    103 102   CO2 27 23 23   BUN 19 12 10   CREATININE 0.5 0.5 0.5       MG:   Lab Results   Component Value Date    MG 2.2 06/12/2020     Ca/Phos:   Lab Results   Component Value Date    CALCIUM 8.7 06/12/2020    PHOS 4.9 (H) 04/21/2020     Amylase: No results found for: AMYLASE  Lipase:   Lab Results   Component Value Date    LIPASE 49 04/21/2020     LIVER PROFILE:   Recent Labs     06/10/20  0334 06/11/20  0805 06/12/20  0341   AST 64* 42* 25   ALT 45* 47* 36*   BILITOT 0.3 0.2 0.2   ALKPHOS 124* 170* 174*       PT/INR:   No results for input(s): PROTIME, INR in the last 72 hours. APTT:   No results for input(s): APTT in the last 72 hours.     Cardiac Enzymes:  Lab Results   Component Value Date    CKTOTAL 204 (H) 04/22/2020    CKMB 18.6 (H) 04/22/2020    TROPONINI 0.03 06/09/2020       Hgb A1C:   Lab Results   Component Value Date    LABA1C 6.7 (H) 05/13/2020     Lab Results   Component Value Date     05/13/2020     BARRY: No results found for: BARRY  ESR: No results found for: SEDRATE  CRP: No results found for: CRP  D Dimer:   Lab Results   Component Value

## 2020-06-13 NOTE — PROGRESS NOTES
Pt refusing to wear bipap, respiratory aware. Pt also refusing to wear O2, checked on RA and pt was 97%.     Briana aLndry  12:29 AM

## 2020-06-14 LAB
ALBUMIN SERPL-MCNC: 3.4 G/DL (ref 3.5–5.2)
ALP BLD-CCNC: 142 U/L (ref 35–104)
ALT SERPL-CCNC: 19 U/L (ref 0–32)
ANION GAP SERPL CALCULATED.3IONS-SCNC: 15 MMOL/L (ref 7–16)
AST SERPL-CCNC: 13 U/L (ref 0–31)
BASOPHILS ABSOLUTE: 0.04 E9/L (ref 0–0.2)
BASOPHILS RELATIVE PERCENT: 0.6 % (ref 0–2)
BILIRUB SERPL-MCNC: <0.2 MG/DL (ref 0–1.2)
BLOOD CULTURE, ROUTINE: NORMAL
BUN BLDV-MCNC: 9 MG/DL (ref 8–23)
CALCIUM SERPL-MCNC: 9 MG/DL (ref 8.6–10.2)
CHLORIDE BLD-SCNC: 102 MMOL/L (ref 98–107)
CO2: 21 MMOL/L (ref 22–29)
CREAT SERPL-MCNC: 0.5 MG/DL (ref 0.5–1)
CULTURE, BLOOD 2: NORMAL
EOSINOPHILS ABSOLUTE: 0.15 E9/L (ref 0.05–0.5)
EOSINOPHILS RELATIVE PERCENT: 2.4 % (ref 0–6)
GFR AFRICAN AMERICAN: >60
GFR NON-AFRICAN AMERICAN: >60 ML/MIN/1.73
GLUCOSE BLD-MCNC: 117 MG/DL (ref 74–99)
HCT VFR BLD CALC: 31.7 % (ref 34–48)
HEMOGLOBIN: 9.5 G/DL (ref 11.5–15.5)
IMMATURE GRANULOCYTES #: 0.03 E9/L
IMMATURE GRANULOCYTES %: 0.5 % (ref 0–5)
LACTIC ACID, SEPSIS: 0.6 MMOL/L (ref 0.5–1.9)
LACTIC ACID, SEPSIS: 0.6 MMOL/L (ref 0.5–1.9)
LACTIC ACID, SEPSIS: 0.7 MMOL/L (ref 0.5–1.9)
LACTIC ACID, SEPSIS: 1.1 MMOL/L (ref 0.5–1.9)
LYMPHOCYTES ABSOLUTE: 1.52 E9/L (ref 1.5–4)
LYMPHOCYTES RELATIVE PERCENT: 24.3 % (ref 20–42)
MAGNESIUM: 2 MG/DL (ref 1.6–2.6)
MCH RBC QN AUTO: 31.5 PG (ref 26–35)
MCHC RBC AUTO-ENTMCNC: 30 % (ref 32–34.5)
MCV RBC AUTO: 105 FL (ref 80–99.9)
MONOCYTES ABSOLUTE: 0.58 E9/L (ref 0.1–0.95)
MONOCYTES RELATIVE PERCENT: 9.3 % (ref 2–12)
NEUTROPHILS ABSOLUTE: 3.94 E9/L (ref 1.8–7.3)
NEUTROPHILS RELATIVE PERCENT: 62.9 % (ref 43–80)
PDW BLD-RTO: 14.2 FL (ref 11.5–15)
PLATELET # BLD: 352 E9/L (ref 130–450)
PMV BLD AUTO: 10.4 FL (ref 7–12)
POTASSIUM SERPL-SCNC: 4.4 MMOL/L (ref 3.5–5)
RBC # BLD: 3.02 E12/L (ref 3.5–5.5)
SODIUM BLD-SCNC: 138 MMOL/L (ref 132–146)
TOTAL PROTEIN: 6.4 G/DL (ref 6.4–8.3)
WBC # BLD: 6.3 E9/L (ref 4.5–11.5)

## 2020-06-14 PROCEDURE — 2700000000 HC OXYGEN THERAPY PER DAY

## 2020-06-14 PROCEDURE — 2580000003 HC RX 258: Performed by: FAMILY MEDICINE

## 2020-06-14 PROCEDURE — 6370000000 HC RX 637 (ALT 250 FOR IP): Performed by: INTERNAL MEDICINE

## 2020-06-14 PROCEDURE — 99221 1ST HOSP IP/OBS SF/LOW 40: CPT | Performed by: PSYCHIATRY & NEUROLOGY

## 2020-06-14 PROCEDURE — 94660 CPAP INITIATION&MGMT: CPT

## 2020-06-14 PROCEDURE — 6370000000 HC RX 637 (ALT 250 FOR IP): Performed by: FAMILY MEDICINE

## 2020-06-14 PROCEDURE — 85025 COMPLETE CBC W/AUTO DIFF WBC: CPT

## 2020-06-14 PROCEDURE — 2060000000 HC ICU INTERMEDIATE R&B

## 2020-06-14 PROCEDURE — 83735 ASSAY OF MAGNESIUM: CPT

## 2020-06-14 PROCEDURE — 83605 ASSAY OF LACTIC ACID: CPT

## 2020-06-14 PROCEDURE — 80053 COMPREHEN METABOLIC PANEL: CPT

## 2020-06-14 PROCEDURE — 6360000002 HC RX W HCPCS: Performed by: INTERNAL MEDICINE

## 2020-06-14 PROCEDURE — 87450 HC DIRECT STREP B ANTIGEN: CPT

## 2020-06-14 PROCEDURE — 36415 COLL VENOUS BLD VENIPUNCTURE: CPT

## 2020-06-14 PROCEDURE — 2580000003 HC RX 258: Performed by: INTERNAL MEDICINE

## 2020-06-14 RX ADMIN — ATORVASTATIN CALCIUM 10 MG: 10 TABLET, FILM COATED ORAL at 20:32

## 2020-06-14 RX ADMIN — QUETIAPINE FUMARATE 25 MG: 25 TABLET ORAL at 07:54

## 2020-06-14 RX ADMIN — DANTROLENE SODIUM 50 MG: 25 CAPSULE ORAL at 12:27

## 2020-06-14 RX ADMIN — DANTROLENE SODIUM 50 MG: 25 CAPSULE ORAL at 17:19

## 2020-06-14 RX ADMIN — AMPICILLIN SODIUM AND SULBACTAM SODIUM 3 G: 2; 1 INJECTION, POWDER, FOR SOLUTION INTRAMUSCULAR; INTRAVENOUS at 18:14

## 2020-06-14 RX ADMIN — LISINOPRIL 1.25 MG: 5 TABLET ORAL at 07:55

## 2020-06-14 RX ADMIN — Medication 10 ML: at 20:23

## 2020-06-14 RX ADMIN — AMPICILLIN SODIUM AND SULBACTAM SODIUM 3 G: 2; 1 INJECTION, POWDER, FOR SOLUTION INTRAMUSCULAR; INTRAVENOUS at 05:46

## 2020-06-14 RX ADMIN — Medication 10 ML: at 07:54

## 2020-06-14 RX ADMIN — CITALOPRAM 20 MG: 20 TABLET, FILM COATED ORAL at 07:53

## 2020-06-14 RX ADMIN — APIXABAN 5 MG: 5 TABLET, FILM COATED ORAL at 07:54

## 2020-06-14 RX ADMIN — CARVEDILOL 3.12 MG: 3.12 TABLET, FILM COATED ORAL at 07:53

## 2020-06-14 RX ADMIN — LEVOTHYROXINE SODIUM 100 MCG: 0.1 TABLET ORAL at 06:45

## 2020-06-14 RX ADMIN — CLOPIDOGREL 75 MG: 75 TABLET, FILM COATED ORAL at 07:53

## 2020-06-14 RX ADMIN — APIXABAN 5 MG: 5 TABLET, FILM COATED ORAL at 20:32

## 2020-06-14 RX ADMIN — DANTROLENE SODIUM 50 MG: 25 CAPSULE ORAL at 20:32

## 2020-06-14 RX ADMIN — DANTROLENE SODIUM 50 MG: 25 CAPSULE ORAL at 07:53

## 2020-06-14 RX ADMIN — AMPICILLIN SODIUM AND SULBACTAM SODIUM 3 G: 2; 1 INJECTION, POWDER, FOR SOLUTION INTRAMUSCULAR; INTRAVENOUS at 12:26

## 2020-06-14 RX ADMIN — NORTRIPTYLINE HYDROCHLORIDE 25 MG: 25 CAPSULE ORAL at 20:32

## 2020-06-14 RX ADMIN — CARVEDILOL 3.12 MG: 3.12 TABLET, FILM COATED ORAL at 17:19

## 2020-06-14 RX ADMIN — FERROUS SULFATE TAB 325 MG (65 MG ELEMENTAL FE) 325 MG: 325 (65 FE) TAB at 07:54

## 2020-06-14 ASSESSMENT — PAIN SCALES - GENERAL
PAINLEVEL_OUTOF10: 0
PAINLEVEL_OUTOF10: 0

## 2020-06-14 NOTE — PLAN OF CARE
Problem: Falls - Risk of:  Goal: Will remain free from falls  Description: Will remain free from falls  Outcome: Met This Shift     Problem: Tissue Perfusion, Altered:  Goal: Circulatory function within specified parameters  Description: Circulatory function within specified parameters  Outcome: Met This Shift     Problem: Skin Integrity:  Goal: Will show no infection signs and symptoms  Description: Will show no infection signs and symptoms  Outcome: Met This Shift

## 2020-06-14 NOTE — PROGRESS NOTES
Hospitalist Progress Note      Synopsis: Patient admitted on 6/9/2020    Subjective    Updated the daughter who is a nurse at [employer] via phone  Also talked to the  at length. He is concerned because she has choked on the food and did not want to eat much today    Exam:  BP (!) 132/56   Pulse 93   Temp 97.7 °F (36.5 °C) (Temporal)   Resp 18   Ht 5' (1.524 m)   Wt 167 lb 3.2 oz (75.8 kg)   SpO2 98%   BMI 32.65 kg/m²   General appearance: No apparent distress, appears stated age and cooperative. HEENT: Pupils equal, round, and reactive to light. Conjunctivae/corneas clear. Neck: Supple. No jugular venous distention. Trachea midline. Respiratory:  Normal respiratory effort. Clear to auscultation, bilaterally without Rales/Wheezes/Rhonchi. Cardiovascular: Regular rate and rhythm with normal S1/S2 without murmurs, rubs or gallops. Abdomen: Soft, non-tender, non-distended with normal bowel sounds. Musculoskeletal: No clubbing, cyanosis or edema bilaterally. Brisk capillary refill. 2+ lower extremity pulses (dorsalis pedis).    Skin:  No rashes    Neurologic: Not moving much but lately she is not following directions because of the noise of the BiPAP  Medications:  Reviewed    Infusion Medications   Scheduled Medications    lisinopril  1.25 mg Oral Daily    levothyroxine  100 mcg Oral Daily    apixaban  5 mg Oral BID    carvedilol  3.125 mg Oral BID WC    ampicillin-sulbactam  3 g Intravenous Q6H    atorvastatin  10 mg Oral Daily    citalopram  20 mg Oral QAM    clopidogrel  75 mg Oral Daily    dantrolene  50 mg Oral 4x Daily    ferrous sulfate  325 mg Oral Daily with breakfast    nortriptyline  25 mg Oral Nightly    sodium chloride flush  10 mL Intravenous 2 times per day     PRN Meds: diphenhydrAMINE, perflutren lipid microspheres, ipratropium-albuterol, sodium chloride flush, acetaminophen **OR** acetaminophen    I/O    Intake/Output Summary (Last 24 hours) at 6/14/2020 CULTRESP in the last 72 hours. Recent Labs     20  1607 20  195   PROCAL 0.27* 0.16*       Radiology:  Ct Abdomen Pelvis W Wo Contrast Additional Contrast? None    Result Date: 2020  Patient MRN:  [id number] : 1945 Age: 77 years Gender: Female Order Date:  2020 2:45 PM EXAM: CT ABDOMEN PELVIS W WO CONTRAST INDICATION:  septic, hx nephrolithiasis with stents septic, hx nephrolithiasis with stents  COMPARISON: None Technique: Low-dose CT  acquisition technique included one of following options; 1 . Automated exposure control, 2. Adjustment of MA and or KV according to patient's size or 3. Use of iterative reconstruction. Multiple computerized tomography sections of the abdomen and pelvis with sagittal and coronal MPR reconstructions were obtained from the top of the diaphragm to the pelvis. FINDINGS: LUNG BASES: Unremarkable. LIVER: Unremarkable. GALLBLADDER: Surgically absent. SPLEEN:Unremarkable. ADRENALS:Unremarkable. KIDNEYS: The left kidney is mildly atrophic. There is a 7 mm calculus in the proximal left ureter. Left ureteral stent in situ. There is mild left hydronephrosis. Multiple 1 mm nonobstructive calculus is seen in the midpole calyx of the right kidney. No ureteral calculus or hydronephrosis. PANCREAS:Unremarkable. BOWEL: Moderate fecal retention is seen in the colon indicating constipation. No bowel wall thickening or mechanical obstruction is seen. APPENDIX:Not visualized. No gross pericecal inflammatory changes to indicate acute appendicitis. BLADDER:Unremarkable. REPRODUCTIVE ORGANS: Status post hysterectomy. VASCULATURE:Moderate calcified atherosclerosis seen in the abdominal aorta. No aneurysm. LYMPH NODES:Unremarkable. BONES: No acute fracture is seen. The bones are demineralized. No bony destructive lesion is seen. An intramedullary shanti is seen in the left femur. Prominent heterotopic ossification is seen along the medial aspect of the proximal femur.  Mild sclerosis

## 2020-06-14 NOTE — CONSULTS
tablet by mouth 2 times daily 5/2/20  Yes Yasir Petit MD   lisinopril (PRINIVIL;ZESTRIL) 2.5 MG tablet Take 1 tablet by mouth daily  Patient taking differently: Take 2.5 mg by mouth daily If BP is 90/60 or higher 5/2/20  Yes Yasir Petit MD   carvedilol (COREG) 6.25 MG tablet Take 1 tablet by mouth 2 times daily  Patient taking differently: Take 6.25 mg by mouth 2 times daily If BP is over 90/60 5/1/20  Yes Yasir Petit MD   bumetanide (BUMEX) 1 MG tablet Take 1 tablet by mouth daily  Patient taking differently: Take 1 mg by mouth daily If BP is over 80/50 5/2/20  Yes Yasir Petit MD   clopidogrel (PLAVIX) 75 MG tablet Take 1 tablet by mouth daily 5/2/20  Yes Yasir Petit MD   levothyroxine (SYNTHROID) 88 MCG tablet Take 1 tablet by mouth daily 3/17/20  Yes Theresa Bradford MD   nortriptyline (PAMELOR) 25 MG capsule Take 1 capsule by mouth nightly 3/17/20 6/15/20 Yes Theresa Bradford MD   atorvastatin (LIPITOR) 10 MG tablet Take 1 tablet by mouth daily 1/17/20  Yes Theresa Bradford MD   Cyanocobalamin (VITAMIN B 12 PO) Take 500 mg by mouth daily    Yes Historical Provider, MD   ipratropium-albuterol (DUONEB) 0.5-2.5 (3) MG/3ML SOLN nebulizer solution Inhale 3 mLs into the lungs every 6 hours as needed for Shortness of Breath 5/15/19  Yes Yamil Reid MD   Calcium Carbonate-Vit D-Min (CALCIUM 600+D PLUS MINERALS) 600-400 MG-UNIT TABS Take 1 tablet by mouth nightly    Yes Historical Provider, MD   polyethylene glycol (MIRALAX) PACK packet Take 17 g by mouth daily as needed    Yes Historical Provider, MD   Cholecalciferol (VITAMIN D3) 5000 units TABS Take 1 tablet by mouth every morning   Yes Historical Provider, MD   Potassium 99 MG TABS Take 1 tablet by mouth every morning   Yes Historical Provider, MD   magnesium (MAGNESIUM-OXIDE) 250 MG TABS tablet Take 250 mg by mouth every morning    Yes Historical Provider, MD   nitrofurantoin (MACRODANTIN) 50 MG capsule Take 1 capsule by mouth 4 times daily for 7 days

## 2020-06-14 NOTE — PROGRESS NOTES
(ZOFRAN ODT) 4 MG disintegrating tablet Take 4-8 mg by mouth every 8 hours as needed for Nausea or Vomiting    Historical Provider, MD   vitamin E 400 UNIT capsule Take 400 Units by mouth daily    Historical Provider, MD   Catheters (URETHRAL CATHETER) MISC by Does not apply route    Historical Provider, MD       OBJECTIVE:  BP (!) 132/56   Pulse 93   Temp 97.7 °F (36.5 °C) (Temporal)   Resp 18   Ht 5' (1.524 m)   Wt 167 lb 3.2 oz (75.8 kg)   SpO2 98%   BMI 32.65 kg/m²   Temp  Av.2 °F (36.2 °C)  Min: 96.8 °F (36 °C)  Max: 97.7 °F (36.5 °C)  Skin: Warm and dry. No rashes were noted. HEENT: Round and reactive pupils. Moist mucous membranes. No ulcerations or thrush. Neck: Supple to movements. Chest: No use of accessory muscles to breathe. Symmetrical expansion. No wheezing, crackles or rhonchi. Cardiovascular: Reguar rate and rhythm. No murmurs gallops, or rubs appreciated. Abdomen: Bowel sounds present, nontender, nondistended, no masses or hepatosplenomegaly. Extremities: No clubbing, no cyanosis, no edema. Lines: peripheral    I/O last 3 completed shifts:   In: 685 [P.O.:480; I.V.:10; IV Piggyback:195]  Out: 1800 [Urine:1800]      Laboratory and Tests Review:      Lab Results   Component Value Date    WBC 6.3 2020    WBC 5.7 2020    WBC 6.3 2020    HGB 9.5 (L) 2020    HCT 31.7 (L) 2020    .0 (H) 2020     2020     Lab Results   Component Value Date    NEUTROABS 3.94 2020    NEUTROABS 4.26 2020    NEUTROABS 5.02 2020     No results found for: [facility]  Lab Results   Component Value Date    ALT 19 2020    AST 13 2020    ALKPHOS 142 (H) 2020    BILITOT <0.2 2020     Lab Results   Component Value Date     2020    K 4.4 2020    K 4.6 2020     2020    CO2 21 2020    BUN 9 2020    CREATININE 0.5 2020    CREATININE 0.5 2020    CREATININE 0.5 06/12/2020    GFRAA >60 06/14/2020    AGRATIO 1.4 06/05/2020    LABGLOM >60 06/14/2020    GLUCOSE 117 06/14/2020    PROT 6.4 06/14/2020    LABALBU 3.4 06/14/2020    CALCIUM 9.0 06/14/2020    BILITOT <0.2 06/14/2020    ALKPHOS 142 06/14/2020    AST 13 06/14/2020    ALT 19 06/14/2020     No results found for: CRP  No results found for: Yakelin Toscano    Radiology:    CT HEAD WO CONTRAST   Final Result      No acute intracranial abnormality. No interval change as of the   previous CT from 6/20/2020. CT ABDOMEN PELVIS W WO CONTRAST Additional Contrast? None   Final Result         1. Mild left hydronephrosis related to a 7 mm calculus in the proximal   left ureter. Left ureteral stent in situ. 2. Constipation. No evidence of bowel obstruction. CT Head WO Contrast   Final Result   No acute intracranial abnormality. XR CHEST PORTABLE   Final Result   Atelectasis and/or infiltrate at the left lower lobe.                 Microbiology:   Lab Results   Component Value Date    BC 24 Hours no growth 06/09/2020    BC 5 Days- no growth 04/22/2020    BC 5 Days- no growth 03/24/2019    ORG Enterococcus faecalis 06/09/2020    ORG Strep agalactiae (Beta Strep Group B) 04/23/2020    ORG Strep agalactiae (Beta Strep Group B) 04/22/2020     Lab Results   Component Value Date    BLOODCULT2 24 Hours no growth 06/09/2020    BLOODCULT2 5 Days- no growth 04/22/2020    BLOODCULT2 5 Days- no growth 03/24/2019    ORG Enterococcus faecalis 06/09/2020    ORG Strep agalactiae (Beta Strep Group B) 04/23/2020    ORG Strep agalactiae (Beta Strep Group B) 04/22/2020     No results found for: WNDABS  Smear, Respiratory   Date Value Ref Range Status   04/22/2020   Final    Group 6: <25 PMN's/LPF and <25 Epithelial cells/LPF  Polymorphonuclear leukocytes not seen  Epithelial cells not seen  Rare Gram negative rods       No results found for: MPNEUMO, CLAMYDCU, LABLEGI, AFBCX, FUNGSM, LABFUNG  CULTURE, RESPIRATORY   Date Value Ref Range Status   04/22/2020 Oral Pharyngeal Britta present  Final     No results found for: CXCATHTIP  No results found for: BFCS  No results found for: CXSURG  Urine Culture, Routine   Date Value Ref Range Status   06/09/2020 >100,000 CFU/ml  Final   06/05/2020   Final     Comment:     Urine CultureORGANISM ID: [id number] - Enterococcus faecalis   05/13/2020   Final     Comment:     Urine CultureBacteria Ur Cult     MRSA Culture Only   Date Value Ref Range Status   03/21/2019 Methicillin resistant Staph aureus not isolated  Final       Problem list:    Active Problems:    Sepsis ([address]  Resolved Problems:    * No resolved hospital problems. *      ASSESSMENT:    Multiple sclerosis   Recent MI with stent   Ureteral obstruction with stent   UTI with ampicllin sensitive enterococcus   BC are neg   CXR showing atelectasis vs infiltrates at the LLL   She denies productive cough but she does intermittently have a cough   Few crackles at the left base   ?  Rash But  says she had rash at home   Afebrile   Legionella neg s   Strep ag neg   Read Dr. Renee Bustos note He did not see any evidence of pneumoia Clinically CT hard to say  Very conrfused    Not aware that my Yeny Vang is a cause   Will talk to pharmacy   Stop date for unasyn will be jun16  Afebrile WBC normal  Plan:   My bias would be to change her to unasyn which would cover the enterococcus and can be used for CAP Although she was recently in the hospital so technically it could be a HAP   Will get strep urine ag and legionella urine ag   She is quite weak   The unasyn lends itself to oral augmentin   But clinically in my opinion she is too weak to go home Even given her MS   Long talk with both of them   Check cultures   Baseline ESR, CRP       Kristy Mcgovern DO    10:10 AM  6/14/2020

## 2020-06-15 PROBLEM — A40.9 SEPSIS DUE TO STREPTOCOCCUS SPECIES (HCC): Status: ACTIVE | Noted: 2020-06-15

## 2020-06-15 PROBLEM — N39.0 UTI (URINARY TRACT INFECTION) DUE TO ENTEROCOCCUS: Status: ACTIVE | Noted: 2020-06-10

## 2020-06-15 PROBLEM — B95.2 UTI (URINARY TRACT INFECTION) DUE TO ENTEROCOCCUS: Status: ACTIVE | Noted: 2020-06-10

## 2020-06-15 PROBLEM — N20.0 NEPHROLITHIASIS: Status: ACTIVE | Noted: 2020-06-15

## 2020-06-15 LAB
ALBUMIN SERPL-MCNC: 2.7 G/DL (ref 3.5–4.7)
ALBUMIN SERPL-MCNC: 3.6 G/DL (ref 3.5–5.2)
ALP BLD-CCNC: 123 U/L (ref 35–104)
ALPHA-1-GLOBULIN: 0.4 G/DL (ref 0.2–0.4)
ALPHA-2-GLOBULIN: 1 G/FL (ref 0.5–1)
ALT SERPL-CCNC: 16 U/L (ref 0–32)
ANION GAP SERPL CALCULATED.3IONS-SCNC: 15 MMOL/L (ref 7–16)
AST SERPL-CCNC: 18 U/L (ref 0–31)
BASOPHILS ABSOLUTE: 0.06 E9/L (ref 0–0.2)
BASOPHILS RELATIVE PERCENT: 0.8 % (ref 0–2)
BETA GLOBULIN: 0.9 G/DL (ref 0.8–1.3)
BILIRUB SERPL-MCNC: <0.2 MG/DL (ref 0–1.2)
BUN BLDV-MCNC: 9 MG/DL (ref 8–23)
CALCIUM SERPL-MCNC: 9.3 MG/DL (ref 8.6–10.2)
CHLORIDE BLD-SCNC: 101 MMOL/L (ref 98–107)
CO2: 21 MMOL/L (ref 22–29)
CREAT SERPL-MCNC: 0.5 MG/DL (ref 0.5–1)
DAT POLYSPECIFIC: NORMAL
ELECTROPHORESIS: ABNORMAL
EOSINOPHILS ABSOLUTE: 0.18 E9/L (ref 0.05–0.5)
EOSINOPHILS RELATIVE PERCENT: 2.4 % (ref 0–6)
FERRITIN: 150 NG/ML
FOLATE: 17.3 NG/ML (ref 4.8–24.2)
GAMMA GLOBULIN: 0.7 G/DL (ref 0.7–1.6)
GFR AFRICAN AMERICAN: >60
GFR NON-AFRICAN AMERICAN: >60 ML/MIN/1.73
GLUCOSE BLD-MCNC: 106 MG/DL (ref 74–99)
HAPTOGLOBIN: 262 MG/DL (ref 30–200)
HCT VFR BLD CALC: 25.8 % (ref 34–48)
HCT VFR BLD CALC: 28.4 % (ref 34–48)
HEMOGLOBIN: 8.9 G/DL (ref 11.5–15.5)
IMMATURE GRANULOCYTES #: 0.03 E9/L
IMMATURE GRANULOCYTES %: 0.4 % (ref 0–5)
IMMATURE RETIC FRACT: 25.8 % (ref 3–15.9)
IMMUNOFIXATION RESULT, SERUM: NORMAL
IRON SATURATION: 20 % (ref 15–50)
IRON: 49 MCG/DL (ref 37–145)
L. PNEUMOPHILA SEROGP 1 UR AG: NORMAL
LACTATE DEHYDROGENASE: 95 U/L (ref 135–214)
LACTIC ACID, SEPSIS: 0.7 MMOL/L (ref 0.5–1.9)
LACTIC ACID, SEPSIS: 0.7 MMOL/L (ref 0.5–1.9)
LACTIC ACID, SEPSIS: 0.8 MMOL/L (ref 0.5–1.9)
LYMPHOCYTES ABSOLUTE: 1.51 E9/L (ref 1.5–4)
LYMPHOCYTES RELATIVE PERCENT: 20.5 % (ref 20–42)
MAGNESIUM: 1.9 MG/DL (ref 1.6–2.6)
MCH RBC QN AUTO: 32 PG (ref 26–35)
MCHC RBC AUTO-ENTMCNC: 31.3 % (ref 32–34.5)
MCV RBC AUTO: 102.2 FL (ref 80–99.9)
METER GLUCOSE: 114 MG/DL (ref 74–99)
MONOCYTES ABSOLUTE: 0.61 E9/L (ref 0.1–0.95)
MONOCYTES RELATIVE PERCENT: 8.3 % (ref 2–12)
NEUTROPHILS ABSOLUTE: 4.97 E9/L (ref 1.8–7.3)
NEUTROPHILS RELATIVE PERCENT: 67.6 % (ref 43–80)
PDW BLD-RTO: 14.2 FL (ref 11.5–15)
PLATELET # BLD: 409 E9/L (ref 130–450)
PMV BLD AUTO: 10.1 FL (ref 7–12)
POTASSIUM SERPL-SCNC: 4.2 MMOL/L (ref 3.5–5)
PROCALCITONIN: 0.11 NG/ML (ref 0–0.08)
RBC # BLD: 2.78 E12/L (ref 3.5–5.5)
RETIC HGB EQUIVALENT: 35.2 PG (ref 28.2–36.6)
RETICULOCYTE ABSOLUTE COUNT: 0.07 E12/L
RETICULOCYTE COUNT PCT: 2.8 % (ref 0.4–1.9)
SODIUM BLD-SCNC: 137 MMOL/L (ref 132–146)
TOTAL IRON BINDING CAPACITY: 246 MCG/DL (ref 250–450)
TOTAL PROTEIN: 5.7 G/DL (ref 6.4–8.3)
TOTAL PROTEIN: 6.5 G/DL (ref 6.4–8.3)
TSH SERPL DL<=0.05 MIU/L-ACNC: 8.1 UIU/ML (ref 0.27–4.2)
VITAMIN B-12: 552 PG/ML (ref 211–946)
WBC # BLD: 7.4 E9/L (ref 4.5–11.5)

## 2020-06-15 PROCEDURE — 85025 COMPLETE CBC W/AUTO DIFF WBC: CPT

## 2020-06-15 PROCEDURE — 36415 COLL VENOUS BLD VENIPUNCTURE: CPT

## 2020-06-15 PROCEDURE — 6370000000 HC RX 637 (ALT 250 FOR IP): Performed by: INTERNAL MEDICINE

## 2020-06-15 PROCEDURE — 81270 JAK2 GENE: CPT

## 2020-06-15 PROCEDURE — 86703 HIV-1/HIV-2 1 RESULT ANTBDY: CPT

## 2020-06-15 PROCEDURE — 80074 ACUTE HEPATITIS PANEL: CPT

## 2020-06-15 PROCEDURE — 97530 THERAPEUTIC ACTIVITIES: CPT

## 2020-06-15 PROCEDURE — 88237 TISSUE CULTURE BONE MARROW: CPT

## 2020-06-15 PROCEDURE — 82607 VITAMIN B-12: CPT

## 2020-06-15 PROCEDURE — 83010 ASSAY OF HAPTOGLOBIN QUANT: CPT

## 2020-06-15 PROCEDURE — 83735 ASSAY OF MAGNESIUM: CPT

## 2020-06-15 PROCEDURE — 87450 HC DIRECT STREP B ANTIGEN: CPT

## 2020-06-15 PROCEDURE — 82746 ASSAY OF FOLIC ACID SERUM: CPT

## 2020-06-15 PROCEDURE — 84145 PROCALCITONIN (PCT): CPT

## 2020-06-15 PROCEDURE — 2060000000 HC ICU INTERMEDIATE R&B

## 2020-06-15 PROCEDURE — 80053 COMPREHEN METABOLIC PANEL: CPT

## 2020-06-15 PROCEDURE — 88271 CYTOGENETICS DNA PROBE: CPT

## 2020-06-15 PROCEDURE — 86880 COOMBS TEST DIRECT: CPT

## 2020-06-15 PROCEDURE — 92610 EVALUATE SWALLOWING FUNCTION: CPT | Performed by: SPEECH-LANGUAGE PATHOLOGIST

## 2020-06-15 PROCEDURE — 6360000002 HC RX W HCPCS: Performed by: INTERNAL MEDICINE

## 2020-06-15 PROCEDURE — 88275 CYTOGENETICS 100-300: CPT

## 2020-06-15 PROCEDURE — 84443 ASSAY THYROID STIM HORMONE: CPT

## 2020-06-15 PROCEDURE — 94660 CPAP INITIATION&MGMT: CPT

## 2020-06-15 PROCEDURE — 2580000003 HC RX 258: Performed by: INTERNAL MEDICINE

## 2020-06-15 PROCEDURE — 83615 LACTATE (LD) (LDH) ENZYME: CPT

## 2020-06-15 PROCEDURE — 6370000000 HC RX 637 (ALT 250 FOR IP): Performed by: FAMILY MEDICINE

## 2020-06-15 PROCEDURE — 83605 ASSAY OF LACTIC ACID: CPT

## 2020-06-15 PROCEDURE — 82962 GLUCOSE BLOOD TEST: CPT

## 2020-06-15 PROCEDURE — 82728 ASSAY OF FERRITIN: CPT

## 2020-06-15 PROCEDURE — 85045 AUTOMATED RETICULOCYTE COUNT: CPT

## 2020-06-15 PROCEDURE — 92526 ORAL FUNCTION THERAPY: CPT | Performed by: SPEECH-LANGUAGE PATHOLOGIST

## 2020-06-15 PROCEDURE — 83540 ASSAY OF IRON: CPT

## 2020-06-15 PROCEDURE — 82525 ASSAY OF COPPER: CPT

## 2020-06-15 PROCEDURE — 97535 SELF CARE MNGMENT TRAINING: CPT

## 2020-06-15 PROCEDURE — 97166 OT EVAL MOD COMPLEX 45 MIN: CPT

## 2020-06-15 PROCEDURE — 99233 SBSQ HOSP IP/OBS HIGH 50: CPT | Performed by: INTERNAL MEDICINE

## 2020-06-15 PROCEDURE — 97161 PT EVAL LOW COMPLEX 20 MIN: CPT

## 2020-06-15 PROCEDURE — 2580000003 HC RX 258: Performed by: FAMILY MEDICINE

## 2020-06-15 PROCEDURE — 88185 FLOWCYTOMETRY/TC ADD-ON: CPT

## 2020-06-15 PROCEDURE — 88184 FLOWCYTOMETRY/ TC 1 MARKER: CPT

## 2020-06-15 PROCEDURE — 83550 IRON BINDING TEST: CPT

## 2020-06-15 PROCEDURE — 84630 ASSAY OF ZINC: CPT

## 2020-06-15 RX ADMIN — CLOPIDOGREL 75 MG: 75 TABLET, FILM COATED ORAL at 09:18

## 2020-06-15 RX ADMIN — DANTROLENE SODIUM 50 MG: 25 CAPSULE ORAL at 17:25

## 2020-06-15 RX ADMIN — DANTROLENE SODIUM 50 MG: 25 CAPSULE ORAL at 21:36

## 2020-06-15 RX ADMIN — Medication 10 ML: at 21:44

## 2020-06-15 RX ADMIN — DANTROLENE SODIUM 50 MG: 25 CAPSULE ORAL at 09:18

## 2020-06-15 RX ADMIN — AMPICILLIN SODIUM AND SULBACTAM SODIUM 3 G: 2; 1 INJECTION, POWDER, FOR SOLUTION INTRAMUSCULAR; INTRAVENOUS at 05:37

## 2020-06-15 RX ADMIN — Medication 10 ML: at 09:19

## 2020-06-15 RX ADMIN — AMPICILLIN SODIUM AND SULBACTAM SODIUM 3 G: 2; 1 INJECTION, POWDER, FOR SOLUTION INTRAMUSCULAR; INTRAVENOUS at 17:25

## 2020-06-15 RX ADMIN — AMPICILLIN SODIUM AND SULBACTAM SODIUM 3 G: 2; 1 INJECTION, POWDER, FOR SOLUTION INTRAMUSCULAR; INTRAVENOUS at 11:50

## 2020-06-15 RX ADMIN — APIXABAN 5 MG: 5 TABLET, FILM COATED ORAL at 09:18

## 2020-06-15 RX ADMIN — AMPICILLIN SODIUM AND SULBACTAM SODIUM 3 G: 2; 1 INJECTION, POWDER, FOR SOLUTION INTRAMUSCULAR; INTRAVENOUS at 00:20

## 2020-06-15 RX ADMIN — NORTRIPTYLINE HYDROCHLORIDE 25 MG: 25 CAPSULE ORAL at 21:36

## 2020-06-15 RX ADMIN — FERROUS SULFATE TAB 325 MG (65 MG ELEMENTAL FE) 325 MG: 325 (65 FE) TAB at 09:18

## 2020-06-15 RX ADMIN — LEVOTHYROXINE SODIUM 100 MCG: 0.1 TABLET ORAL at 06:31

## 2020-06-15 RX ADMIN — CITALOPRAM 20 MG: 20 TABLET, FILM COATED ORAL at 09:18

## 2020-06-15 RX ADMIN — DANTROLENE SODIUM 50 MG: 25 CAPSULE ORAL at 15:16

## 2020-06-15 RX ADMIN — ATORVASTATIN CALCIUM 10 MG: 10 TABLET, FILM COATED ORAL at 21:36

## 2020-06-15 RX ADMIN — AMPICILLIN SODIUM AND SULBACTAM SODIUM 3 G: 2; 1 INJECTION, POWDER, FOR SOLUTION INTRAMUSCULAR; INTRAVENOUS at 03:00

## 2020-06-15 RX ADMIN — CARVEDILOL 3.12 MG: 3.12 TABLET, FILM COATED ORAL at 09:18

## 2020-06-15 RX ADMIN — CARVEDILOL 3.12 MG: 3.12 TABLET, FILM COATED ORAL at 17:25

## 2020-06-15 RX ADMIN — LISINOPRIL 1.25 MG: 5 TABLET ORAL at 09:18

## 2020-06-15 RX ADMIN — APIXABAN 5 MG: 5 TABLET, FILM COATED ORAL at 21:36

## 2020-06-15 ASSESSMENT — PAIN SCALES - GENERAL
PAINLEVEL_OUTOF10: 0
PAINLEVEL_OUTOF10: 0

## 2020-06-15 NOTE — PROGRESS NOTES
Pt resting comfortably when this nurse at bedside rounding. Triology ventilator in place. Pt repositioned by staff. No changes in pt's condition from shift yesterday. No complaints verbalized. Iv site right wrist is patent and flushes w/o problems

## 2020-06-15 NOTE — PROGRESS NOTES
Sitting: NT      Standing: NT         Activity Tolerance poor  fair   Visual/  Perceptual Glasses: ?                  Hand dominance: R     Strength ROM Additional Info:    RUE  3-/5  Proximally: shoulder limited AAROM, PROM  Distally: AAROM WFL elbow flex/ext poor  and poor FMC/dexterity noted during ADL tasks       LUE 3-/5  Proximally: shoulder limited AAROM, PROM  Distally: AAROM WFL elbow flex/ext poor  and poor FMC/dexterity noted during ADL tasks         Hearing: WFL  Sensation:  Unable to accurately test d/t cognition  Tone: impaired-spastic  Edema: none noted            Treatment: Upon arrival, patient lying in bed,  present and agreeable to OT session at this time in collaboration with PT. Therapist facilitated BUE AAROM/PROM exercises(in all planes) to prevent contractures/maintain ROM. Therapist facilitated self-care retraining: UB/LB self-care tasks(donned/doffed gown), simulated toileting task and grooming task(washed face w/ [place] assist) while educating pt on posture, safety and energy conservation techniques. Skilled monitoring of HR, O2 sats and pts response to treatment. Re-positioned pt in bed for comfort/prevent skin breakdown. At end of session, patient lying in bed with  present with call light and phone within reach, all lines and tubes intact. Comments:  Overall pt demonstrated decreased independence and safety during completion of ADL/functional transfers/mobility tasks. Pt demonstrating poor understanding of education/techniques, requiring additional training / education. Pt would benefit from continued skilled OT to increase functional independence and quality of life.       Eval Complexity: Mod  mod  Profile and History- med (extensive chart review)  Assessment of Occupational Performance and Identification of Deficits- med  Clinical Decision Making- med    (Evaluation time includes thorough review of current medical information, gathering

## 2020-06-15 NOTE — PROGRESS NOTES
and limited by spasticity but improved with repetition. Pt positioned on L side with TAPS for pressure relief. Pt following <10% commands. Seems at baseline dependent function. Pt with all needs met and call light in reach. Pt would benefit from continued PT POC to address functional deficits described above. Treatment:  Patient practiced and was instructed in the following treatment:     Patient education provided continuously throughout session for sequencing, safety maintenance, and improving any deficits found during the evaluation.  PROM to BLE in supine  · Skilled positioning - Pt placed in L sidelying position with pillows utilized to facilitate upright posture, joint and skin integrity, and interaction with environment. Pt's/ family goals   1. None stated     Patient and or family understand(s) diagnosis, prognosis, and plan of care. Yes     PLAN:    PT care will be provided in accordance with the objectives noted above. Exercises and functional mobility practice will be used as well as appropriate assistive devices or modalities to obtain goals. Patient and family education will also be administered as needed. Frequency of treatments: 2-5x/week x 1-2 weeks. Time in  0821  Time out  0837    Total Treatment Time 8 minutes     Evaluation Time includes thorough review of current medical information, gathering information on past medical history/social history and prior level of function, completion of standardized testing/informal observation of tasks, assessment of data and education on plan of care and goals.     CPT codes:  [x] Low Complexity PT evaluation 79686  [] Moderate Complexity PT evaluation 36209  [] High Complexity PT evaluation 35994  [] PT Re-evaluation 17545  [] Gait training 44513 -- minutes  [] Manual therapy [id number] -- minutes  [x] Therapeutic activities 22753 8 minutes  [] Therapeutic exercises 65587 -- minutes  [] Neuromuscular reeducation 38000 -- minutes     Samina Moreira Stephan Quispe, DPT  [id number]

## 2020-06-15 NOTE — PROGRESS NOTES
Summary (Last 24 hours) at 6/15/2020 1330  Last data filed at 6/15/2020 1033  Gross per 24 hour   Intake 450 ml   Output 1900 ml   Net -1450 ml       Labs:   Recent Labs     20  115202 06/15/20  0345   WBC 5.7 6.3 7.4   HGB 8.6* 9.5* 8.9*   HCT 27.8* 31.7* 28.4*    352 409       Recent Labs     20  11520  0342 06/15/20  034    138 137   K 4.4 4.4 4.2    102 101   CO2 24 21* 21*   BUN 9 9 9   CREATININE 0.5 0.5 0.5   CALCIUM 8.9 9.0 9.3       Recent Labs     20  11520  0342 06/15/20  0345   PROT 6.2* 6.4 6.5   ALKPHOS 157* 142* 123*   ALT 23 19 16   AST 14 13 18   BILITOT <0.2 <0.2 <0.2       No results for input(s): INR in the last 72 hours. No results for input(s): Cyril Bending in the last 72 hours. Chronic labs:  Lab Results   Component Value Date    CHOL 201 2019    TRIG 282 2019    HDL 34 10/30/2019    LDLCALC 115 (H) 10/30/2019    TSH 6.95 (H) 2020    INR 1.0 2020    LABA1C 6.7 (H) 2020       Microbiology:  Pending  No results for input(s): BC in the last 72 hours. No results for input(s): ORG in the last 72 hours. No results for input(s): Ilana Annas in the last 72 hours. No results for input(s): STREPNEUMAGU in the last 72 hours. Recent Labs     20  1030   LP1UAG Presumptive Negative -suggesting no recent or current infections  with Legionella pneumophila serogroup 1. Infection to Legionella cannot be ruled out since other serogroups  and species may cause infection, antigen may not be present in  early infection, or level of antigen may be below the  detection limit. Normal Range: Presumptive Negative       No results for input(s): ASO in the last 72 hours. No results for input(s): CULTRESP in the last 72 hours.   Recent Labs     20   PROCAL 0.16*       Radiology:  Ct Abdomen Pelvis W Wo Contrast Additional Contrast? None    Result Date: 2020  Patient MRN:  [id number] : in situ. 2. Constipation. No evidence of bowel obstruction. Ct Head Wo Contrast    Result Date: 2020  Patient MRN:  [id number] : 1945 Age: 77 years Gender: Female Order Date:  2020 1:15 PM EXAM: CT HEAD WO CONTRAST INDICATION:  evaluate for CVA evaluate for CVA  COMPARISON: CT head without contrast, 2020 FINDINGS: PARENCHYMA:No mass effect, edema or hemorrhage. Mild volume loss is seen in the cerebrum with mild chronic microvascular changes. VENTRICLES:No hydrocephalus. CALVARIUM:The calvarium is intact without fracture or focal lesion. SINUSES:The visualized paranasal sinuses and mastoids are clear. No acute intracranial abnormality. No interval change as of the previous CT from 2020. Ct Head Wo Contrast    Result Date: 2020  Patient MRN:  [id number] : 1945 Age: 77 years Gender: Female Order Date:  2020 2:45 PM EXAM: CT HEAD WO CONTRAST NUMBER OF IMAGES:  112 INDICATION:  AMS AMS COMPARISON: 2020 TECHNIQUE:   Noncontrast CT of the head was performed. Coronal and sagittal reconstructions were obtained. Dose optimization techniques utilized including automatic exposure control and clarity iterative reconstruction. FINDINGS:   No hydrocephalus or extra-axial fluid collection seen. There is generalized volume loss and chronic small vessel disease. Brain parenchyma and orbits are otherwise unremarkable. Basilar cisterns are preserved. There is minimal opacification of all the visualized paranasal sinuses. Osseous structures are intact. Mastoid air cells are clear. No acute intracranial abnormality. Xr Chest Portable    Result Date: 2020  Patient MRN: [id number] : 1945 Age:  77 years Gender: Female Order Date: 2020 11:45 AM Exam: XR CHEST PORTABLE Number of Images: 1 view Indication:   septic septic Comparison: May 1, 2020 FINDINGS: There is atelectasis and/or infiltrate at the left lower lobe.  Heart and pulmonary vascularity normal. calculus with hydronephrosis s/p cystoscopy, retrograde pyelogram, and left ureteral stent insertion on 4/23/20   UTI  Possible Neurogenic Bladder? Secondary to MS    Recommendation: She is scheduled for a cystoscopy, retrograde pyelogram, ureteroscopy, laser lithotripsy, stone extraction, and left ureteral stent exchange versus removal on 6/19/2020 with Dr. Marbella Vickers [place]. We will keep this scheduled date as of now. She should be treated and cleared of infection prior to this upcoming surgery date    Consulted by Pulmonary for Chest x-ray shows possible infiltrate into the left lower lobe; in which pneumonia was excluded. Acute encephalopathy onset 4-days after after admission and on appropriate antibiotics      PLAN:  1. baseline MS with quadraplegia - PT and RN care  2. on Unasyn for Enterococcal bacteremia/UTI and sepsis, consulted by ID and Urology  3. Not in overt/acute CHF EF 20-25%, on coreg, lisinopril and apixaban: Tolerating lower dose of ACEI  4. work up for delirium Neuro to consult  5.  states she is not swallowing so get swallow test with speech  6. See separate [address] note for discussions on goals of care given encephalopathy and dysphagia and limited CODE currently ET/MV permitted      Diet: DIET CARDIAC;  Code Status: Limited  PT/OT Eval Status:  Can order    DVT Prophylaxis:   eliquis   Recommended disposition at discharge:  home    I have expended > 45min in assessment, care and management    +++++++++++++++++++++++++++++++++++++++++++++++++  Andrés Felix MD   [facility].  +++++++++++++++++++++++++++++++++++++++++++++++++  NOTE: This report was transcribed using voice recognition software. Every effort was made to ensure accuracy; however, inadvertent computerized transcription errors may be present.

## 2020-06-15 NOTE — PROGRESS NOTES
6/15/2020 6:50 PM  Service: Urology  Group: [facility] ([place]    Honorio Douglas  [id number]    Subjective:    She remains confused and hallucinating  Her  is at bedside  Barger intact draining yellow urine  He states that they are considering possibly being discharged home on hospice    Review of Systems  Unable to obtain due to confusion      Scheduled Meds:   lisinopril  1.25 mg Oral Daily    levothyroxine  100 mcg Oral Daily    apixaban  5 mg Oral BID    carvedilol  3.125 mg Oral BID WC    ampicillin-sulbactam  3 g Intravenous Q6H    atorvastatin  10 mg Oral Daily    citalopram  20 mg Oral QAM    clopidogrel  75 mg Oral Daily    dantrolene  50 mg Oral 4x Daily    ferrous sulfate  325 mg Oral Daily with breakfast    nortriptyline  25 mg Oral Nightly    sodium chloride flush  10 mL Intravenous 2 times per day       Objective:  Vitals:    06/15/20 1530   BP: (!) 124/58   Pulse: 93   Resp: 16   Temp: 97.5 °F (36.4 °C)   SpO2: 97%         Allergies: Patient has no known allergies. General Appearance: Awake and alert confused, weak  Skin: no rash or erythema  Head: normocephalic and atraumatic  Pulmonary/Chest: normal air movement, on BiPAP  Abdomen: soft, non-tender, non-distended  Genitourinary: Barger draining yellow urine  Extremities: no cyanosis, clubbing or edema         Labs:     Recent Labs     06/15/20  0345      K 4.2      CO2 21*   BUN 9   CREATININE 0.5   GLUCOSE 106*   CALCIUM 9.3       Lab Results   Component Value Date    HGB 8.9 06/15/2020    HCT 28.4 06/15/2020         Assessment/Plan:  Left obstructing ureteral calculus with hydronephrosis s/p cystoscopy, retrograde pyelogram, and left ureteral stent insertion on 4/23/20   Urosepsis   Possible Neurogenic Bladder?  Secondary to MS    Urine culture + Enterococcus faecalis  Blood cultures no growth  Antibiotics per ID currently on Unasyn  Creatinine remains stable  Her planned stone procedure on 6/19/2020 will

## 2020-06-15 NOTE — PROGRESS NOTES
no masses or organomegaly   Extremities:no edema   Musculoskeletal: normal range of motion, no joint swelling, deformity or tenderness   Neurologic: reflexes normal and symmetric, no cranial nerve deficit noted    LABS/IMAGING:    CBC:  Lab Results   Component Value Date    WBC 6.3 06/14/2020    HGB 9.5 (L) 06/14/2020    HCT 31.7 (L) 06/14/2020    .0 (H) 06/14/2020     06/14/2020    LYMPHOPCT 24.3 06/14/2020    RBC 3.02 (L) 06/14/2020    MCH 31.5 06/14/2020    MCHC 30.0 (L) 06/14/2020    RDW 14.2 06/14/2020    NEUTOPHILPCT 62.9 06/14/2020    MONOPCT 9.3 06/14/2020    EOSPCT 2.2 03/12/2019    BASOPCT 0.6 06/14/2020    NEUTROABS 3.94 06/14/2020    LYMPHSABS 1.52 06/14/2020    MONOSABS 0.58 06/14/2020    EOSABS 0.15 06/14/2020    BASOSABS 0.04 06/14/2020       Recent Labs     06/14/20  0342 06/13/20  1155 06/12/20  0341   WBC 6.3 5.7 6.3   HGB 9.5* 8.6* 8.6*   HCT 31.7* 27.8* 27.3*   .0* 101.8* 102.6*    330 283       BMP:   Recent Labs     06/12/20  0341 06/13/20  1155 06/14/20  0342    137 138   K 3.7 4.4 4.4    100 102   CO2 23 24 21*   BUN 10 9 9   CREATININE 0.5 0.5 0.5       MG:   Lab Results   Component Value Date    MG 2.0 06/14/2020     Ca/Phos:   Lab Results   Component Value Date    CALCIUM 9.0 06/14/2020    PHOS 4.9 (H) 04/21/2020     Amylase: No results found for: AMYLASE  Lipase:   Lab Results   Component Value Date    LIPASE 49 04/21/2020     LIVER PROFILE:   Recent Labs     06/12/20  0341 06/13/20  1155 06/14/20  0342   AST 25 14 13   ALT 36* 23 19   BILITOT 0.2 <0.2 <0.2   ALKPHOS 174* 157* 142*       PT/INR:   No results for input(s): PROTIME, INR in the last 72 hours. APTT:   No results for input(s): APTT in the last 72 hours.     Cardiac Enzymes:  Lab Results   Component Value Date    CKTOTAL 204 (H) 04/22/2020    CKMB 18.6 (H) 04/22/2020    TROPONINI 0.03 06/09/2020       Hgb A1C:   Lab Results   Component Value Date    LABA1C 6.7 (H) 05/13/2020     Lab Results   Component Value Date     05/13/2020     BARRY: No results found for: BARRY  ESR: No results found for: SEDRATE  CRP: No results found for: CRP  D Dimer:   Lab Results   Component Value Date    DDIMER 1724 03/24/2019     Folate and B12:   Lab Results   Component Value Date    VMITMNPG48 1234 (H) 05/15/2020   ,   Lab Results   Component Value Date    FOLATE 17.2 05/15/2020                 PROBLEM LIST:  Patient Active Problem List   Diagnosis    [facility]) [facility]    Spastic quadriparesis ([facility]    Essential hypertension    Hypothyroidism    H/O sarcoidosis    Mixed hyperlipidemia    Neurogenic bladder    HUDSON (nonalcoholic steatohepatitis)    Type 2 diabetes mellitus with hyperglycemia, without long-term current use of insulin (HCC)    STEMI (ST elevation myocardial infarction) ([facility]    CAD (coronary artery disease)    Cardiogenic shock (HCC)    Hydronephrosis    Chronic systolic (congestive) heart failure (HCC)    Left ventricular thrombus    Septic shock (HCC)    Acute respiratory failure with hypoxia (HCC)    Acute on chronic systolic (congestive) heart failure (HCC)    Metabolic encephalopathy    Sepsis ([facility]               ASSESSMENT:  1.) Enterococcus Bacteremia    2. )UTI   3.)Sarcoid ,lung  4. )RUL scar /noule   5. )LLL atelectasis vs pneumonia   6) MS       PLAN:  No  evidence of pneumonia ,clinically ,  She can be on NC and or BiPAP at night   She is already on IV abx   May need Neurology  to reassess   *-The nodule seems more scar than malignancy and I believe  it could be from her sarcoid,no images to compare=  *_incentive spirometer  *- Flutter valve   PT and OT     *- procalcitonin 0.26  Discuss with louise BOOKER [facility]

## 2020-06-15 NOTE — PROGRESS NOTES
SPEECH LANGUAGE PATHOLOGY  DAILY PROGRESS NOTE        PATIENT NAME:  José Antonio Douglas      :  1945          TODAY'S DATE:  6/15/2020 ROOM:  4419/8882-Z    Speech Pathologist (SLP) completed education with the patient/family regarding type of swallowing impairment. Reviewed current solid/liquid consistency diet recommendations and discussed compensatory strategies to ensure safe PO intake. Reviewed aspiration precautions. Encouraged patient and/or family to engage SLP in unstructured Q&A session relative to identified deficit areas; indicated understanding of all information provided via satisfactory verbal response. Pt's  provided suggestions to increase PO intake d/t acute change in cog status secondary to infection at this time;  was pleased and voiced understanding. Will follow to ensure tolerance.        CPT code(s) 85947  dysphagia tx  Total minutes :  15 minutes

## 2020-06-15 NOTE — PROGRESS NOTES
Signed             Advance Care Plan Note      I sought to discuss with patient's  and daughter regarding the patients current conditions, code status, hospital course and goals of care. Her conditions were reviewed with both family members which included the following:  Principal Problem:    UTI (urinary tract infection) due to Enterococcus  Active Problems:    Hydronephrosis    Sepsis due to Streptococcus species ([facility]    Nephrolithiasis    Essential hypertension    Acute respiratory failure with hypoxia ([facility] [facility]    MS (multiple sclerosis) ([facility] [facility]    Spastic quadriparesis ([facility] [facility]    Type 2 diabetes mellitus with hyperglycemia, without long-term current use of insulin ([facility])   Chronic systolic (congestive) heart failure, grade III - NYHA; EF 20-25%. Acute encephalopathy onset 4-days after admission      The finding of new onset of acute encephalopathy onset 4-days after admission and on appropriate antibiotics is worrisome. Furthermore, she is having dysphagia and difficulty drinking. I cautioned family that these findings are not good prognostic markers. Delirium could be from duration of hospitalization superimposed onto MS which is advancing with now bladder dystonia and possible urinary retention.  would like to think further regarding changing limited DNA with permissible short-term intubation to [facility] which is what his daughter is favoring. After further discussion, they would like to keep patient hospitalized and have lithotripsy performed on 6/19 as planned. Afterwards, they are agreeable to discharge with home Hospice.      I have expended > 40 min in discussions with , Ryanne Rogers [phone] in-person and daughter, Brianne Perry by phone on speaker function, who is a nurse [[phone]].

## 2020-06-15 NOTE — PROGRESS NOTES
[address] [place] [address] [facility]  Progress Note      Chief Complaint   Patient presents with    Urinary Tract Infection     recent urinary stents that need removed, patient febrile and hypotensive       SUBJECTIVE:      Patient is tolerating medications. No reported adverse drug reactions. No problems overnight. Review of systems:    As stated above in the chief complaint, otherwise negative. Medications:    Scheduled Meds:   lisinopril  1.25 mg Oral Daily    levothyroxine  100 mcg Oral Daily    apixaban  5 mg Oral BID    carvedilol  3.125 mg Oral BID WC    ampicillin-sulbactam  3 g Intravenous Q6H    atorvastatin  10 mg Oral Daily    citalopram  20 mg Oral QAM    clopidogrel  75 mg Oral Daily    dantrolene  50 mg Oral 4x Daily    ferrous sulfate  325 mg Oral Daily with breakfast    nortriptyline  25 mg Oral Nightly    sodium chloride flush  10 mL Intravenous 2 times per day     Continuous Infusions:  PRN Meds:diphenhydrAMINE, perflutren lipid microspheres, ipratropium-albuterol, sodium chloride flush, acetaminophen **OR** acetaminophen  Prior to Admission medications    Medication Sig Start Date End Date Taking?  Authorizing Provider   ferrous sulfate (IRON 325) 325 (65 Fe) MG tablet Take 1 tablet by mouth daily (with breakfast) 5/18/20  Yes Robert Holden MD   citalopram (CELEXA) 20 MG tablet Take 1 tablet by mouth every morning 5/18/20  Yes Robert Holden MD   dantrolene (DANTRIUM) 50 MG capsule Take 1 capsule by mouth 4 times daily 5/14/20  Yes Robert Holden MD   senna (SENOKOT) 8.6 MG tablet Take 1 tablet by mouth 2 times daily   Yes Historical Provider, MD   baclofen (LIORESAL) 20 MG tablet Take 40 mg by mouth 2 times daily   Yes Historical Provider, MD   aspirin 81 MG EC tablet Take 1 tablet by mouth daily 5/2/20  Yes Ariel Recinos MD   apixaban (ELIQUIS) 5 MG TABS tablet Take 1 tablet by mouth 2 times daily 5/2/20  Yes Ariel Recinos MD   lisinopril (PRINIVIL;ZESTRIL) 2.5 MG tablet Take 1 tablet by mouth daily  Patient taking differently: Take 2.5 mg by mouth daily If BP is 90/60 or higher 5/2/20  Yes Natividad Castro MD   carvedilol (COREG) 6.25 MG tablet Take 1 tablet by mouth 2 times daily  Patient taking differently: Take 6.25 mg by mouth 2 times daily If BP is over 90/60 5/1/20  Yes Natividad Castro MD   bumetanide (BUMEX) 1 MG tablet Take 1 tablet by mouth daily  Patient taking differently: Take 1 mg by mouth daily If BP is over 80/50 5/2/20  Yes Natividad Castro MD   clopidogrel (PLAVIX) 75 MG tablet Take 1 tablet by mouth daily 5/2/20  Yes Natividad Castro MD   levothyroxine (SYNTHROID) 88 MCG tablet Take 1 tablet by mouth daily 3/17/20  Yes Winsome Guzman MD   nortriptyline (PAMELOR) 25 MG capsule Take 1 capsule by mouth nightly 3/17/20 6/15/20 Yes Winsome Guzman MD   atorvastatin (LIPITOR) 10 MG tablet Take 1 tablet by mouth daily 1/17/20  Yes Winsome Guzman MD   Cyanocobalamin (VITAMIN B 12 PO) Take 500 mg by mouth daily    Yes Historical Provider, MD   ipratropium-albuterol (DUONEB) 0.5-2.5 (3) MG/3ML SOLN nebulizer solution Inhale 3 mLs into the lungs every 6 hours as needed for Shortness of Breath 5/15/19  Yes Yamil Elizabeth MD   Calcium Carbonate-Vit D-Min (CALCIUM 600+D PLUS MINERALS) 600-400 MG-UNIT TABS Take 1 tablet by mouth nightly    Yes Historical Provider, MD   polyethylene glycol (MIRALAX) PACK packet Take 17 g by mouth daily as needed    Yes Historical Provider, MD   Cholecalciferol (VITAMIN D3) 5000 units TABS Take 1 tablet by mouth every morning   Yes Historical Provider, MD   Potassium 99 MG TABS Take 1 tablet by mouth every morning   Yes Historical Provider, MD   magnesium (MAGNESIUM-OXIDE) 250 MG TABS tablet Take 250 mg by mouth every morning    Yes Historical Provider, MD   nitrofurantoin (MACRODANTIN) 50 MG capsule Take 1 capsule by mouth 4 times daily for 7 days 6/8/20 6/15/20  Winsome Guzman MD   ondansetron (ZOFRAN ODT) 4 MG Sabine Whitt present  Final     No results found for: CXCATHTIP  No results found for: BFCS  No results found for: CXSURG  Urine Culture, Routine   Date Value Ref Range Status   06/09/2020 >100,000 CFU/ml  Final   06/05/2020   Final     Comment:     Urine CultureORGANISM ID: [id number] - Enterococcus faecalis   05/13/2020   Final     Comment:     Urine CultureBacteria Ur Cult     MRSA Culture Only   Date Value Ref Range Status   03/21/2019 Methicillin resistant Staph aureus not isolated  Final       Problem list:    Active Problems:    Sepsis ([address]  Resolved Problems:    * No resolved hospital problems. *      ASSESSMENT:      Multiple sclerosis   Recent MI with stent   Ureteral obstruction with stent   UTI with ampicllin sensitive enterococcus   BC are neg   CXR showing atelectasis vs infiltrates at the LLL   She denies productive cough but she does intermittently have a cough   Few crackles at the left base   ?  Rash But  says she had rash at home   Afebrile   Legionella neg  Strep ag neg   Read Dr. Margarette Lobo note He did not see any evidence of pneumoia Clinically CT hard to say   Very conrfused Not aware that my Rani Perez is a cause Will talk to pharmacy Stop date for unasyn will be jun16   Afebrile WBC normal  Still confused   Plan:   Continue unasyn until tomorrow  Check cultures   Baseline ESR, CRP     Kristy Mcgovern DO    10:18 AM  6/15/2020

## 2020-06-16 ENCOUNTER — TELEPHONE (OUTPATIENT)
Dept: FAMILY MEDICINE CLINIC | Age: 75
End: 2020-06-16

## 2020-06-16 VITALS
TEMPERATURE: 97.2 F | HEART RATE: 91 BPM | OXYGEN SATURATION: 97 % | BODY MASS INDEX: 31.22 KG/M2 | RESPIRATION RATE: 18 BRPM | DIASTOLIC BLOOD PRESSURE: 59 MMHG | SYSTOLIC BLOOD PRESSURE: 125 MMHG | WEIGHT: 159 LBS | HEIGHT: 60 IN

## 2020-06-16 LAB
ALBUMIN SERPL-MCNC: 3.9 G/DL (ref 3.5–5.2)
ALP BLD-CCNC: 111 U/L (ref 35–104)
ALT SERPL-CCNC: 14 U/L (ref 0–32)
ANION GAP SERPL CALCULATED.3IONS-SCNC: 14 MMOL/L (ref 7–16)
AST SERPL-CCNC: 15 U/L (ref 0–31)
BASOPHILS ABSOLUTE: 0.03 E9/L (ref 0–0.2)
BASOPHILS RELATIVE PERCENT: 0.4 % (ref 0–2)
BILIRUB SERPL-MCNC: <0.2 MG/DL (ref 0–1.2)
BUN BLDV-MCNC: 8 MG/DL (ref 8–23)
CALCIUM SERPL-MCNC: 9.1 MG/DL (ref 8.6–10.2)
CHLORIDE BLD-SCNC: 102 MMOL/L (ref 98–107)
CO2: 22 MMOL/L (ref 22–29)
CREAT SERPL-MCNC: 0.4 MG/DL (ref 0.5–1)
EOSINOPHILS ABSOLUTE: 0.23 E9/L (ref 0.05–0.5)
EOSINOPHILS RELATIVE PERCENT: 2.8 % (ref 0–6)
GFR AFRICAN AMERICAN: >60
GFR NON-AFRICAN AMERICAN: >60 ML/MIN/1.73
GLUCOSE BLD-MCNC: 98 MG/DL (ref 74–99)
HAV IGM SER IA-ACNC: NORMAL
HCT VFR BLD CALC: 28.5 % (ref 34–48)
HEMOGLOBIN: 9 G/DL (ref 11.5–15.5)
HEPATITIS B CORE IGM ANTIBODY: NORMAL
HEPATITIS B SURFACE ANTIGEN INTERPRETATION: NORMAL
HEPATITIS C ANTIBODY INTERPRETATION: NORMAL
HIV-1 AND HIV-2 ANTIBODIES: NORMAL
IMMATURE GRANULOCYTES #: 0.03 E9/L
IMMATURE GRANULOCYTES %: 0.4 % (ref 0–5)
LACTIC ACID, SEPSIS: 1.1 MMOL/L (ref 0.5–1.9)
LYMPHOCYTES ABSOLUTE: 1.35 E9/L (ref 1.5–4)
LYMPHOCYTES RELATIVE PERCENT: 16.5 % (ref 20–42)
MAGNESIUM: 2.1 MG/DL (ref 1.6–2.6)
MCH RBC QN AUTO: 32.4 PG (ref 26–35)
MCHC RBC AUTO-ENTMCNC: 31.6 % (ref 32–34.5)
MCV RBC AUTO: 102.5 FL (ref 80–99.9)
MONOCYTES ABSOLUTE: 0.62 E9/L (ref 0.1–0.95)
MONOCYTES RELATIVE PERCENT: 7.6 % (ref 2–12)
NEUTROPHILS ABSOLUTE: 5.94 E9/L (ref 1.8–7.3)
NEUTROPHILS RELATIVE PERCENT: 72.3 % (ref 43–80)
PDW BLD-RTO: 14.4 FL (ref 11.5–15)
PLATELET # BLD: 483 E9/L (ref 130–450)
PMV BLD AUTO: 10.1 FL (ref 7–12)
POTASSIUM SERPL-SCNC: 4.1 MMOL/L (ref 3.5–5)
RBC # BLD: 2.78 E12/L (ref 3.5–5.5)
SODIUM BLD-SCNC: 138 MMOL/L (ref 132–146)
STREP PNEUMONIAE ANTIGEN, URINE: NORMAL
TOTAL PROTEIN: 6.7 G/DL (ref 6.4–8.3)
WBC # BLD: 8.2 E9/L (ref 4.5–11.5)

## 2020-06-16 PROCEDURE — 85025 COMPLETE CBC W/AUTO DIFF WBC: CPT

## 2020-06-16 PROCEDURE — 80053 COMPREHEN METABOLIC PANEL: CPT

## 2020-06-16 PROCEDURE — 6370000000 HC RX 637 (ALT 250 FOR IP): Performed by: INTERNAL MEDICINE

## 2020-06-16 PROCEDURE — 99232 SBSQ HOSP IP/OBS MODERATE 35: CPT | Performed by: INTERNAL MEDICINE

## 2020-06-16 PROCEDURE — 6370000000 HC RX 637 (ALT 250 FOR IP): Performed by: FAMILY MEDICINE

## 2020-06-16 PROCEDURE — 36415 COLL VENOUS BLD VENIPUNCTURE: CPT

## 2020-06-16 PROCEDURE — 83605 ASSAY OF LACTIC ACID: CPT

## 2020-06-16 PROCEDURE — 94660 CPAP INITIATION&MGMT: CPT

## 2020-06-16 PROCEDURE — 6360000002 HC RX W HCPCS: Performed by: INTERNAL MEDICINE

## 2020-06-16 PROCEDURE — 2700000000 HC OXYGEN THERAPY PER DAY

## 2020-06-16 PROCEDURE — G0328 FECAL BLOOD SCRN IMMUNOASSAY: HCPCS

## 2020-06-16 PROCEDURE — 2580000003 HC RX 258: Performed by: FAMILY MEDICINE

## 2020-06-16 PROCEDURE — 2580000003 HC RX 258: Performed by: INTERNAL MEDICINE

## 2020-06-16 PROCEDURE — 99239 HOSP IP/OBS DSCHRG MGMT >30: CPT | Performed by: INTERNAL MEDICINE

## 2020-06-16 PROCEDURE — 83735 ASSAY OF MAGNESIUM: CPT

## 2020-06-16 RX ORDER — LISINOPRIL 2.5 MG/1
1.25 TABLET ORAL DAILY
Qty: 30 TABLET | Refills: 3 | Status: SHIPPED | OUTPATIENT
Start: 2020-06-17 | End: 2020-09-02 | Stop reason: SDUPTHER

## 2020-06-16 RX ADMIN — DANTROLENE SODIUM 50 MG: 25 CAPSULE ORAL at 16:29

## 2020-06-16 RX ADMIN — Medication 10 ML: at 08:07

## 2020-06-16 RX ADMIN — APIXABAN 5 MG: 5 TABLET, FILM COATED ORAL at 08:07

## 2020-06-16 RX ADMIN — DANTROLENE SODIUM 50 MG: 25 CAPSULE ORAL at 08:06

## 2020-06-16 RX ADMIN — DANTROLENE SODIUM 50 MG: 25 CAPSULE ORAL at 13:08

## 2020-06-16 RX ADMIN — LISINOPRIL 1.25 MG: 5 TABLET ORAL at 08:49

## 2020-06-16 RX ADMIN — CLOPIDOGREL 75 MG: 75 TABLET, FILM COATED ORAL at 08:07

## 2020-06-16 RX ADMIN — CARVEDILOL 3.12 MG: 3.12 TABLET, FILM COATED ORAL at 16:29

## 2020-06-16 RX ADMIN — FERROUS SULFATE TAB 325 MG (65 MG ELEMENTAL FE) 325 MG: 325 (65 FE) TAB at 08:07

## 2020-06-16 RX ADMIN — CARVEDILOL 3.12 MG: 3.12 TABLET, FILM COATED ORAL at 08:06

## 2020-06-16 RX ADMIN — AMPICILLIN SODIUM AND SULBACTAM SODIUM 3 G: 2; 1 INJECTION, POWDER, FOR SOLUTION INTRAMUSCULAR; INTRAVENOUS at 11:27

## 2020-06-16 RX ADMIN — LEVOTHYROXINE SODIUM 100 MCG: 0.1 TABLET ORAL at 06:47

## 2020-06-16 RX ADMIN — AMPICILLIN SODIUM AND SULBACTAM SODIUM 3 G: 2; 1 INJECTION, POWDER, FOR SOLUTION INTRAMUSCULAR; INTRAVENOUS at 05:41

## 2020-06-16 RX ADMIN — CITALOPRAM 20 MG: 20 TABLET, FILM COATED ORAL at 08:07

## 2020-06-16 ASSESSMENT — PAIN SCALES - GENERAL: PAINLEVEL_OUTOF10: 0

## 2020-06-16 NOTE — PROGRESS NOTES
[facility] PROGRESS NOTE                                                                       6/16/2020        CHIEF UROLOGIC COMPLAINT: Ureteral stone with sepsis    HISTORY OF PRESENT ILLNESS:  Patient comfortable in the bed. Apparent agonal breathing.  at bedside and is emotional.  Has many questions. REVIEW OF SYSTEMS:   Unable to obtain as the patient not responding to questions.     PAST FAMILY HISTORY:    Family History   Problem Relation Age of Onset    Stroke Mother     Heart Disease Mother     Cancer Father         lung    Mult Sclerosis Sister     No Known Problems Brother     No Known Problems Sister     Arthritis Sister     Cervical Cancer Sister      PAST SOCIAL HISTORY:    Social History     Socioeconomic History    Marital status:      Spouse name: None    Number of children: None    Years of education: None    Highest education level: None   Occupational History    None   Social Needs    Financial resource strain: None    Food insecurity     Worry: None     Inability: None    Transportation needs     Medical: None     Non-medical: None   Tobacco Use    Smoking status: Never Smoker    Smokeless tobacco: Never Used   Substance and Sexual Activity    Alcohol use: No     Alcohol/week: 0.0 standard drinks     Comment: Ocassionally    Drug use: No    Sexual activity: Yes     Partners: Male   Lifestyle    Physical activity     Days per week: None     Minutes per session: None    Stress: None   Relationships    Social connections     Talks on phone: None     Gets together: None     Attends Anabaptism service: None     Active member of club or organization: None     Attends meetings of clubs or organizations: None     Relationship status: None    Intimate partner violence     Fear of current or ex partner: None     Emotionally abused: None Physically abused: None     Forced sexual activity: None   Other Topics Concern    None   Social History Narrative    None       Scheduled Meds:   lisinopril  1.25 mg Oral Daily    levothyroxine  100 mcg Oral Daily    apixaban  5 mg Oral BID    carvedilol  3.125 mg Oral BID WC    ampicillin-sulbactam  3 g Intravenous Q6H    atorvastatin  10 mg Oral Daily    citalopram  20 mg Oral QAM    clopidogrel  75 mg Oral Daily    dantrolene  50 mg Oral 4x Daily    ferrous sulfate  325 mg Oral Daily with breakfast    nortriptyline  25 mg Oral Nightly    sodium chloride flush  10 mL Intravenous 2 times per day     Continuous Infusions:  PRN Meds:.diphenhydrAMINE, perflutren lipid microspheres, ipratropium-albuterol, sodium chloride flush, acetaminophen **OR** acetaminophen    /60   Pulse 89   Temp 97.9 °F (36.6 °C) (Oral)   Resp 18   Ht 5' (1.524 m)   Wt 159 lb (72.1 kg)   SpO2 99%   BMI 31.05 kg/m²     Lab Results   Component Value Date    WBC 8.2 06/16/2020    HGB 9.0 (L) 06/16/2020    HCT 28.5 (L) 06/16/2020    .5 (H) 06/16/2020     (H) 06/16/2020       Lab Results   Component Value Date    CREATININE 0.4 (L) 06/16/2020         Lab Results   Component Value Date    LABURIN >100,000 CFU/ml 06/09/2020    LABURIN  06/05/2020      Comment:      Urine CultureORGANISM ID: 1.1 - Enterococcus faecalis    LABURIN  05/13/2020      Comment:      Urine CultureBacteria Ur Cult       Lab Results   Component Value Date    BC 5 Days no growth 06/09/2020       Lab Results   Component Value Date    BLOODCULT2 5 Days no growth 06/09/2020       PHYSICAL EXAMINATION:  Skin dry, without rashes  Respirations non-labored, intact  Abdomen soft, non-tender, non-distended  Alert and oriented x3  Barger draining clear urine      ASSESSMENT AND PLAN:  1. Obstructing ureteral stone status post stent insertion. Sepsis of urinary origin.   Her condition is declining and her  has come to terms with the fact that he may be most interested in hospice care at this time. I spoke with the care manager who will discuss this with him further today. I answered his questions the best my knowledge and agreed with the plan of hospice. Should she improve dramatically he understands that the stent may be in place no longer than 3 months and that this will need to be removed.     Andre Sanderson M.D.  6/16/2020  9:55 AM

## 2020-06-16 NOTE — TELEPHONE ENCOUNTER
Spoke w/ Franchesca Jennings at [facility], family does not have a preference if the Hospice doctor addresses medication. She said that it would be easier if the Hospice doctors do the medications. I spoke w/ Dr Lucila Chavez and he said that he will allow Hospice doctors to handle the medications.

## 2020-06-16 NOTE — DISCHARGE INSTR - COC
06/09/20 06/09/20 COVID-19 (Ordered)   06/09/20 Rule-Out Test Resulted    COVID-19 Rule Out 04/25/20 04/25/20 04/25/20 COVID-19 (Ordered)   05/12/20 Max Baum RN    Test cancelled by physician. Previous tests neg x2    COVID-19 Rule Out 04/21/20 04/22/20 04/21/20 COVID-19 (Ordered)   04/22/20 Rule-Out Test Resulted    Not detected 4/22/2020    COVID-19 Rule Out 04/21/20 04/21/20 04/22/20 COVID-19 (Ordered)   04/22/20 Rule-Out Test Resulted    Not detected 4/21/2020          Nurse Assessment:  Last Vital Signs: BP (!) 125/59   Pulse 91   Temp 97.2 °F (36.2 °C)   Resp 18   Ht 5' (1.524 m)   Wt 159 lb (72.1 kg)   SpO2 97%   BMI 31.05 kg/m²     Last documented pain score (0-10 scale): Pain Level: 0  Last Weight:   Wt Readings from Last 1 Encounters:   06/16/20 159 lb (72.1 kg)     Mental Status:  {IP PT MENTAL STATUS:20030}    IV Access:  { CAROLINA IV ACCESS:490761625}    Nursing Mobility/ADLs:  Walking   {CHP DME IIRN:754039695}  Transfer  {CHP DME PAKR:086851660}  Bathing  {CHP DME ZENB:549746517}  Dressing  {CHP DME UHWR:159781404}  Toileting  {CHP DME RMSN:854227384}  Feeding  {CHP DME UQJF:068416817}  Med Admin  {CHP DME RQVM:821165890}  Med Delivery   { CAROLINA MED Delivery:626180294}    Wound Care Documentation and Therapy:        Elimination:  Continence:   · Bowel: {YES / WA:60537}  · Bladder: {YES / QP:70759}  Urinary Catheter: {Urinary Catheter:551038778}   Colostomy/Ileostomy/Ileal Conduit: {YES / BT:52794}       Date of Last BM: ***    Intake/Output Summary (Last 24 hours) at 6/16/2020 1818  Last data filed at 6/16/2020 1642  Gross per 24 hour   Intake 340 ml   Output 1325 ml   Net -985 ml     I/O last 3 completed shifts:   In: 120 [P.O.:120]  Out: 1325 [Urine:1325]    Safety Concerns:     [address] Safety Concerns:695422010}    Impairments/Disabilities:      [address] [employer] Impairments/Disabilities:815642812}    Nutrition Therapy:  Current Nutrition Therapy:   [address] [employer] Diet List:163969467}    Routes of

## 2020-06-16 NOTE — DISCHARGE SUMMARY
BUMEX  Take 1 tablet by mouth daily     nitrofurantoin 50 MG capsule  Commonly known as:  Macrodantin  Take 1 capsule by mouth 4 times daily for 7 days  Ask about: Should I take this medication? Where to Get Your Medications      You can get these medications from any pharmacy    Bring a paper prescription for each of these medications  · apixaban 5 MG Tabs tablet           Physical Exam:    Vitals:  Vitals:    20 0516 20 0625 20 0645 20 0845   BP:    (!) 122/55   Pulse:    94   Resp:    18   Temp:    97.2 °F (36.2 °C)   TempSrc:       SpO2: 99%  99% 97%   Weight:  159 lb (72.1 kg)     Height:         Weight: Weight: 159 lb (72.1 kg)     24 hour intake/output:    Intake/Output Summary (Last 24 hours) at 2020 1431  Last data filed at 2020 1320  Gross per 24 hour   Intake 120 ml   Output 1025 ml   Net -905 ml     General appearance: No apparent distress, appears stated age and sleepy. HEENT: Pupils equal, round, and reactive to light. Conjunctivae/corneas clear. Neck: Supple. No jugular venous distention. Trachea midline. Respiratory:  Normal respiratory effort. Clear to auscultation, bilaterally without Rales/Wheezes/Rhonchi. Cardiovascular: Regular rate and rhythm with normal S1/S2 without murmurs, rubs or gallops. Abdomen: Soft, non-tender, non-distended with normal bowel sounds. Musculoskeletal: No clubbing, cyanosis or edema bilaterally. Brisk capillary refill. 2+ lower extremity pulses (dorsalis pedis).    Skin:  No rashes    Neurologic: Alert and awake, Ox2 [person and place]; per  disorientation and hallucinating has resolved today and she is lucid    Procedures:  None    Diagnostic Test:  See below    Radiology reports as per the Radiologist  Radiology: Ct Abdomen Pelvis W Wo Contrast Additional Contrast? None    Result Date: 2020  Patient MRN:  [id number] : 1945 Age: 77 years Gender: Female Order Date:  2020 2:45 PM EXAM: CT ABDOMEN PELVIS W WO CONTRAST INDICATION:  septic, hx nephrolithiasis with stents septic, hx nephrolithiasis with stents  COMPARISON: None Technique: Low-dose CT  acquisition technique included one of following options; 1 . Automated exposure control, 2. Adjustment of MA and or KV according to patient's size or 3. Use of iterative reconstruction. Multiple computerized tomography sections of the abdomen and pelvis with sagittal and coronal MPR reconstructions were obtained from the top of the diaphragm to the pelvis. FINDINGS: LUNG BASES: Unremarkable. LIVER: Unremarkable. GALLBLADDER: Surgically absent. SPLEEN:Unremarkable. ADRENALS:Unremarkable. KIDNEYS: The left kidney is mildly atrophic. There is a 7 mm calculus in the proximal left ureter. Left ureteral stent in situ. There is mild left hydronephrosis. Multiple 1 mm nonobstructive calculus is seen in the midpole calyx of the right kidney. No ureteral calculus or hydronephrosis. PANCREAS:Unremarkable. BOWEL: Moderate fecal retention is seen in the colon indicating constipation. No bowel wall thickening or mechanical obstruction is seen. APPENDIX:Not visualized. No gross pericecal inflammatory changes to indicate acute appendicitis. BLADDER:Unremarkable. REPRODUCTIVE ORGANS: Status post hysterectomy. VASCULATURE:Moderate calcified atherosclerosis seen in the abdominal aorta. No aneurysm. LYMPH NODES:Unremarkable. BONES: No acute fracture is seen. The bones are demineralized. No bony destructive lesion is seen. An intramedullary shanti is seen in the left femur. Prominent heterotopic ossification is seen along the medial aspect of the proximal femur. Mild sclerosis through the articular surface of the right femoral head indicates avascular necrosis. MISCELLANEOUS:No additional finding. 1. Mild left hydronephrosis related to a 7 mm calculus in the proximal left ureter. Left ureteral stent in situ. 2. Constipation. No evidence of bowel obstruction.     Ct Head Wo Contrast    Result Date: 2020  Patient MRN:  [id number] : 1945 Age: 77 years Gender: Female Order Date:  2020 2:45 PM EXAM: CT HEAD WO CONTRAST NUMBER OF IMAGES:  112 INDICATION:  AMS AMS COMPARISON: 2020 TECHNIQUE:   Noncontrast CT of the head was performed. Coronal and sagittal reconstructions were obtained. Dose optimization techniques utilized including automatic exposure control and clarity iterative reconstruction. FINDINGS:   No hydrocephalus or extra-axial fluid collection seen. There is generalized volume loss and chronic small vessel disease. Brain parenchyma and orbits are otherwise unremarkable. Basilar cisterns are preserved. There is minimal opacification of all the visualized paranasal sinuses. Osseous structures are intact. Mastoid air cells are clear. No acute intracranial abnormality. Xr Chest Portable    Result Date: 2020  Patient MRN: [id number] : 1945 Age:  77 years Gender: Female Order Date: 2020 11:45 AM Exam: XR CHEST PORTABLE Number of Images: 1 view Indication:   septic septic Comparison: May 1, 2020 FINDINGS: There is atelectasis and/or infiltrate at the left lower lobe. Heart and pulmonary vascularity normal. Costophrenic angles sharp. Normal aorta. Atelectasis and/or infiltrate at the left lower lobe.         Results for orders placed or performed during the hospital encounter of 20   Culture, Urine   Result Value Ref Range    Organism Enterococcus faecalis (A)     Urine Culture, Routine >100,000 CFU/ml        Susceptibility    Enterococcus faecalis - BACTERIAL SUSCEPTIBILITY PANEL BY YIN     ampicillin <=^2 Sensitive mcg/mL     doxycycline ^8 Intermediate mcg/mL     gentamicin-syn SYN-S Sensitive mcg/mL     nitrofurantoin ^32 Sensitive mcg/mL     vancomycin ^1 Sensitive mcg/mL   Culture, Blood 1   Result Value Ref Range    Blood Culture, Routine 5 Days no growth    Culture, Blood 2   Result Value Ref Range    Culture, Blood 2 5 Days no growth    Strep Pneumoniae Antigen   Result Value Ref Range    STREP PNEUMONIAE ANTIGEN, URINE       Presumptive negative suggests no current or recent  pneumococcal infection. Infection due to Strep pneumoniae  cannot be ruled out since the antigen present in the sample  may be below the detection limit of the test.  Normal Range:Presumptive Negative     Legionella antigen, urine   Result Value Ref Range    L. pneumophila Serogp 1 Ur Ag       Presumptive Negative -suggesting no recent or current infections  with Legionella pneumophila serogroup 1. Infection to Legionella cannot be ruled out since other serogroups  and species may cause infection, antigen may not be present in  early infection, or level of antigen may be below the  detection limit. Normal Range: Presumptive Negative     Legionella antigen, urine   Result Value Ref Range    L. pneumophila Serogp 1 Ur Ag       Presumptive Negative -suggesting no recent or current infections  with Legionella pneumophila serogroup 1. Infection to Legionella cannot be ruled out since other serogroups  and species may cause infection, antigen may not be present in  early infection, or level of antigen may be below the  detection limit. Normal Range: Presumptive Negative     Strep Pneumoniae Antigen   Result Value Ref Range    STREP PNEUMONIAE ANTIGEN, URINE       Presumptive negative suggests no current or recent  pneumococcal infection.  Infection due to Strep pneumoniae  cannot be ruled out since the antigen present in the sample  may be below the detection limit of the test.  Normal Range:Presumptive Negative     Lactic Acid, Plasma   Result Value Ref Range    Lactic Acid 3.0 (H) 0.5 - 2.2 mmol/L   CBC Auto Differential   Result Value Ref Range    WBC 18.2 (H) 4.5 - 11.5 E9/L    RBC 3.46 (L) 3.50 - 5.50 E12/L    Hemoglobin 11.1 (L) 11.5 - 15.5 g/dL    Hematocrit 35.7 34.0 - 48.0 %    .2 (H) 80.0 - 99.9 fL    MCH 32.1 26.0 - 35.0 pg    MCHC 31.1 (L) 32.0 - 34.5 %    RDW 14.6 E9/L    RBC 2.79 (L) 3.50 - 5.50 E12/L    Hemoglobin 9.0 (L) 11.5 - 15.5 g/dL    Hematocrit 28.7 (L) 34.0 - 48.0 %    .9 (H) 80.0 - 99.9 fL    MCH 32.3 26.0 - 35.0 pg    MCHC 31.4 (L) 32.0 - 34.5 %    RDW 14.2 11.5 - 15.0 fL    Platelets 080 744 - 594 E9/L    MPV 11.0 7.0 - 12.0 fL    Neutrophils % 82.8 (H) 43.0 - 80.0 %    Immature Granulocytes % 0.3 0.0 - 5.0 %    Lymphocytes % 9.1 (L) 20.0 - 42.0 %    Monocytes % 5.5 2.0 - 12.0 %    Eosinophils % 2.0 0.0 - 6.0 %    Basophils % 0.3 0.0 - 2.0 %    Neutrophils Absolute 6.19 1.80 - 7.30 E9/L    Immature Granulocytes # 0.02 E9/L    Lymphocytes Absolute 0.68 (L) 1.50 - 4.00 E9/L    Monocytes Absolute 0.41 0.10 - 0.95 E9/L    Eosinophils Absolute 0.15 0.05 - 0.50 E9/L    Basophils Absolute 0.02 0.00 - 0.20 E9/L   CBC auto differential   Result Value Ref Range    WBC 6.3 4.5 - 11.5 E9/L    RBC 2.66 (L) 3.50 - 5.50 E12/L    Hemoglobin 8.6 (L) 11.5 - 15.5 g/dL    Hematocrit 27.3 (L) 34.0 - 48.0 %    .6 (H) 80.0 - 99.9 fL    MCH 32.3 26.0 - 35.0 pg    MCHC 31.5 (L) 32.0 - 34.5 %    RDW 14.2 11.5 - 15.0 fL    Platelets 217 062 - 935 E9/L    MPV 11.0 7.0 - 12.0 fL    Neutrophils % 79.6 43.0 - 80.0 %    Immature Granulocytes % 0.2 0.0 - 5.0 %    Lymphocytes % 11.7 (L) 20.0 - 42.0 %    Monocytes % 6.3 2.0 - 12.0 %    Eosinophils % 1.9 0.0 - 6.0 %    Basophils % 0.3 0.0 - 2.0 %    Neutrophils Absolute 5.02 1.80 - 7.30 E9/L    Immature Granulocytes # 0.01 E9/L    Lymphocytes Absolute 0.74 (L) 1.50 - 4.00 E9/L    Monocytes Absolute 0.40 0.10 - 0.95 E9/L    Eosinophils Absolute 0.12 0.05 - 0.50 E9/L    Basophils Absolute 0.02 0.00 - 0.20 E9/L   Comprehensive Metabolic Panel   Result Value Ref Range    Sodium 135 132 - 146 mmol/L    Potassium 3.7 3.5 - 5.0 mmol/L    Chloride 102 98 - 107 mmol/L    CO2 23 22 - 29 mmol/L    Anion Gap 10 7 - 16 mmol/L    Glucose 134 (H) 74 - 99 mg/dL    BUN 10 8 - 23 mg/dL    CREATININE 0.5 0.5 - 1.0 mg/dL    GFR Non-African American >60

## 2020-06-16 NOTE — PROGRESS NOTES
06/16/2020    ALKPHOS 111 06/16/2020    AST 15 06/16/2020    ALT 14 06/16/2020     No results found for: CRP  No results found for: [address]    Radiology:    CT HEAD WO CONTRAST   Final Result      No acute intracranial abnormality. No interval change as of the   previous CT from 6/20/2020. CT ABDOMEN PELVIS W WO CONTRAST Additional Contrast? None   Final Result         1. Mild left hydronephrosis related to a 7 mm calculus in the proximal   left ureter. Left ureteral stent in situ. 2. Constipation. No evidence of bowel obstruction. CT Head WO Contrast   Final Result   No acute intracranial abnormality. XR CHEST PORTABLE   Final Result   Atelectasis and/or infiltrate at the left lower lobe.                 Microbiology:   Lab Results   Component Value Date    BC 5 Days no growth 06/09/2020    BC 5 Days- no growth 04/22/2020    BC 5 Days- no growth 03/24/2019    ORG Enterococcus faecalis 06/09/2020    ORG Strep agalactiae (Beta Strep Group B) 04/23/2020    ORG Strep agalactiae (Beta Strep Group B) 04/22/2020     Lab Results   Component Value Date    BLOODCULT2 5 Days no growth 06/09/2020    BLOODCULT2 5 Days- no growth 04/22/2020    BLOODCULT2 5 Days- no growth 03/24/2019    ORG Enterococcus faecalis 06/09/2020    ORG Strep agalactiae (Beta Strep Group B) 04/23/2020    ORG Strep agalactiae (Beta Strep Group B) 04/22/2020     No results found for: WNDABS  Smear, Respiratory   Date Value Ref Range Status   04/22/2020   Final    Group 6: <25 PMN's/LPF and <25 Epithelial cells/LPF  Polymorphonuclear leukocytes not seen  Epithelial cells not seen  Rare Gram negative rods       No results found for: MPNEUMO, CLAMYDCU, LABLEGI, AFBCX, FUNGSM, LABFUNG  CULTURE, RESPIRATORY   Date Value Ref Range Status   04/22/2020 Oral Pharyngeal Britta present  Final     No results found for: CXCATHTIP  No results found for: BFCS  No results found for: CXSURG  Urine Culture, Routine   Date Value Ref Range Status 06/09/2020 >100,000 CFU/ml  Final   06/05/2020   Final     Comment:     Urine CultureORGANISM ID: 1.1 - Enterococcus faecalis   05/13/2020   Final     Comment:     Urine CultureBacteria Ur Cult     MRSA Culture Only   Date Value Ref Range Status   03/21/2019 Methicillin resistant Staph aureus not isolated  Final       Problem list:    Principal Problem:    UTI (urinary tract infection) due to Enterococcus  Active Problems:    Essential hypertension    MS (multiple sclerosis) ([facility])    Spastic quadriparesis ([facility])    Type 2 diabetes mellitus with hyperglycemia, without long-term current use of insulin ([facility])    Hydronephrosis    Chronic systolic (congestive) heart failure ([facility])    Acute respiratory failure with hypoxia ([facility])    Sepsis due to Streptococcus species ([facility] [address]    Nephrolithiasis  Resolved Problems:    * No resolved hospital problems. *      ASSESSMENT:      Multiple sclerosis   Recent MI with stent   Ureteral obstruction with stent   UTI with ampicllin sensitive enterococcus   BC are neg   CXR showing atelectasis vs infiltrates at the LLL   She denies productive cough but she does intermittently have a cough   Few crackles at the left base   ? Rash But  says she had rash at home   Afebrile   Legionella neg  Strep ag neg   Read Dr. Rakesh Heaton note He did not see any evidence of pneumoia Clinically CT hard to say   Very conrfused Not aware that my [place] is a cause Will talk to pharmacy Stop date for unasyn will be jun16   Afebrile WBC normal  Still confused   Plan:   Continue unasyn until tomorrow  Check cultures   Baseline ESR, CRP     Sue Ojeda DO    2:47 PM  6/16/2020                                        [address] [place] [address] [facility]  Progress Note      Chief Complaint   Patient presents with    Urinary Tract Infection     recent urinary stents that need removed, patient febrile and hypotensive       SUBJECTIVE:    Patient is seen and examined at bedside.   She is more [address]    Radiology:    CT HEAD WO CONTRAST   Final Result      No acute intracranial abnormality. No interval change as of the   previous CT from 6/20/2020. CT ABDOMEN PELVIS W WO CONTRAST Additional Contrast? None   Final Result         1. Mild left hydronephrosis related to a 7 mm calculus in the proximal   left ureter. Left ureteral stent in situ. 2. Constipation. No evidence of bowel obstruction. CT Head WO Contrast   Final Result   No acute intracranial abnormality. XR CHEST PORTABLE   Final Result   Atelectasis and/or infiltrate at the left lower lobe.                 Microbiology:   Lab Results   Component Value Date    BC 5 Days no growth 06/09/2020    BC 5 Days- no growth 04/22/2020    BC 5 Days- no growth 03/24/2019    ORG Enterococcus faecalis 06/09/2020    ORG Strep agalactiae (Beta Strep Group B) 04/23/2020    ORG Strep agalactiae (Beta Strep Group B) 04/22/2020     Lab Results   Component Value Date    BLOODCULT2 5 Days no growth 06/09/2020    BLOODCULT2 5 Days- no growth 04/22/2020    BLOODCULT2 5 Days- no growth 03/24/2019    ORG Enterococcus faecalis 06/09/2020    ORG Strep agalactiae (Beta Strep Group B) 04/23/2020    ORG Strep agalactiae (Beta Strep Group B) 04/22/2020     No results found for: WNDABS  Smear, Respiratory   Date Value Ref Range Status   04/22/2020   Final    Group 6: <25 PMN's/LPF and <25 Epithelial cells/LPF  Polymorphonuclear leukocytes not seen  Epithelial cells not seen  Rare Gram negative rods       No results found for: MPNEUMO, CLAMYDCU, LABLEGI, AFBCX, FUNGSM, LABFUNG  CULTURE, RESPIRATORY   Date Value Ref Range Status   04/22/2020 Oral Pharyngeal Britta present  Final     No results found for: CXCATHTIP  No results found for: BFCS  No results found for: CXSURG  Urine Culture, Routine   Date Value Ref Range Status   06/09/2020 >100,000 CFU/ml  Final   06/05/2020   Final     Comment:     Urine CultureORGANISM ID: 1.1 - Enterococcus faecalis

## 2020-06-17 ENCOUNTER — CARE COORDINATION (OUTPATIENT)
Dept: CASE MANAGEMENT | Age: 75
End: 2020-06-17

## 2020-06-17 LAB — OCCULT BLOOD SCREENING: NORMAL

## 2020-06-18 LAB
COPPER: 143.6 UG/DL (ref 80–155)
Lab: NORMAL
REPORT: NORMAL
THIS TEST SENT TO: NORMAL
ZINC: 64.1 UG/DL (ref 60–120)

## 2020-06-21 LAB — JAK2 GENE MUTATION QUAL: NOT DETECTED

## 2020-06-23 ENCOUNTER — VIRTUAL VISIT (OUTPATIENT)
Dept: FAMILY MEDICINE CLINIC | Age: 75
End: 2020-06-23
Payer: MEDICARE

## 2020-06-23 PROCEDURE — 1036F TOBACCO NON-USER: CPT | Performed by: INTERNAL MEDICINE

## 2020-06-23 PROCEDURE — G9899 SCRN MAM PERF RSLTS DOC: HCPCS | Performed by: INTERNAL MEDICINE

## 2020-06-23 PROCEDURE — 3017F COLORECTAL CA SCREEN DOC REV: CPT | Performed by: INTERNAL MEDICINE

## 2020-06-23 PROCEDURE — G8417 CALC BMI ABV UP PARAM F/U: HCPCS | Performed by: INTERNAL MEDICINE

## 2020-06-23 PROCEDURE — G8427 DOCREV CUR MEDS BY ELIG CLIN: HCPCS | Performed by: INTERNAL MEDICINE

## 2020-06-23 PROCEDURE — 1123F ACP DISCUSS/DSCN MKR DOCD: CPT | Performed by: INTERNAL MEDICINE

## 2020-06-23 PROCEDURE — 2022F DILAT RTA XM EVC RTNOPTHY: CPT | Performed by: INTERNAL MEDICINE

## 2020-06-23 PROCEDURE — 1090F PRES/ABSN URINE INCON ASSESS: CPT | Performed by: INTERNAL MEDICINE

## 2020-06-23 PROCEDURE — 99214 OFFICE O/P EST MOD 30 MIN: CPT | Performed by: INTERNAL MEDICINE

## 2020-06-23 PROCEDURE — 4040F PNEUMOC VAC/ADMIN/RCVD: CPT | Performed by: INTERNAL MEDICINE

## 2020-06-23 PROCEDURE — 3044F HG A1C LEVEL LT 7.0%: CPT | Performed by: INTERNAL MEDICINE

## 2020-06-23 PROCEDURE — 1111F DSCHRG MED/CURRENT MED MERGE: CPT | Performed by: INTERNAL MEDICINE

## 2020-06-23 PROCEDURE — G8400 PT W/DXA NO RESULTS DOC: HCPCS | Performed by: INTERNAL MEDICINE

## 2020-06-23 RX ORDER — ATORVASTATIN CALCIUM 10 MG/1
10 TABLET, FILM COATED ORAL DAILY
Qty: 90 TABLET | Refills: 1 | Status: SHIPPED
Start: 2020-06-23 | End: 2020-12-21 | Stop reason: SDUPTHER

## 2020-06-23 ASSESSMENT — ENCOUNTER SYMPTOMS
VOMITING: 0
BLOOD IN STOOL: 0
SHORTNESS OF BREATH: 0
EYE PAIN: 0
DIARRHEA: 0
CHEST TIGHTNESS: 0
CONSTIPATION: 1
RHINORRHEA: 0
NAUSEA: 0
SORE THROAT: 0
COUGH: 0
ABDOMINAL PAIN: 0

## 2020-06-23 NOTE — PROGRESS NOTES
Alcohol/week: 0.0 standard drinks     Comment: Ocassionally    Drug use: No        No Known Allergies,   Past Medical History:   Diagnosis Date    CAD (coronary artery disease) 4/21/2020    Hepatitis     High cholesterol     Hypertension     Hypothyroidism     MI [facility]ocardial infarction) ([facility] 04/21/2020    MS (multiple sclerosis) ([facility]     Osteoporosis     Sarcoidosis     Type 2 diabetes mellitus with hyperglycemia, without long-term current use of insulin ([facility] 10/30/2019   ,   Past Surgical History:   Procedure Laterality Date    CHOLECYSTECTOMY  1990s    CORONARY ANGIOPLASTY WITH STENT PLACEMENT  04/21/2020    Dr. Jeremiah Kee   3.0 x 22mm Resolute MAAME to Prox LAD.   No LV    CYSTOSCOPY INSERTION / REMOVAL STENT / STONE Left 4/23/2020    CYSTOSCOPY LEFT RETROGRADE PYELOGRAM STENT INSERTION, STRATTON CATHETER performed by La Avila DO at [address] [place] [address] Left 3/21/2019    LEFT FEMUR CEPHALLOMEDULLARY NAIL performed by Iwona Nj MD at [facility] Bilateral     HYSTERECTOMY      KNEE SURGERY      plate in lt knee   ,   Social History     Tobacco Use    Smoking status: Never Smoker    Smokeless tobacco: Never Used   Substance Use Topics    Alcohol use: No     Alcohol/week: 0.0 standard drinks     Comment: Ocassionally    Drug use: No   ,   Immunization History   Administered Date(s) Administered    Influenza 12/15/2010    Influenza A ([id number]) Vaccine PF IM 12/02/2009    Influenza Vaccine, unspecified formulation 11/21/2008    Influenza Virus Vaccine 10/27/2014, 10/02/2017    Influenza Whole 10/02/2013, 10/27/2014, 10/21/2015    Influenza, High Dose (Fluzone 65 yrs and older) 10/10/2015, 09/11/2017    Influenza, Quadv, IM, (6 mo and older Fluzone, Flulaval, Fluarix and 3 yrs and older Afluria) 10/02/2017    Influenza, Triv, inactivated, subunit, adjuvanted, IM (Fluad 65 yrs and older) 09/19/2018    Pneumococcal Conjugate 13-valent (Jaun Shore)

## 2020-06-24 ENCOUNTER — TELEPHONE (OUTPATIENT)
Dept: FAMILY MEDICINE CLINIC | Age: 75
End: 2020-06-24

## 2020-06-24 NOTE — TELEPHONE ENCOUNTER
Orders Placed This Encounter   Procedures    External Referral To Palliative Care     Referral Priority:   Routine     Referral Type:   Eval and Treat     Referral Reason:   Specialty Services Required     Requested Specialty:   Palliative Medicine     Number of Visits Requested:   1     Referral placed

## 2020-06-25 ENCOUNTER — TELEPHONE (OUTPATIENT)
Dept: FAMILY MEDICINE CLINIC | Age: 75
End: 2020-06-25

## 2020-06-25 LAB
Lab: NORMAL
REPORT: NORMAL
THIS TEST SENT TO: NORMAL

## 2020-07-01 NOTE — TELEPHONE ENCOUNTER
Name of Medication(s) Requested:  [email or web address]    Pharmacy Requested:   [facility]    Medication(s) pended? [x] Yes  [] No    Last Appointment:  1/17/2020    Future appts:  Future Appointments   Date Time Provider Marisol Garcia   7/23/2020  1:15 PM Yaz Lee  [address]   7/24/2020 10:00 AM Carloz Hernandez MD [facility]   10/13/2020  2:00 PM Juan Prince MD [facility]        Does patient need call back?   [] Yes  [x] No

## 2020-07-06 ENCOUNTER — TELEPHONE (OUTPATIENT)
Dept: FAMILY MEDICINE CLINIC | Age: 75
End: 2020-07-06

## 2020-07-06 NOTE — TELEPHONE ENCOUNTER
Spoke w/ Nadege Ramos. He would like to have a referral placed w/ a cardiologist within [address] [facility] or someone that has access to [facility] records so her care can be coordinated w/ Dr Milton Dorado office. Ideally he would like her to see a cardiologist that was involved in her heart cath (per Dr Sadie Hadley this was [facility] on call doctor for caths, and he would not see a pt in the office for follow up care). Maverick Kong know that she was referred to [facility] because that was the group involved in her care while she was in the hospital. He wanted to know if Dr Sho Pedroza could see her instead of Key Hughes but I told him that would be up to their office. Nadege Ramos feels that Key Hughes did not spend adequate time w/ Jones Mackenzie when she was previously seen at his office. Jones Mackenzie was scheduled to follow up w/ Dr Key Hughes tomorrow but Nadege Ramos cancelled the appointment because of her heart condition and the extreme heat that is expected tomorrow. She has a f/u appt scheduled w/ cardiology for Aug 5th. I suggested that he let Dr Milton Dorado office know that she is scheduled w/ cardiology so they can coordinate a surgery date. I also let Nadege Ramos know that we can refer to a [address] [facility] doctor if he wants a second opinion.

## 2020-07-06 NOTE — TELEPHONE ENCOUNTER
I cannot exactly say why you do not have an appointment with cardiology. Since cardiology is not outside of [address] [place] [address] Rd exactly say what they are scheduling practices are    I will put a referral to cardiology in for you, and we can see about moving up the v video visit.      Thanks

## 2020-07-06 NOTE — TELEPHONE ENCOUNTER
is calling in with concern and wanted to know why patient wasn't given a f/u cardiologist with [facility]. He is upset and wants someone to coordinate his wife's care because he wants to stay with [facility]. He is so confused and doesn't feel comfortable with the way his way is being cared for. He is requesting that  calls him or moves up the vv so they can talk. other

## 2020-07-07 ENCOUNTER — TELEPHONE (OUTPATIENT)
Dept: FAMILY MEDICINE CLINIC | Age: 75
End: 2020-07-07

## 2020-07-07 NOTE — TELEPHONE ENCOUNTER
[facility] Visiting Nurse -  Dante Thomas               [phone]         She is calling about the [facility] Cath. She was there today and drained sediment from the [facility] Cath and showed the  how to do that. Just asking if that is ok with you?

## 2020-07-09 ENCOUNTER — TELEPHONE (OUTPATIENT)
Dept: FAMILY MEDICINE CLINIC | Age: 75
End: 2020-07-09

## 2020-07-09 RX ORDER — CITALOPRAM 20 MG/1
20 TABLET ORAL EVERY MORNING
Qty: 90 TABLET | Refills: 0 | Status: SHIPPED
Start: 2020-07-09 | End: 2020-10-29 | Stop reason: SDUPTHER

## 2020-07-09 NOTE — TELEPHONE ENCOUNTER
Faxed records from chart. Some of the pages are running together when I print it from media. I sent a [facility] w/ the [facility] to res[facility] forms if they cannot use these forms.

## 2020-07-09 NOTE — TELEPHONE ENCOUNTER
Hospitalist Progress note       Patient: Mick Grewal CDCP:[id number]     : 1933 Attending: Samual Landau, MD   81year old female 3/30/2017       Chief complaint: Small bowel obstruction status post ventral hernia repair, postop day #3      Recent Events/Subjective:   She still feels very weak and tired, but is denying any abdominal pain. She's been having bowel movements. She had a FRACISCO drain removed yesterday. She's had no fevers. Appetite has been poor. No other acute complaints.           Medications:     Scheduled:   â¢ insulin regular (human)   Subcutaneous TID AC   â¢ warfarin  3 mg Oral Once per day on    â¢ warfarin  4.5 mg Oral Once per day on Sun Tue Thu Sat   â¢ enoxaparin (LOVENOX) injection  40 mg Subcutaneous Q24H   â¢ methimazole  2.5 mg Oral Once per day on    â¢ simvastatin  40 mg Oral Nightly   â¢ NIFEdipine  60 mg Oral Daily   â¢ fluticasone-salmeterol  1 puff Inhalation BID Resp   â¢ albuterol  2 puff Inhalation 4x Daily Resp   â¢ metoPROLOL  25 mg Oral Daily   â¢ lisinopril-hydroCHLOROthiazide  1 tablet Oral Daily   â¢ pantoprazole  40 mg Intravenous 2 times per day       Continuous Infusions:   â¢ dextrose 5 % infusion              Vital Signs:     Vital Last Value 24 Hour Range   Temperature 98.3 Â°F (36.8 Â°C) Temp  Min: 98 Â°F (36.7 Â°C)  Max: 98.6 Â°F (37 Â°C)   Pulse 70 Pulse  Min: 70  Max: 70   Respiratory 18 Resp  Min: 12  Max: 18   Blood Pressure 130/60 BP  Min: 114/53  Max: 130/60   Arterial BP   No Data Recorded   O2 Sat 2 L/min NA   Pulse Oximetry 92 % SpO2  Min: 91 %  Max: 97 %     Vital Today Admitted   Weight 117.9 kg Weight: 114.9 kg   BMI N/A BMI (Calculated): 47.94     Intake/Output:      I&O Last shift: I/O this shift:  In: 600 [P.O.:600]  Out: 540 [Urine:500; Drains:40]    I&O Last 24 hours:   Intake/Output Summary (Last 24 hours) at 17 1418  Last data filed at 17 1351   Gross per 24 hour   Intake              600 ml   Output              745 ml Last Appointment:  1/17/2020  Future Appointments   Date Time Provider Marisol Garcia   7/23/2020  1:15 PM Karen Irvin MD [facility]   7/24/2020 10:00 AM Michael Zeng MD [facility]   10/13/2020  2:00 PM [facility]       asking for refill on celexa to [facility]             -145 ml       Last Stool Occurrence: 1 (small. Small rectal bleeding. Pt. has large hemorrhoids) (04/04/17 0407)    Physical Exam:     General: Alert and Oriented to person, place and time. Patient is in no acute distress. HEENT: Normocephalic, atraumatic. Pupils equally round and reactive to light. Sclerae are anicteric. Oropharynx is clear without erythema, mucous membranes are moist. Neck is soft and supple. Jugular venous pressure is not elevated. CV: Regular rate and rhythm, normal S1 and S2, no S3 or S4, no murmurs or rubs. Pulm:  Lungs are clear to auscultation bilaterally. No wheezes, rales or rhonchi. Patient is not using accessory muscles to breath. Abd: FRACISCO drain has a small amount of serosanguineous fluid. Abdominal binder in place Soft, non-tender and non-distended. Bowel sounds are normoactive. No guarding or rebound tenderness. No Hepatosplenomegaly. No masses or bruits. Ext: No edema in the lower extremities bilaterally. 2+ dorsalis pedis pulses bilaterally. Warm and well perfused. Skin: No rashes or erythema. No cyanosis or clubbing. Laboratory results:     Recent Labs  Lab 04/03/17  1600  04/03/17  2331 04/04/17  0345 04/04/17  0846 04/04/17  1331   WBC  --   --   --  13.5*  --   --    HGB  --   --   --  9.4*  --   --    HCT  --   --   --  30.8*  --   --    SODIUM  --   --   --  138  --   --    POTASSIUM 3.4  --   --  3.2*  --   --    CHLORIDE  --   --   --  103  --   --    CO2  --   --   --  28  --   --    BUN  --   --   --  20  --   --    BUN CREATININE RATIO  --   --   --  17  --   --    CREATININE  --   --   --  1.18*  --   --    GLUCOSE  --   --   --  137*  --   --    GLUCOSE BEDSIDE  --   < > 141*  --  153* 166*   CALCIUM  --   --   --  8.1*  --   --    < > = values in this interval not displayed.          Assessment:     Active Hospital Problems    Diagnosis Date Noted   â¢ Leukocytosis 03/31/2017     Priority: Low   â¢ Nausea and vomiting 03/30/2017 Priority: Low   â¢ Hematemesis with nausea 03/30/2017     Priority: Low   â¢ Umbilical hernia without obstruction and without gangrene 03/30/2017     Priority: Low   â¢ Embolism and thrombosis of arteries of lower extremity      Priority: Low     Plan & Recommendations:     1. Small bowel obstruction: Her diet is being progressed. She had one of her FRACISCO drains removed yesterday. I appreciate Dr. Kern Needle input. 2. Hypoxia: She has been weaned to room air today. I added incentive spirometry. 3. History of upper extremity DVT and lupus anticoagulant: Her Coumadin was restarted 2 days ago and her INR is still normal. I'm going to increase her Coumadin to 4.5 mg nightly. We started therapeutic Lovenox for bridging today, monitor for signs of bleeding. .    4. Leukocytosis: White blood cell count has normalized today, likely related to her bowel obstruction and surgery. She is not on antibiotics at this time. 5. Incidental left renal mass: Recommend follow-up ultrasound in 6 months. 6. Breast cancer: Recent mammogram was negative. 7. Hyperthyroidism: She is on methimazole, TSH was elevated. Methimazole dose decreased to 3 times weekly. Diet: Transition Diet      Best practice:  DVT prophylaxis:Lovenox 40 mg sub q daily  Liu in Place: No  Central Line: No    Code Status:     Full Resuscitation    Disposition:  Continue inpatient care, discharge likely on Friday.     Tyree Khalil MD  4/4/2017  2:18 PM

## 2020-07-13 ENCOUNTER — APPOINTMENT (OUTPATIENT)
Dept: GENERAL RADIOLOGY | Age: 75
End: 2020-07-13
Payer: MEDICARE

## 2020-07-13 ENCOUNTER — TELEPHONE (OUTPATIENT)
Dept: FAMILY MEDICINE CLINIC | Age: 75
End: 2020-07-13

## 2020-07-13 ENCOUNTER — HOSPITAL ENCOUNTER (EMERGENCY)
Age: 75
Discharge: HOME | End: 2020-07-13
Attending: EMERGENCY MEDICINE
Payer: MEDICARE

## 2020-07-13 VITALS
BODY MASS INDEX: 31.22 KG/M2 | SYSTOLIC BLOOD PRESSURE: 90 MMHG | DIASTOLIC BLOOD PRESSURE: 50 MMHG | HEART RATE: 88 BPM | RESPIRATION RATE: 16 BRPM | WEIGHT: 159 LBS | HEIGHT: 60 IN | OXYGEN SATURATION: 100 % | TEMPERATURE: 98.8 F

## 2020-07-13 LAB
ALBUMIN SERPL-MCNC: 3.5 G/DL (ref 3.5–5.2)
ALP BLD-CCNC: 168 U/L (ref 35–104)
ALT SERPL-CCNC: 12 U/L (ref 0–32)
ANION GAP SERPL CALCULATED.3IONS-SCNC: 12 MMOL/L (ref 7–16)
AST SERPL-CCNC: 16 U/L (ref 0–31)
BACTERIA: ABNORMAL /HPF
BASOPHILS ABSOLUTE: 0.02 E9/L (ref 0–0.2)
BASOPHILS RELATIVE PERCENT: 0.1 % (ref 0–2)
BILIRUB SERPL-MCNC: 0.2 MG/DL (ref 0–1.2)
BILIRUBIN URINE: NEGATIVE
BLOOD, URINE: ABNORMAL
BUN BLDV-MCNC: 15 MG/DL (ref 8–23)
CALCIUM SERPL-MCNC: 9.8 MG/DL (ref 8.6–10.2)
CHLORIDE BLD-SCNC: 89 MMOL/L (ref 98–107)
CLARITY: ABNORMAL
CO2: 29 MMOL/L (ref 22–29)
COLOR: YELLOW
CREAT SERPL-MCNC: 0.8 MG/DL (ref 0.5–1)
EOSINOPHILS ABSOLUTE: 0.08 E9/L (ref 0.05–0.5)
EOSINOPHILS RELATIVE PERCENT: 0.6 % (ref 0–6)
GFR AFRICAN AMERICAN: >60
GFR NON-AFRICAN AMERICAN: >60 ML/MIN/1.73
GLUCOSE BLD-MCNC: 165 MG/DL (ref 74–99)
GLUCOSE URINE: NEGATIVE MG/DL
HCT VFR BLD CALC: 28.1 % (ref 34–48)
HEMOGLOBIN: 8.9 G/DL (ref 11.5–15.5)
IMMATURE GRANULOCYTES #: 0.05 E9/L
IMMATURE GRANULOCYTES %: 0.3 % (ref 0–5)
KETONES, URINE: NEGATIVE MG/DL
LACTIC ACID: 0.9 MMOL/L (ref 0.5–2.2)
LEUKOCYTE ESTERASE, URINE: ABNORMAL
LYMPHOCYTES ABSOLUTE: 1.26 E9/L (ref 1.5–4)
LYMPHOCYTES RELATIVE PERCENT: 8.7 % (ref 20–42)
MCH RBC QN AUTO: 30.1 PG (ref 26–35)
MCHC RBC AUTO-ENTMCNC: 31.7 % (ref 32–34.5)
MCV RBC AUTO: 94.9 FL (ref 80–99.9)
MONOCYTES ABSOLUTE: 1.25 E9/L (ref 0.1–0.95)
MONOCYTES RELATIVE PERCENT: 8.7 % (ref 2–12)
NEUTROPHILS ABSOLUTE: 11.76 E9/L (ref 1.8–7.3)
NEUTROPHILS RELATIVE PERCENT: 81.6 % (ref 43–80)
NITRITE, URINE: POSITIVE
PDW BLD-RTO: 14.7 FL (ref 11.5–15)
PH UA: 8.5 (ref 5–9)
PLATELET # BLD: 353 E9/L (ref 130–450)
PMV BLD AUTO: 10.6 FL (ref 7–12)
POTASSIUM SERPL-SCNC: 4.1 MMOL/L (ref 3.5–5)
PROTEIN UA: 30 MG/DL
RBC # BLD: 2.96 E12/L (ref 3.5–5.5)
RBC UA: ABNORMAL /HPF (ref 0–2)
SARS-COV-2, NAAT: NOT DETECTED
SODIUM BLD-SCNC: 130 MMOL/L (ref 132–146)
SPECIFIC GRAVITY UA: 1.01 (ref 1–1.03)
TOTAL PROTEIN: 6.7 G/DL (ref 6.4–8.3)
UROBILINOGEN, URINE: 0.2 E.U./DL
WBC # BLD: 14.4 E9/L (ref 4.5–11.5)
WBC UA: ABNORMAL /HPF (ref 0–5)

## 2020-07-13 PROCEDURE — 83605 ASSAY OF LACTIC ACID: CPT

## 2020-07-13 PROCEDURE — 96365 THER/PROPH/DIAG IV INF INIT: CPT

## 2020-07-13 PROCEDURE — 87077 CULTURE AEROBIC IDENTIFY: CPT

## 2020-07-13 PROCEDURE — 81001 URINALYSIS AUTO W/SCOPE: CPT

## 2020-07-13 PROCEDURE — U0002 COVID-19 LAB TEST NON-CDC: HCPCS

## 2020-07-13 PROCEDURE — 6360000002 HC RX W HCPCS: Performed by: EMERGENCY MEDICINE

## 2020-07-13 PROCEDURE — 71045 X-RAY EXAM CHEST 1 VIEW: CPT

## 2020-07-13 PROCEDURE — 74018 RADEX ABDOMEN 1 VIEW: CPT

## 2020-07-13 PROCEDURE — 2580000003 HC RX 258: Performed by: EMERGENCY MEDICINE

## 2020-07-13 PROCEDURE — 99283 EMERGENCY DEPT VISIT LOW MDM: CPT

## 2020-07-13 PROCEDURE — 87088 URINE BACTERIA CULTURE: CPT

## 2020-07-13 PROCEDURE — 87186 SC STD MICRODIL/AGAR DIL: CPT

## 2020-07-13 PROCEDURE — 6370000000 HC RX 637 (ALT 250 FOR IP): Performed by: EMERGENCY MEDICINE

## 2020-07-13 PROCEDURE — 85025 COMPLETE CBC W/AUTO DIFF WBC: CPT

## 2020-07-13 PROCEDURE — 80053 COMPREHEN METABOLIC PANEL: CPT

## 2020-07-13 RX ORDER — 0.9 % SODIUM CHLORIDE 0.9 %
500 INTRAVENOUS SOLUTION INTRAVENOUS ONCE
Status: COMPLETED | OUTPATIENT
Start: 2020-07-13 | End: 2020-07-13

## 2020-07-13 RX ORDER — CEFDINIR 300 MG/1
300 CAPSULE ORAL 2 TIMES DAILY
Qty: 14 CAPSULE | Refills: 0 | Status: SHIPPED | OUTPATIENT
Start: 2020-07-13 | End: 2020-07-20

## 2020-07-13 RX ORDER — ACETAMINOPHEN 325 MG/1
650 TABLET ORAL ONCE
Status: COMPLETED | OUTPATIENT
Start: 2020-07-13 | End: 2020-07-13

## 2020-07-13 RX ADMIN — SODIUM CHLORIDE 500 ML: 9 INJECTION, SOLUTION INTRAVENOUS at 20:09

## 2020-07-13 RX ADMIN — CEFEPIME HYDROCHLORIDE 2 G: 2 INJECTION, POWDER, FOR SOLUTION INTRAVENOUS at 19:52

## 2020-07-13 RX ADMIN — ACETAMINOPHEN 650 MG: 325 TABLET ORAL at 18:40

## 2020-07-13 RX ADMIN — SODIUM CHLORIDE 500 ML: 9 INJECTION, SOLUTION INTRAVENOUS at 18:48

## 2020-07-13 RX ADMIN — SODIUM CHLORIDE 500 ML: 9 INJECTION, SOLUTION INTRAVENOUS at 21:03

## 2020-07-13 ASSESSMENT — PAIN SCALES - GENERAL: PAINLEVEL_OUTOF10: 0

## 2020-07-13 NOTE — TELEPHONE ENCOUNTER
Please keep me posted. My concern was for systemic inflammatory response given lower blood pressure and tachycardia.   Difficult for me to determine what the change in vital signs is due to but infection is a possibility

## 2020-07-13 NOTE — TELEPHONE ENCOUNTER
I called the pts  and he said that the nurse will be at the house in 10-15 minutes and he is hesitant to take her to the ER. He is worried to have her around the virus and the only way to get her to the ER is by ambulance. He is afraid they'll admit her.

## 2020-07-13 NOTE — TELEPHONE ENCOUNTER
, Jj Shepherd called concerned that pt might have a UTI. She is not urinating her normal amount. She has a catheter. She has been having hallucinations since noon today. He is concerned with her pulse, 110 and 112 was reported, BP 97/70 and 94/57. He states he also has a call out to visiting nurses. He wanted to know if pt could start an antibiotic? CHRISTINE Karimi.

## 2020-07-13 NOTE — ED PROVIDER NOTES
Department of Emergency Medicine   ED  Provider Note  Admit Date/RoomTime: 7/13/2020  5:57 PM  ED Room: 01/01  Chief Complaint:   Urinary Tract Infection [place] [place] [facility] sent from home for UTI. )    History of Present Illness   Source of history provided by:  patient and spouse/SO. History/Exam Limitations: none. Emi Owen is a 77 y.o. old female who has a past medical history of:   Past Medical History:   Diagnosis Date    CAD (coronary artery disease) 4/21/2020    Hepatitis     High cholesterol     Hypertension     Hypothyroidism     [facility] 04/21/2020    MS (multiple sclerosis) ([facility]     Osteoporosis     Sarcoidosis     Type 2 diabetes mellitus with hyperglycemia, without long-term current use of insulin ([facility] 10/30/2019     Patient who is DNR comfort care with history of multiple sclerosis presents for evaluation of possible UTI. She is bedbound and her primary caretaker is her . She has a chronic indwelling [place] catheter with was changed about a week ago.  states that she has had a low-grade fever and some confusion. He called their PCP who advised him to bring her to the emergency room. She previously was on hospice, however  revoked this as he did not want to take her off of her long-term maintenance medications. Patient is currently pleasant and in no acute distress. She states she has had a mild cough but denies any nausea, vomiting or abdominal pain. No known COVID-19 exposure or sick contacts. ROS   Pertinent positives and negatives are stated within HPI, all other systems reviewed and are negative. Past Surgical History:   Procedure Laterality Date    CHOLECYSTECTOMY  1990s    CORONARY ANGIOPLASTY WITH STENT PLACEMENT  04/21/2020    Dr. Kati Vera   3.0 x 22mm Resolute MAAME to Prox LAD.   No LV    CYSTOSCOPY INSERTION / REMOVAL STENT / STONE Left 4/23/2020    CYSTOSCOPY LEFT RETROGRADE PYELOGRAM STENT INSERTION, STRATTON CATHETER performed by Ghanshyam Avila,  at [facility] Left 3/21/2019    LEFT FEMUR CEPHALLOMEDULLARY NAIL performed by Brittany Cerna MD at [facility] Bilateral     HYSTERECTOMY      KNEE SURGERY      plate in lt knee   Social History:  reports that she has never smoked. She has never used smokeless tobacco. She reports that she does not drink alcohol or use drugs. Family History: family history includes Arthritis in her sister; Cancer in her father; Cervical Cancer in her sister; Heart Disease in her mother; Mult Sclerosis in her sister; No Known Problems in her brother and sister; Stroke in her mother. Allergies: Patient has no known allergies. Physical Exam            ED Triage Vitals [07/13/20 1758]   BP Temp Temp Source Pulse Resp SpO2 Height Weight   (!) 92/44 99.1 °F (37.3 °C) Oral 108 18 98 % -- --      Oxygen Saturation Interpretation: Normal.    · Constitutional:  Alert, development consistent with age. · HEENT:  NC/NT. Airway patent. · Neck:  Normal ROM. Supple. · Respiratory:  Lungs Clear to auscultation and breath sounds equal.  · CV:  Regular rate and rhythm, normal heart sounds, without pathological murmurs, ectopy, gallops, or rubs. · GI:  General Appearance: normal. + saldana catheter         Bowel sounds: normal bowel sounds. Distension:  None. Tenderness: No abdominal tenderness. Liver: non-tender. Spleen:  non-tender. Pulsatile Mass: absent. Hernia:  no inguinal or femoral hernias noted. · : (chaperone present during examination). not indicated / deferred. · Back: CVA Tenderness: No.  · Integument:  Normal turgor. Warm, dry, without visible rash, unless noted elsewhere. Lymphatics: No lymphangitis or adenopathy noted. · Neurological:  Oriented. Motor functions intact. Chronic LE weakness.     Lab / Imaging Results   (All laboratory and radiology results have been personally reviewed by myself)  Labs:  Results for orders placed or performed during the hospital encounter of 07/13/20   CBC Auto Differential   Result Value Ref Range    WBC 14.4 (H) 4.5 - 11.5 E9/L    RBC 2.96 (L) 3.50 - 5.50 E12/L    Hemoglobin 8.9 (L) 11.5 - 15.5 g/dL    Hematocrit 28.1 (L) 34.0 - 48.0 %    MCV 94.9 80.0 - 99.9 fL    MCH 30.1 26.0 - 35.0 pg    MCHC 31.7 (L) 32.0 - 34.5 %    RDW 14.7 11.5 - 15.0 fL    Platelets 331 413 - 266 E9/L    MPV 10.6 7.0 - 12.0 fL    Neutrophils % 81.6 (H) 43.0 - 80.0 %    Immature Granulocytes % 0.3 0.0 - 5.0 %    Lymphocytes % 8.7 (L) 20.0 - 42.0 %    Monocytes % 8.7 2.0 - 12.0 %    Eosinophils % 0.6 0.0 - 6.0 %    Basophils % 0.1 0.0 - 2.0 %    Neutrophils Absolute 11.76 (H) 1.80 - 7.30 E9/L    Immature Granulocytes # 0.05 E9/L    Lymphocytes Absolute 1.26 (L) 1.50 - 4.00 E9/L    Monocytes Absolute 1.25 (H) 0.10 - 0.95 E9/L    Eosinophils Absolute 0.08 0.05 - 0.50 E9/L    Basophils Absolute 0.02 0.00 - 0.20 E9/L   Comprehensive Metabolic Panel   Result Value Ref Range    Sodium 130 (L) 132 - 146 mmol/L    Potassium 4.1 3.5 - 5.0 mmol/L    Chloride 89 (L) 98 - 107 mmol/L    CO2 29 22 - 29 mmol/L    Anion Gap 12 7 - 16 mmol/L    Glucose 165 (H) 74 - 99 mg/dL    BUN 15 8 - 23 mg/dL    CREATININE 0.8 0.5 - 1.0 mg/dL    GFR Non-African American >60 >=60 mL/min/1.73    GFR African American >60     Calcium 9.8 8.6 - 10.2 mg/dL    Total Protein 6.7 6.4 - 8.3 g/dL    Alb 3.5 3.5 - 5.2 g/dL    Total Bilirubin 0.2 0.0 - 1.2 mg/dL    Alkaline Phosphatase 168 (H) 35 - 104 U/L    ALT 12 0 - 32 U/L    AST 16 0 - 31 U/L   Urinalysis   Result Value Ref Range    Color, UA Yellow Straw/Yellow    Clarity, UA TURBID (A) Clear    Glucose, Ur Negative Negative mg/dL    Bilirubin Urine Negative Negative    Ketones, Urine Negative Negative mg/dL    Specific Gravity, UA 1.010 1.005 - 1.030    Blood, Urine MODERATE (A) Negative    pH, UA 8.5 5.0 - 9.0    Protein, UA 30 (A) Negative mg/dL Urobilinogen, Urine 0.2 <2.0 E.U./dL    Nitrite, Urine POSITIVE (A) Negative    Leukocyte Esterase, Urine LARGE (A) Negative   Lactic Acid, Plasma   Result Value Ref Range    Lactic Acid 0.9 0.5 - 2.2 mmol/L   COVID-19   Result Value Ref Range    SARS-CoV-2, NAAT Not Detected Not Detected   Microscopic Urinalysis   Result Value Ref Range    WBC, UA PACKED (A) 0 - 5 /HPF    RBC, UA 5-10 (A) 0 - 2 /HPF    Bacteria, UA MANY (A) None Seen /HPF       Imaging: All Radiology results interpreted by Radiologist unless otherwise noted. XR ABDOMEN (KUB) (SINGLE AP VIEW)   Final Result      1. The left ureteral stent appears grossly well positioned. 2. CT demonstrated urolithiasis is not evident by plain radiography. 3. Constipation. XR CHEST PORTABLE   Final Result    No acute cardiopulmonary abnormality. ED Course / Medical Decision Making     Medications   0.9 % sodium chloride bolus (0 mLs Intravenous Stopped 7/13/20 1936)   acetaminophen (TYLENOL) tablet 650 mg (650 mg Oral Given 7/13/20 1840)   cefepime (MAXIPIME) 2 g IVPB extended (mini-bag) (0 g Intravenous Stopped 7/13/20 2049)   0.9 % sodium chloride bolus (0 mLs Intravenous Stopped 7/13/20 2049)   0.9 % sodium chloride bolus (0 mLs Intravenous Stopped 7/13/20 2136)        Re-examination:  7/13/20        Patients symptoms are improving. Consults:   None    Procedures:   none    Medical Decision Making:      Patient presents for evaluation of possible urinary tract infection. She is DNR comfort care. Labs, x-ray and UA were obtained. Patient has no evidence of pneumonia and tested negative for COVID-19. She was found to have a urinary tract infection. She was given fluids as well as IV antibiotics. Patient and  would prefer to be discharged home. Patient will be placed on Omnicef. Urine culture was sent as well. Transport was arranged. Advised to follow-up with PCP if symptoms do not improve. Counseling:     The emergency provider has spoken with the patient and spouse/SO and discussed todays results, in addition to providing specific details for the plan of care and counseling regarding the diagnosis and prognosis. Questions are answered at this time and they are agreeable with the plan. Assessment      1. Urinary tract infection associated with indwelling urethral catheter, initial encounter ([facility]    2. DNR no code (do not resuscitate)      Plan   Disposition: Discharge to home  Patient condition is stable    New Medications     New Prescriptions    CEFDINIR (OMNICEF) 300 MG CAPSULE    Take 1 capsule by mouth 2 times daily for 7 days     Electronically signed by Alyssa Henderson DO   DD: 7/13/20  **This report was transcribed using voice recognition software. Every effort was made to ensure accuracy; however, inadvertent computerized transcription errors may be present.   END OF ED PROVIDER NOTE            Alyssa Henderson DO  07/13/20 4539

## 2020-07-13 NOTE — ED NOTES
Bed: 01  Expected date:   Expected time:   Means of arrival:   Comments:  Bharti Mcginnis RN  07/13/20 1849

## 2020-07-13 NOTE — TELEPHONE ENCOUNTER
I spoke w/ Angelica Robb. He said that the visiting nurse is on the way. He would like the nurse to see her before he makes the decision to take her to the ER. I told Angelica Robb that if I do not hear back from him by 4:30 I would call before I left for the night for an update.

## 2020-07-13 NOTE — TELEPHONE ENCOUNTER
I spoke w/ [place] Steven he said that the nurse is there now and Marielos Hutchison is running a temp of 100.7. They will take her to [facility]. Dr Jim Rosas verbally informed.

## 2020-07-14 ENCOUNTER — CARE COORDINATION (OUTPATIENT)
Dept: CASE MANAGEMENT | Age: 75
End: 2020-07-14

## 2020-07-14 PROCEDURE — 1111F DSCHRG MED/CURRENT MED MERGE: CPT | Performed by: INTERNAL MEDICINE

## 2020-07-14 NOTE — CARE COORDINATION
[facility] Transitions Initial Follow Up Call    Call within 2 business days of discharge:    Patient: Lore Gomez Patient : 1945   MRN: <[id number]>  Reason for Admission:   Discharge Date: 20 RARS: Readmission Risk Score: 18      Last Discharge [address] [address]       Complaint Diagnosis Description Type Department Provider    20 Urinary Tract Infection Urinary tract infection associated with indwelling urethral catheter, initial encounter ([facility] [address] . .. ED (DISCHARGE) [facility] Tracey Terrell DO           Spoke with:  Gabriella Rico, patient's     Facility:  [facility]    Follow Up:  Contacted patient for BPCI-A and COVID-19 risk follow up. Spoke with patient's  Gabriella Rico who is her primary caregiver. Introduced self and explained BPCI bundle program.  Gabriella Rico agrees to follow up calls. Gabriella Rico stated Irma Braxton seems to be doing alright this morning. He reports /49, Pulse 106, Temp 97.7, O2 sat 94% on RA. He reports her skin is warm to touch. He stated she is alert and oriented this morning. He was able to  MENDOSA ARMANDO and she was started on medication. Barger catheter is in place and draining. Gabriella Rico placed CTN on hold because land line was ringing. When returning, he stated the home health nurse will be making a visit today. Gabriella Rico stated that he revoked from hospice 2 days after signing up therefore Irma Braxton is now receiving home health services. Advised to continue to monitor signs/symptoms. Discussed signs/symptoms and when to contact MD or home health with any new or worsening symptoms. He verbalized understanding. Reviewed medication list.  1111F completed. He stated she is not taking magnesium oxide and instead taking magnesium sulfate. She is also taking colace twice a day but will start taking only once daily. He does not have any other questions or concerns at this time. Will continue to follow.     Patient contacted regarding recent discharge and COVID-19 risk. Discussed COVID-19 related testing which was available at this time. Test results were negative. Patient informed of results, if available? Yes     Care Transition Nurse/ Ambulatory Care Manager contacted the family by telephone to perform post discharge assessment. Verified name and  with family as identifiers. Patient has following risk factors of: acute respiratory failure, heart failure, diabetes. CTN/ACM reviewed discharge instructions, medical action plan and red flags related to discharge diagnosis. Reviewed and educated them on any new and changed medications related to discharge diagnosis. Education provided regarding infection prevention, and signs and symptoms of COVID-19 and when to seek medical attention with family who verbalized understanding. Discussed exposure protocols and quarantine from [address] you at higher risk for severe illness  and given an opportunity for questions and concerns. The family agrees to contact the COVID-19 hotline [phone] or PCP office for questions related to their healthcare. CTN/ACM provided contact information for future reference. From CDC: Are you at higher risk for severe illness?  Wash your hands often.  Avoid close contact (6 feet, which is about two arm lengths) with people who are sick.  Put distance between yourself and other people if COVID-19 is spreading in your community.  Clean and disinfect frequently touched surfaces.  Avoid all cruise travel and non-essential air travel.  Call your healthcare professional if you have concerns about COVID-19 and your underlying condition or if you are sick. For more information on steps you can take to protect yourself, see CDC's How to [id number] [place] [address] for follow-up call in 5-7 days based on severity of symptoms and risk factors.       Future Appointments   Date Time Provider Marisol Garcia   2020  1:15 PM Gela Lagunas  [address] 7/24/2020 10:00 AM Haley Snow MD B[facility]   10/13/2020  2:00 PM [facility]il Anthony [facility]       Thi Amaya RN

## 2020-07-14 NOTE — ED NOTES
[facility] @ bedside, pt transferred to stretcher with no incident, report given, left in stable condition     Pia Brasher RN  07/13/20 7598

## 2020-07-14 NOTE — ED NOTES
Called [facility] to arrange transport for patient back to her residence, ETA for pickup is about 90 minutes     Be Sullivan RN  07/13/20 2109       Be Sullivan RN  07/13/20 2110

## 2020-07-15 LAB
ORGANISM: ABNORMAL
URINE CULTURE, ROUTINE: ABNORMAL

## 2020-07-16 ENCOUNTER — TELEPHONE (OUTPATIENT)
Dept: FAMILY MEDICINE CLINIC | Age: 75
End: 2020-07-16

## 2020-07-16 NOTE — TELEPHONE ENCOUNTER
Received notification from visiting nurses regarding patient's blood sugars    During 1 of our last visits was suggested to start metformin given A1c was increased. They had declined     I would suggest starting metformin at 500 mg twice a day and to continue to follow sugars, metformin offers less risk of hypoglycemia. However can cause a lactic acidosis, last lab when she was in the emergency showed normal kidney function and lactic acid was normal.     Other options would include a sulfonylurea like glyburide or glipizide but those can cause hypoglycemia and given her wavering appetite and food intake would not recommend at this time    Other would be Dallas Petty  Would not recommend Invokana or Jardiance given recurrent urine infections  Other option would be injectable once weekly like Trulicity    Other option would be insulin given either long-acting once a day or fast acting with meals    I would choose the insulin choice over the other oral medications other than metformin    I am okay with rechecking UA and culture after antibiotic therapy    Orders Placed This Encounter   Procedures    Culture, Urine     Standing Status:   Future     Standing Expiration Date:   7/16/2021     Order Specific Question:   Specify (ex-cath, midstream, cysto, etc)?      Answer:   urine cath    URINALYSIS     Standing Status:   Future     Standing Expiration Date:   7/16/2021     Requested Prescriptions     Pending Prescriptions Disp Refills    metFORMIN (GLUCOPHAGE) 500 MG tablet 60 tablet 5     Sig: Take 1 tablet by mouth 2 times daily (with meals)

## 2020-07-17 NOTE — TELEPHONE ENCOUNTER
Requested Prescriptions     Signed Prescriptions Disp Refills    metFORMIN (GLUCOPHAGE) 500 MG tablet 60 tablet 5     Sig: Take 1 tablet by mouth 2 times daily (with meals)     Authorizing Provider: Alyssia Gonzales     We will start with the Metformin -sent to pharmacy    I have a low threshold to trying either long-acting insulin or fast acting insulin with meals    Please have the patient notify us if this dry cough worsens or becomes more persistent as we can find an alternative to lisinopril    Thanks

## 2020-07-21 ENCOUNTER — TELEPHONE (OUTPATIENT)
Dept: FAMILY MEDICINE CLINIC | Age: 75
End: 2020-07-21

## 2020-07-21 ENCOUNTER — CARE COORDINATION (OUTPATIENT)
Dept: CASE MANAGEMENT | Age: 75
End: 2020-07-21

## 2020-07-21 NOTE — TELEPHONE ENCOUNTER
[facility] Pt would like a custom wheelchair. Her insurance will only cover one wheelchair every five years unless there has been a change to her medical condition. Michaela's current wheelchair is from 2017. Gabino Klein and her  believe that she would benefit form a wheelchair that reclines because of her heart condition and low ejection fraction. Pt may need to recline back when she feels dizzy and her current chair does not allow her to do so. Pt has a virtual appt scheduled tomorrow. Can you please include documentation in your note? [employer] will send a form to our office once [facility] completes the application. Chart notes will need to be faxed along w/ the form.

## 2020-07-21 NOTE — CARE COORDINATION
[facility] Transitions Follow Up Call    2020    Patient: Valerie Buchanan  Patient : 1945   MRN: <[id number]>  Reason for Admission:   Discharge Date: 20 RARS: Readmission Risk Score: 25         Spoke with:   Patients  reports patients vital signs were good this morning. [id number] pulse oximeter 99%. She is getting some arm strength and trying to feed herself. She stays in bed. PT is going to try to get her out of the bed soon. Appetite and fluid intake good. Bladder and bowel function is regular. Patient is alert. He denies chills,sob, cough or pressure ulcers. Will continue to follow. Care Transitions Subsequent and Final Call    Subsequent and Final Calls  Do you have any ongoing symptoms?:  No  Have your medications changed?:  No  Do you have any questions related to your medications?:  No  Do you currently have any active services?:  No  Do you have any needs or concerns that I can assist you with?:  No  Identified Barriers:  None  Care Transitions Interventions  Other Interventions:             Follow Up  Future Appointments   Date Time Provider Marisol Garcia   2020  1:15 PM Sherrill Russo  [address]   2020 10:00 AM Chen Davis MD [facility]   10/13/2020  2:00 PM [facility]       Tran Shearer LPN

## 2020-07-22 RX ORDER — LANCETS 30 GAUGE
1 EACH MISCELLANEOUS DAILY
Qty: 50 EACH | Refills: 11 | Status: SHIPPED
Start: 2020-07-22 | End: 2021-09-01 | Stop reason: SDUPTHER

## 2020-07-22 RX ORDER — GLUCOSAMINE HCL/CHONDROITIN SU 500-400 MG
CAPSULE ORAL
Qty: 50 STRIP | Refills: 11 | Status: SHIPPED | OUTPATIENT
Start: 2020-07-22

## 2020-07-22 NOTE — TELEPHONE ENCOUNTER
Pt is doing well on Metformin. BS today was 107. No longer having an issue w/ coughing. Would like an order for a glucometer sent to [facility].

## 2020-07-23 ENCOUNTER — VIRTUAL VISIT (OUTPATIENT)
Dept: FAMILY MEDICINE CLINIC | Age: 75
End: 2020-07-23
Payer: MEDICARE

## 2020-07-23 PROBLEM — A40.9 SEPSIS DUE TO STREPTOCOCCUS SPECIES (HCC): Status: RESOLVED | Noted: 2020-06-15 | Resolved: 2020-07-23

## 2020-07-23 PROBLEM — J96.01 ACUTE RESPIRATORY FAILURE WITH HYPOXIA (HCC): Status: RESOLVED | Noted: 2020-04-25 | Resolved: 2020-07-23

## 2020-07-23 PROBLEM — E55.9 VITAMIN D DEFICIENCY: Status: ACTIVE | Noted: 2020-07-23

## 2020-07-23 PROBLEM — A41.9 SEPTIC SHOCK (HCC): Status: RESOLVED | Noted: 2020-04-23 | Resolved: 2020-07-23

## 2020-07-23 PROBLEM — A41.9 SEPSIS (HCC): Status: RESOLVED | Noted: 2020-06-09 | Resolved: 2020-07-23

## 2020-07-23 PROBLEM — R65.21 SEPTIC SHOCK (HCC): Status: RESOLVED | Noted: 2020-04-23 | Resolved: 2020-07-23

## 2020-07-23 PROBLEM — I21.3 STEMI (ST ELEVATION MYOCARDIAL INFARCTION) (HCC): Status: RESOLVED | Noted: 2020-04-21 | Resolved: 2020-07-23

## 2020-07-23 PROBLEM — I50.23 ACUTE ON CHRONIC SYSTOLIC (CONGESTIVE) HEART FAILURE (HCC): Status: RESOLVED | Noted: 2020-04-27 | Resolved: 2020-07-23

## 2020-07-23 PROCEDURE — 2022F DILAT RTA XM EVC RTNOPTHY: CPT | Performed by: INTERNAL MEDICINE

## 2020-07-23 PROCEDURE — 1090F PRES/ABSN URINE INCON ASSESS: CPT | Performed by: INTERNAL MEDICINE

## 2020-07-23 PROCEDURE — 3017F COLORECTAL CA SCREEN DOC REV: CPT | Performed by: INTERNAL MEDICINE

## 2020-07-23 PROCEDURE — 1123F ACP DISCUSS/DSCN MKR DOCD: CPT | Performed by: INTERNAL MEDICINE

## 2020-07-23 PROCEDURE — G8400 PT W/DXA NO RESULTS DOC: HCPCS | Performed by: INTERNAL MEDICINE

## 2020-07-23 PROCEDURE — G9899 SCRN MAM PERF RSLTS DOC: HCPCS | Performed by: INTERNAL MEDICINE

## 2020-07-23 PROCEDURE — G8427 DOCREV CUR MEDS BY ELIG CLIN: HCPCS | Performed by: INTERNAL MEDICINE

## 2020-07-23 PROCEDURE — 4040F PNEUMOC VAC/ADMIN/RCVD: CPT | Performed by: INTERNAL MEDICINE

## 2020-07-23 PROCEDURE — 3044F HG A1C LEVEL LT 7.0%: CPT | Performed by: INTERNAL MEDICINE

## 2020-07-23 PROCEDURE — 99214 OFFICE O/P EST MOD 30 MIN: CPT | Performed by: INTERNAL MEDICINE

## 2020-07-23 ASSESSMENT — ENCOUNTER SYMPTOMS
SORE THROAT: 0
CONSTIPATION: 1
CHEST TIGHTNESS: 0
RHINORRHEA: 0
VOMITING: 0
ABDOMINAL PAIN: 0
BLOOD IN STOOL: 0
COUGH: 0
EYE PAIN: 0
SHORTNESS OF BREATH: 1
NAUSEA: 0
DIARRHEA: 0

## 2020-07-23 NOTE — TELEPHONE ENCOUNTER
Waiting for [facility] to fax their form and request for records. Once we receive the request we can send all information.

## 2020-07-23 NOTE — PROGRESS NOTES
TeleMedicine Video Visit    This visit was performed as a virtual video visit using a synchronous, two-way, audio-video telehealth technology platform. Patient identification was verified at the start of the visit, including the patient's telephone number and physical location. I discussed with the patient the nature of our telehealth visits, that:     1. Due to the nature of an audio- video modality, the only components of a physical exam that could be done are the elements supported by direct observation. 2. I would evaluate the patient and recommend diagnostics and treatments based on my assessment. 3. If it was felt that the patient should be evaluated in clinic or an emergency room setting, then they would be directed there. 4. Our sessions are not being recorded and that personal health information is protected. 5. Our team would provide follow up care in person if/when the patient needs it. Patient does agree to proceed with telemedicine consultation. Patient's location: home address in [facility]  Physician  location office other people involved in call -        Time spent: Greater than Not billed by time    This visit was completed virtually using Doxy. me    2020    TELEHEALTH EVALUATION -- Audio/Visual (During JNUBM-70 public health emergency)    HPI:    Dwayne Douglas (:  1945) has requested an audio/video evaluation for the following concern(s):    - States has discontinued hospice - now palliative care. Now in physical and occupational therapy     - Was seen in [place] for signs of bladder infection. Concern for SIRS. Was given IVF and cefdinir and discharged home. - Was called by visiting nurses regarding elevated blood sugars. Last lab had showed elevated A1c to 7.7, most recent A1c (2020) was 6.7  - not known to be diabetic. Blood sugars were running 200-300. Added metformin. No reported side effects.    - States had bladder issues and overactive bladder.  Stopped oxybutynin. now has Saldana cath. Following with urology. States needed chronic catheter. States was following with urology. Was given botox treatment and catheter was removed 1 month. States placed on myrbetric. Now has saldana cath, to be changed Monday (7/27).  as flushed intermittently      -  states constipated. States on senokot-2. States on miralax. On iron therapy.      -Patient has CAD. Has had ST elevation MI (2020).   Patient underwent emergency cardiac catheterization where she was found to have LAD of 100% and RCA 95% stenosis.  Patient underwent angioplasty to the LAD.  Patient was found to have severe anterior and septal hypokinesia with a apical dyskinesia and ejection fraction 20 to 25% with a small apical left ventricular thrombus on echocardiogram.  On Plavix and Eliquis.      - Patient  was also found to have a left obstructing ureteral calculus and had a cystoscopy with ureteral stent and stone manipulation. May consider procedure in 3 months if improves. Has saldana cath.       - States has high blood pressure. Has had lower blood pressure. States has been holding some of her heart medications due to the low blood pressure at times. States has been holding bumex (95/), coreg (95/60) and lisinopril (95/60). Has given meds mostly. Has had to hold coreg a few times mostly at night.     -Coronary artery disease status post ST elevation MI with cardiac catheterization and stent to the LAD  (4/2020).  cardiology suggested triple anticoagulation for at least 1 month (due to left atrial thrombus), now just Eliquis and Plavix alone.      - States had hip fracture s/p left ORIF. States has been to physical therapy. Not walking. States having issues with left knee and leg. [address] found lung mass and needs pulmonary follow up yearly (10/2020)       - States has multiple sclerosis. States follows with neurology. States stopped ticferdia. Follow up every 6 months.  States on baclofen 20mg 4x per day and dantrolene 50mg 4x per day. Spasms stable. States weakness to hands b/l - unable to feed self at times. **she would benefit form a wheelchair that reclines because of her heart condition and low ejection fraction. To help get her out of bed to help with physical therapy and strength but also to be able to recline back when she feels dizzy and to assist with transportation. Current chiar does not meet her changed needs.      - States has high cholesterol. States Lipitor. No reported side effects. Stopped zetia.      - States has hypothyroidism. on levothyroxine. More compliant.      - States has had elevated liver function tests. States has seen GI. Has had biopsy. Last lab stable.      - States has depression. On citalopram. Stable on medications. No reported side effects. No suicidal thoughts. Review of Systems   Constitutional: Negative for appetite change, chills, fever and unexpected weight change. HENT: Negative for congestion, rhinorrhea and sore throat. Eyes: Negative for pain and visual disturbance. Respiratory: Positive for shortness of breath. Negative for cough and chest tightness. Cardiovascular: Negative for chest pain and palpitations. Gastrointestinal: Positive for constipation. Negative for abdominal pain, blood in stool, diarrhea, nausea and vomiting. Genitourinary: Negative for difficulty urinating, dysuria, frequency, pelvic pain, urgency and vaginal bleeding. Musculoskeletal: Negative for arthralgias and myalgias. Skin: Negative for rash. Neurological: Positive for weakness. Negative for dizziness, syncope, light-headedness, numbness and headaches. Hematological: Negative for adenopathy. Psychiatric/Behavioral: Negative for suicidal ideas. The patient is not nervous/anxious. Prior to Visit Medications    Medication Sig Taking?  Authorizing Provider   blood glucose monitor kit and supplies Dispense sufficient amount for indicated testing frequency plus additional to accommodate PRN testing needs. Dispense all needed supplies to include: monitor, strips, lancing device, lancets, control solutions, alcohol swabs. Yes Karen Irvin MD   blood glucose monitor strips Test qd  & as needed for symptoms of irregular blood glucose. Dispense sufficient amount for indicated testing frequency plus additional to accommodate PRN testing needs.  Yes Karen Irvin MD   Lancets MISC 1 each by Does not apply route daily Yes Karen Irvin MD   metFORMIN (GLUCOPHAGE) 500 MG tablet Take 1 tablet by mouth 2 times daily (with meals) Yes Karen Irvin MD   citalopram (CELEXA) 20 MG tablet Take 1 tablet by mouth every morning Yes Karen Irvin MD   apixaban (ELIQUIS) 5 MG TABS tablet Take 1 tablet by mouth 2 times daily Yes Karen Irvin MD   atorvastatin (LIPITOR) 10 MG tablet Take 1 tablet by mouth daily Yes Karen Irvin MD   lisinopril (PRINIVIL;ZESTRIL) 2.5 MG tablet Take 0.5 tablets by mouth daily Yes Odilia Brady MD   ferrous sulfate (IRON 325) 325 (65 Fe) MG tablet Take 1 tablet by mouth daily (with breakfast)  Patient taking differently: Take 325 mg by mouth 2-3 days a week Yes Karen Irvin MD   dantrolene (DANTRIUM) 50 MG capsule Take 1 capsule by mouth 4 times daily Yes Karen Irvin MD   senna (SENOKOT) 8.6 MG tablet Take 1 tablet by mouth 2 times daily Yes Historical Provider, MD   baclofen (LIORESAL) 20 MG tablet Take 40 mg by mouth 2 times daily Yes Historical Provider, MD   carvedilol (COREG) 6.25 MG tablet Take 1 tablet by mouth 2 times daily  Patient taking differently: Take 3.125 mg by mouth 2 times daily If systaltic is over 95 Yes Mark Alvarez MD   clopidogrel (PLAVIX) 75 MG tablet Take 1 tablet by mouth daily Yes Mark Alvarez MD   levothyroxine (SYNTHROID) 88 MCG tablet Take 1 tablet by mouth daily Yes Karen Irvin MD   Catheters (URETHRAL CATHETER) MISC by Does not apply route Yes Historical Provider, MD STONE Left 4/23/2020    CYSTOSCOPY LEFT RETROGRADE PYELOGRAM STENT INSERTION, STRATTON CATHETER performed by Jin Avila DO at [address] Left 3/21/2019    LEFT FEMUR CEPHALLOMEDULLARY NAIL performed by Erma Hull MD at [facility] Bilateral     HYSTERECTOMY      KNEE SURGERY      plate in lt knee   ,   Social History     Tobacco Use    Smoking status: Never Smoker    Smokeless tobacco: Never Used   Substance Use Topics    Alcohol use: No     Alcohol/week: 0.0 standard drinks     Comment: Ocassionally    Drug use: No   ,   Immunization History   Administered Date(s) Administered    Influenza 12/15/2010    Influenza A ([id number]) Vaccine PF IM 12/02/2009    Influenza Vaccine, unspecified formulation 11/21/2008    Influenza Virus Vaccine 10/27/2014, 10/02/2017    Influenza Whole 10/02/2013, 10/27/2014, 10/21/2015    Influenza, High Dose (Fluzone 65 yrs and older) 10/10/2015, 09/11/2017    Influenza, Quadv, IM, (6 mo and older Fluzone, Flulaval, Fluarix and 3 yrs and older Afluria) 10/02/2017    Influenza, Triv, inactivated, subunit, adjuvanted, IM (Fluad 65 yrs and older) 09/19/2018    Pneumococcal Conjugate 13-valent (Mary Fall) 01/15/2013, 09/23/2015, 09/18/2018, 09/19/2018    Pneumococcal Polysaccharide ([id number]) 09/18/2013   ,   Health Maintenance   Topic Date Due    Diabetic foot exam  03/05/1955    Diabetic retinal exam  03/05/1955    DTaP/Tdap/Td vaccine (1 - Tdap) 03/05/1965    Shingles Vaccine (1 of 2) 03/05/1996    DEXA (modify frequency per FRAX score)  03/05/2001    Annual Wellness Visit (AWV)  06/19/2019    Flu vaccine (1) 09/01/2020    Diabetic microalbuminuria test  10/30/2020    Lipid screen  10/30/2020    Breast cancer screen  12/10/2020    A1C test (Diabetic or Prediabetic)  05/13/2021    TSH testing  06/15/2021    Potassium monitoring  07/13/2021    Creatinine monitoring  07/13/2021    Colon cancer screen colonoscopy les   - at risk for further infection      Anemia, unspecified type  - decreased iron 2x per week since constipation      Other Constipation  - on senna   - on miralax  - decrease iron to 2x per week   - add mag citrate . 25 as needed      CAD (Coronary artery disease)   - s/p cath and stent to LAD  On lisinopril and coreg when able   - on eliquis, plavix and aspiein x 1 month, then switch to eliquis and plavix alone   - needs follow up with cardio  - follow up labs   - follow up chest xray      Chronic systolic congestive heart failure (HCC)  - EF 20% per echo (4/2020)   - on bumex   - on coreg and lisinopril if able     Essential hypertension  - Continue present treatment   - watch diet   - on coreg twice a day   - on lisinopril   - on bumex   - intermittently needing to hold due to low blood pressure   - improved per patient and       Type 2 diabetes mellitus with hyperglycemia, without long-term current use of insulin ([address]  - Continue present treatment   - watch diet   - last A1c was 6.7 (6/2020)   - added metformin since recent increase in sugars   - to see endocrinology      Hypothyroidism, unspecified type  - continue present treatment  - on levothyroxine  - labs were underactive (6/2020)  - needs recheck of levels - may need increase if sill underactive   - has been noncompliant with med in the past      MS (multiple sclerosis) ([address]  - continue present treatment  - ticierda on hold  - on baclofen and dantrolene  - non ambulatory  - stable  **she would benefit form a wheelchair that reclines because of her heart condition and low ejection fraction. To help get her out of bed to help with physical therapy and strength but also to be able to recline back when she feels dizzy and to assist with transportation.  Current chiar does not meet her changed needs.      Spastic quadriparesis ([address]  - following with neurology   - on baclofen and dantrolene     Mixed hyperlipidemia  - continue present treatment  - watch diet  - on atrovastatin  - follow up labs     Neurogenic bladder  - continue present treatment  - off oxybutynin  - added myrbetric - has not started yet   - currently with saldana   - to see urology     Mild episode of recurrent major depressive disorder ([facility] [address]  - continue present treatment  - on citalopram  - no suicidal thoughts  - stable     HUDSON (nonalcoholic steatohepatitis)  - continue present treatment  - following with GI     Osteopenia of multiple sites  - declines bone density  - follows labs     Lung mass  - possible scar (2019)   - has seen pulmonary - yearly (10/2020)     H/O sarcoidosis    Return in about 6 weeks (around 9/3/2020) for check up and review and labs. Orders Placed This Encounter   Procedures    Magnesium     Standing Status:   Future     Standing Expiration Date:   7/23/2021    Comprehensive Metabolic Panel     Standing Status:   Future     Standing Expiration Date:   7/23/2021    Lipid, Fasting     Standing Status:   Future     Standing Expiration Date:   7/23/2021    Hemoglobin A1C     Standing Status:   Future     Standing Expiration Date:   7/23/2021    Vitamin D 25 Hydroxy     Standing Status:   Future     Standing Expiration Date:   7/23/2021    TSH without Reflex     Standing Status:   Future     Standing Expiration Date:   7/23/2021    Iron and TIBC     Standing Status:   Future     Standing Expiration Date:   7/23/2021     Order Specific Question:   Is Patient Fasting? Answer:   yes     Order Specific Question:   No of Hours?      Answer:   8    CBC Auto Differential     Standing Status:   Future     Standing Expiration Date:   7/23/2021    Ferritin     Standing Status:   Future     Standing Expiration Date:   7/23/2021    Vitamin B12 & Folate     Standing Status:   Future     Standing Expiration Date:   7/23/2021     Requested Prescriptions      No prescriptions requested or ordered in this encounter       Valerie Buchanan is a 77 y.o. female being evaluated by a Virtual Visit (video visit) encounter to address concerns as mentioned above. A caregiver was present when appropriate. Due to this being a TeleHealth encounter (During OFR-48 public health emergency), evaluation of the following organ systems was limited: Vitals/Constitutional/EENT/Resp/CV/GI//MS/Neuro/Skin/Heme-Lymph-Imm. Pursuant to the emergency declaration under the [address] [place] [address] and the [employer] Act, this Virtual Visit was conducted with patient's (and/or legal guardian's) consent, to reduce the patient's risk of exposure to COVID-19 and provide necessary medical care. The patient (and/or legal guardian) has also been advised to contact this office for worsening conditions or problems, and seek emergency medical treatment and/or call 911 if deemed necessary. Patient identification was verified at the start of the visit: Yes    Total time spent on this encounter: Not billed by time    Services were provided through a video synchronous discussion virtually to substitute for in-person clinic visit. Patient and provider were located at their individual homes. --Paulina Nash MD on 7/23/2020 at 4:51 PM    An electronic signature was used to authenticate this note.

## 2020-07-24 ENCOUNTER — VIRTUAL VISIT (OUTPATIENT)
Dept: ENDOCRINOLOGY | Age: 75
End: 2020-07-24
Payer: MEDICARE

## 2020-07-24 PROCEDURE — 4040F PNEUMOC VAC/ADMIN/RCVD: CPT | Performed by: INTERNAL MEDICINE

## 2020-07-24 PROCEDURE — 1090F PRES/ABSN URINE INCON ASSESS: CPT | Performed by: INTERNAL MEDICINE

## 2020-07-24 PROCEDURE — 1036F TOBACCO NON-USER: CPT | Performed by: INTERNAL MEDICINE

## 2020-07-24 PROCEDURE — 2022F DILAT RTA XM EVC RTNOPTHY: CPT | Performed by: INTERNAL MEDICINE

## 2020-07-24 PROCEDURE — 3044F HG A1C LEVEL LT 7.0%: CPT | Performed by: INTERNAL MEDICINE

## 2020-07-24 PROCEDURE — G8417 CALC BMI ABV UP PARAM F/U: HCPCS | Performed by: INTERNAL MEDICINE

## 2020-07-24 PROCEDURE — 99204 OFFICE O/P NEW MOD 45 MIN: CPT | Performed by: INTERNAL MEDICINE

## 2020-07-24 PROCEDURE — G8427 DOCREV CUR MEDS BY ELIG CLIN: HCPCS | Performed by: INTERNAL MEDICINE

## 2020-07-24 PROCEDURE — G9899 SCRN MAM PERF RSLTS DOC: HCPCS | Performed by: INTERNAL MEDICINE

## 2020-07-24 PROCEDURE — 1123F ACP DISCUSS/DSCN MKR DOCD: CPT | Performed by: INTERNAL MEDICINE

## 2020-07-24 PROCEDURE — 3017F COLORECTAL CA SCREEN DOC REV: CPT | Performed by: INTERNAL MEDICINE

## 2020-07-24 PROCEDURE — G8400 PT W/DXA NO RESULTS DOC: HCPCS | Performed by: INTERNAL MEDICINE

## 2020-07-24 NOTE — PROGRESS NOTES
Dr. Carlito Maza   3.0 x 22mm Resolute MAAME to Prox LAD. No LV    CYSTOSCOPY INSERTION / REMOVAL STENT / STONE Left 4/23/2020    CYSTOSCOPY LEFT RETROGRADE PYELOGRAM STENT INSERTION, STRATTON CATHETER performed by Rebeka Avila DO at [facility] Left 3/21/2019    LEFT FEMUR CEPHALLOMEDULLARY NAIL performed by Lupis Jennings MD at [facility] Bilateral     HYSTERECTOMY      KNEE SURGERY      plate in lt knee       SOCIAL HISTORY   Tobacco:   reports that she has never smoked. She has never used smokeless tobacco.  Alcohol:   reports no history of alcohol use. Drugs:   reports no history of drug use. FAMILY HISTORY   Family History   Problem Relation Age of Onset    Stroke Mother     Heart Disease Mother     Cancer Father         lung    Mult Sclerosis Sister     No Known Problems Brother     No Known Problems Sister     Arthritis Sister     Cervical Cancer Sister        ALLERGIES AND DRUG REACTIONS   No Known Allergies    CURRENT MEDICATIONS   Current Outpatient Medications   Medication Sig Dispense Refill    blood glucose monitor kit and supplies Dispense sufficient amount for indicated testing frequency plus additional to accommodate PRN testing needs. Dispense all needed supplies to include: monitor, strips, lancing device, lancets, control solutions, alcohol swabs. 1 kit 0    blood glucose monitor strips Test qd  & as needed for symptoms of irregular blood glucose. Dispense sufficient amount for indicated testing frequency plus additional to accommodate PRN testing needs.  50 strip 11    Lancets MISC 1 each by Does not apply route daily 50 each 11    metFORMIN (GLUCOPHAGE) 500 MG tablet Take 1 tablet by mouth 2 times daily (with meals) 60 tablet 5    citalopram (CELEXA) 20 MG tablet Take 1 tablet by mouth every morning 90 tablet 0    apixaban (ELIQUIS) 5 MG TABS tablet Take 1 tablet by mouth 2 times daily 60 tablet 2    atorvastatin (LIPITOR) 10 MG tablet Take 1 tablet by mouth daily 90 tablet 1    lisinopril (PRINIVIL;ZESTRIL) 2.5 MG tablet Take 0.5 tablets by mouth daily 30 tablet 3    ferrous sulfate (IRON 325) 325 (65 Fe) MG tablet Take 1 tablet by mouth daily (with breakfast) (Patient taking differently: Take 325 mg by mouth 2-3 days a week) 30 tablet 5    dantrolene (DANTRIUM) 50 MG capsule Take 1 capsule by mouth 4 times daily 120 capsule 0    senna (SENOKOT) 8.6 MG tablet Take 1 tablet by mouth 2 times daily      ondansetron (ZOFRAN ODT) 4 MG disintegrating tablet Take 4-8 mg by mouth every 8 hours as needed for Nausea or Vomiting      baclofen (LIORESAL) 20 MG tablet Take 20 mg by mouth 4 times daily       carvedilol (COREG) 6.25 MG tablet Take 1 tablet by mouth 2 times daily (Patient taking differently: Take 3.125 mg by mouth 2 times daily If systaltic is over 95) 60 tablet 3    clopidogrel (PLAVIX) 75 MG tablet Take 1 tablet by mouth daily 30 tablet 3    levothyroxine (SYNTHROID) 88 MCG tablet Take 1 tablet by mouth daily 90 tablet 0    nortriptyline (PAMELOR) 25 MG capsule Take 1 capsule by mouth nightly 90 capsule 0    Catheters (URETHRAL CATHETER) MISC by Does not apply route      Cyanocobalamin (VITAMIN B 12 PO) Take 500 mg by mouth daily       ipratropium-albuterol (DUONEB) 0.5-2.5 (3) MG/3ML SOLN nebulizer solution Inhale 3 mLs into the lungs every 6 hours as needed for Shortness of Breath 360 mL 6    Calcium Carbonate-Vit D-Min (CALCIUM 600+D PLUS MINERALS) 600-400 MG-UNIT TABS Take 1 tablet by mouth nightly       polyethylene glycol (MIRALAX) PACK packet Take 17 g by mouth daily       Cholecalciferol (VITAMIN D3) 5000 units TABS Take 1 tablet by mouth every morning      Potassium 99 MG TABS Take 1 tablet by mouth every morning      magnesium (MAGNESIUM-OXIDE) 250 MG TABS tablet Take 250 mg by mouth every morning        No current facility-administered medications for this visit.         Review of Systems  Constitutional: No fever, no chills, no diaphoresis, no generalized weakness. HEENT: No blurred vision, No sore throat, no ear pain, no hair loss  Neck: denied any neck swelling, difficulty swallowing,   Cardio-pulmonary: No CP, SOB or palpitation, No orthopnea or PND. No cough or wheezing. GI: No N/V/D, no constipation, No abdominal pain, no melena or hematochezia   : Denied any dysuria, hematuria, flank pain, discharge, or incontinence. Skin: denied any rash, ulcer, Hirsute, or hyperpigmentation. MSK: denied any joint deformity, joint pain/swelling, muscle pain, or back pain. Neuro: no numbness, no tingling, no weakness, _    OBJECTIVE    There were no vitals taken for this visit. BP Readings from Last 4 Encounters:   07/13/20 (!) 90/50   06/16/20 (!) 125/59   05/06/20 (!) 110/54   05/01/20 (!) 111/54     Wt Readings from Last 6 Encounters:   07/13/20 159 lb (72.1 kg)   06/16/20 159 lb (72.1 kg)   05/01/20 143 lb 8 oz (65.1 kg)   04/21/20 161 lb (73 kg)   11/15/19 161 lb (73 kg)   09/18/19 185 lb (83.9 kg)     Physical examination:  Due to this being a TeleHealth encounter, evaluation of the following organ systems is limited: Vitals/Constitutional/EENT/Resp/CV/GI//MS/Neuro/Skin/Heme-Lymph-Imm. Modified physical exam through Telemedicine camera    General: Communicating well via camera   Neck: no obvious neck mass. No obvious neck deformity     CVS: no distress   Chest: no distress. Chest is moving with respiration    Extremities:  no visible tremor  Skin: No visible rashes as seen from camera   Musculoskeletal: no visible deformity  Neuro: Alert and oriented to person, place, and time. Psychiatric: Normal mood and affect.  Behavior is normal    Review of Laboratory Data:  I personally reviewed the following lab:  Lab Results   Component Value Date/Time    WBC 14.4 (H) 07/13/2020 06:26 PM    RBC 2.96 (L) 07/13/2020 06:26 PM    HGB 8.9 (L) 07/13/2020 06:26 PM    HCT 28.1 (L) 07/13/2020 06:26 PM    MCV 94.9 07/13/2020 06:26 PM    MCH 30.1 07/13/2020 06:26 PM    MCHC 31.7 (L) 07/13/2020 06:26 PM    RDW 14.7 07/13/2020 06:26 PM     07/13/2020 06:26 PM    MPV 10.6 07/13/2020 06:26 PM      Lab Results   Component Value Date/Time     (L) 07/13/2020 06:26 PM    K 4.1 07/13/2020 06:26 PM    K 4.6 06/09/2020 12:25 PM    CO2 29 07/13/2020 06:26 PM    BUN 15 07/13/2020 06:26 PM    CREATININE 0.8 07/13/2020 06:26 PM    CALCIUM 9.8 07/13/2020 06:26 PM    LABGLOM >60 07/13/2020 06:26 PM    GFRAA >60 07/13/2020 06:26 PM      Lab Results   Component Value Date/Time    TSH 8.100 (H) 06/15/2020 04:07 PM     Lab Results   Component Value Date    LABA1C 6.7 05/13/2020    GLUCOSE 165 07/13/2020    LABMICR 49.5 10/30/2019     Lab Results   Component Value Date    LABA1C 6.7 05/13/2020    LABA1C 7.0 10/30/2019    LABA1C 7.7 07/31/2019     Lab Results   Component Value Date    TRIG 282 03/12/2019    HDL 34 10/30/2019    LDLCALC 115 10/30/2019    CHOL 201 03/12/2019     No results found for: VITD25    Medical Records/Labs/Images review:   I personally reviewed and summarized previous records   All labs and imaging studies were independently reviewed     ASSESSMENT & RECOMMENDATIONS   Michaela Douglas, a 77 y.o.-old female seen in for the following issues     Diabetes Mellitus Type 2    · A1c 6.7 bur she is anemic   · Continue current dose of Metformin 500 mg BID   · The patient was advised to check blood sugars  2 times a day before meals and send BS readings to our office in a week. · Discussed with patient A1c and blood sugar goals   · Patient will need routine diabetes maintenance and prevention  · The patient was referred to diabetic educator for further teaching   · Diabetes labs before next visit     HLD  · Continue Lipitor 10 mg daily     Hypothyroidism   · Currently on levothyroxine 88 mcg daily  · Will recheck level again before next visit and adjust the dose if needed     Return in about 6 weeks (around 9/4/2020) for DM type 2 .     The above issues were reviewed with the patient who understood and agreed with the plan. Thank you for allowing us to participate in the care of this patient. Please do not hesitate to contact us with any additional questions. Diagnosis Orders   1. Type 2 diabetes mellitus with other specified complication, without long-term current use of insulin (HCC)  Basic Metabolic Panel   2. Hyperlipidemia, unspecified hyperlipidemia type     3. Vitamin D deficiency  Vitamin D 25 Hydroxy   4. Hypothyroidism, unspecified type  TSH without Reflex    T4, Free     Marcy Singleton MD  Endocrinologist, [facility] Diabetes Care and Endocrinology   [address] [place] [address], [address] [place] [address],[id number] [id number]   Phone: [phone]  Fax: [phone]  --------------------------------------------  An electronic signature was used to authenticate this note.  Karena Hatchet, MD on 7/24/2020 at 4:51 PM

## 2020-07-27 LAB
APPEARANCE, BODY FLUID: ABNORMAL
BACTERIA, URINE: NORMAL /HPF
BILIRUBIN URINE: NEGATIVE
COLOR, URINE: YELLOW
ERYTHROCYTES URINE: NORMAL /HPF
GLUCOSE URINE: ABNORMAL MG/DL
KETONES, URINE: ABNORMAL MG/DL
LEUKOCYTES, UA: >100 /HPF
MICROSCOPIC URINE: YES
NITRATE, UA: POSITIVE
PH UA: 7 (ref 4.6–8)
PROTEIN UA: 100 MG/DL
RBC URINE: ABNORMAL
SPECIFIC GRAVITY, URINE: 1 (ref 1–1.03)
TRANSITIONAL EPITHELIAL: NORMAL /HPF
URINE CULTURE REFLEX: ABNORMAL
UROBILINOGEN, URINE: NEGATIVE MG/DL
WBC CLUMPS, URINE: NORMAL /HPF
WBC URINE: ABNORMAL

## 2020-07-28 ENCOUNTER — TELEPHONE (OUTPATIENT)
Dept: FAMILY MEDICINE CLINIC | Age: 75
End: 2020-07-28

## 2020-07-28 NOTE — TELEPHONE ENCOUNTER
Please let the patient and  know that repeat urine analysis still showed protein nitrites white cells and red blood cells, uncertain if this is related to chronic catheter. Given how well she was doing on last visit I do not think infection.   Uncertain if this urine was sent for culture, no result on culture was seen in the chart    Please check with [facility] lab to see if a culture is still pending    Thanks

## 2020-07-28 NOTE — TELEPHONE ENCOUNTER
Called [facility] and culture is still pending.   May be done this afternoon

## 2020-07-30 ENCOUNTER — TELEPHONE (OUTPATIENT)
Dept: FAMILY MEDICINE CLINIC | Age: 75
End: 2020-07-30

## 2020-07-30 LAB
25-HYDROXY VITAMIN D-3: 57.4 NG/ML (ref 30–100)
A/G RATIO: 1.1 RATIO (ref 1.1–2.2)
ALBUMIN SERPL-MCNC: 3.4 G/DL (ref 3.4–4.8)
ALP BLD-CCNC: 107 U/L (ref 42–121)
ALT SERPL-CCNC: 14 U/L (ref 10–54)
ANION GAP SERPL CALCULATED.3IONS-SCNC: 11 MEQ/L (ref 3–11)
AST SERPL-CCNC: 17 U/L (ref 10–41)
BASOPHILS ABSOLUTE: 0 K/UL (ref 0–0.2)
BASOPHILS RELATIVE PERCENT: 0.3 % (ref 0–1.5)
BILIRUB SERPL-MCNC: 0.3 MG/DL (ref 0.3–1.5)
BUN BLDV-MCNC: 27 MG/DL (ref 8–21)
CALCIUM SERPL-MCNC: 9.3 MG/DL (ref 8.5–10.5)
CHLORIDE BLD-SCNC: 94 MEQ/L (ref 98–107)
CHOLESTEROL: 166 MG/DL (ref 140–200)
CO2: 29 MEQ/L (ref 21–31)
CREAT SERPL-MCNC: 0.6 MG/DL (ref 0.4–1)
CREATININE + EGFR PANEL: 118 ML/MIN
EOSINOPHILS ABSOLUTE: 0.2 K/UL (ref 0–0.33)
EOSINOPHILS RELATIVE PERCENT: 2.3 % (ref 0–3)
FERRITIN: 80.2 NG/ML (ref 11–307)
FOLATE: 10.5 NG/ML
GFR NON-AFRICAN AMERICAN: 98 ML/MIN
GLOBULIN: 3 G/DL (ref 1.9–3.9)
GLUCOSE BLD-MCNC: 106 MG/DL (ref 70–99)
HCT VFR BLD CALC: 25 % (ref 36–44)
HDLC SERPL-MCNC: 33 MG/DL (ref 35–85)
HEMOGLOBIN: 8.5 G/DL (ref 12–15)
IRON SATURATION: 9 % (ref 15–55)
IRON: 28 UG/DL (ref 50–170)
LDL CHOLESTEROL: 91 MG/DL
LDL/HDL RATIO: 2.8 RATIO
LYMPHOCYTES ABSOLUTE: 1.1 K/UL (ref 1.1–4.8)
LYMPHOCYTES RELATIVE PERCENT: 11.8 % (ref 24–44)
MAGNESIUM: 1.9 MEQ/L (ref 1.6–2.6)
MCH RBC QN AUTO: 30.3 PG (ref 28–34)
MCHC RBC AUTO-ENTMCNC: 34 G/DL (ref 33–37)
MCV RBC AUTO: 89.3 FL (ref 80–100)
MONOCYTES ABSOLUTE: 0.7 K/UL (ref 0.2–0.7)
MONOCYTES RELATIVE PERCENT: 7.5 % (ref 3.4–9)
NEUTROPHILS ABSOLUTE: 7.4 K/UL (ref 1.83–8.7)
PDW BLD-RTO: 15.9 % (ref 10.9–14.3)
PLATELET # BLD: 496 K/UL (ref 150–450)
PMV BLD AUTO: 8.4 FL (ref 7.4–10.4)
POTASSIUM SERPL-SCNC: 4.7 MEQ/L (ref 3.6–5)
RBC # BLD: 2.8 M/UL (ref 4–4.9)
SEGMENTED NEUTROPHILS RELATIVE PERCENT: 78.1 % (ref 40–74)
SODIUM BLD-SCNC: 134 MEQ/L (ref 135–145)
TOTAL IRON BINDING CAPACITY: 315 UG/DL (ref 250–450)
TOTAL PROTEIN: 6.4 G/DL (ref 5.9–7.8)
TRANSFERRIN: 225 MG/DL (ref 192–382)
TRIGL SERPL-MCNC: 230 MG/DL (ref 41–189)
TSH SERPL DL<=0.05 MIU/L-ACNC: 2.83 UIU/ML (ref 0.34–5.6)
URINE CULTURE, ROUTINE: NORMAL
VITAMIN B-12: 551 PG/ML (ref 180–914)
WBC # BLD: 9.4 K/UL (ref 4.5–11)

## 2020-07-31 ENCOUNTER — TELEPHONE (OUTPATIENT)
Dept: FAMILY MEDICINE CLINIC | Age: 75
End: 2020-07-31

## 2020-07-31 ENCOUNTER — CARE COORDINATION (OUTPATIENT)
Dept: CASE MANAGEMENT | Age: 75
End: 2020-07-31

## 2020-07-31 LAB
ESTIMATED AVERAGE GLUCOSE: 143 MG/DL
HBA1C MFR BLD: 6.6 % (ref 4–6)

## 2020-07-31 RX ORDER — SULFAMETHOXAZOLE AND TRIMETHOPRIM 800; 160 MG/1; MG/1
1 TABLET ORAL 2 TIMES DAILY
Qty: 14 TABLET | Refills: 0 | Status: SHIPPED | OUTPATIENT
Start: 2020-07-31 | End: 2020-08-07

## 2020-07-31 NOTE — TELEPHONE ENCOUNTER
Requested Prescriptions     Signed Prescriptions Disp Refills    sulfamethoxazole-trimethoprim (BACTRIM DS;SEPTRA DS) 800-160 MG per tablet 14 tablet 0     Sig: Take 1 tablet by mouth 2 times daily for 7 days     Authorizing Provider: Gege Roldan sent to [facility] [place]    Please also let the patient know that further blood work came through and A1c for 3-month sugar control assessment was stable and in good range    Thanks

## 2020-07-31 NOTE — TELEPHONE ENCOUNTER
, Jaymie Hayes called asking if antibiotic was sent in? He is in agreement with whatever you send over.

## 2020-07-31 NOTE — CARE COORDINATION
[facility] Transitions Follow Up Call    2020    Patient: Lola Swenson  Patient : 1945   MRN: <[id number]>  Reason for Admission:   Discharge Date: 20 RARS: Readmission Risk Score: 25         Spoke with: , Shawanda Mckee     states that patient has another UTI at this time. He is now going to  antibiotics just ordered. Patient also has chronic constipation. She was constipated at the beginning of the week and is now again. Had good BM on Tuesday - none since. Patient had digital de-impaction done Monday and he will call Delaney Newman and doctor to see if she needs one again. Patient has been taking OTC stool softeners, citrate of magnesium, and miralax since Monday with no further stools. Suggested patient drink more liquids and eat more fiber in her diet.  states she is already doing that. Patient's  states patient  has all medications is taking medications as directed and has no questions concerning medications at this time. Patient's  states he knows when to contact physician or report to ED with worsening or severe symptoms, changes, or concerns. Will Follow up at later time. Natasha Rodriguez LPN     [facility] / [address] [place] [address] Transitions Subsequent and Final Call    Subsequent and Final Calls  Do you have any ongoing symptoms?:  Yes  Patient-reported symptoms:  Constipation  Have your medications changed?:  No  Do you have any questions related to your medications?:  No  Do you currently have any active services?:  Yes  Are you currently active with any services?:  Home Health  Do you have any needs or concerns that I can assist you with?:  No  Identified Barriers:  Impairment  Care Transitions Interventions  Other Interventions:             Follow Up  Future Appointments   Date Time Provider Marisol Garcia   2020  8:00 AM Shiloh Lamb MD [facility]   2020  3:00 PM Kayla Potts MD [address] [place] [address]

## 2020-08-03 ENCOUNTER — CARE COORDINATION (OUTPATIENT)
Dept: CASE MANAGEMENT | Age: 75
End: 2020-08-03

## 2020-08-03 NOTE — CARE COORDINATION
[facility]  Transitions Follow Up Call    8/3/2020    Patient: Jose Douglas  Patient : 1945   MRN: <[id number]>  Reason for Admission:   Discharge Date: 20 RARS: Readmission Risk Score: 25         Spoke with: Patient's     Follow Up: Attempted to contact patient for BPCI-A follow up. Spoke with patient's  who answered the phone. He stated she is doing okay. Constipation resolved on Friday night. He stated it's been 2 days since her last bowel movement. He denies Driss Light having any nausea/vomiting. She is eating and drinking fluids w/o issues. He stated he is waiting to see what happens today and will contact MD if she continues to have issues with constipation.  reports Driss Light started on antibiotic on Friday night for UTI. He does not have any questions or concerns at this time. Will continue to follow.       Future Appointments   Date Time Provider Marisol Garcia   2020  8:00 AM Chen Davis MD [facility]   2020  3:00 PM Sherrill Russo  [address]   10/13/2020  2:00 PM Betty Bowers MD [facility]       Johann Rodriguez RN

## 2020-08-10 ENCOUNTER — CARE COORDINATION (OUTPATIENT)
Dept: CASE MANAGEMENT | Age: 75
End: 2020-08-10

## 2020-08-10 NOTE — CARE COORDINATION
[facility] Transitions Bundled Payments for Care Improvement (BPCI) Follow Up Call  Qualifying Diagnosis of Qualifying Diagnosis Sepsis  8/10/2020  Patient Name:  Price Tamez   YOB: 1945  Discharge Date:  7/13/20  RARS:  Readmission Risk Score: 18    PCP:  Haley Lauren MD    Message left in compliance with HIPPA. Stated who I was, representing the [facility], Care Transitions Team. Requested to place a call to their Physician with any immediate health needs/questions/concerns.     Care Transitions will continue to follow per BPCI Program.  Eva Chowdhury, RN, CTN    Follow Up Concerns:  Future Appointments   Date Time Provider [facility]   9/9/2020  8:00 AM Marybeth Wong MD [facility]   9/16/2020  3:00 PM Haley Lauren  [address]   10/13/2020  2:00 PM [facility] Kelsey MD [facility]

## 2020-08-11 RX ORDER — DANTROLENE SODIUM 50 MG/1
50 CAPSULE ORAL 4 TIMES DAILY
Qty: 360 CAPSULE | Refills: 1 | Status: SHIPPED
Start: 2020-08-11 | End: 2021-06-10 | Stop reason: SDUPTHER

## 2020-08-11 NOTE — TELEPHONE ENCOUNTER
Name of Medication(s) Requested:  Dantrium, Metformin and test strips    Pharmacy Requested:   [facility]    Medication(s) pended? [x] Yes  [] No    Last Appointment:  7/23/2020    Future appts:  Future Appointments   Date Time Provider Marisol Garcia   9/9/2020  8:00 AM Annabelle Chaparro MD [facility]   9/16/2020  3:00 PM Negro Michaud MD [address] [place] [address]   10/13/2020  2:00 PM [facility]rik[facility] [place] [facility]        Does patient need call back?   [] Yes  [x] No

## 2020-08-11 NOTE — TELEPHONE ENCOUNTER
Requested Prescriptions     Signed Prescriptions Disp Refills    dantrolene (DANTRIUM) 50 MG capsule 360 capsule 1     Sig: Take 1 capsule by mouth 4 times daily     Authorizing Provider: Minerva ZULETA    metFORMIN (GLUCOPHAGE) 500 MG tablet 180 tablet 1     Sig: Take 1 tablet by mouth 2 times daily (with meals)     Authorizing Provider: Luis Angel Ponce    blood glucose test strips (ASCENSIA AUTODISC VI;ONE TOUCH ULTRA TEST VI) strip 200 each 1     Si each by In Vitro route 2 times daily As needed. Authorizing Provider: Tyrese Reyes sent the Rx to the pharmacy.   Thanks

## 2020-08-11 NOTE — TELEPHONE ENCOUNTER
Pharmacy is requesting a refill on Dantrolene. Please send to [facility].      Last Appointment:  7/23/2020  Future Appointments   Date Time Provider Marisol Garcia   9/9/2020  8:00 AM Catracho Villagomez MD [facility]   9/16/2020  3:00 PM Radha Jose  [address]   10/13/2020  2:00 PM Mary [facility]

## 2020-08-18 ENCOUNTER — CARE COORDINATION (OUTPATIENT)
Dept: CASE MANAGEMENT | Age: 75
End: 2020-08-18

## 2020-08-18 NOTE — CARE COORDINATION
[facility] Transitions Follow Up Call    2020    Patient: Melody Douglas  Patient : 1945   MRN: [id number]  Reason for Admission:   Discharge Date: 20 RARS: Readmission Risk Score: 25         Spoke with: Patient's     Care Transitions Subsequent and Final Call    Subsequent and Final Calls  Do you have any ongoing symptoms?:  No  Have your medications changed?:  No  Do you have any questions related to your medications?:  No  Do you currently have any active services?:  Yes  Are you currently active with any services?:  Home Health, Other  Do you have any needs or concerns that I can assist you with?:  No  Care Transitions Interventions  Other Interventions: Follow Up:  Contacted patient for BPCI-A follow up. Spoke to patient's  who answered the phone. He stated that Orlando Hardy has been improving and seems to be doing better. He reports that she is getting out of bed for a couple of hours a day and getting into her chair. He stated she has completed her antibiotics since we last spoke and does not have any signs/symptoms of UTI. He also stated that her bowel issues have resolved. Orlando Hardy is eating and drinking w/o issues.  reports that she saw the cardiologist 1-1 1/2 weeks ago and EF has improved. Home health continues to make visits as well as Palliative Care. He confirmed Orlando Hardy has all of her medications and taking them as prescribed. He does not have any questions or concerns at this time. Will continue to follow.       Future Appointments   Date Time Provider Marisol Garcia   2020  8:00 AM Parul Parikh MD [facility]   2020  3:00 PM Michaela Green  [address]   10/13/2020  2:00 PM Romana Hong, MD [facility]       Daryle Gamble, RN

## 2020-08-21 ENCOUNTER — TELEPHONE (OUTPATIENT)
Dept: FAMILY MEDICINE CLINIC | Age: 75
End: 2020-08-21
Payer: MEDICARE

## 2020-08-21 PROCEDURE — 81003 URINALYSIS AUTO W/O SCOPE: CPT | Performed by: INTERNAL MEDICINE

## 2020-08-21 RX ORDER — CIPROFLOXACIN 500 MG/1
500 TABLET, FILM COATED ORAL 2 TIMES DAILY
Qty: 14 TABLET | Refills: 0 | Status: SHIPPED | OUTPATIENT
Start: 2020-08-21 | End: 2020-08-28

## 2020-08-21 NOTE — TELEPHONE ENCOUNTER
, Andres Kelley called stating that pt has burning with urination for 1 day and had hallucinations this morning. Her cath was changed on Sunday. He is asking for an antibiotic to be sent to [facility].

## 2020-08-21 NOTE — TELEPHONE ENCOUNTER
Orders Placed This Encounter   Procedures    Culture, Urine     Standing Status:   Future     Standing Expiration Date:   8/21/2021     Order Specific Question:   Specify (ex-cath, midstream, cysto, etc)?      Answer:   cath urine    URINALYSIS     Standing Status:   Future     Standing Expiration Date:   8/21/2021     Vianey with the  and is to bring in a urine sample Monday morning to the [place] lab    Requested Prescriptions     Signed Prescriptions Disp Refills    ciprofloxacin (CIPRO) 500 MG tablet 14 tablet 0     Sig: Take 1 tablet by mouth 2 times daily for 7 days     Authorizing Provider: Curly Escobedo     Medication sent to [facility] [place]   was notified    He will hold off on the medications at present as patient symptoms have improved, but will have the medication if there is any changes or worsening and as above will bring in a urine on Monday morning

## 2020-08-25 ENCOUNTER — TELEPHONE (OUTPATIENT)
Dept: ENDOCRINOLOGY | Age: 75
End: 2020-08-25

## 2020-08-26 ENCOUNTER — HOSPITAL ENCOUNTER (OUTPATIENT)
Age: 75
Discharge: HOME | End: 2020-08-28
Payer: MEDICARE

## 2020-08-26 LAB
BACTERIA: ABNORMAL /HPF
BILIRUBIN URINE: NEGATIVE
BLOOD, URINE: ABNORMAL
CLARITY: CLEAR
COLOR: YELLOW
CRYSTALS, UA: ABNORMAL /HPF
GLUCOSE URINE: NEGATIVE MG/DL
KETONES, URINE: NEGATIVE MG/DL
LEUKOCYTE ESTERASE, URINE: ABNORMAL
NITRITE, URINE: NEGATIVE
PH UA: 8.5 (ref 5–9)
PROTEIN UA: NEGATIVE MG/DL
RBC UA: ABNORMAL /HPF (ref 0–2)
SPECIFIC GRAVITY UA: 1.01 (ref 1–1.03)
UROBILINOGEN, URINE: 0.2 E.U./DL
WBC UA: ABNORMAL /HPF (ref 0–5)

## 2020-08-26 PROCEDURE — 87088 URINE BACTERIA CULTURE: CPT

## 2020-08-26 PROCEDURE — 81001 URINALYSIS AUTO W/SCOPE: CPT

## 2020-08-28 ENCOUNTER — TELEPHONE (OUTPATIENT)
Dept: PRIMARY CARE CLINIC | Age: 75
End: 2020-08-28

## 2020-08-28 LAB — URINE CULTURE, ROUTINE: NORMAL

## 2020-08-28 NOTE — TELEPHONE ENCOUNTER
Please let the patient know that urine analysis showed large amounts of white blood cells, small microscopic blood and crystals    Urine culture however did not grow any specific bacteria and did show some yeast but in small amounts.       No evidence for treatment at present    Thanks

## 2020-08-31 RX ORDER — CLOPIDOGREL BISULFATE 75 MG/1
75 TABLET ORAL DAILY
Qty: 90 TABLET | Refills: 0 | Status: SHIPPED
Start: 2020-08-31 | End: 2020-08-31 | Stop reason: SDUPTHER

## 2020-08-31 RX ORDER — CLOPIDOGREL BISULFATE 75 MG/1
75 TABLET ORAL DAILY
Qty: 30 TABLET | Refills: 0 | Status: SHIPPED
Start: 2020-08-31 | End: 2020-10-19 | Stop reason: SDUPTHER

## 2020-09-01 ENCOUNTER — CARE COORDINATION (OUTPATIENT)
Dept: CASE MANAGEMENT | Age: 75
End: 2020-09-01

## 2020-09-01 NOTE — CARE COORDINATION
Received return phone call from patient's  Gabriella Rico. He stated that Irma Braxton is doing excellent. She no longer has a UTI. He also reports she no longer has a blood clot. Breathing has returned to normal.  She continues to use the Trilogy BIPAP machine at night. He reports her O2 sats have been in the 90's. He will contact pulmonology to verify if she will need to continue to use machine. She is up and sitting in the wheelchair for a few hours a day. She is able to eat by herself using her left hand. He stated she is unable to use her right hand. Continues to work with PT and OT. Gabriella Rico stated Irma Braxton still has a kidney stone. He reports cardiology wanted to wait a couple of weeks before doing anything about her kidney stone. He is aware of when to contact MD with any new or worsening symptoms. He does not have any questions or concerns at this time. Will continue to follow.       Fredy Toledo, RN  Care Transition Nurse

## 2020-09-01 NOTE — CARE COORDINATION
[facility] Transitions Follow Up Call    2020    Patient: Mary Douglas  Patient : 1945   MRN: [id number]  Reason for Admission:   Discharge Date: 20 RARS: Readmission Risk Score: 18    Follow Up: Attempted to contact patient for BPCI-A follow up. Unable to reach patient. Left message with contact information and request for call back.       Future Appointments   Date Time Provider Marisol Garcia   2020  8:00 AM Guadalupe Jones MD [facility]   2020  3:00 PM Mario Reed  [address]   10/13/2020  2:00 PM Elicia Telles MD [facility]       Markus Smith RN

## 2020-09-02 ENCOUNTER — HOSPITAL ENCOUNTER (EMERGENCY)
Age: 75
Discharge: HOME | End: 2020-09-02
Attending: EMERGENCY MEDICINE
Payer: MEDICARE

## 2020-09-02 ENCOUNTER — TELEPHONE (OUTPATIENT)
Dept: FAMILY MEDICINE CLINIC | Age: 75
End: 2020-09-02

## 2020-09-02 VITALS
HEART RATE: 76 BPM | SYSTOLIC BLOOD PRESSURE: 100 MMHG | HEIGHT: 60 IN | OXYGEN SATURATION: 98 % | RESPIRATION RATE: 18 BRPM | DIASTOLIC BLOOD PRESSURE: 60 MMHG | BODY MASS INDEX: 28.07 KG/M2 | WEIGHT: 143 LBS | TEMPERATURE: 98.9 F

## 2020-09-02 LAB
25-HYDROXY VITAMIN D-3: 66.8 NG/ML (ref 30–100)
A/G RATIO: 1.3 RATIO (ref 1.1–2.2)
ABO/RH: NORMAL
ALBUMIN SERPL-MCNC: 3.5 G/DL (ref 3.4–4.8)
ALBUMIN SERPL-MCNC: 3.7 G/DL (ref 3.5–5.2)
ALP BLD-CCNC: 79 U/L (ref 35–104)
ALP BLD-CCNC: 80 U/L (ref 42–121)
ALT SERPL-CCNC: 10 U/L (ref 0–32)
ALT SERPL-CCNC: 14 U/L (ref 10–54)
ANION GAP SERPL CALCULATED.3IONS-SCNC: 12 MMOL/L (ref 7–16)
ANION GAP SERPL CALCULATED.3IONS-SCNC: 8 MEQ/L (ref 3–11)
ANTIBODY SCREEN: NORMAL
APTT: 30.8 SEC (ref 24.5–35.1)
AST SERPL-CCNC: 15 U/L (ref 0–31)
AST SERPL-CCNC: 18 U/L (ref 10–41)
BASOPHILS ABSOLUTE: 0.03 E9/L (ref 0–0.2)
BASOPHILS ABSOLUTE: 0.1 K/UL (ref 0–0.2)
BASOPHILS RELATIVE PERCENT: 0.4 % (ref 0–2)
BASOPHILS RELATIVE PERCENT: 0.7 % (ref 0–1.5)
BILIRUB SERPL-MCNC: 0.3 MG/DL (ref 0.3–1.5)
BILIRUB SERPL-MCNC: <0.2 MG/DL (ref 0–1.2)
BUN BLDV-MCNC: 26 MG/DL (ref 8–23)
BUN BLDV-MCNC: 33 MG/DL (ref 8–21)
CALCIUM SERPL-MCNC: 9.4 MG/DL (ref 8.5–10.5)
CALCIUM SERPL-MCNC: 9.8 MG/DL (ref 8.6–10.2)
CHLORIDE BLD-SCNC: 100 MEQ/L (ref 98–107)
CHLORIDE BLD-SCNC: 99 MMOL/L (ref 98–107)
CHOLESTEROL: 156 MG/DL (ref 140–200)
CHP ED QC CHECK: NORMAL
CO2: 26 MMOL/L (ref 22–29)
CO2: 27 MEQ/L (ref 21–31)
CREAT SERPL-MCNC: 0.6 MG/DL (ref 0.4–1)
CREAT SERPL-MCNC: 0.6 MG/DL (ref 0.5–1)
CREATININE + EGFR PANEL: 118 ML/MIN
DIFFERENTIAL, MANUAL: ABNORMAL
EOSINOPHILS ABSOLUTE: 0.29 E9/L (ref 0.05–0.5)
EOSINOPHILS ABSOLUTE: 0.3 K/UL (ref 0–0.33)
EOSINOPHILS RELATIVE PERCENT: 2.6 % (ref 0–3)
EOSINOPHILS RELATIVE PERCENT: 3.5 % (ref 0–6)
FERRITIN: 33.8 NG/ML (ref 11–307)
FOLATE: 9.6 NG/ML
GFR AFRICAN AMERICAN: >60
GFR NON-AFRICAN AMERICAN: 98 ML/MIN
GFR NON-AFRICAN AMERICAN: >60 ML/MIN/1.73
GLOBULIN: 2.8 G/DL (ref 1.9–3.9)
GLUCOSE BLD-MCNC: 76 MG/DL (ref 74–99)
GLUCOSE BLD-MCNC: 95 MG/DL (ref 70–99)
HCT VFR BLD CALC: 22.9 % (ref 36–44)
HCT VFR BLD CALC: 24.7 % (ref 34–48)
HDLC SERPL-MCNC: 36 MG/DL (ref 35–85)
HEMOCCULT STL QL: NEGATIVE
HEMOGLOBIN: 7.5 G/DL (ref 12–15)
HEMOGLOBIN: 7.6 G/DL (ref 11.5–15.5)
IMMATURE GRANULOCYTES #: 0.06 E9/L
IMMATURE GRANULOCYTES %: 0.7 % (ref 0–5)
INR BLD: 0.9
IRON SATURATION: 8 % (ref 15–55)
IRON: 29 UG/DL (ref 50–170)
LDL CHOLESTEROL: 85 MG/DL
LDL/HDL RATIO: 2.4 RATIO
LYMPHOCYTES ABSOLUTE: 1.3 K/UL (ref 1.1–4.8)
LYMPHOCYTES ABSOLUTE: 1.41 E9/L (ref 1.5–4)
LYMPHOCYTES RELATIVE PERCENT: 12.9 % (ref 24–44)
LYMPHOCYTES RELATIVE PERCENT: 17.2 % (ref 20–42)
MAGNESIUM: 1.9 MEQ/L (ref 1.6–2.6)
MCH RBC QN AUTO: 28.7 PG (ref 26–35)
MCH RBC QN AUTO: 29.3 PG (ref 28–34)
MCHC RBC AUTO-ENTMCNC: 30.8 % (ref 32–34.5)
MCHC RBC AUTO-ENTMCNC: 32.8 G/DL (ref 33–37)
MCV RBC AUTO: 89.2 FL (ref 80–100)
MCV RBC AUTO: 93.2 FL (ref 80–99.9)
MONOCYTES ABSOLUTE: 0.49 E9/L (ref 0.1–0.95)
MONOCYTES ABSOLUTE: 0.5 K/UL (ref 0.2–0.7)
MONOCYTES RELATIVE PERCENT: 4.9 % (ref 3.4–9)
MONOCYTES RELATIVE PERCENT: 6 % (ref 2–12)
NEUTROPHILS ABSOLUTE: 5.91 E9/L (ref 1.8–7.3)
NEUTROPHILS ABSOLUTE: 7.7 K/UL (ref 1.83–8.7)
NEUTROPHILS RELATIVE PERCENT: 72.2 % (ref 43–80)
PDW BLD-RTO: 17.6 % (ref 10.9–14.3)
PDW BLD-RTO: 17.9 FL (ref 11.5–15)
PLATELET # BLD: 512 E9/L (ref 130–450)
PLATELET # BLD: 533 K/UL (ref 150–450)
PMV BLD AUTO: 7.7 FL (ref 7.4–10.4)
PMV BLD AUTO: 9.8 FL (ref 7–12)
POTASSIUM REFLEX MAGNESIUM: 4.8 MMOL/L (ref 3.5–5)
POTASSIUM SERPL-SCNC: 4.7 MEQ/L (ref 3.6–5)
PROTHROMBIN TIME: 9.7 SEC (ref 9.3–12.4)
RBC # BLD: 2.57 M/UL (ref 4–4.9)
RBC # BLD: 2.65 E12/L (ref 3.5–5.5)
SEGMENTED NEUTROPHILS RELATIVE PERCENT: 78.9 % (ref 40–74)
SODIUM BLD-SCNC: 135 MEQ/L (ref 135–145)
SODIUM BLD-SCNC: 137 MMOL/L (ref 132–146)
T4 FREE: 0.94 NG/DL (ref 0.61–1.12)
THYROXINE (T4): 10.6 UG/DL (ref 6.1–12.2)
TOTAL IRON BINDING CAPACITY: 353 UG/DL (ref 250–450)
TOTAL PROTEIN: 6.3 G/DL (ref 5.9–7.8)
TOTAL PROTEIN: 6.5 G/DL (ref 6.4–8.3)
TRANSFERRIN: 252 MG/DL (ref 192–382)
TRIGL SERPL-MCNC: 206 MG/DL (ref 41–189)
TSH SERPL DL<=0.05 MIU/L-ACNC: 3.02 UIU/ML (ref 0.34–5.6)
VITAMIN B-12: 798 PG/ML (ref 180–914)
VITAMIN D 25-HYDROXY: 66.8
VITAMIN D2, 25 HYDROXY: NORMAL
VITAMIN D3,25 HYDROXY: NORMAL
WBC # BLD: 8.2 E9/L (ref 4.5–11.5)
WBC # BLD: 9.8 K/UL (ref 4.5–11)

## 2020-09-02 PROCEDURE — 86900 BLOOD TYPING SEROLOGIC ABO: CPT

## 2020-09-02 PROCEDURE — 86901 BLOOD TYPING SEROLOGIC RH(D): CPT

## 2020-09-02 PROCEDURE — 85025 COMPLETE CBC W/AUTO DIFF WBC: CPT

## 2020-09-02 PROCEDURE — 86850 RBC ANTIBODY SCREEN: CPT

## 2020-09-02 PROCEDURE — 93005 ELECTROCARDIOGRAM TRACING: CPT | Performed by: EMERGENCY MEDICINE

## 2020-09-02 PROCEDURE — 80053 COMPREHEN METABOLIC PANEL: CPT

## 2020-09-02 PROCEDURE — 85730 THROMBOPLASTIN TIME PARTIAL: CPT

## 2020-09-02 PROCEDURE — 99283 EMERGENCY DEPT VISIT LOW MDM: CPT

## 2020-09-02 PROCEDURE — 85610 PROTHROMBIN TIME: CPT

## 2020-09-02 RX ORDER — CARVEDILOL 3.12 MG/1
3.12 TABLET ORAL 2 TIMES DAILY PRN
Qty: 180 TABLET | Refills: 0 | Status: SHIPPED
Start: 2020-09-02 | End: 2020-09-09

## 2020-09-02 RX ORDER — FERROUS SULFATE 325(65) MG
325 TABLET ORAL EVERY OTHER DAY
Qty: 45 TABLET | Refills: 1 | Status: SHIPPED
Start: 2020-09-02 | End: 2021-02-05 | Stop reason: SDUPTHER

## 2020-09-02 RX ORDER — LISINOPRIL 2.5 MG/1
1.25 TABLET ORAL DAILY
Qty: 30 TABLET | Refills: 3 | Status: SHIPPED
Start: 2020-09-02 | End: 2021-02-03 | Stop reason: DRUGHIGH

## 2020-09-02 ASSESSMENT — ENCOUNTER SYMPTOMS
BLOOD IN STOOL: 0
COUGH: 0
BACK PAIN: 0
NAUSEA: 0
ABDOMINAL PAIN: 0
COLOR CHANGE: 0
SHORTNESS OF BREATH: 0
RHINORRHEA: 0
VOMITING: 0

## 2020-09-02 NOTE — TELEPHONE ENCOUNTER
Please let the patient know that blood work results showed    As previously called hemoglobin was very low and decreased from 8.5-7.5 when compared to previous    Iron levels were low and would recommend iron supplementation    Thyroid labs including T4 were normal    Vitamin D levels were normal    Vitamin C40 and folic acid levels were normal    Cholesterol levels were fair. Triglycerides were slightly elevated. HDL or good cholesterol was lower than recommended.   Would continue present medications    Magnesium level was normal    Other electrolytes, liver, and kidney function values were normal    Platelet count was mildly elevated    Other than hemoglobin all other blood counts were normal    Please send a copy of the reports to the patient    Thanks

## 2020-09-02 NOTE — TELEPHONE ENCOUNTER
I received these results and was just altered to abnoral results. I am uncertain as to reason for drop. Would need ot know if bleeding or change in vitals or mental status. Would consider evaluation in ER.  Will need follow up studies

## 2020-09-02 NOTE — TELEPHONE ENCOUNTER
Wolfgang informed. Denies any bleeding or changed in her mental status. Will call an ambulance and have her transported to [address] [place] [address],[address] for evaluation.

## 2020-09-02 NOTE — ED PROVIDER NOTES
ED PROVIDER NOTE    Chief Complaint   Patient presents with    Abnormal Lab     LOW HGB       HPI:  9/2/20,   Time: 6:41 PM EDT       Abdirahman Landeros is a 77 y.o. female presenting to the ED for abnormal lab value. Patient was called this morning due to low hemoglobin of 7.5. She feels well and has no complaints. She denies fatigue, dizziness, lightheadedness, chest pain, shortness of breath, abdominal pain, black or bloody stools. Recently treated for UTI and subsequently feeling better. She has a chronic indwelling Barger which was last changed 2 weeks ago. No fevers, chills, nausea, vomiting. Has not received a blood transfusion in the past.  Taking anticoagulant for LV thrombus. Patient has a history of iron deficiency anemia, has been taking iron only twice a week instead of daily due to constipation recently. Chart review: hx DM2, CAD on plavix, LV thrombus on apixaban, HTN, MS, spastic quadriparesis, neurogenic bladder    Review of Systems:     Review of Systems   Constitutional: Negative for appetite change, chills, fatigue and fever. HENT: Negative for congestion and rhinorrhea. Eyes: Negative for visual disturbance. Respiratory: Negative for cough and shortness of breath. Cardiovascular: Negative for chest pain. Gastrointestinal: Negative for abdominal pain, blood in stool, nausea and vomiting. Genitourinary: Negative for decreased urine volume and difficulty urinating. Musculoskeletal: Negative for back pain and neck pain. Skin: Negative for color change.    Neurological: Negative for dizziness, syncope, weakness, light-headedness, numbness and headaches.       --------------------------------------------- PAST HISTORY ---------------------------------------------  Past Medical History:   Past Medical History:   Diagnosis Date    CAD (coronary artery disease) 4/21/2020    Hepatitis     High cholesterol     Hypertension     Hypothyroidism     MI (myocardial infarction) Physically abused: None     Forced sexual activity: None   Other Topics Concern    None   Social History Narrative    None       Family History:   Family History   Problem Relation Age of Onset    Stroke Mother     Heart Disease Mother     Cancer Father         lung    Mult Sclerosis Sister     No Known Problems Brother     No Known Problems Sister     Arthritis Sister     Cervical Cancer Sister        The patients home medications have been reviewed. Allergies:   No Known Allergies        ---------------------------------------------------PHYSICAL EXAM--------------------------------------    BP (!) 95/47   Pulse 82   Temp 98.9 °F (37.2 °C) (Oral)   Resp 21   Ht 5' (1.524 m)   Wt 143 lb (64.9 kg)   SpO2 100%   BMI 27.93 kg/m²     Physical Exam  Vitals signs and nursing note reviewed. Constitutional:       General: She is not in acute distress. Appearance: She is not toxic-appearing. HENT:      Mouth/Throat:      Mouth: Mucous membranes are moist.   Eyes:      General: No scleral icterus. Extraocular Movements: Extraocular movements intact. Pupils: Pupils are equal, round, and reactive to light. Neck:      Musculoskeletal: Normal range of motion and neck supple. Cardiovascular:      Rate and Rhythm: Normal rate and regular rhythm. Pulses: Normal pulses. Heart sounds: Normal heart sounds. No murmur. Pulmonary:      Effort: Pulmonary effort is normal. No respiratory distress. Breath sounds: Normal breath sounds. No wheezing or rales. Abdominal:      General: There is no distension. Palpations: Abdomen is soft. Tenderness: There is no abdominal tenderness. Musculoskeletal: Normal range of motion. General: No swelling or tenderness. Skin:     General: Skin is warm and dry. Neurological:      Mental Status: She is alert and oriented to person, place, and time.       Comments: Spastic quadriplegia -------------------------------------------------- RESULTS -------------------------------------------------  I have personally reviewed all laboratory and imaging results for this patient. Results are listed below. LABS:  Labs Reviewed   CBC WITH AUTO DIFFERENTIAL - Abnormal; Notable for the following components:       Result Value    RBC 2.65 (*)     Hemoglobin 7.6 (*)     Hematocrit 24.7 (*)     MCHC 30.8 (*)     RDW 17.9 (*)     Platelets 301 (*)     Lymphocytes % 17.2 (*)     Lymphocytes Absolute 1.41 (*)     All other components within normal limits   COMPREHENSIVE METABOLIC PANEL W/ REFLEX TO MG FOR LOW K - Abnormal; Notable for the following components:    BUN 26 (*)     All other components within normal limits   POCT OCCULT BLOOD STOOL COLON CA SCREEN 1-3 - Normal   PROTIME-INR   APTT   TYPE AND SCREEN       Anemia, hemoglobin 7.6 decreased from 8.5 one month ago  Otherwise No clinically significant lab abnormalities      ------------------------- NURSING NOTES AND VITALS REVIEWED ---------------------------   The nursing notes within the ED encounter and vital signs as below have been reviewed by myself. BP (!) 95/47   Pulse 82   Temp 98.9 °F (37.2 °C) (Oral)   Resp 21   Ht 5' (1.524 m)   Wt 143 lb (64.9 kg)   SpO2 100%   BMI 27.93 kg/m²   Oxygen Saturation Interpretation: Normal    The patients available past medical records and past encounters were reviewed. ------------------------------ ED COURSE/MEDICAL DECISION MAKING----------------------    Counseling: The emergency provider has spoken with the patient and discussed todays results, in addition to providing specific details for the plan of care and counseling regarding the diagnosis and prognosis. Questions are answered at this time and they are agreeable with the plan. ED Course/Medical Decision Makin y.o. female here with anemia on outpatient labs.   Patient is afebrile, well-appearing, hemodynamically stable, and in no acute distress. Benign exam, no abdominal tenderness, guaiac negative stool. Hemoglobin decreased from 8.5-7.5, likely due to chronic iron deficiency anemia in setting of patient recently decreasing frequency that she is taking her iron. Low suspicion for acute blood loss anemia. Patient feels well and wants to go home. Advised patient to resume daily iron supplementation. After discussion of findings and return precautions, patient agrees with plan for discharge and outpatient follow up with PCP.       --------------------------------- IMPRESSION AND DISPOSITION ---------------------------------    IMPRESSION  1. Iron deficiency anemia, unspecified iron deficiency anemia type        DISPOSITION  Disposition: Discharge to home  Patient condition is good    NOTE: This report was transcribed using voice recognition software.  Every effort was made to ensure accuracy; however, inadvertent computerized transcription errors may be present    Rafa Ramirez MD  Attending Emergency Physician         Rafa Ramirez MD  09/02/20 [id number]

## 2020-09-02 NOTE — LETTER
[address] [place] [address] Drive  [address] [place] [address]  Phone: [phone]  Fax: Asuncion Loco MD        September 3, 2020     [place]  [address] [facility] [address]      Dear Joo Daniel:    Below are the results from your recent visit:    Resulted Orders   CBC Auto Differential   Result Value Ref Range    WBC 9.8 4.5 - 11.0 K/uL    RBC 2.57 (L) 4.00 - 4.90 M/uL    Hemoglobin 7.5 (LL) 12.0 - 15.0 g/dL    Hematocrit 22.9 (LL) 36.0 - 44.0 %    MCV 89.2 80.0 - 100.0 fL    MCH 29.3 28.0 - 34.0 pg    MCHC 32.8 (L) 33.0 - 37.0 g/dL    RDW 17.6 (H) 10.9 - 14.3 %    Platelets 425 (H) 507 - 450 K/uL    MPV 7.7 7.4 - 10.4 fL    Segs Relative 78.9 (H) 40.0 - 74.0 %    Lymphocytes % 12.9 (L) 24.0 - 44.0 %    Monocytes % 4.9 3.4 - 9.0 %    Eosinophils % 2.6 0.0 - 3.0 %    Basophils % 0.7 0.0 - 1.5 %    Neutrophils Absolute 7.7 1.83 - 8.70 K/uL    Lymphocytes Absolute 1.3 1.10 - 4.80 K/uL    Monocytes Absolute 0.5 0.20 - 0.70 K/uL    Eosinophils Absolute 0.3 0.00 - 0.33 K/uL    Basophils Absolute 0.1 0.00 - 0.20 K/uL    Differential, manual AUTO DIFF    Comprehensive Metabolic Panel   Result Value Ref Range    Sodium 135 135 - 145 mEq/L    Potassium 4.7 3.6 - 5.0 mEq/L    Chloride 100 98 - 107 mEq/L    CO2 27 21 - 31 mEq/L    Anion Gap 8.0 3 - 11 mEq/L    BUN 33 (H) 8 - 21 mg/dL    CREATININE 0.6 0.4 - 1.0 mg/dL    GFR Non-African American 98 >60 mL/min    Creatinine + eGFR Panel 118 >60 mL/min    Glucose 95 70 - 99 mg/dL    Calcium 9.4 8.5 - 10.5 mg/dL    Total Bilirubin 0.3 0.3 - 1.5 mg/dL    Alkaline Phosphatase 80 42 - 121 U/L    AST 18 10 - 41 U/L    ALT 14 10 - 54 U/L    Total Protein 6.3 5.9 - 7.8 g/dL    Alb 3.5 3.4 - 4.8 g/dL    Globulin 2.8 1.9 - 3.9 g/dL    Albumin/Globulin Ratio 1.3 1.1 - 2.2 ratio   Magnesium   Result Value Ref Range    Magnesium 1.9 1.6 - 2.6 mEq/L   ANEMIA PANEL   Result Value Ref Range    Iron 29 (L) 50 - 170 ug/dL Transferrin 252 192 - 382 mg/dL    TIBC 353 250 - 450 ug/dL    Iron Saturation 8 (L) 15 - 55 %    Ferritin 33.8 11 - 307 ng/mL   Folate   Result Value Ref Range    Folate 9.6 >5.9 ng/mL   Lipid Panel   Result Value Ref Range    Cholesterol 156 140 - 200 mg/dL    Triglycerides 206 (H) 41 - 189 mg/dL    LDL Cholesterol 85 <129 mg/dL    HDL 36 35 - 85 mg/dL    LDl/HDL Ratio 2.4 ratio   Vitamin B12   Result Value Ref Range    Vitamin B-12 798 180 - 914 pg/mL   T4, Free   Result Value Ref Range    T4 Free 0.94 0.61 - 1.12 ng/dL   T4   Result Value Ref Range    Thyroxine (T4) 10.6 6.1 - 12.2 ug/dL   TSH without Reflex   Result Value Ref Range    TSH 3.02 0.34 - 5.60 uIU/mL                                 The test results show:    As previously called hemoglobin was very low and decreased from 8.5-7.5 when compared to previous     Iron levels were low and would recommend daily iron supplementation     Thyroid labs including T4 were normal     Vitamin D levels were normal     Vitamin N60 and folic acid levels were normal     Cholesterol levels were fair. Triglycerides were slightly elevated. HDL or good cholesterol was lower than recommended. Would continue present medications     Magnesium level was normal     Other electrolytes, liver, and kidney function values were normal     Platelet count was mildly elevated     Other than hemoglobin all other blood counts were normal      If you have any questions or concerns, please don't hesitate to call.     Sincerely,        Charity Flores MD

## 2020-09-03 ENCOUNTER — TELEPHONE (OUTPATIENT)
Dept: FAMILY MEDICINE CLINIC | Age: 75
End: 2020-09-03

## 2020-09-03 LAB
EKG ATRIAL RATE: 84 BPM
EKG P AXIS: 40 DEGREES
EKG P-R INTERVAL: 184 MS
EKG Q-T INTERVAL: 398 MS
EKG QRS DURATION: 92 MS
EKG QTC CALCULATION (BAZETT): 470 MS
EKG R AXIS: -7 DEGREES
EKG T AXIS: 84 DEGREES
EKG VENTRICULAR RATE: 84 BPM

## 2020-09-03 PROCEDURE — 93010 ELECTROCARDIOGRAM REPORT: CPT | Performed by: INTERNAL MEDICINE

## 2020-09-03 NOTE — TELEPHONE ENCOUNTER
Wolfgang calling to tell you that they went to the ER and was told that she does not merit a blood transfusion after testing her. He did tell her that she needs to take the Iron every day instead of twice a week. Is all this ok with you?

## 2020-09-03 NOTE — TELEPHONE ENCOUNTER
Wolfgang informed of results. He would like an order for ROXY to do a UA next week. She is not having symptoms but there was a lot of  sediment after her cath was changed today.      I will fax an order for a UA C&S

## 2020-09-03 NOTE — ED NOTES
[facility] #: [phone]  Leola Callas #: [phone]     Forestine Pandy, [place]  09/02/20 2145

## 2020-09-03 NOTE — TELEPHONE ENCOUNTER
Wolfgang notified , cbc pended. He would like [facility] to draw .  If order approved will need  Faxed to them

## 2020-09-03 NOTE — TELEPHONE ENCOUNTER
Orders Placed This Encounter   Procedures    CBC Auto Differential     Standing Status:   Future     Standing Expiration Date:   9/3/2021     Order signed   Please fax to leon visiting nurse   Yaa

## 2020-09-04 ENCOUNTER — TELEPHONE (OUTPATIENT)
Dept: ENDOCRINOLOGY | Age: 75
End: 2020-09-04

## 2020-09-04 NOTE — TELEPHONE ENCOUNTER
Called pt regarding bs logs dated: 8/13-8/26    Dr Maryjane Green recommends:  Current dm regimen, okay to check bs once daily    Pt response: expressed verbal understanding, pt's  also stated the side effects of the new medication state to let dr know for kidney problems and heart problems,  Wanted to say pt has hx of heart attack and kidney stone

## 2020-09-08 ENCOUNTER — TELEPHONE (OUTPATIENT)
Dept: FAMILY MEDICINE CLINIC | Age: 75
End: 2020-09-08

## 2020-09-08 NOTE — TELEPHONE ENCOUNTER
Orders Placed This Encounter   Procedures    CBC Auto Differential     Standing Status:   Future     Standing Expiration Date:   9/8/2021     New order placed   Will need faxed     Thanks

## 2020-09-09 ENCOUNTER — VIRTUAL VISIT (OUTPATIENT)
Dept: ENDOCRINOLOGY | Age: 75
End: 2020-09-09
Payer: MEDICARE

## 2020-09-09 PROCEDURE — G8417 CALC BMI ABV UP PARAM F/U: HCPCS | Performed by: INTERNAL MEDICINE

## 2020-09-09 PROCEDURE — 1090F PRES/ABSN URINE INCON ASSESS: CPT | Performed by: INTERNAL MEDICINE

## 2020-09-09 PROCEDURE — 99214 OFFICE O/P EST MOD 30 MIN: CPT | Performed by: INTERNAL MEDICINE

## 2020-09-09 PROCEDURE — G8427 DOCREV CUR MEDS BY ELIG CLIN: HCPCS | Performed by: INTERNAL MEDICINE

## 2020-09-09 PROCEDURE — 3017F COLORECTAL CA SCREEN DOC REV: CPT | Performed by: INTERNAL MEDICINE

## 2020-09-09 PROCEDURE — 3044F HG A1C LEVEL LT 7.0%: CPT | Performed by: INTERNAL MEDICINE

## 2020-09-09 PROCEDURE — G9899 SCRN MAM PERF RSLTS DOC: HCPCS | Performed by: INTERNAL MEDICINE

## 2020-09-09 PROCEDURE — 1123F ACP DISCUSS/DSCN MKR DOCD: CPT | Performed by: INTERNAL MEDICINE

## 2020-09-09 PROCEDURE — G8400 PT W/DXA NO RESULTS DOC: HCPCS | Performed by: INTERNAL MEDICINE

## 2020-09-09 PROCEDURE — 1036F TOBACCO NON-USER: CPT | Performed by: INTERNAL MEDICINE

## 2020-09-09 PROCEDURE — 4040F PNEUMOC VAC/ADMIN/RCVD: CPT | Performed by: INTERNAL MEDICINE

## 2020-09-09 PROCEDURE — 2022F DILAT RTA XM EVC RTNOPTHY: CPT | Performed by: INTERNAL MEDICINE

## 2020-09-09 RX ORDER — ASPIRIN 81 MG/1
81 TABLET ORAL DAILY
COMMUNITY

## 2020-09-09 NOTE — PROGRESS NOTES
Sheri Haynes was read the following message We want to confirm that, for purposes of billing, this is a virtual visit with your provider for which we will submit a claim for reimbursement with your insurance company. You will be responsible for any copays, coinsurance amounts or other amounts not covered by your insurance company. If you do not accept this, unfortunately we will not be able to schedule a virtual visit with the provider. Do you accept?  Brisa Chow responded YES

## 2020-09-09 NOTE — PROGRESS NOTES
[address] [place] [address] Department of Endocrinology Diabetes and Metabolism   [address] [facility] [address]   Phone: [phone]  Fax: [phone]    Date of Service: 9/9/2020    Primary Care Physician: Yony Vogel MD  Provider: Umesh Michel MD     Reason for the visit:  DM type 2    History of Present Illness: The history is provided by the patient. No  was used. Accuracy of the patient data is excellent. Isa Nur is a very pleasant 77 y.o. female CAD and MS seen today for diabetes management     Isa Nur was diagnosed with diabetes at age   The patient is currently on Metformin 500 mg BID,   The patient has been checking blood sugar twice a week.  BS log not available today   Most recent A1c results summarized below  Lab Results   Component Value Date    LABA1C 6.6 07/30/2020    LABA1C 6.7 05/13/2020    LABA1C 7.0 10/30/2019     Patient has had no hypoglycemic episodes   The patient has been mindful of what has been eating and following diabetic diet as encouraged  I reviewed current medications and the patient has no issues with diabetes medications  The patient is due for an eye exam   Microvascular complications:  No Retinopathy, Nephropathy or Neuropathy   Macrovascular complications: + CAD   The patient receives Flushot every year and up to date with the Pneumonia vaccine     PAST MEDICAL HISTORY   Past Medical History:   Diagnosis Date    CAD (coronary artery disease) 4/21/2020    Hepatitis     High cholesterol     Hypertension     Hypothyroidism     MI (myocardial infarction) ([address] 04/21/2020    MS (multiple sclerosis) ([address]     Osteoporosis     Sarcoidosis     Type 2 diabetes mellitus with hyperglycemia, without long-term current use of insulin ([address] 10/30/2019       PAST SURGICAL HISTORY   Past Surgical History:   Procedure Laterality Date    CHOLECYSTECTOMY  1990s    CORONARY ANGIOPLASTY WITH STENT PLACEMENT  04/21/2020     PRN testing needs. Dispense all needed supplies to include: monitor, strips, lancing device, lancets, control solutions, alcohol swabs. 1 kit 0    blood glucose monitor strips Test qd  & as needed for symptoms of irregular blood glucose. Dispense sufficient amount for indicated testing frequency plus additional to accommodate PRN testing needs.  50 strip 11    Lancets MISC 1 each by Does not apply route daily 50 each 11    citalopram (CELEXA) 20 MG tablet Take 1 tablet by mouth every morning 90 tablet 0    atorvastatin (LIPITOR) 10 MG tablet Take 1 tablet by mouth daily 90 tablet 1    senna (SENOKOT) 8.6 MG tablet Take 1 tablet by mouth daily       ondansetron (ZOFRAN ODT) 4 MG disintegrating tablet Take 4-8 mg by mouth every 8 hours as needed for Nausea or Vomiting      baclofen (LIORESAL) 20 MG tablet Take 50 mg by mouth 4 times daily       carvedilol (COREG) 6.25 MG tablet Take 1 tablet by mouth 2 times daily (Patient taking differently: Take 3.125 mg by mouth 2 times daily If systolic blood pressure is 95 or greater) 60 tablet 3    levothyroxine (SYNTHROID) 88 MCG tablet Take 1 tablet by mouth daily (Patient taking differently: Take 88 mcg by mouth nightly ) 90 tablet 0    nortriptyline (PAMELOR) 25 MG capsule Take 1 capsule by mouth nightly 90 capsule 0    Catheters (URETHRAL CATHETER) MISC by Does not apply route      Cyanocobalamin (VITAMIN B 12 PO) Take 500 mg by mouth daily       ipratropium-albuterol (DUONEB) 0.5-2.5 (3) MG/3ML SOLN nebulizer solution Inhale 3 mLs into the lungs every 6 hours as needed for Shortness of Breath 360 mL 6    Calcium Carbonate-Vit D-Min (CALCIUM 600+D PLUS MINERALS) 600-400 MG-UNIT TABS Take 1 tablet by mouth nightly       polyethylene glycol (MIRALAX) PACK packet Take 17 g by mouth daily       vitamin D3 (CHOLECALCIFEROL) 25 MCG (1000 UT) TABS tablet Take 1 tablet by mouth every morning       Potassium 99 MG TABS Take 1 tablet by mouth every morning      Component Value Date/Time    WBC 8.2 09/02/2020 07:07 PM    RBC 2.65 (L) 09/02/2020 07:07 PM    HGB 7.6 (L) 09/02/2020 07:07 PM    HCT 24.7 (L) 09/02/2020 07:07 PM    MCV 93.2 09/02/2020 07:07 PM    MCH 28.7 09/02/2020 07:07 PM    MCHC 30.8 (L) 09/02/2020 07:07 PM    RDW 17.9 (H) 09/02/2020 07:07 PM     (H) 09/02/2020 07:07 PM    MPV 9.8 09/02/2020 07:07 PM      Lab Results   Component Value Date/Time     09/02/2020 07:07 PM    K 4.8 09/02/2020 07:07 PM    CO2 26 09/02/2020 07:07 PM    BUN 26 (H) 09/02/2020 07:07 PM    CREATININE 0.6 09/02/2020 07:07 PM    CALCIUM 9.8 09/02/2020 07:07 PM    LABGLOM >60 09/02/2020 07:07 PM    GFRAA >60 09/02/2020 07:07 PM      Lab Results   Component Value Date/Time    TSH 3.02 09/02/2020 09:05 AM    T4FREE 0.94 09/02/2020 09:05 AM     Lab Results   Component Value Date    LABA1C 6.6 07/30/2020    GLUCOSE 76 09/02/2020    LABMICR 49.5 10/30/2019     Lab Results   Component Value Date    LABA1C 6.6 07/30/2020    LABA1C 6.7 05/13/2020    LABA1C 7.0 10/30/2019     Lab Results   Component Value Date    TRIG 206 09/02/2020    HDL 36 09/02/2020    LDLCALC 115 10/30/2019    CHOL 156 09/02/2020    CHOL 201 03/12/2019     Lab Results   Component Value Date    VITD25 66.8 09/02/2020       Medical Records/Labs/Images review:   I personally reviewed and summarized previous records   All labs and imaging studies were independently reviewed     ASSESSMENT & RECOMMENDATIONS   Michaela Douglas, a 77 y.o.-old female seen in for the following issues     Diabetes Mellitus Type 2    · Under good control   · Continue Metformin 500 mg BID   · The patient was advised to check blood sugars  2 times a day before meals and send BS readings to our office in a week.   · Discussed with patient A1c and blood sugar goals   · Patient will need routine diabetes maintenance and prevention  · The patient was referred to diabetic educator for further teaching   · Diabetes labs before next visit HLD  · Continue Lipitor 10 mg daily     Hypothyroidism   · Currently on levothyroxine 88 mcg daily  · Will recheck level again before next visit and adjust the dose if needed     Return in about 4 months (around 1/9/2021) for DM type 2 . The above issues were reviewed with the patient who understood and agreed with the plan. Thank you for allowing us to participate in the care of this patient. Please do not hesitate to contact us with any additional questions. Diagnosis Orders   1. Type 2 diabetes mellitus with other specified complication, without long-term current use of insulin ([facility]     2. Hyperlipidemia, unspecified hyperlipidemia type     3. Hypothyroidism, unspecified type     4. Vitamin D deficiency       Meredith Zamudio MD  Endocrinologist, [facility] - [facility] Diabetes Care and Endocrinology   [address] [place] [address]   Phone: [phone]  Fax: [phone]  --------------------------------------------  An electronic signature was used to authenticate this note. Felice Schwab MD on 9/9/2020 at 6:04 PM  Services were provided through a video synchronous discussion virtually to substitute for in-person clinic visit. Pursuant to the emergency declaration under the [facility] waiver authority and the [employer] Act, this Virtual  Visit was conducted, with patient's consent, to reduce the patient's risk of exposure to COVID-19 and provide continuity of care.

## 2020-09-10 LAB
APPEARANCE, BODY FLUID: ABNORMAL
BACTERIA, URINE: NORMAL /HPF
BASOPHILS ABSOLUTE: 0 K/UL (ref 0–0.2)
BASOPHILS RELATIVE PERCENT: 0.6 % (ref 0–1.5)
BILIRUBIN URINE: NEGATIVE
COLOR, URINE: YELLOW
EOSINOPHILS ABSOLUTE: 0.2 K/UL (ref 0–0.33)
EOSINOPHILS RELATIVE PERCENT: 2.5 % (ref 0–3)
ERYTHROCYTES URINE: NORMAL /HPF
GLUCOSE URINE: NEGATIVE MG/DL
HCT VFR BLD CALC: 28.9 % (ref 36–44)
HEMOGLOBIN: 9.4 G/DL (ref 12–15)
KETONES, URINE: NEGATIVE MG/DL
LYMPHOCYTES ABSOLUTE: 1.1 K/UL (ref 1.1–4.8)
LYMPHOCYTES RELATIVE PERCENT: 16.9 % (ref 24–44)
MCH RBC QN AUTO: 29.7 PG (ref 28–34)
MCHC RBC AUTO-ENTMCNC: 32.6 G/DL (ref 33–37)
MCV RBC AUTO: 91.2 FL (ref 80–100)
MICROSCOPIC URINE: YES
MONOCYTES ABSOLUTE: 0.5 K/UL (ref 0.2–0.7)
MONOCYTES RELATIVE PERCENT: 7.2 % (ref 3.4–9)
NEUTROPHILS ABSOLUTE: 4.6 K/UL (ref 1.83–8.7)
NITRATE, UA: NEGATIVE
PDW BLD-RTO: 18.6 % (ref 10.9–14.3)
PH UA: 8 (ref 4.6–8)
PLATELET # BLD: 379 K/UL (ref 150–450)
PMV BLD AUTO: 8.3 FL (ref 7.4–10.4)
PROTEIN UA: 100 MG/DL
RBC # BLD: 3.17 M/UL (ref 4–4.9)
RBC URINE: ABNORMAL
SEGMENTED NEUTROPHILS RELATIVE PERCENT: 72.8 % (ref 40–74)
SPECIFIC GRAVITY, URINE: 1.01 (ref 1–1.03)
TRANSITIONAL EPITHELIAL: NORMAL /HPF
URINE CULTURE REFLEX: ABNORMAL
UROBILINOGEN, URINE: NEGATIVE MG/DL
WBC # BLD: 6.4 K/UL (ref 4.5–11)
WBC CLUMPS, URINE: NORMAL /HPF
WBC COUNT, UR AUTO: >100 /HPF
WBC URINE: ABNORMAL

## 2020-09-12 LAB — URINE CULTURE, ROUTINE: NORMAL

## 2020-09-13 ENCOUNTER — TELEPHONE (OUTPATIENT)
Dept: FAMILY MEDICINE CLINIC | Age: 75
End: 2020-09-13

## 2020-09-13 NOTE — TELEPHONE ENCOUNTER
Please let the patient know that blood work results showed    Blood count still show anemia but much improved when compared to previous. Would continue iron supplementation    Urine analysis showed white cells red cells and proteins    Urine culture also showed bacteria    The urine findings I am unsure if they are real infection or related to chronic catheter    I am concerned about repeated antibiotics in the antibiotic resistance because of multiple uses of antibiotics. Would recommend evaluation with urology and would even consider infectious disease.      Current urine culture is very resistant bacteria and only sensitive to a few antibiotics 1 of which is included Levaquin    Thanks

## 2020-09-14 ENCOUNTER — CARE COORDINATION (OUTPATIENT)
Dept: CASE MANAGEMENT | Age: 75
End: 2020-09-14

## 2020-09-14 NOTE — CARE COORDINATION
[facility] Transitions Follow Up Call    2020    Patient: Lore Gomez  Patient : 1945   MRN: <[id number]>  Reason for Admission:   Discharge Date: 20 RARS: Readmission Risk Score: 25         Spoke with: pt's     Care Transitions Subsequent and Final Call    Subsequent and Final Calls  Do you have any ongoing symptoms?:  No  Have your medications changed?:  No  Do you have any questions related to your medications?:  No  Do you currently have any active services?:  Yes  Are you currently active with any services?:  Home Health, Other  Do you have any needs or concerns that I can assist you with?:  No  Care Transitions Interventions  Other Interventions:          States she's been getting occasional UTIs and just completed an antibiotic. States she has another urine culture this week and they also continue to trend her Hgb. States her stools are dark but were negative for blood. Reports they have palliative care and the nurse will be coming tomorrow. States her breathing is normal and denies CP, fevers, or flu-like symptoms. States she's been sitting up in her chair for a couple hours each day, and is taking medications as prescribed. States he does have some concerns regarding his medical bills but plans to discuss that with the  that's coming. Denies other questions or concerns at this time.    Follow Up  Future Appointments   Date Time Provider Marisol Garcia   2020  3:00 PM MD DIMITRI Ashton [facility]   2020  3:20 PM Meera Santos MD [facility] NEURO [facility]   10/13/2020  2:00 PM [facility]ain[facility]tan [facility]   2021  9:20 AM Cameron Shen MD [facility]       Lio Hamilton

## 2020-09-14 NOTE — TELEPHONE ENCOUNTER
I updated Andres Kelley and let him know we are waiting to hear back from urology. Andres Kelley has not noticed any changes in her mental status and states that her urine has been clear. Andres Kelley would like to know if you are ordering repeat labs for this Thurs to check her H&H? Please advise.

## 2020-09-15 NOTE — TELEPHONE ENCOUNTER
If she is doing well with no changes and no fever and no dysuria no abdominal pain flank pain no change in mental status would not treat the urine    Orders Placed This Encounter   Procedures    CBC Auto Differential     Standing Status:   Future     Standing Expiration Date:   9/15/2021    Comprehensive Metabolic Panel     Standing Status:   Future     Standing Expiration Date:   9/15/2021     Basic blood orders to check electrolytes kidney liver and blood counts but would do these a week from today    Thanks

## 2020-09-15 NOTE — TELEPHONE ENCOUNTER
Per Dr Matt Gonzalez: since pt has not had any symptoms he does not want to treat. Positive result may be from chronic cath use and contaminant. He is afraid to over treat and for Michaela to become resistant to certain antibiotics. Will watch for signs and symptoms of UTI. Repeat UA C&S has been ordered for next Tuesday. [facility]

## 2020-09-16 ENCOUNTER — VIRTUAL VISIT (OUTPATIENT)
Dept: FAMILY MEDICINE CLINIC | Age: 75
End: 2020-09-16
Payer: MEDICARE

## 2020-09-16 PROBLEM — I25.110 CORONARY ARTERY DISEASE INVOLVING NATIVE HEART WITH UNSTABLE ANGINA PECTORIS (HCC): Status: ACTIVE | Noted: 2020-04-21

## 2020-09-16 PROCEDURE — G8427 DOCREV CUR MEDS BY ELIG CLIN: HCPCS | Performed by: INTERNAL MEDICINE

## 2020-09-16 PROCEDURE — 3044F HG A1C LEVEL LT 7.0%: CPT | Performed by: INTERNAL MEDICINE

## 2020-09-16 PROCEDURE — G9899 SCRN MAM PERF RSLTS DOC: HCPCS | Performed by: INTERNAL MEDICINE

## 2020-09-16 PROCEDURE — 4040F PNEUMOC VAC/ADMIN/RCVD: CPT | Performed by: INTERNAL MEDICINE

## 2020-09-16 PROCEDURE — 3017F COLORECTAL CA SCREEN DOC REV: CPT | Performed by: INTERNAL MEDICINE

## 2020-09-16 PROCEDURE — 1090F PRES/ABSN URINE INCON ASSESS: CPT | Performed by: INTERNAL MEDICINE

## 2020-09-16 PROCEDURE — 2022F DILAT RTA XM EVC RTNOPTHY: CPT | Performed by: INTERNAL MEDICINE

## 2020-09-16 PROCEDURE — G8400 PT W/DXA NO RESULTS DOC: HCPCS | Performed by: INTERNAL MEDICINE

## 2020-09-16 PROCEDURE — 99214 OFFICE O/P EST MOD 30 MIN: CPT | Performed by: INTERNAL MEDICINE

## 2020-09-16 PROCEDURE — 1123F ACP DISCUSS/DSCN MKR DOCD: CPT | Performed by: INTERNAL MEDICINE

## 2020-09-16 RX ORDER — DOCUSATE SODIUM 100 MG/1
100 CAPSULE, LIQUID FILLED ORAL DAILY PRN
COMMUNITY
End: 2021-06-10 | Stop reason: ALTCHOICE

## 2020-09-16 ASSESSMENT — ENCOUNTER SYMPTOMS
DIARRHEA: 0
RHINORRHEA: 0
CHEST TIGHTNESS: 0
EYE PAIN: 0
NAUSEA: 0
ABDOMINAL PAIN: 0
BLOOD IN STOOL: 0
CONSTIPATION: 1
COUGH: 0
VOMITING: 0
SHORTNESS OF BREATH: 1
SORE THROAT: 0

## 2020-09-18 ENCOUNTER — TELEPHONE (OUTPATIENT)
Dept: FAMILY MEDICINE CLINIC | Age: 75
End: 2020-09-18

## 2020-09-18 NOTE — TELEPHONE ENCOUNTER
Jessica Thompson called to say that Dr. Aram Ochoa put pt on nitrofurantoin and she is having hallucinations. He has tried calling Dr. Danielle Valle office but the phone rings and no one answers. Please advise.

## 2020-09-22 ENCOUNTER — TELEPHONE (OUTPATIENT)
Dept: FAMILY MEDICINE CLINIC | Age: 75
End: 2020-09-22

## 2020-09-22 LAB
A/G RATIO: 1.2 RATIO (ref 1.1–2.2)
ALBUMIN SERPL-MCNC: 3.5 G/DL (ref 3.4–4.8)
ALP BLD-CCNC: 134 U/L (ref 42–121)
ALT SERPL-CCNC: 55 U/L (ref 10–54)
ANION GAP SERPL CALCULATED.3IONS-SCNC: 12 MEQ/L (ref 3–11)
AST SERPL-CCNC: 22 U/L (ref 10–41)
BASOPHILS ABSOLUTE: 0 K/UL (ref 0–0.2)
BASOPHILS RELATIVE PERCENT: 0.4 % (ref 0–1.5)
BILIRUB SERPL-MCNC: 0.4 MG/DL (ref 0.3–1.5)
BUN BLDV-MCNC: 28 MG/DL (ref 8–21)
CALCIUM SERPL-MCNC: 9.4 MG/DL (ref 8.5–10.5)
CHLORIDE BLD-SCNC: 95 MEQ/L (ref 98–107)
CO2: 25 MEQ/L (ref 21–31)
CREAT SERPL-MCNC: 0.7 MG/DL (ref 0.4–1)
CREATININE + EGFR PANEL: 99 ML/MIN
DIFFERENTIAL, MANUAL: ABNORMAL
EOSINOPHILS ABSOLUTE: 0.3 K/UL (ref 0–0.33)
EOSINOPHILS RELATIVE PERCENT: 3.7 % (ref 0–3)
GFR NON-AFRICAN AMERICAN: 82 ML/MIN
GLOBULIN: 3 G/DL (ref 1.9–3.9)
GLUCOSE BLD-MCNC: 137 MG/DL (ref 70–99)
HCT VFR BLD CALC: 23.8 % (ref 36–44)
HEMOGLOBIN: 7.9 G/DL (ref 12–15)
LYMPHOCYTES ABSOLUTE: 1.1 K/UL (ref 1.1–4.8)
LYMPHOCYTES RELATIVE PERCENT: 15.4 % (ref 24–44)
MCH RBC QN AUTO: 30.3 PG (ref 28–34)
MCHC RBC AUTO-ENTMCNC: 33.1 G/DL (ref 33–37)
MCV RBC AUTO: 91.3 FL (ref 80–100)
MONOCYTES ABSOLUTE: 0.5 K/UL (ref 0.2–0.7)
MONOCYTES RELATIVE PERCENT: 7.7 % (ref 3.4–9)
NEUTROPHILS ABSOLUTE: 5.2 K/UL (ref 1.83–8.7)
PDW BLD-RTO: 18.2 % (ref 10.9–14.3)
PLATELET # BLD: 425 K/UL (ref 150–450)
PMV BLD AUTO: 8 FL (ref 7.4–10.4)
POTASSIUM SERPL-SCNC: 4.8 MEQ/L (ref 3.6–5)
RBC # BLD: 2.61 M/UL (ref 4–4.9)
SEGMENTED NEUTROPHILS RELATIVE PERCENT: 72.8 % (ref 40–74)
SODIUM BLD-SCNC: 132 MEQ/L (ref 135–145)
TOTAL PROTEIN: 6.5 G/DL (ref 5.9–7.8)
WBC # BLD: 7.2 K/UL (ref 4.5–11)

## 2020-09-22 NOTE — TELEPHONE ENCOUNTER
Discussed critical lab results w/ Wolfgang. He would like a referral for a hematologist in [facility]. Driss Light will continue to take iron supplement daily. Ana Barksdale also said that Dr Constantino Huff prescribed Cipro in place of Macrobid. She seems to be tolerating the medication.        Can you place the referral? Problem: Breathing Pattern - Ineffective  Goal: *Absence of hypoxia  Outcome: Progressing Towards Goal  Patients oxygen saturation above 92% on 3L nasal cannula no signs or symptoms of respiratory distress, will continue to monitor. 0000 - Bedside shift change report given to Jo-Ann Luna (oncoming nurse) by Kaya Hoang RN (offgoing nurse). Report included the following information SBAR, Kardex, ED Summary, Procedure Summary, Intake/Output, MAR, Recent Results and Med Rec Status. Opportunity for questions provided  q1h rounds completed during shift.

## 2020-09-23 ENCOUNTER — TELEPHONE (OUTPATIENT)
Dept: FAMILY MEDICINE CLINIC | Age: 75
End: 2020-09-23

## 2020-09-23 RX ORDER — IPRATROPIUM BROMIDE AND ALBUTEROL SULFATE 2.5; .5 MG/3ML; MG/3ML
1 SOLUTION RESPIRATORY (INHALATION) EVERY 6 HOURS PRN
Qty: 360 ML | Refills: 6 | Status: SHIPPED
Start: 2020-09-23 | End: 2021-09-16 | Stop reason: SDUPTHER

## 2020-09-23 RX ORDER — CLOTRIMAZOLE AND BETAMETHASONE DIPROPIONATE 10; .64 MG/G; MG/G
CREAM TOPICAL
Qty: 15 G | Refills: 1 | Status: SHIPPED
Start: 2020-09-23 | End: 2020-10-28

## 2020-09-23 NOTE — TELEPHONE ENCOUNTER
Name of Medication(s) Requested:  Duoneb    Pharmacy Requested:   [facility]    Medication(s) pended? [x] Yes  [] No    Last Appointment:  9/16/2020    Future appts:  Future Appointments   Date Time Provider Marisol Garcia   9/30/2020  3:20 PM Robert Berrios MD [facility]   10/13/2020 10:30 AM Tiffany Lam MD [facility]   10/28/2020  1:30 PM Guerline Graves MD [facility]   1/14/2021  9:20 AM Merlin Boys, MD [facility]        Does patient need call back?   [] Yes  [x] No

## 2020-09-23 NOTE — TELEPHONE ENCOUNTER
Requested Prescriptions     Signed Prescriptions Disp Refills    clotrimazole-betamethasone (LOTRISONE) 1-0.05 % cream 15 g 1     Sig: Apply topically 2 times daily PRN     Authorizing Provider: Bbaita Ferrell     Sent   Thanks

## 2020-09-23 NOTE — TELEPHONE ENCOUNTER
Pt has developed an itchy red rash on her back. Jose Ramon Peacock would like to know if something could be sent to [address] [place] [address] in [place]. Mycolog cream is not preferred. I have Mycolog and a preferred alternative (Lotrisone) qued. Please send appropriate medication.

## 2020-09-23 NOTE — TELEPHONE ENCOUNTER
Requested Prescriptions     Signed Prescriptions Disp Refills    ipratropium-albuterol (DUONEB) 0.5-2.5 (3) MG/3ML SOLN nebulizer solution 360 mL 6     Sig: Inhale 3 mLs into the lungs every 6 hours as needed for Shortness of Breath     Authorizing Provider: Genesis Reveles

## 2020-09-24 ENCOUNTER — OFFICE VISIT (OUTPATIENT)
Dept: FAMILY MEDICINE CLINIC | Age: 75
End: 2020-09-24
Payer: MEDICARE

## 2020-09-24 ENCOUNTER — TELEPHONE (OUTPATIENT)
Dept: FAMILY MEDICINE CLINIC | Age: 75
End: 2020-09-24

## 2020-09-24 VITALS
TEMPERATURE: 96.8 F | DIASTOLIC BLOOD PRESSURE: 68 MMHG | OXYGEN SATURATION: 98 % | SYSTOLIC BLOOD PRESSURE: 108 MMHG | HEART RATE: 83 BPM

## 2020-09-24 PROCEDURE — G8400 PT W/DXA NO RESULTS DOC: HCPCS | Performed by: INTERNAL MEDICINE

## 2020-09-24 PROCEDURE — 1090F PRES/ABSN URINE INCON ASSESS: CPT | Performed by: INTERNAL MEDICINE

## 2020-09-24 PROCEDURE — 1036F TOBACCO NON-USER: CPT | Performed by: INTERNAL MEDICINE

## 2020-09-24 PROCEDURE — 3017F COLORECTAL CA SCREEN DOC REV: CPT | Performed by: INTERNAL MEDICINE

## 2020-09-24 PROCEDURE — G8417 CALC BMI ABV UP PARAM F/U: HCPCS | Performed by: INTERNAL MEDICINE

## 2020-09-24 PROCEDURE — G8427 DOCREV CUR MEDS BY ELIG CLIN: HCPCS | Performed by: INTERNAL MEDICINE

## 2020-09-24 PROCEDURE — G9899 SCRN MAM PERF RSLTS DOC: HCPCS | Performed by: INTERNAL MEDICINE

## 2020-09-24 PROCEDURE — 99213 OFFICE O/P EST LOW 20 MIN: CPT | Performed by: INTERNAL MEDICINE

## 2020-09-24 PROCEDURE — 4040F PNEUMOC VAC/ADMIN/RCVD: CPT | Performed by: INTERNAL MEDICINE

## 2020-09-24 PROCEDURE — 1123F ACP DISCUSS/DSCN MKR DOCD: CPT | Performed by: INTERNAL MEDICINE

## 2020-09-24 NOTE — TELEPHONE ENCOUNTER
Orders Placed This Encounter   Procedures    Comprehensive Metabolic Panel     Standing Status:   Future     Standing Expiration Date:   9/24/2021    Magnesium     Standing Status:   Future     Standing Expiration Date:   9/24/2021    CBC Auto Differential     Standing Status:   Future     Standing Expiration Date:   9/24/2021    Iron and TIBC     Standing Status:   Future     Standing Expiration Date:   9/24/2021     Order Specific Question:   Is Patient Fasting? Answer:   yes     Order Specific Question:   No of Hours?      Answer:   8    Ferritin     Standing Status:   Future     Standing Expiration Date:   9/24/2021    Vitamin B12 & Folate     Standing Status:   Future     Standing Expiration Date:   9/24/2021    RETICULOCYTES     Standing Status:   Future     Standing Expiration Date:   9/24/2021    LACTATE DEHYDROGENASE     Standing Status:   Future     Standing Expiration Date:   9/24/2021     Lab orders placed

## 2020-09-24 NOTE — PROGRESS NOTES
20  Michaela Douglas : 1945 Sex: female  Age: 77 y.o. Chief Complaint   Patient presents with    Other     hard spots on abdomen       HPI: The patient states that they have had abnormal swelling  to abdominal area noticed today. States states lower right abdomen. States also swelling to lower left. States feels as if something popped out. States has had hard spot in abdomen. No pain. The patient denies nausea and vomiting. Bowel movements have been normal color and consistency. No diarrhea. No black or bloody stools. The patient has a chronic saldana but denies dysuria or gross hematuria. No flank pain. No fevers or chills. No chest pain or dyspnea. They come to the urgent care for evaluation. ROS:  Const: Positives and pertinent negatives as per HPI. All others reviewed and are negative.          Current Outpatient Medications:     ipratropium-albuterol (DUONEB) 0.5-2.5 (3) MG/3ML SOLN nebulizer solution, Inhale 3 mLs into the lungs every 6 hours as needed for Shortness of Breath, Disp: 360 mL, Rfl: 6    clotrimazole-betamethasone (LOTRISONE) 1-0.05 % cream, Apply topically 2 times daily PRN, Disp: 15 g, Rfl: 1    docusate sodium (COLACE) 100 MG capsule, Take 100 mg by mouth daily, Disp: , Rfl:     Sennosides-Docusate Sodium (SENOKOT S PO), Take 1 tablet by mouth 2 times daily, Disp: , Rfl:     aspirin 81 MG EC tablet, Take 81 mg by mouth daily, Disp: , Rfl:     ferrous sulfate (IRON 325) 325 (65 Fe) MG tablet, Take 1 tablet by mouth every other day (Patient taking differently: Take 325 mg by mouth daily (with breakfast) ), Disp: 45 tablet, Rfl: 1    lisinopril (PRINIVIL;ZESTRIL) 2.5 MG tablet, Take 0.5 tablets by mouth daily, Disp: 30 tablet, Rfl: 3    clopidogrel (PLAVIX) 75 MG tablet, Take 1 tablet by mouth daily, Disp: 30 tablet, Rfl: 0    dantrolene (DANTRIUM) 50 MG capsule, Take 1 capsule by mouth 4 times daily (Patient taking differently: Take 100 mg by mouth 2 times daily 2 caps bid), Disp: 360 capsule, Rfl: 1    metFORMIN (GLUCOPHAGE) 500 MG tablet, Take 1 tablet by mouth 2 times daily (with meals), Disp: 180 tablet, Rfl: 1    blood glucose test strips (ASCENSIA AUTODISC VI;ONE TOUCH ULTRA TEST VI) strip, 1 each by In Vitro route 2 times daily As needed. , Disp: 200 each, Rfl: 1    blood glucose monitor kit and supplies, Dispense sufficient amount for indicated testing frequency plus additional to accommodate PRN testing needs. Dispense all needed supplies to include: monitor, strips, lancing device, lancets, control solutions, alcohol swabs., Disp: 1 kit, Rfl: 0    blood glucose monitor strips, Test qd  & as needed for symptoms of irregular blood glucose. Dispense sufficient amount for indicated testing frequency plus additional to accommodate PRN testing needs. , Disp: 50 strip, Rfl: 11    Lancets MISC, 1 each by Does not apply route daily, Disp: 50 each, Rfl: 11    citalopram (CELEXA) 20 MG tablet, Take 1 tablet by mouth every morning, Disp: 90 tablet, Rfl: 0    atorvastatin (LIPITOR) 10 MG tablet, Take 1 tablet by mouth daily, Disp: 90 tablet, Rfl: 1    senna (SENOKOT) 8.6 MG tablet, Take 1 tablet by mouth 2 times daily , Disp: , Rfl:     ondansetron (ZOFRAN ODT) 4 MG disintegrating tablet, Take 4-8 mg by mouth every 8 hours as needed for Nausea or Vomiting, Disp: , Rfl:     baclofen (LIORESAL) 20 MG tablet, Take 50 mg by mouth 4 times daily , Disp: , Rfl:     carvedilol (COREG) 6.25 MG tablet, Take 1 tablet by mouth 2 times daily (Patient taking differently: Take 3.125 mg by mouth 2 times daily If systolic blood pressure is 95 or greater), Disp: 60 tablet, Rfl: 3    levothyroxine (SYNTHROID) 88 MCG tablet, Take 1 tablet by mouth daily (Patient taking differently: Take 88 mcg by mouth nightly ), Disp: 90 tablet, Rfl: 0    Catheters (URETHRAL CATHETER) MISC, by Does not apply route, Disp: , Rfl:     Cyanocobalamin (VITAMIN B 12 PO), Take 500 mg by mouth daily , Disp: , Rfl:     Calcium Carbonate-Vit D-Min (CALCIUM 600+D PLUS MINERALS) 600-400 MG-UNIT TABS, Take 1 tablet by mouth nightly , Disp: , Rfl:     polyethylene glycol (MIRALAX) PACK packet, Take 17 g by mouth daily , Disp: , Rfl:     vitamin D3 (CHOLECALCIFEROL) 25 MCG (1000 UT) TABS tablet, Take 1 tablet by mouth every morning , Disp: , Rfl:     magnesium (MAGNESIUM-OXIDE) 250 MG TABS tablet, Take 250 mg by mouth every morning , Disp: , Rfl:     nortriptyline (PAMELOR) 25 MG capsule, Take 1 capsule by mouth nightly, Disp: 90 capsule, Rfl: 0  Allergies   Allergen Reactions    Macrobid [Nitrofurantoin]      Mental Changes       Past Medical History:   Diagnosis Date    CAD (coronary artery disease) 4/21/2020    Hepatitis     High cholesterol     Hypertension     Hypothyroidism     MI [facility]myocardial infarction) ([facility] 04/21/2020    MS (multiple sclerosis) ([facility]     Osteoporosis     Sarcoidosis     Type 2 diabetes mellitus with hyperglycemia, without long-term current use of insulin ([facility] 10/30/2019     Past Surgical History:   Procedure Laterality Date    CHOLECYSTECTOMY  1990s    CORONARY ANGIOPLASTY WITH STENT PLACEMENT  04/21/2020    Dr. Elsa Sarmiento   3.0 x 22mm Resolute MAAME to Prox LAD.   No LV    CYSTOSCOPY INSERTION / REMOVAL STENT / STONE Left 4/23/2020    CYSTOSCOPY LEFT RETROGRADE PYELOGRAM STENT INSERTION, STRATTON CATHETER performed by Raven Avila DO at [facility] Left 3/21/2019    LEFT FEMUR CEPHALLOMEDULLARY NAIL performed [facility] Jeni Kelly MD [facility] Bilateral     HYSTERECTOMY      KNEE SURGERY      plate in lt knee     Family History   Problem Relation Age of Onset    Stroke Mother     Heart Disease Mother     Cancer Father         lung    Mult Sclerosis Sister     No Known Problems Brother     No Known Problems Sister     Arthritis Sister     Cervical Cancer Sister      Social History     Socioeconomic History    Marital status:      Spouse name: Not on file    Number of children: Not on file    Years of education: Not on file    Highest education level: Not on file   Occupational History    Not on file   Social Needs    Financial resource strain: Not on file    Food insecurity     Worry: Not on file     Inability: Not on file    Transportation needs     Medical: Not on file     Non-medical: Not on file   Tobacco Use    Smoking status: Never Smoker    Smokeless tobacco: Never Used   Substance and Sexual Activity    Alcohol use: No     Alcohol/week: 0.0 standard drinks     Comment: Ocassionally    Drug use: No    Sexual activity: Yes     Partners: Male   Lifestyle    Physical activity     Days per week: Not on file     Minutes per session: Not on file    Stress: Not on file   Relationships    Social connections     Talks on phone: Not on file     Gets together: Not on file     Attends Confucianist service: Not on file     Active member of club or organization: Not on file     Attends meetings of clubs or organizations: Not on file     Relationship status: Not on file    Intimate partner violence     Fear of current or ex partner: Not on file     Emotionally abused: Not on file     Physically abused: Not on file     Forced sexual activity: Not on file   Other Topics Concern    Not on file   Social History Narrative    Not on file       Vitals:    09/24/20 1438   BP: 108/68   Pulse: 83   Temp: 96.8 °F (36 °C)   SpO2: 98%  Comment: 2 LO2       Exam:  Physical Exam  Vitals signs reviewed. Constitutional:       Appearance: She is well-developed. She is ill-appearing. HENT:      Head: Normocephalic and atraumatic. Right Ear: External ear normal.      Left Ear: External ear normal.   Eyes:      Pupils: Pupils are equal, round, and reactive to light. Neck:      Musculoskeletal: Normal range of motion and neck supple. Thyroid: No thyromegaly. Cardiovascular:      Rate and Rhythm: Normal rate and regular rhythm. Heart sounds: Normal heart sounds. No murmur. Pulmonary:      Effort: Pulmonary effort is normal.      Breath sounds: Decreased breath sounds present. No wheezing. Abdominal:      General: Bowel sounds are normal. There is no distension. Palpations: Abdomen is soft. Tenderness: There is no abdominal tenderness. There is no guarding or rebound. Comments: Hard lesion to b/l lower quadrants    Musculoskeletal: Normal range of motion. Lymphadenopathy:      Cervical: No cervical adenopathy. Skin:     General: Skin is warm and dry. Neurological:      Mental Status: She is alert and oriented to person, place, and time. Cranial Nerves: No cranial nerve deficit. Comments: Weakness to b/l UE and LE   In wheel chair    Psychiatric:         Judgment: Judgment normal.         Assessment and Plan:  Mariela Barrios was seen today for other. Diagnoses and all orders for this visit:    Lower abdominal pain  Comments:  hard lesions to b/l abdo   uncerrtain cause or significance   check US abdo   Orders:  -     US ABDOMEN COMPLETE; Future    Follow-up with your primary care provider in 7-10 days for re-evaluation and further management. Return  sooner or go to the Emergency Department immediately if your condition changes or worsens. Return for check up and review, as scheduled.     Seen By:  Leodan Sethi MD

## 2020-09-25 ENCOUNTER — TELEPHONE (OUTPATIENT)
Dept: PRIMARY CARE CLINIC | Age: 75
End: 2020-09-25

## 2020-09-25 NOTE — TELEPHONE ENCOUNTER
Please let the patient know that ultrasound of the abdomen per radiology report suggested that area of concern in the lower abdomen appeared to be calcifications within the subcutaneous fat of the abdominal wall. This was also apparently seen on CAT scan that was done in June of this year. May be related to previous injections through the years but no definitive masses were seen.     Thanks

## 2020-09-30 ENCOUNTER — VIRTUAL VISIT (OUTPATIENT)
Dept: NEUROLOGY | Age: 75
End: 2020-09-30
Payer: MEDICARE

## 2020-09-30 PROCEDURE — 99215 OFFICE O/P EST HI 40 MIN: CPT | Performed by: PSYCHIATRY & NEUROLOGY

## 2020-09-30 NOTE — PROGRESS NOTES
Sheri Haynes was read the following message We want to confirm that, for purposes of billing, this is a virtual visit with your provider for which we will submit a claim for reimbursement with your insurance company. You will be responsible for any copays, coinsurance amounts or other amounts not covered by your insurance company. If you do not accept this, unfortunately we will not be able to schedule a virtual visit with the provider. Do you accept? rBisa Chow responded Yes .

## 2020-09-30 NOTE — PROGRESS NOTES
l,Subjective:     Shania Ortiz was a 77 y.o. right handed woman who suffered from long-standing multiple sclerosis. Her EDSS continued 8.5. The patient and her  remained excellent historians. This visit was by telemedicine. Her medications were baclofen, Lipitor, citalopram, levothyroxine, nortriptyline, dantrolene, Myrbetriq, Zetia, inhalers, multivitamins and omega-3 fatty acids. She remained well off Tecfidera. She continued with Dantrium and baclofen for spasticity---baclofen 20 mg 4 times daily and dantrolene 100 mg twice daily. Her spasms remained moderately controlled. She continued transferring on with a sliding board. Her , unfortunately, still listed her from the wheelchair into bed. They did not use a Eduar's lift, despite having that device. Her  had remodeled the bathroom the more easily fit her wheelchair. She was still reading and speaking without issues. There were no memory problems. Unfortunately, following her heart attack, there was increasing weakness of the right arm and hand. She now used her left hand for eating and controlling her power wheelchair. She noted decline in the right hand for years before her hospitalization. She denied any associated neck pains or radiating discomforts. Unfortunately, she suffered a heart attack 5 months ago. Ejection fractions were as low as 20%, but now 45 to 50%. There were no arrhythmias. She denied increasing fatigue from that event. Medically, she also suffered from several urinary tract infections, producing confusion. She was scheduled for removal of a kidney stone soon. That stone was producing these infections. Her left femoral fracture had completely healed. She also suffered from questionable sarcoidosis--- without recurrent lung disease. She i was on iron supplements for low hemoglobins. She was still sleeping erratically, despite taking melatonin.     She continued well at home with her 's assistance. .    Review of systems was otherwise negative. Objective:     She was elderly woman in no acute distress, who was alert, cooperative and oriented. She was breathing comfortably, without chest pain or shortness of breath. She was thinner than previously. She remained in a wheelchair. She continued to be very pleasant. Her skin was unremarkable. There was no abdominal tenderness. Her limbs are unchanged. Neurological examination produced an intact mental status. I found no cognitive issues, dysarthria or aphasia. Cranial nerve testing was also unremarkable. She did not move both legs but raised both arms well. There were decreased fine finger movements and rapid alternating movements in both hands, more so the right one. She appreciated light touch throughout. There were no ataxic movements. She could not stand. Her neurological examination displayed increasing weakness and clumsiness in the right hand. Recent CMP was unremarkable; CBC with differential was also unrevealing--- except for a minimal anemia. Discontinuing Tecfidera. Previous MRIs revealed extensive lesion load in her cervical cord and brain---without acute changes. 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 Her examination now displays increasing weakness of her right arm and hand. I first suspect cervical radicular disease and not recurrent multiple sclerosis. The family did not wish to pursue additional diagnostic measures. She did not require disease modifying therapies. She will continue on her current dose of Dantrium and baclofen. Fortunately, there is no cognitive involvement. Unfortunately, she endured several bladder infections and confusion. In addition, she suffered a recent heart attack. She has stabilized medically. Plan:     Baclofen was maintained at 20 mg 4 times daily;  Dantrium was continued at 100 mg twice daily. She will stay off Tecfidera and melatonin. She will return in 5 to 6 months. She will call if any problems arise in the interim    I spent 40 minutes with the patient with over 50 % spent in counseling and disease management. All patient issues were addressed and all questions were answered. TeleMedicine Patient Consent    This visit was performed as a virtual video visit using a synchronous, two-way, audio-video telehealth technology platform. Patient identification was verified at the start of the visit, including the patient's telephone number and physical location. I discussed with the patient the nature of our telehealth visits, that:     1. Due to the nature of an audio- video modality, the only components of a physical exam that could be done are the elements supported by direct observation. 2. I would evaluate the patient and recommend diagnostics and treatments based on my assessment. 3. If it was felt that the patient should be evaluated in clinic or an emergency room setting, then they would be directed there. 4. Our sessions are not being recorded and that personal health information is protected. 5. Our team would provide follow up care in person if/when the patient needs it. The patient did agree to proceed with telemedicine consultation. This teleconference call took place at the patient's home. Again I spent 40 minutes with this patient. This visit was completed virtually using [email or web address] me.

## 2020-10-05 ENCOUNTER — TELEPHONE (OUTPATIENT)
Dept: FAMILY MEDICINE CLINIC | Age: 75
End: 2020-10-05

## 2020-10-05 LAB
A/G RATIO: 1.3 RATIO (ref 1.1–2.2)
ALBUMIN SERPL-MCNC: 3.8 G/DL (ref 3.4–4.8)
ALP BLD-CCNC: 93 U/L (ref 42–121)
ALT SERPL-CCNC: 16 U/L (ref 10–54)
ANION GAP SERPL CALCULATED.3IONS-SCNC: 12 MEQ/L (ref 3–11)
AST SERPL-CCNC: 17 U/L (ref 10–41)
BASOPHILS ABSOLUTE: 0 K/UL (ref 0–0.2)
BASOPHILS RELATIVE PERCENT: 0.4 % (ref 0–1.5)
BILIRUB SERPL-MCNC: 0.2 MG/DL (ref 0.3–1.5)
BUN BLDV-MCNC: 34 MG/DL (ref 8–21)
CALCIUM SERPL-MCNC: 9.5 MG/DL (ref 8.5–10.5)
CHLORIDE BLD-SCNC: 97 MEQ/L (ref 98–107)
CO2: 27 MEQ/L (ref 21–31)
CREAT SERPL-MCNC: 0.6 MG/DL (ref 0.4–1)
CREATININE + EGFR PANEL: 118 ML/MIN
EOSINOPHILS ABSOLUTE: 0.2 K/UL (ref 0–0.33)
EOSINOPHILS RELATIVE PERCENT: 3.1 % (ref 0–3)
FERRITIN: 32.6 NG/ML (ref 11–307)
FOLATE: 14.3 NG/ML
GFR NON-AFRICAN AMERICAN: 98 ML/MIN
GLOBULIN: 2.9 G/DL (ref 1.9–3.9)
GLUCOSE BLD-MCNC: 98 MG/DL (ref 70–99)
HCT VFR BLD CALC: 26.9 % (ref 36–44)
HEMOGLOBIN: 8.9 G/DL (ref 12–15)
IRON SATURATION: 6 % (ref 15–55)
IRON: 21 UG/DL (ref 50–170)
LACTATE DEHYDROGENASE: 64 U/L (ref 98–192)
LYMPHOCYTES ABSOLUTE: 1.3 K/UL (ref 1.1–4.8)
LYMPHOCYTES RELATIVE PERCENT: 15.9 % (ref 24–44)
MAGNESIUM: 2 MEQ/L (ref 1.6–2.6)
MCH RBC QN AUTO: 29.8 PG (ref 28–34)
MCHC RBC AUTO-ENTMCNC: 32.9 G/DL (ref 33–37)
MCV RBC AUTO: 90.8 FL (ref 80–100)
MONOCYTES ABSOLUTE: 0.7 K/UL (ref 0.2–0.7)
MONOCYTES RELATIVE PERCENT: 9.2 % (ref 3.4–9)
NEUTROPHILS ABSOLUTE: 5.6 K/UL (ref 1.83–8.7)
PDW BLD-RTO: 17.8 % (ref 10.9–14.3)
PLATELET # BLD: 358 K/UL (ref 150–450)
PMV BLD AUTO: 8.6 FL (ref 7.4–10.4)
POTASSIUM SERPL-SCNC: 4.8 MEQ/L (ref 3.6–5)
RBC # BLD: 2.97 M/UL (ref 4–4.9)
RETIC %: 1.75 % (ref 0.5–2.2)
RETICULOCYTE ABSOLUTE COUNT: 0.05 M/UL (ref 0.02–0.1)
SEGMENTED NEUTROPHILS RELATIVE PERCENT: 71.4 % (ref 40–74)
SODIUM BLD-SCNC: 136 MEQ/L (ref 135–145)
TOTAL IRON BINDING CAPACITY: 351 UG/DL (ref 250–450)
TOTAL PROTEIN: 6.7 G/DL (ref 5.9–7.8)
TRANSFERRIN: 251 MG/DL (ref 192–382)
VITAMIN B-12: 1272 PG/ML (ref 180–914)
WBC # BLD: 7.9 K/UL (ref 4.5–11)

## 2020-10-05 NOTE — TELEPHONE ENCOUNTER
ISADORA schneider calling she has orders to draw her labs tomorrow. However she is seeing her today to change her catheter. Ok to draw labs today ?

## 2020-10-06 ENCOUNTER — TELEPHONE (OUTPATIENT)
Dept: FAMILY MEDICINE CLINIC | Age: 75
End: 2020-10-06

## 2020-10-06 NOTE — LETTER
[address] [place] [address] Drive  [address] [place] [address]  Phone: [phone]  Fax: [phone]    Craig Harrington MD        October 7, 2020     Aniyah Douglas  [address] [address]      Dear Tejinder Sahu:    Below are the results from your recent visit:    Resulted Orders   CBC Auto Differential   Result Value Ref Range    WBC 7.9 4.5 - 11.0 K/uL    RBC 2.97 (L) 4.00 - 4.90 M/uL    Hemoglobin 8.9 (L) 12.0 - 15.0 g/dL    Hematocrit 26.9 (L) 36.0 - 44.0 %    MCV 90.8 80.0 - 100.0 fL    MCH 29.8 28.0 - 34.0 pg    MCHC 32.9 (L) 33.0 - 37.0 g/dL    RDW 17.8 (H) 10.9 - 14.3 %    Platelets 867 001 - 908 K/uL    MPV 8.6 7.4 - 10.4 fL    Segs Relative 71.4 40.0 - 74.0 %    Lymphocytes % 15.9 (L) 24.0 - 44.0 %    Monocytes % 9.2 (H) 3.4 - 9.0 %    Eosinophils % 3.1 (H) 0.0 - 3.0 %    Basophils % 0.4 0.0 - 1.5 %    Neutrophils Absolute 5.6 1.83 - 8.70 K/uL    Lymphocytes Absolute 1.3 1.10 - 4.80 K/uL    Monocytes Absolute 0.7 0.20 - 0.70 K/uL    Eosinophils Absolute 0.2 0.00 - 0.33 K/uL    Basophils Absolute 0.0 0.00 - 0.20 K/uL   Reticulocytes   Result Value Ref Range    Retic % 1.75 0.5 - 2.2 %    Retic Ct Abs 0.0519 0.0200 - 0.1 M/uL   Comprehensive Metabolic Panel   Result Value Ref Range    Sodium 136 135 - 145 mEq/L    Potassium 4.8 3.6 - 5.0 mEq/L    Chloride 97 (L) 98 - 107 mEq/L    CO2 27 21 - 31 mEq/L    Anion Gap 12.0 (H) 3 - 11 mEq/L    BUN 34 (H) 8 - 21 mg/dL    CREATININE 0.6 0.4 - 1.0 mg/dL    GFR Non-African American 98 >60 mL/min    Creatinine + eGFR Panel 118 >60 mL/min    Glucose 98 70 - 99 mg/dL    Calcium 9.5 8.5 - 10.5 mg/dL    Total Bilirubin 0.2 (L) 0.3 - 1.5 mg/dL    Alkaline Phosphatase 93 42 - 121 U/L    AST 17 10 - 41 U/L    ALT 16 10 - 54 U/L    Total Protein 6.7 5.9 - 7.8 g/dL    Alb 3.8 3.4 - 4.8 g/dL    Globulin 2.9 1.9 - 3.9 g/dL    Albumin/Globulin Ratio 1.3 1.1 - 2.2 ratio   Magnesium   Result Value Ref Range    Magnesium 2.0 1.6 - 2.6 mEq/L ANEMIA PANEL   Result Value Ref Range    Iron 21 (L) 50 - 170 ug/dL    Transferrin 251 192 - 382 mg/dL    TIBC 351 250 - 450 ug/dL    Iron Saturation 6 (L) 15 - 55 %    Ferritin 32.6 11 - 307 ng/mL   Folate   Result Value Ref Range    Folate 14.3 >5.9 ng/mL   Lactate Dehydrogenase   Result Value Ref Range    LD 64 (L) 98 - 192 U/L   Vitamin B12   Result Value Ref Range    Vitamin B-12 1272 (H) 180 - 914 pg/mL     The test results show:    Hemoglobin or red blood cell count was still low in the anemia range, but improved when compared to previous     Iron levels were low and would recommend to continue present medication and consideration for the hematologist for potential IV iron     Vitamin F01 and folic acid levels were normal     Kidney function was normal labs somewhat suggestive of possible dehydration     Most electrolytes were normal     Liver functions were normal     Other blood counts were normal  If you have any questions or concerns, please don't hesitate to call.     Sincerely,        Arthur Zamudio MD

## 2020-10-06 NOTE — TELEPHONE ENCOUNTER
Please let the patient know that blood work results showed    Hemoglobin or red blood cell count was still low in the anemia range, but improved when compared to previous    Iron levels were low and would recommend to continue present medication and consideration for the hematologist for potential IV iron    Vitamin X10 and folic acid levels were normal    Kidney function was normal labs somewhat suggestive of possible dehydration    Most electrolytes were normal    Liver functions were normal    Other blood counts were normal    Thanks

## 2020-10-08 ENCOUNTER — TELEPHONE (OUTPATIENT)
Dept: ENDOCRINOLOGY | Age: 75
End: 2020-10-08

## 2020-10-13 ENCOUNTER — VIRTUAL VISIT (OUTPATIENT)
Dept: PULMONOLOGY | Age: 75
End: 2020-10-13
Payer: MEDICARE

## 2020-10-13 PROCEDURE — 99442 PR PHYS/QHP TELEPHONE EVALUATION 11-20 MIN: CPT | Performed by: INTERNAL MEDICINE

## 2020-10-13 NOTE — PROGRESS NOTES
TeleMedicine Video Visit    This visit was performed as a virtual video visit using a synchronous, two-way, audio-video telehealth technology platform. Patient identification was verified at the start of the visit, including the patient's telephone number and physical location. I discussed with the patient the nature of our telehealth visits, that:     1. Due to the nature of an audio- video modality, the only components of a physical exam that could be done are the elements supported by direct observation. 2. I would evaluate the patient and recommend diagnostics and treatments based on my assessment. 3. If it was felt that the patient should be evaluated in clinic or an emergency room setting, then they would be directed there. 4. Our sessions are not being recorded and that personal health information is protected. 5. Our team would provide follow up care in person if/when the patient needs it. Patient does agree to proceed with telemedicine consultation. Patient's location: pt's home  Physician  location [address] 7 other people involved in call  Gaston Layman      Time spent: Greater than 20    This visit was completed virtually using [email or web address]. me     Video follow up today with  present. Oxygen at home continues; 2 liters NC. Using Nebulizers(Duoneb) prn. Lab work completed last week; results reviewed. Discussed the risk of surgery pt planning for kidney stones; pt and  understand the moderate risk and would like to proceed with surgery. Denies and needs/questions. Office to mail out follow up appt for 1 year in office.

## 2020-10-13 NOTE — PROGRESS NOTES
[facility]                                                                 Division of Pulmonary, [facility]                                                                       Pulmonary &health 61 [facility]                                                                   Pulmonary Consult Note            Patient: Eddie Naik  MRN: [id number]  : 1945      Date of Admission: . No admission date for patient encounter.         Reason for Consultation:Lung nodule   CC :   HPI:   Eddie Naik is a 77 y.o. y/o female with past medical history significant for MS and sarcoid and she has left hip pain after fall that happen around 2019 and she went to rehab and had surgery before that mat rehab started cough and SOB and not feeling well and she was brought to [facility] and she ahs been doing well ,no cough and SOB and Duo neb helps a lot     She was diagnosed with sarcoid  and she is not on any bronchoscopy and biopsy ,iN [facility] but it has been stable as Per CT/PET     She did not smoke but had second smoking more than 10 years from parents     She had recent PET scan  shows  Just scar but no active disease and small Right hilar lymph nodes that is calcified      She is going for Trip across the country       Today visit  She has been doing well and she denies any SOB ,No cough ,  No fever  Does not need inhalers  Never smoke  On no inhalers     PAST MEDICAL HISTORY:     Past Medical History:   Diagnosis Date    CAD (coronary artery disease) 2020    Hepatitis     High cholesterol     Hypertension     Hypothyroidism     MI (myocardial infarction) ([address] 2020    [facility]) [facility]     Osteoporosis     Sarcoidosis     Type 2 diabetes mellitus with hyperglycemia, without long-term current use of insulin ([facility] 10/30/2019       PAST SURGICAL HISTORY:   Past Surgical History:   Procedure Laterality Date    CHOLECYSTECTOMY  1990s    CORONARY ANGIOPLASTY WITH STENT PLACEMENT  04/21/2020    Dr. Adin Doll   3.0 x 22mm Resolute MAAME to Prox LAD.   No LV    CYSTOSCOPY INSERTION / REMOVAL STENT / STONE Left 4/23/2020    CYSTOSCOPY LEFT RETROGRADE PYELOGRAM STENT INSERTION, STRATTON CATHETER performed by Sujata Avila DO at [address] [place] [address] Left 3/21/2019    LEFT FEMUR CEPHALLOMEDULLARY NAIL performed by Bakari Peñaloza MD at [facility] Bilateral     HYSTERECTOMY      KNEE SURGERY      plate in lt knee       FAMILY HISTORY:   Family History   Problem Relation Age of Onset    Stroke Mother     Heart Disease Mother     Cancer Father         lung    Mult Sclerosis Sister     No Known Problems Brother     No Known Problems Sister     Arthritis Sister     Cervical Cancer Sister        SOCIAL HISTORY:   Social History     Socioeconomic History    Marital status:      Spouse name: Not on file    Number of children: Not on file    Years of education: Not on file    Highest education level: Not on file   Occupational History    Not on file   Social Needs    Financial resource strain: Not on file    Food insecurity     Worry: Not on file     Inability: Not on file    Transportation needs     Medical: Not on file     Non-medical: Not on file   Tobacco Use    Smoking status: Never Smoker    Smokeless tobacco: Never Used   Substance and Sexual Activity    Alcohol use: No     Alcohol/week: 0.0 standard drinks     Comment: Ocassionally    Drug use: No    Sexual activity: Yes     Partners: Male   Lifestyle    Physical activity     Days per week: Not on file     Minutes per session: Not on file    Stress: Not on file   Relationships    Social connections     Talks on phone: Not on file     Gets together: Not on file     Attends [employer] service: Not on file     Active member of club or organization: Not on file     Attends meetings of clubs or organizations: Not on file     Relationship status: Not on file    Intimate partner violence     Fear of current or ex partner: Not on file     Emotionally abused: Not on file     Physically abused: Not on file     Forced sexual activity: Not on file   Other Topics Concern    Not on file   Social History Narrative    Not on file     Social History     Tobacco Use   Smoking Status Never Smoker   Smokeless Tobacco Never Used     Social History     Substance and Sexual Activity   Alcohol Use No    Alcohol/week: 0.0 standard drinks    Comment: Ocassionally     Social History     Substance and Sexual Activity   Drug Use No        HISTORY:    HOME MEDICATIONS:  Prior to Admission medications    Medication Sig Start Date End Date Taking?  Authorizing Provider   ipratropium-albuterol (DUONEB) 0.5-2.5 (3) MG/3ML SOLN nebulizer solution Inhale 3 mLs into the lungs every 6 hours as needed for Shortness of Breath 9/23/20   Cece Casillas MD   clotrimazole-betamethasone (LOTRISONE) 1-0.05 % cream Apply topically 2 times daily PRN 9/23/20   Cece Casillas MD   docusate sodium (COLACE) 100 MG capsule Take 100 mg by mouth daily    Historical Provider, MD   Sennosides-Docusate Sodium (SENOKOT S PO) Take 1 tablet by mouth 2 times daily    Historical Provider, MD   aspirin 81 MG EC tablet Take 81 mg by mouth daily    Historical Provider, MD   ferrous sulfate (IRON 325) 325 (65 Fe) MG tablet Take 1 tablet by mouth every other day  Patient taking differently: Take 325 mg by mouth daily (with breakfast)  9/2/20   Cece Casillas MD   lisinopril (PRINIVIL;ZESTRIL) 2.5 MG tablet Take 0.5 tablets by mouth daily 9/2/20   Cece Casillas MD   clopidogrel (PLAVIX) 75 MG tablet Take 1 tablet by mouth daily 8/31/20   Cece Casillas MD   dantrolene (DANTRIUM) 50 MG capsule Take 1 capsule by mouth 4 times daily  Patient taking differently: Take 100 mg by mouth 2 times daily 2 caps bid 8/11/20   Cece Casillas MD metFORMIN (GLUCOPHAGE) 500 MG tablet Take 1 tablet by mouth 2 times daily (with meals) 8/11/20   Obdulio Gray MD   blood glucose test strips (ASCENSIA AUTODISC VI;ONE TOUCH ULTRA TEST VI) strip 1 each by In Vitro route 2 times daily As needed. 8/11/20   Obdulio Gray MD   blood glucose monitor kit and supplies Dispense sufficient amount for indicated testing frequency plus additional to accommodate PRN testing needs. Dispense all needed supplies to include: monitor, strips, lancing device, lancets, control solutions, alcohol swabs. 7/22/20   Obdulio Gray MD   blood glucose monitor strips Test qd  & as needed for symptoms of irregular blood glucose. Dispense sufficient amount for indicated testing frequency plus additional to accommodate PRN testing needs.  7/22/20   Obdulio Gray MD   Lancets MISC 1 each by Does not apply route daily 7/22/20   Obdulio Gray MD   citalopram (CELEXA) 20 MG tablet Take 1 tablet by mouth every morning 7/9/20   Obdulio Gray MD   atorvastatin (LIPITOR) 10 MG tablet Take 1 tablet by mouth daily 6/23/20   Obdulio Gray MD   senna (SENOKOT) 8.6 MG tablet Take 1 tablet by mouth 2 times daily     Historical Provider, MD   ondansetron (ZOFRAN ODT) 4 MG disintegrating tablet Take 4-8 mg by mouth every 8 hours as needed for Nausea or Vomiting    Historical Provider, MD   baclofen (LIORESAL) 20 MG tablet Take 50 mg by mouth 4 times daily     Historical Provider, MD   carvedilol (COREG) 6.25 MG tablet Take 1 tablet by mouth 2 times daily  Patient taking differently: Take 3.125 mg by mouth 2 times daily If systolic blood pressure is 95 or greater 5/1/20   Iliana Betancourt MD   levothyroxine (SYNTHROID) 88 MCG tablet Take 1 tablet by mouth daily  Patient taking differently: Take 88 mcg by mouth nightly  3/17/20   Obdulio Gray MD   nortriptyline (PAMELOR) 25 MG capsule Take 1 capsule by mouth nightly 3/17/20 9/30/20  Obdulio Gray MD   Catheters (URETHRAL CATHETER) [facility] by Does not apply route    Historical Provider, MD   Cyanocobalamin (VITAMIN B 12 PO) Take 500 mg by mouth daily     Historical Provider, MD   Calcium Carbonate-Vit D-Min (CALCIUM 600+D PLUS MINERALS) 600-400 MG-UNIT TABS Take 1 tablet by mouth nightly     Historical Provider, MD   polyethylene glycol (MIRALAX) PACK packet Take 17 g by mouth daily     Historical Provider, MD   vitamin D3 (CHOLECALCIFEROL) 25 MCG (1000 UT) TABS tablet Take 1 tablet by mouth every morning     Historical Provider, MD   magnesium (MAGNESIUM-OXIDE) 250 MG TABS tablet Take 250 mg by mouth every morning     Historical Provider, MD       CURRENT MEDICATIONS:  No current facility-administered medications for this visit. IV MEDICATIONS:      ALLERGIES:  Allergies   Allergen Reactions    Macrobid [Nitrofurantoin]      Mental Changes       REVIEW OF SYSTEMS:  General ROS:  No weight loss ,no fatigue     ENT ROS:   No Sore throat ,no lymphoadenopathy,no nasal stuffiness     Hematological and Lymphatic ROS:   No ecchymosis ,no tendency to bleed  Respiratory ROS:   Cough ,SOB   Cardiovascular ROS:   No CP,No Palpitation   Gastrointestinal ROS:   No Gi bleed,no nausea or vomiting      - Musculoskeletal ROS:      - no joint swelling ,no joint pain   Neurological ROS:     -no weakness or numbness    Dermatological ROS:   No skin rash ,no urticaria     PHYSICAL EXAMINATION:     VITAL SIGNS:  There were no vitals taken for this visit.   Wt Readings from Last 3 Encounters:   09/02/20 143 lb (64.9 kg)   07/13/20 159 lb (72.1 kg)   06/16/20 159 lb (72.1 kg)     Temp Readings from Last 3 Encounters:   09/24/20 96.8 °F (36 °C)   09/02/20 98.9 °F (37.2 °C) (Oral)   07/13/20 98.8 °F (37.1 °C) (Oral)     TMAX:  BP Readings from Last 3 Encounters:   09/24/20 108/68   09/02/20 100/60   07/13/20 (!) 90/50     Pulse Readings from Last 3 Encounters:   09/24/20 83   09/02/20 76   07/13/20 88     This is phone visit     INTAKE/OUTPUTS:  No intake/output data recorded. No intake or output data in the 24 hours ending 10/13/20 1050      LABS/IMAGING:    CBC:  Lab Results   Component Value Date    WBC 7.9 10/05/2020    HGB 8.9 (L) 10/05/2020    HCT 26.9 (L) 10/05/2020    MCV 90.8 10/05/2020     10/05/2020    LYMPHOPCT 15.9 (L) 10/05/2020    RBC 2.97 (L) 10/05/2020    MCH 29.8 10/05/2020    MCHC 32.9 (L) 10/05/2020    RDW 17.8 (H) 10/05/2020    NEUTOPHILPCT 72.2 09/02/2020    MONOPCT 9.2 (H) 10/05/2020    EOSPCT 2.2 03/12/2019    BASOPCT 0.4 10/05/2020    NEUTROABS 5.6 10/05/2020    LYMPHSABS 1.3 10/05/2020    MONOSABS 0.7 10/05/2020    EOSABS 0.2 10/05/2020    BASOSABS 0.0 10/05/2020       Recent Labs     10/05/20  1400 09/22/20  1145   WBC 7.9 7.2   HGB 8.9* 7.9*   HCT 26.9* 23.8*   MCV 90.8 91.3    425       BMP:   No results for input(s): NA, K, CL, CO2, PHOS, BUN, CREATININE in the last 72 hours. Invalid input(s): CA    MG:   Lab Results   Component Value Date    MG 2.0 10/05/2020     Ca/Phos:   Lab Results   Component Value Date    CALCIUM 9.5 10/05/2020    PHOS 4.9 (H) 04/21/2020     Amylase: No results found for: AMYLASE  Lipase:   Lab Results   Component Value Date    LIPASE 49 04/21/2020     LIVER PROFILE:   No results for input(s): AST, ALT, LIPASE, BILIDIR, BILITOT, ALKPHOS in the last 72 hours. Invalid input(s): AMYLASE,  ALB    PT/INR:   No results for input(s): PROTIME, INR in the last 72 hours. APTT:   No results for input(s): APTT in the last 72 hours.     Cardiac Enzymes:  Lab Results   Component Value Date    CKTOTAL 204 (H) 04/22/2020    CKMB 18.6 (H) 04/22/2020    TROPONINI 0.03 06/09/2020       Hgb A1C:   Lab Results   Component Value Date    LABA1C 6.6 (H) 07/30/2020     Lab Results   Component Value Date     07/30/2020     BARRY: No results found for: BARRY  ESR: No results found for: SEDRATE  CRP: No results found for: CRP  D Dimer:   Lab Results   Component Value Date    DDIMER 1724 03/24/2019     Folate and B12:   Lab Results   Component Value Date    ORFDXLWN97 1272 (H) 10/05/2020   ,   Lab Results   Component Value Date    FOLATE 14.3 10/05/2020                 PROBLEM LIST:  Patient Active Problem List   Diagnosis    [facility]    Spastic quadriparesis (HCC)    Lung mass    Essential hypertension    Hypothyroidism    H/O sarcoidosis    Mixed hyperlipidemia    Neurogenic bladder    HUDSON (nonalcoholic steatohepatitis)    Other constipation    Osteopenia of multiple sites    Type 2 diabetes mellitus with hyperglycemia, without long-term current use of insulin (HCC)    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 (HCC)    Hydronephrosis    Chronic systolic (congestive) heart failure (HCC)    Left ventricular thrombus    Metabolic encephalopathy    UTI (urinary tract infection) due to Enterococcus    Nephrolithiasis    Vitamin D deficiency               ASSESSMENT:  1.) history of Flu A  3.)Sarcoid ,lung  4. )RUL scar /noule   5.)Hip fracture /left  ^ MS       PLAN:  *-Since Lung nodule is stable and also PET scan was negative and she is low risk patient <I think we hold on any further imaging for now  *- PET scan shows no PET avid activity in lung ,rest of finding as per primary  care (like kidney stone)  *-BD  As need   *_incentive spirometer  *-advise to ambulate every 2-3 hours during trip as she did not want Lovenox  To see  Cardiology   In term pof kidney stone surgery ,she is moderate risk from pulmonary stand point as she has mild hypoxia  and she understand risks and would like to proceed  Will see in 1 year and decide if we need any imaging       [address] Jolly and [address]

## 2020-10-19 RX ORDER — CLOPIDOGREL BISULFATE 75 MG/1
75 TABLET ORAL DAILY
Qty: 30 TABLET | Refills: 0 | Status: SHIPPED
Start: 2020-10-19 | End: 2021-02-03 | Stop reason: SDUPTHER

## 2020-10-19 NOTE — TELEPHONE ENCOUNTER
Name of Medication(s) Requested:  clopidogrel (PLAVIX) 75 MG tablet    Pharmacy Requested:   [facility]    Medication(s) pended? [x] Yes  [] No    Last Appointment:  9/24/2020    Future appts:  Future Appointments   Date Time Provider Marisol Garcia   10/28/2020  1:30 PM MD DIIMTRI [facility]   12/22/2020  3:15 PM Dre Valdivia  [address] [place] [address]   1/14/2021  9:20 AM Negrito Pandya MD [facility]   2/23/2021  2:00 PM MD Liane Merino [facility]   10/8/2021  9:00 AM [facility]elles [facility]        Does patient need call back?   [] Yes  [x] No

## 2020-10-21 RX ORDER — CARVEDILOL 6.25 MG/1
3.12 TABLET ORAL 2 TIMES DAILY
Qty: 90 TABLET | Refills: 1 | Status: SHIPPED
Start: 2020-10-21 | End: 2020-11-22 | Stop reason: SDUPTHER

## 2020-10-21 NOTE — TELEPHONE ENCOUNTER
Pharmacy is requesting a refill on Coreg. Rx is ready to be sent.      Last Appointment:  9/24/2020  Future Appointments   Date Time Provider Marisol Garcia   10/28/2020  1:30 PM MD DIMITRI [facility]   12/22/2020  3:15 PM Shanell Cardoza  [address]   1/14/2021  9:20  Darrel Avila MD [facility] ENDO [facility]   2/23/2021  2:00 PM Lizz Barron MD [facility] NEURO [facility]   10/8/2021  9:00 AM Muneer Elvie Collet, [facility]

## 2020-10-23 RX ORDER — BACLOFEN 20 MG/1
50 TABLET ORAL 4 TIMES DAILY
Qty: 360 TABLET | Refills: 1 | Status: SHIPPED
Start: 2020-10-23 | End: 2020-12-21

## 2020-10-23 NOTE — TELEPHONE ENCOUNTER
Last Appointment:  9/24/2020  Future Appointments   Date Time Provider Marisol Garcia   10/28/2020  1:30 PM Zully Charles MD [facility]   12/22/2020  3:15 PM Zully Charles  [address] [place] [address]   1/14/2021  9:20 AM Marycruz Kline MD [facility] ENDO [facility]   2/23/2021  2:00 PM Ivanna Cardenas MD [facility] NEURO [facility]   10/8/2021  9:00 AM [facility]      Wolfgang asking for refill on baclofen 20mg qid to [facility]

## 2020-10-26 ENCOUNTER — TELEPHONE (OUTPATIENT)
Dept: FAMILY MEDICINE CLINIC | Age: 75
End: 2020-10-26

## 2020-10-26 RX ORDER — LEVOTHYROXINE SODIUM 88 UG/1
88 TABLET ORAL DAILY
Qty: 90 TABLET | Refills: 0 | Status: SHIPPED
Start: 2020-10-26 | End: 2021-02-15 | Stop reason: SDUPTHER

## 2020-10-26 NOTE — TELEPHONE ENCOUNTER
Pharmacy is requesting a refill on Synthroid. Rx is ready to be sent.      Last Appointment:  9/24/2020  Future Appointments   Date Time Provider Marisol Garcia   10/28/2020  1:30 PM Corinne Loza MD [facility]   12/22/2020  3:15 PM Corinne Loza  [address] [place] [address]   1/14/2021  9:20 AM Varun Altamirano MD [facility] END[facility]   2/23/2021  2:00 PM Eri Ovalle MD [facility]   10/8/2021  9:00 AM [facility]il [facility]anez [facility]

## 2020-10-26 NOTE — TELEPHONE ENCOUNTER
[facility] has a question about Baclofen. Last Rx was sent for 20mg 2 1/2 tabs qid. This is a dose increase. She was previously prescribed 20mg 1 po qid. *50mg qid exceeds the maximum recommended daily does.

## 2020-10-28 ENCOUNTER — VIRTUAL VISIT (OUTPATIENT)
Dept: FAMILY MEDICINE CLINIC | Age: 75
End: 2020-10-28
Payer: MEDICARE

## 2020-10-28 PROCEDURE — G9899 SCRN MAM PERF RSLTS DOC: HCPCS | Performed by: INTERNAL MEDICINE

## 2020-10-28 PROCEDURE — 4040F PNEUMOC VAC/ADMIN/RCVD: CPT | Performed by: INTERNAL MEDICINE

## 2020-10-28 PROCEDURE — G8427 DOCREV CUR MEDS BY ELIG CLIN: HCPCS | Performed by: INTERNAL MEDICINE

## 2020-10-28 PROCEDURE — 3017F COLORECTAL CA SCREEN DOC REV: CPT | Performed by: INTERNAL MEDICINE

## 2020-10-28 PROCEDURE — 3044F HG A1C LEVEL LT 7.0%: CPT | Performed by: INTERNAL MEDICINE

## 2020-10-28 PROCEDURE — 1123F ACP DISCUSS/DSCN MKR DOCD: CPT | Performed by: INTERNAL MEDICINE

## 2020-10-28 PROCEDURE — G8400 PT W/DXA NO RESULTS DOC: HCPCS | Performed by: INTERNAL MEDICINE

## 2020-10-28 PROCEDURE — 1090F PRES/ABSN URINE INCON ASSESS: CPT | Performed by: INTERNAL MEDICINE

## 2020-10-28 PROCEDURE — 99214 OFFICE O/P EST MOD 30 MIN: CPT | Performed by: INTERNAL MEDICINE

## 2020-10-28 PROCEDURE — 2022F DILAT RTA XM EVC RTNOPTHY: CPT | Performed by: INTERNAL MEDICINE

## 2020-10-28 RX ORDER — SENNOSIDES 8.6 MG
1 CAPSULE ORAL DAILY
COMMUNITY
End: 2021-02-23 | Stop reason: SDUPTHER

## 2020-10-28 RX ORDER — LANOLIN ALCOHOL/MO/W.PET/CERES
3 CREAM (GRAM) TOPICAL NIGHTLY PRN
COMMUNITY
End: 2020-11-18 | Stop reason: ALTCHOICE

## 2020-10-28 NOTE — PROGRESS NOTES
TeleMedicine Video Visit    This visit was performed as a virtual video visit using a synchronous, two-way, audio-video telehealth technology platform. Patient identification was verified at the start of the visit, including the patient's telephone number and physical location. I discussed with the patient the nature of our telehealth visits, that:     1. Due to the nature of an audio- video modality, the only components of a physical exam that could be done are the elements supported by direct observation. 2. I would evaluate the patient and recommend diagnostics and treatments based on my assessment. 3. If it was felt that the patient should be evaluated in clinic or an emergency room setting, then they would be directed there. 4. Our sessions are not being recorded and that personal health information is protected. 5. Our team would provide follow up care in person if/when the patient needs it. Patient does agree to proceed with telemedicine consultation. Patient's location: home address in [facility]  Physician  location office  other people involved in call -        Time spent: Greater than Not billed by time    This visit was completed virtually using Doxy. me      10/28/2020    TELEHEALTH EVALUATION -- Audio/Visual (During YLVCY-21 public health emergency)    HPI:    Rosalinda Douglas (:  1945) has requested an audio/video evaluation for the following concern(s):    - States abnormal swelling  to abdominal area States states lower right abdomen. States also swelling to lower left. States feels as if something popped out. States has had hard spot in abdomen. US done and showed calcified material in subq fat      - States was found to have low blood count. States was seen in er - no treatment. Added iron. Last lab was 8.9. to see hematology.     - Patient  was also found to have a left obstructing ureteral calculus and had a cystoscopy with ureteral stent and stone manipulation.  Has saldana cath. States has some sediment. No current symptoms. was given ciprofloxacin 500mg bid if develops symptoms. For surgery for stones 11/2020    - States had bladder issues and overactive bladder. Stopped oxybutynin. now has Saldana cath. Following with urology. States needed chronic catheter. States was following with urology. Was given botox treatment and catheter was removed 1 month. States placed on myrbetric. Now has saldana cath, to be changed Monday (7/27).  as flushed intermittently      - Was called by visiting nurses regarding elevated blood sugars. Last lab had showed elevated A1c to 7.7, most recent A1c (7/2020) was 6.6  - not known to be diabetic. Blood sugars were running . On metformin. No reported side effects.     -  states constipated. States on senokot-2. States on miralax. On iron therapy.      -Patient has CAD. Has had ST elevation MI (2020).   Patient underwent emergency cardiac catheterization where she was found to have LAD of 100% and RCA 95% stenosis.  Patient underwent angioplasty to the LAD.  Patient was found to have severe anterior and septal hypokinesia with a apical dyskinesia and ejection fraction 20 to 25% with a small apical left ventricular thrombus on echocardiogram. Off eliquis. On Plavix last visit (10/20) - ok for surgery.      - States has high blood pressure. States 115/58. Has had lower blood pressure. States has been holding some of her heart medications due to the low blood pressure at times. Off bumex, holding coreg (95/60) and lisinopril (95/60). Has given meds mostly.     -Coronary artery disease status post ST elevation MI with cardiac catheterization and stent to the LAD  (4/2020).  cardiology suggested triple anticoagulation for at least 1 month (due to left atrial thrombus), Off Eliquis. On Plavix alone.      - States had hip fracture s/p left ORIF. States has been to physical therapy. Not walking. States having issues with left knee and leg.  Released from ortho.     - States found lung mass and needs pulmonary follow up yearly (10/2020). States PET suggested scar - no further imaging needed at present. Follow up in 1 year.      - States has multiple sclerosis. States follows with neurology. States stopped ticferdia. Follow up every 6 months. States on baclofen 20mg 4x per day and dantrolene 50mg 4x per day. Spasms stable. States weakness to hands b/l - difficult to feed self at times. Still with right hand weakness. **she would benefit form a wheelchair that reclines because of her heart condition and low ejection fraction. To help get her out of bed to help with physical therapy and strength but also to be able to recline back when she feels dizzy and to assist with transportation. Current chiar does not meet her changed needs. Has new power chair.     - States has high cholesterol. States Lipitor. No reported side effects. Stopped zetia.      - States has hypothyroidism. on levothyroxine. More compliant. Last lab Was 9/2020 - stable.      - States has had elevated liver function tests. States has seen GI. Has had biopsy. Last lab stable.      - States has depression. On citalopram. Stable on medications. No reported side effects. No suicidal thoughts. Review of Systems    Prior to Visit Medications    Medication Sig Taking?  Authorizing Provider   Sennosides (SENNA) 8.6 MG CAPS Take 1 capsule by mouth 2 times daily Yes Historical Provider, MD   Potassium 99 MG TABS Take 1 tablet by mouth daily Yes Historical Provider, MD   melatonin 3 MG TABS tablet Take 3 mg by mouth nightly as needed Yes Historical Provider, MD   levothyroxine (SYNTHROID) 88 MCG tablet Take 1 tablet by mouth daily Yes Jamil Singh MD   baclofen (LIORESAL) 20 MG tablet Take 2.5 tablets by mouth 4 times daily  Patient taking differently: Take 20 mg by mouth 4 times daily  Yes aJmil Singh MD   carvedilol (COREG) 6.25 MG tablet Take 0.5 tablets by mouth 2 times daily Yes Kennieth Boeck Chris Nye MD   clopidogrel (PLAVIX) 75 MG tablet Take 1 tablet by mouth daily Yes Mani Wagner MD   ipratropium-albuterol (DUONEB) 0.5-2.5 (3) MG/3ML SOLN nebulizer solution Inhale 3 mLs into the lungs every 6 hours as needed for Shortness of Breath Yes Mani Wagner MD   docusate sodium (COLACE) 100 MG capsule Take 100 mg by mouth daily Yes Historical Provider, MD   aspirin 81 MG EC tablet Take 81 mg by mouth daily Yes Historical Provider, MD   ferrous sulfate (IRON 325) 325 (65 Fe) MG tablet Take 1 tablet by mouth every other day  Patient taking differently: Take 325 mg by mouth daily (with breakfast)  Yes Mani Wagner MD   lisinopril (PRINIVIL;ZESTRIL) 2.5 MG tablet Take 0.5 tablets by mouth daily Yes Mani Wagner MD   dantrolene (DANTRIUM) 50 MG capsule Take 1 capsule by mouth 4 times daily  Patient taking differently: Take 100 mg by mouth 2 times daily 2 caps bid Yes Mani Wagner MD   metFORMIN (GLUCOPHAGE) 500 MG tablet Take 1 tablet by mouth 2 times daily (with meals) Yes Mani Wagner MD   blood glucose test strips (ASCENSIA AUTODISC VI;ONE TOUCH ULTRA TEST VI) strip 1 each by In Vitro route 2 times daily As needed. Yes Main Wagner MD   blood glucose monitor kit and supplies Dispense sufficient amount for indicated testing frequency plus additional to accommodate PRN testing needs. Dispense all needed supplies to include: monitor, strips, lancing device, lancets, control solutions, alcohol swabs. Yes Mani Wagner MD   blood glucose monitor strips Test qd  & as needed for symptoms of irregular blood glucose. Dispense sufficient amount for indicated testing frequency plus additional to accommodate PRN testing needs.  Yes Mani Wagner MD   Lancets MISC 1 each by Does not apply route daily Yes Mani Wagner MD   citalopram (CELEXA) 20 MG tablet Take 1 tablet by mouth every morning Yes Mani Wagner MD   atorvastatin (LIPITOR) 10 MG tablet Take 1 tablet by mouth daily Yes Celeste Gipson MD   Catheters (URETHRAL CATHETER) MISC by Does not apply route Yes Historical Provider, MD   Cyanocobalamin (VITAMIN B 12 PO) Take 500 mg by mouth daily  Yes Historical Provider, MD   Calcium Carbonate-Vit D-Min (CALCIUM 600+D PLUS MINERALS) 600-400 MG-UNIT TABS Take 1 tablet by mouth nightly  Yes Historical Provider, MD   polyethylene glycol (MIRALAX) PACK packet Take 17 g by mouth daily  Yes Historical Provider, MD   vitamin D3 (CHOLECALCIFEROL) 25 MCG (1000 UT) TABS tablet Take 1 tablet by mouth every morning  Yes Historical Provider, MD   magnesium (MAGNESIUM-OXIDE) 250 MG TABS tablet Take 250 mg by mouth every morning  Yes Historical Provider, MD   nortriptyline (PAMELOR) 25 MG capsule Take 1 capsule by mouth nightly  Celeste Gipson MD       Social History     Tobacco Use    Smoking status: Never Smoker    Smokeless tobacco: Never Used   Substance Use Topics    Alcohol use: No     Alcohol/week: 0.0 standard drinks     Comment: Ocassionally    Drug use: No        Allergies   Allergen Reactions    Macrobid [Nitrofurantoin]      Mental Changes   ,   Past Medical History:   Diagnosis Date    CAD (coronary artery disease) 4/21/2020    Hepatitis     High cholesterol     Hypertension     Hypothyroidism     MI [facility] infarction) [facility] [address] 04/21/2020    MS (multiple sclerosis) ([facility] [address]     Osteoporosis     Sarcoidosis     Type 2 diabetes mellitus with hyperglycemia, without long-term current use of insulin ([facility] [address] 10/30/2019   ,   Past Surgical History:   Procedure Laterality Date    CHOLECYSTECTOMY  1990s    CORONARY ANGIOPLASTY WITH STENT PLACEMENT  04/21/2020    Dr. Tracee Huerta   3.0 x 22mm Resolute MAAME to Prox LAD.   No LV    CYSTOSCOPY INSERTION / REMOVAL STENT / STONE Left 4/23/2020    CYSTOSCOPY LEFT RETROGRADE PYELOGRAM STENT INSERTION, STRATTON CATHETER performed by Daryn Avila DO at [facility] Left 3/21/2019    LEFT FEMUR CEPHALLOMEDULLARY NAIL performed by Jaya Cannon MD at [facility] Bilateral     HYSTERECTOMY      KNEE SURGERY      plate in lt knee   ,   Social History     Tobacco Use    Smoking status: Never Smoker    Smokeless tobacco: Never Used   Substance Use Topics    Alcohol use: No     Alcohol/week: 0.0 standard drinks     Comment: Ocassionally    Drug use: No   ,   Immunization History   Administered Date(s) Administered    Influenza 12/15/2010    Influenza A ([id number]) Vaccine PF IM 12/02/2009    Influenza Vaccine, unspecified formulation 11/21/2008    Influenza Virus Vaccine 10/27/2014, 10/02/2017    Influenza Whole 10/02/2013, 10/27/2014, 10/21/2015    Influenza, High Dose (Fluzone 65 yrs and older) 10/10/2015, 09/11/2017    Influenza, Quadv, IM, (6 mo and older Fluzone, Flulaval, Fluarix and 3 yrs and older Afluria) 10/02/2017    Influenza, Triv, inactivated, subunit, adjuvanted, IM (Fluad 65 yrs and older) 09/19/2018    Pneumococcal Conjugate 13-valent (Melvina Godoye) 01/15/2013, 09/23/2015, 09/18/2018, 09/19/2018    Pneumococcal Polysaccharide ([id number]) 09/18/2013   ,   Health Maintenance   Topic Date Due    Diabetic foot exam  03/05/1955    Diabetic retinal exam  03/05/1955    DTaP/Tdap/Td vaccine (1 - Tdap) 03/05/1965    Shingles Vaccine (1 of 2) 03/05/1996    DEXA (modify frequency per FRAX score)  03/05/2001    Annual Wellness Visit (AWV)  06/19/2019    Flu vaccine (1) 09/01/2020    Diabetic microalbuminuria test  10/30/2020    Breast cancer screen  12/10/2020    A1C test (Diabetic or Prediabetic)  07/30/2021    Lipid screen  09/02/2021    TSH testing  09/02/2021    Potassium monitoring  10/05/2021    Creatinine monitoring  10/05/2021    Colon cancer screen colonoscopy  11/04/2029    Pneumococcal 65+ years Vaccine  Completed    Hepatitis C screen  Completed    Hepatitis A vaccine  Aged Out    Hib vaccine  Aged Out    Meningococcal (ACWY) vaccine  Aged Out       PHYSICAL EXAMINATION:  [ INSTRUCTIONS:  \"[x]\" Indicates a positive item  \"[]\" Indicates a negative item  -- DELETE ALL ITEMS NOT EXAMINED]  Vital Signs: (As obtained by patient/caregiver or practitioner observation)    Blood pressure-  Heart rate-    Respiratory rate-    Temperature-  Pulse oximetry-     Constitutional: [x] Appears well-developed and well-nourished [x] No apparent distress      [] Abnormal-   Mental status  [x] Alert and awake  [x] Oriented to person/place/time []Able to follow commands      Eyes:  EOM    [x]  Normal  [] Abnormal-  Sclera  []  Normal  [] Abnormal -         Discharge []  None visible  [] Abnormal -    HENT:   [x] Normocephalic, atraumatic.   [] Abnormal   [] Mouth/Throat: Mucous membranes are moist.     External Ears [x] Normal  [] Abnormal-     Neck: [x] No visualized mass     Pulmonary/Chest: [x] Respiratory effort normal.  [] No visualized signs of difficulty breathing or respiratory distress        [] Abnormal-      Musculoskeletal:   [] Normal gait with no signs of ataxia         [x] Normal range of motion of neck        [] Abnormal-       Neurological:        [x] No Facial Asymmetry (Cranial nerve 7 motor function) (limited exam to video visit)          [] No gaze palsy        [] Abnormal-         Skin:        [x] No significant exanthematous lesions or discoloration noted on facial skin         [] Abnormal-            Psychiatric:       [x] Normal Affect [] No Hallucinations        [] Abnormal-     Other pertinent observable physical exam findings-     ASSESSMENT/PLAN:  Lower abdominal pain  - hard lesions to b/l abdo  - US showed calcified material   - no further treatment at present     Urinary tract infection without hematuria, site unspecified  - enterococcus    - still has stent   - at risk for further infection   - no symptoms  - has cipro to use if symptoms   - following with urology     Nephrolithiasis  - for cystoscopy and lithotripsy (11/2020)      Anemia, unspecified type  - follow labs  - increased iron   - to see hematology      Other Constipation  - on senna   - on miralax  - add mag citrate . 25 as needed      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, unspecified vessel or lesion type ([facility] [address]  - s/p cath and stent to LAD  On lisinopril and coreg when able   - on eliquis, plavix and aspiein x 1 month, then switch to eliquis and plavix alone   - needs follow up with cardio  - follow up labs   - follow up chest xray      Chronic systolic congestive heart failure (HCC)  - EF 20% per echo (4/2020)   - off bumex   - on coreg and lisinopril if able      Essential hypertension  - Continue present treatment   - watch diet   - off bumex   - on coreg twice a day   - on lisinopril   - intermittently needing to hold due to low blood pressure   - improved per patient and       Type 2 diabetes mellitus with hyperglycemia, without long-term current use of insulin ([facility] [address]  - Continue present treatment   - watch diet   - last A1c was 6.6 (7/2020)   - now on metformin since recent increase in sugars   - on endocrinology      Hypothyroidism, unspecified type  - continue present treatment  - on levothyroxine  - labs were normal (9/2020)     MS (multiple sclerosis) ([facility] [address]  - continue present treatment  - ticierda on hold  - on baclofen and dantrolene  - non ambulatory  - stable  **she would benefit form a wheelchair that reclines because of her heart condition and low ejection fraction. To help get her out of bed to help with physical therapy and strength but also to be able to recline back when she feels dizzy and to assist with transportation.  Current chiar does not meet her changed needs.      Spastic quadriparesis ([facility] [address]  - following with neurology   - on baclofen and dantrolene     Mixed hyperlipidemia  - continue present treatment  - watch diet  - on atrovastatin  - follow up labs     Neurogenic bladder  - continue present treatment  - off oxybutynin  - was to add myrbetric - has not started yet   - currently with saldana   - to see urology     Mild episode of recurrent major depressive disorder ([facility] [address]  - continue present treatment  - on citalopram  - no suicidal thoughts  - stable     HUDSON (nonalcoholic steatohepatitis)  - continue present treatment  - following with GI     Osteopenia of multiple sites  - declines bone density  - follows labs     Lung mass  - possible scar (2019)   - has seen pulmonary - yearly (10/2020)      H/O sarcoidosis    Return in about 6 weeks (around 12/9/2020) for check up and review. No orders of the defined types were placed in this encounter. Requested Prescriptions      No prescriptions requested or ordered in this encounter       Darlene Her is a 77 y.o. female being evaluated by a Virtual Visit (video visit) encounter to address concerns as mentioned above. A caregiver was present when appropriate. Due to this being a TeleHealth encounter (During [place] [id number] public health emergency), evaluation of the following organ systems was limited: Vitals/Constitutional/EENT/Resp/CV/GI//MS/Neuro/Skin/Heme-Lymph-Imm. Pursuant to the emergency declaration under the [facility], [address] [place] [address] [facility] authority and the [employer] Act, this Virtual Visit was conducted with patient's (and/or legal guardian's) consent, to reduce the patient's risk of exposure to COVID-19 and provide necessary medical care. The patient (and/or legal guardian) has also been advised to contact this office for worsening conditions or problems, and seek emergency medical treatment and/or call 911 if deemed necessary. Patient identification was verified at the start of the visit: Yes    Total time spent on this encounter: Not billed by time    Services were provided through a video synchronous discussion virtually to substitute for in-person clinic visit.  Patient and provider were located at their individual homes. --Craig Harrington MD on 10/28/2020 at 2:16 PM    An electronic signature was used to authenticate this note.

## 2020-10-29 ENCOUNTER — TELEPHONE (OUTPATIENT)
Dept: FAMILY MEDICINE CLINIC | Age: 75
End: 2020-10-29

## 2020-10-29 RX ORDER — CITALOPRAM 20 MG/1
20 TABLET ORAL EVERY MORNING
Qty: 90 TABLET | Refills: 1 | Status: SHIPPED
Start: 2020-10-29 | End: 2021-04-22 | Stop reason: SDUPTHER

## 2020-10-29 NOTE — TELEPHONE ENCOUNTER
Pharmacy is requesting a refill on Celexa. Rx is ready to be sent.     Last Appointment:  10/28/2020  Future Appointments   Date Time Provider Marisol Garcia   12/22/2020  3:15 PM Craig Harrington  [address] [place] [address]   1/14/2021  9:20 AM Amrit Beard MD [facility] ENDO [facility]   2/23/2021  2:00 PM Dante Sanz MD [facility] NEURO [facility]   10/8/2021  9:00 AM [facility]

## 2020-10-29 NOTE — TELEPHONE ENCOUNTER
- Jaylen [place]  He is calling about a bed sore that she is getting on her buttock. He is asking if you can send a script to [facility] in [place] for [address]. It comes in a 44ml tube.

## 2020-10-30 ENCOUNTER — TELEPHONE (OUTPATIENT)
Dept: ENDOCRINOLOGY | Age: 75
End: 2020-10-30

## 2020-10-30 NOTE — TELEPHONE ENCOUNTER
Jo Reid called to tell us that the script was not received by the pharmacy last night. I called [facility] and was told it did get there, but it was after 8:00pm.  Jo Reid will go there today and pick it up.

## 2020-11-13 ENCOUNTER — PREP FOR PROCEDURE (OUTPATIENT)
Dept: UROLOGY | Age: 75
End: 2020-11-13

## 2020-11-13 ENCOUNTER — TELEPHONE (OUTPATIENT)
Dept: ENDOCRINOLOGY | Age: 75
End: 2020-11-13

## 2020-11-13 RX ORDER — SODIUM CHLORIDE 0.9 % (FLUSH) 0.9 %
10 SYRINGE (ML) INJECTION PRN
Status: CANCELLED | OUTPATIENT
Start: 2020-11-13

## 2020-11-13 RX ORDER — SODIUM CHLORIDE 9 MG/ML
INJECTION, SOLUTION INTRAVENOUS CONTINUOUS
Status: CANCELLED | OUTPATIENT
Start: 2020-11-13

## 2020-11-13 RX ORDER — SODIUM CHLORIDE 0.9 % (FLUSH) 0.9 %
10 SYRINGE (ML) INJECTION EVERY 12 HOURS SCHEDULED
Status: CANCELLED | OUTPATIENT
Start: 2020-11-13

## 2020-11-13 RX ORDER — CIPROFLOXACIN 2 MG/ML
400 INJECTION, SOLUTION INTRAVENOUS
Status: CANCELLED | OUTPATIENT
Start: 2020-11-13 | End: 2020-11-13

## 2020-11-16 RX ORDER — MULTIVITAMIN/IRON/FOLIC ACID 18MG-0.4MG
1 TABLET ORAL DAILY
COMMUNITY
End: 2020-12-21

## 2020-11-18 DIAGNOSIS — U07.1 COVID-19: ICD-10-CM

## 2020-11-18 RX ORDER — HONEY 100 %
PASTE (ML) TOPICAL PRN
COMMUNITY
Start: 2020-10-30 | End: 2021-04-22 | Stop reason: ALTCHOICE

## 2020-11-18 NOTE — PROGRESS NOTES
[facility] PRE-ADMISSION TESTING GENERAL INSTRUCTIONS- [facility]-phone number:[phone]    GENERAL INSTRUCTIONS  [x] Antibacterial Soap shower Night before and/or AM of Surgery  [] Prasanna wipe instruction sheet and wipes given. [x] Nothing by mouth after midnight, including gum, candy, mints, or water.  [] You may brush your teeth, gargle, but do NOT swallow water. []Hibiclens shower  the night before and the morning of surgery. Do not use             Hibiclens on your face or head. [x]No smoking, chewing tobacco, illegal drugs, or alcohol within 24 hours of your surgery. [x] Jewelry, valuables or body piercing's should not be brought to the hospital. All body and/or tongue piercing's must be removed prior to arriving to hospital.  ALL hair pins must be removed. [x] Do not wear makeup, lotions, powders, deodorant. Nail polish as directed by the nurse. [x] Arrange transportation with a responsible adult  to and from the hospital. If you do not have a responsible adult  to transport you, you will need to make arrangements with a medical transportation company (i.e. [employer]. A Uber/taxi/bus is not appropriate unless you are accompanied by a responsible adult ). Arrange for someone to be with you for the remainder of the day and for 24 hours after your procedure due to having had anesthesia. Who will be your  for transportation? _[place] STAR_________________   Who will be staying with you for 24 hrs after your procedure?____HUSBAND______________  [x] Bring insurance card and photo ID.  [] Transfusion Bracelet: Please bring with you to hospital, day of surgery  [] Bring urine specimen day of surgery. Any small container is acceptable. [] Use inhalers the morning of surgery and bring with you to hospital.  [] Bring copy of living will or healthcare power of  papers to be placed in your electronic record.   [] CPAP/BI-PAP: Please bring your machine if you are to spend the night in the hospital.     PARKING INSTRUCTIONS:   [x] Arrival Time:__0530___________  · [] Parking lot '\"I\"  is located on [facility] (the corner of [facility] and [facility]). To enter, press the button and the gate will lift. A free token will be provided to exit the lot. One car per patient is allowed to park in this lot. All other cars are to park on [address] [place] [address] either in the parking garage or the handicap lot. [x] To reach the [facility] lobby from [address] [place] [address], upon entering the hospital, take elevator B to the 3rd floor. EDUCATION INSTRUCTIONS:      [] Knee or hip replacement booklet & exercise pamphlets given. [] Majo 77 placed in chart. [] Pre-admission Testing educational folder given  [] Incentive Spirometry,coughing & deep breathing exercises reviewed. []Medication information sheet(s)   [x]Fluoroscopy-Xray used in surgery reviewed with patient. Educational pamphlet placed in chart. [x]Pain: Post-op pain is normal and to be expected. You will be asked to rate your pain from 0-10(a zero is not acceptable-education is needed). Your post-op pain goal is: 5  [] Ask your nurse for your pain medication. [] Joint camp offered. [] Joint replacement booklets given. [] Other:___________________________    MEDICATION INSTRUCTIONS:   [x]Bring a complete list of your medications, please write the last time you took the medicine, give this list to the nurse. [x] Take the following medications the morning of surgery with 1-2 ounces of water: SEE MED LIST[] Stop herbal supplements and vitamins 5 days before your surgery. [x] DO NOT take any diabetic medicine the morning of surgery. Follow instructions for insulin the day before surgery. [x] If you are diabetic and your blood sugar is low or you feel symptomatic, you may drink 1-2 ounces of apple juice or take a glucose tablet.   The morning of your procedure, you may call the pre-op area if you have concerns about your blood sugar [phone]. [] Use your inhalers the morning of surgery. Bring your emergency inhaler with you day of surgery. [] Follow physician instructions regarding any blood thinners you may be taking. WHAT TO EXPECT:  [x] The day of surgery you will be greeted and checked in by the [employer].  In addition, you will be registered in the [place] by a Patient Access Representative. Please bring your photo ID and insurance card. A nurse will greet you in accordance to the time you are needed in the pre-op area to prepare you for surgery. Please do not be discouraged if you are not greeted in the order you arrive as there are many variables that are involved in patient preparation. Your patience is greatly appreciated as you wait for your nurse. Please bring in items such as: books, magazines, newspapers, electronics, or any other items  to occupy your time in the waiting area. [x]  Delays may occur with surgery and staff will make a sincere effort to keep you informed of delays. If any delays occur with your procedure, we apologize ahead of time for your inconvenience as we recognize the value of your time. [facility] PRE-ADMISSION TESTING GENERAL INSTRUCTIONS- [facility]-phone number:[phone]    GENERAL INSTRUCTIONS  [] Antibacterial Soap shower Night before and/or AM of Surgery  [] Prasanna wipe instruction sheet and wipes given. [] Nothing by mouth after midnight, including gum, candy, mints, or water.  [] You may brush your teeth, gargle, but do NOT swallow water. []Hibiclens shower  the night before and the morning of surgery. Do not use             Hibiclens on your face or head. []No smoking, chewing tobacco, illegal drugs, or alcohol within 24 hours of your surgery.   [] Jewelry, valuables or body piercing's should not be brought to the hospital. All body and/or tongue piercing's must be removed prior to arriving to hospital.  ALL hair pins must be removed. [] Do not wear makeup, lotions, powders, deodorant. Nail polish as directed by the nurse. [] Arrange transportation with a responsible adult  to and from the hospital. If you do not have a responsible adult  to transport you, you will need to make arrangements with a medical transportation company (i.e. [employer]. A Uber/taxi/bus is not appropriate unless you are accompanied by a responsible adult ). Arrange for someone to be with you for the remainder of the day and for 24 hours after your procedure due to having had anesthesia. Who will be your  for transportation?__________________   Who will be staying with you for 24 hrs after your procedure?__________________  [] Bring insurance card and photo ID.  [] Transfusion Bracelet: Please bring with you to hospital, day of surgery  [] Bring urine specimen day of surgery. Any small container is acceptable. [] Use inhalers the morning of surgery and bring with you to hospital.  [] Bring copy of living will or healthcare power of  papers to be placed in your electronic record. [] CPAP/BI-PAP: Please bring your machine if you are to spend the night in the hospital.     PARKING INSTRUCTIONS:   [] Arrival Time:_____________  · [] Parking lot '\"I\"  is located on [facility] (the corner of [facility]). To enter, press the button and the gate will lift. A free token will be provided to exit the lot. One car per patient is allowed to park in this lot. All other cars are to park on [address] [place] [address] either in the parking garage or the handicap lot. [] To reach the [place] lobby from [address] [place] [address], upon entering the hospital, take elevator B to the 3rd floor. EDUCATION INSTRUCTIONS:      [] Knee or hip replacement booklet & exercise pamphlets given. [] Majo 77 placed in chart.    [] Pre-admission Testing educational folder given  [] Incentive Spirometry,coughing & deep breathing exercises reviewed. []Medication information sheet(s)   []Fluoroscopy-Xray used in surgery reviewed with patient. Educational pamphlet placed in chart. []Pain: Post-op pain is normal and to be expected. You will be asked to rate your pain from 0-10(a zero is not acceptable-education is needed). Your post-op pain goal is:  [] Ask your nurse for your pain medication. [] Joint camp offered. [] Joint replacement booklets given. [] Other:___________________________    MEDICATION INSTRUCTIONS:   [x]Bring a complete list of your medications, please write the last time you took the medicine, give this list to the nurse. [x] Take the following medications the morning of surgery with 1-2 ounces of water:   [] Stop herbal supplements and vitamins 5 days before your surgery. [] DO NOT take any diabetic medicine the morning of surgery. Follow instructions for insulin the day before surgery. [x] If you are diabetic and your blood sugar is low or you feel symptomatic, you may drink 1-2 ounces of apple juice or take a glucose tablet. The morning of your procedure, you may call the pre-op area if you have concerns about your blood sugar [phone]. [] Use your inhalers the morning of surgery. Bring your emergency inhaler with you day of surgery. [] Follow physician instructions regarding any blood thinners you may be taking. WHAT TO EXPECT:  [x] The day of surgery you will be greeted and checked in by the [employer].  In addition, you will be registered in the [facility] by a Patient Access Representative. Please bring your photo ID and insurance card. A nurse will greet you in accordance to the time you are needed in the pre-op area to prepare you for surgery.  Please do not be discouraged if you are not greeted in the order you arrive as there are many variables that are involved in patient preparation. Your patience is greatly appreciated as you wait for your nurse. Please bring in items such as: books, magazines, newspapers, electronics, or any other items  to occupy your time in the waiting area. [x]  Delays may occur with surgery and staff will make a sincere effort to keep you informed of delays. If any delays occur with your procedure, we apologize ahead of time for your inconvenience as we recognize the value of your time.

## 2020-11-19 LAB
SARS-COV-2: NOT DETECTED
SOURCE: NORMAL

## 2020-11-22 ENCOUNTER — ANESTHESIA EVENT (OUTPATIENT)
Dept: OPERATING ROOM | Age: 75
End: 2020-11-22
Payer: MEDICARE

## 2020-11-22 RX ORDER — CARVEDILOL 3.12 MG/1
3.12 TABLET ORAL 2 TIMES DAILY
Qty: 180 TABLET | Refills: 1 | Status: SHIPPED
Start: 2020-11-22 | End: 2021-04-07 | Stop reason: SDUPTHER

## 2020-11-22 NOTE — TELEPHONE ENCOUNTER
Requested Prescriptions     Signed Prescriptions Disp Refills    carvedilol (COREG) 3.125 MG tablet 180 tablet 1     Sig: Take 1 tablet by mouth 2 times daily     Authorizing Provider: Collette Isbell

## 2020-11-23 ENCOUNTER — APPOINTMENT (OUTPATIENT)
Dept: GENERAL RADIOLOGY | Age: 75
End: 2020-11-23
Attending: UROLOGY
Payer: MEDICARE

## 2020-11-23 ENCOUNTER — ANESTHESIA (OUTPATIENT)
Dept: OPERATING ROOM | Age: 75
End: 2020-11-23
Payer: MEDICARE

## 2020-11-23 ENCOUNTER — HOSPITAL ENCOUNTER (OUTPATIENT)
Age: 75
Setting detail: OUTPATIENT SURGERY
Discharge: HOME | End: 2020-11-23
Attending: UROLOGY | Admitting: UROLOGY
Payer: MEDICARE

## 2020-11-23 VITALS — SYSTOLIC BLOOD PRESSURE: 119 MMHG | OXYGEN SATURATION: 100 % | DIASTOLIC BLOOD PRESSURE: 54 MMHG

## 2020-11-23 VITALS
TEMPERATURE: 98.6 F | SYSTOLIC BLOOD PRESSURE: 110 MMHG | RESPIRATION RATE: 16 BRPM | OXYGEN SATURATION: 100 % | WEIGHT: 150 LBS | HEIGHT: 60 IN | BODY MASS INDEX: 29.45 KG/M2 | HEART RATE: 84 BPM | DIASTOLIC BLOOD PRESSURE: 57 MMHG

## 2020-11-23 LAB — METER GLUCOSE: 103 MG/DL (ref 74–99)

## 2020-11-23 PROCEDURE — 3600000014 HC SURGERY LEVEL 4 ADDTL 15MIN: Performed by: UROLOGY

## 2020-11-23 PROCEDURE — 82365 CALCULUS SPECTROSCOPY: CPT

## 2020-11-23 PROCEDURE — C2617 STENT, NON-COR, TEM W/O DEL: HCPCS | Performed by: UROLOGY

## 2020-11-23 PROCEDURE — 7100000011 HC PHASE II RECOVERY - ADDTL 15 MIN: Performed by: UROLOGY

## 2020-11-23 PROCEDURE — 6360000002 HC RX W HCPCS: Performed by: NURSE ANESTHETIST, CERTIFIED REGISTERED

## 2020-11-23 PROCEDURE — 2500000003 HC RX 250 WO HCPCS: Performed by: NURSE ANESTHETIST, CERTIFIED REGISTERED

## 2020-11-23 PROCEDURE — 74420 UROGRAPHY RTRGR +-KUB: CPT

## 2020-11-23 PROCEDURE — 2709999900 HC NON-CHARGEABLE SUPPLY: Performed by: UROLOGY

## 2020-11-23 PROCEDURE — C1769 GUIDE WIRE: HCPCS | Performed by: UROLOGY

## 2020-11-23 PROCEDURE — C1894 INTRO/SHEATH, NON-LASER: HCPCS | Performed by: UROLOGY

## 2020-11-23 PROCEDURE — 82962 GLUCOSE BLOOD TEST: CPT

## 2020-11-23 PROCEDURE — 2720000010 HC SURG SUPPLY STERILE: Performed by: UROLOGY

## 2020-11-23 PROCEDURE — 2580000003 HC RX 258: Performed by: NURSE PRACTITIONER

## 2020-11-23 PROCEDURE — 3700000000 HC ANESTHESIA ATTENDED CARE: Performed by: UROLOGY

## 2020-11-23 PROCEDURE — 6360000002 HC RX W HCPCS: Performed by: NURSE PRACTITIONER

## 2020-11-23 PROCEDURE — 3600000004 HC SURGERY LEVEL 4 BASE: Performed by: UROLOGY

## 2020-11-23 PROCEDURE — 3700000001 HC ADD 15 MINUTES (ANESTHESIA): Performed by: UROLOGY

## 2020-11-23 PROCEDURE — 7100000010 HC PHASE II RECOVERY - FIRST 15 MIN: Performed by: UROLOGY

## 2020-11-23 PROCEDURE — 88300 SURGICAL PATH GROSS: CPT

## 2020-11-23 DEVICE — URETERAL STENT
Type: IMPLANTABLE DEVICE | Site: URETER | Status: FUNCTIONAL
Brand: PERCUFLEX™

## 2020-11-23 RX ORDER — PHENYLEPHRINE HCL IN 0.9% NACL 1 MG/10 ML
SYRINGE (ML) INTRAVENOUS PRN
Status: DISCONTINUED | OUTPATIENT
Start: 2020-11-23 | End: 2020-11-23 | Stop reason: SDUPTHER

## 2020-11-23 RX ORDER — SODIUM CHLORIDE 0.9 % (FLUSH) 0.9 %
10 SYRINGE (ML) INJECTION EVERY 12 HOURS SCHEDULED
Status: DISCONTINUED | OUTPATIENT
Start: 2020-11-23 | End: 2020-11-23 | Stop reason: HOSPADM

## 2020-11-23 RX ORDER — PROMETHAZINE HYDROCHLORIDE 25 MG/ML
6.25 INJECTION, SOLUTION INTRAMUSCULAR; INTRAVENOUS
Status: DISCONTINUED | OUTPATIENT
Start: 2020-11-23 | End: 2020-11-23 | Stop reason: HOSPADM

## 2020-11-23 RX ORDER — FENTANYL CITRATE 50 UG/ML
25 INJECTION, SOLUTION INTRAMUSCULAR; INTRAVENOUS EVERY 5 MIN PRN
Status: DISCONTINUED | OUTPATIENT
Start: 2020-11-23 | End: 2020-11-23 | Stop reason: HOSPADM

## 2020-11-23 RX ORDER — MEPERIDINE HYDROCHLORIDE 25 MG/ML
12.5 INJECTION INTRAMUSCULAR; INTRAVENOUS; SUBCUTANEOUS EVERY 5 MIN PRN
Status: DISCONTINUED | OUTPATIENT
Start: 2020-11-23 | End: 2020-11-23 | Stop reason: HOSPADM

## 2020-11-23 RX ORDER — SULFAMETHOXAZOLE AND TRIMETHOPRIM 800; 160 MG/1; MG/1
1 TABLET ORAL 2 TIMES DAILY
Qty: 10 TABLET | Refills: 0 | Status: SHIPPED | OUTPATIENT
Start: 2020-11-23 | End: 2020-12-03

## 2020-11-23 RX ORDER — OXYCODONE HYDROCHLORIDE AND ACETAMINOPHEN 5; 325 MG/1; MG/1
1 TABLET ORAL
Status: DISCONTINUED | OUTPATIENT
Start: 2020-11-23 | End: 2020-11-23 | Stop reason: HOSPADM

## 2020-11-23 RX ORDER — FENTANYL CITRATE 50 UG/ML
50 INJECTION, SOLUTION INTRAMUSCULAR; INTRAVENOUS EVERY 5 MIN PRN
Status: DISCONTINUED | OUTPATIENT
Start: 2020-11-23 | End: 2020-11-23 | Stop reason: HOSPADM

## 2020-11-23 RX ORDER — SODIUM CHLORIDE 0.9 % (FLUSH) 0.9 %
10 SYRINGE (ML) INJECTION PRN
Status: DISCONTINUED | OUTPATIENT
Start: 2020-11-23 | End: 2020-11-23 | Stop reason: HOSPADM

## 2020-11-23 RX ORDER — SODIUM CHLORIDE 9 MG/ML
INJECTION, SOLUTION INTRAVENOUS CONTINUOUS
Status: DISCONTINUED | OUTPATIENT
Start: 2020-11-23 | End: 2020-11-23 | Stop reason: HOSPADM

## 2020-11-23 RX ORDER — PROPOFOL 10 MG/ML
INJECTION, EMULSION INTRAVENOUS CONTINUOUS PRN
Status: DISCONTINUED | OUTPATIENT
Start: 2020-11-23 | End: 2020-11-23 | Stop reason: SDUPTHER

## 2020-11-23 RX ORDER — OXYCODONE HYDROCHLORIDE AND ACETAMINOPHEN 5; 325 MG/1; MG/1
1 TABLET ORAL EVERY 4 HOURS PRN
Qty: 10 TABLET | Refills: 0 | Status: SHIPPED | OUTPATIENT
Start: 2020-11-23 | End: 2020-11-30

## 2020-11-23 RX ORDER — ONDANSETRON 2 MG/ML
4 INJECTION INTRAMUSCULAR; INTRAVENOUS
Status: DISCONTINUED | OUTPATIENT
Start: 2020-11-23 | End: 2020-11-23 | Stop reason: HOSPADM

## 2020-11-23 RX ORDER — FENTANYL CITRATE 50 UG/ML
INJECTION, SOLUTION INTRAMUSCULAR; INTRAVENOUS PRN
Status: DISCONTINUED | OUTPATIENT
Start: 2020-11-23 | End: 2020-11-23 | Stop reason: SDUPTHER

## 2020-11-23 RX ORDER — CIPROFLOXACIN 2 MG/ML
INJECTION, SOLUTION INTRAVENOUS PRN
Status: DISCONTINUED | OUTPATIENT
Start: 2020-11-23 | End: 2020-11-23 | Stop reason: SDUPTHER

## 2020-11-23 RX ORDER — CIPROFLOXACIN 2 MG/ML
400 INJECTION, SOLUTION INTRAVENOUS
Status: COMPLETED | OUTPATIENT
Start: 2020-11-23 | End: 2020-11-23

## 2020-11-23 RX ADMIN — SODIUM CHLORIDE: 9 INJECTION, SOLUTION INTRAVENOUS at 07:43

## 2020-11-23 RX ADMIN — FENTANYL CITRATE 50 MCG: 50 INJECTION, SOLUTION INTRAMUSCULAR; INTRAVENOUS at 09:16

## 2020-11-23 RX ADMIN — PROPOFOL 50 MCG/KG/MIN: 10 INJECTION, EMULSION INTRAVENOUS at 09:16

## 2020-11-23 RX ADMIN — Medication 100 MCG: at 09:29

## 2020-11-23 RX ADMIN — CIPROFLOXACIN 400 MG: 2 INJECTION, SOLUTION INTRAVENOUS at 09:09

## 2020-11-23 RX ADMIN — CIPROFLOXACIN 400 MG: 2 INJECTION, SOLUTION INTRAVENOUS at 07:57

## 2020-11-23 ASSESSMENT — PAIN SCALES - GENERAL
PAINLEVEL_OUTOF10: 0

## 2020-11-23 ASSESSMENT — PULMONARY FUNCTION TESTS
PIF_VALUE: 0

## 2020-11-23 ASSESSMENT — PAIN - FUNCTIONAL ASSESSMENT: PAIN_FUNCTIONAL_ASSESSMENT: 0-10

## 2020-11-23 NOTE — BRIEF OP NOTE
Brief Postoperative Note      Patient: Therese Husbands  YOB: 1945  MRN: [id number]    Date of Procedure: 11/23/2020    Pre-Op Diagnosis: KIDNEY STONES    Post-Op Diagnosis: Same       Procedure(s):  LEFT CYSTOSCOPY RETROGRADE PYELOGRAM, URETEROSCOPY,  LASER LITHOTRIPSY POSSIBLE STENT INSERTION    Surgeon(s):  Viktor Avila DO    Assistant:  * No surgical staff found *    Anesthesia: Monitor Anesthesia Care    Estimated Blood Loss (mL): Minimal    Complications: None    Specimens:   * No specimens in log *    Implants:  * No implants in log *      Drains: * No LDAs found *    Findings: see operative report    Electronically signed by Junaid Avila DO on 11/23/2020 at 7:34 AM

## 2020-11-23 NOTE — PROGRESS NOTES
Admit to pre op from home. Alert and oriented.  at bedside. He is the POA and caretaker at home. She has one small wound on her coccyx with a small scab noted. She in bedridden due to ms.    She does had a urethral saldana in place that is draining clear yellow urine

## 2020-11-23 NOTE — H&P
11/23/2020 7:32 AM  Service: Urology  Group: [facility] ([place])    Elsi Douglas  [id number]     Chief Complaint: Left proximal ureteral stone    History of Present Illness: The patient is a 77 y.o. female patient who presents with the above    Past Medical History:   Diagnosis Date    CAD (coronary artery disease) 4/21/2020    Hepatitis     High cholesterol     Hypertension     Hypothyroidism     [facility]ocardial infarction) ([facility] 04/21/2020    MS (multiple sclerosis) ([facility]     Osteoporosis     Sarcoidosis     Type 2 diabetes mellitus with hyperglycemia, without long-term current use of insulin ([facility] 10/30/2019       Past Surgical History:   Procedure Laterality Date    CHOLECYSTECTOMY  1990s    CORONARY ANGIOPLASTY WITH STENT PLACEMENT  04/21/2020    Dr. Shraddha Up   3.0 x 22mm Resolute MAAME to Prox LAD.   No LV    CYSTOSCOPY INSERTION / REMOVAL STENT / STONE Left 4/23/2020    CYSTOSCOPY LEFT RETROGRADE PYELOGRAM STENT INSERTION, STRATTON CATHETER performed by Linda Avila DO at [facility] Left 3/21/2019    LEFT FEMUR CEPHALLOMEDULLARY NAIL performed by Mariam Peña MD at [facility] Bilateral     HYSTERECTOMY      KNEE SURGERY      plate in lt knee       Medications Prior to Admission:    Medications Prior to Admission: Wound Dressings (MEDIHONEY WOUND/BURN DRESSING) PSTE paste, as needed [place] buttocks  OXYGEN, Inhale 2 L into the lungs continuous  Magnesium Oxide 250 MG TABS, Take 1 tablet by mouth daily  citalopram (CELEXA) 20 MG tablet, Take 1 tablet by mouth every morning  Sennosides (SENNA) 8.6 MG CAPS, Take 1 capsule by mouth 2 times daily  levothyroxine (SYNTHROID) 88 MCG tablet, Take 1 tablet by mouth daily (Patient taking differently: Take 88 mcg by mouth nightly )  baclofen (LIORESAL) 20 MG tablet, Take 2.5 tablets by mouth 4 times daily (Patient taking differently: Take 20 mg by mouth 4 times daily )  clopidogrel (PLAVIX) 75 MG swabs. Lancets MISC, 1 each by Does not apply route daily  Catheters (URETHRAL CATHETER) MISC, by Does not apply route  Calcium Carbonate-Vit D-Min (CALCIUM 600+D PLUS MINERALS) 600-400 MG-UNIT TABS, Take 1 tablet by mouth nightly   magnesium (MAGNESIUM-OXIDE) 250 MG TABS tablet, Take 250 mg by mouth every morning     Allergies:    Macrobid [nitrofurantoin]    Social History:    reports that she has never smoked. She has never used smokeless tobacco. She reports that she does not drink alcohol or use drugs. Family History:   Non-contributory to this urological problem  family history includes Arthritis in her sister; Cancer in her father; Cervical Cancer in her sister; Heart Disease in her mother; Mult Sclerosis in her sister; No Known Problems in her brother and sister; Stroke in her mother. Review of Systems:  Respiratory: negative for cough and hemoptysis  Cardiovascular: negative for chest pain and dyspnea  Gastrointestinal: negative for abdominal pain, diarrhea, nausea and vomiting  Derm: negative for rash and skin lesion(s)  Neurological: negative for seizures and tremors  Endocrine: negative for diabetic symptoms including polydipsia and polyuria  : As above in the HPI, otherwise negative  All other reviews are negative    Physical Exam:   Vitals: BP (!) 116/56   Pulse 88   Temp 97 °F (36.1 °C) (Temporal)   Resp 20   Ht 5' (1.524 m)   Wt 150 lb (68 kg)   SpO2 100%   BMI 29.29 kg/m²   General:  Awake, alert, oriented X 3. Well developed, well nourished, well groomed. No apparent distress. HEENT:  Normocephalic, atraumatic. Pupils equal, round. No scleral icterus. No conjunctival injection. Normal lips, teeth, and gums. No nasal discharge. Neck:  Supple, no masses. Heart:  RRR  Lungs:  No audible wheezing. Respirations symmetric and non-labored.   Abdomen:  soft, nontender, no masses, no organomegaly, no peritoneal signs  Extremities:  No clubbing, cyanosis, or edema  Skin:  Warm and dry, no open lesions or rashes  Neuro:  Cranial nerves 2-12 intact, no focal deficits  Rectal: deferred  Genitalia:  Barger no    Labs:   No results for input(s): WBC, RBC, HGB, HCT, MCV, MCH, MCHC, RDW, PLT, MPV in the last 72 hours. No results for input(s): CREATININE in the last 72 hours.     Images:      Assessment: Michaela Douglas 77 y.o. female     Left proximal ureteral stone    Plan:    See the outpatient H&P  All options were discussed  The patient family is present  Progress to the OR for C&P, U, LL, SBE, left stent exchange  The risks, benefits, and alternatives were discussed  NPO  DVT prophylaxis  Pre-op antibiotics      Kalin Mckeon [place], DO   [facility]

## 2020-11-23 NOTE — ANESTHESIA POSTPROCEDURE EVALUATION
Department of Anesthesiology  Postprocedure Note    Patient: Monica Viera  MRN: [id number]  YOB: 1945  Date of evaluation: 11/23/2020  Time:  1:26 PM     Procedure Summary     Date:  11/23/20 Room / Location:  [facility] 11 / [facility]    Anesthesia Start:  0355 Anesthesia Stop:  3428    Procedure:  CYSTOSCOPY LEFT URETEROSCOPY,  LASER LITHOTRIPSY  BASKET EXTRACTION LEFT STONE, STENT EXCHANGE (Left Bladder) Diagnosis:  (KIDNEY STONES)    Surgeon:  Kailee Avila DO Responsible Provider:  Liberty Mullen DO    Anesthesia Type:  general, MAC ASA Status:  4          Anesthesia Type: general, MAC    Devora Phase I: Devora Score: 10    Devora Phase II: Devora Score: 9    Last vitals: Reviewed and per EMR flowsheets.        Anesthesia Post Evaluation    Patient location during evaluation: PACU  Patient participation: complete - patient participated  Level of consciousness: awake and alert  Pain score: 1  Airway patency: patent  Nausea & Vomiting: no nausea and no vomiting  Complications: no  Cardiovascular status: hemodynamically stable  Respiratory status: acceptable  Hydration status: euvolemic

## 2020-11-23 NOTE — PROGRESS NOTES
discharge instructions gone over with patient and patients  all questions answered at this time .  Cristino phillips called for

## 2020-11-23 NOTE — ANESTHESIA PRE PROCEDURE
Department of Anesthesiology  Preprocedure Note       Name:  Samanta Paulson   Age:  77 y.o.  :  1945                                          MRN:  [id number]         Date:  2020      Surgeon: Shyla Beltran):  Aliza Avila,     Procedure: Procedure(s):  LEFT CYSTOSCOPY RETROGRADE PYELOGRAM, URETEROSCOPY,  LASER LITHOTRIPSY POSSIBLE STENT INSERTION    Medications prior to admission:   Prior to Admission medications    Medication Sig Start Date End Date Taking? Authorizing Provider   oxyCODONE-acetaminophen (PERCOCET) 5-325 MG per tablet Take 1 tablet by mouth every 4 hours as needed for Pain for up to 7 days.  20 Yes Viktor A Austin, DO   sulfamethoxazole-trimethoprim (BACTRIM DS) 800-160 MG per tablet Take 1 tablet by mouth 2 times daily for 10 days 11/23/20 12/3/20 Yes Viktor A Austin, DO   carvedilol (COREG) 3.125 MG tablet Take 1 tablet by mouth 2 times daily 20  Yes Corinne Loza MD   Wound Dressings (MEDIHONEY WOUND/BURN DRESSING) PSTE paste as needed [place] buttocks 10/30/20  Yes Historical Provider, MD   OXYGEN Inhale 2 L into the lungs continuous   Yes Historical Provider, MD   Magnesium Oxide 250 MG TABS Take 1 tablet by mouth daily   Yes Historical Provider, MD   citalopram (CELEXA) 20 MG tablet Take 1 tablet by mouth every morning 10/29/20  Yes Corinne Loza MD   Sennosides (SENNA) 8.6 MG CAPS Take 1 capsule by mouth 2 times daily   Yes Historical Provider, MD   Potassium 99 MG TABS Take 1 tablet by mouth daily   Yes Historical Provider, MD   levothyroxine (SYNTHROID) 88 MCG tablet Take 1 tablet by mouth daily  Patient taking differently: Take 88 mcg by mouth nightly  10/26/20  Yes Corinne Loza MD   baclofen (LIORESAL) 20 MG tablet Take 2.5 tablets by mouth 4 times daily  Patient taking differently: Take 20 mg by mouth 4 times daily  10/23/20  Yes Corinne Loza MD   clopidogrel (PLAVIX) 75 MG tablet Take 1 tablet by mouth daily 10/19/20  Yes Corinne Loza MD ipratropium-albuterol (DUONEB) 0.5-2.5 (3) MG/3ML SOLN nebulizer solution Inhale 3 mLs into the lungs every 6 hours as needed for Shortness of Breath 9/23/20  Yes Dre Valdivia MD   docusate sodium (COLACE) 100 MG capsule Take 100 mg by mouth daily   Yes Historical Provider, MD   aspirin 81 MG EC tablet Take 81 mg by mouth daily   Yes Historical Provider, MD   ferrous sulfate (IRON 325) 325 (65 Fe) MG tablet Take 1 tablet by mouth every other day  Patient taking differently: Take 325 mg by mouth daily (with breakfast)  9/2/20  Yes Dre Valdivia MD   lisinopril (PRINIVIL;ZESTRIL) 2.5 MG tablet Take 0.5 tablets by mouth daily 9/2/20  Yes Dre Valdivia MD   dantrolene (DANTRIUM) 50 MG capsule Take 1 capsule by mouth 4 times daily  Patient taking differently: Take 100 mg by mouth 2 times daily 2 caps bid 8/11/20  Yes Dre Valdivia MD   metFORMIN (GLUCOPHAGE) 500 MG tablet Take 1 tablet by mouth 2 times daily (with meals) 8/11/20  Yes Dre Valdivia MD   blood glucose test strips (ASCENSIA AUTODISC VI;ONE TOUCH ULTRA TEST VI) strip 1 each by In Vitro route 2 times daily As needed. 8/11/20  Yes Dre Valdivia MD   blood glucose monitor strips Test qd  & as needed for symptoms of irregular blood glucose. Dispense sufficient amount for indicated testing frequency plus additional to accommodate PRN testing needs.  7/22/20  Yes Dre Valdivia MD   atorvastatin (LIPITOR) 10 MG tablet Take 1 tablet by mouth daily  Patient taking differently: Take 10 mg by mouth nightly  6/23/20  Yes Dre Valdivia MD   nortriptyline [facility] [place] [facility]") 25 MG capsule Take 1 capsule by mouth nightly 3/17/20 11/18/20 Yes Dre Valdivia MD   Cyanocobalamin (VITAMIN B 12 PO) Take 500 mg by mouth daily    Yes Historical Provider, MD   Calcium Carbonate-Vit D-Min (CALCIUM 600+D PLUS MINERALS) 600-400 MG-UNIT TABS Take 1 tablet by mouth nightly    Yes Historical Provider, MD   polyethylene glycol (MIRALAX) PACK packet Take 17 g by mouth daily    Yes Historical Provider, MD   vitamin D3 (CHOLECALCIFEROL) 25 MCG (1000 UT) TABS tablet Take 1 tablet by mouth every morning    Yes Historical Provider, MD   magnesium (MAGNESIUM-OXIDE) 250 MG TABS tablet Take 250 mg by mouth every morning    Yes Historical Provider, MD   blood glucose monitor kit and supplies Dispense sufficient amount for indicated testing frequency plus additional to accommodate PRN testing needs. Dispense all needed supplies to include: monitor, strips, lancing device, lancets, control solutions, alcohol swabs. 7/22/20   Zully Charles MD   Lancets MISC 1 each by Does not apply route daily 7/22/20   Zully Charles MD   Catheters (URETHRAL CATHETER) [facility] by Does not apply route    Historical Provider, MD       Current medications:    Current Facility-Administered Medications   Medication Dose Route Frequency Provider Last Rate Last Dose    0.9 % sodium chloride infusion   Intravenous Continuous Swathi Malhotra APRN -  mL/hr at 11/23/20 0743      ciprofloxacin (CIPRO) IVPB 400 mg  400 mg Intravenous On Call to [facility] [facility], APRN -  mL/hr at 11/23/20 0757 400 mg at 11/23/20 0757    sodium chloride flush 0.9 % injection 10 mL  10 mL Intravenous 2 times per day ELIE Ding - CNP        sodium chloride flush 0.9 % injection 10 mL  10 mL Intravenous PRN Swathi Malhotra APRN - CNP           Allergies:     Allergies   Allergen Reactions    Macrobid [Nitrofurantoin]      Mental Changes       Problem List:    Patient Active Problem List   Diagnosis Code    MS (multiple sclerosis) ([facility] G35    Spastic quadriparesis ([facility] G82.50    Lung mass R91.8    Essential hypertension I10    Hypothyroidism E03.9    H/O sarcoidosis Z86.2    Mixed hyperlipidemia E78.2    Neurogenic bladder N31.9    HUDSON (nonalcoholic steatohepatitis) K75.81    Other constipation K59.09    Osteopenia of multiple sites M85.89    Type 2 diabetes mellitus with hyperglycemia, without long-term current use of insulin (HCC) E11.65    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 (HCC) I25.110    Hydronephrosis N13.30    Chronic systolic (congestive) heart failure (HCC) I50.22    Left ventricular thrombus 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.5    Metabolic encephalopathy S64.12    UTI (urinary tract infection) due to Enterococcus N39.0, B95.2    Nephrolithiasis N20.0    Vitamin D deficiency E55.9       Past Medical History:        Diagnosis Date    CAD (coronary artery disease) 4/21/2020    Hepatitis     High cholesterol     Hypertension     Hypothyroidism     MI (myocardial infarction) ([facility] 04/21/2020    MS (multiple sclerosis) ([facility]     Osteoporosis     Sarcoidosis     Type 2 diabetes mellitus with hyperglycemia, without long-term current use of insulin ([facility] 10/30/2019       Past Surgical History:        Procedure Laterality Date    CHOLECYSTECTOMY  1990s    CORONARY ANGIOPLASTY WITH STENT PLACEMENT  04/21/2020    Dr. Miguel Navarro   3.0 x 22mm Resolute MAAME to Prox LAD.   No LV    CYSTOSCOPY INSERTION / REMOVAL STENT / STONE Left 4/23/2020    CYSTOSCOPY LEFT RETROGRADE PYELOGRAM STENT INSERTION, STRATTON CATHETER performed by Davide Avila DO at [facility] Left 3/21/2019    LEFT FEMUR CEPHALLOMEDULLARY NAIL performed by Sandra Ramirez MD at [facility] Bilateral     HYSTERECTOMY      KNEE SURGERY      plate in lt knee       Social History:    Social History     Tobacco Use    Smoking status: Never Smoker    Smokeless tobacco: Never Used   Substance Use Topics    Alcohol use: No     Alcohol/week: 0.0 standard drinks     Comment: Ocassionally                                Counseling given: Not Answered      Vital Signs (Current):   Vitals:    11/18/20 1104 11/23/20 0725   BP:  (!) 116/56   Pulse:  88   Resp:  20   Temp:  97 °F (36.1 °C)   TempSrc:  Temporal   SpO2:  100%   Weight: 150 lb (68 kg) 150 lb (68 kg)   Height: 5' (1.524 m)                                               BP Readings from Last 3 Encounters:   11/23/20 (!) 116/56   09/24/20 108/68   09/02/20 100/60       NPO Status: Time of last liquid consumption: 2300                        Time of last solid consumption: 1500                        Date of last liquid consumption: 11/22/20                        Date of last solid food consumption: 11/22/20    BMI:   Wt Readings from Last 3 Encounters:   11/23/20 150 lb (68 kg)   09/02/20 143 lb (64.9 kg)   07/13/20 159 lb (72.1 kg)     Body mass index is 29.29 kg/m². CBC:   Lab Results   Component Value Date    WBC 7.9 10/05/2020    RBC 2.97 10/05/2020    HGB 8.9 10/05/2020    HCT 26.9 10/05/2020    MCV 90.8 10/05/2020    RDW 17.8 10/05/2020     10/05/2020       CMP:   Lab Results   Component Value Date     10/05/2020    K 4.8 10/05/2020    K 4.8 09/02/2020    CL 97 10/05/2020    CO2 27 10/05/2020    BUN 34 10/05/2020    CREATININE 0.6 10/05/2020    GFRAA >60 09/02/2020    AGRATIO 1.3 10/05/2020    LABGLOM 98 10/05/2020    GLUCOSE 98 10/05/2020    PROT 6.7 10/05/2020    CALCIUM 9.5 10/05/2020    BILITOT 0.2 10/05/2020    ALKPHOS 93 10/05/2020    AST 17 10/05/2020    ALT 16 10/05/2020       POC Tests: No results for input(s): POCGLU, POCNA, POCK, POCCL, POCBUN, POCHEMO, POCHCT in the last 72 hours.     Coags:   Lab Results   Component Value Date    PROTIME 9.7 09/02/2020    INR 0.9 09/02/2020    APTT 30.8 09/02/2020       HCG (If Applicable): No results found for: PREGTESTUR, PREGSERUM, HCG, HCGQUANT     ABGs: No results found for: PHART, PO2ART, UPD9UKD, DGE5KQM, BEART, H1WZVJUJ     Type & Screen (If Applicable):  No results found for: LABABO, LABRH    Drug/Infectious Status (If Applicable):  No results found for: HIV, HEPCAB    COVID-19 Screening (If Applicable):   Lab Results   Component Value Date    COVID19 Not Detected 11/17/2020         Anesthesia Evaluation  Patient summary reviewed and Nursing notes reviewed no history of anesthetic complications:   Airway: Mallampati: III        Dental:      Comment: None loose    Pulmonary: breath sounds clear to auscultation                            ROS comment: S/P intubation and vent 4/23/20 for two weeks  Home O2 2L  Noninvasive ventilation at night   Cardiovascular:    (+) hypertension:, past MI (MI 4/28/20):, CABG/stent (Stent 4/28/20):, CHF:,         Rhythm: regular  Rate: normal                 ROS comment: Denies cardiac sx/sx  EF 45-50%     Neuro/Psych:                ROS comment: MS since 1985. Currently wheel chair bound. GI/Hepatic/Renal:   (+) liver disease:, renal disease: kidney stones,           Endo/Other:    (+) DiabetesType II DM, , hypothyroidism, blood dyscrasia: anemia:., .                 Abdominal:           Vascular: negative vascular ROS. Anesthesia Plan      general and MAC     ASA 4             Anesthetic plan and risks discussed with patient and spouse. Plan discussed with CRNA. Olivier Michaud DO   11/23/2020        Patient seen and examined, chart reviewed, agree with above findings. Anesthetic plan, risks, benefits, alternatives, and personnel involved discussed with patient and her . Patient verbalized an understanding and agreed to proceed. NPO status confirmed. Anesthetic plan discussed with care team members and agreed upon.     Olivier Michaud DO   11/23/2020  8:35 AM

## 2020-11-24 NOTE — OP NOTE
[address]                  [address] [facility],  [facility]                                OPERATIVE REPORT    PATIENT NAME: Rafael Corrales                    :        1945  MED REC NO:   [id number]                            ROOM:  ACCOUNT NO:   [de-identified]                           ADMIT DATE: 2020  PROVIDER:     Luisa Avila DO    DATE OF PROCEDURE:  2020    PREOPERATIVE DIAGNOSES:  1. A 7 mm proximal left ureteral stone. 2.  Also note there is 1 cm left renal calculi. 3.  Left hydronephrosis. 4.  UTI with associated sepsis. POSTOPERATIVE DIAGNOSES:  1. A 7 mm proximal left ureteral stone. 2.  Also note there is 1 cm left renal calculi. 3.  Left hydronephrosis. 4.  UTI with associated sepsis. 5.  Also note, there is small microperforation of the ureter with a  complete ureteral impaction of the stone. PROCEDURES PERFORMED:  1. Cystoscopy. 2.  Complex flexible ureteroscopy. 3.  Laser lithotripsy. 4.  Stent exchange. 5.  Barger catheter replacement. ANESTHESIA:  Monitored anesthesia. SURGEON:  Ross Cameron MD    ESTIMATED BLOOD LOSS:  Minimal.    CONDITION:  PACU, stable. PREOPERATIVE ANTIBIOTICS:  Administered. PATHOLOGIC SPECIMEN:  Stone. STORY:  The patient is a 78-year-old female who in 2020 had presented  with a left septic kidney stone. At that time she had a stent placed. This was done by my dad, Dr. Fidencio Avila. Please note the patient did  have some cardiac issues at that time. She was actually placed back on  the schedule in 2020 but had to be canceled also for cardiac issues. At this point she re-presents now for definitive urologic intervention. The risks, benefits, the alternative of the proposed surgical procedure  were extensively explained to her as well as her  who is present  today.   They understood the risks, benefits and alternatives and elected  to proceed. DESCRIPTION OF PROCEDURE:  This pleasant 77  female was brought  to the operating room #11 at [address] [place] [address]. Placed in the supine position and induced with monitored  anesthesia. Anesthesia monitored the head and neck areas, IV access and  vital signs throughout the course of the case. Status post induction,  the patient was placed in dorsal lithotomy position. Underlying points  of pressure were padded. SCDs were applied. Prepped and draped in  normal sterile fashion. I proceeded by taking a 21-[place] Olympus scope  inserted through the meatus in atraumatic fashion. Please note the  patient had reduced bladder capacity. She does have MS. She does have  a neurogenic bladder. She does have a chronic indwelling Barger  catheter. At this point, I was able to grasp the stent, externalize it  on fluoroscopy, could see the stone proximal in the mid ureter. There  was also another 1 cm stone on left lower pole. After this occurred, I  was able to place 0.035 Glidewire through the stent and removed the  stent in its entirety. I took an 8/10 coaxial ureteral dilator with  inner and outer component, coursed easily into the distal mid ureter. I  removed the inner component, the outer component remained. I was able  to get a second wire being a Bentson wire and removed the outer  component. Over the Bentson wire, I took a 12 x 28 [place] ureteral  access sheath, which coursed easily into the distal mid ureter over the  Bentson wire. I was able to get appropriate position to move the inner  component and the Bentson wire. I took a flexible ureteroscope, coursed  easily into the distal mid ureter. I was able to place the flexible  scope right on the stone with a 200 holmium laser fiber with a setting  of 0.3 and 70 on dusting and a rate of 0.6 and 20 on fragmentation. I  began to systematically ablate this.   Please note that the stone had  eroded through the

## 2020-11-28 LAB
CALCULI COMPOSITION: NORMAL
MASS: 16 MG
STONE DESCRIPTION: NORMAL
STONE NUMBER: 2
STONE SIZE: NORMAL MM

## 2020-12-01 ENCOUNTER — TELEPHONE (OUTPATIENT)
Dept: ENDOCRINOLOGY | Age: 75
End: 2020-12-01

## 2020-12-07 LAB
BASOPHILS ABSOLUTE: NORMAL
BASOPHILS RELATIVE PERCENT: NORMAL
EOSINOPHILS ABSOLUTE: NORMAL
EOSINOPHILS RELATIVE PERCENT: NORMAL
HCT VFR BLD CALC: NORMAL %
HEMOGLOBIN: NORMAL
LYMPHOCYTES ABSOLUTE: NORMAL
LYMPHOCYTES RELATIVE PERCENT: NORMAL
MCH RBC QN AUTO: NORMAL PG
MCHC RBC AUTO-ENTMCNC: NORMAL G/DL
MCV RBC AUTO: NORMAL FL
MONOCYTES ABSOLUTE: NORMAL
MONOCYTES RELATIVE PERCENT: NORMAL
NEUTROPHILS ABSOLUTE: NORMAL
NEUTROPHILS RELATIVE PERCENT: NORMAL
PLATELET # BLD: NORMAL 10*3/UL
PMV BLD AUTO: NORMAL FL
RBC # BLD: NORMAL 10*6/UL
WBC # BLD: NORMAL 10*3/UL

## 2020-12-09 ENCOUNTER — TELEPHONE (OUTPATIENT)
Dept: FAMILY MEDICINE CLINIC | Age: 75
End: 2020-12-09

## 2020-12-09 NOTE — TELEPHONE ENCOUNTER
Please let the patient know that blood work results received from hematology oncology show    Blood counts still slightly low anemic but improved when compared to previous    Other blood counts including white cell count and platelet count were normal    Thanks

## 2020-12-14 ENCOUNTER — TELEPHONE (OUTPATIENT)
Dept: ENDOCRINOLOGY | Age: 75
End: 2020-12-14

## 2020-12-14 NOTE — TELEPHONE ENCOUNTER
Spoke w/ Chevy Turpin at Dr Joselin Ferrer office. She will check w/ the nurse and let us know.        [phone]  [phone]

## 2020-12-15 ENCOUNTER — TELEPHONE (OUTPATIENT)
Dept: FAMILY MEDICINE CLINIC | Age: 75
End: 2020-12-15

## 2020-12-15 NOTE — LETTER
[address] [place] [address] Drive  [address] [id number]  Phone: [phone]  Fax: [phone]    Radha Solis MD        December 17, 2020     Patient: Maia Douglas   YOB: 1945           To Whom It May Concern: It is my medical opinion that Rudy Diallo is medically optimized but is considered intermediate to high risk. She is able to proceed as scheduled for surgery on 11/30/20 with [facility], DO. If you have any questions or concerns, please don't hesitate to call.     Sincerely,        Radha Solis MD

## 2020-12-15 NOTE — TELEPHONE ENCOUNTER
We did not get a form yet. There is another open message in pt's chart about clearance. We still need to know if pt will need cardiac clearance and if she we need tested for Covid again. I will update pt's  once I have more information. At this point Dr Joy Kirkland will probably sign off on clearance without an appt.

## 2020-12-15 NOTE — TELEPHONE ENCOUNTER
Office called back and spoke w/ LPN. They will be faxing a form. We still need to know if pt needs cleared from cardiology and if Covid testing is needed. Dr Diana Knee office was closed when I tried calling again.

## 2020-12-16 NOTE — PROGRESS NOTES
Patient agreed to COVID test on 12-23 at the  [facility] ([facility]   located at  off of [address] 8.between the hours of 6 am- 2:30 pm, instructed to bring ID. Patient instructed to self isolate until day of surgery.

## 2020-12-17 NOTE — TELEPHONE ENCOUNTER
I spoke w/ Evonne Birch and he was informed that Marvin Burrell will need a Covid test prior to surgery. He has not heard if cardiac clearance will be needed. He will ask Dr Mary Ellen Abdul if the clearance from last month is still good. I will ask Dr Janette Calvo to sign a letter saying pt is cleared. I will close this message and continue to document  In the other open message.

## 2020-12-17 NOTE — TELEPHONE ENCOUNTER
Amrit Romero called back to say that he called Dr. My Baker office and pt is cleared for surgery.   (The clearance is still good from previously)

## 2020-12-21 RX ORDER — ATORVASTATIN CALCIUM 10 MG/1
TABLET, FILM COATED ORAL
Qty: 90 TABLET | Refills: 0 | Status: SHIPPED
Start: 2020-12-21 | End: 2021-02-03 | Stop reason: SDUPTHER

## 2020-12-21 RX ORDER — BACLOFEN 20 MG/1
40 TABLET ORAL 2 TIMES DAILY
COMMUNITY
End: 2021-05-07 | Stop reason: SDUPTHER

## 2020-12-21 NOTE — TELEPHONE ENCOUNTER
Pharmacy is requesting a refill    Last Appointment:  10/28/2020  Future Appointments   Date Time Provider Marisol Garcia   1/14/2021  9:20 AM Javi Armas MD [facility]   2/3/2021  2:30 PM Víctor Mirza  [address] [place] [address]   2/23/2021  2:00 PM Shadia Vivar MD [facility]   10/8/2021  9:00 AM [facility]il [facility]

## 2020-12-21 NOTE — PROGRESS NOTES
[facility] PRE-ADMISSION TESTING GENERAL INSTRUCTIONS- [facility]-phone number:[phone]    GENERAL INSTRUCTIONS  [x] Antibacterial Soap shower Night before and/or AM of Surgery  [] Prasanna wipe instruction sheet and wipes given. [x] Nothing by mouth after midnight, including gum, candy, mints, or water. [x] You may brush your teeth, gargle, but do NOT swallow water. []Hibiclens shower  the night before and the morning of surgery. Do not use             Hibiclens on your face or head. [x]No smoking, chewing tobacco, illegal drugs, or alcohol within 24 hours of your surgery. [x] Jewelry, valuables or body piercing's should not be brought to the hospital. All body and/or tongue piercing's must be removed prior to arriving to hospital.  ALL hair pins must be removed. [x] Do not wear makeup, lotions, powders, deodorant. Nail polish as directed by the nurse. [x] Arrange transportation with a responsible adult  to and from the hospital. If you do not have a responsible adult  to transport you, you will need to make arrangements with a medical transportation company (i.e. [employer]. A Uber/taxi/bus is not appropriate unless you are accompanied by a responsible adult ). Arrange for someone to be with you for the remainder of the day and for 24 hours after your procedure due to having had anesthesia. Who will be your  for transportation? _Dan_____________   Who will be staying with you for 24 hrs after your procedure?_____Dan________  [x] Bring insurance card and photo ID.  [] Transfusion Bracelet: Please bring with you to hospital, day of surgery  [] Bring urine specimen day of surgery. Any small container is acceptable. [x] Use inhalers the morning of surgery and bring with you to hospital.  [] Bring copy of living will or healthcare power of  papers to be placed in your electronic record.   [] CPAP/BI-PAP: Please bring your machine if you are to EXPECT:  [x] The day of surgery you will be greeted and checked in by the [employer].  In addition, you will be registered in the [facility] by a Patient Access Representative. Please bring your photo ID and insurance card. A nurse will greet you in accordance to the time you are needed in the pre-op area to prepare you for surgery. Please do not be discouraged if you are not greeted in the order you arrive as there are many variables that are involved in patient preparation. Your patience is greatly appreciated as you wait for your nurse. Please bring in items such as: books, magazines, newspapers, electronics, or any other items  to occupy your time in the waiting area. [x]  Delays may occur with surgery and staff will make a sincere effort to keep you informed of delays. If any delays occur with your procedure, we apologize ahead of time for your inconvenience as we recognize the value of your time.

## 2020-12-23 ENCOUNTER — HOSPITAL ENCOUNTER (OUTPATIENT)
Age: 75
Discharge: HOME | End: 2020-12-25
Payer: MEDICARE

## 2020-12-23 PROCEDURE — U0003 INFECTIOUS AGENT DETECTION BY NUCLEIC ACID (DNA OR RNA); SEVERE ACUTE RESPIRATORY SYNDROME CORONAVIRUS 2 (SARS-COV-2) (CORONAVIRUS DISEASE [COVID-19]), AMPLIFIED PROBE TECHNIQUE, MAKING USE OF HIGH THROUGHPUT TECHNOLOGIES AS DESCRIBED BY CMS-2020-01-R: HCPCS

## 2020-12-24 RX ORDER — SODIUM CHLORIDE 9 MG/ML
INJECTION, SOLUTION INTRAVENOUS CONTINUOUS
Status: CANCELLED | OUTPATIENT
Start: 2020-12-24

## 2020-12-24 RX ORDER — SODIUM CHLORIDE 0.9 % (FLUSH) 0.9 %
10 SYRINGE (ML) INJECTION PRN
Status: CANCELLED | OUTPATIENT
Start: 2020-12-24

## 2020-12-24 RX ORDER — SODIUM CHLORIDE 0.9 % (FLUSH) 0.9 %
10 SYRINGE (ML) INJECTION EVERY 12 HOURS SCHEDULED
Status: CANCELLED | OUTPATIENT
Start: 2020-12-24

## 2020-12-24 RX ORDER — CIPROFLOXACIN 2 MG/ML
400 INJECTION, SOLUTION INTRAVENOUS
Status: CANCELLED | OUTPATIENT
Start: 2020-12-24 | End: 2020-12-24

## 2020-12-25 LAB
SARS-COV-2: NOT DETECTED
SOURCE: NORMAL

## 2020-12-30 ENCOUNTER — ANESTHESIA EVENT (OUTPATIENT)
Dept: OPERATING ROOM | Age: 75
End: 2020-12-30
Payer: MEDICARE

## 2020-12-30 ENCOUNTER — HOSPITAL ENCOUNTER (OUTPATIENT)
Age: 75
Setting detail: OUTPATIENT SURGERY
Discharge: HOME | End: 2020-12-30
Attending: UROLOGY | Admitting: UROLOGY
Payer: MEDICARE

## 2020-12-30 ENCOUNTER — ANESTHESIA (OUTPATIENT)
Dept: OPERATING ROOM | Age: 75
End: 2020-12-30
Payer: MEDICARE

## 2020-12-30 ENCOUNTER — APPOINTMENT (OUTPATIENT)
Dept: GENERAL RADIOLOGY | Age: 75
End: 2020-12-30
Attending: UROLOGY
Payer: MEDICARE

## 2020-12-30 VITALS
HEART RATE: 74 BPM | TEMPERATURE: 97 F | SYSTOLIC BLOOD PRESSURE: 106 MMHG | BODY MASS INDEX: 29.45 KG/M2 | OXYGEN SATURATION: 100 % | DIASTOLIC BLOOD PRESSURE: 50 MMHG | WEIGHT: 150 LBS | RESPIRATION RATE: 20 BRPM | HEIGHT: 60 IN

## 2020-12-30 VITALS — OXYGEN SATURATION: 100 % | SYSTOLIC BLOOD PRESSURE: 132 MMHG | DIASTOLIC BLOOD PRESSURE: 57 MMHG

## 2020-12-30 DIAGNOSIS — Z01.812 PRE-OPERATIVE LABORATORY EXAMINATION: ICD-10-CM

## 2020-12-30 DIAGNOSIS — U07.1 COVID-19: Primary | ICD-10-CM

## 2020-12-30 DIAGNOSIS — N20.0 KIDNEY STONE: ICD-10-CM

## 2020-12-30 LAB — METER GLUCOSE: 104 MG/DL (ref 74–99)

## 2020-12-30 PROCEDURE — C1769 GUIDE WIRE: HCPCS | Performed by: UROLOGY

## 2020-12-30 PROCEDURE — C1894 INTRO/SHEATH, NON-LASER: HCPCS | Performed by: UROLOGY

## 2020-12-30 PROCEDURE — 2720000010 HC SURG SUPPLY STERILE: Performed by: UROLOGY

## 2020-12-30 PROCEDURE — 82962 GLUCOSE BLOOD TEST: CPT

## 2020-12-30 PROCEDURE — 3600000004 HC SURGERY LEVEL 4 BASE: Performed by: UROLOGY

## 2020-12-30 PROCEDURE — 2580000003 HC RX 258: Performed by: NURSE PRACTITIONER

## 2020-12-30 PROCEDURE — 7100000010 HC PHASE II RECOVERY - FIRST 15 MIN: Performed by: UROLOGY

## 2020-12-30 PROCEDURE — 3700000001 HC ADD 15 MINUTES (ANESTHESIA): Performed by: UROLOGY

## 2020-12-30 PROCEDURE — 74420 UROGRAPHY RTRGR +-KUB: CPT

## 2020-12-30 PROCEDURE — 7100000011 HC PHASE II RECOVERY - ADDTL 15 MIN: Performed by: UROLOGY

## 2020-12-30 PROCEDURE — 3700000000 HC ANESTHESIA ATTENDED CARE: Performed by: UROLOGY

## 2020-12-30 PROCEDURE — 6360000002 HC RX W HCPCS: Performed by: NURSE PRACTITIONER

## 2020-12-30 PROCEDURE — 51702 INSERT TEMP BLADDER CATH: CPT

## 2020-12-30 PROCEDURE — C2617 STENT, NON-COR, TEM W/O DEL: HCPCS | Performed by: UROLOGY

## 2020-12-30 PROCEDURE — C1758 CATHETER, URETERAL: HCPCS | Performed by: UROLOGY

## 2020-12-30 PROCEDURE — 82365 CALCULUS SPECTROSCOPY: CPT

## 2020-12-30 PROCEDURE — 3600000014 HC SURGERY LEVEL 4 ADDTL 15MIN: Performed by: UROLOGY

## 2020-12-30 PROCEDURE — 6360000002 HC RX W HCPCS: Performed by: ANESTHESIOLOGIST ASSISTANT

## 2020-12-30 PROCEDURE — 2500000003 HC RX 250 WO HCPCS: Performed by: ANESTHESIOLOGIST ASSISTANT

## 2020-12-30 PROCEDURE — 88300 SURGICAL PATH GROSS: CPT

## 2020-12-30 PROCEDURE — 2709999900 HC NON-CHARGEABLE SUPPLY: Performed by: UROLOGY

## 2020-12-30 DEVICE — URETERAL STENT
Type: IMPLANTABLE DEVICE | Site: URETER | Status: FUNCTIONAL
Brand: PERCUFLEX™

## 2020-12-30 RX ORDER — LIDOCAINE HYDROCHLORIDE 20 MG/ML
INJECTION, SOLUTION INTRAVENOUS PRN
Status: DISCONTINUED | OUTPATIENT
Start: 2020-12-30 | End: 2020-12-30 | Stop reason: SDUPTHER

## 2020-12-30 RX ORDER — OXYCODONE HYDROCHLORIDE AND ACETAMINOPHEN 5; 325 MG/1; MG/1
1 TABLET ORAL EVERY 4 HOURS PRN
Qty: 10 TABLET | Refills: 0 | Status: SHIPPED | OUTPATIENT
Start: 2020-12-30 | End: 2021-01-06

## 2020-12-30 RX ORDER — CIPROFLOXACIN 500 MG/1
500 TABLET, FILM COATED ORAL 2 TIMES DAILY
Qty: 10 TABLET | Refills: 0 | Status: SHIPPED | OUTPATIENT
Start: 2020-12-30 | End: 2021-01-04

## 2020-12-30 RX ORDER — CIPROFLOXACIN 2 MG/ML
400 INJECTION, SOLUTION INTRAVENOUS
Status: COMPLETED | OUTPATIENT
Start: 2020-12-30 | End: 2020-12-30

## 2020-12-30 RX ORDER — ONDANSETRON 2 MG/ML
INJECTION INTRAMUSCULAR; INTRAVENOUS PRN
Status: DISCONTINUED | OUTPATIENT
Start: 2020-12-30 | End: 2020-12-30 | Stop reason: SDUPTHER

## 2020-12-30 RX ORDER — FENTANYL CITRATE 50 UG/ML
INJECTION, SOLUTION INTRAMUSCULAR; INTRAVENOUS PRN
Status: DISCONTINUED | OUTPATIENT
Start: 2020-12-30 | End: 2020-12-30 | Stop reason: SDUPTHER

## 2020-12-30 RX ORDER — SODIUM CHLORIDE 0.9 % (FLUSH) 0.9 %
10 SYRINGE (ML) INJECTION PRN
Status: DISCONTINUED | OUTPATIENT
Start: 2020-12-30 | End: 2020-12-30 | Stop reason: HOSPADM

## 2020-12-30 RX ORDER — PROPOFOL 10 MG/ML
INJECTION, EMULSION INTRAVENOUS PRN
Status: DISCONTINUED | OUTPATIENT
Start: 2020-12-30 | End: 2020-12-30 | Stop reason: SDUPTHER

## 2020-12-30 RX ORDER — SODIUM CHLORIDE 9 MG/ML
INJECTION, SOLUTION INTRAVENOUS CONTINUOUS
Status: DISCONTINUED | OUTPATIENT
Start: 2020-12-30 | End: 2020-12-30 | Stop reason: HOSPADM

## 2020-12-30 RX ORDER — SODIUM CHLORIDE 0.9 % (FLUSH) 0.9 %
10 SYRINGE (ML) INJECTION EVERY 12 HOURS SCHEDULED
Status: DISCONTINUED | OUTPATIENT
Start: 2020-12-30 | End: 2020-12-30 | Stop reason: HOSPADM

## 2020-12-30 RX ORDER — PHENYLEPHRINE HCL IN 0.9% NACL 1 MG/10 ML
SYRINGE (ML) INTRAVENOUS PRN
Status: DISCONTINUED | OUTPATIENT
Start: 2020-12-30 | End: 2020-12-30 | Stop reason: SDUPTHER

## 2020-12-30 RX ORDER — PROPOFOL 10 MG/ML
INJECTION, EMULSION INTRAVENOUS CONTINUOUS PRN
Status: DISCONTINUED | OUTPATIENT
Start: 2020-12-30 | End: 2020-12-30 | Stop reason: SDUPTHER

## 2020-12-30 RX ADMIN — FENTANYL CITRATE 50 MCG: 50 INJECTION, SOLUTION INTRAMUSCULAR; INTRAVENOUS at 11:48

## 2020-12-30 RX ADMIN — Medication 100 MCG: at 12:03

## 2020-12-30 RX ADMIN — Medication 50 MCG: at 12:00

## 2020-12-30 RX ADMIN — Medication 100 MCG: at 12:15

## 2020-12-30 RX ADMIN — LIDOCAINE HYDROCHLORIDE 60 MG: 20 INJECTION, SOLUTION INTRAVENOUS at 11:48

## 2020-12-30 RX ADMIN — Medication 100 MCG: at 12:20

## 2020-12-30 RX ADMIN — CIPROFLOXACIN 400 MG: 2 INJECTION, SOLUTION INTRAVENOUS at 10:47

## 2020-12-30 RX ADMIN — CIPROFLOXACIN 400 MG: 2 INJECTION, SOLUTION INTRAVENOUS at 11:48

## 2020-12-30 RX ADMIN — PROPOFOL 50 MCG/KG/MIN: 10 INJECTION, EMULSION INTRAVENOUS at 11:48

## 2020-12-30 RX ADMIN — PROPOFOL 20 MG: 10 INJECTION, EMULSION INTRAVENOUS at 11:48

## 2020-12-30 RX ADMIN — SODIUM CHLORIDE: 9 INJECTION, SOLUTION INTRAVENOUS at 11:40

## 2020-12-30 RX ADMIN — SODIUM CHLORIDE: 9 INJECTION, SOLUTION INTRAVENOUS at 10:44

## 2020-12-30 RX ADMIN — Medication 100 MCG: at 12:12

## 2020-12-30 RX ADMIN — ONDANSETRON HYDROCHLORIDE 4 MG: 2 INJECTION, SOLUTION INTRAMUSCULAR; INTRAVENOUS at 11:48

## 2020-12-30 ASSESSMENT — PULMONARY FUNCTION TESTS
PIF_VALUE: 1

## 2020-12-30 ASSESSMENT — PAIN SCALES - GENERAL
PAINLEVEL_OUTOF10: 0
PAINLEVEL_OUTOF10: 0

## 2020-12-30 ASSESSMENT — PAIN - FUNCTIONAL ASSESSMENT: PAIN_FUNCTIONAL_ASSESSMENT: 0-10

## 2020-12-30 NOTE — ANESTHESIA PRE PROCEDURE
Department of Anesthesiology  Preprocedure Note       Name:  Marcia Riddle   Age:  77 y.o.  :  1945                                          MRN:  [id number]         Date:  2020      Surgeon: Vita Roque):  Tara Avila, DO    Procedure: Procedure(s):  CYSTOSCOPY RETROGRADE URETEROSCOPY PYELOGRAM LASER LITHOTRIPSY LEFT J-STENT    Medications prior to admission:   Prior to Admission medications    Medication Sig Start Date End Date Taking?  Authorizing Provider   baclofen (LIORESAL) 20 MG tablet Take 40 mg by mouth 2 times daily   Yes Historical Provider, MD   carvedilol (COREG) 3.125 MG tablet Take 1 tablet by mouth 2 times daily 20  Yes Martha Lester MD   clopidogrel (PLAVIX) 75 MG tablet Take 1 tablet by mouth daily 10/19/20  Yes Martha Lester MD   atorvastatin (LIPITOR) 10 MG tablet 1 po q hs 20   Martha Lester MD   Wound Dressings (MEDIHONEY WOUND/BURN DRESSING) PSTE paste as needed [place] buttocks 10/30/20   Historical Provider, MD   OXYGEN Inhale 2 L into the lungs continuous    Historical Provider, MD   citalopram (CELEXA) 20 MG tablet Take 1 tablet by mouth every morning 10/29/20   Martha Lester MD   Sennosides (SENNA) 8.6 MG CAPS Take 1 capsule by mouth daily     Historical Provider, MD   Potassium 99 MG TABS Take 1 tablet by mouth daily    Historical Provider, MD   levothyroxine (SYNTHROID) 88 MCG tablet Take 1 tablet by mouth daily  Patient taking differently: Take 88 mcg by mouth nightly  10/26/20   Martha Lester MD   ipratropium-albuterol (DUONEB) 0.5-2.5 (3) MG/3ML SOLN nebulizer solution Inhale 3 mLs into the lungs every 6 hours as needed for Shortness of Breath 20   Martha Lester MD   docusate sodium (COLACE) 100 MG capsule Take 100 mg by mouth daily    Historical Provider, MD   aspirin 81 MG EC tablet Take 81 mg by mouth daily    Historical Provider, MD   ferrous sulfate (IRON 325) 325 (65 Fe) MG tablet Take 1 tablet by mouth every other day Patient taking differently: Take 325 mg by mouth daily (with breakfast)  9/2/20   Gabriela Yu MD   lisinopril (PRINIVIL;ZESTRIL) 2.5 MG tablet Take 0.5 tablets by mouth daily 9/2/20   Gabriela Yu MD   dantrolene (DANTRIUM) 50 MG capsule Take 1 capsule by mouth 4 times daily  Patient taking differently: Take 100 mg by mouth 2 times daily 2 caps bid 8/11/20   Gabriela Yu MD   metFORMIN (GLUCOPHAGE) 500 MG tablet Take 1 tablet by mouth 2 times daily (with meals) 8/11/20   Gabriela uY MD   blood glucose test strips (ASCENSIA AUTODISC VI;ONE TOUCH ULTRA TEST VI) strip 1 each by In Vitro route 2 times daily As needed. 8/11/20   Gabriela Yu MD   blood glucose monitor kit and supplies Dispense sufficient amount for indicated testing frequency plus additional to accommodate PRN testing needs. Dispense all needed supplies to include: monitor, strips, lancing device, lancets, control solutions, alcohol swabs. 7/22/20   Gabriela Yu MD   blood glucose monitor strips Test qd  & as needed for symptoms of irregular blood glucose. Dispense sufficient amount for indicated testing frequency plus additional to accommodate PRN testing needs.  7/22/20   Gabriela Yu MD   Lancets MISC 1 each by Does not apply route daily 7/22/20   Gabriela Yu MD   nortriptyline [facility]) 25 MG capsule Take 1 capsule by mouth nightly 3/17/20 11/18/20  Gabriela Yu MD   Catheters (URETHRAL CATHETER) MISC by Does not apply route    Historical Provider, MD   Cyanocobalamin (VITAMIN B 12 PO) Take 500 mg by mouth daily     Historical Provider, MD   Calcium Carbonate-Vit D-Min (CALCIUM 600+D PLUS MINERALS) 600-400 MG-UNIT TABS Take 1 tablet by mouth nightly     Historical Provider, MD   polyethylene glycol (MIRALAX) PACK packet Take 17 g by mouth daily     Historical Provider, MD   vitamin D3 (CHOLECALCIFEROL) 25 MCG (1000 UT) TABS tablet Take 1 tablet by mouth every morning     Historical Provider, MD  Type 2 diabetes mellitus with hyperglycemia, without long-term current use of insulin ([facility] [address] 10/30/2019       Past Surgical History:        Procedure Laterality Date    CHOLECYSTECTOMY  1990s    CORONARY ANGIOPLASTY WITH STENT PLACEMENT  04/21/2020    Dr. Valentina Gilford   3.0 x 22mm Resolute MAAME to Prox LAD.   No LV    CYSTOSCOPY INSERTION / REMOVAL STENT / STONE Left 4/23/2020    CYSTOSCOPY LEFT RETROGRADE PYELOGRAM STENT INSERTION, STRATTON CATHETER performed by Marcos Kussmaul Memo, DO at [facility] Left 3/21/2019    LEFT FEMUR CEPHALLOMEDULLARY NAIL performed by Arvin Brunson MD at [facility] Bilateral     HYSTERECTOMY      KNEE SURGERY      plate in lt knee    LITHOTRIPSY Left 11/23/2020    CYSTOSCOPY LEFT URETEROSCOPY,  LASER LITHOTRIPSY  BASKET EXTRACTION LEFT STONE, STENT EXCHANGE performed by Marcos Kussmaul Memo, DO at [facility] [place] History:    Social History     Tobacco Use    Smoking status: Never Smoker    Smokeless tobacco: Never Used   Substance Use Topics    Alcohol use: No     Alcohol/week: 0.0 standard drinks     Comment: Ocassionally                                Counseling given: Not Answered      Vital Signs (Current):   Vitals:    12/21/20 1453 12/30/20 0951   BP:  (!) 116/55   Pulse:  77   Resp:  18   Temp:  97.7 °F (36.5 °C)   TempSrc:  Temporal   SpO2:  100%   Weight: 150 lb (68 kg) 150 lb (68 kg)   Height: 5' (1.524 m) 5' (1.524 m)                                              BP Readings from Last 3 Encounters:   12/30/20 (!) 116/55   11/23/20 (!) 119/54   11/23/20 (!) 110/57       NPO Status: Time of last liquid consumption: 2230                        Time of last solid consumption: 1800                        Date of last liquid consumption: 12/29/20                        Date of last solid food consumption: 12/29/20    BMI:   Wt Readings from Last 3 Encounters:   12/30/20 150 lb (68 kg)   11/23/20 150 lb (68 kg)   09/02/20 143 lb (64.9 kg) Body mass index is 29.29 kg/m². CBC:   Lab Results   Component Value Date    WBC 7.9 10/05/2020    RBC 2.97 10/05/2020    HGB 8.9 10/05/2020    HCT 26.9 10/05/2020    MCV 90.8 10/05/2020    RDW 17.8 10/05/2020     10/05/2020       CMP:   Lab Results   Component Value Date     10/05/2020    K 4.8 10/05/2020    K 4.8 09/02/2020    CL 97 10/05/2020    CO2 27 10/05/2020    BUN 34 10/05/2020    CREATININE 0.6 10/05/2020    GFRAA >60 09/02/2020    AGRATIO 1.3 10/05/2020    LABGLOM 98 10/05/2020    GLUCOSE 98 10/05/2020    PROT 6.7 10/05/2020    CALCIUM 9.5 10/05/2020    BILITOT 0.2 10/05/2020    ALKPHOS 93 10/05/2020    AST 17 10/05/2020    ALT 16 10/05/2020       POC Tests: No results for input(s): POCGLU, POCNA, POCK, POCCL, POCBUN, POCHEMO, POCHCT in the last 72 hours.     Coags:   Lab Results   Component Value Date    PROTIME 9.7 09/02/2020    INR 0.9 09/02/2020    APTT 30.8 09/02/2020       HCG (If Applicable): No results found for: PREGTESTUR, PREGSERUM, HCG, HCGQUANT     ABGs: No results found for: PHART, PO2ART, PNT1FWO, HTY6YSY, BEART, Q5ODORAS     Type & Screen (If Applicable):  No results found for: LABABO, LABRH    Drug/Infectious Status (If Applicable):  No results found for: HIV, HEPCAB    COVID-19 Screening (If Applicable):   Lab Results   Component Value Date    COVID19 Not Detected 12/23/2020         Anesthesia Evaluation  Patient summary reviewed and Nursing notes reviewed no history of anesthetic complications:   Airway: Mallampati: III        Dental:      Comment: None loose    Pulmonary: breath sounds clear to auscultation                            ROS comment: S/P intubation and vent 4/23/20 for two weeks  Home O2 2L  Noninvasive ventilation at night   Cardiovascular:    (+) hypertension:, past MI (MI 4/28/20):, CABG/stent (Stent 4/28/20):, CHF:,         Rhythm: regular  Rate: normal                 ROS comment: Denies cardiac sx/sx  EF 45-50%     Neuro/Psych: ROS comment: MS since 1985. Currently wheel chair bound. GI/Hepatic/Renal:   (+) liver disease:, renal disease: kidney stones,           Endo/Other:    (+) DiabetesType II DM, , hypothyroidism, blood dyscrasia: anemia:., .                 Abdominal:           Vascular: negative vascular ROS. Anesthesia Plan      MAC     ASA 4             Anesthetic plan and risks discussed with patient and spouse. Plan discussed with CRNA. DOS STAFF ADDENDUM:    Pt seen and examined, chart reviewed (including anesthesia, drug and allergy history). Anesthetic plan, risks, benefits, alternatives, and personnel involved discussed with patient. Patient verbalized an understanding and agrees to proceed. Plan discussed with care team members and agreed upon.     Ceci Almeida MD  Staff Anesthesiologist  10:22 AM    Ceci Almeida MD   12/30/2020

## 2020-12-30 NOTE — PROGRESS NOTES
Admitted to Same Day Surgery Unit. Preop instructions given to patient & family. Covid Test done: Yes, 12/23/20    Results: NEGATIVE    Self-quarantine guidelines followed since tested? Yes    Any unusual S/S or concerns expressed or observed?  No

## 2020-12-30 NOTE — H&P
12/30/2020 10:22 AM  Service: Urology  Group: [facility] ([place])    Marifer Douglas  [id number]     Chief Complaint: Left ureteral stone with impaction    History of Present Illness: The patient is a 77 y.o. female patient who presents with the above  She does have a stent  This was placed in April  She had an MI  She did have another procedure done in November  She did have a ureteral perforation  She remains with a stent at present  All options were discussed    Past Medical History:   Diagnosis Date    CAD (coronary artery disease) 4/21/2020    High cholesterol     Hypertension     Hypothyroidism     MI (myocardial infarction) ([facility] [address] 04/21/2020    MS (multiple sclerosis) ([facility] [address]     Osteoporosis     Sarcoidosis     Type 2 diabetes mellitus with hyperglycemia, without long-term current use of insulin ([facility] [address] 10/30/2019       Past Surgical History:   Procedure Laterality Date    CHOLECYSTECTOMY  1990s    CORONARY ANGIOPLASTY WITH STENT PLACEMENT  04/21/2020    Dr. Mark Cortez   3.0 x 22mm Resolute MAAME to Prox LAD.   No LV    CYSTOSCOPY INSERTION / REMOVAL STENT / STONE Left 4/23/2020    CYSTOSCOPY LEFT RETROGRADE PYELOGRAM STENT INSERTION, STRATTON CATHETER performed by Eunice Avila DO at [facility] Left 3/21/2019    LEFT FEMUR CEPHALLOMEDULLARY NAIL performed by Leonardo Garg MD at [facility] Bilateral     HYSTERECTOMY      KNEE SURGERY      plate in lt knee    LITHOTRIPSY Left 11/23/2020    CYSTOSCOPY LEFT URETEROSCOPY,  LASER LITHOTRIPSY  BASKET EXTRACTION LEFT STONE, STENT EXCHANGE performed by Eunice Avila DO at [facility] OR       Medications Prior to Admission:    Medications Prior to Admission: baclofen (LIORESAL) 20 MG tablet, Take 40 mg by mouth 2 times daily  carvedilol (COREG) 3.125 MG tablet, Take 1 tablet by mouth 2 times daily  clopidogrel (PLAVIX) 75 MG tablet, Take 1 tablet by mouth daily  atorvastatin (LIPITOR) 10 MG tablet, 1 po q (URETHRAL CATHETER) MISC, by Does not apply route  Cyanocobalamin (VITAMIN B 12 PO), Take 500 mg by mouth daily   Calcium Carbonate-Vit D-Min (CALCIUM 600+D PLUS MINERALS) 600-400 MG-UNIT TABS, Take 1 tablet by mouth nightly   polyethylene glycol (MIRALAX) PACK packet, Take 17 g by mouth daily   vitamin D3 (CHOLECALCIFEROL) 25 MCG (1000 UT) TABS tablet, Take 1 tablet by mouth every morning   magnesium (MAGNESIUM-OXIDE) 250 MG TABS tablet, Take 250 mg by mouth every morning     Allergies:    Macrobid [nitrofurantoin]    Social History:    reports that she has never smoked. She has never used smokeless tobacco. She reports that she does not drink alcohol or use drugs. Family History:   Non-contributory to this urological problem  family history includes Arthritis in her sister; Cancer in her father; Cervical Cancer in her sister; Heart Disease in her mother; Mult Sclerosis in her sister; No Known Problems in her brother and sister; Stroke in her mother. Review of Systems:  Respiratory: negative for cough and hemoptysis  Cardiovascular: negative for chest pain and dyspnea  Gastrointestinal: negative for abdominal pain, diarrhea, nausea and vomiting  Derm: negative for rash and skin lesion(s)  Neurological: negative for seizures and tremors  Endocrine: negative for diabetic symptoms including polydipsia and polyuria  : As above in the HPI, otherwise negative  All other reviews are negative    Physical Exam:   Vitals: BP (!) 116/55   Pulse 77   Temp 97.7 °F (36.5 °C) (Temporal)   Resp 18   Ht 5' (1.524 m)   Wt 150 lb (68 kg)   SpO2 100%   BMI 29.29 kg/m²   General:  Awake, alert, oriented X 3. Well developed, well nourished, well groomed. No apparent distress. HEENT:  Normocephalic, atraumatic. Pupils equal, round. No scleral icterus. No conjunctival injection. Normal lips, teeth, and gums. No nasal discharge. Neck:  Supple, no masses. Heart:  RRR  Lungs:  No audible wheezing.   Respirations symmetric and non-labored. Abdomen:  soft, nontender, no masses, no organomegaly, no peritoneal signs  Extremities:  No clubbing, cyanosis, or edema  Skin:  Warm and dry, no open lesions or rashes  Neuro:  Cranial nerves 2-12 intact, no focal deficits  Rectal: deferred  Genitalia:  Barger no    Labs:   No results for input(s): WBC, RBC, HGB, HCT, MCV, MCH, MCHC, RDW, PLT, MPV in the last 72 hours. No results for input(s): CREATININE in the last 72 hours.     Images:      Assessment: Michaela Douglas 77 y.o. female     Left ureteral stone with left ureteral impaction and left left ureteral perforation    Plan:    See the outpatient H&P  All options were discussed  The patient family is present  Progress to the OR for C&P, U, LL, SBE, left stent exchange   The risks, benefits, and alternatives were discussed  NPO  DVT prophylaxis  Pre-op antibiotics      Tyesha Macedo Memo, DO   [facility]

## 2020-12-31 NOTE — OP NOTE
[address]                  [address] [facility]  [facility]                                OPERATIVE REPORT    PATIENT NAME: Mj Bailey                    :        1945  MED REC NO:   [id number]                            ROOM:  ACCOUNT NO:   [de-identified]                           ADMIT DATE: 2020  PROVIDER:     Alfredo Avila DO    DATE OF PROCEDURE:  2020    PREOPERATIVE DIAGNOSES:  1. Left proximal ureteral stone. 2.  Left hydronephrosis. 3.  Left flank pain. 4.  History of left ureteral impaction with associated UTI and sepsis. 5.  Left renal calculi. PROCEDURES PERFORMED:  The patient had:  1. Cystoscopy. 2.  Complex flexible ureteroscopy. 3.  Laser lithotripsy. 4.  Stone basket extraction. 5.  Stent exchange. 6.  Stent was on a string. 7.  Barger catheter exchange. EBL 0 cc    SURGEON:  Maritza Acosta DO    STORY:  This is a 71-year-old  female that is known to me in  2020, had presented with septic kidney stone, had a stent placed by  my dad, Dr. Hillary Avila. She was actually placed back on the schedule  in 2020, but had an heart attack which prevented her from having the  procedure done. She then presented in 2020, at which time I was able  to begin the process of lasering this left proximal stone, but it did  have a ureteral impaction and it was actually ureteral perforation. Laser did occur, but I ultimately wanted to bring her back in a month to  reevaluate. She also had a left renal calculi. I did talk to her as  well as her  about the risks, benefits, the alternatives of the  proposed surgical procedure. They understood the risks, benefits, and  alternatives, elected to proceed. Please note, the patient does have a  Barger catheter in. The patient, for that matter, has also been managed  with this Barger.   She has also mentioned that she understood the risks, the benefits, the alternatives, and elected to proceed. DESCRIPTION OF PROCEDURE:  A 80-year-old female was brought to the  operating room #11 at [facility],  placed in the supine position, and induced with monitored anesthesia. Anesthesia monitored head, neck, airway, IV access, and vital signs  throughout the course of the case. Status post induction, the patient  was placed in dorsal lithotomy position. Underlying points of pressure  were padded. SCDs were applied. She was prepped and draped in normal  sterile fashion. I proceeded by taking a 21-Thai Olympus scope with a  30-degree lens inserted through the meatus in atraumatic fashion. Panendoscopic visualization of the bladder was really devoid of any  significant masses, tumors, lesions, or defects. I grasped the stent,  externalized it. A general inspection of the ureter did not really show  a fairly large amount of stone, but in the renal pelvis she could see  there was a 5 x 1 cm stone. Ultimately, once I got the stent  externalized, I placed a 0.035 Glidewire up through it and then I  removed in its entirety. I then took an 8/10 coaxial ureteral dilator with inner and outer  component, coursed easily into the distal and mid ureter. I removed the  inner component, the outer component remained. I took a flexible  ureteroscope of course easily into the distal mid ureter and there was a  small stone and had a [place] stone basket and grasped without any  issue. The remaining portion of the ureter looked open. There was no  residual stone. I did not go into the left renal pelvis and there was a  1 cm stone. I placed a 200 Holmium laser fiber around the stone with  the setting of 0.3 and 70 on dusting and 0.8 and 20 on fragmentation.    The stone was laser ablated into minuscule dust.  Ultimately, at the end  of the procedure, I did remove the ureteroscope and ureteral access sheath over the 0.035 Glidewire which remained. I did place a 6 x 26  double-J stent with a string left on. A Barger catheter was placed. The  string was tapped to the Barger. She woke from anesthesia without  complications. She brought to PACU in a stable condition. PLAN:  1. She will go home with the Barger. 2.  She will need the stent removed on Wednesday of next week. 3.  She had the left renal stone taken carefully. Please note there was  no right renal stone, this had been conveyed to her  but there  was none on imaging. 4.  She will follow up in the office in two to four weeks. 5.  Pain medications and antibiotics.         [address], DO    D: 12/30/2020 12:24:04       T: 12/30/2020 20:06:59     MM/MARIAN_MARK  Job#: [id number]     Doc#: [id number]    CC:  Primary Care Physician

## 2021-01-06 ENCOUNTER — TELEPHONE (OUTPATIENT)
Dept: ENDOCRINOLOGY | Age: 76
End: 2021-01-06

## 2021-01-07 LAB
CALCULI COMPOSITION: NORMAL
MASS: 11 MG
STONE DESCRIPTION: NORMAL
STONE NUMBER: 1
STONE SIZE: NORMAL MM

## 2021-01-12 ENCOUNTER — HOSPITAL ENCOUNTER (INPATIENT)
Age: 76
LOS: 6 days | Discharge: HOME HEALTH | DRG: 246 | End: 2021-01-19
Attending: EMERGENCY MEDICINE | Admitting: FAMILY MEDICINE
Payer: MEDICARE

## 2021-01-12 ENCOUNTER — APPOINTMENT (OUTPATIENT)
Dept: GENERAL RADIOLOGY | Age: 76
DRG: 246 | End: 2021-01-12
Payer: MEDICARE

## 2021-01-12 DIAGNOSIS — R07.9 CHEST PAIN, UNSPECIFIED TYPE: Primary | ICD-10-CM

## 2021-01-12 LAB
ANION GAP SERPL CALCULATED.3IONS-SCNC: 11 MMOL/L (ref 7–16)
BASOPHILS ABSOLUTE: 0.03 E9/L (ref 0–0.2)
BASOPHILS RELATIVE PERCENT: 0.2 % (ref 0–2)
BUN BLDV-MCNC: 33 MG/DL (ref 8–23)
CALCIUM SERPL-MCNC: 9.4 MG/DL (ref 8.6–10.2)
CHLORIDE BLD-SCNC: 96 MMOL/L (ref 98–107)
CO2: 26 MMOL/L (ref 22–29)
CREAT SERPL-MCNC: 0.6 MG/DL (ref 0.5–1)
EOSINOPHILS ABSOLUTE: 0.18 E9/L (ref 0.05–0.5)
EOSINOPHILS RELATIVE PERCENT: 1.2 % (ref 0–6)
GFR AFRICAN AMERICAN: >60
GFR NON-AFRICAN AMERICAN: >60 ML/MIN/1.73
GLUCOSE BLD-MCNC: 156 MG/DL (ref 74–99)
HCT VFR BLD CALC: 28.4 % (ref 34–48)
HEMOGLOBIN: 9.4 G/DL (ref 11.5–15.5)
IMMATURE GRANULOCYTES #: 0.07 E9/L
IMMATURE GRANULOCYTES %: 0.5 % (ref 0–5)
LYMPHOCYTES ABSOLUTE: 0.93 E9/L (ref 1.5–4)
LYMPHOCYTES RELATIVE PERCENT: 6.2 % (ref 20–42)
MCH RBC QN AUTO: 32.5 PG (ref 26–35)
MCHC RBC AUTO-ENTMCNC: 33.1 % (ref 32–34.5)
MCV RBC AUTO: 98.3 FL (ref 80–99.9)
MONOCYTES ABSOLUTE: 1.01 E9/L (ref 0.1–0.95)
MONOCYTES RELATIVE PERCENT: 6.7 % (ref 2–12)
NEUTROPHILS ABSOLUTE: 12.83 E9/L (ref 1.8–7.3)
NEUTROPHILS RELATIVE PERCENT: 85.2 % (ref 43–80)
PDW BLD-RTO: 15.9 FL (ref 11.5–15)
PLATELET # BLD: 378 E9/L (ref 130–450)
PMV BLD AUTO: 10.2 FL (ref 7–12)
POTASSIUM REFLEX MAGNESIUM: 6.5 MMOL/L (ref 3.5–5)
POTASSIUM SERPL-SCNC: 4.4 MMOL/L (ref 3.5–5)
PRO-BNP: 396 PG/ML (ref 0–450)
RBC # BLD: 2.89 E12/L (ref 3.5–5.5)
SODIUM BLD-SCNC: 133 MMOL/L (ref 132–146)
TROPONIN: 0.02 NG/ML (ref 0–0.03)
WBC # BLD: 15.1 E9/L (ref 4.5–11.5)

## 2021-01-12 PROCEDURE — 99285 EMERGENCY DEPT VISIT HI MDM: CPT

## 2021-01-12 PROCEDURE — 85025 COMPLETE CBC W/AUTO DIFF WBC: CPT

## 2021-01-12 PROCEDURE — 83880 ASSAY OF NATRIURETIC PEPTIDE: CPT

## 2021-01-12 PROCEDURE — 93005 ELECTROCARDIOGRAM TRACING: CPT | Performed by: EMERGENCY MEDICINE

## 2021-01-12 PROCEDURE — 84484 ASSAY OF TROPONIN QUANT: CPT

## 2021-01-12 PROCEDURE — 71045 X-RAY EXAM CHEST 1 VIEW: CPT

## 2021-01-12 PROCEDURE — 80048 BASIC METABOLIC PNL TOTAL CA: CPT

## 2021-01-12 PROCEDURE — 84132 ASSAY OF SERUM POTASSIUM: CPT

## 2021-01-12 PROCEDURE — 2580000003 HC RX 258: Performed by: EMERGENCY MEDICINE

## 2021-01-12 RX ORDER — 0.9 % SODIUM CHLORIDE 0.9 %
1000 INTRAVENOUS SOLUTION INTRAVENOUS ONCE
Status: COMPLETED | OUTPATIENT
Start: 2021-01-12 | End: 2021-01-12

## 2021-01-12 RX ORDER — 0.9 % SODIUM CHLORIDE 0.9 %
500 INTRAVENOUS SOLUTION INTRAVENOUS ONCE
Status: COMPLETED | OUTPATIENT
Start: 2021-01-12 | End: 2021-01-13

## 2021-01-12 RX ADMIN — SODIUM CHLORIDE 500 ML: 9 INJECTION, SOLUTION INTRAVENOUS at 23:10

## 2021-01-12 RX ADMIN — SODIUM CHLORIDE 1000 ML: 9 INJECTION, SOLUTION INTRAVENOUS at 21:18

## 2021-01-13 ENCOUNTER — TELEPHONE (OUTPATIENT)
Dept: NEUROLOGY | Age: 76
End: 2021-01-13

## 2021-01-13 ENCOUNTER — APPOINTMENT (OUTPATIENT)
Dept: CARDIAC CATH/INVASIVE PROCEDURES | Age: 76
DRG: 246 | End: 2021-01-13
Payer: MEDICARE

## 2021-01-13 PROBLEM — I95.9 HYPOTENSION: Status: ACTIVE | Noted: 2021-01-13

## 2021-01-13 PROBLEM — I20.0 UNSTABLE ANGINA (HCC): Status: ACTIVE | Noted: 2021-01-13

## 2021-01-13 PROBLEM — K59.00 CONSTIPATION: Status: ACTIVE | Noted: 2019-07-31

## 2021-01-13 PROBLEM — E78.5 HYPERLIPIDEMIA: Status: ACTIVE | Noted: 2019-07-31

## 2021-01-13 PROBLEM — R65.10 SIRS (SYSTEMIC INFLAMMATORY RESPONSE SYNDROME) (HCC): Status: ACTIVE | Noted: 2021-01-13

## 2021-01-13 PROBLEM — Z97.8 CHRONIC INDWELLING FOLEY CATHETER: Status: ACTIVE | Noted: 2021-01-13

## 2021-01-13 PROBLEM — I50.42 CHRONIC COMBINED SYSTOLIC AND DIASTOLIC CHF (CONGESTIVE HEART FAILURE) (HCC): Status: ACTIVE | Noted: 2020-04-23

## 2021-01-13 PROBLEM — D64.9 ANEMIA: Status: ACTIVE | Noted: 2021-01-13

## 2021-01-13 PROBLEM — I21.4 NSTEMI (NON-ST ELEVATED MYOCARDIAL INFARCTION) (HCC): Status: ACTIVE | Noted: 2021-01-12

## 2021-01-13 LAB
ABO/RH: NORMAL
ANTIBODY SCREEN: NORMAL
CHOLESTEROL, TOTAL: 127 MG/DL (ref 0–199)
CK MB: 7.2 NG/ML (ref 0–4.3)
D DIMER: 344 NG/ML DDU
EKG ATRIAL RATE: 106 BPM
EKG ATRIAL RATE: 110 BPM
EKG P AXIS: 59 DEGREES
EKG P AXIS: 62 DEGREES
EKG P-R INTERVAL: 180 MS
EKG P-R INTERVAL: 190 MS
EKG Q-T INTERVAL: 344 MS
EKG Q-T INTERVAL: 350 MS
EKG QRS DURATION: 78 MS
EKG QRS DURATION: 90 MS
EKG QTC CALCULATION (BAZETT): 464 MS
EKG QTC CALCULATION (BAZETT): 465 MS
EKG R AXIS: -15 DEGREES
EKG R AXIS: -6 DEGREES
EKG T AXIS: 101 DEGREES
EKG T AXIS: 78 DEGREES
EKG VENTRICULAR RATE: 106 BPM
EKG VENTRICULAR RATE: 110 BPM
FOLATE: 17.6 NG/ML (ref 4.8–24.2)
HBA1C MFR BLD: 4.9 % (ref 4–5.6)
HDLC SERPL-MCNC: 35 MG/DL
INR BLD: 1
IRON SATURATION: 11 % (ref 15–50)
IRON: 26 MCG/DL (ref 37–145)
LDL CHOLESTEROL CALCULATED: 62 MG/DL (ref 0–99)
METER GLUCOSE: 116 MG/DL (ref 74–99)
METER GLUCOSE: 143 MG/DL (ref 74–99)
METER GLUCOSE: 192 MG/DL (ref 74–99)
PROCALCITONIN: 0.13 NG/ML (ref 0–0.08)
PROTHROMBIN TIME: 10.8 SEC (ref 9.3–12.4)
SARS-COV-2, NAAT: NOT DETECTED
TOTAL CK: 59 U/L (ref 20–180)
TOTAL IRON BINDING CAPACITY: 231 MCG/DL (ref 250–450)
TRIGL SERPL-MCNC: 148 MG/DL (ref 0–149)
TROPONIN: 0.12 NG/ML (ref 0–0.03)
TROPONIN: 0.12 NG/ML (ref 0–0.03)
VITAMIN B-12: 1629 PG/ML (ref 211–946)
VLDLC SERPL CALC-MCNC: 30 MG/DL

## 2021-01-13 PROCEDURE — 2580000003 HC RX 258: Performed by: FAMILY MEDICINE

## 2021-01-13 PROCEDURE — 83540 ASSAY OF IRON: CPT

## 2021-01-13 PROCEDURE — 86901 BLOOD TYPING SEROLOGIC RH(D): CPT

## 2021-01-13 PROCEDURE — 82746 ASSAY OF FOLIC ACID SERUM: CPT

## 2021-01-13 PROCEDURE — 82607 VITAMIN B-12: CPT

## 2021-01-13 PROCEDURE — 84145 PROCALCITONIN (PCT): CPT

## 2021-01-13 PROCEDURE — 6370000000 HC RX 637 (ALT 250 FOR IP): Performed by: INTERNAL MEDICINE

## 2021-01-13 PROCEDURE — 93454 CORONARY ARTERY ANGIO S&I: CPT

## 2021-01-13 PROCEDURE — 82553 CREATINE MB FRACTION: CPT

## 2021-01-13 PROCEDURE — 6360000002 HC RX W HCPCS

## 2021-01-13 PROCEDURE — C1894 INTRO/SHEATH, NON-LASER: HCPCS

## 2021-01-13 PROCEDURE — 82550 ASSAY OF CK (CPK): CPT

## 2021-01-13 PROCEDURE — 86850 RBC ANTIBODY SCREEN: CPT

## 2021-01-13 PROCEDURE — 36415 COLL VENOUS BLD VENIPUNCTURE: CPT

## 2021-01-13 PROCEDURE — 82962 GLUCOSE BLOOD TEST: CPT

## 2021-01-13 PROCEDURE — C1887 CATHETER, GUIDING: HCPCS

## 2021-01-13 PROCEDURE — 84484 ASSAY OF TROPONIN QUANT: CPT

## 2021-01-13 PROCEDURE — 6370000000 HC RX 637 (ALT 250 FOR IP): Performed by: FAMILY MEDICINE

## 2021-01-13 PROCEDURE — C1769 GUIDE WIRE: HCPCS

## 2021-01-13 PROCEDURE — 80061 LIPID PANEL: CPT

## 2021-01-13 PROCEDURE — 2580000003 HC RX 258: Performed by: INTERNAL MEDICINE

## 2021-01-13 PROCEDURE — 83550 IRON BINDING TEST: CPT

## 2021-01-13 PROCEDURE — 86900 BLOOD TYPING SEROLOGIC ABO: CPT

## 2021-01-13 PROCEDURE — 4A023N7 MEASUREMENT OF CARDIAC SAMPLING AND PRESSURE, LEFT HEART, PERCUTANEOUS APPROACH: ICD-10-PCS | Performed by: INTERNAL MEDICINE

## 2021-01-13 PROCEDURE — 85378 FIBRIN DEGRADE SEMIQUANT: CPT

## 2021-01-13 PROCEDURE — B2111ZZ FLUOROSCOPY OF MULTIPLE CORONARY ARTERIES USING LOW OSMOLAR CONTRAST: ICD-10-PCS | Performed by: INTERNAL MEDICINE

## 2021-01-13 PROCEDURE — 2709999900 HC NON-CHARGEABLE SUPPLY

## 2021-01-13 PROCEDURE — U0002 COVID-19 LAB TEST NON-CDC: HCPCS

## 2021-01-13 PROCEDURE — 2140000000 HC CCU INTERMEDIATE R&B

## 2021-01-13 PROCEDURE — 93005 ELECTROCARDIOGRAM TRACING: CPT | Performed by: FAMILY MEDICINE

## 2021-01-13 PROCEDURE — 2500000003 HC RX 250 WO HCPCS

## 2021-01-13 PROCEDURE — 85610 PROTHROMBIN TIME: CPT

## 2021-01-13 PROCEDURE — 6360000002 HC RX W HCPCS: Performed by: FAMILY MEDICINE

## 2021-01-13 PROCEDURE — 83036 HEMOGLOBIN GLYCOSYLATED A1C: CPT

## 2021-01-13 RX ORDER — ONDANSETRON 2 MG/ML
4 INJECTION INTRAMUSCULAR; INTRAVENOUS EVERY 6 HOURS PRN
Status: DISCONTINUED | OUTPATIENT
Start: 2021-01-13 | End: 2021-01-19 | Stop reason: HOSPADM

## 2021-01-13 RX ORDER — VITAMIN B COMPLEX
1000 TABLET ORAL EVERY MORNING
Status: DISCONTINUED | OUTPATIENT
Start: 2021-01-13 | End: 2021-01-19 | Stop reason: HOSPADM

## 2021-01-13 RX ORDER — BACLOFEN 10 MG/1
40 TABLET ORAL 2 TIMES DAILY
Status: DISCONTINUED | OUTPATIENT
Start: 2021-01-13 | End: 2021-01-19 | Stop reason: HOSPADM

## 2021-01-13 RX ORDER — CITALOPRAM 20 MG/1
20 TABLET ORAL EVERY MORNING
Status: DISCONTINUED | OUTPATIENT
Start: 2021-01-13 | End: 2021-01-19 | Stop reason: HOSPADM

## 2021-01-13 RX ORDER — ACETAMINOPHEN 325 MG/1
650 TABLET ORAL EVERY 6 HOURS PRN
Status: DISCONTINUED | OUTPATIENT
Start: 2021-01-13 | End: 2021-01-19 | Stop reason: HOSPADM

## 2021-01-13 RX ORDER — LISINOPRIL 5 MG/1
2.5 TABLET ORAL DAILY
Status: DISCONTINUED | OUTPATIENT
Start: 2021-01-13 | End: 2021-01-19 | Stop reason: HOSPADM

## 2021-01-13 RX ORDER — FERROUS SULFATE 325(65) MG
325 TABLET ORAL
Status: DISCONTINUED | OUTPATIENT
Start: 2021-01-13 | End: 2021-01-19 | Stop reason: HOSPADM

## 2021-01-13 RX ORDER — SODIUM CHLORIDE 0.9 % (FLUSH) 0.9 %
10 SYRINGE (ML) INJECTION EVERY 12 HOURS SCHEDULED
Status: DISCONTINUED | OUTPATIENT
Start: 2021-01-13 | End: 2021-01-19 | Stop reason: HOSPADM

## 2021-01-13 RX ORDER — DANTROLENE SODIUM 25 MG/1
100 CAPSULE ORAL 2 TIMES DAILY
Status: DISCONTINUED | OUTPATIENT
Start: 2021-01-13 | End: 2021-01-19 | Stop reason: HOSPADM

## 2021-01-13 RX ORDER — DEXTROSE MONOHYDRATE 25 G/50ML
12.5 INJECTION, SOLUTION INTRAVENOUS PRN
Status: DISCONTINUED | OUTPATIENT
Start: 2021-01-13 | End: 2021-01-19 | Stop reason: HOSPADM

## 2021-01-13 RX ORDER — SODIUM CHLORIDE 9 MG/ML
INJECTION, SOLUTION INTRAVENOUS CONTINUOUS
Status: ACTIVE | OUTPATIENT
Start: 2021-01-13 | End: 2021-01-14

## 2021-01-13 RX ORDER — ATORVASTATIN CALCIUM 10 MG/1
10 TABLET, FILM COATED ORAL NIGHTLY
Status: DISCONTINUED | OUTPATIENT
Start: 2021-01-13 | End: 2021-01-19 | Stop reason: HOSPADM

## 2021-01-13 RX ORDER — CARVEDILOL 3.12 MG/1
3.12 TABLET ORAL 2 TIMES DAILY WITH MEALS
Status: DISCONTINUED | OUTPATIENT
Start: 2021-01-13 | End: 2021-01-19 | Stop reason: HOSPADM

## 2021-01-13 RX ORDER — ACETAMINOPHEN 325 MG/1
650 TABLET ORAL EVERY 4 HOURS PRN
Status: DISCONTINUED | OUTPATIENT
Start: 2021-01-13 | End: 2021-01-19 | Stop reason: HOSPADM

## 2021-01-13 RX ORDER — SODIUM CHLORIDE 0.9 % (FLUSH) 0.9 %
10 SYRINGE (ML) INJECTION PRN
Status: DISCONTINUED | OUTPATIENT
Start: 2021-01-13 | End: 2021-01-19 | Stop reason: HOSPADM

## 2021-01-13 RX ORDER — LEVOTHYROXINE SODIUM 88 UG/1
88 TABLET ORAL NIGHTLY
Status: DISCONTINUED | OUTPATIENT
Start: 2021-01-13 | End: 2021-01-19 | Stop reason: HOSPADM

## 2021-01-13 RX ORDER — SODIUM CHLORIDE 9 MG/ML
INJECTION, SOLUTION INTRAVENOUS CONTINUOUS
Status: ACTIVE | OUTPATIENT
Start: 2021-01-13 | End: 2021-01-13

## 2021-01-13 RX ORDER — DEXTROSE MONOHYDRATE 50 MG/ML
100 INJECTION, SOLUTION INTRAVENOUS PRN
Status: DISCONTINUED | OUTPATIENT
Start: 2021-01-13 | End: 2021-01-19 | Stop reason: HOSPADM

## 2021-01-13 RX ORDER — NICOTINE POLACRILEX 4 MG
15 LOZENGE BUCCAL PRN
Status: DISCONTINUED | OUTPATIENT
Start: 2021-01-13 | End: 2021-01-19 | Stop reason: HOSPADM

## 2021-01-13 RX ORDER — ISOSORBIDE MONONITRATE 30 MG/1
30 TABLET, EXTENDED RELEASE ORAL DAILY
Status: DISCONTINUED | OUTPATIENT
Start: 2021-01-13 | End: 2021-01-17

## 2021-01-13 RX ORDER — CLOPIDOGREL BISULFATE 75 MG/1
75 TABLET ORAL DAILY
Status: DISCONTINUED | OUTPATIENT
Start: 2021-01-13 | End: 2021-01-19 | Stop reason: HOSPADM

## 2021-01-13 RX ORDER — LANOLIN ALCOHOL/MO/W.PET/CERES
500 CREAM (GRAM) TOPICAL DAILY
Status: DISCONTINUED | OUTPATIENT
Start: 2021-01-13 | End: 2021-01-19 | Stop reason: HOSPADM

## 2021-01-13 RX ORDER — ASPIRIN 81 MG/1
81 TABLET ORAL DAILY
Status: DISCONTINUED | OUTPATIENT
Start: 2021-01-13 | End: 2021-01-19 | Stop reason: HOSPADM

## 2021-01-13 RX ORDER — OYSTER SHELL CALCIUM WITH VITAMIN D 500; 200 MG/1; [IU]/1
1 TABLET, FILM COATED ORAL NIGHTLY
Status: DISCONTINUED | OUTPATIENT
Start: 2021-01-13 | End: 2021-01-19 | Stop reason: HOSPADM

## 2021-01-13 RX ORDER — DOCUSATE SODIUM 100 MG/1
100 CAPSULE, LIQUID FILLED ORAL DAILY
Status: DISCONTINUED | OUTPATIENT
Start: 2021-01-13 | End: 2021-01-19 | Stop reason: HOSPADM

## 2021-01-13 RX ORDER — SODIUM CHLORIDE 0.9 % (FLUSH) 0.9 %
10 SYRINGE (ML) INJECTION PRN
Status: DISCONTINUED | OUTPATIENT
Start: 2021-01-13 | End: 2021-01-16

## 2021-01-13 RX ORDER — POLYETHYLENE GLYCOL 3350 17 G/17G
17 POWDER, FOR SOLUTION ORAL DAILY
Status: DISCONTINUED | OUTPATIENT
Start: 2021-01-13 | End: 2021-01-19 | Stop reason: HOSPADM

## 2021-01-13 RX ORDER — NITROGLYCERIN 0.4 MG/1
0.4 TABLET SUBLINGUAL EVERY 5 MIN PRN
Status: DISCONTINUED | OUTPATIENT
Start: 2021-01-13 | End: 2021-01-19 | Stop reason: HOSPADM

## 2021-01-13 RX ORDER — IPRATROPIUM BROMIDE AND ALBUTEROL SULFATE 2.5; .5 MG/3ML; MG/3ML
1 SOLUTION RESPIRATORY (INHALATION) EVERY 6 HOURS PRN
Status: DISCONTINUED | OUTPATIENT
Start: 2021-01-13 | End: 2021-01-19 | Stop reason: HOSPADM

## 2021-01-13 RX ORDER — ISOSORBIDE MONONITRATE 30 MG/1
30 TABLET, EXTENDED RELEASE ORAL DAILY
Status: DISCONTINUED | OUTPATIENT
Start: 2021-01-13 | End: 2021-01-13 | Stop reason: SDUPTHER

## 2021-01-13 RX ORDER — PROMETHAZINE HYDROCHLORIDE 25 MG/1
12.5 TABLET ORAL EVERY 6 HOURS PRN
Status: DISCONTINUED | OUTPATIENT
Start: 2021-01-13 | End: 2021-01-19 | Stop reason: HOSPADM

## 2021-01-13 RX ORDER — SENNA PLUS 8.6 MG/1
1 TABLET ORAL DAILY
Status: DISCONTINUED | OUTPATIENT
Start: 2021-01-13 | End: 2021-01-19 | Stop reason: HOSPADM

## 2021-01-13 RX ORDER — POLYETHYLENE GLYCOL 3350 17 G/17G
17 POWDER, FOR SOLUTION ORAL DAILY PRN
Status: DISCONTINUED | OUTPATIENT
Start: 2021-01-13 | End: 2021-01-19 | Stop reason: HOSPADM

## 2021-01-13 RX ORDER — ACETAMINOPHEN 650 MG/1
650 SUPPOSITORY RECTAL EVERY 6 HOURS PRN
Status: DISCONTINUED | OUTPATIENT
Start: 2021-01-13 | End: 2021-01-19 | Stop reason: HOSPADM

## 2021-01-13 RX ORDER — ISOSORBIDE MONONITRATE 30 MG/1
30 TABLET, EXTENDED RELEASE ORAL DAILY
COMMUNITY
End: 2021-05-12 | Stop reason: SDUPTHER

## 2021-01-13 RX ADMIN — ATORVASTATIN CALCIUM 10 MG: 10 TABLET, FILM COATED ORAL at 21:06

## 2021-01-13 RX ADMIN — Medication 500 MCG: at 10:26

## 2021-01-13 RX ADMIN — NITROGLYCERIN 0.4 MG: 0.4 TABLET, ORALLY DISINTEGRATING SUBLINGUAL at 06:26

## 2021-01-13 RX ADMIN — CLOPIDOGREL 75 MG: 75 TABLET, FILM COATED ORAL at 10:21

## 2021-01-13 RX ADMIN — ISOSORBIDE MONONITRATE 30 MG: 30 TABLET ORAL at 10:23

## 2021-01-13 RX ADMIN — DANTROLENE SODIUM 100 MG: 25 CAPSULE ORAL at 21:06

## 2021-01-13 RX ADMIN — LEVOTHYROXINE SODIUM 88 MCG: 0.09 TABLET ORAL at 21:05

## 2021-01-13 RX ADMIN — SENNOSIDES 8.6 MG: 8.6 TABLET, FILM COATED ORAL at 10:25

## 2021-01-13 RX ADMIN — BACLOFEN 40 MG: 10 TABLET ORAL at 21:06

## 2021-01-13 RX ADMIN — DOCUSATE SODIUM 100 MG: 100 CAPSULE, LIQUID FILLED ORAL at 10:22

## 2021-01-13 RX ADMIN — Medication 1 TABLET: at 21:06

## 2021-01-13 RX ADMIN — SODIUM CHLORIDE 75 ML/HR: 9 INJECTION, SOLUTION INTRAVENOUS at 04:19

## 2021-01-13 RX ADMIN — INSULIN LISPRO 2 UNITS: 100 INJECTION, SOLUTION INTRAVENOUS; SUBCUTANEOUS at 21:07

## 2021-01-13 RX ADMIN — SODIUM CHLORIDE: 9 INJECTION, SOLUTION INTRAVENOUS at 17:52

## 2021-01-13 RX ADMIN — ASPIRIN 81 MG: 81 TABLET ORAL at 10:13

## 2021-01-13 RX ADMIN — BACLOFEN 40 MG: 10 TABLET ORAL at 10:14

## 2021-01-13 RX ADMIN — DANTROLENE SODIUM 100 MG: 25 CAPSULE ORAL at 10:21

## 2021-01-13 RX ADMIN — Medication 1000 UNITS: at 10:26

## 2021-01-13 RX ADMIN — ENOXAPARIN SODIUM 70 MG: 80 INJECTION SUBCUTANEOUS at 04:19

## 2021-01-13 RX ADMIN — CARVEDILOL 3.12 MG: 3.12 TABLET, FILM COATED ORAL at 10:15

## 2021-01-13 RX ADMIN — MAGNESIUM GLUCONATE 500 MG ORAL TABLET 400 MG: 500 TABLET ORAL at 10:23

## 2021-01-13 RX ADMIN — CITALOPRAM 20 MG: 20 TABLET, FILM COATED ORAL at 10:21

## 2021-01-13 RX ADMIN — LISINOPRIL 2.5 MG: 5 TABLET ORAL at 21:06

## 2021-01-13 ASSESSMENT — PAIN DESCRIPTION - LOCATION: LOCATION: CHEST

## 2021-01-13 ASSESSMENT — ENCOUNTER SYMPTOMS
EYE PAIN: 0
COUGH: 0
DIARRHEA: 0
ABDOMINAL PAIN: 0
PHOTOPHOBIA: 0
NAUSEA: 0
SHORTNESS OF BREATH: 0
VOMITING: 0
SORE THROAT: 0
BACK PAIN: 0

## 2021-01-13 ASSESSMENT — PAIN DESCRIPTION - PAIN TYPE: TYPE: ACUTE PAIN

## 2021-01-13 ASSESSMENT — PAIN SCALES - GENERAL
PAINLEVEL_OUTOF10: 0
PAINLEVEL_OUTOF10: 0

## 2021-01-13 NOTE — CARE COORDINATION
1/13/2021 - Care Coordination -  Admitted for chest pain. Hx CAD, MS. Cardiology consulted. Spoke with pt in room to discuss discharge planning. PCP is Dr Luther Strong. Pharmacy is [facility] in [place]. Lives with her  who provides most of her care. She has an aide come once a week to assist with bathing. Lives in a tri- level home with her living space on main floor. DME - wc, ww, sc, bsc. Uses the wc - unable to walk. Hx [employer]. Received fax from [employer] - pt is active with them and they request we notify them when pt is discharged so they may resume care. Spoke with Dimitry Brennan at [facility] visiting nurses - will need resumption of HHC order on chart at discharge. Plan is to return home when medically stable. Pt reports she uses [facility] ambulance for transport and she is transported stretcher. SW/CM will follow.

## 2021-01-13 NOTE — ED NOTES
In room with transport and pt at this time. Made aware that this nurse contacted pt  and left message with pt room assignment.   Pt declined comfort measures prior to transport to assigned room     Ruben Cárdenas RN  01/13/21 [id number]

## 2021-01-13 NOTE — ACP (ADVANCE CARE PLANNING)
ADVANCED CARE PLANNING    Patient Name: Meryl Stanton       YOB: 1945              MRN:    [id number]  Admission Date:  1/12/2021  8:48 PM    Active Diagnoses:  Principal Problem:    NSTEMI (non-ST elevated myocardial infarction) [facility])  Active Problems:    Essential hypertension    Hypothyroidism    MS (multiple sclerosis) ([facility] [address]    Lung mass    Hyperlipidemia    Neurogenic bladder    Constipation    Type 2 diabetes mellitus with hyperglycemia, without long-term current use of insulin ([facility])    Multiple vessel coronary artery disease    Hydronephrosis    Chronic combined systolic and diastolic CHF (congestive heart failure) ([facility])    Hypotension    SIRS (systemic inflammatory response syndrome) ([facility])    Anemia    Chronic indwelling Barger catheter  Resolved Problems:    * No resolved hospital problems. *      These active diagnoses are of sufficient risk that focused discussion on advanced care planning is indicated in order to allow the patient to thoughtfully consider personal goals of care; and, if situations arise that prevent the patient to personally give input, to ensure appropriate representation of their personal desire for different levels and levels of care. Persons present in discussion: Leeton Malkin, Claudio Cage MD, Family members:none. Discussion: I reviewed her admission for NSTEMI,SIRS, hypotension/impending cardiogenic shock and her desires for ongoing aggressive care, including potential intubation and mechanical ventilation; also discussed who would speak on her behalf should she be unable to do so and discussed what conversations she has had with her family so they understand her desires if such a situation occurred now or in the future. I presented and explained the availability of our palliative care team to her, including the availability of advanced directives forms which she can review with her family and then fill out if they so wish. Patient has a POLST form stating that she is DNR CCA. This was clarified with patient's  over the phone. He is patient's primary caregiver and POA. Time Spent on Advanced Planning Documents: 16 minutes    Electronically signed by Lisa Sosa MD on 1/13/2021 at 3:46 AM    NOTE: This report was transcribed using voice recognition software. Every effort was made to ensure accuracy; however, inadvertent computerized transcription errors may be present.

## 2021-01-13 NOTE — TELEPHONE ENCOUNTER
Patient's  call to let Dr Abdi Bender know that his wife was admitted last night with chest pain, [facility]. She is in room # 5811P and will be having a heart cath sometime today.   Electronically signed by Idalia Cardona MA on 1/13/21 at 9:26 AM EST

## 2021-01-13 NOTE — CONSULTS
[facility] Cardiology Consult     INPATIENT CARDIOLOGY CONSULT    Name: Estefania Iglesias    Age: 77 y.o. Date of Admission: 1/12/2021  8:48 PM    Date of Service: 1/13/2021    Reason for Consultation: Chest pain    Chief Complaint: Chest pain    Referring Physician: Dr. Sofiya Tyson    History of Present Illness: The patient is a 77y.o. year old female who is well-known to us and presents after midsternal chest pressure of moderate intensity at rest, similar to previous MI symptoms, without nausea, vomiting or diaphoresis. Recurrent symptoms earlier this morning relieved after sublingual nitroglycerin. Baseline troponin 0.02 increased to 0.12. EKG showing sinus rhythm and nonspecific ST changes. Patient known to us from late presentation anterior myocardial infarction/shock in April 2020 at which time she underwent LAD IRINA. Echocardiogram at that time showed EF 20-25% with apical clot. She was treated with several months of \"triple\" therapy including aspirin, clopidogrel and Eliquis. Subsequent echocardiogram in June showed EF 45% without apical clot. She is debilitated and essentially bedbound because of multiple sclerosis. Past Medical History:   Past Medical History:   Diagnosis Date    CAD (coronary artery disease) 4/21/2020    High cholesterol     Hypertension     Hypothyroidism     MI (myocardial infarction) ([facility] [address] 04/21/2020    MS (multiple sclerosis) ([facility] [address]     Osteoporosis     Sarcoidosis     Type 2 diabetes mellitus with hyperglycemia, without long-term current use of insulin ([facility] [address] 10/30/2019       Past Surgical History:  Past Surgical History:   Procedure Laterality Date    CHOLECYSTECTOMY  1990s    CORONARY ANGIOPLASTY WITH STENT PLACEMENT  04/21/2020    Dr. Leroy Jurado   3.0 x 22mm Resolute MAAME to Prox LAD.   No LV    CYSTOSCOPY INSERTION / REMOVAL STENT / STONE Left 4/23/2020 CYSTOSCOPY LEFT RETROGRADE PYELOGRAM STENT INSERTION, STRATTON CATHETER performed by Heath Avila DO at [facility] Left 3/21/2019    LEFT FEMUR CEPHALLOMEDULLARY NAIL performed by Ofelia Jacobs MD at [facility] Bilateral    [address]      plate in lt knee    LITHOTRIPSY Left 11/23/2020    CYSTOSCOPY LEFT URETEROSCOPY,  LASER LITHOTRIPSY  BASKET EXTRACTION LEFT STONE, STENT EXCHANGE performed by Heath Avila DO at [address] LITHOTRIPSY Left 12/30/2020    CYSTOSCOPY RETROGRADE URETEROSCOPY PYELOGRAM LASER LITHOTRIPSY LEFT J-STENT performed by Heath Avila DO at [facility] OR       Family History:  Family History   Problem Relation Age of Onset    Stroke Mother     Heart Disease Mother     Cancer Father         lung    Mult Sclerosis Sister     No Known Problems Brother     No Known Problems Sister     Arthritis Sister     Cervical Cancer Sister        Social History:  Social History     Socioeconomic History    Marital status:      Spouse name: Not on file    Number of children: Not on file    Years of education: Not on file    Highest education level: Not on file   Occupational History    Not on file   Social Needs    Financial resource strain: Not on file    Food insecurity     Worry: Not on file     Inability: Not on file    Transportation needs     Medical: Not on file     Non-medical: Not on file   Tobacco Use    Smoking status: Never Smoker    Smokeless tobacco: Never Used   Substance and Sexual Activity    Alcohol use: No     Alcohol/week: 0.0 standard drinks     Comment: Ocassionally    Drug use: No    Sexual activity: Yes     Partners: Male   Lifestyle    Physical activity     Days per week: Not on file     Minutes per session: Not on file    Stress: Not on file   Relationships    Social connections     Talks on phone: Not on file     Gets together: Not on file     Attends Worship service: Not on file Active member of club or organization: Not on file     Attends meetings of clubs or organizations: Not on file     Relationship status: Not on file    Intimate partner violence     Fear of current or ex partner: Not on file     Emotionally abused: Not on file     Physically abused: Not on file     Forced sexual activity: Not on file   Other Topics Concern    Not on file   Social History Narrative    Not on file       Allergies: Allergies   Allergen Reactions    Macrobid [Nitrofurantoin]      Mental Changes       Home Meds:  Prior to Admission medications    Medication Sig Start Date End Date Taking?  Authorizing Provider   isosorbide mononitrate (IMDUR) 30 MG extended release tablet Take 30 mg by mouth daily   Yes Historical Provider, MD   atorvastatin (LIPITOR) 10 MG tablet 1 po q hs 12/21/20   Luis Lynch MD   baclofen (LIORESAL) 20 MG tablet Take 40 mg by mouth 2 times daily    Historical Provider, MD   carvedilol (COREG) 3.125 MG tablet Take 1 tablet by mouth 2 times daily 11/22/20   Luis Lynch MD   Wound Dressings (MEDIHONEY WOUND/BURN DRESSING) PSTE paste as needed [place] buttocks 10/30/20   Historical Provider, MD   OXYGEN Inhale 2 L into the lungs continuous    Historical Provider, MD   citalopram (CELEXA) 20 MG tablet Take 1 tablet by mouth every morning 10/29/20   Luis Lynch MD   Sennosides (SENNA) 8.6 MG CAPS Take 1 capsule by mouth daily     Historical Provider, MD   Potassium 99 MG TABS Take 1 tablet by mouth daily    Historical Provider, MD   levothyroxine (SYNTHROID) 88 MCG tablet Take 1 tablet by mouth daily  Patient taking differently: Take 88 mcg by mouth nightly  10/26/20   Luis Lynch MD   clopidogrel (PLAVIX) 75 MG tablet Take 1 tablet by mouth daily 10/19/20   Luis Lynch MD   ipratropium-albuterol (DUONEB) 0.5-2.5 (3) MG/3ML SOLN nebulizer solution Inhale 3 mLs into the lungs every 6 hours as needed for Shortness of Breath 9/23/20   Luis Lynch MD docusate sodium (COLACE) 100 MG capsule Take 100 mg by mouth daily    Historical Provider, MD   aspirin 81 MG EC tablet Take 81 mg by mouth daily    Historical Provider, MD   ferrous sulfate (IRON 325) 325 (65 Fe) MG tablet Take 1 tablet by mouth every other day  Patient taking differently: Take 325 mg by mouth daily (with breakfast)  9/2/20   Luis Galvan MD   lisinopril (PRINIVIL;ZESTRIL) 2.5 MG tablet Take 0.5 tablets by mouth daily 9/2/20   Luis Galvan MD   dantrolene (DANTRIUM) 50 MG capsule Take 1 capsule by mouth 4 times daily  Patient taking differently: Take 100 mg by mouth 2 times daily 2 caps bid 8/11/20   Luis Galvan MD   metFORMIN (GLUCOPHAGE) 500 MG tablet Take 1 tablet by mouth 2 times daily (with meals) 8/11/20   Luis Galvan MD   blood glucose test strips (ASCENSIA AUTODISC VI;ONE TOUCH ULTRA TEST VI) strip 1 each by In Vitro route 2 times daily As needed. 8/11/20   Luis Galvan MD   blood glucose monitor kit and supplies Dispense sufficient amount for indicated testing frequency plus additional to accommodate PRN testing needs. Dispense all needed supplies to include: monitor, strips, lancing device, lancets, control solutions, alcohol swabs. 7/22/20   Luis Galvan MD   blood glucose monitor strips Test qd  & as needed for symptoms of irregular blood glucose. Dispense sufficient amount for indicated testing frequency plus additional to accommodate PRN testing needs.  7/22/20   Luis Galvan MD   Lancets MISC 1 each by Does not apply route daily 7/22/20   Luis Galvan MD   nortriptyline [facility]) 25 MG capsule Take 1 capsule by mouth nightly 3/17/20 12/30/20  Luis Galvan MD   Catheters (URETHRAL CATHETER) MISC by Does not apply route    Historical Provider, MD   Cyanocobalamin (VITAMIN B 12 PO) Take 500 mg by mouth daily     Historical Provider, MD Calcium Carbonate-Vit D-Min (CALCIUM 600+D PLUS MINERALS) 600-400 MG-UNIT TABS Take 1 tablet by mouth nightly     Historical Provider, MD   polyethylene glycol (MIRALAX) PACK packet Take 17 g by mouth daily     Historical Provider, MD   vitamin D3 (CHOLECALCIFEROL) 25 MCG (1000 UT) TABS tablet Take 1 tablet by mouth every morning     Historical Provider, MD   magnesium (MAGNESIUM-OXIDE) 250 MG TABS tablet Take 250 mg by mouth every morning     Historical Provider, MD       Current Medications:  Current Facility-Administered Medications   Medication Dose Route Frequency Provider Last Rate Last Admin    aspirin EC tablet 81 mg  81 mg Oral Daily Ernesto Vergara MD        atorvastatin (LIPITOR) tablet 10 mg  10 mg Oral Nightly Ernesto Vergara MD        baclofen (LIORESAL) tablet 40 mg  40 mg Oral BID Ernesto Vergara MD        calcium-vitamin D (OSCAL-500) 500-200 MG-UNIT per tablet 1 tablet  1 tablet Oral Nightly Ernesto Vergara MD        citalopram (CELEXA) tablet 20 mg  20 mg Oral QAM Ernesto Vergara MD        clopidogrel (PLAVIX) tablet 75 mg  75 mg Oral Daily Ernesto Vergara MD        vitamin B-12 (CYANOCOBALAMIN) tablet 500 mcg  500 mcg Oral Daily Ernesto Vergara MD        dantrolene (DANTRIUM) capsule 100 mg  100 mg Oral BID Ernesto Vergara MD        docusate sodium (COLACE) capsule 100 mg  100 mg Oral Daily Ernesto Vergara MD        ferrous sulfate (IRON 325) tablet 325 mg  325 mg Oral Daily with breakfast Ernesto Vergara MD        ipratropium-albuterol (DUONEB) nebulizer solution 3 mL  1 vial Inhalation Q6H PRN Ernesto Vergara MD        levothyroxine (SYNTHROID) tablet 88 mcg  88 mcg Oral Nightly Ernesto Vergara MD        magnesium oxide (MAG-OX) tablet 400 mg  400 mg Oral QAM Ernesto Vergara MD        polyethylene glycol (GLYCOLAX) packet 17 g  17 g Oral Daily Ernesto Vergara MD Pulmonary: No cough, wheeze, hemoptysis  HEENT: No visual disturbances or difficult swallowing  GI: No nausea, vomiting, diarrhea, abdominal pain, rectal bleeding  : No dysuria or hematuria  Endocrine: No excessive thirst, heat or cold intolerance. Musculoskeletal: No joint pain or muscle aches. No claudication  Skin: No skin breakdown or rashes  Neuro: Multiple sclerosis  Psych: No depression, anxiety    Physical Exam:  BP (!) 100/51   Pulse 120   Temp 97 °F (36.1 °C) (Temporal)   Resp 16   Ht 5' (1.524 m)   Wt 150 lb (68 kg)   SpO2 100%   BMI 29.29 kg/m²   Weight change: Wt Readings from Last 3 Encounters:   01/12/21 150 lb (68 kg)   12/30/20 150 lb (68 kg)   11/23/20 150 lb (68 kg)       General: Awake, alert, oriented x3, no acute distress    HEENT: Normocephalic and atraumatic, pupils equal and round. Sclera nonicteric and oral mucosa moist.  Tongue and trachea midline. Neck: No JVD or bruits. No thyroid enlargement or adenopathy    Cardiac: Regular rate and rhythm, normal S1 and S2, no S3. Apical impulse is normal.  No murmurs, rubs or clicks. No carotid bruits and no abdominal bruits. No peripheral edema, cyanosis or clubbing. Normal pulses in the upper and lower extremities bilaterally. Resp: Unlabored respirations at rest.  No wheezes, rales or rhonchi. Abdomen: soft, nontender, nondistended, no gross organomegaly or mass    Skin: Warm and dry, no cyanosis. Musculoskeletal: normal tone and strength in the upper and lower extremities bilaterally    Neuro: Bedridden. Alert and oriented x3, cranial nerves all intact. Psych: Cooperative, and normal affect    Intake/Output:    Intake/Output Summary (Last 24 hours) at 1/13/2021 0732  Last data filed at 1/13/2021 7317  Gross per 24 hour   Intake 646 ml   Output 650 ml   Net -4 ml     No intake/output data recorded.     Laboratory Tests:  Lab Results   Component Value Date    CREATININE 0.6 01/12/2021    BUN 33 (H) 01/12/2021  01/12/2021    K 4.4 01/12/2021    CL 96 (L) 01/12/2021    CO2 26 01/12/2021     Recent Labs     01/12/21 2111 01/13/21  0218   CKTOTAL  --  59   CKMB  --  7.2*   TROPONINI 0.02 0.12*     Lab Results   Component Value Date    PROBNP 396 01/12/2021     Lab Results   Component Value Date    WBC 15.1 01/12/2021    RBC 2.89 01/12/2021    HGB 9.4 01/12/2021    HCT 28.4 01/12/2021    MCV 98.3 01/12/2021    MCH 32.5 01/12/2021    MCHC 33.1 01/12/2021    RDW 15.9 01/12/2021     01/12/2021    MPV 10.2 01/12/2021     No results for input(s): ALKPHOS, ALT, AST, PROT, BILITOT, BILIDIR, LABALBU in the last 72 hours.   Lab Results   Component Value Date    MG 2.0 10/05/2020     Lab Results   Component Value Date    PROTIME 10.8 01/13/2021    INR 1.0 01/13/2021     Lab Results   Component Value Date    TSH 3.02 09/02/2020     No components found for: CHLPL  Lab Results   Component Value Date    TRIG 148 01/13/2021    TRIG 206 (H) 09/02/2020    TRIG 230 (H) 07/30/2020     Lab Results   Component Value Date    HDL 35 01/13/2021    HDL 36 09/02/2020    HDL 33 (L) 07/30/2020     Lab Results   Component Value Date    LDLCALC 62 01/13/2021    [address] (H) 10/30/2019    LDLCALC 101 07/24/2019           Active Hospital Problems    Diagnosis    Essential hypertension [I10]     Priority: Medium    Hypothyroidism [E03.9]     Priority: Medium    Hypotension [I95.9]    SIRS (systemic inflammatory response syndrome) ([facility]) [R65.10]    Anemia [D64.9]    Chronic indwelling Barger catheter [Z97.8]    Unstable angina ([facility]) [I20.0]    NSTEMI (non-ST elevated myocardial infarction) ([facility] [address] [I21.4]    Chronic combined systolic and diastolic CHF (congestive heart failure) ([address] [I50.42]    Hydronephrosis [N13.30]    Multiple vessel coronary artery disease [I25.10]    Type 2 diabetes mellitus with hyperglycemia, without long-term current use of insulin (HCC) [E11.65]    Hyperlipidemia [E78.5]    Neurogenic bladder [N31.9]  Constipation [K59.00]    Lung mass [R91.8]    MS (multiple sclerosis) ([facility] [address] [G35]             ASSESSMENT / PLAN:  1. Unstable angina/non-STEMI with known late presentation anterior MI/shock/LAD drug-eluting stent last April. Continue dual antiplatelet therapy, beta-blocker, nitrate, ACE inhibitor. Recommend heart catheterization. Procedure risks and benefits outlined and she agrees to proceed    2. Ischemic cardiomyopathy with remote apical left ventricular clot which resolved after triple therapy. Last LVEF 45%    3. Stable chronic combined heart failure    4.  Multiple sclerosis          Electronically signed by Mirta Brito MD on 1/13/2021 at 7:32 AM

## 2021-01-13 NOTE — H&P
 Cervical Cancer Sister        REVIEW OF SYSTEMS:   Pertinent positives as noted in the HPI. All other systems reviewed and negative. PHYSICAL EXAM:    BP (!) 90/40   Pulse 89   Temp 97 °F (36.1 °C) (Temporal)   Resp 16   Ht 5' (1.524 m)   Wt 150 lb (68 kg)   SpO2 100%   BMI 29.29 kg/m²     General appearance: Elderly female, weak, no apparent distress, appears stated age and cooperative. Afebrile. HEENT:  Normal cephalic, atraumatic without obvious deformity. Pupils equal, round, and reactive to light. Extra ocular muscles intact. Conjunctivae/corneas clear. Neck: Supple, with limited range of motion. No jugular venous distention. Trachea midline. Respiratory:  Normal respiratory effort. Clear to auscultation, bilaterally without Rales/Wheezes/Rhonchi. Cardiovascular:  Regular rate and rhythm with normal S1/S2 without murmurs, rubs or gallops. Abdomen: Soft, non-tender, non-distended with normal bowel sounds. Musculoskeletal:  No clubbing, cyanosis or edema bilaterally. Limited range of motion without deformity. Skin: Skin color, texture, turgor normal.  No rashes or lesions. Neurologic:   Cranial nerves: II-XII intact, grossly non-focal.  Paraplegic with bilateral foot drop. Psychiatric:  Alert and oriented, thought content appropriate, normal insight  Capillary Refill: Brisk,< 3 seconds   Peripheral Pulses: +2 palpable, equal bilaterally       Labs:     Recent Labs     01/12/21 2111   WBC 15.1*   HGB 9.4*   HCT 28.4*        Recent Labs     01/12/21 2111 01/12/21 2211     --    K 6.5* 4.4   CL 96*  --    CO2 26  --    BUN 33*  --    CREATININE 0.6  --    CALCIUM 9.4  --      No results for input(s): AST, ALT, BILIDIR, BILITOT, ALKPHOS in the last 72 hours.   Recent Labs     01/13/21 0218   INR 1.0     Recent Labs     01/12/21 2111 01/13/21 0218   CKTOTAL  --  59   TROPONINI 0.02 0.12*       Urinalysis:      Lab Results   Component Value Date    NITRU Negative 08/26/2020 [address] LARGE 09/10/2020    BACTERIA Trace 09/10/2020    BACTERIA FEW 08/26/2020    RBCUA MODERATE 09/10/2020    BLOODU SMALL 08/26/2020    SPECGRAV 1.010 08/26/2020    GLUCOSEU NEGATIVE 09/10/2020       Radiology:   Reviewed and documented  XR CHEST PORTABLE   Final Result   Stable mild chronic interstitial markings in both lungs. ASSESSMENT:    Active Hospital Problems    Diagnosis Date Noted    Essential hypertension [I10] 03/25/2019     Priority: Medium    Hypothyroidism [E03.9] 03/25/2019     Priority: Medium    Hypotension [I95.9] 01/13/2021    SIRS (systemic inflammatory response syndrome) (HCC) [R65.10] 01/13/2021    Anemia [D64.9] 01/13/2021    Chronic indwelling Barger catheter [Z97.8] 01/13/2021    NSTEMI (non-ST elevated myocardial infarction) ([facility] [address] [I21.4] 01/12/2021    Chronic combined systolic and diastolic CHF (congestive heart failure) ([facility] [address] [I50.42] 04/23/2020    Hydronephrosis [N13.30] 04/22/2020    Multiple vessel coronary artery disease [I25.10] 04/21/2020    Type 2 diabetes mellitus with hyperglycemia, without long-term current use of insulin ([facility] [address] [E11.65] 10/30/2019    Hyperlipidemia [E78.5] 07/31/2019    Neurogenic bladder [N31.9] 07/31/2019    Constipation [K59.00] 07/31/2019    Lung mass [R91.8] 03/24/2019    MS (multiple sclerosis) ([facility] [address] Patsy Solorio 10/30/2013         PLAN:  1.  Non-ST elevation MI, therapeutic dose Lovenox, consult cardiology. Cycle enzymes. Pain control. 2.  Multivessel coronary artery disease. Continue aspirin and Plavix. 3.  SIRS, no obvious source of infection, check SARS-CoV-2 analysis. Procalcitonin, if elevated, consider antibiotics. 4.  Hypotension/impending cardiogenic shock/infection, hold blood pressure medications, gentle fluids, monitor vitals. 5.  Chronic combined systolic and diastolic CHF, compensated. 6.  Hypothyroidism, continue Synthroid, check thyroid function. 7.  Hypertension, blood pressure is running low, hold medications. 8.  Anemia, monitor hemoglobin. Check anemia work-up. Transfuse if less than 8.  9.  Type 2 diabetes, sliding scale coverage, monitor blood sugar. 10.  Hyperlipidemia, statin  11. Neurogenic bladder with chronic Barger and bacterial colonization with chronically abnormal urinalysis. 12.  Constipation, bowel regimen  13. History of MS    DVT Prophylaxis: Lovenox   diet: Diet NPO Effective Now  Code Status: Full Code    PT/OT Eval Status: As needed    Dispo -inpatient/telemetry       Elbert Sim MD    Thank you Radha Serna MD for the opportunity to be involved in this patient's care.

## 2021-01-13 NOTE — TELEPHONE ENCOUNTER
Please thank her  for letting me know this information. Please inform him that I am not allowed to see the patient due to COVID-19 restrictions. Please have him keep us closely informed concerning her welfare.   Thank you

## 2021-01-13 NOTE — ED PROVIDER NOTES
Patient is a 69-year-old female with a past medical history of hypertension, multiple sclerosis, CHF, CAD, diabetes presents emerged part with chest pain. Symptoms moderate in severity and improved since onset. Patient states she had sudden onset of chest pain starting at 7 PM.  Describes as a pressure-like sensation on her chest and radiated up into her jaw. She states it was there and that her  gave her 3 full doses of aspirin which then caused all of her symptoms to resolve. She did have a previous stent placed in April secondary to an MI and states this feels exactly like the chest pain she had on her prior heart attack. She was just sitting there when the chest pain started. Aspirin made symptoms improved and nothing made symptoms worse. She denied any shortness of breath, abdominal pain, diarrhea or constipation. She has no active chest pain currently. Review of Systems   Constitutional: Negative for chills and fever. HENT: Negative for congestion and sore throat. Eyes: Negative for photophobia and pain. Respiratory: Negative for cough and shortness of breath. Cardiovascular: Positive for chest pain. Negative for leg swelling. Gastrointestinal: Negative for abdominal pain, diarrhea, nausea and vomiting. Genitourinary: Negative for dysuria and frequency. Musculoskeletal: Negative for arthralgias and back pain. Skin: Negative for rash and wound. Neurological: Negative for weakness and headaches. All other systems reviewed and are negative. Physical Exam  Vitals signs and nursing note reviewed. Constitutional:       General: She is not in acute distress. Appearance: She is well-developed. She is not ill-appearing. HENT:      Head: Normocephalic and atraumatic. Eyes:      Extraocular Movements: Extraocular movements intact. Pupils: Pupils are equal, round, and reactive to light.    Neck: Musculoskeletal: Normal range of motion and neck supple. Cardiovascular:      Rate and Rhythm: Normal rate and regular rhythm. Pulses: Normal pulses. Pulmonary:      Effort: Pulmonary effort is normal. No respiratory distress. Breath sounds: Normal breath sounds. No wheezing or rales. Abdominal:      Palpations: Abdomen is soft. Tenderness: There is no abdominal tenderness. There is no guarding or rebound. Musculoskeletal:         General: No swelling or tenderness. Skin:     General: Skin is warm and dry. Capillary Refill: Capillary refill takes less than 2 seconds. Neurological:      General: No focal deficit present. Mental Status: She is alert and oriented to person, place, and time.           Procedures     MDM  Number of Diagnoses or Management Options     Amount and/or Complexity of Data Reviewed  Decide to obtain previous medical records or to obtain history from someone other than the patient: yes Patient presents emergency room for chest pain. She is clinically stable, mildly hypotensive. IV fluids started. Cardiac work-up initiated. EKG demonstrating no acute ischemic changes. All chest pain was resolved when patient arrived to the emergency department. She was given 3 full dose aspirins by her , no further aspirin given. She does not have any chest pain and is hypotensive, no nitro or pain medications given at this time. Lab work demonstrating a hyperkalemia but it was hemolyzed, repeat within normal limits. Troponin is 0.02 which seems to be baseline for patient. Labs do demonstrate a leukocytosis with a white count of 15. Patient not demonstrating any infectious symptoms on exam.  She is afebrile no evidence of pneumonia on exam.  History and physical exam less concerning for pulmonary embolism or dissection. Covid test performed and negative. With patient's history of present illness and risk factors, would benefit from inpatient evaluation and care. Consult placed on-call hospitalist who accepted the patient for admission.          ----------------------------------------------- PAST HISTORY --------------------------------------------  Past Medical History:  has a past medical history of CAD (coronary artery disease), High cholesterol, Hypertension, Hypothyroidism, MI (myocardial infarction) ([facility] [address] [facility] [facility]) [facility] Osteoporosis, Sarcoidosis, and Type 2 diabetes mellitus with hyperglycemia, without long-term current use of insulin ([facility] Past Surgical History:  has a past surgical history that includes knee surgery; Hysterectomy; First rib removal (Bilateral); Cholecystectomy (1990s); Femur fracture surgery (Left, 3/21/2019); Coronary angioplasty with stent (04/21/2020); CYSTOSCOPY INSERTION / REMOVAL STENT / STONE (Left, 4/23/2020); Lithotripsy (Left, 11/23/2020); and Lithotripsy (Left, 12/30/2020). Social History:  reports that she has never smoked. She has never used smokeless tobacco. She reports that she does not drink alcohol or use drugs. Family History: family history includes Arthritis in her sister; Cancer in her father; Cervical Cancer in her sister; Heart Disease in her mother; Mult Sclerosis in her sister; No Known Problems in her brother and sister; Stroke in her mother. The patients home medications have been reviewed.     Allergies: Macrobid [nitrofurantoin]    ------------------------------------------------ RESULTS ---------------------------------------------------    LABS:  Results for orders placed or performed during the hospital encounter of 01/12/21   CBC Auto Differential   Result Value Ref Range    WBC 15.1 (H) 4.5 - 11.5 E9/L    RBC 2.89 (L) 3.50 - 5.50 E12/L    Hemoglobin 9.4 (L) 11.5 - 15.5 g/dL    Hematocrit 28.4 (L) 34.0 - 48.0 %    MCV 98.3 80.0 - 99.9 fL    MCH 32.5 26.0 - 35.0 pg    MCHC 33.1 32.0 - 34.5 %    RDW 15.9 (H) 11.5 - 15.0 fL    Platelets 978 467 - 457 E9/L    MPV 10.2 7.0 - 12.0 fL    Neutrophils % 85.2 (H) 43.0 - 80.0 %    Immature Granulocytes % 0.5 0.0 - 5.0 %    Lymphocytes % 6.2 (L) 20.0 - 42.0 %    Monocytes % 6.7 2.0 - 12.0 %    Eosinophils % 1.2 0.0 - 6.0 %    Basophils % 0.2 0.0 - 2.0 %    Neutrophils Absolute 12.83 (H) 1.80 - 7.30 E9/L    Immature Granulocytes # 0.07 E9/L    Lymphocytes Absolute 0.93 (L) 1.50 - 4.00 E9/L    Monocytes Absolute 1.01 (H) 0.10 - 0.95 E9/L    Eosinophils Absolute 0.18 0.05 - 0.50 E9/L    Basophils Absolute 0.03 0.00 - 0.20 F6/E   Basic Metabolic Panel w/ Reflex to MG   Result Value Ref Range    Sodium 133 132 - 146 mmol/L    Potassium reflex Magnesium 6.5 (H) 3.5 - 5.0 mmol/L    Chloride 96 (L) 98 - 107 mmol/L    CO2 26 22 - 29 mmol/L    Anion Gap 11 7 - 16 mmol/L    Glucose 156 (H) 74 - 99 mg/dL    BUN 33 (H) 8 - 23 mg/dL    CREATININE 0.6 0.5 - 1.0 mg/dL    GFR Non-African American >60 >=60 mL/min/1.73 GFR African American >60     Calcium 9.4 8.6 - 10.2 mg/dL   Troponin   Result Value Ref Range    Troponin 0.02 0.00 - 0.03 ng/mL   Brain Natriuretic Peptide   Result Value Ref Range    Pro- 0 - 450 pg/mL   Potassium   Result Value Ref Range    Potassium 4.4 3.5 - 5.0 mmol/L   COVID-19   Result Value Ref Range    SARS-CoV-2, NAAT Not Detected Not Detected       RADIOLOGY:    All Radiology results interpreted by Radiologist unless otherwise noted. XR CHEST PORTABLE   Final Result   Stable mild chronic interstitial markings in both lungs. EKG: This EKG is signed and interpreted by ED Physician. Time:  2054   Rate: 106  Rhythm: Sinus. Interpretation: non-specific EKG. left axis deviation. QTc 464. Nonspecific ST wave changes in the lateral leads. No acute ST segment elevation. Comparison: stable as compared to patient's most recent EKG, changes compared to previous EKG.    ---------------------------- NURSING NOTES AND VITALS REVIEWED -------------------------   The nursing notes within the ED encounter and vital signs as below have been reviewed.    BP (!) 102/58   Pulse 93   Temp 97.6 °F (36.4 °C)   Resp 18   Ht 5' (1.524 m)   Wt 150 lb (68 kg)   SpO2 100%   BMI 29.29 kg/m²   Oxygen Saturation Interpretation: Normal      ------------------------------------------PROGRESS NOTES -------------------------------------------    ED COURSE MEDICATIONS:                Medications   aspirin EC tablet 81 mg (has no administration in time range)   atorvastatin (LIPITOR) tablet 10 mg (has no administration in time range)   baclofen (LIORESAL) tablet 40 mg (has no administration in time range)   calcium-vitamin D (OSCAL-500) 500-200 MG-UNIT per tablet 1 tablet (has no administration in time range)   citalopram (CELEXA) tablet 20 mg (has no administration in time range)   clopidogrel (PLAVIX) tablet 75 mg (has no administration in time range) Vitamin B 12 LOZG 500 mg (has no administration in time range)   dantrolene (DANTRIUM) capsule 100 mg (has no administration in time range)   docusate sodium (COLACE) capsule 100 mg (has no administration in time range)   ferrous sulfate (IRON 325) tablet 325 mg (has no administration in time range)   ipratropium-albuterol (DUONEB) nebulizer solution 3 mL (has no administration in time range)   levothyroxine (SYNTHROID) tablet 88 mcg (has no administration in time range)   magnesium (MAGNESIUM-OXIDE) tablet 250 mg (has no administration in time range)   polyethylene glycol (GLYCOLAX) packet 17 g (has no administration in time range)   vitamin D3 (CHOLECALCIFEROL) tablet 1,000 Units (has no administration in time range)   insulin lispro (HUMALOG) injection vial 0-10 Units (has no administration in time range)   glucose (GLUTOSE) 40 % oral gel 15 g (has no administration in time range)   dextrose 50 % IV solution (has no administration in time range)   glucagon (rDNA) injection 1 mg (has no administration in time range)   dextrose 5 % solution (has no administration in time range)   sodium chloride flush 0.9 % injection 10 mL (has no administration in time range)   sodium chloride flush 0.9 % injection 10 mL (has no administration in time range)   promethazine (PHENERGAN) tablet 12.5 mg (has no administration in time range)     Or   ondansetron (ZOFRAN) injection 4 mg (has no administration in time range)   acetaminophen (TYLENOL) tablet 650 mg (has no administration in time range)     Or   acetaminophen (TYLENOL) suppository 650 mg (has no administration in time range)   polyethylene glycol (GLYCOLAX) packet 17 g (has no administration in time range)   nitroGLYCERIN (NITROSTAT) SL tablet 0.4 mg (has no administration in time range)   enoxaparin (LOVENOX) injection 40 mg (has no administration in time range)   0.9 % sodium chloride bolus (0 mLs Intravenous Stopped 1/12/21 6360) 0.9 % sodium chloride bolus (0 mLs Intravenous Stopped 1/13/21 0015)       CONSULTATIONS:            Consultation:  I Spoke with Dr. Mark Hernandez (Medicine). Discussed case. They will admit this patient. Tenisha Kraus PROCEDURES:            none. COUNSELING:   I have spoken with the patient and discussed todays results, in addition to providing specific details for the plan of care and counseling regarding the diagnosis and prognosis.     --------------------------------------- IMPRESSION & DISPOSITION --------------------------------     IMPRESSION(s):  1. Chest pain, unspecified type        This patient's ED course included: a personal history and physicial examination, re-evaluation prior to disposition, cardiac monitoring and continuous pulse oximetry    This patient has been closely monitored during their ED course. DISPOSITION:  Disposition: Admit to telemetry. Patient condition is stable. END OF PROVIDER NOTE.        [address] [place] [address] DO Rula  Resident  01/13/21 [id number]

## 2021-01-13 NOTE — ED NOTES
Bed: 17B-17  Expected date:   Expected time:   Means of arrival:   Comments:  ems     Radha Sawyer RN  01/12/21 2048

## 2021-01-14 LAB
ANION GAP SERPL CALCULATED.3IONS-SCNC: 11 MMOL/L (ref 7–16)
BUN BLDV-MCNC: 19 MG/DL (ref 8–23)
CALCIUM SERPL-MCNC: 8.9 MG/DL (ref 8.6–10.2)
CHLORIDE BLD-SCNC: 104 MMOL/L (ref 98–107)
CO2: 25 MMOL/L (ref 22–29)
CREAT SERPL-MCNC: 0.5 MG/DL (ref 0.5–1)
GFR AFRICAN AMERICAN: >60
GFR NON-AFRICAN AMERICAN: >60 ML/MIN/1.73
GLUCOSE BLD-MCNC: 92 MG/DL (ref 74–99)
HCT VFR BLD CALC: 29.4 % (ref 34–48)
HEMOGLOBIN: 9.3 G/DL (ref 11.5–15.5)
MAGNESIUM: 2.2 MG/DL (ref 1.6–2.6)
MCH RBC QN AUTO: 32.1 PG (ref 26–35)
MCHC RBC AUTO-ENTMCNC: 31.6 % (ref 32–34.5)
MCV RBC AUTO: 101.4 FL (ref 80–99.9)
METER GLUCOSE: 140 MG/DL (ref 74–99)
METER GLUCOSE: 153 MG/DL (ref 74–99)
PDW BLD-RTO: 15.7 FL (ref 11.5–15)
PLATELET # BLD: 371 E9/L (ref 130–450)
PMV BLD AUTO: 10.5 FL (ref 7–12)
POC ACT LR: 149 SECONDS
POC ACT LR: 200 SECONDS
POC ACT LR: 231 SECONDS
POC ACT LR: 385 SECONDS
POC ACT LR: >400 SECONDS
POTASSIUM SERPL-SCNC: 4.2 MMOL/L (ref 3.5–5)
RBC # BLD: 2.9 E12/L (ref 3.5–5.5)
SODIUM BLD-SCNC: 140 MMOL/L (ref 132–146)
WBC # BLD: 12.5 E9/L (ref 4.5–11.5)

## 2021-01-14 PROCEDURE — 6360000002 HC RX W HCPCS

## 2021-01-14 PROCEDURE — 80048 BASIC METABOLIC PNL TOTAL CA: CPT

## 2021-01-14 PROCEDURE — 85347 COAGULATION TIME ACTIVATED: CPT

## 2021-01-14 PROCEDURE — 6370000000 HC RX 637 (ALT 250 FOR IP)

## 2021-01-14 PROCEDURE — 027135Z DILATION OF CORONARY ARTERY, TWO ARTERIES WITH TWO DRUG-ELUTING INTRALUMINAL DEVICES, PERCUTANEOUS APPROACH: ICD-10-PCS | Performed by: INTERNAL MEDICINE

## 2021-01-14 PROCEDURE — 2580000003 HC RX 258: Performed by: INTERNAL MEDICINE

## 2021-01-14 PROCEDURE — B2151ZZ FLUOROSCOPY OF LEFT HEART USING LOW OSMOLAR CONTRAST: ICD-10-PCS | Performed by: INTERNAL MEDICINE

## 2021-01-14 PROCEDURE — 99223 1ST HOSP IP/OBS HIGH 75: CPT | Performed by: CLINICAL NURSE SPECIALIST

## 2021-01-14 PROCEDURE — C9600 PERC DRUG-EL COR STENT SING: HCPCS

## 2021-01-14 PROCEDURE — C1725 CATH, TRANSLUMIN NON-LASER: HCPCS

## 2021-01-14 PROCEDURE — 2709999900 HC NON-CHARGEABLE SUPPLY

## 2021-01-14 PROCEDURE — C9606 PERC D-E COR REVASC W AMI S: HCPCS

## 2021-01-14 PROCEDURE — 2500000003 HC RX 250 WO HCPCS

## 2021-01-14 PROCEDURE — C1874 STENT, COATED/COV W/DEL SYS: HCPCS

## 2021-01-14 PROCEDURE — 83735 ASSAY OF MAGNESIUM: CPT

## 2021-01-14 PROCEDURE — 93005 ELECTROCARDIOGRAM TRACING: CPT | Performed by: INTERNAL MEDICINE

## 2021-01-14 PROCEDURE — 93458 L HRT ARTERY/VENTRICLE ANGIO: CPT

## 2021-01-14 PROCEDURE — C1894 INTRO/SHEATH, NON-LASER: HCPCS

## 2021-01-14 PROCEDURE — 82962 GLUCOSE BLOOD TEST: CPT

## 2021-01-14 PROCEDURE — 6370000000 HC RX 637 (ALT 250 FOR IP): Performed by: FAMILY MEDICINE

## 2021-01-14 PROCEDURE — 2140000000 HC CCU INTERMEDIATE R&B

## 2021-01-14 PROCEDURE — 85027 COMPLETE CBC AUTOMATED: CPT

## 2021-01-14 PROCEDURE — C1887 CATHETER, GUIDING: HCPCS

## 2021-01-14 PROCEDURE — 36415 COLL VENOUS BLD VENIPUNCTURE: CPT

## 2021-01-14 PROCEDURE — 92928 PRQ TCAT PLMT NTRAC ST 1 LES: CPT | Performed by: INTERNAL MEDICINE

## 2021-01-14 PROCEDURE — 2700000000 HC OXYGEN THERAPY PER DAY

## 2021-01-14 PROCEDURE — C1769 GUIDE WIRE: HCPCS

## 2021-01-14 RX ORDER — SODIUM CHLORIDE 9 MG/ML
INJECTION, SOLUTION INTRAVENOUS CONTINUOUS
Status: ACTIVE | OUTPATIENT
Start: 2021-01-14 | End: 2021-01-14

## 2021-01-14 RX ORDER — SODIUM CHLORIDE 0.9 % (FLUSH) 0.9 %
10 SYRINGE (ML) INJECTION PRN
Status: DISCONTINUED | OUTPATIENT
Start: 2021-01-14 | End: 2021-01-16

## 2021-01-14 RX ORDER — ACETAMINOPHEN 325 MG/1
650 TABLET ORAL EVERY 4 HOURS PRN
Status: DISCONTINUED | OUTPATIENT
Start: 2021-01-14 | End: 2021-01-19 | Stop reason: HOSPADM

## 2021-01-14 RX ORDER — SODIUM CHLORIDE 0.9 % (FLUSH) 0.9 %
10 SYRINGE (ML) INJECTION EVERY 12 HOURS SCHEDULED
Status: DISCONTINUED | OUTPATIENT
Start: 2021-01-14 | End: 2021-01-19 | Stop reason: HOSPADM

## 2021-01-14 RX ADMIN — BACLOFEN 40 MG: 10 TABLET ORAL at 23:19

## 2021-01-14 RX ADMIN — LEVOTHYROXINE SODIUM 88 MCG: 0.09 TABLET ORAL at 23:20

## 2021-01-14 RX ADMIN — Medication 1 TABLET: at 23:19

## 2021-01-14 RX ADMIN — Medication 10 ML: at 23:20

## 2021-01-14 RX ADMIN — CLOPIDOGREL 75 MG: 75 TABLET, FILM COATED ORAL at 10:33

## 2021-01-14 RX ADMIN — ASPIRIN 81 MG: 81 TABLET ORAL at 10:33

## 2021-01-14 RX ADMIN — DANTROLENE SODIUM 100 MG: 25 CAPSULE ORAL at 23:21

## 2021-01-14 RX ADMIN — ATORVASTATIN CALCIUM 10 MG: 10 TABLET, FILM COATED ORAL at 23:19

## 2021-01-14 ASSESSMENT — PAIN SCALES - GENERAL: PAINLEVEL_OUTOF10: 0

## 2021-01-14 NOTE — PROGRESS NOTES
[facility] Cardiology Consult     INPATIENT CARDIOLOGY CONSULT    Name: Connor Olmstead    Age: 77 y.o. Date of Admission: 1/12/2021  8:48 PM    Date of Service: 1/14/2021    Reason for Consultation: Chest pain    Chief Complaint: Chest pain    Referring Physician: Dr. Raymundo Young    History of Present Illness: The patient is a 77y.o. year old female who is well-known to us and presents after midsternal chest pressure of moderate intensity at rest, similar to previous MI symptoms, without nausea, vomiting or diaphoresis. Recurrent symptoms earlier this morning relieved after sublingual nitroglycerin. Baseline troponin 0.02 increased to 0.12. EKG showing sinus rhythm and nonspecific ST changes. Patient known to us from late presentation anterior myocardial infarction/shock in April 2020 at which time she underwent LAD IRINA. Echocardiogram at that time showed EF 20-25% with apical clot. She was treated with several months of \"triple\" therapy including aspirin, clopidogrel and Eliquis. Subsequent echocardiogram in June showed EF 45% without apical clot. She is debilitated and essentially bedbound because of multiple sclerosis. 1/14/2021  Talked to Dr Heidy Ramirez earlier this morning. Technically difficult diagnostic coronary angiogram via right radial approach but confirms severe LAD ISR, and high-grade obtuse marginal.  Plan for femoral approach revascularization today. This morning however she is dyspneic/pale and tachycardic with telemetry suspicious for VT however EKG confirms sinus tachycardia. Did receive IV amiodarone bolus. Long discussion with  and son and they will suspend DNR CCA, and convert to full code, in order to proceed with PCI.     Past Medical History:   Past Medical History:   Diagnosis Date    CAD (coronary artery disease) 4/21/2020    High cholesterol     Hypertension     Hypothyroidism     MI (myocardial infarction) ([facility] 04/21/2020    MS (multiple sclerosis) ([facility]  Osteoporosis     Sarcoidosis     Type 2 diabetes mellitus with hyperglycemia, without long-term current use of insulin ([facility] 10/30/2019       Past Surgical History:  Past Surgical History:   Procedure Laterality Date    CHOLECYSTECTOMY  1990s    CORONARY ANGIOPLASTY WITH STENT PLACEMENT  04/21/2020    Dr. Leslie Lamar   3.0 x 22mm Resolute MAAME to Prox LAD.   No LV    CYSTOSCOPY INSERTION / REMOVAL STENT / STONE Left 4/23/2020    CYSTOSCOPY LEFT RETROGRADE PYELOGRAM STENT INSERTION, STRATTON CATHETER performed by Duane Avila DO at [address] [facility] Left 3/21/2019    LEFT FEMUR CEPHALLOMEDULLARY NAIL performed by Shon Farfan MD at [facility]    [address]      plate in lt knee    LITHOTRIPSY Left 11/23/2020    CYSTOSCOPY LEFT URETEROSCOPY,  LASER LITHOTRIPSY  BASKET EXTRACTION LEFT STONE, STENT EXCHANGE performed by Duane Avila DO at [place] [id number] LITHOTRIPSY Left 12/30/2020    CYSTOSCOPY RETROGRADE URETEROSCOPY PYELOGRAM LASER LITHOTRIPSY LEFT J-STENT performed by Duane Avila DO at [facility] OR       Family History:  Family History   Problem Relation Age of Onset    Stroke Mother     Heart Disease Mother     Cancer Father         lung    Mult Sclerosis Sister     No Known Problems Brother     No Known Problems Sister     Arthritis Sister     Cervical Cancer Sister        Social History:  Social History     Socioeconomic History    Marital status:      Spouse name: Not on file    Number of children: Not on file    Years of education: Not on file    Highest education level: Not on file   Occupational History    Not on file   Social Needs    Financial resource strain: Not on file    Food insecurity     Worry: Not on file     Inability: Not on file    Transportation needs     Medical: Not on file     Non-medical: Not on file   Tobacco Use    Smoking status: Never Smoker    Smokeless tobacco: Never Used Substance and Sexual Activity    Alcohol use: No     Alcohol/week: 0.0 standard drinks     Comment: Ocassionally    Drug use: No    Sexual activity: Yes     Partners: Male   Lifestyle    Physical activity     Days per week: Not on file     Minutes per session: Not on file    Stress: Not on file   Relationships    Social connections     Talks on phone: Not on file     Gets together: Not on file     Attends Tenriism service: Not on file     Active member of club or organization: Not on file     Attends meetings of clubs or organizations: Not on file     Relationship status: Not on file    Intimate partner violence     Fear of current or ex partner: Not on file     Emotionally abused: Not on file     Physically abused: Not on file     Forced sexual activity: Not on file   Other Topics Concern    Not on file   Social History Narrative    Not on file       Allergies: Allergies   Allergen Reactions    Macrobid [Nitrofurantoin]      Mental Changes       Home Meds:  Prior to Admission medications    Medication Sig Start Date End Date Taking?  Authorizing Provider   isosorbide mononitrate (IMDUR) 30 MG extended release tablet Take 30 mg by mouth daily   Yes Historical Provider, MD   atorvastatin (LIPITOR) 10 MG tablet 1 po q hs 12/21/20   Ramon Wallace MD   baclofen (LIORESAL) 20 MG tablet Take 40 mg by mouth 2 times daily    Historical Provider, MD   carvedilol (COREG) 3.125 MG tablet Take 1 tablet by mouth 2 times daily 11/22/20   Ramon Wallace MD   Wound Dressings ([facility] WOUND/BURN DRESSING) PSTE paste as needed [place] buttocks 10/30/20   Historical Provider, MD   OXYGEN Inhale 2 L into the lungs continuous    Historical Provider, MD   citalopram (CELEXA) 20 MG tablet Take 1 tablet by mouth every morning 10/29/20   Ramon Wallace MD   Sennosides (SENNA) 8.6 MG CAPS Take 1 capsule by mouth daily     Historical Provider, MD Potassium 99 MG TABS Take 1 tablet by mouth daily    Historical Provider, MD   levothyroxine (SYNTHROID) 88 MCG tablet Take 1 tablet by mouth daily  Patient taking differently: Take 88 mcg by mouth nightly  10/26/20   Melisa Guerra MD   clopidogrel (PLAVIX) 75 MG tablet Take 1 tablet by mouth daily 10/19/20   Melisa Guerra MD   ipratropium-albuterol (DUONEB) 0.5-2.5 (3) MG/3ML SOLN nebulizer solution Inhale 3 mLs into the lungs every 6 hours as needed for Shortness of Breath 9/23/20   Melisa Guerra MD   docusate sodium (COLACE) 100 MG capsule Take 100 mg by mouth daily    Historical Provider, MD   aspirin 81 MG EC tablet Take 81 mg by mouth daily    Historical Provider, MD   ferrous sulfate (IRON 325) 325 (65 Fe) MG tablet Take 1 tablet by mouth every other day  Patient taking differently: Take 325 mg by mouth daily (with breakfast)  9/2/20   Melisa Guerra MD   lisinopril (PRINIVIL;ZESTRIL) 2.5 MG tablet Take 0.5 tablets by mouth daily 9/2/20   Melisa Guerra MD   dantrolene (DANTRIUM) 50 MG capsule Take 1 capsule by mouth 4 times daily  Patient taking differently: Take 100 mg by mouth 2 times daily 2 caps bid 8/11/20   Melisa Guerra MD   metFORMIN (GLUCOPHAGE) 500 MG tablet Take 1 tablet by mouth 2 times daily (with meals) 8/11/20   Melisa Guerra MD   blood glucose test strips (ASCENSIA AUTODISC VI;ONE TOUCH ULTRA TEST VI) strip 1 each by In Vitro route 2 times daily As needed. 8/11/20   Melisa Guerra MD   blood glucose monitor kit and supplies Dispense sufficient amount for indicated testing frequency plus additional to accommodate PRN testing needs. Dispense all needed supplies to include: monitor, strips, lancing device, lancets, control solutions, alcohol swabs.  7/22/20   Melisa Guerra MD blood glucose monitor strips Test qd  & as needed for symptoms of irregular blood glucose. Dispense sufficient amount for indicated testing frequency plus additional to accommodate PRN testing needs.  7/22/20   Jared Chacon MD   Lancets MISC 1 each by Does not apply route daily 7/22/20   Jared Chacon MD   nortriptyline [facility]) 25 MG capsule Take 1 capsule by mouth nightly 3/17/20 12/30/20  Jared Chacon MD   Catheters (URETHRAL CATHETER) MISC by Does not apply route    Historical Provider, MD   Cyanocobalamin (VITAMIN B 12 PO) Take 500 mg by mouth daily     Historical Provider, MD   Calcium Carbonate-Vit D-Min (CALCIUM 600+D PLUS MINERALS) 600-400 MG-UNIT TABS Take 1 tablet by mouth nightly     Historical Provider, MD   polyethylene glycol (MIRALAX) PACK packet Take 17 g by mouth daily     Historical Provider, MD   vitamin D3 (CHOLECALCIFEROL) 25 MCG (1000 UT) TABS tablet Take 1 tablet by mouth every morning     Historical Provider, MD   magnesium (MAGNESIUM-OXIDE) 250 MG TABS tablet Take 250 mg by mouth every morning     Historical Provider, MD       Current Medications:  Current Facility-Administered Medications   Medication Dose Route Frequency Provider Last Rate Last Admin    amiodarone (CORDARONE) 150 mg in dextrose 5 % 100 mL bolus  150 mg Intravenous Once Andie Humphrey MD        aspirin EC tablet 81 mg  81 mg Oral Daily Lisa Sosa MD   81 mg at 01/13/21 1013    atorvastatin (LIPITOR) tablet 10 mg  10 mg Oral Nightly Lisa Sosa MD   10 mg at 01/13/21 2106    baclofen (LIORESAL) tablet 40 mg  40 mg Oral BID Lisa Sosa MD   40 mg at 01/13/21 2106    calcium-vitamin D (OSCAL-500) 500-200 MG-UNIT per tablet 1 tablet  1 tablet Oral Nightly Lisa Sosa MD   1 tablet at 01/13/21 2106    citalopram (CELEXA) tablet 20 mg  20 mg Oral QAM Lisa Sosa MD   20 mg at 01/13/21 1021  clopidogrel (PLAVIX) tablet 75 mg  75 mg Oral Daily Tony Guajardo MD   75 mg at 01/13/21 1021    vitamin B-12 (CYANOCOBALAMIN) tablet 500 mcg  500 mcg Oral Daily Tony Guajardo MD   500 mcg at 01/13/21 1026    dantrolene (DANTRIUM) capsule 100 mg  100 mg Oral BID Tony Guajardo MD   100 mg at 01/13/21 2106    docusate sodium (COLACE) capsule 100 mg  100 mg Oral Daily Tony Guajardo MD   100 mg at 01/13/21 1022    ferrous sulfate (IRON 325) tablet 325 mg  325 mg Oral Daily with breakfast Tony Guajardo MD        ipratropium-albuterol (DUONEB) nebulizer solution 3 mL  1 vial Inhalation Q6H PRN Tony Guajardo MD        levothyroxine (SYNTHROID) tablet 88 mcg  88 mcg Oral Nightly Tony Guajardo MD   88 mcg at 01/13/21 2105    magnesium oxide (MAG-OX) tablet 400 mg  400 mg Oral QAM Tony Guajardo MD   400 mg at 01/13/21 1023    polyethylene glycol (GLYCOLAX) packet 17 g  17 g Oral Daily Tony Guajardo MD        Vitamin D (CHOLECALCIFEROL) tablet 1,000 Units  1,000 Units Oral QAM Tony Guajardo MD   1,000 Units at 01/13/21 1026    insulin lispro (HUMALOG) injection vial 0-10 Units  0-10 Units Subcutaneous 4x Daily AC & HS Tony Guajardo MD   2 Units at 01/13/21 2107    glucose (GLUTOSE) 40 % oral gel 15 g  15 g Oral PRN Tony Guajardo MD        dextrose 50 % IV solution  12.5 g Intravenous PRN Tony Guajardo MD        glucagon (rDNA) injection 1 mg  1 mg Intramuscular PRN Tony Guajardo MD        dextrose 5 % solution  100 mL/hr Intravenous PRN Tony Guajardo MD        sodium chloride flush 0.9 % injection 10 mL  10 mL Intravenous 2 times per day Tony Guajardo MD        sodium chloride flush 0.9 % injection 10 mL  10 mL Intravenous PRN Tony Guajardo MD        promethazine (PHENERGAN) tablet 12.5 mg  12.5 mg Oral Q6H PRN Tony Guajardo MD        Or    ondansetron (ZOFRAN) injection 4 mg  4 mg Intravenous Q6H PRN Tony Guajardo MD 11/23/20 150 lb (68 kg)       General: Awake,, oriented x3,     HEENT: Pupils equal and round    Neck: No JVD or bruits. No thyroid enlargement or adenopathy    Cardiac: Regular rate and rhythm, normal S1 and S2, no S3. Apical impulse is normal.  No murmurs, rubs or clicks. No carotid bruits and no abdominal bruits. No peripheral edema, cyanosis or clubbing. Normal pulses in the upper and lower extremities bilaterally. Resp: Tachypneic soft crackles bilaterally    Abdomen: soft, nontender, nondistended, no gross organomegaly or mass    Skin: Cool and dry    Neuro: Bedridden. Alert and oriented x3,     Psych: Cooperative, and normal affect    Intake/Output:    Intake/Output Summary (Last 24 hours) at 1/14/2021 0900  Last data filed at 1/14/2021 0500  Gross per 24 hour   Intake 1680 ml   Output 1450 ml   Net 230 ml     No intake/output data recorded. Laboratory Tests:  Lab Results   Component Value Date    CREATININE 0.5 01/14/2021    BUN 19 01/14/2021     01/14/2021    K 4.2 01/14/2021     01/14/2021    CO2 25 01/14/2021     Recent Labs     01/12/21  2111 01/13/21  0218 01/13/21  0856   CKTOTAL  --  59  --    CKMB  --  7.2*  --    TROPONINI 0.02 0.12* 0.12*     Lab Results   Component Value Date    PROBNP 396 01/12/2021     Lab Results   Component Value Date    WBC 12.5 01/14/2021    RBC 2.90 01/14/2021    HGB 9.3 01/14/2021    HCT 29.4 01/14/2021    .4 01/14/2021    MCH 32.1 01/14/2021    MCHC 31.6 01/14/2021    RDW 15.7 01/14/2021     01/14/2021    MPV 10.5 01/14/2021     No results for input(s): ALKPHOS, ALT, AST, PROT, BILITOT, BILIDIR, LABALBU in the last 72 hours.   Lab Results   Component Value Date    MG 2.2 01/14/2021     Lab Results   Component Value Date    PROTIME 10.8 01/13/2021    INR 1.0 01/13/2021     Lab Results   Component Value Date    TSH 3.02 09/02/2020     No components found for: CHLPL  Lab Results   Component Value Date    TRIG 148 01/13/2021 TRIG 206 (H) 09/02/2020    TRIG 230 (H) 07/30/2020     Lab Results   Component Value Date    HDL 35 01/13/2021    HDL 36 09/02/2020    HDL 33 (L) 07/30/2020     Lab Results   Component Value Date    LDLCALC 62 01/13/2021    [address] (H) 10/30/2019    LDLCALC 101 07/24/2019           Active Hospital Problems    Diagnosis    Essential hypertension [I10]     Priority: Medium    Hypothyroidism [E03.9]     Priority: Medium    Hypotension [I95.9]    SIRS (systemic inflammatory response syndrome) ([facility]) [R65.10]    Anemia [D64.9]    Chronic indwelling Barger catheter [Z97.8]    Unstable angina ([facility]) [I20.0]    NSTEMI (non-ST elevated myocardial infarction) ([facility] [I21.4]    Chronic combined systolic and diastolic CHF (congestive heart failure) ([facility] [I50.42]    Hydronephrosis [N13.30]    Multiple vessel coronary artery disease [I25.10]    Type 2 diabetes mellitus with hyperglycemia, without long-term current use of insulin ([facility] [E11.65]    Hyperlipidemia [E78.5]    Neurogenic bladder [N31.9]    Constipation [K59.00]    Lung mass [R91.8]    MS (multiple sclerosis) ([facility] [G35]             ASSESSMENT / PLAN:  1. Unstable angina/non-STEMI with known late presentation anterior MI/shock/LAD drug-eluting stent last April. Severe LAD ISR and high-grade obtuse marginal stenoses. Continue dual antiplatelet therapy, beta-blocker, nitrate, ACE inhibitor. Plans for urgent PCI. Procedure risks and benefits discussed with patient,  and son. They agreed to proceed. DNR CCA suspended and now full code    2. Ischemic cardiomyopathy with remote apical left ventricular clot which resolved after triple therapy. Last LVEF 45%    3. Stable chronic combined heart failure    4.  Multiple sclerosis          Electronically signed by Kay Carvalho MD on 1/14/2021 at 9:00 AM

## 2021-01-14 NOTE — PROCEDURES
[address]                  [address] [facility],  [facility]                            CARDIAC CATHETERIZATION    PATIENT NAME: Renetta Francisco                    :        1945  MED REC NO:   [id number]                            ROOM:       54  ACCOUNT NO:   [de-identified]                           ADMIT DATE: 2021  PROVIDER:     Adin Buchanan MD    DATE OF PROCEDURE:  2021    LEFT HEART CATHETERIZATION    INDICATION:  Non-ST-elevation myocardial infarction and known CAD. REFERRING PROVIDER:  Chen Tran. Anurag Calloway MD    PROCEDURE NOTE:  The patient was brought to the cardiac cath lab in her  usual fasting state. Under sterile condition and under local anesthetic  and using conscious sedation, a 6-Chilean Terumo Slender sheath was  inserted into the right radial artery. The patient received 5000 units  of intravenous heparin. She also received 200 mcg of intraarterial  nitroglycerin and 2.5 mg of diluted verapamil via the right radial  sheath. Then, a 5-Chilean TIG diagnostic catheter was advanced to the  ascending aorta with difficulty due to angulated and elongated right  subclavian artery. This catheter failed to engage the coronary  arteries. It was exchanged to 4-Chilean JL-3.5, then 5-Chilean JL-3.5,  then 5-Chilean EBU 3.5 which able to engage the right coronary artery  and an angiogram was done. This catheter failed to engage the left  main. It was exchanged to an EBU 3.0, then to 6-Chilean JL-3.0 which was  able to engage the left main coronary artery with difficulty and an  angiogram was done. At the end of the procedure, the catheter was  pulled out. The Terumo slender sheath was pulled out and a Vasc Band  was applied over the right radial artery with good hemostasis. The  patient tolerated the procedure well and no complications were noted. No significant blood loss occurred during the procedure.     FINDINGS: HEMODYNAMIC:  Aortic pressure 99/51 mmHg. CORONARY ANATOMY:  LEFT MAIN:  It is a short artery which is medium in size with no  significant stenosis noted. There was damping of the pressure during  the angiogram.    LAD:  It is medium in size and wrapping around the apex and giving rise  to small diagonal branches and several small septal perforators. There  was a stent noted in the proximal to mid LAD with diffuse in-stent  restenosis, but severe mid focal stenosis at 80%. There was also 80%  stenosis at the distal edge of the stent with TAM 2 to 3 flow distally. LEFT CIRCUMFLEX:  It is medium in size and codominant, giving rise to a  bifurcating first obtuse marginal branch and to the PDA. The upper  branch of the OM was very small. The second branch was large with 80%  tubular proximal to mid stenosis. RIGHT CORONARY ARTERY:  It is very small, giving rise to a conus branch,  an RV marginal branch and a small PLV branch. There was a long 80% to  90% mid stenosis with TAM-2 flow distally. RECOMMENDATIONS:  Staged PCI of the LAD and the circumflex using femoral  approach. PROCEDURE START TIME:  4038. PROCEDURE END TIME:  1648. CONTRAST VOLUME:  120 mL of Optiray. FLUOROSCOPY TIME:  13.8 minutes.         Faviola Cueva MD    D: 01/14/2021 7:45:27       T: 01/14/2021 7:53:18     [email or web address]  Job#: [id number]     Doc#: [id number]    CC:

## 2021-01-14 NOTE — PROCEDURES
[address]                  [address] [id number],  [facility]                            CARDIAC CATHETERIZATION    PATIENT NAME: Jamie Recinos                    :        1945  MED REC NO:   [id number]                            ROOM:       8843  ACCOUNT NO:   [de-identified]                           ADMIT DATE: 2021  PROVIDER:     Dominick Saldaña MD    DATE OF PROCEDURE:  2021    PROCEDURE:  PCI. INDICATION:  Non-ST-elevation myocardial infarction with severe in-stent  restenosis of proximal to mid LAD and severe stenosis of the OM1 and  nonsustained VT this morning. REFERRING PROVIDER:  Elva Ricardo. Cameron Chacon MD    PROCEDURE NOTE:  The patient underwent left heart catheterization via  the right radial access yesterday. The procedure was very difficult  from the right radial access due to very elongated and angulated right  subclavian artery. She was found to have severe in-stent restenosis of  the proximal to mid LAD stent and severe stenosis of the OM1 branch. She was brought to the cardiac cath lab this morning after having  nonsustained VT. Under sterile condition and under local anesthetic and  using conscious sedation, and using a 5-Czech micropuncture needle, a  6-Czech sheath was inserted into the right radial artery. Then, the  patient received 5000 units of intravenous heparin. Then, a 6-Czech  JL-3.5 guiding catheter with side holes was advanced to the ascending  aorta without difficulty. The left main coronary artery was engaged and  an angiogram was done. There was diffuse in-stent restenosis in the  proximal to mid LAD with 80% to 90% stenosis at the mid segment of the  stent and 80% stenosis at the distal edge of the stent with TAM-2 to 3  flow.   The first obtuse marginal branch has an 80% to 90% tubular  proximal to mid stenosis and there was 70% discrete distal stenosis where the vessel becomes small. There was TAM-3 flow distally. Then,  a Runthrough wire was advanced into the distal LAD without difficulty. Then, a 2.5 x 20 [place] monorail balloon was inflated twice inside the  stent at 12 atmospheres. Then, a 3.0 x 30 Resolute [place] stent was  deployed at 14 atmospheres. This stent was postdilated using a 3.5 x 20  noncompliant Quantum balloon inflated three times at 16 atmospheres with  10% to 20% discrete eccentric distal notching in the distal edge of the  stent with TAM-3 flow distally. To mention that the patient was  hypotensive even before starting the procedure. She received a total of  400 mcg of intravenous Saul-Synephrine was started on Levophed. Then,  the wire was pulled out from the LAD and was advanced into obtuse  marginal branch without difficulty. Then, the 2.5 x 20 balloon was  inflated once at 12 atmospheres. Then, a 2.5 x 26 Resolute Tk stent  was deployed at 16 atmospheres with 0% residual stenosis and TAM-3 flow  distally. At the end of the procedure, the wire was pulled out. The  guide catheter was exchanged over the guidewire to a 5-Cypriot pigtail  which was advanced into the left ventricle without difficulty. The left  ventricular end-diastolic pressure was measured at 32 mmHg. There was  no significant gradient across the aortic valve. At the end of the  procedure, pigtail was pulled out. The right groin sheath was sutured  in place and will be taken out when the ACT is below 170. To mention  that the patient's ACT was above 400 twice during the PCI and was 231 at  the end. The patient received an extra 2000 units of intravenous  heparin. She also received a baby aspirin and one tablet of Plavix. The patient tolerated the procedure well and no complications were  noted. No significant blood loss occurred during the procedure. IMPRESSION:  1.   Successful PCI of severe in-stent restenosis of proximal to mid LAD stent using a drug-eluting stent. 2.  Successful PCI of severe OM stenosis using drug-eluting stent. RECOMMENDATIONS:  1. Continue DAPT. 2.  Aggressive medical therapy. PROCEDURE START TIME:  09:42 a.m. PROCEDURE END TIME:  10:24 a.m. CONTRAST VOLUME:  100 mL of Optiray. FLUOROSCOPY TIME:  8.2 minutes.         Candis Up MD    D: 01/14/2021 10:51:10       T: 01/14/2021 10:59:17     [email or web address]  Job#: [id number]     Doc#: [id number]    CC:

## 2021-01-14 NOTE — CARE COORDINATION
1/14/2021 - Cardiology following. Pt experienced chest pain and went to cardiac cath lab. Discharge plan remains home with [facility] when medically stable. SW/CM will follow.

## 2021-01-14 NOTE — PROGRESS NOTES
Hospitalist Progress Note      SYNOPSIS: Patient admitted on 2021 for       SUBJECTIVE:    Patient seen and examined  Patient was pale and diaphoretic this morning. Taken to the Cath Lab became hypotensive requiring pressors      Temp (24hrs), Av.7 °F (37.1 °C), Min:98.1 °F (36.7 °C), Max:100.3 °F (37.9 °C)    DIET: DIET CARDIAC;  CODE: DNR-CCA    Intake/Output Summary (Last 24 hours) at 2021 1002  Last data filed at 2021 0500  Gross per 24 hour   Intake 1680 ml   Output 1450 ml   Net 230 ml       OBJECTIVE:    /64   Pulse 141   Temp 98.1 °F (36.7 °C)   Resp 24   Ht 5' (1.524 m)   Wt 147 lb 11.2 oz (67 kg)   SpO2 99%   BMI 28.85 kg/m²     General appearance: Acutely ill  HEENT: Normocephalic, atraumatic  Neck: Supple. No jugular venous distention.    Respiratory: Clear to auscultation bilaterally, normal respiratory effort  Cardiovascular: Regular rate rhythm, normal S1-S2  Abdomen: Soft, nontender, nondistended  Musculoskeletal: Normal range of motion, no deformity  Neurologic lethargic    ASSESSMENT:  NSTEMI s/p PCI of LAD/circumflex  Hypotension  Coronary artery disease  Chronic combined systolic/diastolic congestive heart failure  Hypothyroidism  Anemia  Type 2 diabetes  Hyperlipidemia       PLAN:  Cardiology following  Will need to move to ICU if she needs pressors  Continue amiodarone, aspirin, atorvastatin, carvedilol, Plavix, Imdur, lisinopril  Sliding scale insulin            Medications:  REVIEWED DAILY    Infusion Medications    dextrose       Scheduled Medications    amiodarone bolus  150 mg Intravenous Once    aspirin  81 mg Oral Daily    atorvastatin  10 mg Oral Nightly    baclofen  40 mg Oral BID    calcium-vitamin D  1 tablet Oral Nightly    citalopram  20 mg Oral QAM    clopidogrel  75 mg Oral Daily    vitamin B-12  500 mcg Oral Daily    dantrolene  100 mg Oral BID    docusate sodium  100 mg Oral Daily  ferrous sulfate  325 mg Oral Daily with breakfast    levothyroxine  88 mcg Oral Nightly    magnesium oxide  400 mg Oral QAM    polyethylene glycol  17 g Oral Daily    Vitamin D  1,000 Units Oral QAM    insulin lispro  0-10 Units Subcutaneous 4x Daily AC & HS    sodium chloride flush  10 mL Intravenous 2 times per day    senna  1 tablet Oral Daily    carvedilol  3.125 mg Oral BID WC    isosorbide mononitrate  30 mg Oral Daily    lisinopril  2.5 mg Oral Daily    sodium chloride flush  10 mL Intravenous 2 times per day     PRN Meds: ipratropium-albuterol, glucose, dextrose, glucagon (rDNA), dextrose, sodium chloride flush, promethazine **OR** ondansetron, acetaminophen **OR** acetaminophen, polyethylene glycol, nitroGLYCERIN, sodium chloride flush, acetaminophen    Labs:     Recent Labs     01/12/21 2111 01/14/21  0430   WBC 15.1* 12.5*   HGB 9.4* 9.3*   HCT 28.4* 29.4*    371       Recent Labs     01/12/21 2111 01/12/21 2211 01/14/21  0430     --  140   K 6.5* 4.4 4.2   CL 96*  --  104   CO2 26  --  25   BUN 33*  --  19   CREATININE 0.6  --  0.5   CALCIUM 9.4  --  8.9       No results for input(s): PROT, ALB, ALKPHOS, ALT, AST, BILITOT, AMYLASE, LIPASE in the last 72 hours.     Recent Labs     01/13/21 0218   INR 1.0       Recent Labs     01/12/21 2111 01/13/21 0218 01/13/21  0856   CKTOTAL  --  59  --    TROPONINI 0.02 0.12* 0.12*       Chronic labs:    Lab Results   Component Value Date    CHOL 127 01/13/2021    TRIG 148 01/13/2021    HDL 35 01/13/2021    LDLCALC 62 01/13/2021    TSH 3.02 09/02/2020    INR 1.0 01/13/2021    LABA1C 4.9 01/13/2021       Radiology: REVIEWED DAILY    +++++++++++++++++++++++++++++++++++++++++++++++++  [address], [place]  +++++++++++++++++++++++++++++++++++++++++++++++++ NOTE: This report was transcribed using voice recognition software. Every effort was made to ensure accuracy; however, inadvertent computerized transcription errors may be present.

## 2021-01-14 NOTE — CONSULTS
Palliative Care Department  [phone]  Palliative Care Initial Consult  Particia Garland DELGADILLO-CNS, [address]  [phone]  Hospital Day: 3    Date of Initial Consult: 1/14/21  Referring Provider: Dr. Ela Doshi was consulted for assistance with: goals of care and code status discussion      HPI:   Sharon Flores is a 77 y. o. with a past medical history of hypertension, multiple sclerosis, CHF, CAD, Sarcoidosis, MI (stent placement April 2020) and diabetes  who was admitted on 1/12/2021 from home with a CHIEF COMPLAINT of chest pain.  gave pt 3 full doses of ASA at home with chest pain resolving. Cardiology was consulted. Pt was admitted for further evaluation and care for unstable angina and nonSTEMI. 1/14/21 pt had recurring chest pain and underwent PCI of severe in-stent restenosis of proximal to mid LAD stent; IRINA placed. ASSESSMENT/PLAN:     1/14/21 Pt underwent heart cath today with placement of stent. SBP . On amiodarone. She is on her own trilogy NIV with no labored breathing at present; SPO2 97%. She remains lethargic but responsive.  at bedside. Pertinent Hospital Diagnoses   ? NSTEMI s/p PCI with placement of stent 1/14/21- Cardiology following; continues on BB, antiplatelets, ACE  ? Hx of multiple sclerosis  ? Chronic heart failure; ischemic cardiomyopathy-last LVEF 45%; continue to monitor; f/u with Cardiology      Palliative Care Encounter / Counseling Regarding Goals of Care  ? Mitchell Douglas, Does Not have capacity for medical decision-making. Capacity is time limited and situation/question specific  ? During encounter  Izabela Cao was surrogate medical decision-maker  ? Outcome of goals of care meeting: hoping to return home once medically stable; to have [facility] aide; follows with [facility] care ([phone])  ?  Code status changed to LIMITED-  no intubation; no CPR; no shock but YES to resuscitative medications ? Advanced Directives: no HPOA or Living Will noted in chart  ? Surrogate/Legal NOK: Andreas Marina @ [phone]      Details of Conversation:    1/14 Met with  Leticia Fontaine at bedside. Provided update and explained role of Palliative medicine. States he is familiar with Palliative care since he has had this service for his wife for quite some time in their home in [place]. They did have hospice for only a few days but revoked since he and his wife wish to continue Trilogy for respiratory issues; want to continue treating infections; and continued medical care if needed including all meds for her M.S. They just don't want aggressive resuscitation like CPR, intubation or defibrillation. Code status changed to limited to indicate their wishes. He also would accept transfer to ICU if needed to continue medical treatment including pressors if needed. He has good understanding of his wife's current status and has been caring for her for years with multiple sclerosis; being wheelchair bound since 2004. States they have been  62 yrs and he is hopeful she can return home with assistance of aides and palliative care. Emotional support offered. Spoke with and updated staff nurse.      Spiritual assessment: no spiritual distress identified  Bereavement and grief: to be determined  Referrals to: none today    SUBJECTIVE:     Active Hospital Problems    Diagnosis Date Noted    Essential hypertension [I10] 03/25/2019     Priority: Medium    Hypothyroidism [E03.9] 03/25/2019     Priority: Medium    Hypotension [I95.9] 01/13/2021    SIRS (systemic inflammatory response syndrome) ([facility] [R65.10] 01/13/2021    Anemia [D64.9] 01/13/2021    Chronic indwelling Barger catheter [Z97.8] 01/13/2021    Unstable angina ([facility] [I20.0] 01/13/2021    NSTEMI (non-ST elevated myocardial infarction) ([facility] [I21.4] 01/12/2021  Chronic combined systolic and diastolic CHF (congestive heart failure) ([facility] [I50.42] 04/23/2020    Hydronephrosis [N13.30] 04/22/2020    Multiple vessel coronary artery disease [I25.10] 04/21/2020    Type 2 diabetes mellitus with hyperglycemia, without long-term current use of insulin ([facility] [E11.65] 10/30/2019    Hyperlipidemia [E78.5] 07/31/2019    Neurogenic bladder [N31.9] 07/31/2019    Constipation [K59.00] 07/31/2019    Lung mass [R91.8] 03/24/2019    MS (multiple sclerosis) ([facility] [address] [facility] Carrera Prophet 10/30/2013           OBJECTIVE:   Prognosis: Guarded    Physical Exam:  BP 94/64   Pulse 118   Temp 95.7 °F (35.4 °C) (Temporal)   Resp 20   Ht 5' (1.524 m)   Wt 147 lb 11.2 oz (67 kg)   SpO2 97%   BMI 28.85 kg/m²   Gen:   NAD, lethargic; on trilogy NIV; resting comfortably  HEENT:  Normocephalic, atraumatic, mucosa moist, EOMI  Neck:  Supple, trachea midline, no JVD  Lungs:  CTA bilaterally; respirations unlabored  Heart:  ST; RRR, distant heart tones, no murmur, rub, or gallop noted during exam  Abd:  Soft, non tender, non distended, bowel sounds present  : saldana in place  Ext:  Limited movement; no edema, pulses present  Skin:  Warm and dry  Neuro:  Eyes closed; responds to voice; lethargic; following commands    Past Medical History:   Diagnosis Date    CAD (coronary artery disease) 4/21/2020    High cholesterol     Hypertension     Hypothyroidism     MI (myocardial infarction) ([facility] 04/21/2020    MS (multiple sclerosis) ([facility]     Osteoporosis     Sarcoidosis     Type 2 diabetes mellitus with hyperglycemia, without long-term current use of insulin ([facility] [address] [facility] 10/30/2019     Past Surgical History:   Procedure Laterality Date    CHOLECYSTECTOMY  1990s    CORONARY ANGIOPLASTY WITH STENT PLACEMENT  04/21/2020    Dr. Beverly Dick   3.0 x 22mm Resolute MAAME to Prox LAD.   No LV    CORONARY ANGIOPLASTY WITH STENT PLACEMENT  01/14/2021 3.0 x 30 Tk to the prox LAD and a 2.5 x 20 [place] to the Obtuse marginal by Dr. Nayely Mahmood / Colette Delgado / Fadumo Mcrae Left 4/23/2020    CYSTOSCOPY LEFT RETROGRADE PYELOGRAM STENT INSERTION, STRATTON CATHETER performed by Marilyn Avila DO at [facility] Left 3/21/2019    LEFT FEMUR CEPHALLOMEDULLARY NAIL performed by Kiley Herrera MD at [facility] Bilateral     HYSTERECTOMY      KNEE SURGERY      plate in lt knee    LITHOTRIPSY Left 11/23/2020    CYSTOSCOPY LEFT URETEROSCOPY,  LASER LITHOTRIPSY  BASKET EXTRACTION LEFT STONE, STENT EXCHANGE performed by Marilyn Avila DO at [facility] LITHOTRIPSY Left 12/30/2020    CYSTOSCOPY RETROGRADE URETEROSCOPY PYELOGRAM LASER LITHOTRIPSY LEFT J-STENT performed by Marilyn Avila DO at [facility] History:   The patient currently lives at home with   TOBACCO:  reports that she has never smoked. She has never used smokeless tobacco.  ETOH:  reports no history of alcohol use. Objective data reviewed: labs, images, records, medication use, vitals and chart    Discussed patient and the plan of care with the other IDT members: Palliative Medicine IDT Team    Time/Communication  Greater than 50% of time spent, total 70 minutes in counseling and coordination of care at the bedside regarding goals of care, symptom management, diagnosis and prognosis and see above. Thank you for allowing Palliative Medicine to participate in the care of [address] [place] [address].   Mayelin Torres APRN-CNS, ACHPN

## 2021-01-14 NOTE — PATIENT CARE CONFERENCE
P Quality Flow/Interdisciplinary Rounds Progress Note        Quality Flow Rounds held on January 14, 2021    Disciplines Attending:  Bedside Nurse, ,  and Nursing Unit Leadership    Raquel Scherer was admitted on 1/12/2021  8:48 PM    Anticipated Discharge Date:  Expected Discharge Date: 01/15/21    Disposition:    Gary Score:  Gary Scale Score: 17    Readmission Risk              Risk of Unplanned Readmission:        21           Discussed patient goal for the day, patient clinical progression, and barriers to discharge.   The following Goal(s) of the Day/Commitment(s) have been identified:  Diagnostics - Report Results         Roberta Mata  January 14, 2021

## 2021-01-15 ENCOUNTER — APPOINTMENT (OUTPATIENT)
Dept: GENERAL RADIOLOGY | Age: 76
DRG: 246 | End: 2021-01-15
Payer: MEDICARE

## 2021-01-15 LAB
ANION GAP SERPL CALCULATED.3IONS-SCNC: 18 MMOL/L (ref 7–16)
BUN BLDV-MCNC: 29 MG/DL (ref 8–23)
CALCIUM SERPL-MCNC: 9.2 MG/DL (ref 8.6–10.2)
CHLORIDE BLD-SCNC: 102 MMOL/L (ref 98–107)
CO2: 16 MMOL/L (ref 22–29)
CREAT SERPL-MCNC: 0.7 MG/DL (ref 0.5–1)
EKG ATRIAL RATE: 132 BPM
EKG P AXIS: 8 DEGREES
EKG P-R INTERVAL: 152 MS
EKG Q-T INTERVAL: 336 MS
EKG QRS DURATION: 126 MS
EKG QTC CALCULATION (BAZETT): 497 MS
EKG R AXIS: -46 DEGREES
EKG T AXIS: 98 DEGREES
EKG VENTRICULAR RATE: 132 BPM
GFR AFRICAN AMERICAN: >60
GFR NON-AFRICAN AMERICAN: >60 ML/MIN/1.73
GLUCOSE BLD-MCNC: 129 MG/DL (ref 74–99)
HCT VFR BLD CALC: 28.2 % (ref 34–48)
HEMOGLOBIN: 9.2 G/DL (ref 11.5–15.5)
LV EF: 23 %
LVEF MODALITY: NORMAL
MAGNESIUM: 2.3 MG/DL (ref 1.6–2.6)
MCH RBC QN AUTO: 32.6 PG (ref 26–35)
MCHC RBC AUTO-ENTMCNC: 32.6 % (ref 32–34.5)
MCV RBC AUTO: 100 FL (ref 80–99.9)
PDW BLD-RTO: 15.6 FL (ref 11.5–15)
PLATELET # BLD: 380 E9/L (ref 130–450)
PMV BLD AUTO: 10.6 FL (ref 7–12)
POTASSIUM SERPL-SCNC: 4.2 MMOL/L (ref 3.5–5)
RBC # BLD: 2.82 E12/L (ref 3.5–5.5)
SODIUM BLD-SCNC: 136 MMOL/L (ref 132–146)
WBC # BLD: 13.2 E9/L (ref 4.5–11.5)

## 2021-01-15 PROCEDURE — 6370000000 HC RX 637 (ALT 250 FOR IP): Performed by: FAMILY MEDICINE

## 2021-01-15 PROCEDURE — 71045 X-RAY EXAM CHEST 1 VIEW: CPT

## 2021-01-15 PROCEDURE — 85027 COMPLETE CBC AUTOMATED: CPT

## 2021-01-15 PROCEDURE — 2500000003 HC RX 250 WO HCPCS: Performed by: INTERNAL MEDICINE

## 2021-01-15 PROCEDURE — 2700000000 HC OXYGEN THERAPY PER DAY

## 2021-01-15 PROCEDURE — 99223 1ST HOSP IP/OBS HIGH 75: CPT | Performed by: INTERNAL MEDICINE

## 2021-01-15 PROCEDURE — 94660 CPAP INITIATION&MGMT: CPT

## 2021-01-15 PROCEDURE — 2580000003 HC RX 258: Performed by: INTERNAL MEDICINE

## 2021-01-15 PROCEDURE — 6370000000 HC RX 637 (ALT 250 FOR IP): Performed by: INTERNAL MEDICINE

## 2021-01-15 PROCEDURE — 2140000000 HC CCU INTERMEDIATE R&B

## 2021-01-15 PROCEDURE — 93306 TTE W/DOPPLER COMPLETE: CPT

## 2021-01-15 PROCEDURE — 80048 BASIC METABOLIC PNL TOTAL CA: CPT

## 2021-01-15 PROCEDURE — 6360000004 HC RX CONTRAST MEDICATION: Performed by: INTERNAL MEDICINE

## 2021-01-15 PROCEDURE — 36415 COLL VENOUS BLD VENIPUNCTURE: CPT

## 2021-01-15 PROCEDURE — 83735 ASSAY OF MAGNESIUM: CPT

## 2021-01-15 RX ORDER — BUMETANIDE 0.25 MG/ML
1 INJECTION, SOLUTION INTRAMUSCULAR; INTRAVENOUS ONCE
Status: COMPLETED | OUTPATIENT
Start: 2021-01-15 | End: 2021-01-15

## 2021-01-15 RX ADMIN — DANTROLENE SODIUM 100 MG: 25 CAPSULE ORAL at 10:13

## 2021-01-15 RX ADMIN — BUMETANIDE 1 MG: 0.25 INJECTION, SOLUTION INTRAMUSCULAR; INTRAVENOUS at 11:14

## 2021-01-15 RX ADMIN — CARVEDILOL 3.12 MG: 3.12 TABLET, FILM COATED ORAL at 10:13

## 2021-01-15 RX ADMIN — BACLOFEN 40 MG: 10 TABLET ORAL at 10:11

## 2021-01-15 RX ADMIN — MAGNESIUM GLUCONATE 500 MG ORAL TABLET 400 MG: 500 TABLET ORAL at 15:28

## 2021-01-15 RX ADMIN — CARVEDILOL 3.12 MG: 3.12 TABLET, FILM COATED ORAL at 20:05

## 2021-01-15 RX ADMIN — CITALOPRAM 20 MG: 20 TABLET, FILM COATED ORAL at 10:13

## 2021-01-15 RX ADMIN — ISOSORBIDE MONONITRATE 30 MG: 30 TABLET ORAL at 15:28

## 2021-01-15 RX ADMIN — BACLOFEN 40 MG: 10 TABLET ORAL at 20:05

## 2021-01-15 RX ADMIN — LEVOTHYROXINE SODIUM 88 MCG: 0.09 TABLET ORAL at 20:05

## 2021-01-15 RX ADMIN — Medication 10 ML: at 09:00

## 2021-01-15 RX ADMIN — ASPIRIN 81 MG: 81 TABLET ORAL at 10:24

## 2021-01-15 RX ADMIN — DANTROLENE SODIUM 100 MG: 25 CAPSULE ORAL at 20:06

## 2021-01-15 RX ADMIN — Medication 10 ML: at 20:06

## 2021-01-15 RX ADMIN — PERFLUTREN 1.65 MG: 6.52 INJECTION, SUSPENSION INTRAVENOUS at 11:42

## 2021-01-15 RX ADMIN — CLOPIDOGREL 75 MG: 75 TABLET, FILM COATED ORAL at 15:28

## 2021-01-15 RX ADMIN — ATORVASTATIN CALCIUM 10 MG: 10 TABLET, FILM COATED ORAL at 20:05

## 2021-01-15 ASSESSMENT — PAIN SCALES - GENERAL
PAINLEVEL_OUTOF10: 0

## 2021-01-15 NOTE — PROGRESS NOTES
Perfect serve sent to Dr. Candida Piedra with request from patients daughter [facility] to be called

## 2021-01-15 NOTE — PATIENT CARE CONFERENCE
[facility] Quality Flow/Interdisciplinary Rounds Progress Note        Quality Flow Rounds held on January 15, 2021    Disciplines Attending:  Bedside Nurse, ,  and Nursing Unit Leadership    Renae Rios was admitted on 1/12/2021  8:48 PM    Anticipated Discharge Date:  Expected Discharge Date: 01/15/21    Disposition:    Gary Score:  Gary Scale Score: 16    Readmission Risk              Risk of Unplanned Readmission:        24           Discussed patient goal for the day, patient clinical progression, and barriers to discharge.   The following Goal(s) of the Day/Commitment(s) have been identified:  Diagnostics - Report Results         Erum Herrera  January 15, 2021

## 2021-01-15 NOTE — PROGRESS NOTES
[facility] Cardiology Consult     INPATIENT CARDIOLOGY CONSULT    Name: Burak Roberson    Age: 77 y.o. Date of Admission: 1/12/2021  8:48 PM    Date of Service: 1/15/2021    Reason for Consultation: Chest pain    Chief Complaint: Chest pain    Referring Physician: Dr. Saul Laird    History of Present Illness: The patient is a 77y.o. year old female who is well-known to us and presents after midsternal chest pressure of moderate intensity at rest, similar to previous MI symptoms, without nausea, vomiting or diaphoresis. Recurrent symptoms earlier this morning relieved after sublingual nitroglycerin. Baseline troponin 0.02 increased to 0.12. EKG showing sinus rhythm and nonspecific ST changes. Patient known to us from late presentation anterior myocardial infarction/shock in April 2020 at which time she underwent LAD IRINA. Echocardiogram at that time showed EF 20-25% with apical clot. She was treated with several months of \"triple\" therapy including aspirin, clopidogrel and Eliquis. Subsequent echocardiogram in June showed EF 45% without apical clot. She is debilitated and essentially bedbound because of multiple sclerosis. 1/14/2021  Talked to Dr Solis Pierce earlier this morning. Technically difficult diagnostic coronary angiogram via right radial approach but confirms severe LAD ISR, and high-grade obtuse marginal.  Plan for femoral approach revascularization today. This morning however she is dyspneic/pale and tachycardic with telemetry suspicious for VT however EKG confirms sinus tachycardia. Did receive IV amiodarone bolus. Long discussion with  and son and they will suspend DNR CCA, and convert to full code, in order to proceed with PCI.    1/15/2021  Underwent LAD and obtuse marginal IRINA on 1/14. No recurrent chest pain.   Mild shortness of breath  Telemetry: Sinus rhythm/sinus tachycardia  Creatinine 0.7, hemoglobin 9.2      Past Medical History:   Past Medical History:   Diagnosis Date  CAD (coronary artery disease) 4/21/2020    High cholesterol     Hypertension     Hypothyroidism     MI (myocardial infarction) ([facility] 04/21/2020    MS (multiple sclerosis) ([facility]     Osteoporosis     Sarcoidosis     Type 2 diabetes mellitus with hyperglycemia, without long-term current use of insulin [facility] 10/30/2019       Past Surgical History:  Past Surgical History:   Procedure Laterality Date    CHOLECYSTECTOMY  1990s    CORONARY ANGIOPLASTY WITH STENT PLACEMENT  04/21/2020    Dr. Shilo Farfan   3.0 x 22mm Resolute MAAME to Prox LAD.   No LV    CORONARY ANGIOPLASTY WITH STENT PLACEMENT  01/14/2021    3.0 x 30 Letha to the prox LAD and a 2.5 x 20 Maame to the Obtuse marginal by Dr. Tim Aguilar / [address] / [place] Left 4/23/2020    CYSTOSCOPY LEFT RETROGRADE PYELOGRAM STENT INSERTION, [address] performed by Susanne Avila DO at [address] Left 3/21/2019    LEFT FEMUR CEPHALLOMEDULLARY NAIL performed by Tonya Boss MD at [facility] Bilateral    [address]      plate in lt knee    LITHOTRIPSY Left 11/23/2020    CYSTOSCOPY LEFT URETEROSCOPY,  LASER LITHOTRIPSY  BASKET EXTRACTION LEFT STONE, STENT EXCHANGE performed by Susanne Avila DO at [facility] LITHOTRIPSY Left 12/30/2020    CYSTOSCOPY RETROGRADE URETEROSCOPY PYELOGRAM LASER LITHOTRIPSY LEFT J-STENT performed by Susanne Avila DO at [facility] OR       Family History:  Family History   Problem Relation Age of Onset    Stroke Mother     Heart Disease Mother     Cancer Father         lung    Mult Sclerosis Sister     No Known Problems Brother     No Known Problems Sister     Arthritis Sister     Cervical Cancer Sister        Social History:  Social History     Socioeconomic History    Marital status:      Spouse name: Not on file    Number of children: Not on file    Years of education: Not on file    Highest education level: Not on file Occupational History    Not on file   Social Needs    Financial resource strain: Not on file    Food insecurity     Worry: Not on file     Inability: Not on file    Transportation needs     Medical: Not on file     Non-medical: Not on file   Tobacco Use    Smoking status: Never Smoker    Smokeless tobacco: Never Used   Substance and Sexual Activity    Alcohol use: No     Alcohol/week: 0.0 standard drinks     Comment: Ocassionally    Drug use: No    Sexual activity: Yes     Partners: Male   Lifestyle    Physical activity     Days per week: Not on file     Minutes per session: Not on file    Stress: Not on file   Relationships    Social connections     Talks on phone: Not on file     Gets together: Not on file     Attends [employer] service: Not on file     Active member of club or organization: Not on file     Attends meetings of clubs or organizations: Not on file     Relationship status: Not on file    Intimate partner violence     Fear of current or ex partner: Not on file     Emotionally abused: Not on file     Physically abused: Not on file     Forced sexual activity: Not on file   Other Topics Concern    Not on file   Social History Narrative    Not on file       Allergies: Allergies   Allergen Reactions    Macrobid [Nitrofurantoin]      Mental Changes       Home Meds:  Prior to Admission medications    Medication Sig Start Date End Date Taking?  Authorizing Provider   isosorbide mononitrate (IMDUR) 30 MG extended release tablet Take 30 mg by mouth daily   Yes Historical Provider, MD   atorvastatin (LIPITOR) 10 MG tablet 1 po q hs 12/21/20   Ramon Wallace MD   baclofen (LIORESAL) 20 MG tablet Take 40 mg by mouth 2 times daily    Historical Provider, MD   carvedilol (COREG) 3.125 MG tablet Take 1 tablet by mouth 2 times daily 11/22/20   Ramon Wallace MD   [facility] PSTE paste as needed [place] buttocks 10/30/20   Historical Provider, MD blood glucose monitor kit and supplies Dispense sufficient amount for indicated testing frequency plus additional to accommodate PRN testing needs. Dispense all needed supplies to include: monitor, strips, lancing device, lancets, control solutions, alcohol swabs. 7/22/20   Stoney Franklin MD   blood glucose monitor strips Test qd  & as needed for symptoms of irregular blood glucose. Dispense sufficient amount for indicated testing frequency plus additional to accommodate PRN testing needs.  7/22/20   Stoney Franklin MD   Lancets MISC 1 each by Does not apply route daily 7/22/20   Stoney Franklin MD   nortriptyline [facility]) 25 MG capsule Take 1 capsule by mouth nightly 3/17/20 12/30/20  Stoney Franklin MD   Catheters (URETHRAL CATHETER) MISC by Does not apply route    Historical Provider, MD   Cyanocobalamin (VITAMIN B 12 PO) Take 500 mg by mouth daily     Historical Provider, MD   Calcium Carbonate-Vit D-Min (CALCIUM 600+D PLUS MINERALS) 600-400 MG-UNIT TABS Take 1 tablet by mouth nightly     Historical Provider, MD   polyethylene glycol (MIRALAX) PACK packet Take 17 g by mouth daily     Historical Provider, MD   vitamin D3 (CHOLECALCIFEROL) 25 MCG (1000 UT) TABS tablet Take 1 tablet by mouth every morning     Historical Provider, MD   magnesium (MAGNESIUM-OXIDE) 250 MG TABS tablet Take 250 mg by mouth every morning     Historical Provider, MD       Current Medications:  Current Facility-Administered Medications   Medication Dose Route Frequency Provider Last Rate Last Admin    amiodarone (CORDARONE) 150 mg in dextrose 5 % 100 mL bolus  150 mg Intravenous Once Onur Tiwari MD   Stopped at 01/14/21 1015    sodium chloride flush 0.9 % injection 10 mL  10 mL Intravenous 2 times per day Ileana Diez MD   10 mL at 01/14/21 2320    sodium chloride flush 0.9 % injection 10 mL  10 mL Intravenous PRN Ileana Diez MD        acetaminophen (TYLENOL) tablet 650 mg  650 mg Oral Q4H PRN Ileana Diez MD  perflutren lipid microspheres (DEFINITY) injection 1.65 mg  1.5 mL Intravenous ONCE PRN Elena Tay MD        aspirin EC tablet 81 mg  81 mg Oral Daily Chase Doshi MD   81 mg at 01/15/21 1024    atorvastatin (LIPITOR) tablet 10 mg  10 mg Oral Nightly Chase Doshi MD   10 mg at 01/14/21 2319    baclofen (LIORESAL) tablet 40 mg  40 mg Oral BID Chase Doshi MD   40 mg at 01/15/21 1011    calcium-vitamin D (OSCAL-500) 500-200 MG-UNIT per tablet 1 tablet  1 tablet Oral Nightly Chase Doshi MD   1 tablet at 01/14/21 2319    citalopram (CELEXA) tablet 20 mg  20 mg Oral QA Chase Doshi MD   20 mg at 01/15/21 1013    clopidogrel (PLAVIX) tablet 75 mg  75 mg Oral Daily Chase Doshi MD   75 mg at 01/14/21 1033    vitamin B-12 (CYANOCOBALAMIN) tablet 500 mcg  500 mcg Oral Daily Chase Doshi MD   Stopped at 01/14/21 1017    dantrolene (DANTRIUM) capsule 100 mg  100 mg Oral BID Chase Doshi MD   100 mg at 01/15/21 1013    docusate sodium (COLACE) capsule 100 mg  100 mg Oral Daily Chase Doshi MD   Stopped at 01/14/21 1018    ferrous sulfate (IRON 325) tablet 325 mg  325 mg Oral Daily with breakfast Chase Doshi MD   Stopped at 01/14/21 1018    ipratropium-albuterol (DUONEB) nebulizer solution 3 mL  1 vial Inhalation Q6H PRN Chase Doshi MD        levothyroxine (SYNTHROID) tablet 88 mcg  88 mcg Oral Nightly Chase Doshi MD   88 mcg at 01/14/21 2320    magnesium oxide (MAG-OX) tablet 400 mg  400 mg Oral ANITA Doshi MD   Stopped at 01/14/21 1018    polyethylene glycol (GLYCOLAX) packet 17 g  17 g Oral Daily Chase Doshi MD   Stopped at 01/14/21 1016    Vitamin D (CHOLECALCIFEROL) tablet 1,000 Units  1,000 Units Oral ANITA Doshi MD   Stopped at 01/14/21 1017    insulin lispro (HUMALOG) injection vial 0-10 Units  0-10 Units Subcutaneous 4x Daily AC & HS Chase Doshi MD   Stopped at 01/14/21 1142  glucose (GLUTOSE) 40 % oral gel 15 g  15 g Oral PRN Katy Lara MD        dextrose 50 % IV solution  12.5 g Intravenous PRN Katy Lara MD        glucagon (rDNA) injection 1 mg  1 mg Intramuscular PRN Katy Lara MD        dextrose 5 % solution  100 mL/hr Intravenous PRN Katy Lara MD        sodium chloride flush 0.9 % injection 10 mL  10 mL Intravenous 2 times per day Katy Lara MD   Stopped at 01/14/21 1017    sodium chloride flush 0.9 % injection 10 mL  10 mL Intravenous PRN Katy Lara MD        promethazine (PHENERGAN) tablet 12.5 mg  12.5 mg Oral Q6H PRN Katy Lara MD        Or    ondansetron (ZOFRAN) injection 4 mg  4 mg Intravenous Q6H PRN Katy Lara MD        acetaminophen (TYLENOL) tablet 650 mg  650 mg Oral Q6H PRN Katy Lara MD        Or    acetaminophen (TYLENOL) suppository 650 mg  650 mg Rectal Q6H PRN Katy Lara MD        polyethylene glycol (GLYCOLAX) packet 17 g  17 g Oral Daily PRN Katy Lara MD        nitroGLYCERIN (NITROSTAT) SL tablet 0.4 mg  0.4 mg Sublingual Q5 Min PRN Katy Lara MD   0.4 mg at 01/13/21 0626    senna (SENOKOT) tablet 8.6 mg  1 tablet Oral Daily Katy Lara MD   Stopped at 01/14/21 1016    carvedilol (COREG) tablet 3.125 mg  3.125 mg Oral BID WC Patrice Bucnh MD   3.125 mg at 01/15/21 1013    isosorbide mononitrate (IMDUR) extended release tablet 30 mg  30 mg Oral Daily Patrice Bunch MD   Stopped at 01/14/21 1147    lisinopril (PRINIVIL;ZESTRIL) tablet 2.5 mg  2.5 mg Oral Daily Patrice Bunch MD   Stopped at 01/14/21 1146    sodium chloride flush 0.9 % injection 10 mL  10 mL Intravenous 2 times per day Neal Steven MD   Stopped at 01/14/21 1017    sodium chloride flush 0.9 % injection 10 mL  10 mL Intravenous PRN Neal Steven MD        acetaminophen (TYLENOL) tablet 650 mg  650 mg Oral Q4H PRN Neal Steven MD          dextrose         Review of Systems: No headache or confusion. Denies chest pain. No cough. No nausea vomiting. No fever or chills    Physical Exam:  BP (!) 119/56   Pulse 130   Temp 98 °F (36.7 °C) (Temporal)   Resp 16   Ht 5' (1.524 m)   Wt 142 lb 6.4 oz (64.6 kg)   SpO2 96%   BMI 27.81 kg/m²   Weight change: -7 lb 9.6 oz (-3.447 kg)  Wt Readings from Last 3 Encounters:   01/15/21 142 lb 6.4 oz (64.6 kg)   12/30/20 150 lb (68 kg)   11/23/20 150 lb (68 kg)       General: Awake,, oriented x3,     HEENT: Pupils equal and round    Neck: No JVD or bruits. No thyroid enlargement or adenopathy    Cardiac: Regular rate and rhythm, normal S1 and S2, no S3. Apical impulse is normal.  No murmurs, rubs or clicks. No carotid bruits and no abdominal bruits. No peripheral edema, cyanosis or clubbing. Normal pulses in the upper and lower extremities bilaterally. Resp: Tachypneic, soft crackles bilaterally    Abdomen: soft, nontender, nondistended, no gross organomegaly or mass    Skin: Cool and dry    Neuro: Bedridden. Alert and oriented x3,     Psych: Cooperative,     Intake/Output:    Intake/Output Summary (Last 24 hours) at 1/15/2021 1027  Last data filed at 1/15/2021 0700  Gross per 24 hour   Intake 770 ml   Output 625 ml   Net 145 ml     No intake/output data recorded.     Laboratory Tests:  Lab Results   Component Value Date    CREATININE 0.7 01/15/2021    BUN 29 (H) 01/15/2021     01/15/2021    K 4.2 01/15/2021     01/15/2021    CO2 16 (L) 01/15/2021     Recent Labs     01/12/21  2111 01/13/21  0218 01/13/21  0856   CKTOTAL  --  59  --    CKMB  --  7.2*  --    TROPONINI 0.02 0.12* 0.12*     Lab Results   Component Value Date    PROBNP 396 01/12/2021     Lab Results   Component Value Date    WBC 13.2 01/15/2021    RBC 2.82 01/15/2021    HGB 9.2 01/15/2021    HCT 28.2 01/15/2021    .0 01/15/2021    MCH 32.6 01/15/2021    MCHC 32.6 01/15/2021    RDW 15.6 01/15/2021     01/15/2021    MPV 10.6 01/15/2021 No results for input(s): ALKPHOS, ALT, AST, PROT, BILITOT, BILIDIR, LABALBU in the last 72 hours. Lab Results   Component Value Date    MG 2.3 01/15/2021     Lab Results   Component Value Date    PROTIME 10.8 01/13/2021    INR 1.0 01/13/2021     Lab Results   Component Value Date    TSH 3.02 09/02/2020     No components found for: CHLPL  Lab Results   Component Value Date    TRIG 148 01/13/2021    TRIG 206 (H) 09/02/2020    TRIG 230 (H) 07/30/2020     Lab Results   Component Value Date    HDL 35 01/13/2021    HDL 36 09/02/2020    HDL 33 (L) 07/30/2020     Lab Results   Component Value Date    LDLCALC 62 01/13/2021    [address] 115 (H) 10/30/2019    LDLCALC 101 07/24/2019           Active Hospital Problems    Diagnosis    Essential hypertension [I10]     Priority: Medium    Hypothyroidism [E03.9]     Priority: Medium    Chest pain [R07.9]    Palliative care by specialist [Z51.5]    Goals of care, counseling/discussion [Z71.89]    Hypotension [I95.9]    SIRS (systemic inflammatory response syndrome) ([facility] [R65.10]    Anemia [D64.9]    Chronic indwelling Barger catheter [Z97.8]    Unstable angina ([facility]) [I20.0]    NSTEMI (non-ST elevated myocardial infarction) ([facility] [I21.4]    Chronic combined systolic and diastolic CHF (congestive heart failure) ([facility]) [I50.42]    Hydronephrosis [N13.30]    Multiple vessel coronary artery disease [I25.10]    Type 2 diabetes mellitus with hyperglycemia, without long-term current use of insulin ([facility]) [E11.65]    Hyperlipidemia [E78.5]    Neurogenic bladder [N31.9]    Constipation [K59.00]    Lung mass [R91.8]    MS (multiple sclerosis) ([facility]) [G35]     IMPRESSION:  1. Successful PCI of severe in-stent restenosis of proximal to mid LAD  stent using a drug-eluting stent. 2.  Successful PCI of severe OM stenosis using drug-eluting stent.     RECOMMENDATIONS:  1. Continue DAPT.   2.  Aggressive medical therapy.     PROCEDURE START TIME:  09:42 a.m.    PROCEDURE END TIME:  10:24 a.m.     CONTRAST VOLUME:  100 mL of Optiray.     FLUOROSCOPY TIME:  8.2 minutes.           Fatuma Alex MD     D: 01/14/2021 10:51:10       T: 01/14/2021 10:59:17     [email or web address]  Job#: [id number]     Doc#: [id number]           ASSESSMENT / PLAN:  1. Unstable angina/non-STEMI with known late presentation anterior MI/shock/LAD drug-eluting stent last April. Severe LAD ISR and high-grade obtuse marginal stenoses. Continue dual antiplatelet therapy, beta-blocker, nitrate, ACE inhibitor. Successful LAD and obtuse marginal IRINA on 1/14.    2. Ischemic cardiomyopathy with remote apical left ventricular clot which resolved after triple therapy. Last LVEF 45%. Lungs congested today. Suspect acute on chronic systolic heart failure. Bumex 1 mg IV today and will likely need subsequent chronic oral dosing. Echo pending. Continue ACE inhibitor and beta-blocker    3. Hyperlipidemia: Continue statin      4.  Multiple sclerosis          Electronically signed by Wanda Young MD on 1/15/2021 at 10:27 AM

## 2021-01-15 NOTE — CARE COORDINATION
1/15/2021 - Admitted for NSTEMI. S/P PCI of LAD/circumflex. Cardiology following. Palliative care consult done. IV bumex 1 mg x1 dose today. On home trilogy NIV. Monitor NSR. Plan is to return home when medically stable with [facility]. Also follows with [facility]. Placed call to [facility] [phone] and spoke with Ayelen in the Palliative care office. Pt CM is Sole ([phone]). Ayleen requests a discharge summary and med list and any labs and diagnotic xray/scans be faxed to [phone] when pt discharges. EDWIN/LALY will continue to follow.

## 2021-01-15 NOTE — PROGRESS NOTES
1/14 Met with  Susanne Hector at bedside. Provided update and explained role of Palliative medicine. States he is familiar with Palliative care since he has had this service for his wife for quite some time in their home in [place]. They did have hospice for only a few days but revoked since he and his wife wish to continue Trilogy for respiratory issues; want to continue treating infections; and continued medical care if needed including all meds for her M.S. They just don't want aggressive resuscitation like CPR, intubation or defibrillation. Code status changed to limited to indicate their wishes. He also would accept transfer to ICU if needed to continue medical treatment including pressors if needed. He has good understanding of his wife's current status and has been caring for her for years with multiple sclerosis; being wheelchair bound since 2004. States they have been  62 yrs and he is hopeful she can return home with assistance of aides and palliative care. Emotional support offered. Spoke with and updated staff nurse.      Spiritual assessment: no spiritual distress identified  Bereavement and grief: to be determined  Referrals to: none today    SUBJECTIVE:     Active Hospital Problems    Diagnosis Date Noted    Essential hypertension [I10] 03/25/2019     Priority: Medium    Hypothyroidism [E03.9] 03/25/2019     Priority: Medium    Chest pain [R07.9]     Palliative care by specialist [Z51.5]     Goals of care, counseling/discussion [Z71.89]     Hypotension [I95.9] 01/13/2021    SIRS (systemic inflammatory response syndrome) ([facility] [R65.10] 01/13/2021    Anemia [D64.9] 01/13/2021    Chronic indwelling Barger catheter [Z97.8] 01/13/2021    Unstable angina ([facility] [I20.0] 01/13/2021    NSTEMI (non-ST elevated myocardial infarction) ([facility] [I21.4] 01/12/2021    Chronic combined systolic and diastolic CHF (congestive heart failure) ([facility] [I50.42] 04/23/2020  Hydronephrosis [N13.30] 04/22/2020    Multiple vessel coronary artery disease [I25.10] 04/21/2020    Type 2 diabetes mellitus with hyperglycemia, without long-term current use of insulin [facility] [E11.65] 10/30/2019    Hyperlipidemia [E78.5] 07/31/2019    Neurogenic bladder [N31.9] 07/31/2019    Constipation [K59.00] 07/31/2019    Lung mass [R91.8] 03/24/2019    MS (multiple sclerosis) ([facility] Silvia Severino 10/30/2013           OBJECTIVE:   Prognosis: Guarded    Physical Exam:  BP (!) 119/56   Pulse 130   Temp 98 °F (36.7 °C) (Temporal)   Resp 16   Ht 5' (1.524 m)   Wt 142 lb 6.4 oz (64.6 kg)   SpO2 96%   BMI 27.81 kg/m²   Gen:   NAD, lethargic; on trilogy NIV; resting comfortably  HEENT:  Normocephalic, atraumatic, mucosa moist, EOMI  Neck:  Supple, trachea midline, no JVD  Lungs:  CTA bilaterally; respirations unlabored  Heart:  ST; RRR, distant heart tones, no murmur, rub, or gallop noted during exam  Abd:  Soft, non tender, non distended, bowel sounds present  : saldana in place  Ext:  Limited movement; no edema, pulses present  Skin:  Warm and dry  Neuro:  Eyes closed; responds to voice; lethargic; following commands      Social History:   The patient currently lives at home with   TOBACCO:  reports that she has never smoked. She has never used smokeless tobacco.  ETOH:  reports no history of alcohol use. Objective data reviewed: labs, images, records, medication use, vitals and chart    Discussed patient and the plan of care with the other IDT members: Palliative Medicine IDT Team    Time/Communication  Greater than 50% of time spent, total 25 minutes in counseling and coordination of care at the bedside regarding goals of care, symptom management, diagnosis and prognosis and see above. Thank you for allowing Palliative Medicine to participate in the care of [address] [place] [address].   Abdi Keith APRN-CNS, ACHPN

## 2021-01-15 NOTE — PROGRESS NOTES
Hospitalist Progress Note      SYNOPSIS: Patient admitted on 2021 for       SUBJECTIVE:    Patient seen and examined  Records reviewed      Temp (24hrs), Av.7 °F (36.5 °C), Min:95.7 °F (35.4 °C), Max:98.7 °F (37.1 °C)    DIET: DIET CARDIAC;  CODE: Limited    Intake/Output Summary (Last 24 hours) at 1/15/2021 0933  Last data filed at 1/15/2021 0700  Gross per 24 hour   Intake 770 ml   Output 625 ml   Net 145 ml       OBJECTIVE:    BP (!) 119/56   Pulse 130   Temp 98 °F (36.7 °C) (Temporal)   Resp 18   Ht 5' (1.524 m)   Wt 142 lb 6.4 oz (64.6 kg)   SpO2 96%   BMI 27.81 kg/m²     General appearance: Alert, no acute distress  HEENT: Normocephalic, atraumatic  Neck: Supple. No jugular venous distention.    Respiratory: Bibasilar crackles  Cardiovascular: Regular rate rhythm, normal S1-S2  Abdomen: Soft, nontender, nondistended  Musculoskeletal: Normal range of motion, no deformity  Neurologic alert, follows simple commands    ASSESSMENT:  NSTEMI s/p PCI of LAD/circumflex  Hypotension  Coronary artery disease  Acute on chronic combined systolic/diastolic congestive heart failure  Hypothyroidism  Anemia  Type 2 diabetes  Hyperlipidemia       PLAN:  Cardiology following  Continue amiodarone, aspirin, atorvastatin, carvedilol, Plavix, Imdur, lisinopril  Sliding scale insulin  Recheck chest x-ray  Bumex 1 mg IV           Medications:  REVIEWED DAILY    Infusion Medications    dextrose       Scheduled Medications    amiodarone bolus  150 mg Intravenous Once    sodium chloride flush  10 mL Intravenous 2 times per day    aspirin  81 mg Oral Daily    atorvastatin  10 mg Oral Nightly    baclofen  40 mg Oral BID    calcium-vitamin D  1 tablet Oral Nightly    citalopram  20 mg Oral QAM    clopidogrel  75 mg Oral Daily    vitamin B-12  500 mcg Oral Daily    dantrolene  100 mg Oral BID    docusate sodium  100 mg Oral Daily    ferrous sulfate  325 mg Oral Daily with breakfast  levothyroxine  88 mcg Oral Nightly    magnesium oxide  400 mg Oral QAM    polyethylene glycol  17 g Oral Daily    Vitamin D  1,000 Units Oral QAM    insulin lispro  0-10 Units Subcutaneous 4x Daily AC & HS    sodium chloride flush  10 mL Intravenous 2 times per day    senna  1 tablet Oral Daily    carvedilol  3.125 mg Oral BID WC    isosorbide mononitrate  30 mg Oral Daily    lisinopril  2.5 mg Oral Daily    sodium chloride flush  10 mL Intravenous 2 times per day     PRN Meds: sodium chloride flush, acetaminophen, perflutren lipid microspheres, ipratropium-albuterol, glucose, dextrose, glucagon (rDNA), dextrose, sodium chloride flush, promethazine **OR** ondansetron, acetaminophen **OR** acetaminophen, polyethylene glycol, nitroGLYCERIN, sodium chloride flush, acetaminophen    Labs:     Recent Labs     01/12/21  2111 01/14/21  0430 01/15/21  0604   WBC 15.1* 12.5* 13.2*   HGB 9.4* 9.3* 9.2*   HCT 28.4* 29.4* 28.2*    371 380       Recent Labs     01/12/21  2111 01/12/21  2211 01/14/21  0430 01/15/21  0604     --  140 136   K 6.5* 4.4 4.2 4.2   CL 96*  --  104 102   CO2 26  --  25 16*   BUN 33*  --  19 29*   CREATININE 0.6  --  0.5 0.7   CALCIUM 9.4  --  8.9 9.2       No results for input(s): PROT, ALB, ALKPHOS, ALT, AST, BILITOT, AMYLASE, LIPASE in the last 72 hours.     Recent Labs     01/13/21 0218   INR 1.0       Recent Labs     01/12/21 2111 01/13/21 0218 01/13/21  0856   CKTOTAL  --  59  --    TROPONINI 0.02 0.12* 0.12*       Chronic labs:    Lab Results   Component Value Date    CHOL 127 01/13/2021    TRIG 148 01/13/2021    HDL 35 01/13/2021    LDLCALC 62 01/13/2021    TSH 3.02 09/02/2020    INR 1.0 01/13/2021    LABA1C 4.9 01/13/2021       Radiology: REVIEWED DAILY    +++++++++++++++++++++++++++++++++++++++++++++++++  [place] , [place]  +++++++++++++++++++++++++++++++++++++++++++++++++ NOTE: This report was transcribed using voice recognition software. Every effort was made to ensure accuracy; however, inadvertent computerized transcription errors may be present.

## 2021-01-16 ENCOUNTER — APPOINTMENT (OUTPATIENT)
Dept: GENERAL RADIOLOGY | Age: 76
DRG: 246 | End: 2021-01-16
Payer: MEDICARE

## 2021-01-16 LAB
BACTERIA: ABNORMAL /HPF
BILIRUBIN URINE: ABNORMAL
BLOOD, URINE: ABNORMAL
C-REACTIVE PROTEIN: 21.4 MG/DL (ref 0–0.4)
CLARITY: ABNORMAL
COLOR: YELLOW
GLUCOSE URINE: NEGATIVE MG/DL
KETONES, URINE: 15 MG/DL
LEUKOCYTE ESTERASE, URINE: ABNORMAL
METER GLUCOSE: 125 MG/DL (ref 74–99)
NITRITE, URINE: NEGATIVE
PH UA: 5.5 (ref 5–9)
PRO-BNP: ABNORMAL PG/ML (ref 0–450)
PROCALCITONIN: 1.22 NG/ML (ref 0–0.08)
PROTEIN UA: 100 MG/DL
RBC UA: ABNORMAL /HPF (ref 0–2)
SPECIFIC GRAVITY UA: >=1.03 (ref 1–1.03)
UROBILINOGEN, URINE: 0.2 E.U./DL
WBC UA: ABNORMAL /HPF (ref 0–5)

## 2021-01-16 PROCEDURE — 84145 PROCALCITONIN (PCT): CPT

## 2021-01-16 PROCEDURE — 6370000000 HC RX 637 (ALT 250 FOR IP): Performed by: INTERNAL MEDICINE

## 2021-01-16 PROCEDURE — 2500000003 HC RX 250 WO HCPCS: Performed by: INTERNAL MEDICINE

## 2021-01-16 PROCEDURE — 2700000000 HC OXYGEN THERAPY PER DAY

## 2021-01-16 PROCEDURE — 6360000002 HC RX W HCPCS: Performed by: INTERNAL MEDICINE

## 2021-01-16 PROCEDURE — 83880 ASSAY OF NATRIURETIC PEPTIDE: CPT

## 2021-01-16 PROCEDURE — 81001 URINALYSIS AUTO W/SCOPE: CPT

## 2021-01-16 PROCEDURE — 6360000002 HC RX W HCPCS: Performed by: FAMILY MEDICINE

## 2021-01-16 PROCEDURE — 86140 C-REACTIVE PROTEIN: CPT

## 2021-01-16 PROCEDURE — 2580000003 HC RX 258: Performed by: FAMILY MEDICINE

## 2021-01-16 PROCEDURE — 99233 SBSQ HOSP IP/OBS HIGH 50: CPT | Performed by: INTERNAL MEDICINE

## 2021-01-16 PROCEDURE — 82962 GLUCOSE BLOOD TEST: CPT

## 2021-01-16 PROCEDURE — 6370000000 HC RX 637 (ALT 250 FOR IP): Performed by: FAMILY MEDICINE

## 2021-01-16 PROCEDURE — 36415 COLL VENOUS BLD VENIPUNCTURE: CPT

## 2021-01-16 PROCEDURE — 71045 X-RAY EXAM CHEST 1 VIEW: CPT

## 2021-01-16 PROCEDURE — 2580000003 HC RX 258: Performed by: INTERNAL MEDICINE

## 2021-01-16 PROCEDURE — 2140000000 HC CCU INTERMEDIATE R&B

## 2021-01-16 PROCEDURE — 94640 AIRWAY INHALATION TREATMENT: CPT

## 2021-01-16 RX ORDER — BUMETANIDE 0.25 MG/ML
1 INJECTION, SOLUTION INTRAMUSCULAR; INTRAVENOUS 2 TIMES DAILY
Status: DISCONTINUED | OUTPATIENT
Start: 2021-01-17 | End: 2021-01-19

## 2021-01-16 RX ORDER — DOXYCYCLINE HYCLATE 100 MG/1
100 CAPSULE ORAL EVERY 12 HOURS SCHEDULED
Status: DISCONTINUED | OUTPATIENT
Start: 2021-01-16 | End: 2021-01-19 | Stop reason: HOSPADM

## 2021-01-16 RX ORDER — BUMETANIDE 0.25 MG/ML
1 INJECTION, SOLUTION INTRAMUSCULAR; INTRAVENOUS ONCE
Status: COMPLETED | OUTPATIENT
Start: 2021-01-16 | End: 2021-01-16

## 2021-01-16 RX ORDER — BUMETANIDE 0.25 MG/ML
1 INJECTION, SOLUTION INTRAMUSCULAR; INTRAVENOUS DAILY
Status: DISCONTINUED | OUTPATIENT
Start: 2021-01-16 | End: 2021-01-17

## 2021-01-16 RX ADMIN — IPRATROPIUM BROMIDE AND ALBUTEROL SULFATE 3 ML: .5; 3 SOLUTION RESPIRATORY (INHALATION) at 19:10

## 2021-01-16 RX ADMIN — LISINOPRIL 2.5 MG: 5 TABLET ORAL at 08:09

## 2021-01-16 RX ADMIN — CLOPIDOGREL 75 MG: 75 TABLET, FILM COATED ORAL at 08:07

## 2021-01-16 RX ADMIN — AMPICILLIN SODIUM AND SULBACTAM SODIUM 3 G: 2; 1 INJECTION, POWDER, FOR SOLUTION INTRAMUSCULAR; INTRAVENOUS at 12:59

## 2021-01-16 RX ADMIN — ISOSORBIDE MONONITRATE 30 MG: 30 TABLET ORAL at 08:06

## 2021-01-16 RX ADMIN — DANTROLENE SODIUM 100 MG: 25 CAPSULE ORAL at 08:07

## 2021-01-16 RX ADMIN — BACLOFEN 40 MG: 10 TABLET ORAL at 08:06

## 2021-01-16 RX ADMIN — AMPICILLIN SODIUM AND SULBACTAM SODIUM 3 G: 2; 1 INJECTION, POWDER, FOR SOLUTION INTRAMUSCULAR; INTRAVENOUS at 06:54

## 2021-01-16 RX ADMIN — AMPICILLIN SODIUM AND SULBACTAM SODIUM 3 G: 2; 1 INJECTION, POWDER, FOR SOLUTION INTRAMUSCULAR; INTRAVENOUS at 00:28

## 2021-01-16 RX ADMIN — SODIUM CHLORIDE, PRESERVATIVE FREE 10 ML: 5 INJECTION INTRAVENOUS at 20:58

## 2021-01-16 RX ADMIN — BUMETANIDE 1 MG: 0.25 INJECTION, SOLUTION INTRAMUSCULAR; INTRAVENOUS at 06:39

## 2021-01-16 RX ADMIN — DOXYCYCLINE HYCLATE 100 MG: 100 CAPSULE ORAL at 23:33

## 2021-01-16 RX ADMIN — ENOXAPARIN SODIUM 40 MG: 40 INJECTION SUBCUTANEOUS at 08:06

## 2021-01-16 RX ADMIN — CARVEDILOL 3.12 MG: 3.12 TABLET, FILM COATED ORAL at 08:06

## 2021-01-16 RX ADMIN — MAGNESIUM GLUCONATE 500 MG ORAL TABLET 400 MG: 500 TABLET ORAL at 08:06

## 2021-01-16 RX ADMIN — CITALOPRAM 20 MG: 20 TABLET, FILM COATED ORAL at 08:06

## 2021-01-16 RX ADMIN — Medication 500 MCG: at 08:06

## 2021-01-16 RX ADMIN — CARVEDILOL 3.12 MG: 3.12 TABLET, FILM COATED ORAL at 16:29

## 2021-01-16 RX ADMIN — ASPIRIN 81 MG: 81 TABLET ORAL at 08:06

## 2021-01-16 RX ADMIN — ONDANSETRON 4 MG: 2 INJECTION INTRAMUSCULAR; INTRAVENOUS at 20:50

## 2021-01-16 RX ADMIN — Medication 1000 UNITS: at 08:06

## 2021-01-16 RX ADMIN — Medication 10 ML: at 09:00

## 2021-01-16 RX ADMIN — BUMETANIDE 1 MG: 0.25 INJECTION, SOLUTION INTRAMUSCULAR; INTRAVENOUS at 18:54

## 2021-01-16 RX ADMIN — AMPICILLIN SODIUM AND SULBACTAM SODIUM 3 G: 2; 1 INJECTION, POWDER, FOR SOLUTION INTRAMUSCULAR; INTRAVENOUS at 18:29

## 2021-01-16 RX ADMIN — Medication 10 ML: at 08:05

## 2021-01-16 ASSESSMENT — PAIN SCALES - GENERAL
PAINLEVEL_OUTOF10: 0

## 2021-01-16 NOTE — PATIENT CARE CONFERENCE
[facility] Quality Flow/Interdisciplinary Rounds Progress Note        Quality Flow Rounds held on January 16, 2021    Disciplines Attending:  Bedside Nurse, ,  and Nursing Unit Leadership    Aníbal Maxwell was admitted on 1/12/2021  8:48 PM    Anticipated Discharge Date:  Expected Discharge Date: 01/15/21    Disposition:    Gary Score:  Gary Scale Score: 14    Readmission Risk              Risk of Unplanned Readmission:        27           Discussed patient goal for the day, patient clinical progression, and barriers to discharge.   The following Goal(s) of the Day/Commitment(s) have been identified:  Labs - Report Results         [facility]  January 16, 2021

## 2021-01-16 NOTE — CONSULTS
Pulmonary [address]                             Pulmonary Consult/Progress Note :            Patient: Radha Gaston  MRN: [id number]  : 1945      Date of Admission: .2021  8:48 PM    Requesting :Dr George Sommer    Reason for Consultation:Lung nodule [place] pneumonia   CC : chest pain   HPI:   Radha Gaston is a 77 y.o. y/o female with past medical history significant for MS and sarcoid a       She presented to the emergency department with complaints of chest pain for 1-2 days before admission and described the pain  as pressure, severe, constant for about 30 minutes and resolved after taking some aspirin, she had associated nausea but no vomiting, she had diaphoresis, she denies any dizziness or palpitation. She has a cough productive of clear sputum, she denies any  No abdominal pain, no leg swelling, no change in bowel      her Vital signs notable for blood pressure of 86/66, pulse 107, labs showed potassium of 4.4, white count 15.1, hemoglobin 9.4, glucose 156, proBNP 396 and troponin of 0.02-> 0.12. Chest x-ray shows chronic interstitial markings, echo showed EF of 45 to 50% with grade 1 diastolic dysfunction, last cardiac work-up was cardiac catheterization in April where she had 100% stenosis of LAD that was stented, 30% OM2/90% distal OM 2, 90% RCA with collaterals.   EKG shows sinus tachycardia rate of 106    PAST MEDICAL HISTORY:     Past Medical History:   Diagnosis Date    CAD (coronary artery disease) 2020    High cholesterol     Hypertension     Hypothyroidism     MI (myocardial infarction) ([facility] 2020    MS (multiple sclerosis) ([facility]     Osteoporosis     Sarcoidosis     Type 2 diabetes mellitus with hyperglycemia, without long-term current use of insulin ([facility] 10/30/2019       PAST SURGICAL HISTORY:   Past Surgical History:   Procedure Laterality Date    CHOLECYSTECTOMY  1990s  CORONARY ANGIOPLASTY WITH STENT PLACEMENT  04/21/2020    Dr. Shilo Farfan   3.0 x 22mm Resolute MAAME to Prox LAD.   No LV    CORONARY ANGIOPLASTY WITH STENT PLACEMENT  01/14/2021    3.0 x 30 Maame to the prox LAD and a 2.5 x 20 Maame to the Obtuse marginal by Dr. Tim Aguilar / Demarcus Bender / Gucci Jackson Left 4/23/2020    CYSTOSCOPY LEFT RETROGRADE PYELOGRAM STENT INSERTION, STRATTON CATHETER performed by Susanne Avila DO at [address] [place] [address] Left 3/21/2019    LEFT FEMUR CEPHALLOMEDULLARY NAIL performed by Tonya Boss MD at [facility] Bilateral     HYSTERECTOMY      KNEE SURGERY      plate in lt knee    LITHOTRIPSY Left 11/23/2020    CYSTOSCOPY LEFT URETEROSCOPY,  LASER LITHOTRIPSY  BASKET EXTRACTION LEFT STONE, STENT EXCHANGE performed by Susanne Avila DO at [place] [address] LITHOTRIPSY Left 12/30/2020    CYSTOSCOPY RETROGRADE URETEROSCOPY PYELOGRAM LASER LITHOTRIPSY LEFT J-STENT performed by Susanne Avila DO at [facility] HISTORY:   Family History   Problem Relation Age of Onset    Stroke Mother     Heart Disease Mother     Cancer Father         lung    Mult Sclerosis Sister     No Known Problems Brother     No Known Problems Sister     Arthritis Sister     Cervical Cancer Sister        SOCIAL HISTORY:   Social History     Socioeconomic History    Marital status:      Spouse name: Not on file    Number of children: Not on file    Years of education: Not on file    Highest education level: Not on file   Occupational History    Not on file   Social Needs    Financial resource strain: Not on file    Food insecurity     Worry: Not on file     Inability: Not on file    Transportation needs     Medical: Not on file     Non-medical: Not on file   Tobacco Use    Smoking status: Never Smoker    Smokeless tobacco: Never Used   Substance and Sexual Activity    Alcohol use: No     Alcohol/week: 0.0 standard drinks     Comment: Hany Galvan  Drug use: No    Sexual activity: Yes     Partners: Male   Lifestyle    Physical activity     Days per week: Not on file     Minutes per session: Not on file    Stress: Not on file   Relationships    Social connections     Talks on phone: Not on file     Gets together: Not on file     Attends [employer] service: Not on file     Active member of club or organization: Not on file     Attends meetings of clubs or organizations: Not on file     Relationship status: Not on file    Intimate partner violence     Fear of current or ex partner: Not on file     Emotionally abused: Not on file     Physically abused: Not on file     Forced sexual activity: Not on file   Other Topics Concern    Not on file   Social History Narrative    Not on file     Social History     Tobacco Use   Smoking Status Never Smoker   Smokeless Tobacco Never Used     Social History     Substance and Sexual Activity   Alcohol Use No    Alcohol/week: 0.0 standard drinks    Comment: Ocassionally     Social History     Substance and Sexual Activity   Drug Use No       :    HOME MEDICATIONS:  Prior to Admission medications    Medication Sig Start Date End Date Taking?  Authorizing Provider   isosorbide mononitrate (IMDUR) 30 MG extended release tablet Take 30 mg by mouth daily   Yes Historical Provider, MD   atorvastatin (LIPITOR) 10 MG tablet 1 po q hs 12/21/20   Niesha Agee MD   baclofen (LIORESAL) 20 MG tablet Take 40 mg by mouth 2 times daily    Historical Provider, MD   carvedilol (COREG) 3.125 MG tablet Take 1 tablet by mouth 2 times daily 11/22/20   Niesha Agee MD   W[facility] Dress[facility] PSTE paste as needed [place] buttocks 10/30/20   Historical Provider, MD   OXYGEN Inhale 2 L into the lungs continuous    Historical Provider, MD   citalopram (CELEXA) 20 MG tablet Take 1 tablet by mouth every morning 10/29/20   Niesha Agee MD Sennosides (SENNA) 8.6 MG CAPS Take 1 capsule by mouth daily     Historical Provider, MD   Potassium 99 MG TABS Take 1 tablet by mouth daily    Historical Provider, MD   levothyroxine (SYNTHROID) 88 MCG tablet Take 1 tablet by mouth daily  Patient taking differently: Take 88 mcg by mouth nightly  10/26/20   Stoney Franklin MD   clopidogrel (PLAVIX) 75 MG tablet Take 1 tablet by mouth daily 10/19/20   Stoney Franklin MD   ipratropium-albuterol (DUONEB) 0.5-2.5 (3) MG/3ML SOLN nebulizer solution Inhale 3 mLs into the lungs every 6 hours as needed for Shortness of Breath 9/23/20   Stoney Franklin MD   docusate sodium (COLACE) 100 MG capsule Take 100 mg by mouth daily    Historical Provider, MD   aspirin 81 MG EC tablet Take 81 mg by mouth daily    Historical Provider, MD   ferrous sulfate (IRON 325) 325 (65 Fe) MG tablet Take 1 tablet by mouth every other day  Patient taking differently: Take 325 mg by mouth daily (with breakfast)  9/2/20   Stoney Franklin MD   lisinopril (PRINIVIL;ZESTRIL) 2.5 MG tablet Take 0.5 tablets by mouth daily 9/2/20   Stoney Franklin MD   dantrolene (DANTRIUM) 50 MG capsule Take 1 capsule by mouth 4 times daily  Patient taking differently: Take 100 mg by mouth 2 times daily 2 caps bid 8/11/20   Stoney Franklin MD   metFORMIN (GLUCOPHAGE) 500 MG tablet Take 1 tablet by mouth 2 times daily (with meals) 8/11/20   Stoney Franklin MD   blood glucose test strips (ASCENSIA AUTODISC VI;ONE TOUCH ULTRA TEST VI) strip 1 each by In Vitro route 2 times daily As needed. 8/11/20   Stoney Franklin MD   blood glucose monitor kit and supplies Dispense sufficient amount for indicated testing frequency plus additional to accommodate PRN testing needs. Dispense all needed supplies to include: monitor, strips, lancing device, lancets, control solutions, alcohol swabs.  7/22/20   Stoney Franklin MD docusate sodium (COLACE) capsule 100 mg, 100 mg, Oral, Daily  ferrous sulfate (IRON 325) tablet 325 mg, 325 mg, Oral, Daily with breakfast  ipratropium-albuterol (DUONEB) nebulizer solution 3 mL, 1 vial, Inhalation, Q6H PRN  levothyroxine (SYNTHROID) tablet 88 mcg, 88 mcg, Oral, Nightly  magnesium oxide (MAG-OX) tablet 400 mg, 400 mg, Oral, QAM  polyethylene glycol (GLYCOLAX) packet 17 g, 17 g, Oral, Daily  Vitamin D (CHOLECALCIFEROL) tablet 1,000 Units, 1,000 Units, Oral, QAM  insulin lispro (HUMALOG) injection vial 0-10 Units, 0-10 Units, Subcutaneous, 4x Daily AC & HS  glucose (GLUTOSE) 40 % oral gel 15 g, 15 g, Oral, PRN  dextrose 50 % IV solution, 12.5 g, Intravenous, PRN  glucagon (rDNA) injection 1 mg, 1 mg, Intramuscular, PRN  dextrose 5 % solution, 100 mL/hr, Intravenous, PRN  sodium chloride flush 0.9 % injection 10 mL, 10 mL, Intravenous, 2 times per day  sodium chloride flush 0.9 % injection 10 mL, 10 mL, Intravenous, PRN  promethazine (PHENERGAN) tablet 12.5 mg, 12.5 mg, Oral, Q6H PRN **OR** ondansetron (ZOFRAN) injection 4 mg, 4 mg, Intravenous, Q6H PRN  acetaminophen (TYLENOL) tablet 650 mg, 650 mg, Oral, Q6H PRN **OR** acetaminophen (TYLENOL) suppository 650 mg, 650 mg, Rectal, Q6H PRN  polyethylene glycol (GLYCOLAX) packet 17 g, 17 g, Oral, Daily PRN  nitroGLYCERIN (NITROSTAT) SL tablet 0.4 mg, 0.4 mg, Sublingual, Q5 Min PRN  senna (SENOKOT) tablet 8.6 mg, 1 tablet, Oral, Daily  carvedilol (COREG) tablet 3.125 mg, 3.125 mg, Oral, BID WC  isosorbide mononitrate (IMDUR) extended release tablet 30 mg, 30 mg, Oral, Daily  lisinopril (PRINIVIL;ZESTRIL) tablet 2.5 mg, 2.5 mg, Oral, Daily  sodium chloride flush 0.9 % injection 10 mL, 10 mL, Intravenous, 2 times per day  sodium chloride flush 0.9 % injection 10 mL, 10 mL, Intravenous, PRN  acetaminophen (TYLENOL) tablet 650 mg, 650 mg, Oral, Q4H PRN    IV MEDICATIONS:   dextrose         ALLERGIES:  Allergies   Allergen Reactions  Macrobid [Nitrofurantoin]      Mental Changes       REVIEW OF SYSTEMS:  General ROS:  No weight loss ,no fatigue     ENT ROS:   No Sore throat ,no lymphoadenopathy,no nasal stuffiness     Hematological and Lymphatic ROS:   No ecchymosis ,no tendency to bleed  Respiratory ROS:   Cough ,SOB   Cardiovascular ROS:   No CP,No Palpitation   Gastrointestinal ROS:   No Gi bleed,no nausea or vomiting      - Musculoskeletal ROS:      - no joint swelling ,no joint pain   Neurological ROS:     -no weakness or numbness    Dermatological ROS:   No skin rash ,no urticaria     PHYSICAL EXAMINATION:     VITAL SIGNS:  BP (!) 112/59   Pulse 105   Temp 95 °F (35 °C) (Temporal)   Resp 20   Ht 5' (1.524 m)   Wt 142 lb 6.4 oz (64.6 kg)   SpO2 97%   BMI 27.81 kg/m²   Wt Readings from Last 3 Encounters:   01/15/21 142 lb 6.4 oz (64.6 kg)   12/30/20 150 lb (68 kg)   11/23/20 150 lb (68 kg)     Temp Readings from Last 3 Encounters:   01/15/21 95 °F (35 °C) (Temporal)   12/30/20 97 °F (36.1 °C) (Temporal)   11/23/20 98.6 °F (37 °C) (Temporal)     TMAX:  BP Readings from Last 3 Encounters:   01/15/21 (!) 112/59   12/30/20 (!) 106/50   12/30/20 (!) 132/57     Pulse Readings from Last 3 Encounters:   01/15/21 105   12/30/20 74   11/23/20 84           INTAKE/OUTPUTS:  I/O last 3 completed shifts: In: 200 [P.O.:210;  I.V.:560]  Out: 500 [Urine:500]    Intake/Output Summary (Last 24 hours) at 1/15/2021 2211  Last data filed at 1/15/2021 1552  Gross per 24 hour   Intake 260 ml   Output 500 ml   Net -240 ml       General Appearance: alert and oriented to person, place and time, well-developed and   well-nourished, in no acute distress   Eyes: pupils equal, round, and reactive to light, extraocular eye movements intact, conjunctivae normal and sclera anicteric   Neck: neck supple and non tender without mass, no thyromegaly, no thyroid nodules and no cervical adenopathy   Pulmonary/Chest:rhonchi bilateral Cardiovascular: normal rate, regular rhythm, normal S1 and S2, no murmurs, rubs, clicks or gallops, distal pulses intact, no carotid bruits, no murmurs, no gallops, no carotid bruits and no JVD   Abdomen: obese, soft, non-tender, non-distended, normal bowel sounds, no masses or organomegaly   Extremities:no edema   Musculoskeletal: normal range of motion, no joint swelling, deformity or tenderness   Neurologic: reflexes normal and symmetric, no cranial nerve deficit noted    LABS/IMAGING:    CBC:  Lab Results   Component Value Date    WBC 13.2 (H) 01/15/2021    HGB 9.2 (L) 01/15/2021    HCT 28.2 (L) 01/15/2021    .0 (H) 01/15/2021     01/15/2021    LYMPHOPCT 6.2 (L) 01/12/2021    RBC 2.82 (L) 01/15/2021    MCH 32.6 01/15/2021    MCHC 32.6 01/15/2021    RDW 15.6 (H) 01/15/2021    NEUTOPHILPCT 85.2 (H) 01/12/2021    MONOPCT 6.7 01/12/2021    EOSPCT 2.2 03/12/2019    BASOPCT 0.2 01/12/2021    NEUTROABS 12.83 (H) 01/12/2021    LYMPHSABS 0.93 (L) 01/12/2021    MONOSABS 1.01 (H) 01/12/2021    EOSABS 0.18 01/12/2021    BASOSABS 0.03 01/12/2021       Recent Labs     01/15/21  0604 01/14/21  0430 01/12/21  2111   WBC 13.2* 12.5* 15.1*   HGB 9.2* 9.3* 9.4*   HCT 28.2* 29.4* 28.4*   .0* 101.4* 98.3    371 378       BMP:   Recent Labs     01/14/21  0430 01/15/21  0604    136   K 4.2 4.2    102   CO2 25 16*   BUN 19 29*   CREATININE 0.5 0.7       MG:   Lab Results   Component Value Date    MG 2.3 01/15/2021     Ca/Phos:   Lab Results   Component Value Date    CALCIUM 9.2 01/15/2021    PHOS 4.9 (H) 04/21/2020     Amylase: No results found for: AMYLASE  Lipase:   Lab Results   Component Value Date    LIPASE 49 04/21/2020     LIVER PROFILE:   No results for input(s): AST, ALT, LIPASE, BILIDIR, BILITOT, ALKPHOS in the last 72 hours. Invalid input(s):   AMYLASE,  ALB    PT/INR:   Recent Labs     01/13/21 0218   PROTIME 10.8   INR 1.0     APTT: No results for input(s): APTT in the last 72 hours. Cardiac Enzymes:  Lab Results   Component Value Date    CKTOTAL 59 01/13/2021    CKMB 7.2 (H) 01/13/2021    TROPONINI 0.12 (H) 01/13/2021       Hgb A1C:   Lab Results   Component Value Date    LABA1C 4.9 01/13/2021     Lab Results   Component Value Date     07/30/2020     BARRY: No results found for: BARRY  ESR: No results found for: SEDRATE  CRP: No results found for: CRP  D Dimer:   Lab Results   Component Value Date    DDIMER 344 01/13/2021     Folate and B12:   Lab Results   Component Value Date    USATNTLQ80 1629 (H) 01/13/2021   ,   Lab Results   Component Value Date    FOLATE 17.6 01/13/2021                 PROBLEM LIST:  Patient Active Problem List   Diagnosis    [facility] sclerosis) ([facility]    Spastic quadriparesis ([facility]    Lung mass    Essential hypertension    Hypothyroidism    H/O sarcoidosis    Hyperlipidemia    Neurogenic bladder    HUDSON (nonalcoholic steatohepatitis)    Constipation    Osteopenia of multiple sites    Type 2 diabetes mellitus with hyperglycemia, without long-term current use of insulin (HCC)    [facility] elevation [facility]) [facility]    Multiple vessel coronary artery disease    Hydronephrosis    Chronic combined systolic and diastolic CHF (congestive heart failure) ([facility])    Left ventricular thrombus    Metabolic encephalopathy    UTI (urinary tract infection) due to Enterococcus    Nephrolithiasis    Vitamin D deficiency    NSTEMI (non-ST elevated myocardial infarction) ([facility])    Hypotension    SIRS (systemic inflammatory response syndrome) ([facility])    Anemia    Chronic indwelling Barger catheter    Unstable angina ([facility])    Chest pain    Palliative care by specialist    Goals of care, counseling/discussion               ASSESSMENT:  1.) Moderate Hypoxia   2) Possible Pneumonia   2.)Possible CHF   3.)Sarcoid ,lung  4. )RUL scar /noule   5. )LLL atelectasis vs pneumonia   6) MS       PLAN: Check procalcitonon ,BNP and CRP   Cover with abx until infection ruled out  Will need chest vest and hypertonic saline   She can be on NC and or BiPAP at night   *-The nodule seems more scar than malignancy and I believe  it could be from her sarcoid,no images to compare=  *_incentive spirometer  *- Flutter valve   PT and OT   *- will call daughter and update    DVT Px start Lovenox ,watch hb    Thank you very much for allowing me to participate in the care of this pleasant patient , should you have any questions ,please do not hesitate to contact me    Latoya Roca [address] and [facility]

## 2021-01-16 NOTE — PROGRESS NOTES
Hospitalist Progress Note      SYNOPSIS: Patient admitted on 2021 for       SUBJECTIVE:    Patient seen and examined  Records reviewed  Procalcitonin elevated 1.22  White count increased to 13.2      Temp (24hrs), Av.9 °F (36.1 °C), Min:95 °F (35 °C), Max:97.6 °F (36.4 °C)    DIET: DIET CARDIAC;  CODE: Limited    Intake/Output Summary (Last 24 hours) at 2021 0955  Last data filed at 2021 0542  Gross per 24 hour   Intake 240 ml   Output 700 ml   Net -460 ml       OBJECTIVE:    BP (!) 117/55   Pulse 92   Temp 97.1 °F (36.2 °C)   Resp 18   Ht 5' (1.524 m)   Wt 142 lb 6.4 oz (64.6 kg)   SpO2 96%   BMI 27.81 kg/m²     General appearance: Alert, no acute distress  HEENT: Normocephalic, atraumatic  Neck: Supple. No jugular venous distention.    Respiratory: Bibasilar crackles  Cardiovascular: Regular rate rhythm, normal S1-S2  Abdomen: Soft, nontender, nondistended  Musculoskeletal: Normal range of motion, no deformity  Neurologic alert, follows simple commands    ASSESSMENT:  NSTEMI s/p PCI of LAD/circumflex  Possible pneumonia  Leukocytosis  Hypotension  Coronary artery disease  Acute on chronic combined systolic/diastolic congestive heart failure  Hypothyroidism  Anemia  Type 2 diabetes  Hyperlipidemia       PLAN:  Cardiology following  Pulmonology following  Unasyn 3 g every 6 hours  Bumex 1 mg IV daily  Continue amiodarone, aspirin, atorvastatin, carvedilol, Plavix, Imdur, lisinopril  Sliding scale insulin          Medications:  REVIEWED DAILY    Infusion Medications    dextrose       Scheduled Medications    bumetanide  1 mg Intravenous Daily    enoxaparin  40 mg Subcutaneous Daily    ampicillin-sulbactam  3 g Intravenous Q6H    amiodarone bolus  150 mg Intravenous Once    sodium chloride flush  10 mL Intravenous 2 times per day    aspirin  81 mg Oral Daily    atorvastatin  10 mg Oral Nightly    baclofen  40 mg Oral BID    calcium-vitamin D  1 tablet Oral Nightly NOTE: This report was transcribed using voice recognition software. Every effort was made to ensure accuracy; however, inadvertent computerized transcription errors may be present.

## 2021-01-16 NOTE — PROGRESS NOTES
DAILY PROGRESS NOTE - THE HEART CENTER    SUBJECTIVE:    She is being followed for chest discomfort and coronary disease, ischemic cardiomyopathy. Yesterday 1/15 had IRINA to severe in-stent restenosis of LAD stent and obtuse marginal IRINA placement. Pulmonary edema post procedure for which she is being diuresed. Questionable ventricular tachycardia for which she received amiodarone bolus. Sinus rhythm on telemetry. In bed and poorly arousable at this time. On nonrebreather and does not currently appear dyspneic. Minimal if any diuresis at all. Hypertension, hyperlipidemia, late presentation anterior STEMI April 2020 post LAD drug-eluting stent placement. LVEF 25% with apical thrombus that resolved with triple therapy including aspirin, clopidogrel, Eliquis. Recovery of LVEF to 45% with no apical thrombus June 2020. Bedbound due to multiple sclerosis. OBJECTIVE:    Her vital signs were reviewed today.     Vitals:    01/16/21 0415   BP: (!) 118/57   Pulse: 107   Resp: 18   Temp: 97.2 °F (36.2 °C)   SpO2: 97%       Scheduled Meds:   enoxaparin  40 mg Subcutaneous Daily    ampicillin-sulbactam  3 g Intravenous Q6H    amiodarone bolus  150 mg Intravenous Once    sodium chloride flush  10 mL Intravenous 2 times per day    aspirin  81 mg Oral Daily    atorvastatin  10 mg Oral Nightly    baclofen  40 mg Oral BID    calcium-vitamin D  1 tablet Oral Nightly    citalopram  20 mg Oral QAM    clopidogrel  75 mg Oral Daily    vitamin B-12  500 mcg Oral Daily    dantrolene  100 mg Oral BID    docusate sodium  100 mg Oral Daily    ferrous sulfate  325 mg Oral Daily with breakfast    levothyroxine  88 mcg Oral Nightly    magnesium oxide  400 mg Oral QAM    polyethylene glycol  17 g Oral Daily    Vitamin D  1,000 Units Oral QAM    insulin lispro  0-10 Units Subcutaneous 4x Daily AC & HS    sodium chloride flush  10 mL Intravenous 2 times per day    senna  1 tablet Oral Daily  carvedilol  3.125 mg Oral BID WC    isosorbide mononitrate  30 mg Oral Daily    lisinopril  2.5 mg Oral Daily    sodium chloride flush  10 mL Intravenous 2 times per day     Continuous Infusions:   dextrose       PRN Meds:.sodium chloride flush, acetaminophen, ipratropium-albuterol, glucose, dextrose, glucagon (rDNA), dextrose, sodium chloride flush, promethazine **OR** ondansetron, acetaminophen **OR** acetaminophen, polyethylene glycol, nitroGLYCERIN, sodium chloride flush, acetaminophen    REVIEW OF SYSTEMS: Unable to obtain due to mental status and BiPAP mask. FAMILY HISTORY: Negative for CAD in first-degree relatives. SOCIAL HISTORY: Negative for alcohol, tobacco, or illicit drug use. PHYSICAL EXAM:    General Appearance: Poorly arousable on BiPAP mask  Neck:  no bruits  Lungs:  Normal expansion. Coarse breath sounds bilaterally to auscultation. No rales or wheezing but diffuse rhonchi  Heart:  Heart sounds are normal.  Regular rate and rhythm without murmur, gallop or rub. Abdomen:  Soft, non-tender. Extremities: Extremities warm to touch, pink, with no edema. Neuro/musculosketal:  Unremarkable. LABS:    Recent Labs     01/14/21  0430 01/15/21  0604    136   CREATININE 0.5 0.7       Recent Labs     01/14/21  0430 01/15/21  0604   HGB 9.3* 9.2*       No results for input(s): INR in the last 72 hours. IMPRESSION:    #1.  Coronary disease post PCI and IRINA stent placementcontinue dual antiplatelet therapy at this time. #2.  Acute on chronic systolic heart failuresuspect pulmonary edema. She received IV Bumex, but did not have much diuresis. Bumex 1 mg IV now then daily. Monitor fluid status. Dyspnea and rhonchi may be due to underlying sarcoidosis. If minimal response to bumetanide, will stop tomorrow or the day after. #3.  Ischemic cardiomyopathylow-dose carvedilol and lisinopril for cardioprotection. #4. Hyperlipidemiastatin. #5.  Continue to follow.

## 2021-01-17 LAB
ALBUMIN SERPL-MCNC: 3.8 G/DL (ref 3.5–5.2)
ALP BLD-CCNC: 66 U/L (ref 35–104)
ALT SERPL-CCNC: 22 U/L (ref 0–32)
ANION GAP SERPL CALCULATED.3IONS-SCNC: 12 MMOL/L (ref 7–16)
AST SERPL-CCNC: 24 U/L (ref 0–31)
BASOPHILS ABSOLUTE: 0.03 E9/L (ref 0–0.2)
BASOPHILS RELATIVE PERCENT: 0.4 % (ref 0–2)
BILIRUB SERPL-MCNC: <0.2 MG/DL (ref 0–1.2)
BUN BLDV-MCNC: 33 MG/DL (ref 8–23)
CALCIUM SERPL-MCNC: 8.8 MG/DL (ref 8.6–10.2)
CHLORIDE BLD-SCNC: 105 MMOL/L (ref 98–107)
CO2: 25 MMOL/L (ref 22–29)
CREAT SERPL-MCNC: 0.7 MG/DL (ref 0.5–1)
EOSINOPHILS ABSOLUTE: 0.14 E9/L (ref 0.05–0.5)
EOSINOPHILS RELATIVE PERCENT: 2 % (ref 0–6)
GFR AFRICAN AMERICAN: >60
GFR NON-AFRICAN AMERICAN: >60 ML/MIN/1.73
GLUCOSE BLD-MCNC: 175 MG/DL (ref 74–99)
HCT VFR BLD CALC: 28.2 % (ref 34–48)
HEMOGLOBIN: 8.8 G/DL (ref 11.5–15.5)
IMMATURE GRANULOCYTES #: 0.02 E9/L
IMMATURE GRANULOCYTES %: 0.3 % (ref 0–5)
LYMPHOCYTES ABSOLUTE: 0.56 E9/L (ref 1.5–4)
LYMPHOCYTES RELATIVE PERCENT: 7.8 % (ref 20–42)
MAGNESIUM: 2.3 MG/DL (ref 1.6–2.6)
MCH RBC QN AUTO: 31.3 PG (ref 26–35)
MCHC RBC AUTO-ENTMCNC: 31.2 % (ref 32–34.5)
MCV RBC AUTO: 100.4 FL (ref 80–99.9)
METER GLUCOSE: 135 MG/DL (ref 74–99)
METER GLUCOSE: 139 MG/DL (ref 74–99)
METER GLUCOSE: 144 MG/DL (ref 74–99)
METER GLUCOSE: 169 MG/DL (ref 74–99)
MONOCYTES ABSOLUTE: 0.74 E9/L (ref 0.1–0.95)
MONOCYTES RELATIVE PERCENT: 10.3 % (ref 2–12)
NEUTROPHILS ABSOLUTE: 5.68 E9/L (ref 1.8–7.3)
NEUTROPHILS RELATIVE PERCENT: 79.2 % (ref 43–80)
PDW BLD-RTO: 15.2 FL (ref 11.5–15)
PLATELET # BLD: 375 E9/L (ref 130–450)
PMV BLD AUTO: 10.1 FL (ref 7–12)
POLYCHROMASIA: ABNORMAL
POTASSIUM SERPL-SCNC: 3.3 MMOL/L (ref 3.5–5)
PROCALCITONIN: 0.61 NG/ML (ref 0–0.08)
RBC # BLD: 2.81 E12/L (ref 3.5–5.5)
SODIUM BLD-SCNC: 142 MMOL/L (ref 132–146)
TOTAL PROTEIN: 6.7 G/DL (ref 6.4–8.3)
WBC # BLD: 7.2 E9/L (ref 4.5–11.5)

## 2021-01-17 PROCEDURE — 2140000000 HC CCU INTERMEDIATE R&B

## 2021-01-17 PROCEDURE — 82962 GLUCOSE BLOOD TEST: CPT

## 2021-01-17 PROCEDURE — 6360000002 HC RX W HCPCS: Performed by: INTERNAL MEDICINE

## 2021-01-17 PROCEDURE — 6370000000 HC RX 637 (ALT 250 FOR IP): Performed by: FAMILY MEDICINE

## 2021-01-17 PROCEDURE — 2700000000 HC OXYGEN THERAPY PER DAY

## 2021-01-17 PROCEDURE — 36415 COLL VENOUS BLD VENIPUNCTURE: CPT

## 2021-01-17 PROCEDURE — 2580000003 HC RX 258: Performed by: INTERNAL MEDICINE

## 2021-01-17 PROCEDURE — 85025 COMPLETE CBC W/AUTO DIFF WBC: CPT

## 2021-01-17 PROCEDURE — 2580000003 HC RX 258: Performed by: FAMILY MEDICINE

## 2021-01-17 PROCEDURE — 80053 COMPREHEN METABOLIC PANEL: CPT

## 2021-01-17 PROCEDURE — 99232 SBSQ HOSP IP/OBS MODERATE 35: CPT | Performed by: INTERNAL MEDICINE

## 2021-01-17 PROCEDURE — 83735 ASSAY OF MAGNESIUM: CPT

## 2021-01-17 PROCEDURE — 84145 PROCALCITONIN (PCT): CPT

## 2021-01-17 PROCEDURE — 6370000000 HC RX 637 (ALT 250 FOR IP): Performed by: INTERNAL MEDICINE

## 2021-01-17 PROCEDURE — 2500000003 HC RX 250 WO HCPCS: Performed by: INTERNAL MEDICINE

## 2021-01-17 RX ADMIN — BUMETANIDE 1 MG: 0.25 INJECTION, SOLUTION INTRAMUSCULAR; INTRAVENOUS at 11:24

## 2021-01-17 RX ADMIN — BACLOFEN 40 MG: 10 TABLET ORAL at 01:09

## 2021-01-17 RX ADMIN — SODIUM CHLORIDE, PRESERVATIVE FREE 10 ML: 5 INJECTION INTRAVENOUS at 21:40

## 2021-01-17 RX ADMIN — Medication 10 ML: at 15:00

## 2021-01-17 RX ADMIN — DOXYCYCLINE HYCLATE 100 MG: 100 CAPSULE ORAL at 21:39

## 2021-01-17 RX ADMIN — FERROUS SULFATE TAB 325 MG (65 MG ELEMENTAL FE) 325 MG: 325 (65 FE) TAB at 11:21

## 2021-01-17 RX ADMIN — CLOPIDOGREL 75 MG: 75 TABLET, FILM COATED ORAL at 10:00

## 2021-01-17 RX ADMIN — Medication 500 MCG: at 10:00

## 2021-01-17 RX ADMIN — MAGNESIUM GLUCONATE 500 MG ORAL TABLET 400 MG: 500 TABLET ORAL at 11:23

## 2021-01-17 RX ADMIN — ISOSORBIDE MONONITRATE 30 MG: 30 TABLET ORAL at 11:23

## 2021-01-17 RX ADMIN — BACLOFEN 40 MG: 10 TABLET ORAL at 21:39

## 2021-01-17 RX ADMIN — SENNOSIDES 8.6 MG: 8.6 TABLET, FILM COATED ORAL at 11:21

## 2021-01-17 RX ADMIN — DOXYCYCLINE HYCLATE 100 MG: 100 CAPSULE ORAL at 10:00

## 2021-01-17 RX ADMIN — CARVEDILOL 3.12 MG: 3.12 TABLET, FILM COATED ORAL at 09:00

## 2021-01-17 RX ADMIN — CARVEDILOL 3.12 MG: 3.12 TABLET, FILM COATED ORAL at 18:00

## 2021-01-17 RX ADMIN — Medication 10 ML: at 09:00

## 2021-01-17 RX ADMIN — BACLOFEN 40 MG: 10 TABLET ORAL at 09:00

## 2021-01-17 RX ADMIN — ASPIRIN 81 MG: 81 TABLET ORAL at 11:22

## 2021-01-17 RX ADMIN — Medication 1 TABLET: at 21:39

## 2021-01-17 RX ADMIN — Medication 10 ML: at 21:39

## 2021-01-17 RX ADMIN — AMPICILLIN SODIUM AND SULBACTAM SODIUM 3 G: 2; 1 INJECTION, POWDER, FOR SOLUTION INTRAMUSCULAR; INTRAVENOUS at 00:29

## 2021-01-17 RX ADMIN — Medication 1000 UNITS: at 11:20

## 2021-01-17 RX ADMIN — CITALOPRAM 20 MG: 20 TABLET, FILM COATED ORAL at 10:00

## 2021-01-17 RX ADMIN — Medication 10 ML: at 21:40

## 2021-01-17 RX ADMIN — POLYETHYLENE GLYCOL 3350 17 G: 17 POWDER, FOR SOLUTION ORAL at 09:00

## 2021-01-17 RX ADMIN — ENOXAPARIN SODIUM 40 MG: 40 INJECTION SUBCUTANEOUS at 11:17

## 2021-01-17 RX ADMIN — AMPICILLIN SODIUM AND SULBACTAM SODIUM 3 G: 2; 1 INJECTION, POWDER, FOR SOLUTION INTRAMUSCULAR; INTRAVENOUS at 06:13

## 2021-01-17 RX ADMIN — DANTROLENE SODIUM 100 MG: 25 CAPSULE ORAL at 21:39

## 2021-01-17 RX ADMIN — AMPICILLIN SODIUM AND SULBACTAM SODIUM 3 G: 2; 1 INJECTION, POWDER, FOR SOLUTION INTRAMUSCULAR; INTRAVENOUS at 18:34

## 2021-01-17 RX ADMIN — DOCUSATE SODIUM 100 MG: 100 CAPSULE, LIQUID FILLED ORAL at 11:20

## 2021-01-17 RX ADMIN — DANTROLENE SODIUM 100 MG: 25 CAPSULE ORAL at 01:10

## 2021-01-17 RX ADMIN — ATORVASTATIN CALCIUM 10 MG: 10 TABLET, FILM COATED ORAL at 21:39

## 2021-01-17 RX ADMIN — LEVOTHYROXINE SODIUM 88 MCG: 0.09 TABLET ORAL at 21:39

## 2021-01-17 RX ADMIN — AMPICILLIN SODIUM AND SULBACTAM SODIUM 3 G: 2; 1 INJECTION, POWDER, FOR SOLUTION INTRAMUSCULAR; INTRAVENOUS at 13:00

## 2021-01-17 RX ADMIN — DANTROLENE SODIUM 100 MG: 25 CAPSULE ORAL at 10:00

## 2021-01-17 RX ADMIN — BUMETANIDE 1 MG: 0.25 INJECTION, SOLUTION INTRAMUSCULAR; INTRAVENOUS at 21:40

## 2021-01-17 ASSESSMENT — PAIN SCALES - GENERAL
PAINLEVEL_OUTOF10: 0

## 2021-01-17 NOTE — PROGRESS NOTES
Palliative Medicine  Social Work Progress Note    Pt Name: Josephsaeniels Gaston    [facility] contacted pt's , Susanne Hector. He stated he is preparing in case pt is discharged in the next few days, he feels it may be likely on Tuesday. He states he does not anticipate any further DME needs as they have it in place from prior hospitalizations. Susanne Hector states that he will restart services with [facility] (nurse comes one time a week), and with [facility]. Susanne Hector provides all of pt's care since November as their private paid caregiver has been unable to come since then due to medical needs of her own. He does not request additional assistance at this time. Susanen Hector states he would like to have a Zoom call tomorrow if possible with pt's children (son and daughter live in [place] and [place], another daughter who is an RN lives in [place]), as he feels that will help lift pt's spirits. Susanne Hector appreciative for support, has PM phone number for any needs.

## 2021-01-17 NOTE — PROGRESS NOTES
Hospitalist Progress Note      SYNOPSIS: Patient admitted on 2021 for       SUBJECTIVE:    Patient seen and examined  Records reviewed      Temp (24hrs), Av.8 °F (36.6 °C), Min:97.2 °F (36.2 °C), Max:98.1 °F (36.7 °C)    DIET: DIET CARDIAC;  CODE: Limited    Intake/Output Summary (Last 24 hours) at 2021 0911  Last data filed at 2021 0542  Gross per 24 hour   Intake 1100 ml   Output 1050 ml   Net 50 ml       OBJECTIVE:    BP (!) 108/55   Pulse 99   Temp 98 °F (36.7 °C) (Temporal)   Resp 16   Ht 5' (1.524 m)   Wt 142 lb 6.4 oz (64.6 kg)   SpO2 98%   BMI 27.81 kg/m²     General appearance: Alert, no acute distress  HEENT: Normocephalic, atraumatic  Neck: Supple. No jugular venous distention.    Respiratory: Bibasilar crackles  Cardiovascular: Regular rate rhythm, normal S1-S2  Abdomen: Soft, nontender, nondistended  Musculoskeletal: Normal range of motion, no deformity  Neurologic alert, follows simple commands    ASSESSMENT:  NSTEMI s/p PCI of LAD/circumflex  Possible pneumonia  Leukocytosis  Hypotension  Coronary artery disease  Acute on chronic combined systolic/diastolic congestive heart failure  Hypothyroidism  Anemia  Type 2 diabetes  Hyperlipidemia       PLAN:  Cardiology following  Pulmonology following  Unasyn 3 g every 6 hours  Bumex increased 1 mg IV twice daily  Continue amiodarone, aspirin, atorvastatin, carvedilol, Plavix, Imdur, lisinopril  Sliding scale insulin          Medications:  REVIEWED DAILY    Infusion Medications    dextrose       Scheduled Medications    bumetanide  1 mg Intravenous BID    doxycycline hyclate  100 mg Oral 2 times per day    enoxaparin  40 mg Subcutaneous Daily    ampicillin-sulbactam  3 g Intravenous Q6H    amiodarone bolus  150 mg Intravenous Once    sodium chloride flush  10 mL Intravenous 2 times per day    aspirin  81 mg Oral Daily    atorvastatin  10 mg Oral Nightly    baclofen  40 mg Oral BID  calcium-vitamin D  1 tablet Oral Nightly    citalopram  20 mg Oral QAM    clopidogrel  75 mg Oral Daily    vitamin B-12  500 mcg Oral Daily    dantrolene  100 mg Oral BID    docusate sodium  100 mg Oral Daily    ferrous sulfate  325 mg Oral Daily with breakfast    levothyroxine  88 mcg Oral Nightly    magnesium oxide  400 mg Oral QAM    polyethylene glycol  17 g Oral Daily    Vitamin D  1,000 Units Oral QAM    insulin lispro  0-10 Units Subcutaneous 4x Daily AC & HS    sodium chloride flush  10 mL Intravenous 2 times per day    senna  1 tablet Oral Daily    carvedilol  3.125 mg Oral BID WC    isosorbide mononitrate  30 mg Oral Daily    lisinopril  2.5 mg Oral Daily    sodium chloride flush  10 mL Intravenous 2 times per day     PRN Meds: acetaminophen, ipratropium-albuterol, glucose, dextrose, glucagon (rDNA), dextrose, sodium chloride flush, promethazine **OR** ondansetron, acetaminophen **OR** acetaminophen, polyethylene glycol, nitroGLYCERIN, acetaminophen    Labs:     Recent Labs     01/15/21  0604   WBC 13.2*   HGB 9.2*   HCT 28.2*          Recent Labs     01/15/21  0604      K 4.2      CO2 16*   BUN 29*   CREATININE 0.7   CALCIUM 9.2       No results for input(s): PROT, ALB, ALKPHOS, ALT, AST, BILITOT, AMYLASE, LIPASE in the last 72 hours. No results for input(s): INR in the last 72 hours. No results for input(s): [place] Scrivener in the last 72 hours.     Chronic labs:    Lab Results   Component Value Date    CHOL 127 01/13/2021    TRIG 148 01/13/2021    HDL 35 01/13/2021    LDLCALC 62 01/13/2021    TSH 3.02 09/02/2020    INR 1.0 01/13/2021    LABA1C 4.9 01/13/2021       Radiology: REVIEWED DAILY    +++++++++++++++++++++++++++++++++++++++++++++++++  [address], [place]  +++++++++++++++++++++++++++++++++++++++++++++++++ NOTE: This report was transcribed using voice recognition software. Every effort was made to ensure accuracy; however, inadvertent computerized transcription errors may be present.

## 2021-01-17 NOTE — PROGRESS NOTES
Pulmonary [facility]                             Pulmonary Consult/Progress Note :            Reason for Consultation:Lung nodule [place] pneumonia   CC : chest pain     HPI  Doing slightly better  On 6 L  No chest pain   Used BiPAP last night and she feels better with it       PHYSICAL EXAMINATION:     VITAL SIGNS:  BP (!) 101/53   Pulse 98   Temp 97.2 °F (36.2 °C) (Temporal)   Resp 18   Ht 5' (1.524 m)   Wt 142 lb 6.4 oz (64.6 kg)   SpO2 96%   BMI 27.81 kg/m²   Wt Readings from Last 3 Encounters:   01/15/21 142 lb 6.4 oz (64.6 kg)   12/30/20 150 lb (68 kg)   11/23/20 150 lb (68 kg)     Temp Readings from Last 3 Encounters:   01/16/21 97.2 °F (36.2 °C) (Temporal)   12/30/20 97 °F (36.1 °C) (Temporal)   11/23/20 98.6 °F (37 °C) (Temporal)     TMAX:  BP Readings from Last 3 Encounters:   01/16/21 (!) 101/53   12/30/20 (!) 106/50   12/30/20 (!) 132/57     Pulse Readings from Last 3 Encounters:   01/16/21 98   12/30/20 74   11/23/20 84           INTAKE/OUTPUTS:  I/O last 3 completed shifts: In: 600 [P.O.:400;  I.V.:200]  Out: 1150 [Urine:1150]    Intake/Output Summary (Last 24 hours) at 1/16/2021 2240  Last data filed at 1/16/2021 1822  Gross per 24 hour   Intake 920 ml   Output 750 ml   Net 170 ml       General Appearance: alert and oriented to person, place and time, well-developed and   well-nourished, in no acute distress   Eyes: pupils equal, round, and reactive to light, extraocular eye movements intact, conjunctivae normal and sclera anicteric   Neck: neck supple and non tender without mass, no thyromegaly, no thyroid nodules and no cervical adenopathy   Pulmonary/Chest:rhonchi bilateral  Cardiovascular: normal rate, regular rhythm, normal S1 and S2, no murmurs, rubs, clicks or gallops, distal pulses intact, no carotid bruits, no murmurs, no gallops, no carotid bruits and no JVD  Hypothyroidism    H/O sarcoidosis    Hyperlipidemia    Neurogenic bladder    HUDSON (nonalcoholic steatohepatitis)    Constipation    Osteopenia of multiple sites    Type 2 diabetes mellitus with hyperglycemia, without long-term current use of insulin (HCC)    STEMI (ST elevation myocardial infarction) (HCC)    Multiple vessel coronary artery disease    Hydronephrosis    Chronic combined systolic and diastolic CHF (congestive heart failure) (HCC)    Left ventricular thrombus    Metabolic encephalopathy    UTI (urinary tract infection) due to Enterococcus    Nephrolithiasis    Vitamin D deficiency    NSTEMI (non-ST elevated myocardial infarction) (HCC)    Hypotension    SIRS (systemic inflammatory response syndrome) (HCC)    Anemia    Chronic indwelling Barger catheter    Unstable angina (HCC)    Chest pain    Palliative care by specialist    Goals of care, counseling/discussion               ASSESSMENT:  1.) Moderate Hypoxia   2) Possible Pneumonia   2.)Possible CHF   3.)Sarcoid ,lung  4. )RUL scar /noule   5. )LLL atelectasis vs pneumonia   6) MS       PLAN:   procalcitonon ,BNP and CRP ,compatble with pneumonia and fluid overlaod   Cover with abx until infection ruled out   chest vest and hypertonic saline   She can be on NC and or BiPAP at night   *-The nodule seems more scar than malignancy and I believe  it could be from her sarcoid,no images to compare=  *_incentive spirometer  *- Flutter valve   PT and OT   DVT Px start Lovenox ,watch hb    Thank you very much for allowing me to participate in the care of this pleasant patient , should you have any questions ,please do not hesitate to contact me    Latoya Roca [address] and [facility]

## 2021-01-17 NOTE — PROGRESS NOTES
DAILY PROGRESS NOTE - THE HEART CENTER    SUBJECTIVE:    She is being followed for chest discomfort and coronary disease, ischemic cardiomyopathy. Looks better and less dyspneic. Unable to give cogent history. She is disoriented. Around 800 cc out yesterday but fluid status is currently even. 1/15 had IRINA to severe in-stent restenosis of LAD stent and obtuse marginal IRINA placement. Pulmonary edema post procedure for which she is being diuresed. Questionable ventricular tachycardia for which she received amiodarone bolus. Sinus rhythm on telemetry. Ejection fraction on echocardiogram 20-25%. Hyperlipidemia, late presentation anterior STEMI April 2020 post LAD drug-eluting stent placement. LVEF 25% with apical thrombus that resolved with triple therapy including aspirin, clopidogrel, Eliquis. Recovery of LVEF to 45% with no apical thrombus June 2020. Bedbound due to multiple sclerosis. OBJECTIVE:    Her vital signs were reviewed today. Blood pressure 110/60, pulse 80 and regular, respiratory rate 16.     Vitals:    01/17/21 0613   BP:    Pulse:    Resp:    Temp:    SpO2: 98%       Scheduled Meds:   bumetanide  1 mg Intravenous Daily    bumetanide  1 mg Intravenous BID    doxycycline hyclate  100 mg Oral 2 times per day    enoxaparin  40 mg Subcutaneous Daily    ampicillin-sulbactam  3 g Intravenous Q6H    amiodarone bolus  150 mg Intravenous Once    sodium chloride flush  10 mL Intravenous 2 times per day    aspirin  81 mg Oral Daily    atorvastatin  10 mg Oral Nightly    baclofen  40 mg Oral BID    calcium-vitamin D  1 tablet Oral Nightly    citalopram  20 mg Oral QAM    clopidogrel  75 mg Oral Daily    vitamin B-12  500 mcg Oral Daily    dantrolene  100 mg Oral BID    docusate sodium  100 mg Oral Daily    ferrous sulfate  325 mg Oral Daily with breakfast    levothyroxine  88 mcg Oral Nightly    magnesium oxide  400 mg Oral QAM    polyethylene glycol  17 g Oral Daily  Vitamin D  1,000 Units Oral QAM    insulin lispro  0-10 Units Subcutaneous 4x Daily AC & HS    sodium chloride flush  10 mL Intravenous 2 times per day    senna  1 tablet Oral Daily    carvedilol  3.125 mg Oral BID WC    isosorbide mononitrate  30 mg Oral Daily    lisinopril  2.5 mg Oral Daily    sodium chloride flush  10 mL Intravenous 2 times per day     Continuous Infusions:   dextrose       PRN Meds:.acetaminophen, ipratropium-albuterol, glucose, dextrose, glucagon (rDNA), dextrose, sodium chloride flush, promethazine **OR** ondansetron, acetaminophen **OR** acetaminophen, polyethylene glycol, nitroGLYCERIN, acetaminophen    REVIEW OF SYSTEMS: Unable to obtain due to mental status and BiPAP mask. FAMILY HISTORY: Negative for CAD in first-degree relatives. SOCIAL HISTORY: Negative for alcohol, tobacco, or illicit drug use. PHYSICAL EXAM:    General Appearance: Poorly arousable on BiPAP mask  Neck:  no bruits  Lungs:  Normal expansion. Coarse breath sounds bilaterally to auscultation. No rales or wheezing but diffuse rhonchi  Heart:  Heart sounds are normal.  Regular rate and rhythm without murmur, gallop or rub. Abdomen:  Soft, non-tender. Extremities: Extremities warm to touch, pink, with trace bilateral edema. Neuro/musculosketal:  Unremarkable. LABS:    Recent Labs     01/15/21  0604      CREATININE 0.7       Recent Labs     01/15/21  0604   HGB 9.2*       No results for input(s): INR in the last 72 hours. IMPRESSION:    #1.  Coronary disease post PCI and IRINA stent placementcontinue dual antiplatelet therapy at this time. #2.  Acute on chronic systolic heart failuresuspect pulmonary edema. Diuresing well at this time and actually looks better than yesterday with less dyspnea. Increased Bumex to twice daily. Continue at this time and monitor fluid status. #3.  Ischemic cardiomyopathycontinue carvedilol and lisinopril at low dose. Stop isosorbide as it may be contributing to decreased blood pressure. #4. Hyperlipidemiastatin. #5.  Continue to follow.

## 2021-01-18 LAB
ANION GAP SERPL CALCULATED.3IONS-SCNC: 12 MMOL/L (ref 7–16)
BASOPHILS ABSOLUTE: 0.04 E9/L (ref 0–0.2)
BASOPHILS RELATIVE PERCENT: 0.6 % (ref 0–2)
BUN BLDV-MCNC: 24 MG/DL (ref 8–23)
CALCIUM SERPL-MCNC: 8.7 MG/DL (ref 8.6–10.2)
CHLORIDE BLD-SCNC: 107 MMOL/L (ref 98–107)
CO2: 28 MMOL/L (ref 22–29)
CREAT SERPL-MCNC: 0.6 MG/DL (ref 0.5–1)
EKG ATRIAL RATE: 99 BPM
EKG P AXIS: 68 DEGREES
EKG P-R INTERVAL: 194 MS
EKG Q-T INTERVAL: 536 MS
EKG QRS DURATION: 82 MS
EKG QTC CALCULATION (BAZETT): 687 MS
EKG R AXIS: -22 DEGREES
EKG T AXIS: 100 DEGREES
EKG VENTRICULAR RATE: 99 BPM
EOSINOPHILS ABSOLUTE: 0.15 E9/L (ref 0.05–0.5)
EOSINOPHILS RELATIVE PERCENT: 2.4 % (ref 0–6)
GFR AFRICAN AMERICAN: >60
GFR NON-AFRICAN AMERICAN: >60 ML/MIN/1.73
GLUCOSE BLD-MCNC: 234 MG/DL (ref 74–99)
HCT VFR BLD CALC: 27.4 % (ref 34–48)
HEMOGLOBIN: 8.5 G/DL (ref 11.5–15.5)
IMMATURE GRANULOCYTES #: 0.02 E9/L
IMMATURE GRANULOCYTES %: 0.3 % (ref 0–5)
LYMPHOCYTES ABSOLUTE: 0.72 E9/L (ref 1.5–4)
LYMPHOCYTES RELATIVE PERCENT: 11.6 % (ref 20–42)
MCH RBC QN AUTO: 31.6 PG (ref 26–35)
MCHC RBC AUTO-ENTMCNC: 31 % (ref 32–34.5)
MCV RBC AUTO: 101.9 FL (ref 80–99.9)
METER GLUCOSE: 112 MG/DL (ref 74–99)
METER GLUCOSE: 208 MG/DL (ref 74–99)
MONOCYTES ABSOLUTE: 0.67 E9/L (ref 0.1–0.95)
MONOCYTES RELATIVE PERCENT: 10.8 % (ref 2–12)
NEUTROPHILS ABSOLUTE: 4.63 E9/L (ref 1.8–7.3)
NEUTROPHILS RELATIVE PERCENT: 74.3 % (ref 43–80)
PDW BLD-RTO: 15.3 FL (ref 11.5–15)
PLATELET # BLD: 334 E9/L (ref 130–450)
PMV BLD AUTO: 10.7 FL (ref 7–12)
POTASSIUM SERPL-SCNC: 3.4 MMOL/L (ref 3.5–5)
RBC # BLD: 2.69 E12/L (ref 3.5–5.5)
SODIUM BLD-SCNC: 147 MMOL/L (ref 132–146)
WBC # BLD: 6.2 E9/L (ref 4.5–11.5)

## 2021-01-18 PROCEDURE — 2700000000 HC OXYGEN THERAPY PER DAY

## 2021-01-18 PROCEDURE — 85025 COMPLETE CBC W/AUTO DIFF WBC: CPT

## 2021-01-18 PROCEDURE — 6370000000 HC RX 637 (ALT 250 FOR IP): Performed by: INTERNAL MEDICINE

## 2021-01-18 PROCEDURE — 82962 GLUCOSE BLOOD TEST: CPT

## 2021-01-18 PROCEDURE — 2580000003 HC RX 258: Performed by: INTERNAL MEDICINE

## 2021-01-18 PROCEDURE — 36415 COLL VENOUS BLD VENIPUNCTURE: CPT

## 2021-01-18 PROCEDURE — 2500000003 HC RX 250 WO HCPCS: Performed by: INTERNAL MEDICINE

## 2021-01-18 PROCEDURE — 80048 BASIC METABOLIC PNL TOTAL CA: CPT

## 2021-01-18 PROCEDURE — 2580000003 HC RX 258: Performed by: FAMILY MEDICINE

## 2021-01-18 PROCEDURE — 6370000000 HC RX 637 (ALT 250 FOR IP)

## 2021-01-18 PROCEDURE — 6370000000 HC RX 637 (ALT 250 FOR IP): Performed by: FAMILY MEDICINE

## 2021-01-18 PROCEDURE — 2140000000 HC CCU INTERMEDIATE R&B

## 2021-01-18 PROCEDURE — 99231 SBSQ HOSP IP/OBS SF/LOW 25: CPT | Performed by: NURSE PRACTITIONER

## 2021-01-18 PROCEDURE — 6360000002 HC RX W HCPCS: Performed by: INTERNAL MEDICINE

## 2021-01-18 PROCEDURE — 99233 SBSQ HOSP IP/OBS HIGH 50: CPT | Performed by: INTERNAL MEDICINE

## 2021-01-18 PROCEDURE — 93005 ELECTROCARDIOGRAM TRACING: CPT | Performed by: INTERNAL MEDICINE

## 2021-01-18 RX ORDER — ISOSORBIDE MONONITRATE 30 MG/1
30 TABLET, EXTENDED RELEASE ORAL DAILY
Status: DISCONTINUED | OUTPATIENT
Start: 2021-01-18 | End: 2021-01-19 | Stop reason: HOSPADM

## 2021-01-18 RX ADMIN — POLYETHYLENE GLYCOL 3350 17 G: 17 POWDER, FOR SOLUTION ORAL at 08:50

## 2021-01-18 RX ADMIN — INSULIN LISPRO 2 UNITS: 100 INJECTION, SOLUTION INTRAVENOUS; SUBCUTANEOUS at 12:26

## 2021-01-18 RX ADMIN — BUMETANIDE 1 MG: 0.25 INJECTION, SOLUTION INTRAMUSCULAR; INTRAVENOUS at 22:17

## 2021-01-18 RX ADMIN — AMPICILLIN SODIUM AND SULBACTAM SODIUM 3 G: 2; 1 INJECTION, POWDER, FOR SOLUTION INTRAMUSCULAR; INTRAVENOUS at 12:26

## 2021-01-18 RX ADMIN — FERROUS SULFATE TAB 325 MG (65 MG ELEMENTAL FE) 325 MG: 325 (65 FE) TAB at 08:50

## 2021-01-18 RX ADMIN — ATORVASTATIN CALCIUM 10 MG: 10 TABLET, FILM COATED ORAL at 22:17

## 2021-01-18 RX ADMIN — AMPICILLIN SODIUM AND SULBACTAM SODIUM 3 G: 2; 1 INJECTION, POWDER, FOR SOLUTION INTRAMUSCULAR; INTRAVENOUS at 05:55

## 2021-01-18 RX ADMIN — Medication 10 ML: at 22:18

## 2021-01-18 RX ADMIN — ENOXAPARIN SODIUM 40 MG: 40 INJECTION SUBCUTANEOUS at 17:10

## 2021-01-18 RX ADMIN — DANTROLENE SODIUM 100 MG: 25 CAPSULE ORAL at 21:09

## 2021-01-18 RX ADMIN — CLOPIDOGREL 75 MG: 75 TABLET, FILM COATED ORAL at 08:50

## 2021-01-18 RX ADMIN — AMPICILLIN SODIUM AND SULBACTAM SODIUM 3 G: 2; 1 INJECTION, POWDER, FOR SOLUTION INTRAMUSCULAR; INTRAVENOUS at 17:07

## 2021-01-18 RX ADMIN — ASPIRIN 81 MG: 81 TABLET ORAL at 08:52

## 2021-01-18 RX ADMIN — Medication 10 ML: at 21:11

## 2021-01-18 RX ADMIN — DANTROLENE SODIUM 100 MG: 25 CAPSULE ORAL at 08:52

## 2021-01-18 RX ADMIN — NITROGLYCERIN 0.4 MG: 0.4 TABLET, ORALLY DISINTEGRATING SUBLINGUAL at 03:01

## 2021-01-18 RX ADMIN — BUMETANIDE 1 MG: 0.25 INJECTION, SOLUTION INTRAMUSCULAR; INTRAVENOUS at 08:50

## 2021-01-18 RX ADMIN — DOXYCYCLINE HYCLATE 100 MG: 100 CAPSULE ORAL at 21:09

## 2021-01-18 RX ADMIN — Medication 1000 UNITS: at 08:52

## 2021-01-18 RX ADMIN — SODIUM CHLORIDE, PRESERVATIVE FREE 10 ML: 5 INJECTION INTRAVENOUS at 08:53

## 2021-01-18 RX ADMIN — BACLOFEN 40 MG: 10 TABLET ORAL at 08:51

## 2021-01-18 RX ADMIN — ISOSORBIDE MONONITRATE 30 MG: 30 TABLET ORAL at 08:50

## 2021-01-18 RX ADMIN — SENNOSIDES 8.6 MG: 8.6 TABLET, FILM COATED ORAL at 08:52

## 2021-01-18 RX ADMIN — CARVEDILOL 3.12 MG: 3.12 TABLET, FILM COATED ORAL at 08:52

## 2021-01-18 RX ADMIN — LEVOTHYROXINE SODIUM 88 MCG: 0.09 TABLET ORAL at 21:09

## 2021-01-18 RX ADMIN — AMPICILLIN SODIUM AND SULBACTAM SODIUM 3 G: 2; 1 INJECTION, POWDER, FOR SOLUTION INTRAMUSCULAR; INTRAVENOUS at 00:33

## 2021-01-18 RX ADMIN — CARVEDILOL 3.12 MG: 3.12 TABLET, FILM COATED ORAL at 17:01

## 2021-01-18 RX ADMIN — LISINOPRIL 2.5 MG: 5 TABLET ORAL at 21:10

## 2021-01-18 RX ADMIN — NITROGLYCERIN 1 INCH: 20 OINTMENT TOPICAL at 06:12

## 2021-01-18 RX ADMIN — MAGNESIUM GLUCONATE 500 MG ORAL TABLET 400 MG: 500 TABLET ORAL at 08:50

## 2021-01-18 RX ADMIN — CITALOPRAM 20 MG: 20 TABLET, FILM COATED ORAL at 08:51

## 2021-01-18 RX ADMIN — DOXYCYCLINE HYCLATE 100 MG: 100 CAPSULE ORAL at 08:50

## 2021-01-18 RX ADMIN — DOCUSATE SODIUM 100 MG: 100 CAPSULE, LIQUID FILLED ORAL at 10:33

## 2021-01-18 RX ADMIN — Medication 1 TABLET: at 22:17

## 2021-01-18 RX ADMIN — BACLOFEN 40 MG: 10 TABLET ORAL at 21:09

## 2021-01-18 RX ADMIN — Medication 500 MCG: at 08:51

## 2021-01-18 ASSESSMENT — PAIN SCALES - GENERAL
PAINLEVEL_OUTOF10: 0
PAINLEVEL_OUTOF10: 0

## 2021-01-18 ASSESSMENT — PAIN DESCRIPTION - LOCATION: LOCATION: CHEST

## 2021-01-18 NOTE — CARE COORDINATION
1/18/2021 - Cardiology, pulmonology, palliative care following. Currently on 6L NC. Spoke with pt , Jose Miguel Godfrey. They are requesting a zoom call. Called the zoom rounder line and they will call Wolfgang and set up a day/time for the zoom call. Jose Miguel Godfrey states when pt is discharged, they use [address] ambulance in [address] [place] [address] or [phone]. Informed Wolfgang that [facility] and [facility] care will be notified when pt is discharged and they will continue to follow pt. Ambulance form and face sheet in envelope on chart. SW/CM will follow.

## 2021-01-18 NOTE — PROGRESS NOTES
Palliative Care Department  [phone]  Progress Note    Patel Catherine  [id number]  Hospital Day: 7    Date of Initial Consult: 1/14/21  Referring Provider: Dr. Brian Carmona was consulted for assistance with: goals of care and code status discussion      HPI:   Patel Catherine is a 77 y. o. with a past medical history of hypertension, multiple sclerosis, CHF, CAD, Sarcoidosis, MI (stent placement April 2020) and diabetes  who was admitted on 1/12/2021 from home with a CHIEF COMPLAINT of chest pain.  gave pt 3 full doses of ASA at home with chest pain resolving. Cardiology was consulted. Pt was admitted for further evaluation and care for unstable angina and nonSTEMI. 1/14/21 pt had recurring chest pain and underwent PCI of severe in-stent restenosis of proximal to mid LAD stent; IRINA placed.        ASSESSMENT/PLAN:     Active Hospital Problems    Diagnosis Date Noted    Essential hypertension [I10] 03/25/2019     Priority: Medium    Hypothyroidism [E03.9] 03/25/2019     Priority: Medium    Chest pain [R07.9]     Palliative care by specialist [Z51.5]     Goals of care, counseling/discussion [Z71.89]     Hypotension [I95.9] 01/13/2021    SIRS (systemic inflammatory response syndrome) ([facility] [R65.10] 01/13/2021    Anemia [D64.9] 01/13/2021    Chronic indwelling Barger catheter [Z97.8] 01/13/2021    Unstable angina ([facility] [I20.0] 01/13/2021    NSTEMI (non-ST elevated myocardial infarction) ([facility] [I21.4] 01/12/2021    Chronic combined systolic and diastolic CHF (congestive heart failure) ([facility] [I50.42] 04/23/2020    Hydronephrosis [N13.30] 04/22/2020    Multiple vessel coronary artery disease [I25.10] 04/21/2020    Type 2 diabetes mellitus with hyperglycemia, without long-term current use of insulin ([facility] [E11.65] 10/30/2019    Hyperlipidemia [E78.5] 07/31/2019    Neurogenic bladder [N31.9] 07/31/2019    Constipation [K59.00] 07/31/2019    Lung mass [R91.8] 03/24/2019  MS (multiple sclerosis) ([facility] [address] Ryan Art 10/30/2013     Pertinent Hospital Diagnoses   ? NSTEMI s/p PCI with placement of stent 1/14/21- Cardiology following; continues on BB, antiplatelets, ACE  ? Hx of multiple sclerosis  ? Chronic heart failure; ischemic cardiomyopathy-last LVEF 45%; continue to monitor; f/u with Cardiology      Palliative Care Encounter / Counseling Regarding Goals of Care  ? Angela Keepers Dattilio, Does Not have capacity for medical decision-making. Capacity is time limited and situation/question specific  ? During encounter  Margarita Moses was surrogate medical decision-maker  ? Outcome of goals of care meeting: to return home once medically stable; with [facility] as well as Palliative care with community hospice  ? Code status changed to LIMITED-  no intubation; no CPR; no shock but YES to resuscitative medications  ? Advanced Directives: no HPOA or Living Will noted in chart  ? Surrogate/Legal NOK: Alexandre Fry @ [phone]      Spiritual assessment: no spiritual distress identified  Bereavement and grief: to be determined  Referrals to: none today    SUBJECTIVE:     Details of Conversation:    1/18-  Patient seen at the bedside, alert and in no distress. She denies chest pain or shortness of breath. She does complain of stiffness in her neck, but other wish has no other complaints. She is looking forward to a zoom call with her family later today. Plan of care at this time is to return home with [facility] and palliative care with [facility], as was established prior to admission. Otherwise no immediate palliative needs are identified at this time. 1/15- Met with pt; she is using Trilogy at present but able to converse. She denies pain at present; states she is comfortable and isn't short of breath while using Trilogy. Spoke with , Dalton To by phone. States he has been sitting in the parking lot all day since he lives an hour away and wants to be available if needed. He is anxious and upset about not being able to be with his wife since he states \"she is helpless\" with M.S. and feels he would be a support to her. He is requesting to change code status to allow for intubation if needed; order changed to NO CPR; NO SHOCK BUT WANTS INTUBATION AND MEDS. States he fears his wife may need intubated since she has been complaining of feeling short of breath. She denies feeling short of breath while on Trilogy while I was in the room. Staff nurse updated. 1/14 Met with  Dalton To at bedside. Provided update and explained role of Palliative medicine. States he is familiar with Palliative care since he has had this service for his wife for quite some time in their home in [facility]. They did have hospice for only a few days but revoked since he and his wife wish to continue Trilogy for respiratory issues; want to continue treating infections; and continued medical care if needed including all meds for her M.S. They just don't want aggressive resuscitation like CPR, intubation or defibrillation. Code status changed to limited to indicate their wishes. He also would accept transfer to ICU if needed to continue medical treatment including pressors if needed. He has good understanding of his wife's current status and has been caring for her for years with multiple sclerosis; being wheelchair bound since 2004. States they have been  62 yrs and he is hopeful she can return home with assistance of aides and palliative care. Emotional support offered. Spoke with and updated staff nurse.          OBJECTIVE:   Prognosis: Guarded    Physical Exam: BP (!) 117/56   Pulse 92   Temp 97.7 °F (36.5 °C)   Resp 16   Ht 5' (1.524 m)   Wt 142 lb 6.4 oz (64.6 kg)   SpO2 100%   BMI 27.81 kg/m²   Gen:   NAD, alert, resting comfortably  HEENT:  Normocephalic, atraumatic, mucosa moist, EOMI  Neck:  Supple, trachea midline, no JVD  Lungs:  CTA bilaterally; respirations unlabored  Heart:  RRR, distant heart tones, no murmur, rub, or gallop noted during exam  Abd:  Soft, non tender, non distended, bowel sounds present  : saldana in place  Ext:  Limited movement; weak, no edema, pulses present  Skin:  Warm and dry  Neuro:  Alert, following commands    Social History:   The patient currently lives at home with   TOBACCO:  reports that she has never smoked. She has never used smokeless tobacco.  ETOH:  reports no history of alcohol use. Objective data reviewed: labs, images, records, medication use, vitals and chart    Discussed patient and the plan of care with the other IDT members: Palliative Medicine IDT Team    Lew Bryson. [address] [place] [address] APRN-CNP  Palliative Medicine    Time/Communication  Greater than 50% of time spent, total 15 minutes in counseling and coordination of care at the bedside regarding goals of care, symptom management, diagnosis and prognosis and see above. Thank you for allowing Palliative Medicine to participate in the care of [address] [place] [address].   Yaron DELGADILLO-CNS, PAIGE

## 2021-01-18 NOTE — PROGRESS NOTES
Hospitalist Progress Note      SYNOPSIS: Patient admitted on 2021 for       SUBJECTIVE:    Patient seen and examined  Records reviewed      Temp (24hrs), Av.2 °F (36.2 °C), Min:96 °F (35.6 °C), Max:97.8 °F (36.6 °C)    DIET: DIET CARDIAC;  CODE: Limited    Intake/Output Summary (Last 24 hours) at 2021 1009  Last data filed at 2021 0615  Gross per 24 hour   Intake 360 ml   Output 1400 ml   Net -1040 ml       OBJECTIVE:    BP (!) 117/56   Pulse 92   Temp 97.7 °F (36.5 °C)   Resp 16   Ht 5' (1.524 m)   Wt 142 lb 6.4 oz (64.6 kg)   SpO2 100%   BMI 27.81 kg/m²     General appearance: Alert, no acute distress  HEENT: Normocephalic, atraumatic  Neck: Supple. No jugular venous distention.    Respiratory: Bibasilar crackles  Cardiovascular: Regular rate rhythm, normal S1-S2  Abdomen: Soft, nontender, nondistended  Musculoskeletal: Normal range of motion, no deformity  Neurologic alert, follows simple commands    ASSESSMENT:  NSTEMI s/p PCI of LAD/circumflex  Possible pneumonia  Leukocytosis  Hypotension  Coronary artery disease  Acute on chronic combined systolic/diastolic congestive heart failure  Hypothyroidism  Anemia  Type 2 diabetes  Hyperlipidemia       PLAN:  Cardiology following  Pulmonology following  Unasyn 3 g every 6 hours  Continue doxycycline  Bumex 1 mg IV twice daily  Continue amiodarone, aspirin, atorvastatin, carvedilol, Plavix, Imdur, lisinopril  Sliding scale insulin          Medications:  REVIEWED DAILY    Infusion Medications    dextrose       Scheduled Medications    isosorbide mononitrate  30 mg Oral Daily    bumetanide  1 mg Intravenous BID    doxycycline hyclate  100 mg Oral 2 times per day    enoxaparin  40 mg Subcutaneous Daily    ampicillin-sulbactam  3 g Intravenous Q6H    amiodarone bolus  150 mg Intravenous Once    sodium chloride flush  10 mL Intravenous 2 times per day    aspirin  81 mg Oral Daily    atorvastatin  10 mg Oral Nightly  baclofen  40 mg Oral BID    calcium-vitamin D  1 tablet Oral Nightly    citalopram  20 mg Oral QAM    clopidogrel  75 mg Oral Daily    vitamin B-12  500 mcg Oral Daily    dantrolene  100 mg Oral BID    docusate sodium  100 mg Oral Daily    ferrous sulfate  325 mg Oral Daily with breakfast    levothyroxine  88 mcg Oral Nightly    magnesium oxide  400 mg Oral QAM    polyethylene glycol  17 g Oral Daily    Vitamin D  1,000 Units Oral QAM    insulin lispro  0-10 Units Subcutaneous 4x Daily AC & HS    sodium chloride flush  10 mL Intravenous 2 times per day    senna  1 tablet Oral Daily    carvedilol  3.125 mg Oral BID WC    lisinopril  2.5 mg Oral Daily    sodium chloride flush  10 mL Intravenous 2 times per day     PRN Meds: acetaminophen, ipratropium-albuterol, glucose, dextrose, glucagon (rDNA), dextrose, sodium chloride flush, promethazine **OR** ondansetron, acetaminophen **OR** acetaminophen, polyethylene glycol, nitroGLYCERIN, acetaminophen    Labs:     Recent Labs     01/17/21  1218   WBC 7.2   HGB 8.8*   HCT 28.2*          Recent Labs     01/17/21  1218      K 3.3*      CO2 25   BUN 33*   CREATININE 0.7   CALCIUM 8.8       Recent Labs     01/17/21  1218   PROT 6.7   ALKPHOS 66   ALT 22   AST 24   BILITOT <0.2       No results for input(s): INR in the last 72 hours. No results for input(s): Brian Ana in the last 72 hours.     Chronic labs:    Lab Results   Component Value Date    CHOL 127 01/13/2021    TRIG 148 01/13/2021    HDL 35 01/13/2021    LDLCALC 62 01/13/2021    TSH 3.02 09/02/2020    INR 1.0 01/13/2021    LABA1C 4.9 01/13/2021       Radiology: REVIEWED DAILY    +++++++++++++++++++++++++++++++++++++++++++++++++  [address], [address]  +++++++++++++++++++++++++++++++++++++++++++++++++ NOTE: This report was transcribed using voice recognition software. Every effort was made to ensure accuracy; however, inadvertent computerized transcription errors may be present.

## 2021-01-18 NOTE — PROGRESS NOTES
Pulmonary [facility]                             Pulmonary Consult/Progress Note :            Reason for Consultation:Lung nodule Arneta Sever pneumonia   CC : chest pain     HPI  Doing slightly better  On 6 L  No chest pain   Used BiPAP last night and she feels better with it   On Bumex ,still not negative balance     PHYSICAL EXAMINATION:     VITAL SIGNS:  BP (!) 110/56   Pulse 94   Temp 97.8 °F (36.6 °C)   Resp 18   Ht 5' (1.524 m)   Wt 142 lb 6.4 oz (64.6 kg)   SpO2 99%   BMI 27.81 kg/m²   Wt Readings from Last 3 Encounters:   01/15/21 142 lb 6.4 oz (64.6 kg)   12/30/20 150 lb (68 kg)   11/23/20 150 lb (68 kg)     Temp Readings from Last 3 Encounters:   01/17/21 97.8 °F (36.6 °C)   12/30/20 97 °F (36.1 °C) (Temporal)   11/23/20 98.6 °F (37 °C) (Temporal)     TMAX:  BP Readings from Last 3 Encounters:   01/17/21 (!) 110/56   12/30/20 (!) 106/50   12/30/20 (!) 132/57     Pulse Readings from Last 3 Encounters:   01/17/21 94   12/30/20 74   11/23/20 84           INTAKE/OUTPUTS:  I/O last 3 completed shifts:   In: 36 [P.O.:920]  Out: 600 [Urine:600]    Intake/Output Summary (Last 24 hours) at 1/17/2021 2209  Last data filed at 1/17/2021 1626  Gross per 24 hour   Intake 600 ml   Output 1200 ml   Net -600 ml       General Appearance: alert and oriented to person, place and time, well-developed and   well-nourished, in no acute distress   Eyes: pupils equal, round, and reactive to light, extraocular eye movements intact, conjunctivae normal and sclera anicteric   Neck: neck supple and non tender without mass, no thyromegaly, no thyroid nodules and no cervical adenopathy   Pulmonary/Chest:rhonchi bilateral  Cardiovascular: normal rate, regular rhythm, normal S1 and S2, no murmurs, rubs, clicks or gallops, distal pulses intact, no carotid bruits, no murmurs, no gallops, no carotid bruits and no JVD Abdomen: obese, soft, non-tender, non-distended, normal bowel sounds, no masses or organomegaly   Extremities:no edema   Musculoskeletal: normal range of motion, no joint swelling, deformity or tenderness   Neurologic: reflexes normal and symmetric, no cranial nerve deficit noted    LABS/IMAGING:    CBC:  Lab Results   Component Value Date    WBC 7.2 01/17/2021    HGB 8.8 (L) 01/17/2021    HCT 28.2 (L) 01/17/2021    .4 (H) 01/17/2021     01/17/2021    LYMPHOPCT 7.8 (L) 01/17/2021    RBC 2.81 (L) 01/17/2021    MCH 31.3 01/17/2021    MCHC 31.2 (L) 01/17/2021    RDW 15.2 (H) 01/17/2021    NEUTOPHILPCT 79.2 01/17/2021    MONOPCT 10.3 01/17/2021    EOSPCT 2.2 03/12/2019    BASOPCT 0.4 01/17/2021    NEUTROABS 5.68 01/17/2021    LYMPHSABS 0.56 (L) 01/17/2021    MONOSABS 0.74 01/17/2021    EOSABS 0.14 01/17/2021    BASOSABS 0.03 01/17/2021       Recent Labs     01/17/21  1218 01/15/21  0604 01/14/21  0430   WBC 7.2 13.2* 12.5*   HGB 8.8* 9.2* 9.3*   HCT 28.2* 28.2* 29.4*   .4* 100.0* 101.4*    380 371       BMP:   Recent Labs     01/15/21  0604 01/17/21  1218    142   K 4.2 3.3*    105   CO2 16* 25   BUN 29* 33*   CREATININE 0.7 0.7       MG:   Lab Results   Component Value Date    MG 2.3 01/17/2021     Ca/Phos:   Lab Results   Component Value Date    CALCIUM 8.8 01/17/2021    PHOS 4.9 (H) 04/21/2020     Amylase: No results found for: AMYLASE  Lipase:   Lab Results   Component Value Date    LIPASE 49 04/21/2020     LIVER PROFILE:   Recent Labs     01/17/21  1218   AST 24   ALT 22   BILITOT <0.2   ALKPHOS 66       PT/INR:   No results for input(s): PROTIME, INR in the last 72 hours. APTT:   No results for input(s): APTT in the last 72 hours.                 PROBLEM LIST:  Patient Active Problem List   Diagnosis    MS (multiple sclerosis) ([facility] [address]    Spastic quadriparesis (HCC)    Lung mass    Essential hypertension    Hypothyroidism    H/O sarcoidosis    Hyperlipidemia  Neurogenic bladder    HUDSON (nonalcoholic steatohepatitis)    Constipation    Osteopenia of multiple sites    Type 2 diabetes mellitus with hyperglycemia, without long-term current use of insulin (HCC)    STEMI (ST elevation myocardial infarction) (HCC)    Multiple vessel coronary artery disease    Hydronephrosis    Chronic combined systolic and diastolic CHF (congestive heart failure) (HCC)    Left ventricular thrombus    Metabolic encephalopathy    UTI (urinary tract infection) due to Enterococcus    Nephrolithiasis    Vitamin D deficiency    NSTEMI (non-ST elevated myocardial infarction) (HCC)    Hypotension    SIRS (systemic inflammatory response syndrome) (HCC)    Anemia    Chronic indwelling Barger catheter    Unstable angina (HCC)    Chest pain    Palliative care by specialist    Goals of care, counseling/discussion               ASSESSMENT:  1.) Moderate Hypoxia   2) Possible Pneumonia   2.)Possible CHF   3.)Sarcoid ,lung  4. )RUL scar /noule   5. )LLL atelectasis vs pneumonia   6) MS       PLAN:   procalcitonon ,BNP and CRP ,compatble with pneumonia and fluid overlaod   Cover with abx on Unasyn   Bumex 1 mg bid   Chest vest and hypertonic saline   She can be on NC and or BiPAP at night   *-The nodule seems more scar than malignancy and I believe  it could be from her sarcoid,no images to compare=  *_incentive spirometer  *- Flutter valve   PT and OT   DVT Px start Lovenox ,watch hb    Thank you very much for allowing me to participate in the care of this pleasant patient , should you have any questions ,please do not hesitate to contact me    Latoya Roca [address] and [facility]

## 2021-01-18 NOTE — PROGRESS NOTES
DAILY PROGRESS NOTE - THE HEART CENTER    SUBJECTIVE:    She is being followed for chest discomfort and coronary disease, ischemic cardiomyopathy. Certainly appears less dyspneic from previous but states that she has been having some chest discomfort. Has some last night that required sublingual nitroglycerin. ECG this morning showed nonspecific ST abnormalities. IRINA to LAD stent ISR, along with obtuse marginal IRINA on 1/15. LVEF decreased from 45% to 20-25%. Questionable ventricular tachycardia for which she received amiodarone for one IV dose. Hyperlipidemia, late presentation anterior STEMI April 2020 post LAD drug-eluting stent placement. LVEF 25% with apical thrombus that resolved with triple therapy including aspirin, clopidogrel, Eliquis. Recovery of LVEF to 45% with no apical thrombus June 2020. Bedbound due to multiple sclerosis. OBJECTIVE:    Her vital signs were reviewed today. Blood pressure 110/60, pulse 80 and regular, respiratory rate 16.     Vitals:    01/18/21 0259   BP: (!) 120/59   Pulse: 97   Resp: 16   Temp:    SpO2: 98%       Scheduled Meds:   nitroglycerin  1 inch Topical 4 times per day    bumetanide  1 mg Intravenous BID    doxycycline hyclate  100 mg Oral 2 times per day    enoxaparin  40 mg Subcutaneous Daily    ampicillin-sulbactam  3 g Intravenous Q6H    amiodarone bolus  150 mg Intravenous Once    sodium chloride flush  10 mL Intravenous 2 times per day    aspirin  81 mg Oral Daily    atorvastatin  10 mg Oral Nightly    baclofen  40 mg Oral BID    calcium-vitamin D  1 tablet Oral Nightly    citalopram  20 mg Oral QAM    clopidogrel  75 mg Oral Daily    vitamin B-12  500 mcg Oral Daily    dantrolene  100 mg Oral BID    docusate sodium  100 mg Oral Daily    ferrous sulfate  325 mg Oral Daily with breakfast    levothyroxine  88 mcg Oral Nightly    magnesium oxide  400 mg Oral QAM    polyethylene glycol  17 g Oral Daily    Vitamin D  1,000 Units Oral QAM #3.  Ischemic cardiomyopathycontinue carvedilol and lisinopril at low dose. Hold lisinopril ONLY if systolic pressure goes below 90. #4. Hyperlipidemiastatin. #5.  Continue to follow.

## 2021-01-19 ENCOUNTER — APPOINTMENT (OUTPATIENT)
Dept: GENERAL RADIOLOGY | Age: 76
DRG: 246 | End: 2021-01-19
Payer: MEDICARE

## 2021-01-19 ENCOUNTER — TELEPHONE (OUTPATIENT)
Dept: FAMILY MEDICINE CLINIC | Age: 76
End: 2021-01-19

## 2021-01-19 VITALS
TEMPERATURE: 98.6 F | OXYGEN SATURATION: 100 % | WEIGHT: 139.7 LBS | RESPIRATION RATE: 16 BRPM | DIASTOLIC BLOOD PRESSURE: 66 MMHG | SYSTOLIC BLOOD PRESSURE: 128 MMHG | HEART RATE: 94 BPM | HEIGHT: 60 IN | BODY MASS INDEX: 27.43 KG/M2

## 2021-01-19 LAB — METER GLUCOSE: 148 MG/DL (ref 74–99)

## 2021-01-19 PROCEDURE — 2580000003 HC RX 258: Performed by: INTERNAL MEDICINE

## 2021-01-19 PROCEDURE — 6370000000 HC RX 637 (ALT 250 FOR IP): Performed by: INTERNAL MEDICINE

## 2021-01-19 PROCEDURE — 2700000000 HC OXYGEN THERAPY PER DAY

## 2021-01-19 PROCEDURE — 82962 GLUCOSE BLOOD TEST: CPT

## 2021-01-19 PROCEDURE — 6360000002 HC RX W HCPCS: Performed by: INTERNAL MEDICINE

## 2021-01-19 PROCEDURE — 71045 X-RAY EXAM CHEST 1 VIEW: CPT

## 2021-01-19 PROCEDURE — 6370000000 HC RX 637 (ALT 250 FOR IP): Performed by: FAMILY MEDICINE

## 2021-01-19 RX ORDER — BUMETANIDE 1 MG/1
1 TABLET ORAL 2 TIMES DAILY
Status: DISCONTINUED | OUTPATIENT
Start: 2021-01-19 | End: 2021-01-19 | Stop reason: HOSPADM

## 2021-01-19 RX ORDER — DOXYCYCLINE HYCLATE 100 MG/1
100 CAPSULE ORAL EVERY 12 HOURS SCHEDULED
Qty: 20 CAPSULE | Refills: 0 | Status: SHIPPED | OUTPATIENT
Start: 2021-01-19 | End: 2021-01-29

## 2021-01-19 RX ORDER — AMOXICILLIN AND CLAVULANATE POTASSIUM 875; 125 MG/1; MG/1
1 TABLET, FILM COATED ORAL 2 TIMES DAILY WITH MEALS
Qty: 20 TABLET | Refills: 0 | Status: SHIPPED | OUTPATIENT
Start: 2021-01-19 | End: 2021-01-19 | Stop reason: SDUPTHER

## 2021-01-19 RX ORDER — DOXYCYCLINE HYCLATE 100 MG/1
100 CAPSULE ORAL EVERY 12 HOURS SCHEDULED
Qty: 20 CAPSULE | Refills: 0 | Status: SHIPPED | OUTPATIENT
Start: 2021-01-19 | End: 2021-01-19 | Stop reason: SDUPTHER

## 2021-01-19 RX ORDER — AMOXICILLIN AND CLAVULANATE POTASSIUM 875; 125 MG/1; MG/1
1 TABLET, FILM COATED ORAL 2 TIMES DAILY WITH MEALS
Qty: 20 TABLET | Refills: 0 | Status: SHIPPED
Start: 2021-01-19 | End: 2021-01-22 | Stop reason: SINTOL

## 2021-01-19 RX ORDER — BUMETANIDE 1 MG/1
1 TABLET ORAL 2 TIMES DAILY
Qty: 30 TABLET | Refills: 3 | Status: SHIPPED | OUTPATIENT
Start: 2021-01-19 | End: 2021-01-19 | Stop reason: SDUPTHER

## 2021-01-19 RX ORDER — BUMETANIDE 1 MG/1
1 TABLET ORAL 2 TIMES DAILY
Qty: 30 TABLET | Refills: 3 | Status: SHIPPED
Start: 2021-01-19 | End: 2021-02-05 | Stop reason: SDUPTHER

## 2021-01-19 RX ORDER — NITROGLYCERIN 0.4 MG/1
0.4 TABLET SUBLINGUAL EVERY 5 MIN PRN
Qty: 25 TABLET | Refills: 3 | Status: SHIPPED
Start: 2021-01-19 | End: 2021-02-08 | Stop reason: ALTCHOICE

## 2021-01-19 RX ADMIN — CLOPIDOGREL 75 MG: 75 TABLET, FILM COATED ORAL at 10:05

## 2021-01-19 RX ADMIN — FERROUS SULFATE TAB 325 MG (65 MG ELEMENTAL FE) 325 MG: 325 (65 FE) TAB at 10:57

## 2021-01-19 RX ADMIN — Medication 500 MCG: at 10:02

## 2021-01-19 RX ADMIN — AMPICILLIN SODIUM AND SULBACTAM SODIUM 3 G: 2; 1 INJECTION, POWDER, FOR SOLUTION INTRAMUSCULAR; INTRAVENOUS at 00:48

## 2021-01-19 RX ADMIN — BACLOFEN 40 MG: 10 TABLET ORAL at 10:03

## 2021-01-19 RX ADMIN — CITALOPRAM 20 MG: 20 TABLET, FILM COATED ORAL at 10:04

## 2021-01-19 RX ADMIN — Medication 1000 UNITS: at 10:02

## 2021-01-19 RX ADMIN — SODIUM CHLORIDE, PRESERVATIVE FREE 10 ML: 5 INJECTION INTRAVENOUS at 10:04

## 2021-01-19 RX ADMIN — DANTROLENE SODIUM 100 MG: 25 CAPSULE ORAL at 10:03

## 2021-01-19 RX ADMIN — BUMETANIDE 1 MG: 1 TABLET ORAL at 10:02

## 2021-01-19 RX ADMIN — ASPIRIN 81 MG: 81 TABLET ORAL at 10:02

## 2021-01-19 RX ADMIN — LISINOPRIL 2.5 MG: 5 TABLET ORAL at 10:05

## 2021-01-19 RX ADMIN — AMPICILLIN SODIUM AND SULBACTAM SODIUM 3 G: 2; 1 INJECTION, POWDER, FOR SOLUTION INTRAMUSCULAR; INTRAVENOUS at 05:40

## 2021-01-19 RX ADMIN — CARVEDILOL 3.12 MG: 3.12 TABLET, FILM COATED ORAL at 10:04

## 2021-01-19 RX ADMIN — ISOSORBIDE MONONITRATE 30 MG: 30 TABLET ORAL at 10:03

## 2021-01-19 RX ADMIN — MAGNESIUM GLUCONATE 500 MG ORAL TABLET 400 MG: 500 TABLET ORAL at 10:02

## 2021-01-19 RX ADMIN — DOXYCYCLINE HYCLATE 100 MG: 100 CAPSULE ORAL at 10:02

## 2021-01-19 NOTE — PROGRESS NOTES
Pt is ready for discharge in stable status, triteodora dawn and nathan instructions  And all medications  Given to ambulance members

## 2021-01-19 NOTE — TELEPHONE ENCOUNTER
Spouse jatin calling she was hospitalized for a heart attack. She will be released from [facility] today. She got a stent replaced&a new one put in. She has an appt with you 02/03. Ok to do FU then?      He also needs a new rx for nitro sent in

## 2021-01-19 NOTE — PLAN OF CARE
I spoke with daughter on the phone today and informed her of pending discharge on p.o. bumetanide. She asked about antibiotic treatment and I told her I will defer this to the pulmonary service.   Dr. Pauly Jacobsen

## 2021-01-19 NOTE — PROGRESS NOTES
Hospitalist Progress Note      SYNOPSIS: Patient admitted on 2021 for       SUBJECTIVE:    Patient seen and examined  Records reviewed      Temp (24hrs), Av.8 °F (36.6 °C), Min:96.2 °F (35.7 °C), Max:98.6 °F (37 °C)    DIET: DIET CARDIAC;  CODE: Limited    Intake/Output Summary (Last 24 hours) at 2021 0856  Last data filed at 2021 0500  Gross per 24 hour   Intake 1200 ml   Output 1400 ml   Net -200 ml       OBJECTIVE:    /66   Pulse 94   Temp 98.6 °F (37 °C)   Resp 16   Ht 5' (1.524 m)   Wt 139 lb 11.2 oz (63.4 kg)   SpO2 100%   BMI 27.28 kg/m²     General appearance: Alert, no acute distress  HEENT: Normocephalic, atraumatic  Neck: Supple. No jugular venous distention.    Respiratory: Bibasilar crackles  Cardiovascular: Regular rate rhythm, normal S1-S2  Abdomen: Soft, nontender, nondistended  Musculoskeletal: Normal range of motion, no deformity  Neurologic alert, follows simple commands    ASSESSMENT:  NSTEMI s/p PCI of LAD/circumflex  Possible pneumonia  Leukocytosis  Hypotension  Coronary artery disease  Acute on chronic combined systolic/diastolic congestive heart failure  Hypothyroidism  Anemia  Type 2 diabetes  Hyperlipidemia       PLAN:  Cardiology following  Pulmonology following  Unasyn 3 g every 6 hours  Continue doxycycline  Bumex 1 mg IV twice daily  Continue amiodarone, aspirin, atorvastatin, carvedilol, Plavix, Imdur, lisinopril  Sliding scale insulin        Medications:  REVIEWED DAILY    Infusion Medications    dextrose       Scheduled Medications    bumetanide  1 mg Oral BID    isosorbide mononitrate  30 mg Oral Daily    doxycycline hyclate  100 mg Oral 2 times per day    enoxaparin  40 mg Subcutaneous Daily    ampicillin-sulbactam  3 g Intravenous Q6H    sodium chloride flush  10 mL Intravenous 2 times per day    aspirin  81 mg Oral Daily    atorvastatin  10 mg Oral Nightly    baclofen  40 mg Oral BID    calcium-vitamin D  1 tablet Oral Nightly  citalopram  20 mg Oral QAM    clopidogrel  75 mg Oral Daily    vitamin B-12  500 mcg Oral Daily    dantrolene  100 mg Oral BID    docusate sodium  100 mg Oral Daily    ferrous sulfate  325 mg Oral Daily with breakfast    levothyroxine  88 mcg Oral Nightly    magnesium oxide  400 mg Oral QAM    polyethylene glycol  17 g Oral Daily    Vitamin D  1,000 Units Oral QAM    insulin lispro  0-10 Units Subcutaneous 4x Daily AC & HS    sodium chloride flush  10 mL Intravenous 2 times per day    senna  1 tablet Oral Daily    carvedilol  3.125 mg Oral BID WC    lisinopril  2.5 mg Oral Daily    sodium chloride flush  10 mL Intravenous 2 times per day     PRN Meds: acetaminophen, ipratropium-albuterol, glucose, dextrose, glucagon (rDNA), dextrose, sodium chloride flush, promethazine **OR** ondansetron, acetaminophen **OR** acetaminophen, polyethylene glycol, nitroGLYCERIN, acetaminophen    Labs:     Recent Labs     01/17/21  1218 01/18/21  1035   WBC 7.2 6.2   HGB 8.8* 8.5*   HCT 28.2* 27.4*    334       Recent Labs     01/17/21  1218 01/18/21  1035    147*   K 3.3* 3.4*    107   CO2 25 28   BUN 33* 24*   CREATININE 0.7 0.6   CALCIUM 8.8 8.7       Recent Labs     01/17/21  1218   PROT 6.7   ALKPHOS 66   ALT 22   AST 24   BILITOT <0.2       No results for input(s): INR in the last 72 hours. No results for input(s): Shadeland Elmer in the last 72 hours.     Chronic labs:    Lab Results   Component Value Date    CHOL 127 01/13/2021    TRIG 148 01/13/2021    HDL 35 01/13/2021    LDLCALC 62 01/13/2021    TSH 3.02 09/02/2020    INR 1.0 01/13/2021    LABA1C 4.9 01/13/2021       Radiology: REVIEWED DAILY    +++++++++++++++++++++++++++++++++++++++++++++++++  [address], [place]  +++++++++++++++++++++++++++++++++++++++++++++++++ NOTE: This report was transcribed using voice recognition software. Every effort was made to ensure accuracy; however, inadvertent computerized transcription errors may be present.

## 2021-01-19 NOTE — TELEPHONE ENCOUNTER
, Rubio Hargrove called to say that pt was not given her medications prior to leaving the hospital.  Rubio Hargrove states her meds were to be given to the ambulance staff and they were not. He is asking if you would send over the meds prescribed in the hospital to RA. Pt needs to start these tonight per Rubio Hargrove. I did pend the meds that were prescribed in the hospital.  Rubio Hargrove did not know the name of them but said it was two antibiotics and something for fluid. Rubio Hargrove will call the hospital and notify them her meds were not sent home with her.

## 2021-01-19 NOTE — TELEPHONE ENCOUNTER
Okay to keep 2-3 appointment unless needs to be seen sooner in which case we would have to see what we could do with the schedule    Okay to refill nitro

## 2021-01-19 NOTE — PROGRESS NOTES
CLINICAL PHARMACY NOTE: MEDS TO [address] Select Patient?: Yes  Total # of Prescriptions Filled: 3   The following medications were delivered to the patient:  · Doxycycline Hyclate 100  · Bumex 1  · Amoxicillin 875-125  Total # of Interventions Completed: 2  Time Spent (min): 30    Additional Documentation:

## 2021-01-19 NOTE — PROGRESS NOTES
Pulmonary [facility]                             Pulmonary Consult/Progress Note :            Reason for Consultation:Lung nodule Jonathan Escort pneumonia   CC : chest pain     HPI  Doing slightly better  On 6 L  No chest pain   Used BiPAP last night and she feels better with it   On Bumex ,still not negative balance   Very weak    PHYSICAL EXAMINATION:     VITAL SIGNS:  BP (!) 115/56   Pulse 94   Temp 98.2 °F (36.8 °C)   Resp 16   Ht 5' (1.524 m)   Wt 142 lb 6.4 oz (64.6 kg)   SpO2 98%   BMI 27.81 kg/m²   Wt Readings from Last 3 Encounters:   01/15/21 142 lb 6.4 oz (64.6 kg)   12/30/20 150 lb (68 kg)   11/23/20 150 lb (68 kg)     Temp Readings from Last 3 Encounters:   01/18/21 98.2 °F (36.8 °C)   12/30/20 97 °F (36.1 °C) (Temporal)   11/23/20 98.6 °F (37 °C) (Temporal)     TMAX:  BP Readings from Last 3 Encounters:   01/18/21 (!) 115/56   12/30/20 (!) 106/50   12/30/20 (!) 132/57     Pulse Readings from Last 3 Encounters:   01/18/21 94   12/30/20 74   11/23/20 84           INTAKE/OUTPUTS:  I/O last 3 completed shifts: In: 894 [P.O.:720;  I.V.:200]  Out: 1400 [Urine:1400]    Intake/Output Summary (Last 24 hours) at 1/18/2021 2222  Last data filed at 1/18/2021 2157  Gross per 24 hour   Intake 1200 ml   Output 1500 ml   Net -300 ml       General Appearance: alert and oriented to person, place and time, well-developed and   well-nourished, in no acute distress   Eyes: pupils equal, round, and reactive to light, extraocular eye movements intact, conjunctivae normal and sclera anicteric   Neck: neck supple and non tender without mass, no thyromegaly, no thyroid nodules and no cervical adenopathy   Pulmonary/Chest:rhonchi bilateral  Cardiovascular: normal rate, regular rhythm, normal S1 and S2, no murmurs, rubs, clicks or gallops, distal pulses intact, no carotid bruits, no murmurs, no gallops, no carotid bruits and no JVD Abdomen: obese, soft, non-tender, non-distended, normal bowel sounds, no masses or organomegaly   Extremities:no edema   Musculoskeletal: normal range of motion, no joint swelling, deformity or tenderness   Neurologic: reflexes normal and symmetric, no cranial nerve deficit noted    LABS/IMAGING:    CBC:  Lab Results   Component Value Date    WBC 6.2 01/18/2021    HGB 8.5 (L) 01/18/2021    HCT 27.4 (L) 01/18/2021    .9 (H) 01/18/2021     01/18/2021    LYMPHOPCT 11.6 (L) 01/18/2021    RBC 2.69 (L) 01/18/2021    MCH 31.6 01/18/2021    MCHC 31.0 (L) 01/18/2021    RDW 15.3 (H) 01/18/2021    NEUTOPHILPCT 74.3 01/18/2021    MONOPCT 10.8 01/18/2021    EOSPCT 2.2 03/12/2019    BASOPCT 0.6 01/18/2021    NEUTROABS 4.63 01/18/2021    LYMPHSABS 0.72 (L) 01/18/2021    MONOSABS 0.67 01/18/2021    EOSABS 0.15 01/18/2021    BASOSABS 0.04 01/18/2021       Recent Labs     01/18/21  1035 01/17/21  1218 01/15/21  0604   WBC 6.2 7.2 13.2*   HGB 8.5* 8.8* 9.2*   HCT 27.4* 28.2* 28.2*   .9* 100.4* 100.0*    375 380       BMP:   Recent Labs     01/17/21  1218 01/18/21  1035    147*   K 3.3* 3.4*    107   CO2 25 28   BUN 33* 24*   CREATININE 0.7 0.6       MG:   Lab Results   Component Value Date    MG 2.3 01/17/2021     Ca/Phos:   Lab Results   Component Value Date    CALCIUM 8.7 01/18/2021    PHOS 4.9 (H) 04/21/2020     Amylase: No results found for: AMYLASE  Lipase:   Lab Results   Component Value Date    LIPASE 49 04/21/2020     LIVER PROFILE:   Recent Labs     01/17/21  1218   AST 24   ALT 22   BILITOT <0.2   ALKPHOS 66       PT/INR:   No results for input(s): PROTIME, INR in the last 72 hours. APTT:   No results for input(s): APTT in the last 72 hours.                 PROBLEM LIST:  Patient Active Problem List   Diagnosis    MS [facility]multiple sclerosis) ([facility] [address]    Spastic quadriparesis (HCC)    Lung mass    Essential hypertension    Hypothyroidism    H/O sarcoidosis    Hyperlipidemia

## 2021-01-19 NOTE — PROGRESS NOTES
DAILY PROGRESS NOTE - THE HEART CENTER    SUBJECTIVE:    She is being followed for chest discomfort and coronary disease, ischemic cardiomyopathy. No further chest pain. Dyspneic at this time.  requesting for discharge today and any IV medication to be changed to oral.    Admitted with chest discomfort and non-STEMI. Underwent heart catheterization and had IRINA to LAD stent ISR, along with obtuse marginal IRINA on 1/15. LVEF decreased from 45% to 20-25%. Questionable ventricular tachycardia for which she received amiodarone for one IV dose. OBJECTIVE:    Her vital signs were reviewed today.     Vitals:    01/19/21 0500   BP: (!) 111/57   Pulse: 95   Resp: 15   Temp: 96.2 °F (35.7 °C)   SpO2: 99%       Scheduled Meds:   isosorbide mononitrate  30 mg Oral Daily    bumetanide  1 mg Intravenous BID    doxycycline hyclate  100 mg Oral 2 times per day    enoxaparin  40 mg Subcutaneous Daily    ampicillin-sulbactam  3 g Intravenous Q6H    sodium chloride flush  10 mL Intravenous 2 times per day    aspirin  81 mg Oral Daily    atorvastatin  10 mg Oral Nightly    baclofen  40 mg Oral BID    calcium-vitamin D  1 tablet Oral Nightly    citalopram  20 mg Oral QAM    clopidogrel  75 mg Oral Daily    vitamin B-12  500 mcg Oral Daily    dantrolene  100 mg Oral BID    docusate sodium  100 mg Oral Daily    ferrous sulfate  325 mg Oral Daily with breakfast    levothyroxine  88 mcg Oral Nightly    magnesium oxide  400 mg Oral QAM    polyethylene glycol  17 g Oral Daily    Vitamin D  1,000 Units Oral QAM    insulin lispro  0-10 Units Subcutaneous 4x Daily AC & HS    sodium chloride flush  10 mL Intravenous 2 times per day    senna  1 tablet Oral Daily    carvedilol  3.125 mg Oral BID WC    lisinopril  2.5 mg Oral Daily    sodium chloride flush  10 mL Intravenous 2 times per day     Continuous Infusions:   dextrose PRN Meds:.acetaminophen, ipratropium-albuterol, glucose, dextrose, glucagon (rDNA), dextrose, sodium chloride flush, promethazine **OR** ondansetron, acetaminophen **OR** acetaminophen, polyethylene glycol, nitroGLYCERIN, acetaminophen    REVIEW OF SYSTEMS: Unable to obtain due to mental status. FAMILY HISTORY: Negative for CAD in first-degree relatives. SOCIAL HISTORY: Negative for alcohol, tobacco, or illicit drug use. PHYSICAL EXAM:    General Appearance: Awake, appears in no distress. Neck:  no bruits  Lungs:  Normal expansion. Clear bilaterally to auscultation. No rales or wheezing. Heart:  Heart sounds are normal.  Regular rate and rhythm without murmur, gallop or rub. Abdomen:  Soft, non-tender. Extremities: Extremities warm to touch, pink, with trace bilateral edema. Neuro/musculosketal:  Unremarkable. LABS:    Recent Labs     01/17/21  1218 01/18/21  1035    147*   CREATININE 0.7 0.6       Recent Labs     01/17/21  1218 01/18/21  1035   HGB 8.8* 8.5*       No results for input(s): INR in the last 72 hours. IMPRESSION:    #1.  Coronary disease post PCI and IRINA stent placementcontinue dual antiplatelet therapy at this time. No further chest discomfort on oral isosorbide. Chest discomfort may have been due to to distal nonrevascularizable CAD. #2.  Acute on chronic systolic heart failurenearly 2 L negative at this time and okay from cardiac standpoint if she is discharged. I will change IV to oral bumetanide milligram twice daily. Arrange follow-up in my office with Dr. Yann Ewing, whom she normally sees, 7 to 10 days. #3.  Ischemic cardiomyopathycontinue carvedilol and lisinopril at low dose. Hold lisinopril ONLY if systolic pressure goes below 90. #4. Hyperlipidemiastatin. #5.  As above, okay for discharge today and I arranged follow-up in my office with Dr. Sergio Martins. I will update her daughter, whom I have been communicating with by phone on a regular basis.

## 2021-01-19 NOTE — CARE COORDINATION
Care Coordination -   The patient is set up to be picked up at 2 pm by [address] ambulance @ [phone] . I called Courtney Nye at [facility] and faxed all clinical to fax [phone] and I also notified [facility] at [facility] care @[phone] and she will follow for home.  I called Mr Simone Pop @ [phone] and informed him that his wife will be picked up here at 2 pm . I will follow Jaylen Iglesias [phone]

## 2021-01-19 NOTE — PROGRESS NOTES
Palliative Medicine  Progress Note    Goals of care have been addressed. Goals is for d/c home with [place] [id number] and Community Palliative care as was established prior to admission. The  is anxious for d/c today. [id number] [address] for d/c from the palliative perspective, though discharge is pending attending and other consultants opinions. This was discussed with the patient's  and he states he understands. Otherwise there are no further PM needs at this time. PM will now sign off. If new PM needs arise, please re-consult. Thank you. Bailey Bettencourt APRN-CNP  Palliative Medicine    Note: This report was completed using computerSupernova voiced recognition software. Every effort has been made to ensure accuracy; however, inadvertent computerized transcription errors may be present.

## 2021-01-19 NOTE — TELEPHONE ENCOUNTER
Requested Prescriptions     Signed Prescriptions Disp Refills    nitroGLYCERIN (NITROSTAT) 0.4 MG SL tablet 25 tablet 3     Sig: Place 1 tablet under the tongue every 5 minutes as needed for Chest pain up to max of 3 total doses. If no relief after 1 dose, call 911.      Authorizing Provider: Adelina Tovar   Thanks

## 2021-01-20 ENCOUNTER — TELEPHONE (OUTPATIENT)
Dept: FAMILY MEDICINE CLINIC | Age: 76
End: 2021-01-20

## 2021-01-20 ENCOUNTER — CARE COORDINATION (OUTPATIENT)
Dept: CASE MANAGEMENT | Age: 76
End: 2021-01-20

## 2021-01-20 DIAGNOSIS — F33.0 MILD EPISODE OF RECURRENT MAJOR DEPRESSIVE DISORDER (HCC): ICD-10-CM

## 2021-01-20 DIAGNOSIS — G82.50 SPASTIC QUADRIPARESIS (HCC): ICD-10-CM

## 2021-01-20 RX ORDER — NORTRIPTYLINE HYDROCHLORIDE 25 MG/1
25 CAPSULE ORAL NIGHTLY
Qty: 90 CAPSULE | Refills: 0 | Status: SHIPPED
Start: 2021-01-20 | End: 2021-02-23 | Stop reason: ALTCHOICE

## 2021-01-20 NOTE — TELEPHONE ENCOUNTER
[facility]       [phone]     She is calling with some concerns about Lord Hoang based on a conversation she had with her  Alexia Lorenzo. She was following up on the discharge and asking some questions, that Alexia Lorenzo was answering, but in an agitated way. She was able to speak to Lord Hoang briefly, who was having trouble speaking, sounding very dry. When she was able to speak to Alexia Lorenzo again she suggested that he hydrate her, but was informed that she is fluid restricted at this time. She has put her notes in the chart for your review, but concerned that her follow up with you is not until 1/26/21, even tho Alexia Lorenzo is comfortable with that.

## 2021-01-20 NOTE — TELEPHONE ENCOUNTER
Last Appointment:  10/28/2020  Future Appointments   Date Time Provider Marisol Garcia   1/26/2021  1:20 PM Susana Winn MD [facility]   2/3/2021  2:30 PM Preeti Schaefer  [address] [place] [address]   2/23/2021  2:00 PM Faye Olsen MD [facility]   10/8/2021  9:00 AM Yamil Turner MD [facility]

## 2021-01-20 NOTE — CARE COORDINATION
[facility] Transitions Initial Follow Up Call    Call within 2 business days of discharge: Yes    Patient: Milly Hernandez Patient : 1945   MRN: [id number]  Reason for Admission: NSTEMI  Discharge Date: 21 RARS: Readmission Risk Score: 28      Last Discharge [facility]       Complaint Diagnosis Description Type Department Provider    21 Chest Pain Chest pain, unspecified type ED to Hosp-Admission (Discharged) (ADMITTED) [id number] Tammy Hanks, DO; Andree Curling. .. Spoke with: Juan Luis Sanchez, patient's spouse with permission of patient    Facility: [facility]      Challenges to be reviewed by the provider   Additional needs identified to be addressed with provider Yes  decline in status this morning         Method of communication with provider : phone    Was this a readmission? No  Patient stated reason for admission: chest pain  Patients top risk factors for readmission: functional physical ability, medical condition and caregiver stress    Care Transition Nurse (CTN) contacted the family by telephone to perform post hospital discharge assessment. Verified name and  with family as identifiers. Provided introduction to self, and explanation of the CTN role. CTN reviewed discharge instructions, medical action plan and red flags with family who verbalized understanding. Family given an opportunity to ask questions and does not have any further questions or concerns at this time. Were discharge instructions available to patient? Yes. Reviewed appropriate site of care based on symptoms and resources available to patient including: PCP, Specialist, Home health and When to call 911. The family agrees to contact the PCP office for questions related to their healthcare. Discussed follow-up appointments. If no appointment was previously scheduled, appointment scheduling offered: Yes. Patient has vv with Dr. Arash Genao on 2/3/21.   Discussed changing appt to within 7 days of discharge from the hospital.  Patient's spouse declined. This CTN did contact Spring Fontana MA at Dr. Jocelyn Beard office regarding need for vv asap with patient. Is follow up appointment scheduled within 7 days of discharge? No  Non[facility] follow up appointment(s): n/a    Plan for follow-up call in 1-2 days based on severity of symptoms and risk factors. Plan for next call: symptom management-chest pain, sob, mentation, lethargy, self management-blood sugars, follow up appointment-Scheduled follow up appts with Dr. Easton Smith, Dr. Michel Tapia. Changed appt with Dr. Ingrid Hernandez and medication management-Medication changes or questions. CTN provided contact information for future needs. Non-face-to-face services provided:  Scheduled appointment with PCP-Patient has vv with Dr. Ingrid Hernandez on 2/3/21. Discussed changing appt to within 7 days of discharge from the hospital.  Patient's spouse declined. This CTN did contact Spring Fontana MA at Dr. Jocelyn Beard office regarding need for vv asap with patient. Scheduled appointment with Specialist-Patient's spouse to schedule appt with Dr. Easton Smith and Dr. Michel Tapia. Verified virutal visit with Dr. Patel Kang on 1/26/21 at 1430. Obtained and reviewed discharge summary and/or continuity of care documents  Communication with home health agencies or other community services the patient is currently using-Per patient's spouse, [facility] bess performed today.     Care Transitions 24 Hour Call    Do you have any ongoing symptoms?: Yes  Patient-reported symptoms: Fatigue  Do you have a copy of your discharge instructions?: Yes  Do you have all of your prescriptions and are they filled?: Yes  Have you been contacted by a [facility] Pharmacist?: No  Have you scheduled your follow up appointment?: Yes  How are you going to get to your appointment?: Other (Comment: vv)  Were you discharged with any Home Care or Post Acute Services: Yes  Post Acute Services: Home Health, Other (Comment: Palliative Nurse; [facility] Palliative)  Care Transitions Interventions       Spoke with patient's spouse and patient for initial care transition call post hospital discharge. Med review not completed; 1111F not entered. Patient's spouse reports he has been managing his wife's meds for a year without issue and \"there are about 30 of them. \"  Discussed new medications prescribed upon discharge. Patient's spouse instructed patient is to take Augmentin and Doxycycline as directed until completely finished and not to stop unless directed by physician. Patient's spouse verbalized understanding. Patient's spouse denies any abnormal bleeding due to Plavix. Patient presented to the emergency department on 1/12/21 for chest pain. Patient's spouse reports patient was fine last night and this morning. Patient ate breakfast this morning without issue. Patient's spouse administered morning medications including new meds -- Augmentin, Doxy and Bumex -- and patient began to c/o chest pain and sob. Per Violeta Malik, patient's SpO2 100% on room air; however, patient was placed on Trilogy, 5l/min, and and Nitro 0.4 mg x 1 sublingual tablet was administered. Wolfgang notified patient's oxygen order is for 2l/min. Patient does utilized nc during the day and Trilogy hs. Reviewed oxygen safety with patient's spouse. Patient denies chest pain at this time. Patient lethargic per Violeta Malik. Patient arouseable to verbal stimulus. Patient oriented to self, disoriented to place and time. Per Violeta Malik, home care was just in the home and vital signs were fine; denies fever. Per Violeta Malik, urine is dark yellow, but clear. Per Violeta Malik, patient has not urinated much since he emptied catheter bag this morning. Patient c/o dry mouth to this CTN; and noted by this CTN while speaking to patient. Patient's spouse reports he is trying to restrict her fluids. Patient's spouse notified, patient needs water due to taking Bumex. Wolfgang notified even with a fluid restriction, patient may drink 1L of fluid daily.   No fluid restriction noted in EMR. Water provided to patient by spouse. Per Syl Ruiz, fbs today 167 mg/dl. Patient does follow a low carb, low sodium diet. Patient reports she is tired, denies pain. [facility] Wolfgang notified by this CTN of plan to contact Dr. Reid Sims office via phone due to status decline. Syl Joseph reports he is not taking patient back to ER and patient is [facility]. Radha Morales [facility] notified of all of the above. Wolfgang updated this CTN spoke to Radha July at Dr. Reid Sims office, who will speak to Dr. Jeannette Rdz. Wolfgang appreciative of update.     Follow Up  Future Appointments   Date Time Provider Marisol Garcia   1/26/2021  1:20 PM Eugenia Martinez MD [facility]   2/3/2021  2:30 PM Giulia Mckeon  [facility]   2/23/2021  2:00 PM MD Ray Zamorano [facility]   10/8/2021  9:00 AM Yamil Chatterjee MD [address] [facility]       Noelle Marie RN

## 2021-01-20 NOTE — TELEPHONE ENCOUNTER
I do not recommend that medicine outside of hospice   May have to disucss with cardiology regarding ranexa

## 2021-01-20 NOTE — TELEPHONE ENCOUNTER
I spoke w/ Yvrose Flakes. He feels that Ephraim Peters is doing well and does not wish to move up the appointment. She will be following up with cardiology. He will let us know if anything changes.

## 2021-01-20 NOTE — TELEPHONE ENCOUNTER
Clair Luis - Daughter - Cardiac Nurse with [employer] ([employer]). She is calling to tell you that she is leaving [facility] [employer] to come home to help care for her mom after recent discharge. One of the concerns at this point in the [facility] is ineffective for her chest pain at times. There is still liquid Roxinol from last year in the home. She advised her dad to give her 2-4mg every 30 min for this, but wants you to ok that as well. She lives in [place], but coming home tomorrow. She is bringing [facility] papers for you to fill out. Is this something that you can do for her?

## 2021-01-20 NOTE — TELEPHONE ENCOUNTER
I spoke w/ Scar Mitchell and Violeta Malik and explained Dr Lara Part concerns. They will check w/ cardiology to see which medication would be best.     Scar Mitchell will bring in FMLA forms. She is requesting intermittent leave to use PRN. She will include a note on the forms w/ additional information.

## 2021-01-21 NOTE — DISCHARGE INSTR - COC
Continuity of Care Form    Patient Name: Nirali Pereira   :  1945  MRN:  [id number]    Admit date:  2021  Discharge date:  ***    Code Status Order: Prior   Advance Directives:   [address] [place] [address] Documentation     Date/Time Healthcare Directive Type of Healthcare Directive Copy in [address] [facility] [place] [address] Agent's Name Healthcare Agent's Phone Number    [phone]  Yes, patient has an advance directive for healthcare treatment  Durable power of  for health care;Living will;Health care treatment directive  No, copy requested from family  [address]  [phone]          Admitting Physician:  Sarah Edwards MD  PCP: Ramon Wallace MD    Discharging Nurse: [facility] Unit/Room#: 9781/9025-D  Discharging Unit Phone Number: ***    Emergency Contact:   Extended Emergency Contact Information  Primary Emergency Contact: Owen Douglas  Address: [address] [place] [address], [address] [place], [place] [address]vin [facility] [address]  Phone: [phone]  Work Phone: [phone]  Mobile Phone: [phone]  Relation: Spouse   needed? No  Secondary Emergency Contact: Gr[place] Phone: [phone]  Mobile Phone: [phone]  Relation: Child   needed? No    Past Surgical History:  Past Surgical History:   Procedure Laterality Date        CORONARY ANGIOPLASTY WITH STENT PLACEMENT  2020    Dr. Shiol Farfan   3.0 x 22mm Resolute MAAME to Prox LAD.   No LV    CORONARY ANGIOPLASTY WITH STENT PLACEMENT  2021    3.0 x 30 [place] to the prox LAD and a 2.5 x 20 [place] to the Obtuse marginal by Dr. Tim Aguilar / [facility] Robert Garcia / Gucci Chesterton Left 2020    CYSTOSCOPY LEFT RETROGRADE PYELOGRAM STENT INSERTION, STRATTON CATHETER performed by Susanne Avila DO at [address] Left 3/21/2019    LEFT FEMUR CEPHALLOMEDULLARY NAIL performed by Tonya Boss MD at [facility]  Metabolic encephalopathy Z95.93    UTI (urinary tract infection) due to Enterococcus N39.0, B95.2    Nephrolithiasis N20.0    Vitamin D deficiency E55.9    NSTEMI (non-ST elevated myocardial infarction) ([facility]) I21.4    Hypotension I95.9    SIRS (systemic inflammatory response syndrome) ([facility]) R65.10    Anemia D64.9    Chronic indwelling Barger catheter Z97.8    Unstable angina ([facility]) I20.0    Chest pain R07.9    Palliative care by specialist Z51.5    Goals of care, counseling/discussion Z71.89       Isolation/Infection:   Isolation          No Isolation        Patient Infection Status     Infection Onset Added Last Indicated Last Indicated By Review Planned Expiration Resolved Resolved By    None active    Resolved    COVID-19 Rule Out 01/13/21 01/13/21 01/13/21 COVID-19 (Ordered)   01/13/21 Rule-Out Test Resulted    COVID-19 Rule Out 12/23/20 12/23/20 12/23/20 Covid-19 Ambulatory (Ordered)   12/25/20 Rule-Out Test Resulted    COVID-19 Rule Out 11/18/20 11/18/20 11/17/20 Covid-19 Ambulatory (Ordered)   11/19/20 Rule-Out Test Resulted    COVID-19 Rule Out 07/13/20 07/13/20 07/13/20 COVID-19 (Ordered)   07/13/20 Rule-Out Test Resulted    COVID-19 Rule Out 06/09/20 06/09/20 06/09/20 COVID-19 (Ordered)   06/09/20 Rule-Out Test Resulted    COVID-19 Rule Out 04/25/20 04/25/20 04/25/20 COVID-19 (Ordered)   05/12/20 Peyton Hardy RN    Test cancelled by physician.  Previous tests neg x2    COVID-19 Rule Out 04/21/20 04/22/20 04/21/20 COVID-19 (Ordered)   04/22/20 Rule-Out Test Resulted    Not detected 4/22/2020    COVID-19 Rule Out 04/21/20 04/21/20 04/22/20 COVID-19 (Ordered)   04/22/20 Rule-Out Test Resulted    Not detected 4/21/2020          Nurse Assessment:  Last Vital Signs: /66   Pulse 94   Temp 98.6 °F (37 °C)   Resp 16   Ht 5' (1.524 m)   Wt 139 lb 11.2 oz (63.4 kg)   SpO2 100%   BMI 27.28 kg/m²     Last documented pain score (0-10 scale): Pain Level: 0  Last Weight: Wt Readings from Last 1 Encounters:   21 139 lb 11.2 oz (63.4 kg)     Mental Status:  {IP PT MENTAL STATUS:45107}    IV Access:  { [place] IV ACCESS:779890686}    Nursing Mobility/ADLs:  Walking   {CHP DME EDMK:054324245}  Transfer  {CHP DME CRQD:291864620}  Bathing  {CHP DME OHZM:828717188}  Dressing  {P DME KLCX:785942655}  Toileting  {CHP DME TQV}  Feeding  {P DME VIGX:884302393}  Med Admin  {[facility] DME PUA}  Med Delivery   { [place] MED Delivery:572458535}    Wound Care Documentation and Therapy:        Elimination:  Continence:   · Bowel: {YES / QZ:07252}  · Bladder: {YES / NR:23470}  Urinary Catheter: {Urinary Catheter:609642366}   Colostomy/Ileostomy/Ileal Conduit: {YES / EC:23101}       Date of Last BM: ***  No intake or output data in the 24 hours ending 21 0919  I/O last 3 completed shifts: In: 1200 [P.O.:800;  I.V.:400]  Out: 1400 [Urine:1400]    Safety Concerns:     [address] CAR[place] Safety Concerns:922620752}    Impairments/Disabilities:      { CAR[place] Impairments/Disabilities:240088885}    Nutrition Therapy:  Current Nutrition Therapy:   { CAROLINA Diet List:906531469}    Routes of Feeding: {[facility] DME Other Feedings:895606591}  Liquids: {Slp liquid thickness:12754}  Daily Fluid Restriction: {P DME Yes amt example:343190973}  Last Modified Barium Swallow with Video (Video Swallowing Test): {Done Not Done IVQX:732366221}    Treatments at the Time of Hospital Discharge:   Respiratory Treatments: ***  Oxygen Therapy:  {Therapy; copd oxygen:15229}  Ventilator:    { CC Vent GJQH:669690940}    Rehab Therapies: {THERAPEUTIC INTERVENTION:0232336016}  Weight Bearing Status/Restrictions: { CC Weight Bearin}  Other Medical Equipment (for information only, NOT a DME order):  {EQUIPMENT:409639550}  Other Treatments: ***    Patient's personal belongings (please select all that are sent with patient):  {[facility] DME Belongings:341783282}    HATTIE SIGNATURE:  {Esignature:101848209} CASE MANAGEMENT/SOCIAL WORK SECTION    Inpatient Status Date: ***    Readmission Risk Assessment Score:  Readmission Risk              Risk of Unplanned Readmission:        0           Discharging to Facility/ Agency   · Name:   · Address:  · Phone:  · Fax:    Dialysis Facility (if applicable)   · Name:  · Address:  · Dialysis Schedule:  · Phone:  · Fax:    / signature: {Esignature:638023249}    PHYSICIAN SECTION    Prognosis: {Prognosis:6936195217}    Condition at Discharge: [address] [place] [address] Patient Condition:903328444}    Rehab Potential (if transferring to Rehab): {Prognosis:0046480614}    Recommended Labs or Other Treatments After Discharge: ***    Physician Certification: I certify the above information and transfer of Tammi Orourke  is necessary for the continuing treatment of the diagnosis listed and that she requires {Admit to Appropriate Level of Care:14829} for {GREATER/LESS:637653801} 30 days.      Update Admission H&P: {CHP DME Changes in [facility]:374361128}    PHYSICIAN SIGNATURE:  {Esignature:849452139}

## 2021-01-22 ENCOUNTER — CARE COORDINATION (OUTPATIENT)
Dept: CASE MANAGEMENT | Age: 76
End: 2021-01-22

## 2021-01-22 ENCOUNTER — TELEPHONE (OUTPATIENT)
Dept: ENDOCRINOLOGY | Age: 76
End: 2021-01-22

## 2021-01-22 NOTE — PROGRESS NOTES
Dtr called; pt developed some shortness of breath associated with antibiotics upon discharge. Dr. Ventura Zeng notified. Discontinue Augmentin antibiotic and allergies updated. Pt to continue Doxycycline as ordered (no issues) per dtr. Advised Dr. Ventura Zeng feels no additional antibiotics are needed at this time.

## 2021-01-22 NOTE — CARE COORDINATION
[facility] Transitions Follow Up Call    2021    Patient: Ross Douglas  Patient : 1945   MRN: [id number]  Reason for Admission: chest pain  Discharge Date: 21 RARS: Readmission Risk Score: 29      -Spoke with patient's daughter Armida Wanger communication release of information form) for follow up care transition call. Telly Fish is a nurse and will be staying with her her mom until next Saturday(21.)  -Sharyn Machado has been in communication with her mother's providers. Sharyn Machado states her mother is stable at the moment occasionally having a little SOB. Sharyn Machado states saldana catheter draining well and emptied for 800 ml of urine.  -Palliative care visit is next Wednesday(21.) Hospice had been considered in the past but then her mother \"bounced back\" per Sharyn Machado. -CTN will continue to follow for care transitions.        Follow Up  Future Appointments   Date Time Provider Marisol Garcia   2021  1:20 PM Myranda Fuchs MD [facility]   2/3/2021  2:30 PM Jacquelyn Mills  W [address] [place] [address]   2021  2:00 PM MD [facility]   10/8/2021  9:00 AM Kye Weinberg MD [facility]       Kimberly Sue, HATTIE

## 2021-01-23 ENCOUNTER — TELEPHONE (OUTPATIENT)
Dept: PRIMARY CARE CLINIC | Age: 76
End: 2021-01-23

## 2021-01-23 RX ORDER — ONDANSETRON 4 MG/1
4 TABLET, ORALLY DISINTEGRATING ORAL EVERY 8 HOURS PRN
Qty: 30 TABLET | Refills: 0 | Status: SHIPPED | OUTPATIENT
Start: 2021-01-23

## 2021-01-25 ENCOUNTER — TELEPHONE (OUTPATIENT)
Dept: FAMILY MEDICINE CLINIC | Age: 76
End: 2021-01-25

## 2021-01-25 DIAGNOSIS — R09.89 RUNNY NOSE: ICD-10-CM

## 2021-01-25 DIAGNOSIS — R06.02 SOB (SHORTNESS OF BREATH): Primary | ICD-10-CM

## 2021-01-25 RX ORDER — FLUTICASONE PROPIONATE 50 MCG
2 SPRAY, SUSPENSION (ML) NASAL DAILY PRN
Qty: 1 BOTTLE | Refills: 1 | Status: SHIPPED | OUTPATIENT
Start: 2021-01-25

## 2021-01-25 NOTE — TELEPHONE ENCOUNTER
I do not recommend the Roxanol as discussed previously    I am uncertain if her anxiety is causing the shortness of breath or is the shortness of breath causing anxiety    If she is having shortness of breath I would be concerned about congestive heart failure type symptoms especially since the breathing treatments do not seem to be helping.      So in order to help I probably would need clarification whether it seems anxiety may be driving the shortness of breath or do I need to explore shortness of breath as a cause of the anxiety    If shortness of breath is driving the anxiety would recommend a chest x-ray to make sure there is no congestive heart failure type symptoms    If anxiety is driving the shortness of breath would have to consider some type of medication treatment to help with anxiety, whether that be SSRI or SNRI, or buspirone, or the benzodiazepines as mentioned in previous note    For nasal congestion would recommend either Flonase or Nasacort

## 2021-01-25 NOTE — TELEPHONE ENCOUNTER
Elyssa - Daughter       She is calling to let you know that Lord Hoang has a lot of nasal congestion and asking if you would send a script for a nasal spray to [facility]

## 2021-01-25 NOTE — TELEPHONE ENCOUNTER
When I spoke w/ Les De La Cruz earlier she wanted you to know that Debbie Escobedo is having an issue w/  SOB. They give a breathing treatment but it takes awhile to take effect. Debbie Escobedo will get anxious when this happens. She was wanting to know if you could recommend something. She asked about using the Roxanol. I reminded her that you did feel comfortable continuing Roxanol and that you considered it more of a Hospice medication. Les De La Cruz feels that Xanax, Ativan, and Vistaril will be too strong for Michaela. She also worried that they would drop her BP low. Any suggestions?

## 2021-01-25 NOTE — TELEPHONE ENCOUNTER
Orders Placed This Encounter   Procedures    XR CHEST STANDARD (2 VW)     Standing Status:   Future     Standing Expiration Date:   2022     Scheduling Instructions:      Call [phone] to schedule     Order Specific Question:   Reason for exam:     Answer:    To be done / [facility]     Requested Prescriptions     Signed Prescriptions Disp Refills    fluticasone (FLONASE) 50 MCG/ACT nasal spray 1 Bottle 1     Si sprays by Each Nostril route daily as needed for Rhinitis     Authorizing Provider: Kacie Whitehead     Order signed    Refill sent

## 2021-01-26 ENCOUNTER — VIRTUAL VISIT (OUTPATIENT)
Dept: ENDOCRINOLOGY | Age: 76
End: 2021-01-26
Payer: MEDICARE

## 2021-01-26 DIAGNOSIS — E03.9 HYPOTHYROIDISM, UNSPECIFIED TYPE: ICD-10-CM

## 2021-01-26 DIAGNOSIS — E11.69 TYPE 2 DIABETES MELLITUS WITH OTHER SPECIFIED COMPLICATION, WITHOUT LONG-TERM CURRENT USE OF INSULIN (HCC): Primary | ICD-10-CM

## 2021-01-26 DIAGNOSIS — E55.9 VITAMIN D DEFICIENCY: ICD-10-CM

## 2021-01-26 DIAGNOSIS — E78.5 HYPERLIPIDEMIA, UNSPECIFIED HYPERLIPIDEMIA TYPE: ICD-10-CM

## 2021-01-26 PROCEDURE — 1123F ACP DISCUSS/DSCN MKR DOCD: CPT | Performed by: INTERNAL MEDICINE

## 2021-01-26 PROCEDURE — 1111F DSCHRG MED/CURRENT MED MERGE: CPT | Performed by: INTERNAL MEDICINE

## 2021-01-26 PROCEDURE — G8484 FLU IMMUNIZE NO ADMIN: HCPCS | Performed by: INTERNAL MEDICINE

## 2021-01-26 PROCEDURE — 4040F PNEUMOC VAC/ADMIN/RCVD: CPT | Performed by: INTERNAL MEDICINE

## 2021-01-26 PROCEDURE — G8427 DOCREV CUR MEDS BY ELIG CLIN: HCPCS | Performed by: INTERNAL MEDICINE

## 2021-01-26 PROCEDURE — G8417 CALC BMI ABV UP PARAM F/U: HCPCS | Performed by: INTERNAL MEDICINE

## 2021-01-26 PROCEDURE — 1090F PRES/ABSN URINE INCON ASSESS: CPT | Performed by: INTERNAL MEDICINE

## 2021-01-26 PROCEDURE — 1036F TOBACCO NON-USER: CPT | Performed by: INTERNAL MEDICINE

## 2021-01-26 PROCEDURE — 2022F DILAT RTA XM EVC RTNOPTHY: CPT | Performed by: INTERNAL MEDICINE

## 2021-01-26 PROCEDURE — 3044F HG A1C LEVEL LT 7.0%: CPT | Performed by: INTERNAL MEDICINE

## 2021-01-26 PROCEDURE — G8400 PT W/DXA NO RESULTS DOC: HCPCS | Performed by: INTERNAL MEDICINE

## 2021-01-26 PROCEDURE — 99214 OFFICE O/P EST MOD 30 MIN: CPT | Performed by: INTERNAL MEDICINE

## 2021-01-26 PROCEDURE — 3017F COLORECTAL CA SCREEN DOC REV: CPT | Performed by: INTERNAL MEDICINE

## 2021-01-26 NOTE — PROGRESS NOTES
Reyes Rosales was read the following message We want to confirm that, for purposes of billing, this is a virtual visit with your provider for which we will submit a claim for reimbursement with your insurance company. You will be responsible for any copays, coinsurance amounts or other amounts not covered by your insurance company. If you do not accept this, unfortunately we will not be able to schedule a virtual visit with the provider. Do you accept?  Amy Presley responded YES

## 2021-01-26 NOTE — DISCHARGE SUMMARY
Hospitalist Discharge Summary    Patient ID: Rome Mohs   Patient : 1945  Patient's PCP: Preeti Schaefer MD    Admit Date: 2021  Admitting Physician: Dangelo Orellana MD    Discharge Date:  2021  Discharge Physician: Arianne Omer DO   Discharge Condition: Stable  Discharge Disposition: Home with [address] course in brief:  (Please refer to daily progress notes for a comprehensive review of the hospitalization by requesting medical records)  The patient is a 77y.o. year old female who is well-known to us and presents after midsternal chest pressure of moderate intensity at rest, similar to previous MI symptoms, without nausea, vomiting or diaphoresis. Recurrent symptoms earlier this morning relieved after sublingual nitroglycerin. Baseline troponin 0.02 increased to 0.12. EKG showing sinus rhythm and nonspecific ST changes.     Patient known to us from late presentation anterior myocardial infarction/shock in 2020 at which time she underwent LAD IRINA. Echocardiogram at that time showed EF 20-25% with apical clot. She was treated with several months of \"triple\" therapy including aspirin, clopidogrel and Eliquis. Subsequent echocardiogram in  showed EF 45% without apical clot. She is debilitated and essentially bedbound because of multiple sclerosis. 1. Unstable angina/non-STEMI with known late presentation anterior MI/shock/LAD drug-eluting stent last April. Continue dual antiplatelet therapy, beta-blocker, nitrate, ACE inhibitor. Recommend heart catheterization. Procedure risks and benefits outlined and she agrees to proceed     2. Ischemic cardiomyopathy with remote apical left ventricular clot which resolved after triple therapy.   Last LVEF 45%     3. Stable chronic combined heart failure     4. Multiple sclerosis    Patient was admitted. Cardiac catheterization was performed by cardiology. Pulmonology was consulted. Diagnosed with possible pneumonia started on antibiotic therapy. Placed on BiPAP. Patient clinically improved over the next several days discharged on January 19, 2021        Consults:   IP CONSULT TO INTERNAL MEDICINE  IP CONSULT TO CARDIOLOGY  IP CONSULT TO PALLIATIVE CARE  IP CONSULT TO PULMONOLOGY  IP CONSULT TO HOME CARE NEEDS    Discharge Diagnoses:  NSTEMI s/p PCI of LAD/circumflex  Possible pneumonia  Leukocytosis  Hypotension  Coronary artery disease  Acute on chronic combined systolic/diastolic congestive heart failure  Hypothyroidism  Anemia  Type 2 diabetes  Hyperlipidemia      Discharge Instructions / Follow up:    Future Appointments   Date Time Provider Marisol Garcia   1/26/2021  1:20 PM Myranda Fuchs MD [facility] ENDO [facility]   2/3/2021  2:30 PM Jacquelyn Mills MD [address] [place] [address]   2/23/2021  2:00 PM Spring Reyna MD [facility] NEURO [facility]   10/8/2021  9:00 AM [facility]       Continued appropriate risk factor modification of blood pressure, diabetes and serum lipids will remain essential to reducing risk of future atherosclerotic development    Activity: activity as tolerated    Significant labs:  CBC:   No results for input(s): WBC, RBC, HGB, HCT, MCV, RDW, PLT in the last 72 hours. BMP: No results for input(s): NA, K, CL, CO2, BUN, CREATININE, MG, PHOS in the last 72 hours. Invalid input(s): CA  LFT:  No results for input(s): PROT, ALB, ALKPHOS, ALT, AST, BILITOT, AMYLASE, LIPASE in the last 72 hours. PT/INR: No results for input(s): INR, APTT in the last 72 hours. BNP: No results for input(s): BNP in the last 72 hours.   Hgb A1C:   Lab Results   Component Value Date    LABA1C 4.9 01/13/2021     Folate and B12:   Lab Results   Component Value Date    ZPAUUSTZ19 5810 (H) 01/13/2021   ,   Lab Results   Component Value Date    FOLATE 17.6 01/13/2021 Thyroid Studies:   Lab Results   Component Value Date    TSH 3.02 09/02/2020       Urinalysis:    Lab Results   Component Value Date    NITRU Negative 01/16/2021    WBCUA PACKED 01/16/2021    WBCUA LARGE 09/10/2020    BACTERIA RARE 01/16/2021    RBCUA 1-3 01/16/2021    RBCUA MODERATE 09/10/2020    BLOODU LARGE 01/16/2021    SPECGRAV >=1.030 01/16/2021    GLUCOSEU Negative 01/16/2021       Imaging:  Echo Complete    Result Date: 1/16/2021 Transthoracic Echocardiography Report (TTE)  Demographics   Patient Name      Ventura Haynes  Gender              Female                    J   Medical Record    [id number]       Room Number         6277  Number   Account #         [de-identified]      Procedure Date      01/15/2021   Corporate ID                     Ordering Physician  Arlon Gaucher MD   Accession Number  [id number]     Referring Physician   Date of Birth     1945     Sonographer         Rola Muhammad                                                       MICHEAL   Age               77 year(s)     Interpreting        [facility] [address]                                   Physician           Danielle Green MD                                    Any Other  Procedure Type of Study   TTE procedure  Procedure Date Date: 01/15/2021 Start: 10:49 AM Study Location: Portable Technical Quality: Limited visualization due to body habitus. Indications:NSTEMI. Patient Status: Routine Contrast Medium: Definity. Height: 60 inches Weight: 150 pounds BSA: 1.65 m^2 BMI: 29.29 kg/m^2 BP: 115/64 mmHg Allergies   - Other drug:(Macrobid). Findings   Left Ventricle  Left ventricle is normal in size. Normal left ventricular wall thickness. Overall ejection fraction severely decreased. Ejection fraction is visually estimated at 20-25%. Apical hypokinesis. No thrombus is present in the left ventricle. Indeterminate diastolic function. Right Ventricle  Normal right ventricular size and function. Left Atrium  The left atrium is borderline dilated. Interatrial septum appears intact. Right Atrium  Normal right atrium size. Mitral Valve  Structurally normal mitral valve. No evidence of mitral valve stenosis. Mild mitral regurgitation is present. Tricuspid Valve  The tricuspid valve appears structurally normal.  Mild to moderate tricuspid regurgitation. RVSP is 45-50 mmHg. Pulmonary hypertension is moderate.    Aortic Valve  Individual aortic valve leaflets are not clearly visualized. No hemodynamically significant aortic stenosis is present. No evidence of aortic valve regurgitation. Pulmonic Valve  The pulmonic valve was not well visualized. No evidence of pulmonic valve stenosis. No evidence of any pulmonic regurgitation. Pericardial Effusion  No evidence of pericardial effusion. Pleural Effusion  No evidence of pleural effusion. Aorta  Aortic root dimension within normal limits.    Conclusions   Signature   ----------------------------------------------------------------  Electronically signed by Raheem Tracey MD(Interpreting  physician) on 01/16/2021 07:24 AM  ----------------------------------------------------------------  M-Mode/2D Measurements & Calculations   LV Diastolic    LV Systolic Dimension: 3.7  AV Cusp Separation: 1.5 cmLA  Dimension: 4.6  cm                          Dimension: 3.3 cmAO Root  cm              LV Volume Diastolic: 98 ml  Dimension: 2.7 cm  LV FS:19.6 %    LV Volume Systolic: 92.1 ml  LV PW           LV EDV/LV EDV Index: 98  Diastolic: 0.7  ZX/95 JK/D^5TJ ESV/LV ESV  cm              Index: 56.7 ml/34ml/ m^2    RV Diastolic Dimension: 3.2 cm  LV PW Systolic: EF Calculated: 29.7 %  1.1 cm          LV Mass Index: 60 l/min*m^2 LA/Aorta: 1.22  Septum          LV Length: 7.9 cm           Ascending Aorta: 3 cm  Diastolic: 0.7                              LA volume/Index: 35.3 ml  cm              LVOT: 1.9 cm                /21.39ml/m^2  Septum                                      RA Area: 9.5 cm^2  Systolic: 0.9  cm                                          IVC Expiration: 0.8 cm   LV Mass: 99.33  g  Doppler Measurements & Calculations   MV Peak E-Wave: 2.61 AV Peak Velocity:     LVOT Peak Velocity: 0.79 m/s  m/s                  1.16 m/s              LVOT Mean Velocity: 0.57 m/s                       AV Peak Gradient:     LVOT Peak Gradient: 2.5  MV Peak Gradient:    5.41 mmHg             mmHgLVOT Mean Gradient: 1.4  27.3 mmHg            AV Mean Velocity:     mmHg  MV Mean Gradient:    0.84 m/s              Estimated RVSP: 40.43 mmHg  13.1 mmHg            AV Mean Gradient: 3.1 Estimated RAP:3 mmHg  MV Mean Velocity:    mmHg  1.67 m/s             AV VTI: 16.6 cm  MV Deceleration      AV Area               TR Velocity:3.06 m/s  Time: 167.2 msec     (Continuity):1.93     TR Gradient:37.43 mmHg  MV P1/2t: 50.3 msec  cm^2                  PV Peak Velocity: 0.71 m/s  MVA by PHT:4.37 cm^2                       PV Peak Gradient: 2.03 mmHg  MV Area              LVOT VTI: 11.3 cm     PV Mean Velocity: 0.47 m/s  (continuity): 1 cm^2                       PV Mean Gradient: 1 mmHg                       Estimated PASP: 40.43                       mmHg  MR Velocity: 4.93  m/s  MR VTI: 118.9 cm  [email or web address] [id number]%3d    Fl Retrograde Pyelogram W Wo Kub    Result Date: 12/30/2020  EXAMINATION: SPOT FLUOROSCOPIC IMAGES 12/30/2020 12:22 pm TECHNIQUE: Fluoroscopy was provided by the radiology department for procedure. Radiologist was not present during examination. FLUOROSCOPY DOSE AND TYPE OR TIME AND EXPOSURES: 15 FLUOROSCOPY TIME: Not provided. COMPARISON: None HISTORY: ORDERING SYSTEM PROVIDED HISTORY: Kidney stone TECHNOLOGIST PROVIDED HISTORY: Reason for exam:->Kidney stone What reading provider will be dictating this exam?->CRC Intraprocedural imaging. FINDINGS: 15 spot images of the abdomen were obtained. Cholecystectomy clips present. Left ureteric stent was replaced. No obvious complication on the images provided. Intraprocedural fluoroscopic spot images as above. See separate procedure report for more information.     Xr Chest Portable    Result Date: 1/19/2021 EXAMINATION: ONE XRAY VIEW OF THE CHEST 1/19/2021 8:41 am COMPARISON: 01/16/2021 HISTORY: ORDERING SYSTEM PROVIDED HISTORY: Pulmonary edema TECHNOLOGIST PROVIDED HISTORY: Reason for exam:->Pulmonary edema What reading provider will be dictating this exam?->CRC FINDINGS: The heart is at upper limits of normal in size. Multifocal bilateral perihilar and lower lobe airspace disease is noted. Findings are favored to represent CHF. Superimposed pneumonia cannot be excluded. There are trace bilateral pleural effusions. 1. No interval change in the multifocal bilateral airspace disease favored to represent CHF with possibly superimposed pneumonia. 2. Stable small bilateral pleural effusions. Xr Chest Portable    Result Date: 1/16/2021  EXAMINATION: ONE XRAY VIEW OF THE CHEST 1/16/2021 7:04 pm COMPARISON: 01/15/2021 HISTORY: ORDERING SYSTEM PROVIDED HISTORY: dyspnea TECHNOLOGIST PROVIDED HISTORY: Reason for exam:->dyspnea What reading provider will be dictating this exam?->CRC FINDINGS: Interstitial and hazy opacities in perihilar and infrahilar locations. Opacities persist in lung bases silhouetting the hemidiaphragms. Mild cardiomegaly. No pneumothorax. Stable interstitial pulmonary edema or bilateral pneumonia with probable bilateral pleural effusions. Xr Chest Portable    Result Date: 1/15/2021  EXAMINATION: ONE XRAY VIEW OF THE CHEST 1/15/2021 2:47 pm COMPARISON: January 12, 2021 HISTORY: ORDERING SYSTEM PROVIDED HISTORY: chf TECHNOLOGIST PROVIDED HISTORY: Reason for exam:->chf What reading provider will be dictating this exam?->CRC FINDINGS: Interval progression/development of diffuse bilateral infiltrates. Opacities noted at the lung bases as well. The heart is mildly enlarged. There is blunting of the costophrenic angles bilaterally. No pneumothorax. CHF. Superimposed pneumonia can not be excluded.     Xr Chest Portable    Result Date: 1/12/2021 EXAMINATION: ONE XRAY VIEW OF THE CHEST 1/12/2021 10:00 pm COMPARISON: July 13, 2020 HISTORY: ORDERING SYSTEM PROVIDED HISTORY: chest pain TECHNOLOGIST PROVIDED HISTORY: Reason for exam:->chest pain What reading provider will be dictating this exam?->CRC FINDINGS: Stable mild chronic interstitial markings noted. No new consolidation. The heart is stable in size. The costophrenic angles are clear. No pneumothorax. Stable mild chronic interstitial markings in both lungs. Discharge Medications:      Medication List      CHANGE how you take these medications    dantrolene 50 MG capsule  Commonly known as: DANTRIUM  Take 1 capsule by mouth 4 times daily  What changed:   · how much to take  · when to take this  · additional instructions     ferrous sulfate 325 (65 Fe) MG tablet  Commonly known as: IRON 325  Take 1 tablet by mouth every other day  What changed: when to take this     levothyroxine 88 MCG tablet  Commonly known as: SYNTHROID  Take 1 tablet by mouth daily  What changed: when to take this        CONTINUE taking these medications    aspirin 81 MG EC tablet     atorvastatin 10 MG tablet  Commonly known as: LIPITOR  1 po q hs     baclofen 20 MG tablet  Commonly known as: LIORESAL     blood glucose monitor kit and supplies  Dispense sufficient amount for indicated testing frequency plus additional to accommodate PRN testing needs. Dispense all needed supplies to include: monitor, strips, lancing device, lancets, control solutions, alcohol swabs. * blood glucose test strips  Test qd  & as needed for symptoms of irregular blood glucose. Dispense sufficient amount for indicated testing frequency plus additional to accommodate PRN testing needs. * blood glucose test strips strip  Commonly known as: ASCENSIA AUTODISC VI;ONE TOUCH ULTRA TEST VI  1 each by In Vitro route 2 times daily As needed.      Calcium 600+D Plus Minerals 600-400 MG-UNIT Tabs     carvedilol 3.125 MG tablet Commonly known as: COREG  Take 1 tablet by mouth 2 times daily     citalopram 20 MG tablet  Commonly known as: CELEXA  Take 1 tablet by mouth every morning     clopidogrel 75 MG tablet  Commonly known as: PLAVIX  Take 1 tablet by mouth daily     docusate sodium 100 MG capsule  Commonly known as: COLACE     ipratropium-albuterol 0.5-2.5 (3) MG/3ML Soln nebulizer solution  Commonly known as: DUONEB  Inhale 3 mLs into the lungs every 6 hours as needed for Shortness of Breath     isosorbide mononitrate 30 MG extended release tablet  Commonly known as: IMDUR     Lancets Misc  1 each by Does not apply route daily     lisinopril 2.5 MG tablet  Commonly known as: PRINIVIL;ZESTRIL  Take 0.5 tablets by mouth daily     magnesium 250 MG Tabs tablet  Commonly known as: MAGNESIUM-OXIDE     medihoney wound/burn dressing Pste paste     metFORMIN 500 MG tablet  Commonly known as: GLUCOPHAGE  Take 1 tablet by mouth 2 times daily (with meals)     OXYGEN     polyethylene glycol 17 g Pack packet  Commonly known as: MIRALAX     Potassium 99 MG Tabs     Senna 8.6 MG Caps     Urethral Catheter Misc     VITAMIN B 12 PO     vitamin D3 25 MCG (1000 UT) Tabs tablet  Commonly known as: CHOLECALCIFEROL         * This list has 2 medication(s) that are the same as other medications prescribed for you. Read the directions carefully, and ask your doctor or other care provider to review them with you. Time Spent on discharge is more than 45 minutes in the examination, evaluation, counseling and review of medications and discharge plan.    +++++++++++++++++++++++++++++++++++++++++++++++++  Sarah Sen DO  [address]  +++++++++++++++++++++++++++++++++++++++++++++++++  NOTE: This report was transcribed using voice recognition software. Every effort was made to ensure accuracy; however, inadvertent computerized transcription errors may be present.

## 2021-01-27 ENCOUNTER — TELEPHONE (OUTPATIENT)
Dept: FAMILY MEDICINE CLINIC | Age: 76
End: 2021-01-27

## 2021-01-27 ENCOUNTER — CARE COORDINATION (OUTPATIENT)
Dept: CASE MANAGEMENT | Age: 76
End: 2021-01-27

## 2021-01-27 DIAGNOSIS — R06.02 SOB (SHORTNESS OF BREATH): ICD-10-CM

## 2021-01-27 NOTE — CARE COORDINATION
[facility] Transitions Follow Up Call    2021    Patient: Carolina Rhodes  Patient : 1945   MRN: [id number]  Reason for Admission: NSTEMI  Discharge Date: 21 RARS: Readmission Risk Score: 28         Spoke with: Gonsalo Klein, patient's spouse with permission from patient previously and Cleveland Boxer, patient's daughter on HIPAA      Needs to be reviewed by the provider   Additional needs identified to be addressed with provider No  none       Method of communication with provider : none    Care Transition Nurse (CTN) contacted the family by telephone to follow up after admission on 21. Verified name and  with family as identifiers. Follow up appointment completed? No    CTN reviewed discharge instructions, medical action plan and red flags with family and discussed any barriers to care and/or understanding of plan of care after discharge. Discussed appropriate site of care based on symptoms and resources available to patient including: PCP, Specialist and Home health. The family agrees to contact the PCP office for questions related to their healthcare. Patients top risk factors for readmission: medical condition    Plan for follow-up call in 7-10 days based on severity of symptoms and risk factors. Plan for next call: symptom management-cp, sob, confusion, lethargy, self management-bs, follow up appointment-completed with Dr. Michaela Reagan and medication management-Medication changes or questions. CTN provided contact information for future needs. Care Transitions Subsequent and Final Call    Subsequent and Final Calls  Do you have any ongoing symptoms?: Yes  Onset of Patient-reported symptoms:  In the past 7 days  Patient-reported symptoms: Nausea, Chest Pain, Shortness of Breath  Have your medications changed?: Yes  Patient Reports: Parameters changed for Bumex hold less than sbp 90, Started on Nasonex and Zofran  Do you have any questions related to your medications?: No  Do you currently have any active services?: Yes  Are you currently active with any services?: Home Health, Other  Do you have any needs or concerns that I can assist you with?: No  Identified Barriers: None  Care Transitions Interventions  No Identified Needs  Other Interventions:            Follow Up  Future Appointments   Date Time Provider Marisol Radha   2/3/2021  2:30 PM Denise Sandhoff,  [address] [place] [address]   2/23/2021  2:00 PM Karen Wei MD [facility]   10/8/2021  9:00 AM Denise Elizalde MD [address] [facility]       Jessica Parikh RN

## 2021-01-27 NOTE — TELEPHONE ENCOUNTER
Spoke with Nahed Ayala NP from [facility]. She wanted to let you know that they are changing the DNR CCA to DNR CC. She also stated that pt had chest pain last night and took 3 doses of nitro. She is asking if you would prescribe Roxinol 20mg/mL 5mg under the tongue every 4 hours. Skye Greco Dr.

## 2021-01-28 NOTE — TELEPHONE ENCOUNTER
Left a detailed message for Vita Chase to let her know that even with the code status change Dr Jeannette Rdz does not feel comfortable prescribing the medication. I also called and spoke w/ Khoi Salinas and Syl Ruiz at the home number to explain options. Reminded of the suggestion to speak w/ cardiology about Ranexa or using Buspar, benzo, SSRI, SNRI. Khoi Salinas said that they are still on the fence w/ Buspar. They are unsure if they want to use it. She did not feel the a PNR benzo would be ideal, does not like that it would stay in her system so long. At this time she does not want to start or change her depression anxiety medications. hKoi Salinas thanked you for reviewing the request and letting them know. Nothing else is needed at this time.

## 2021-01-29 ENCOUNTER — TELEPHONE (OUTPATIENT)
Dept: FAMILY MEDICINE CLINIC | Age: 76
End: 2021-01-29

## 2021-01-29 NOTE — TELEPHONE ENCOUNTER
Requested Prescriptions     Signed Prescriptions Disp Refills    nystatin (MYCOSTATIN) 279470 UNIT/ML suspension 140 mL 0     Sig: Take 5 mLs by mouth 4 times daily for 7 days     Authorizing Provider: Jared zarate     Please let us know if not improving   Thanks

## 2021-01-29 NOTE — CARE COORDINATION
Late entry 1/27/21    Spoke to patient's daughter Edison Cueva on HIPAA who is a RN at [address] [facility] [employer] in [place]. Per Edison Cueva, she has been in town to assist her father with care of her mother. Per Edison Cueva, patient is doing okay today. Patient did have a rough night. Per Karina Ferro has an appt scheduled for today 1/27/21. Per Edison Cueva, no further invasive cardiac interventions should be done. Edison Cueva has discussed with her father keeping her mother at home even if she complains of chest pain. Per Edison Cueva, no sob today, no congestion after using Nasonex. Patient is taking Zofran for nausea. Patient did have cp on 1/26/21 that was not relieved with Nitro. Edison Cueva spoke to cardiology to have parameters for Bumex changed to hold for sbp <90. Patient has had two doses of Bumex, no further chest pain. Patient is weak and does have occasional sob. Patient is intermittently confused and lethargic during episodes of hypotension. Mervat placed patient in trendelenburg, with results. Patient is bedridden and requires complete care. Edison Cueva reports saldana is draining clear yellow urine. Edison Cueva states she discussed appropriate fluid intake with her father. Per Edison Cueva, she made a mar for her father and discussed safe medication administration with him. She has also instructed him the order of checking bp prior to medicating. Edison Cueva can be reached at [phone]. Patient's daughter denies any further needs, questions, or concerns at this time. Patient's daughter is agreeable to future follow up calls and will inform her father care coordination will continue to contact him via phone.

## 2021-01-29 NOTE — TELEPHONE ENCOUNTER
Elyssa - Daughter       She thinks her mom is getting oral thrush because her mouth is burning and there is some redness on her tongue. She is asking if you will send something to [employer] in [facility] for that?

## 2021-02-01 ENCOUNTER — TELEPHONE (OUTPATIENT)
Dept: PULMONOLOGY | Age: 76
End: 2021-02-01

## 2021-02-01 NOTE — TELEPHONE ENCOUNTER
Returned call from  about hospital follow up appt. Pt doing well with home Oxygen and Trilogy NIV device since getting home. Home Care nursing visits weekly and  just checking with office for follow up since hospitalized in Jan 2021. Advised  that for now we will keep her follow up that is already scheduled and RN will discuss need to be seen by Dr. Isa Daniels sooner.  requesting virtual visits for pt if possible. Advised  office will call if needs to be seen sooner after discussing with Dr. Isa Daniels.  to call prn.

## 2021-02-03 ENCOUNTER — VIRTUAL VISIT (OUTPATIENT)
Dept: FAMILY MEDICINE CLINIC | Age: 76
End: 2021-02-03
Payer: MEDICARE

## 2021-02-03 DIAGNOSIS — E78.2 MIXED HYPERLIPIDEMIA: ICD-10-CM

## 2021-02-03 DIAGNOSIS — E11.65 TYPE 2 DIABETES MELLITUS WITH HYPERGLYCEMIA, WITHOUT LONG-TERM CURRENT USE OF INSULIN (HCC): ICD-10-CM

## 2021-02-03 DIAGNOSIS — K59.09 OTHER CONSTIPATION: ICD-10-CM

## 2021-02-03 DIAGNOSIS — R91.8 LUNG MASS: ICD-10-CM

## 2021-02-03 DIAGNOSIS — G82.50 SPASTIC QUADRIPARESIS (HCC): ICD-10-CM

## 2021-02-03 DIAGNOSIS — D64.9 ANEMIA, UNSPECIFIED TYPE: ICD-10-CM

## 2021-02-03 DIAGNOSIS — Z86.2 H/O SARCOIDOSIS: ICD-10-CM

## 2021-02-03 DIAGNOSIS — I50.22 CHRONIC SYSTOLIC (CONGESTIVE) HEART FAILURE (HCC): ICD-10-CM

## 2021-02-03 DIAGNOSIS — I25.110 CORONARY ARTERY DISEASE INVOLVING NATIVE HEART WITH UNSTABLE ANGINA PECTORIS, UNSPECIFIED VESSEL OR LESION TYPE (HCC): Primary | ICD-10-CM

## 2021-02-03 DIAGNOSIS — N39.0 FREQUENT UTI: ICD-10-CM

## 2021-02-03 DIAGNOSIS — E03.8 OTHER SPECIFIED HYPOTHYROIDISM: ICD-10-CM

## 2021-02-03 DIAGNOSIS — G35 MS (MULTIPLE SCLEROSIS) (HCC): ICD-10-CM

## 2021-02-03 DIAGNOSIS — I10 ESSENTIAL HYPERTENSION: ICD-10-CM

## 2021-02-03 DIAGNOSIS — F33.0 MILD EPISODE OF RECURRENT MAJOR DEPRESSIVE DISORDER (HCC): ICD-10-CM

## 2021-02-03 PROCEDURE — 1123F ACP DISCUSS/DSCN MKR DOCD: CPT | Performed by: INTERNAL MEDICINE

## 2021-02-03 PROCEDURE — 2022F DILAT RTA XM EVC RTNOPTHY: CPT | Performed by: INTERNAL MEDICINE

## 2021-02-03 PROCEDURE — 99214 OFFICE O/P EST MOD 30 MIN: CPT | Performed by: INTERNAL MEDICINE

## 2021-02-03 PROCEDURE — 3017F COLORECTAL CA SCREEN DOC REV: CPT | Performed by: INTERNAL MEDICINE

## 2021-02-03 PROCEDURE — G8427 DOCREV CUR MEDS BY ELIG CLIN: HCPCS | Performed by: INTERNAL MEDICINE

## 2021-02-03 PROCEDURE — G8400 PT W/DXA NO RESULTS DOC: HCPCS | Performed by: INTERNAL MEDICINE

## 2021-02-03 PROCEDURE — 3044F HG A1C LEVEL LT 7.0%: CPT | Performed by: INTERNAL MEDICINE

## 2021-02-03 PROCEDURE — 4040F PNEUMOC VAC/ADMIN/RCVD: CPT | Performed by: INTERNAL MEDICINE

## 2021-02-03 PROCEDURE — 1090F PRES/ABSN URINE INCON ASSESS: CPT | Performed by: INTERNAL MEDICINE

## 2021-02-03 PROCEDURE — 1111F DSCHRG MED/CURRENT MED MERGE: CPT | Performed by: INTERNAL MEDICINE

## 2021-02-03 RX ORDER — FLUCONAZOLE 150 MG/1
150 TABLET ORAL ONCE
Qty: 1 TABLET | Refills: 0 | Status: SHIPPED | OUTPATIENT
Start: 2021-02-03 | End: 2021-02-03

## 2021-02-03 RX ORDER — DANTROLENE SODIUM 50 MG/1
50 CAPSULE ORAL 4 TIMES DAILY
Qty: 360 CAPSULE | Refills: 1 | Status: CANCELLED | OUTPATIENT
Start: 2021-02-03

## 2021-02-03 RX ORDER — CLOPIDOGREL BISULFATE 75 MG/1
75 TABLET ORAL DAILY
Qty: 90 TABLET | Refills: 1 | Status: SHIPPED
Start: 2021-02-03 | End: 2021-06-10 | Stop reason: SDUPTHER

## 2021-02-03 RX ORDER — RANOLAZINE 1000 MG/1
1000 TABLET, EXTENDED RELEASE ORAL 2 TIMES DAILY
COMMUNITY
Start: 2021-01-29

## 2021-02-03 RX ORDER — DANTROLENE SODIUM 50 MG/1
100 CAPSULE ORAL 2 TIMES DAILY
Qty: 360 CAPSULE | Refills: 1 | Status: SHIPPED
Start: 2021-02-03 | End: 2021-02-08 | Stop reason: SDUPTHER

## 2021-02-03 RX ORDER — ATORVASTATIN CALCIUM 10 MG/1
TABLET, FILM COATED ORAL
Qty: 90 TABLET | Refills: 1 | Status: SHIPPED
Start: 2021-02-03 | End: 2021-06-10 | Stop reason: SDUPTHER

## 2021-02-03 ASSESSMENT — PATIENT HEALTH QUESTIONNAIRE - PHQ9: DEPRESSION UNABLE TO ASSESS: PT REFUSES

## 2021-02-03 ASSESSMENT — ENCOUNTER SYMPTOMS
VOMITING: 0
EYE PAIN: 0
COUGH: 0
CONSTIPATION: 0
ABDOMINAL PAIN: 0
BLOOD IN STOOL: 0
RHINORRHEA: 0
NAUSEA: 0
DIARRHEA: 0
CHEST TIGHTNESS: 0
SORE THROAT: 0
SHORTNESS OF BREATH: 1

## 2021-02-03 NOTE — PROGRESS NOTES
TeleMedicine Video Visit    This visit was performed as a virtual video visit using a synchronous, two-way, audio-video telehealth technology platform. Patient identification was verified at the start of the visit, including the patient's telephone number and physical location. I discussed with the patient the nature of our telehealth visits, that:     1. Due to the nature of an audio- video modality, the only components of a physical exam that could be done are the elements supported by direct observation. 2. I would evaluate the patient and recommend diagnostics and treatments based on my assessment. 3. If it was felt that the patient should be evaluated in clinic or an emergency room setting, then they would be directed there. 4. Our sessions are not being recorded and that personal health information is protected. 5. Our team would provide follow up care in person if/when the patient needs it. Patient does agree to proceed with telemedicine consultation. Patient's location: home address in [facility]  Physician  location office  other people involved in call -        Time spent: Greater than Not billed by time    This visit was completed virtually using Doxy. me      2/3/2021    TELEHEALTH EVALUATION -- Audio/Visual ([facility])    HPI:    King Ld Douglas (:  1945) has requested an audio/video evaluation for the following concern(s):    - States was admitted to hospital with new chest pain () Was found to have NSTEMI s/p stent sev instent restenosis LAD, CHF, poss pneumonia. Code status DNRCC - not interested in hospice     - States had issues with swallowing. -Patient has CAD. Has had ST elevation MI (2020) angio and stent to LAD and NSTEMI for instent stenosis (1/21). Patient was found to have severe anterior and septal hypokinesia with a apical dyskinesia and ejection fraction 20 to 25% with a small apical left ventricular thrombus on echocardiogram. Off eliquis. Stopped lisinopril. Added ranexa. On plavix. On isosorbide. On aspirin. No further chest pain or SOB.    - States was found to have low blood count, Anemia. Added iron. Last lab was 8.9. to see hematology.     - Patient had left obstructing ureteral calculus and had a cystoscopy with ureteral stent and stone manipulation. Has saldana cath. (11/20) - Cystoscopy. Complex flexible ureteroscopy. Laser lithotripsy Stent exchange,  And since stent removed. No current symptoms. was given bactrim if develops symptoms.    - States had bladder issues and overactive bladder. Stopped oxybutynin. States needed chronic saldana catheter. States was following with urology. Was given botox treatment and catheter was removed 1 month. Off myrbetric.  as flushed intermittently      - elevated blood sugars. Last lab had showed elevated A1c to 7.7, most recent A1c (7/2020) was 6.6  - not known to be diabetic. Blood sugars were running . On metformin. No reported side effects.    - States has high cholesterol. States Lipitor. No reported side effects. Stopped zetia.      -  states constipated. States on senokot-2. States on miralax. On iron therapy. - States has high blood pressure. States 101/68 (2/3/2021).  has been holding some of her heart medications due to the low blood pressure at times. On coreg 3.125mg bid. On bumex. On isosorbide, off lisinopril     - States has hypothyroidism. on levothyroxine. More compliant.  Last lab Was 9/2020 - stable.     - States had hip fracture s/p left ORIF. States has been to physical therapy. Not walking. States having issues with left knee and leg. Released from [address] [place] found lung mass and needs pulmonary follow up yearly (10/2020). States PET suggested scar - no further imaging needed at present. Follow up in 1 year.      - States has multiple sclerosis. States follows with neurology. States stopped ticferdia. Follow up every 6 months. States on baclofen 20mg 4x per day and dantrolene 50mg 4x per day. Spasms stable. States weakness to hands b/l - difficult to feed self at times. Still with right hand weakness. **she would benefit form a wheelchair that reclines because of her heart condition and low ejection fraction. To help get her out of bed to help with physical therapy and strength but also to be able to recline back when she feels dizzy and to assist with transportation. Current chiar does not meet her changed needs. Has new power chair.      - States has had elevated liver function tests. States has seen GI. Has had biopsy. Last lab stable.      - States has depression. On citalopram. Stable on medications. No reported side effects. No suicidal thoughts. Review of Systems   Constitutional: Negative for appetite change, chills, fever and unexpected weight change. HENT: Negative for congestion, rhinorrhea and sore throat. Eyes: Negative for pain and visual disturbance. Respiratory: Positive for shortness of breath. Negative for cough and chest tightness. Cardiovascular: Negative for chest pain and palpitations. Gastrointestinal: Negative for abdominal pain, blood in stool, constipation, diarrhea, nausea and vomiting. Genitourinary: Negative for difficulty urinating, dysuria, frequency, pelvic pain, urgency and vaginal bleeding. Musculoskeletal: Negative for arthralgias and myalgias. Skin: Negative for rash. Neurological: Negative for dizziness, syncope, weakness, light-headedness, numbness and headaches. Hematological: Negative for adenopathy. Psychiatric/Behavioral: Negative for suicidal ideas. The patient is not nervous/anxious. Prior to Visit Medications    Medication Sig Taking?  Authorizing Provider   ranolazine (RANEXA) 500 MG extended release tablet Take 1,000 mg by mouth 2 times daily  Yes Historical Provider, MD   atorvastatin (LIPITOR) 10 MG tablet 1 po q hs Yes Joesph Thorne MD   clopidogrel (PLAVIX) 75 MG tablet Take 1 tablet by mouth daily Yes Joesph Thorne MD   metFORMIN (GLUCOPHAGE) 500 MG tablet Take 1 tablet by mouth 2 times daily (with meals) Yes Joesph Thorne MD   dantrolene (DANTRIUM) 50 MG capsule Take 2 capsules by mouth 2 times daily Yes Joesph Thorne MD   fluconazole (DIFLUCAN) 150 MG tablet Take 1 tablet by mouth once for 1 dose Yes Joesph Thorne MD   nystatin (MYCOSTATIN) 784494 UNIT/ML suspension Take 5 mLs by mouth 4 times daily for 7 days Yes Joesph Thorne MD   fluticasone Orion Mink) 50 MCG/ACT nasal spray 2 sprays by Each Nostril route daily as needed for Rhinitis Yes Joesph Thorne MD   ondansetron (ZOFRAN ODT) 4 MG disintegrating tablet Take 1 tablet by mouth every 8 hours as needed for Nausea or Vomiting Yes Quynh Mccullough MD   nortriptyline (PAMELOR) 25 MG capsule Take 1 capsule by mouth nightly Yes Joesph Thorne MD   bumetanide (BUMEX) 1 MG tablet Take 1 tablet by mouth 2 times daily  Patient taking differently: Take 1 mg by mouth 2 times daily Take if systolic blood pressure is 90 or greater Yes Joesph Thorne MD   isosorbide mononitrate (IMDUR) 30 MG extended release tablet Take 30 mg by mouth daily If BP is 80/50 or higher Yes Historical Provider, MD   baclofen (LIORESAL) 20 MG tablet Take 40 mg by mouth 2 times daily Yes Historical Provider, MD   carvedilol (COREG) 3.125 MG tablet Take 1 tablet by mouth 2 times daily Yes Joesph Thorne MD Wound Dressings ([facility] WOUND/BURN DRESSING) PSTE paste as needed [place] buttocks Yes Historical Provider, MD   OXYGEN Inhale 3 L into the lungs continuous  Yes Historical Provider, MD   citalopram (CELEXA) 20 MG tablet Take 1 tablet by mouth every morning Yes Mamadou Pradhan MD   Sennosides (SENNA) 8.6 MG CAPS Take 1 capsule by mouth daily  Yes Historical Provider, MD   Potassium 99 MG TABS Take 1 tablet by mouth daily Yes Historical Provider, MD   levothyroxine (SYNTHROID) 88 MCG tablet Take 1 tablet by mouth daily  Patient taking differently: Take 88 mcg by mouth Daily  Yes Mamadou Pradhan MD   ipratropium-albuterol (DUONEB) 0.5-2.5 (3) MG/3ML SOLN nebulizer solution Inhale 3 mLs into the lungs every 6 hours as needed for Shortness of Breath  Patient taking differently: Inhale 1 vial into the lungs every 8 hours as needed for Shortness of Breath  Yes Mamadou Pradhan MD   docusate sodium (COLACE) 100 MG capsule Take 100 mg by mouth daily Yes Historical Provider, MD   aspirin 81 MG EC tablet Take 81 mg by mouth daily Yes Historical Provider, MD   ferrous sulfate (IRON 325) 325 (65 Fe) MG tablet Take 1 tablet by mouth every other day  Patient taking differently: Take 325 mg by mouth daily (with breakfast)  Yes Mamadou Pradhan MD   dantrolene (DANTRIUM) 50 MG capsule Take 1 capsule by mouth 4 times daily  Patient taking differently: Take 100 mg by mouth 2 times daily 2 caps bid Yes Mamadou Pradhan MD   blood glucose test strips (ASCENSIA AUTODISC VI;ONE TOUCH ULTRA TEST VI) strip 1 each by In Vitro route 2 times daily As needed. Yes Mamadou Pradhan MD   blood glucose monitor kit and supplies Dispense sufficient amount for indicated testing frequency plus additional to accommodate PRN testing needs. Dispense all needed supplies to include: monitor, strips, lancing device, lancets, control solutions, alcohol swabs.  Yes Mamadou Pradhan MD Procedure Laterality Date    CHOLECYSTECTOMY  1990s    CORONARY ANGIOPLASTY WITH STENT PLACEMENT  04/21/2020    Dr. Kevin Chowdhury   3.0 x 22mm Resolute MAAME to Prox LAD.   No LV    CORONARY ANGIOPLASTY WITH STENT PLACEMENT  01/14/2021    3.0 x 30 Maame to the prox LAD and a 2.5 x 20 [place] to the Obtuse marginal by Dr. Candelaria Stanford / Willian Coffee / Nathan Pile Left 4/23/2020    CYSTOSCOPY LEFT RETROGRADE PYELOGRAM STENT INSERTION, [address] performed by Winsome Avila DO at [address] Left 3/21/2019    LEFT FEMUR CEPHALLOMEDULLARY NAIL performed by Giulia Roberson MD at [facility] Bilateral     HYSTERECTOMY      KNEE SURGERY      plate in lt knee    LITHOTRIPSY Left 11/23/2020    CYSTOSCOPY LEFT URETEROSCOPY,  LASER LITHOTRIPSY  BASKET EXTRACTION LEFT STONE, STENT EXCHANGE performed by Winsome Avila DO at [address] LITHOTRIPSY Left 12/30/2020    CYSTOSCOPY RETROGRADE URETEROSCOPY PYELOGRAM LASER LITHOTRIPSY LEFT J-STENT performed by Winsome Avila DO at [address]   ,   Social History     Tobacco Use    Smoking status: Never Smoker    Smokeless tobacco: Never Used   Substance Use Topics    Alcohol use: No     Alcohol/week: 0.0 standard drinks     Comment: Ocassionally    Drug use: No   ,   Immunization History   Administered Date(s) Administered    Influenza 12/15/2010    Influenza A (W0C2-17) Vaccine PF IM 12/02/2009    Influenza Vaccine, unspecified formulation 11/21/2008    Influenza Virus Vaccine 10/27/2014, 10/02/2017    Influenza Whole 10/02/2013, 10/27/2014, 10/21/2015    Influenza, High Dose (Fluzone 65 yrs and older) 10/10/2015, 09/11/2017    Influenza, Quadv, IM, (6 mo and older Fluzone, Flulaval, Fluarix and 3 yrs and older Afluria) 10/02/2017    Influenza, Triv, inactivated, subunit, adjuvanted, IM (Fluad 65 yrs and older) 09/19/2018    Pneumococcal Conjugate 13-valent (Andre Endow) 01/15/2013, 09/23/2015, 09/18/2018, 09/19/2018  Pneumococcal Polysaccharide ([id number]) 09/18/2013   ,   Health Maintenance   Topic Date Due    COVID-19 Vaccine (1 of 2) [birth year]/1962    DTaP/Tdap/Td vaccine (1 - Tdap) 03/05/1965    Shingles Vaccine (1 of 2) 03/05/1996    Annual Wellness Visit (AWV)  06/19/2019    Flu vaccine (1) 09/01/2020    Breast cancer screen  12/10/2020    TSH testing  09/02/2021    A1C test (Diabetic or Prediabetic)  01/13/2022    Potassium monitoring  01/18/2022    Creatinine monitoring  01/18/2022    Colon cancer screen colonoscopy  11/04/2029    Pneumococcal 65+ years Vaccine  Completed    Hepatitis A vaccine  Aged Out    Hib vaccine  Aged Out    Meningococcal (ACWY) vaccine  Aged Out       PHYSICAL EXAMINATION:  [ INSTRUCTIONS:  \"[x]\" Indicates a positive item  \"[]\" Indicates a negative item  -- DELETE ALL ITEMS NOT EXAMINED]  Vital Signs: (As obtained by patient/caregiver or practitioner observation)    Blood pressure-  Heart rate-    Respiratory rate-    Temperature-  Pulse oximetry-     Constitutional: [x] Appears well-developed and well-nourished [x] No apparent distress      [] Abnormal-   Mental status  [x] Alert and awake  [x] Oriented to person/place/time []Able to follow commands      Eyes:  EOM    [x]  Normal  [] Abnormal-  Sclera  []  Normal  [] Abnormal -         Discharge []  None visible  [] Abnormal -    HENT:   [x] Normocephalic, atraumatic.   [] Abnormal   [] Mouth/Throat: Mucous membranes are moist.     External Ears [x] Normal  [] Abnormal-     Neck: [x] No visualized mass     Pulmonary/Chest: [x] Respiratory effort normal.  [] No visualized signs of difficulty breathing or respiratory distress        [] Abnormal-      Musculoskeletal:   [] Normal gait with no signs of ataxia         [x] Normal range of motion of neck        [] Abnormal-       Neurological:        [x] No Facial Asymmetry (Cranial nerve 7 motor function) (limited exam to video visit)          [] No gaze palsy        [] Abnormal- Skin:        [x] No significant exanthematous lesions or discoloration noted on facial skin         [] Abnormal-            Psychiatric:       [x] Normal Affect [] No Hallucinations        [] Abnormal-     Other pertinent observable physical exam findings-     ASSESSMENT/PLAN:    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, unspecified vessel or lesion type [facility])  - s/p cath and stent to LAD (2020), then instent stenosis s/p restent (1/21)   - following with cardio   - off lisinopril   - off eliquis  - on coreg   - on imdur   - on ranexa   - on bumex   - on plavix  - on aspirin  - stable at present     Chronic systolic congestive heart failure (HCC)  - EF 20% per echo (4/2020)   - on bumex   - on coreg   - off lisinopril     Frequent UTI  - enterococcus    - at risk for further infection   - no symptoms  - has bactrim to use if symptoms   - following with urology   - add diflucan     Nephrolithiasis  - s/p cystoscopy and lithotripsy (11/2020)   - now chronic saldana      Anemia, unspecified type  - follow labs  - on iron   - to see hematology      Other Constipation  - on senna   - on miralax  - add mag citrate . 25 as needed      Essential hypertension  - watch diet   - off lisinopril   - on bumex   - on coreg twice a day   - on imdur   - on ranex   - stable per   - improved per patient and       Type 2 diabetes mellitus with hyperglycemia, without long-term current use of insulin ([address]  - watch diet   - last A1c was 6.6 (7/2020)   - now on metformin since recent increase in sugars   - on endocrinology      Hypothyroidism, unspecified type  - on levothyroxine  - labs were normal (9/2020)     MS (multiple sclerosis) ([address]  - ticierda on hold  - on baclofen and dantrolene  - non ambulatory  - stable **she would benefit form a wheelchair that reclines because of her heart condition and low ejection fraction. To help get her out of bed to help with physical therapy and strength but also to be able to recline back when she feels dizzy and to assist with transportation. Current rubyar does not meet her changed needs.      Spastic quadriparesis ([address]  - following with neurology   - on baclofen and dantrolene     Mixed hyperlipidemia  - watch diet  - on atrovastatin  - follow up labs     Neurogenic bladder  - off oxybutynin  - off myrbetric - has not started yet   - currently with saldana      Mild episode of recurrent major depressive disorder (HCC)  - on citalopram  - no suicidal thoughts  - stable     HUDSON (nonalcoholic steatohepatitis)  - following with GI     Osteopenia of multiple sites  - declines bone density  - follows labs     Lung mass  - possible scar (2019)   - has seen pulmonary - yearly (10/2020)      H/O sarcoidosis    Return in about 1 month (around 3/3/2021) for check up and review. Orders Placed This Encounter   Procedures    Comprehensive Metabolic Panel     Standing Status:   Future     Standing Expiration Date:   2/3/2022    Magnesium     Standing Status:   Future     Standing Expiration Date:   2/3/2022    Ferritin     Standing Status:   Future     Standing Expiration Date:   2/3/2022    Iron and TIBC     Standing Status:   Future     Standing Expiration Date:   2/3/2022     Order Specific Question:   Is Patient Fasting? Answer:   yes     Order Specific Question:   No of Hours?      Answer:   8    CBC Auto Differential     Standing Status:   Future     Standing Expiration Date:   2/3/2022    Vitamin B12 & Folate     Standing Status:   Future     Standing Expiration Date:   2/3/2022     Requested Prescriptions     Signed Prescriptions Disp Refills    atorvastatin (LIPITOR) 10 MG tablet 90 tablet 1     Si po q hs    clopidogrel (PLAVIX) 75 MG tablet 90 tablet 1 Sig: Take 1 tablet by mouth daily    metFORMIN (GLUCOPHAGE) 500 MG tablet 180 tablet 1     Sig: Take 1 tablet by mouth 2 times daily (with meals)    dantrolene (DANTRIUM) 50 MG capsule 360 capsule 1     Sig: Take 2 capsules by mouth 2 times daily    fluconazole (DIFLUCAN) 150 MG tablet 1 tablet 0     Sig: Take 1 tablet by mouth once for 1 dose       Radha Gaston is a 77 y.o. female being evaluated by a Virtual Visit (video visit) encounter to address concerns as mentioned above. A caregiver was present when appropriate. Due to this being a TeleHealth encounter (During [facility]-40 public health emergency), evaluation of the following organ systems was limited: Vitals/Constitutional/EENT/Resp/CV/GI//MS/Neuro/Skin/Heme-Lymph-Imm. Pursuant to the emergency declaration under the [address] [place] [address] authority and the [employer] Act, this Virtual Visit was conducted with patient's (and/or legal guardian's) consent, to reduce the patient's risk of exposure to COVID-19 and provide necessary medical care. The patient (and/or legal guardian) has also been advised to contact this office for worsening conditions or problems, and seek emergency medical treatment and/or call 911 if deemed necessary. Patient identification was verified at the start of the visit: Yes    Total time spent on this encounter: Not billed by time    Services were provided through a video synchronous discussion virtually to substitute for in-person clinic visit. Patient and provider were located at their individual homes. --Denise Sandhoff, MD on 2/3/2021 at 3:44 PM    An electronic signature was used to authenticate this note.

## 2021-02-04 ENCOUNTER — TELEPHONE (OUTPATIENT)
Dept: FAMILY MEDICINE CLINIC | Age: 76
End: 2021-02-04

## 2021-02-04 NOTE — TELEPHONE ENCOUNTER
As long as does not have anaphylaxis history to vaccine or component I think it is ok to get vaccine

## 2021-02-05 ENCOUNTER — TELEPHONE (OUTPATIENT)
Dept: FAMILY MEDICINE CLINIC | Age: 76
End: 2021-02-05

## 2021-02-05 RX ORDER — BUMETANIDE 1 MG/1
1 TABLET ORAL 2 TIMES DAILY
Qty: 60 TABLET | Refills: 0 | Status: SHIPPED
Start: 2021-02-05 | End: 2021-02-08 | Stop reason: SDUPTHER

## 2021-02-05 RX ORDER — BUMETANIDE 1 MG/1
1 TABLET ORAL 2 TIMES DAILY
Qty: 180 TABLET | Refills: 3 | Status: SHIPPED
Start: 2021-02-05 | End: 2021-12-29 | Stop reason: SDUPTHER

## 2021-02-05 RX ORDER — FERROUS SULFATE 325(65) MG
325 TABLET ORAL EVERY OTHER DAY
Qty: 45 TABLET | Refills: 1 | Status: SHIPPED
Start: 2021-02-05 | End: 2021-02-08 | Stop reason: DRUGHIGH

## 2021-02-05 NOTE — TELEPHONE ENCOUNTER
Last Appointment:  2/3/2021  Future Appointments   Date Time Provider Marisol Garcia   2/23/2021  2:00 PM [facility]   3/2/2021 11:30 AM [facility]   3/3/2021  2:30 PM Mamadou Pradhan MD [address] [place] [address]   6/22/2021  1:40 PM Alex Patel MD BD ENDO [facility]   10/8/2021  9:00 AM [facility]il [facility]rell [facility]      Pt needs refill on bumex. hospitalist placed her on this. Wolfgang calling she has a uti. Dr wagner placed her on bactrim for 5 days. He wanted to make you aware.

## 2021-02-05 NOTE — TELEPHONE ENCOUNTER
I returned a call to Alan Mcdonough regarding a message for a refill on a medication. He told me that Simran Ortega had a seizure a little while ago and is now unconscience. I told him to call an ambulance, but he is not going to do that. He said that she has been though enough and there is nothing more they can do for her in any hospital.    His daughter Lucie Samayoa is back home and is supportive of his decision to just monitor and keep her at home.

## 2021-02-05 NOTE — TELEPHONE ENCOUNTER
If this is the case and I do not necessarily disagree, I would recommend hospice so that we can keep her comfortable.

## 2021-02-05 NOTE — TELEPHONE ENCOUNTER
Shiela Fears. He said that Hospice was there yesterday and gave him their number. He will call them if she does not respond soon. Right now she is resting comfortably and peaceful.

## 2021-02-05 NOTE — TELEPHONE ENCOUNTER
[id number] Thalia Holguin for ativan to stop seizure    Will need to explore other seizure medication    Unless hospice I am recommending ER ASAP!  She needs evaluation and treatment

## 2021-02-05 NOTE — TELEPHONE ENCOUNTER
Name of Medication(s) Requested:  Ferrous Sulfate    Pharmacy Requested:   [facility]    Medication(s) pended? [x] Yes  [] No    Last Appointment:  2/3/2021    Future appts:  Future Appointments   Date Time Provider Marisol Garcia   2/23/2021  2:00 PM Nemo Reddy MD [facility] NEURO [facility]   3/2/2021 11:30 AM Siri Pinks Nobie Favre, MD [facility]   3/3/2021  2:30 PM Radha Serna  [address] [place] [address]   6/22/2021  1:40 PM Jackson Ritchie MD [facility] ENDO [facility]   10/8/2021  9:00 AM Shirin Martines MD [facility]        Does patient need call back?   [] Yes  [x] No

## 2021-02-05 NOTE — TELEPHONE ENCOUNTER
Nurse jennie calling spouse jatin is refusing hospice, nurse feels she really needs it. She is struggling with seizures, she is very lethargic. She cannot even open her eyes. Nurse witnessed a seizure today around 12:45pm. [facility] to give ativan after seizures? Spouse is refusing to take her to ED. Nurse is aware you ordering a  bunch of blood work on Mon, she is unsure if patient will make it through the weekend.

## 2021-02-08 ENCOUNTER — TELEPHONE (OUTPATIENT)
Dept: FAMILY MEDICINE CLINIC | Age: 76
End: 2021-02-08

## 2021-02-08 RX ORDER — FERROUS SULFATE 325(65) MG
325 TABLET ORAL
Qty: 90 TABLET | Refills: 1 | Status: SHIPPED
Start: 2021-02-08 | End: 2021-04-22 | Stop reason: ALTCHOICE

## 2021-02-08 RX ORDER — RANOLAZINE 500 MG/1
500 TABLET, EXTENDED RELEASE ORAL 2 TIMES DAILY
COMMUNITY
End: 2021-02-08 | Stop reason: DRUGHIGH

## 2021-02-08 RX ORDER — ISOSORBIDE MONONITRATE 30 MG/1
30 TABLET, EXTENDED RELEASE ORAL DAILY
COMMUNITY
End: 2021-02-08 | Stop reason: SDUPTHER

## 2021-02-08 RX ORDER — AMOXICILLIN 250 MG
1 CAPSULE ORAL DAILY
Qty: 30 TABLET | Refills: 0
Start: 2021-02-08 | End: 2021-06-10 | Stop reason: ALTCHOICE

## 2021-02-08 NOTE — TELEPHONE ENCOUNTER
Spoke w/ Mary Saini and went over medications again. Medications were correct but added parameters for when to hold blood pressure lowering medications. He thinks that Faby Ward may be getting constipated. He is feeding her baby food since she was having an issue swallowing. He said that he will call if the constipation continues. He does have magnesium citrate on hand if you want him to use it. He was holding all meds today but will start giving them tonight at [facility].     He still had a bottle of Macrodantin on hand. I told him to shantell the bottle w/ an X and not to give it. She has had a reaction to the medication two separate times and most recently stopped breathing, collapsed, and was shaking. I will call Charmaine Hood tomorrow to see if they can resume care next week.

## 2021-02-08 NOTE — TELEPHONE ENCOUNTER
Lucio Councilman at [place] [id number]. She states she spoke with [facility] and pt has been discharged so you should not need to do a letter. Pt is requesting home health services to be started up again. A new referral will need to be sent. The only issue with sending to N is that per Dariel Rey they will not be able to see pt until the end of the week. If you feel she needs to be seen sooner and Juan Luis Sanchez is in agreement pt will need to be referred elsewhere.

## 2021-02-08 NOTE — TELEPHONE ENCOUNTER
Wolfgang mishra will you ok a letter to cancel hospise services ? This will need faxed to svn if approved. She was fine since Saturday morning, other than not sleeping. She has not slept since sat&has been hallucinating. Will you review her meds for a cause of this ? New meds; ranexa, &isosorbide. She also was placed on bactrim for a uti, this was d/c due to possible allergy. She had a seizure 10min after rx. They gave her cpiro&still not doing good. Urology advised to stop antibiotics. He has held all her meds since last night.

## 2021-02-08 NOTE — TELEPHONE ENCOUNTER
Update:  Niraj Mensah called back in and wanted to let you know that pt has not had any medications since yesterday and she is doing fine. He wanted to let you know that cardio took pt off of lisinopril. Her BP is 113/62 and heart rate is 81. He states his daughter, Rafael Duque is talking with Dr. Oumar Baca and coordinating her medications and if you wanted to call her to get any updates he provider her number: [phone].

## 2021-02-09 ENCOUNTER — CARE COORDINATION (OUTPATIENT)
Dept: CASE MANAGEMENT | Age: 76
End: 2021-02-09

## 2021-02-09 NOTE — CARE COORDINATION
[facility] Transitions Follow Up Call    2021    Patient: Ayad Douglas  Patient : 1945   MRN: [id number]  Reason for Admission: NSTEMI  Discharge Date: 21 RARS: Readmission Risk Score: 29         Spoke with: Samuel Hardy, patient's daughter on HIPAA      Needs to be reviewed by the provider   Additional needs identified to be addressed with provider No  none       Method of communication with provider : none    Care Transition Nurse (CTN) contacted the family by telephone to follow up after admission on 21. Verified name and  with family as identifiers. Addressed changes since last contact: symptom management-Per patient's daughter, patient had some hallucinations on 21, alert and oriented on 21. Patient had seizure like activity. Dr. Ciarra Bonilla notified. Per Samuel Hardy, patient goes in and out, feels better then worse. Per Samuel Hardy patient has continued to have chest pain. Meds changed per cardiogy. , follow up appointment-Had vv with Dr. Ciarra Bonilla on 2/3/21. and medication management-Medication changes documented. Patient's daughter Samuel Hrady is RN. Discharged needs reviewed: none  Follow up appointment completed? Yes    CTN reviewed discharge instructions, medical action plan and red flags with family and discussed any barriers to care and/or understanding of plan of care after discharge. Discussed appropriate site of care based on symptoms and resources available to patient including: PCP, Specialist, Home health and When to call 911. The family agrees to contact the PCP office for questions related to their healthcare. Patients top risk factors for readmission: functional cognitive ability, functional physical ability, medical condition and caregiver stress    Plan for follow-up call in 5-7 days based on severity of symptoms and risk factors. Plan for next call: symptom management-sob, hallucinations, mentation, sob, chest pain. , self management-bs, follow up appointment-completed any appts and medication management-Med changes  CTN provided contact information for future needs. Care Transitions Subsequent and Final Call    Subsequent and Final Calls  Do you have any ongoing symptoms?: Yes  Onset of Patient-reported symptoms: In the past 7 days  Patient-reported symptoms: Other  Have your medications changed?: Yes  Patient Reports: Lisinopril discontinued by Dr. Mary Anne Mehta and Ranexa was doubled. Per Diana Gonzales, using Nystatin 1-2weeks. Patient took one dose of Diflucan. Patient was started on Bactrim but was discontinued due to possible allergic reaction. Do you have any questions related to your medications?: No  Do you currently have any active services?: Yes  Are you currently active with any services?: Home Health, Other  Do you have any needs or concerns that I can assist you with?: No  Identified Barriers: Other  Care Transitions Interventions  No Identified Needs  Other Interventions:         Spoke with patient's daughter for follow up care transition call. Verified with Diana Gonzales, hospice was discontinued by patient's spouse. Zee Music doesn't want to the humidifier and cooler for Trilogy removed due to patient being active with hospice. Reviewed med changes. Added Bactrim to allergy list.  Patient is not taking Roxinol prescribed by hospice, is taking Ativan prn. Patient is not sleeping well. Patient is taking Melatonin and sleeping for a few hours. Patient's daughter denies any further needs, questions, or concerns at this time. Patient's daughter is agreeable to future follow up calls.      Follow Up  Future Appointments   Date Time Provider Marisol Garcia   2/23/2021  2:00 PM Hans Rodriguez MD [facility] NEURO [facility]   3/2/2021 11:30 AM Anand Phillips MD [facility]   3/3/2021  2:30 PM Zaki Martin  [address]   6/22/2021  1:40 PM Atif Ojeda MD [facility] ENDO [facility]   10/8/2021  9:00 AM Yamil Phillips MD [address] [facility]       Susan Paige RN

## 2021-02-09 NOTE — TELEPHONE ENCOUNTER
Carola Farley informed at [facility]. They will send orders for Dr Lopez Cai to sign and resume services next week.

## 2021-02-10 ENCOUNTER — TELEPHONE (OUTPATIENT)
Dept: FAMILY MEDICINE CLINIC | Age: 76
End: 2021-02-10

## 2021-02-10 DIAGNOSIS — N39.0 FREQUENT UTI: Primary | ICD-10-CM

## 2021-02-10 LAB
AMORPHOUS PHOSPHATES, URINE: NORMAL /HPF
APPEARANCE, BODY FLUID: ABNORMAL
BACTERIA, URINE: NORMAL /HPF
BILIRUBIN URINE: ABNORMAL
CALCIUM OXALATE CRYSTALS: NORMAL /HPF
COLOR, URINE: YELLOW
ERYTHROCYTES URINE: >100 /HPF
GLUCOSE URINE: NEGATIVE MG/DL
KETONES, URINE: NEGATIVE MG/DL
LEUKOCYTES, UA: NORMAL /HPF
MICROSCOPIC URINE: YES
NITRATE, UA: NEGATIVE
PH UA: 8 (ref 4.6–8)
PROTEIN UA: 30 MG/DL
RBC URINE: NEGATIVE
RENAL EPITHELIAL, UA: NORMAL /HPF
SPECIFIC GRAVITY, URINE: 1 (ref 1–1.03)
TRANSITIONAL EPITHELIAL: NORMAL /HPF
UROBILINOGEN, URINE: NEGATIVE MG/DL
WBC URINE: ABNORMAL
YEAST, URINE: NORMAL /HPF

## 2021-02-10 NOTE — TELEPHONE ENCOUNTER
Orders Placed This Encounter   Procedures    Culture, Urine     Standing Status:   Future     Standing Expiration Date:   2/10/2022     Order Specific Question:   Specify (ex-cath, midstream, cysto, etc)?      Answer:   cath    URINALYSIS     Standing Status:   Future     Standing Expiration Date:   2/10/2022     Orders placed

## 2021-02-11 ENCOUNTER — TELEPHONE (OUTPATIENT)
Dept: FAMILY MEDICINE CLINIC | Age: 76
End: 2021-02-11

## 2021-02-11 NOTE — TELEPHONE ENCOUNTER
Wolfgang calling for result on UA, does she have a urine infection? He is aware culture is not back yet.

## 2021-02-11 NOTE — TELEPHONE ENCOUNTER
Urine analysis showed slight protein, small amount of white cells, small bilirubin. These findings have been present in the previous urine studies as well but are much less than any of the previous urinalysis results.     Given the chronic catheter these may be normal findings due to the catheter    I am still uncertain as to whether or not she has a urine infection

## 2021-02-12 ENCOUNTER — TELEPHONE (OUTPATIENT)
Dept: PRIMARY CARE CLINIC | Age: 76
End: 2021-02-12

## 2021-02-12 DIAGNOSIS — N39.0 URINARY TRACT INFECTION WITHOUT HEMATURIA, SITE UNSPECIFIED: Primary | ICD-10-CM

## 2021-02-12 LAB — URINE CULTURE, ROUTINE: NORMAL

## 2021-02-12 RX ORDER — CIPROFLOXACIN 500 MG/1
500 TABLET, FILM COATED ORAL 2 TIMES DAILY
Qty: 14 TABLET | Refills: 0 | Status: SHIPPED | OUTPATIENT
Start: 2021-02-12 | End: 2021-02-19

## 2021-02-12 NOTE — TELEPHONE ENCOUNTER
Requested Prescriptions     Signed Prescriptions Disp Refills    ciprofloxacin (CIPRO) 500 MG tablet 14 tablet 0     Sig: Take 1 tablet by mouth 2 times daily for 7 days     Authorizing Provider: Ezra Barry

## 2021-02-15 RX ORDER — LEVOTHYROXINE SODIUM 88 UG/1
88 TABLET ORAL DAILY
Qty: 90 TABLET | Refills: 0 | Status: SHIPPED
Start: 2021-02-15 | End: 2021-04-22 | Stop reason: SDUPTHER

## 2021-02-16 ENCOUNTER — CARE COORDINATION (OUTPATIENT)
Dept: CASE MANAGEMENT | Age: 76
End: 2021-02-16

## 2021-02-16 NOTE — CARE COORDINATION
[facility] Transitions Follow Up Call    2021    Patient: Nirali Pereira  Patient : 1945   MRN: [id number]  Reason for Admission: NSTEMI  Discharge Date: 21 RARS: Readmission Risk Score: 28         Spoke with: No one    Attempt to reach the patient's daughter Rafael Duque for final Care Transition call post hospital discharge. Message left with CTN's contact information requesting return phone call. No further outreach scheduled at this time. Episode resolved. CTN to sign off.       Follow Up  Future Appointments   Date Time Provider Marisol Garcia   2021  2:00 PM Fiona Burton MD [facility] NEURO [facility]   3/2/2021 11:30 AM Sebas De Jesus MD [facility]   3/3/2021  2:30 PM Ramon Wallace MD [address] [place] [address]   2021  1:40 PM Angelo Bruner MD [facility] ENDO [facility]   10/8/2021  9:00 AM Yamil De Jesus MD [facility]       Tamy Riley RN

## 2021-02-22 ENCOUNTER — TELEPHONE (OUTPATIENT)
Dept: FAMILY MEDICINE CLINIC | Age: 76
End: 2021-02-22

## 2021-02-22 NOTE — TELEPHONE ENCOUNTER
Luis MUIR    She is calling to ask if you want a lab draw today while she is out to see this patient?

## 2021-02-22 NOTE — TELEPHONE ENCOUNTER
Rani Patel had already left [place]'s house when I called. I will fax the orders and she will draw them later this week.

## 2021-02-23 ENCOUNTER — VIRTUAL VISIT (OUTPATIENT)
Dept: NEUROLOGY | Age: 76
End: 2021-02-23
Payer: MEDICARE

## 2021-02-23 DIAGNOSIS — G35 MS (MULTIPLE SCLEROSIS) (HCC): Chronic | ICD-10-CM

## 2021-02-23 PROCEDURE — 99215 OFFICE O/P EST HI 40 MIN: CPT | Performed by: PSYCHIATRY & NEUROLOGY

## 2021-02-23 NOTE — PROGRESS NOTES
[facility] was read the following message We want to confirm that, for purposes of billing, this is a virtual visit with your provider for which we will submit a claim for reimbursement with your insurance company. You will be responsible for any copays, coinsurance amounts or other amounts not covered by your insurance company. If you do not accept this, unfortunately we will not be able to schedule or proceed with a virtual visit with the provider. Do you accept? Ephraim Peters responded Yes .
continued well at home with her 's assistance. Unfortunately, her  was unable to leave the home. He had received his COVID-19 vaccination. So far, no one would come to the home to administer the vaccine for his wife. She was eating and sleeping well. Review of systems was otherwise negative. Objective:     She was elderly woman in no acute distress, who was alert, cooperative and oriented. She was breathing comfortably, without chest pain or shortness of breath. She now had nasal cannula oxygen. She was lying comfortably in bed, appearing no thinner. She continued to be very pleasant. Her skin was unremarkable. There was no abdominal tenderness. Her limbs were unchanged. Neurological examination produced an intact mental status. I found no cognitive issues, dysarthria or aphasia. Cranial nerve testing remained unremarkable. She did not move both legs but raised her left arm better than the right. There were decreased fine finger movements and rapid alternating movements in both hands, more so the right. She appreciated light touch throughout. There was no clumsiness. Her neurological examination was unchanged. Recent CMP was unremarkable; CBC with differential was also unrevealing except for a minimal anemia. Recent blood sugar was 148. Previous MRIs revealed extensive lesion load in her cervical cord and brainwithout acute changes. 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 Her examination again displays increasing weakness of her right arm and hand. I first suspect cervical radicular disease and not recurrent multiple sclerosis. The family did not wish to pursue additional diagnostic measures. She did not require disease modifying therapies. She will continue on her current dose of Dantrium and baclofen. Fortunately, there remained no cognitive involvement.     Unfortunately, she endured

## 2021-02-24 ENCOUNTER — TELEPHONE (OUTPATIENT)
Dept: ENDOCRINOLOGY | Age: 76
End: 2021-02-24

## 2021-03-02 ENCOUNTER — VIRTUAL VISIT (OUTPATIENT)
Dept: PULMONOLOGY | Age: 76
End: 2021-03-02
Payer: MEDICARE

## 2021-03-02 DIAGNOSIS — R09.02 HYPOXIA: Primary | ICD-10-CM

## 2021-03-02 PROCEDURE — 99213 OFFICE O/P EST LOW 20 MIN: CPT | Performed by: INTERNAL MEDICINE

## 2021-03-02 NOTE — PROGRESS NOTES
[facility]                                                                 Division of Pulmonary, [address] [facility]                                                                       Pulmonary &health [facility]                                                                   Pulmonary Consult Note            Patient: Keagan Mac  MRN: [id number]  : 1945      Date of Admission: . No admission date for patient encounter.         Reason for Consultation:Lung nodule   CC :   HPI:   Keagan Mac is a 77 y.o. y/o female with past medical history significant for MS and sarcoid and she has left hip pain after fall that happen around 2019 and she went to rehab and had surgery before that mat rehab started cough and SOB and not feeling well and she was brought to [facility] and she ahs been doing well ,no cough and SOB and Duo neb helps a lot     She was diagnosed with sarcoid  and she is not on any bronchoscopy and biopsy ,iN [facility] but it has been stable as Per CT/PET     She did not smoke but had second smoking more than 10 years from parents     She had recent PET scan  shows  Just scar but no active disease and small Right hilar lymph nodes that is calcified      She is going for Trip across the country       Today visit:  She has been doing well and she denies any SOB ,No cough ,  No fever  Does not need inhalers  Never smoke  On no inhalers   Can not get the vaccine for covid       PAST MEDICAL HISTORY:     Past Medical History:   Diagnosis Date    CAD (coronary artery disease) 2020    High cholesterol     Hypertension     Hypothyroidism     MI (myocardial infarction) ([address] 2020    [facility]     Osteoporosis     Sarcoidosis     Seizure ([address] 2021    Type 2 diabetes mellitus with hyperglycemia, without long-term current use of insulin ([address] Substance and Sexual Activity    Alcohol use: No     Alcohol/week: 0.0 standard drinks     Comment: Ocassionally    Drug use: No    Sexual activity: Yes     Partners: Male   Lifestyle    Physical activity     Days per week: Not on file     Minutes per session: Not on file    Stress: Not on file   Relationships    Social connections     Talks on phone: Not on file     Gets together: Not on file     Attends Orthodox service: Not on file     Active member of club or organization: Not on file     Attends meetings of clubs or organizations: Not on file     Relationship status: Not on file    Intimate partner violence     Fear of current or ex partner: Not on file     Emotionally abused: Not on file     Physically abused: Not on file     Forced sexual activity: Not on file   Other Topics Concern    Not on file   Social History Narrative    Not on file     Social History     Tobacco Use   Smoking Status Never Smoker   Smokeless Tobacco Never Used     Social History     Substance and Sexual Activity   Alcohol Use No    Alcohol/week: 0.0 standard drinks    Comment: Ocassionally     Social History     Substance and Sexual Activity   Drug Use No        HISTORY:    HOME MEDICATIONS:  Prior to Admission medications    Medication Sig Start Date End Date Taking?  Authorizing Provider   levothyroxine (SYNTHROID) 88 MCG tablet Take 1 tablet by mouth daily 2/15/21   Preeti Schaefer MD   ferrous sulfate (IRON 325) 325 (65 Fe) MG tablet Take 1 tablet by mouth daily (with breakfast) 2/8/21   Preeti Schaefer MD   senna-docusate (SENNA-S) 8.6-50 MG per tablet Take 1 tablet by mouth daily 2/8/21   Preeti Schaefer MD   Acetaminophen (TYLENOL PO) Take by mouth as needed    Historical Provider, MD   bumetanide (BUMEX) 1 MG tablet Take 1 tablet by mouth 2 times daily  Patient taking differently: Take 1 mg by mouth 2 times daily Systolic must be greater than 90 or hold 2/5/21   Preeti Schaefer MD   ranolazine [facility]) 500 MG extended release tablet Take 1,000 mg by mouth 2 times daily  1/29/21   Historical Provider, MD   atorvastatin (LIPITOR) 10 MG tablet 1 po q hs 2/3/21   Zaki Martin MD   clopidogrel (PLAVIX) 75 MG tablet Take 1 tablet by mouth daily 2/3/21   Zaki Martin MD   metFORMIN (GLUCOPHAGE) 500 MG tablet Take 1 tablet by mouth 2 times daily (with meals) 2/3/21   Zaki Martin MD   fluticasone [place]) 50 MCG/ACT nasal spray 2 sprays by Each Nostril route daily as needed for Rhinitis 1/25/21   Zaki Martin MD   ondansetron (ZOFRAN ODT) 4 MG disintegrating tablet Take 1 tablet by mouth every 8 hours as needed for Nausea or Vomiting 1/23/21   Geeta Burrows MD   isosorbide mononitrate (IMDUR) 30 MG extended release tablet Take 30 mg by mouth daily If BP is 80/50 or higher    Historical Provider, MD   baclofen (LIORESAL) 20 MG tablet Take 40 mg by mouth 2 times daily    Historical Provider, MD   carvedilol (COREG) 3.125 MG tablet Take 1 tablet by mouth 2 times daily  Patient taking differently: Take 3.125 mg by mouth 2 times daily If BP is 80/50 or higher and pulse should be 60 or higher 11/22/20   Zaki Martin MD   Wound Dressings ([facility] WOUND/BURN DRESSING) PSTE paste as needed [place] buttocks 10/30/20   Historical Provider, MD   OXYGEN Inhale 3 L into the lungs continuous     Historical Provider, MD   citalopram (CELEXA) 20 MG tablet Take 1 tablet by mouth every morning 10/29/20   Zaki Martin MD   Potassium 99 MG TABS Take 1 tablet by mouth daily    Historical Provider, MD   ipratropium-albuterol (DUONEB) 0.5-2.5 (3) MG/3ML SOLN nebulizer solution Inhale 3 mLs into the lungs every 6 hours as needed for Shortness of Breath  Patient taking differently: Inhale 1 vial into the lungs every 8 hours as needed for Shortness of Breath  9/23/20   Zaki Martin MD   docusate sodium (COLACE) 100 MG capsule Take 100 mg by mouth daily    Historical Provider, MD   aspirin 81 MG EC tablet Take 81 mg by mouth daily    Historical Provider, MD   dantrolene (DANTRIUM) 50 MG capsule Take 1 capsule by mouth 4 times daily  Patient taking differently: Take 100 mg by mouth 2 times daily 2 caps bid 8/11/20   Jared Chacon MD   blood glucose test strips (ASCENSIA AUTODISC VI;ONE TOUCH ULTRA TEST VI) strip 1 each by In Vitro route 2 times daily As needed. 8/11/20   Jared Chacon MD   blood glucose monitor kit and supplies Dispense sufficient amount for indicated testing frequency plus additional to accommodate PRN testing needs. Dispense all needed supplies to include: monitor, strips, lancing device, lancets, control solutions, alcohol swabs. 7/22/20   Jared Chacon MD   blood glucose monitor strips Test qd  & as needed for symptoms of irregular blood glucose. Dispense sufficient amount for indicated testing frequency plus additional to accommodate PRN testing needs. 7/22/20   Jared Chacon MD   Lancets MISC 1 each by Does not apply route daily 7/22/20   Jared Chacon MD   Catheters (URETHRAL CATHETER) [address] [facility] by Does not apply route    Historical Provider, MD   Cyanocobalamin (VITAMIN B 12 PO) Take 500 mg by mouth daily     Historical Provider, MD   Calcium Carbonate-Vit D-Min (CALCIUM 600+D PLUS MINERALS) 600-400 MG-UNIT TABS Take 1 tablet by mouth nightly     Historical Provider, MD   polyethylene glycol (MIRALAX) PACK packet Take 17 g by mouth daily     Historical Provider, MD   vitamin D3 (CHOLECALCIFEROL) 25 MCG (1000 UT) TABS tablet Take 1 tablet by mouth every morning     Historical Provider, MD   magnesium (MAGNESIUM-OXIDE) 250 MG TABS tablet Take 250 mg by mouth every morning     Historical Provider, MD       CURRENT MEDICATIONS:  No current facility-administered medications for this visit.      IV MEDICATIONS:      ALLERGIES:  Allergies   Allergen Reactions    Augmentin [Amoxicillin-Pot Clavulanate] Shortness Of Breath    Bactrim [Sulfamethoxazole-Trimethoprim] Anaphylaxis and Other (See Comments)     Seizures,     Macrobid [Nitrofurantoin] Anaphylaxis     Mental Changes, shaking, stopped breathing, collapsed (happened again Feb 2021)        REVIEW OF SYSTEMS:  General ROS:  No weight loss ,no fatigue     ENT ROS:   No Sore throat ,no lymphoadenopathy,no nasal stuffiness     Hematological and Lymphatic ROS:   No ecchymosis ,no tendency to bleed  Respiratory ROS:   Cough ,SOB   Cardiovascular ROS:   No CP,No Palpitation   Gastrointestinal ROS:   No Gi bleed,no nausea or vomiting      - Musculoskeletal ROS:      - no joint swelling ,no joint pain   Neurological ROS:     -no weakness or numbness    Dermatological ROS:   No skin rash ,no urticaria     PHYSICAL EXAMINATION:               PROBLEM LIST:  Patient Active Problem List   Diagnosis    MS ([facility] sclerosis) [facility] [facility]    Spastic quadriparesis (HCC)    Lung mass    Essential hypertension    Hypothyroidism    H/O sarcoidosis    Hyperlipidemia    Neurogenic bladder    HUDSON (nonalcoholic steatohepatitis)    Constipation    Osteopenia of multiple sites    Type 2 diabetes mellitus with hyperglycemia, without long-term current use of insulin (HCC)    STEMI (ST elevation myocardial infarction) (HCC)    Multiple vessel coronary artery disease    Hydronephrosis    Chronic combined systolic and diastolic CHF (congestive heart failure) (HCC)    Left ventricular thrombus    Metabolic encephalopathy    UTI (urinary tract infection) due to Enterococcus    Nephrolithiasis    Vitamin D deficiency    NSTEMI (non-ST elevated myocardial infarction) (HCC)    Hypotension    SIRS (systemic inflammatory response syndrome) (HCC)    Anemia    Chronic indwelling Barger catheter    Unstable angina (HCC)    Chest pain    Palliative care by specialist    Goals of care, counseling/discussion               ASSESSMENT:  1- MS   2.)Sarcoid ,lung  3. )RUL scar /noule   4. )Hip fracture /left  5-  MS       PLAN:  *-Since Lung nodule is stable and also PET scan was negative and she is low risk patient <I think we hold on any further imaging for now  *- PET scan shows no PET avid activity in lung ,rest of finding as per primary  care (like kidney stone)  *-BD  As need   *_incentive spirometer  *-advise to ambulate every 2-3 hours during trip as she did not want Lovenox  *-Continue Trilogy   Will see in 1 year and decide if we need any imaging   Flonase spray   Advise to use nasal spray saline      KENNETH BURNS  Pulmonary/Critical care Medicine   [facility]

## 2021-03-02 NOTE — PROGRESS NOTES
TeleMedicine Video Visit    This visit was performed as a virtual video visit using a synchronous, two-way, audio-video telehealth technology platform. Patient identification was verified at the start of the visit, including the patient's telephone number and physical location. I discussed with the patient the nature of our telehealth visits, that:     1. Due to the nature of an audio- video modality, the only components of a physical exam that could be done are the elements supported by direct observation. 2. I would evaluate the patient and recommend diagnostics and treatments based on my assessment. 3. If it was felt that the patient should be evaluated in clinic or an emergency room setting, then they would be directed there. 4. Our sessions are not being recorded and that personal health information is protected. 5. Our team would provide follow up care in person if/when the patient needs it. Patient does agree to proceed with telemedicine consultation. Patient's location: home address in [place] [address]  location [address] other people involved in call       Time spent: Greater than 20    This visit was completed virtually using Doxy. me     Video visit today with pt ; pt pretty mush in bed with short times in the chair with use of assistive devices at home. Pt using Oxygen @ 3liters continuous. Trilogy NIV at night with sleep and working well. Hospice Care in place at home and pleased with services.  on today's call. Uses Nebulizer and meds as needed. Encouraged use of incentive spirometer device at home. Plan for virtual follow up in 4-5 mos. Appt card to be mailed.

## 2021-03-03 ENCOUNTER — VIRTUAL VISIT (OUTPATIENT)
Dept: FAMILY MEDICINE CLINIC | Age: 76
End: 2021-03-03
Payer: MEDICARE

## 2021-03-03 DIAGNOSIS — I25.110 CORONARY ARTERY DISEASE INVOLVING NATIVE HEART WITH UNSTABLE ANGINA PECTORIS, UNSPECIFIED VESSEL OR LESION TYPE (HCC): Primary | ICD-10-CM

## 2021-03-03 DIAGNOSIS — R91.8 LUNG MASS: ICD-10-CM

## 2021-03-03 DIAGNOSIS — I50.22 CHRONIC SYSTOLIC (CONGESTIVE) HEART FAILURE (HCC): ICD-10-CM

## 2021-03-03 DIAGNOSIS — E03.8 OTHER SPECIFIED HYPOTHYROIDISM: ICD-10-CM

## 2021-03-03 DIAGNOSIS — Z86.2 H/O SARCOIDOSIS: ICD-10-CM

## 2021-03-03 DIAGNOSIS — E78.2 MIXED HYPERLIPIDEMIA: ICD-10-CM

## 2021-03-03 DIAGNOSIS — N20.0 NEPHROLITHIASIS: ICD-10-CM

## 2021-03-03 DIAGNOSIS — G35 MS (MULTIPLE SCLEROSIS) (HCC): ICD-10-CM

## 2021-03-03 DIAGNOSIS — N39.0 FREQUENT UTI: ICD-10-CM

## 2021-03-03 DIAGNOSIS — E11.65 TYPE 2 DIABETES MELLITUS WITH HYPERGLYCEMIA, WITHOUT LONG-TERM CURRENT USE OF INSULIN (HCC): ICD-10-CM

## 2021-03-03 DIAGNOSIS — J96.11 CHRONIC RESPIRATORY FAILURE WITH HYPOXIA (HCC): ICD-10-CM

## 2021-03-03 DIAGNOSIS — I10 ESSENTIAL HYPERTENSION: ICD-10-CM

## 2021-03-03 DIAGNOSIS — D64.9 ANEMIA, UNSPECIFIED TYPE: ICD-10-CM

## 2021-03-03 DIAGNOSIS — K59.09 OTHER CONSTIPATION: ICD-10-CM

## 2021-03-03 DIAGNOSIS — G82.50 SPASTIC QUADRIPARESIS (HCC): ICD-10-CM

## 2021-03-03 DIAGNOSIS — F33.0 MILD EPISODE OF RECURRENT MAJOR DEPRESSIVE DISORDER (HCC): ICD-10-CM

## 2021-03-03 DIAGNOSIS — J44.9 CHRONIC OBSTRUCTIVE PULMONARY DISEASE, UNSPECIFIED COPD TYPE (HCC): ICD-10-CM

## 2021-03-03 PROCEDURE — 1123F ACP DISCUSS/DSCN MKR DOCD: CPT | Performed by: INTERNAL MEDICINE

## 2021-03-03 PROCEDURE — 3044F HG A1C LEVEL LT 7.0%: CPT | Performed by: INTERNAL MEDICINE

## 2021-03-03 PROCEDURE — 4040F PNEUMOC VAC/ADMIN/RCVD: CPT | Performed by: INTERNAL MEDICINE

## 2021-03-03 PROCEDURE — 2022F DILAT RTA XM EVC RTNOPTHY: CPT | Performed by: INTERNAL MEDICINE

## 2021-03-03 PROCEDURE — G8427 DOCREV CUR MEDS BY ELIG CLIN: HCPCS | Performed by: INTERNAL MEDICINE

## 2021-03-03 PROCEDURE — 3017F COLORECTAL CA SCREEN DOC REV: CPT | Performed by: INTERNAL MEDICINE

## 2021-03-03 PROCEDURE — 99214 OFFICE O/P EST MOD 30 MIN: CPT | Performed by: INTERNAL MEDICINE

## 2021-03-03 PROCEDURE — 1090F PRES/ABSN URINE INCON ASSESS: CPT | Performed by: INTERNAL MEDICINE

## 2021-03-03 PROCEDURE — G8400 PT W/DXA NO RESULTS DOC: HCPCS | Performed by: INTERNAL MEDICINE

## 2021-03-03 ASSESSMENT — ENCOUNTER SYMPTOMS
NAUSEA: 0
SORE THROAT: 0
CONSTIPATION: 0
EYE PAIN: 0
ABDOMINAL PAIN: 0
COUGH: 0
BLOOD IN STOOL: 0
SHORTNESS OF BREATH: 1
RHINORRHEA: 0
DIARRHEA: 0
VOMITING: 0
CHEST TIGHTNESS: 0

## 2021-03-03 NOTE — PROGRESS NOTES
TeleMedicine Video Visit    This visit was performed as a virtual video visit using a synchronous, two-way, audio-video telehealth technology platform. Patient identification was verified at the start of the visit, including the patient's telephone number and physical location. I discussed with the patient the nature of our telehealth visits, that:     1. Due to the nature of an audio- video modality, the only components of a physical exam that could be done are the elements supported by direct observation. 2. I would evaluate the patient and recommend diagnostics and treatments based on my assessment. 3. If it was felt that the patient should be evaluated in clinic or an emergency room setting, then they would be directed there. 4. Our sessions are not being recorded and that personal health information is protected. 5. Our team would provide follow up care in person if/when the patient needs it. Patient does agree to proceed with telemedicine consultation. Patient's location: home address in [facility]  Physician  location office  other people involved in call -        Time spent: Greater than Not billed by time    This visit was completed virtually using Doxy. me      3/3/2021    TELEHEALTH EVALUATION -- Audio/Visual (During PLASP-67 public health emergency)    HPI:    King Ld Douglas (:  1945) has requested an audio/video evaluation for the following concern(s):     - States had issues with swallowing.     -Patient has CAD. Has had ST elevation MI () angio and stent to LAD and NSTEMI for instent stenosis (). Patient was found to have severe anterior and septal hypokinesia with a apical dyskinesia and ejection fraction 20 to 25% with a small apical left ventricular thrombus on echocardiogram. Off eliquis. On ranexa 1000mg twice a day. On plavix. On isosorbide. On Creg. On bumex. On aspirin. Has hold parameters for medications and will hold if BP too low, holding most in evening.  No further chest pain or SOB.    - States was found to have low blood count, Anemia. Added iron. Last lab was 8.9. to see hematology.     - Patient had left obstructing ureteral calculus and had a cystoscopy with ureteral stent and stone manipulation. Has saldana cath. (11/20) - Cystoscopy. Complex flexible ureteroscopy. Laser lithotripsy Stent exchange,  And since stent removed. No current symptoms. was given bactrim if develops symptoms.    - States had bladder issues and overactive bladder. Stopped oxybutynin. States needed chronic saldana catheter. States was following with urology. Was given botox treatment in past. Off myrbetric.  as flushed intermittently      - elevated blood sugars. Last lab had showed elevated A1c to 7.7, most recent A1c (7/2020) was 6.6  - not known to be diabetic. Blood sugars were running . On metformin. No reported side effects.    - States has high cholesterol. States Lipitor. No reported side effects. Stopped zetia.      -  states constipated. States on senokot-2. States on miralax. On iron therapy. States has used mag citrate from time to time.     - States has high blood pressure. States 101/68 (2/3/2021). States has been holding some of her heart medications due to the low blood pressure at times. On coreg 3.125mg bid. On bumex. On isosorbide, off lisinopril. Has hold parameters for medications and will hold if BP too low, holding most in evening    - States has hypothyroidism. on levothyroxine. More compliant. Last lab was 9/2020 - stable.      - States had hip fracture s/p left ORIF. States has been to physical therapy. Not walking. States having issues with left knee and leg. Released from [employer] found lung mass and needs pulmonary follow up yearly (10/2020). States PET suggested scar - no further imaging needed at present. Follow up in 1 year.      - States has multiple sclerosis. States follows with neurology. States stopped ticferdia.  States on baclofen 20mg 4x per day and dantrolene 50mg 4x per day. Spasms stable. States weakness to hands b/l - difficult to feed self at times. Still with right hand weakness. **she would benefit form a wheelchair that reclines because of her heart condition and low ejection fraction. To help get her out of bed to help with physical therapy and strength but also to be able to recline back when she feels dizzy and to assist with transportation. Current chiar does not meet her changed needs. Has new power chair.      - States has had elevated liver function tests. States has seen GI. Has had biopsy. Last lab stable.      - States has depression. On citalopram. Stable on medications. No reported side effects. No suicidal thoughts. Review of Systems   Constitutional: Negative for appetite change, chills, fever and unexpected weight change. HENT: Negative for congestion, rhinorrhea and sore throat. Eyes: Negative for pain and visual disturbance. Respiratory: Positive for shortness of breath. Negative for cough and chest tightness. Cardiovascular: Negative for chest pain and palpitations. Gastrointestinal: Negative for abdominal pain, blood in stool, constipation, diarrhea, nausea and vomiting. Genitourinary: Negative for difficulty urinating, dysuria, frequency, pelvic pain, urgency and vaginal bleeding. Musculoskeletal: Negative for arthralgias and myalgias. Skin: Negative for rash. Neurological: Negative for dizziness, syncope, weakness, light-headedness, numbness and headaches. Hematological: Negative for adenopathy. Psychiatric/Behavioral: Negative for suicidal ideas. The patient is not nervous/anxious. Prior to Visit Medications    Medication Sig Taking?  Authorizing Provider   levothyroxine (SYNTHROID) 88 MCG tablet Take 1 tablet by mouth daily Yes Luis Galvan MD   ferrous sulfate (IRON 325) 325 (65 Fe) MG tablet Take 1 tablet by mouth daily (with breakfast) Yes Luis Galvan MD senna-docusate (SENNA-S) 8.6-50 MG per tablet Take 1 tablet by mouth daily Yes Delaney Quintanilla MD   Acetaminophen (TYLENOL PO) Take by mouth as needed Yes Historical Provider, MD   bumetanide (BUMEX) 1 MG tablet Take 1 tablet by mouth 2 times daily  Patient taking differently: Take 1 mg by mouth 2 times daily BP must be greater than 90/50 Yes Delaney Quintanilla MD   ranolazine (RANEXA) 1000 MG extended release tablet Take 1,000 mg by mouth 2 times daily  Yes Historical Provider, MD   atorvastatin (LIPITOR) 10 MG tablet 1 po q hs Yes Delaney Quintanilla MD   clopidogrel (PLAVIX) 75 MG tablet Take 1 tablet by mouth daily Yes Delaney Quintanilal MD   metFORMIN (GLUCOPHAGE) 500 MG tablet Take 1 tablet by mouth 2 times daily (with meals) Yes Delaney Quintanilla MD   fluticasone (FLONASE) 50 MCG/ACT nasal spray 2 sprays by Each Nostril route daily as needed for Rhinitis Yes Delaney Quintanilla MD   ondansetron (ZOFRAN ODT) 4 MG disintegrating tablet Take 1 tablet by mouth every 8 hours as needed for Nausea or Vomiting Yes Gin Woods MD   isosorbide mononitrate (IMDUR) 30 MG extended release tablet Take 30 mg by mouth daily If BP is 80/55 or higher Yes Historical Provider, MD   baclofen (LIORESAL) 20 MG tablet Take 40 mg by mouth 2 times daily Yes Historical Provider, MD   carvedilol (COREG) 3.125 MG tablet Take 1 tablet by mouth 2 times daily  Patient taking differently: Take 3.125 mg by mouth 2 times daily If BP is 80/55 or higher and pulse should be 60 or higher Yes Delaney Quintanilla MD   Wound Dressings ([facility] WOUND/BURN DRESSING) PSTE paste as needed [place] buttocks Yes Historical Provider, MD   OXYGEN Inhale 3 L into the lungs continuous  Yes Historical Provider, MD   citalopram (CELEXA) 20 MG tablet Take 1 tablet by mouth every morning Yes Delaney Quintanilla MD   Potassium 99 MG TABS Take 1 tablet by mouth daily Yes Historical Provider, MD   ipratropium-albuterol (DUONEB) 0.5-2.5 (3) MG/3ML SOLN nebulizer solution Inhale 3 mLs into the lungs every 6 hours as needed for Shortness of Breath  Patient taking differently: Inhale 1 vial into the lungs every 8 hours as needed for Shortness of Breath  Yes Cruzito Zheng MD   docusate sodium (COLACE) 100 MG capsule Take 100 mg by mouth daily Yes Historical Provider, MD   aspirin 81 MG EC tablet Take 81 mg by mouth daily Yes Historical Provider, MD   dantrolene (DANTRIUM) 50 MG capsule Take 1 capsule by mouth 4 times daily  Patient taking differently: Take 100 mg by mouth 2 times daily 2 caps bid Yes Cruzito Zheng MD   blood glucose test strips (ASCENSIA AUTODISC VI;ONE TOUCH ULTRA TEST VI) strip 1 each by In Vitro route 2 times daily As needed. Yes Cruzito Zheng MD   blood glucose monitor kit and supplies Dispense sufficient amount for indicated testing frequency plus additional to accommodate PRN testing needs. Dispense all needed supplies to include: monitor, strips, lancing device, lancets, control solutions, alcohol swabs. Yes Cruzito Zheng MD   blood glucose monitor strips Test qd  & as needed for symptoms of irregular blood glucose. Dispense sufficient amount for indicated testing frequency plus additional to accommodate PRN testing needs.  Yes Cruzito Zheng MD   Lancets MISC 1 each by Does not apply route daily Yes Cruzito Zheng MD   Catheters (URETHRAL CATHETER) [address] [facility] by Does not apply route Yes Historical Provider, MD   Cyanocobalamin (VITAMIN B 12 PO) Take 500 mg by mouth daily  Yes Historical Provider, MD   Calcium Carbonate-Vit D-Min (CALCIUM 600+D PLUS MINERALS) 600-400 MG-UNIT TABS Take 1 tablet by mouth nightly  Yes Historical Provider, MD   polyethylene glycol (MIRALAX) PACK packet Take 17 g by mouth daily  Yes Historical Provider, MD   vitamin D3 (CHOLECALCIFEROL) 25 MCG (1000 UT) TABS tablet Take 1 tablet by mouth every morning  Yes Historical Provider, MD   magnesium (MAGNESIUM-OXIDE) 250 MG TABS tablet Take 250 mg by mouth every morning  Yes Historical Provider, MD       Social History     Tobacco Use    Smoking status: Never Smoker    Smokeless tobacco: Never Used   Substance Use Topics    Alcohol use: No     Alcohol/week: 0.0 standard drinks     Comment: Ocassionally    Drug use: No        Allergies   Allergen Reactions    Augmentin [Amoxicillin-Pot Clavulanate] Shortness Of Breath    Bactrim [Sulfamethoxazole-Trimethoprim] Anaphylaxis and Other (See Comments)     Seizures,     Macrobid [Nitrofurantoin] Anaphylaxis     Mental Changes, shaking, stopped breathing, collapsed (happened again Feb 2021)    ,   Past Medical History:   Diagnosis Date    CAD (coronary artery disease) 4/21/2020    High cholesterol     Hypertension     Hypothyroidism     [facility] infarction) [facility] [address] 04/21/2020    MS (multiple sclerosis) ([facility] [address] [facility]     Osteoporosis     Sarcoidosis     Seizure ([facility] [address] 02/05/2021    Type 2 diabetes mellitus with hyperglycemia, without long-term current use of insulin ([facility] [address] 10/30/2019   ,   Past Surgical History:   Procedure Laterality Date    CHOLECYSTECTOMY  1990s    CORONARY ANGIOPLASTY WITH STENT PLACEMENT  04/21/2020    Dr. Rula Cutler   3.0 x 22mm Resolute MAAME to Prox LAD.   No LV    CORONARY ANGIOPLASTY WITH STENT PLACEMENT  01/14/2021    3.0 x 30 Maame to the prox LAD and a 2.5 x 20 Maame to the Obtuse marginal by Dr. Aguila Harerll / [address] [place] [address] Jose / Kaylee uHmmel Left 4/23/2020    CYSTOSCOPY LEFT RETROGRADE PYELOGRAM STENT INSERTION, STRATTON CATHETER performed by Evelyne Avila DO at [facility] Left 3/21/2019    LEFT FEMUR CEPHALLOMEDULLARY NAIL performed by Mckenna William MD at [facility] Bilateral     HYSTERECTOMY      KNEE SURGERY      plate in lt knee    LITHOTRIPSY Left 11/23/2020    CYSTOSCOPY LEFT URETEROSCOPY,  LASER LITHOTRIPSY  BASKET EXTRACTION LEFT STONE, STENT EXCHANGE [facility]    CYSTOSCOPY RETROGRADE URETEROSCOPY PYELOGRAM LASER LITHOTRIPSY LEFT J-STENT performed by Shandra Avila DO at [facility]   ,   Social History     Tobacco Use    Smoking status: Never Smoker    Smokeless tobacco: Never Used   Substance Use Topics    Alcohol use: No     Alcohol/week: 0.0 standard drinks     Comment: Ocassionally    Drug use: No   ,   Immunization History   Administered Date(s) Administered    Influenza 12/15/2010    Influenza A ([id number]) Vaccine PF IM 12/02/2009    Influenza Vaccine, unspecified formulation 11/21/2008    Influenza Virus Vaccine 10/27/2014, 10/02/2017    Influenza Whole 10/02/2013, 10/27/2014, 10/21/2015    Influenza, High Dose (Fluzone 65 yrs and older) 10/10/2015, 09/11/2017    Influenza, Quadv, IM, (6 mo and older Fluzone, Flulaval, Fluarix and 3 yrs and older Afluria) 10/02/2017    Influenza, Triv, inactivated, subunit, adjuvanted, IM (Fluad 65 yrs and older) 09/19/2018    Pneumococcal Conjugate 13-valent (Joo Ghazi) 01/15/2013, 09/23/2015, 09/18/2018, 09/19/2018    Pneumococcal Polysaccharide ([id number]) 09/18/2013   ,   Health Maintenance   Topic Date Due    COVID-19 Vaccine (1 of 2) Never done    DTaP/Tdap/Td vaccine (1 - Tdap) Never done    Shingles Vaccine (1 of 2) Never done   [employer] Visit (AWV)  Never done    Flu vaccine (1) 09/01/2020    Breast cancer screen  12/10/2020    TSH testing  09/02/2021    A1C test (Diabetic or Prediabetic)  01/13/2022    Potassium monitoring  01/18/2022    Creatinine monitoring  01/18/2022    Colon cancer screen colonoscopy  11/04/2029    Pneumococcal 65+ years Vaccine  Completed    Hepatitis A vaccine  Aged Out    Hib vaccine  Aged Out    Meningococcal (ACWY) vaccine  Aged Out       PHYSICAL EXAMINATION:  [ INSTRUCTIONS:  \"[x]\" Indicates a positive item  \"[]\" Indicates a negative item  -- DELETE ALL ITEMS NOT EXAMINED]  Vital Signs: (As obtained by patient/caregiver or practitioner observation)    Blood pressure-  Heart rate- Respiratory rate-    Temperature-  Pulse oximetry-     Constitutional: [x] Appears well-developed and well-nourished [x] No apparent distress      [] Abnormal-   Mental status  [x] Alert and awake  [x] Oriented to person/place/time []Able to follow commands      Eyes:  EOM    [x]  Normal  [] Abnormal-  Sclera  []  Normal  [] Abnormal -         Discharge []  None visible  [] Abnormal -    HENT:   [x] Normocephalic, atraumatic.   [] Abnormal   [] Mouth/Throat: Mucous membranes are moist.     External Ears [x] Normal  [] Abnormal-     Neck: [x] No visualized mass     Pulmonary/Chest: [x] Respiratory effort normal.  [] No visualized signs of difficulty breathing or respiratory distress        [] Abnormal-      Musculoskeletal:   [] Normal gait with no signs of ataxia         [x] Normal range of motion of neck        [] Abnormal-       Neurological:        [x] No Facial Asymmetry (Cranial nerve 7 motor function) (limited exam to video visit)          [] No gaze palsy        [] Abnormal-         Skin:        [x] No significant exanthematous lesions or discoloration noted on facial skin         [] Abnormal-            Psychiatric:       [x] Normal Affect [] No Hallucinations        [] Abnormal-     Other pertinent observable physical exam findings-     ASSESSMENT/PLAN:    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, unspecified vessel or lesion type [facility])  - s/p cath and stent to LAD (2020), then instent stenosis s/p restent (1/21)   - following with cardio   - off lisinopril   - off eliquis  - on coreg   - on imdur   - on ranexa   - on bumex   - on plavix  - on aspirin  - stable at present     Chronic systolic congestive heart failure (HCC)  - EF 20% per echo (4/2020)   - on bumex   - on coreg   - off lisinopril    -needs updated echo     Frequent UTI  - enterococcus    - at risk for further infection   - no current symptoms  - has bactrim to use if symptoms   - following with urology   - stable at present     Nephrolithiasis  - s/p cystoscopy and lithotripsy (11/2020)   - now chronic saldana      Anemia, unspecified type  - follow labs  - on iron   - to see hematology      Other Constipation  - on senna   - on miralax  - add mag citrate . 25 as needed      Essential hypertension  - watch diet   - off lisinopril   - on bumex   - on coreg twice a day   - on imdur   - on ranex   - stable per   - improved per patient and       Type 2 diabetes mellitus with hyperglycemia, without long-term current use of insulin (HCC)  - watch diet   - last A1c was 6.6 (7/2020)   - now on metformin since recent increase in sugars   - on endocrinology      Hypothyroidism, unspecified type  - on levothyroxine  - labs were normal (9/2020)    Chronic respiratory failure with hypoxia (HCC)  - on oxygen - 3 liters   - has trilogy at night   - has seen pulmonary - recommending to cont trilogy   - stable      MS (multiple sclerosis) ([facility] [address]  - ticierda on hold  - stopped nortriptyline   - on baclofen and dantrolene  - non ambulatory  - stable  **she would benefit form a wheelchair that reclines because of her heart condition and low ejection fraction. To help get her out of bed to help with physical therapy and strength but also to be able to recline back when she feels dizzy and to assist with transportation.  Current chiar does not meet her changed needs.      Spastic quadriparesis ([facility] [address]  - following with neurology   - on baclofen and dantrolene     Mixed hyperlipidemia  - watch diet  - on atrovastatin  - follow up labs     Neurogenic bladder  - off oxybutynin  - off myrbetric - has not started yet   - currently with saldana      Mild episode of recurrent major depressive disorder (HCC)  - on citalopram  - no suicidal thoughts  - stable     HUDSON (nonalcoholic steatohepatitis)  - following with GI     Osteopenia of multiple sites  - declines bone density  - follows labs     Lung mass  - possible scar (2019)   - has seen pulmonary - yearly (10/2020)      H/O sarcoidosis    Chronic obstructive pulmonary disease, unspecified COPD type ([facility] [address]  - may be more sarcoid and MS related     Return in about 6 weeks (around 4/14/2021). No orders of the defined types were placed in this encounter. Requested Prescriptions      No prescriptions requested or ordered in this encounter       Tess Vargas is a 77 y.o. female being evaluated by a Virtual Visit (video visit) encounter to address concerns as mentioned above. A caregiver was present when appropriate. Due to this being a TeleHealth encounter (During ZEKJV-52 public health emergency), evaluation of the following organ systems was limited: Vitals/Constitutional/EENT/Resp/CV/GI//MS/Neuro/Skin/Heme-Lymph-Imm. Pursuant to the emergency declaration under the [address] [place] [address], [address] [place] [address] authority and the [employer] Act, this Virtual Visit was conducted with patient's (and/or legal guardian's) consent, to reduce the patient's risk of exposure to COVID-19 and provide necessary medical care. The patient (and/or legal guardian) has also been advised to contact this office for worsening conditions or problems, and seek emergency medical treatment and/or call 911 if deemed necessary. Patient identification was verified at the start of the visit: Yes    Total time spent on this encounter: Not billed by time    Services were provided through a video synchronous discussion virtually to substitute for in-person clinic visit. Patient and provider were located at their individual homes. --Na Jean MD on 3/3/2021 at 3:46 PM    An electronic signature was used to authenticate this note.

## 2021-03-08 ENCOUNTER — TELEPHONE (OUTPATIENT)
Dept: ENDOCRINOLOGY | Age: 76
End: 2021-03-08

## 2021-03-11 ENCOUNTER — TELEPHONE (OUTPATIENT)
Dept: FAMILY MEDICINE CLINIC | Age: 76
End: 2021-03-11

## 2021-03-11 DIAGNOSIS — E87.1 HYPONATREMIA: ICD-10-CM

## 2021-03-11 DIAGNOSIS — D64.9 ANEMIA, UNSPECIFIED TYPE: Primary | ICD-10-CM

## 2021-03-11 LAB
A/G RATIO: 1.1 RATIO (ref 1.1–2.2)
ALBUMIN SERPL-MCNC: 3.4 G/DL
ALBUMIN SERPL-MCNC: 3.4 G/DL (ref 3.4–4.8)
ALP BLD-CCNC: 58 U/L
ALP BLD-CCNC: 58 U/L (ref 42–121)
ALT SERPL-CCNC: 10 U/L
ALT SERPL-CCNC: 10 U/L (ref 10–54)
ANION GAP SERPL CALCULATED.3IONS-SCNC: 1.1 MMOL/L
ANION GAP SERPL CALCULATED.3IONS-SCNC: 12 MEQ/L (ref 3–11)
AST SERPL-CCNC: 16 U/L
AST SERPL-CCNC: 16 U/L (ref 10–41)
BASOPHILS ABSOLUTE: 0 /ΜL
BASOPHILS ABSOLUTE: 0 K/UL (ref 0–0.2)
BASOPHILS RELATIVE PERCENT: 0.3 %
BASOPHILS RELATIVE PERCENT: 0.3 % (ref 0–1.5)
BILIRUB SERPL-MCNC: 0.4 MG/DL (ref 0.1–1.4)
BILIRUB SERPL-MCNC: 0.4 MG/DL (ref 0.3–1.5)
BUN BLDV-MCNC: 18 MG/DL
BUN BLDV-MCNC: 18 MG/DL (ref 8–21)
CALCIUM SERPL-MCNC: 9 MG/DL
CALCIUM SERPL-MCNC: 9 MG/DL (ref 8.5–10.5)
CHLORIDE BLD-SCNC: 91 MEQ/L (ref 98–107)
CHLORIDE BLD-SCNC: 91 MMOL/L
CO2: 25 MEQ/L (ref 21–31)
CO2: 25 MMOL/L
CREAT SERPL-MCNC: 0.5 MG/DL
CREAT SERPL-MCNC: 0.5 MG/DL (ref 0.4–1)
CREATININE + EGFR PANEL: 146 ML/MIN
DIFFERENTIAL, MANUAL: ABNORMAL
EOSINOPHILS ABSOLUTE: 0.1 /ΜL
EOSINOPHILS ABSOLUTE: 0.1 K/UL (ref 0–0.33)
EOSINOPHILS RELATIVE PERCENT: 1.6 %
EOSINOPHILS RELATIVE PERCENT: 1.6 % (ref 0–3)
FERRITIN: 135.1 NG/ML (ref 11–307)
FERRITIN: 135.1 NG/ML (ref 9–150)
FOLATE: 15.3
FOLATE: 15.3 NG/ML
GFR CALCULATED: 120
GFR NON-AFRICAN AMERICAN: 120 ML/MIN
GLOBULIN: 3 G/DL (ref 1.9–3.9)
GLUCOSE BLD-MCNC: 138 MG/DL
GLUCOSE BLD-MCNC: 138 MG/DL (ref 70–99)
HCT VFR BLD CALC: 23 % (ref 36–44)
HCT VFR BLD CALC: 23 % (ref 36–46)
HEMOGLOBIN: 7.7 G/DL (ref 12–15)
HEMOGLOBIN: 7.7 G/DL (ref 12–16)
IRON SATURATION: 11 % (ref 15–55)
IRON: 33 UG/DL (ref 50–170)
LYMPHOCYTES ABSOLUTE: 0.8 /ΜL
LYMPHOCYTES ABSOLUTE: 0.8 K/UL (ref 1.1–4.8)
LYMPHOCYTES RELATIVE PERCENT: 9.2 %
LYMPHOCYTES RELATIVE PERCENT: 9.2 % (ref 24–44)
MAGNESIUM: 1.7 MEQ/L (ref 1.6–2.6)
MCH RBC QN AUTO: 32.2 PG
MCH RBC QN AUTO: 32.2 PG (ref 28–34)
MCHC RBC AUTO-ENTMCNC: 33.6 G/DL
MCHC RBC AUTO-ENTMCNC: 33.6 G/DL (ref 33–37)
MCV RBC AUTO: 95.7 FL
MCV RBC AUTO: 95.7 FL (ref 80–100)
MONOCYTES ABSOLUTE: 0.7 /ΜL
MONOCYTES ABSOLUTE: 0.7 K/UL (ref 0.2–0.7)
MONOCYTES RELATIVE PERCENT: 8.1 %
MONOCYTES RELATIVE PERCENT: 8.1 % (ref 3.4–9)
NEUTROPHILS ABSOLUTE: 7.1 /ΜL
NEUTROPHILS ABSOLUTE: 7.1 K/UL (ref 1.83–8.7)
NEUTROPHILS RELATIVE PERCENT: 80.8 %
PDW BLD-RTO: 13.9 %
PDW BLD-RTO: 13.9 % (ref 10.9–14.3)
PLATELET # BLD: 519 K/UL (ref 150–450)
PLATELET # BLD: 519 K/ΜL
PMV BLD AUTO: 7.5 FL
PMV BLD AUTO: 7.5 FL (ref 7.4–10.4)
POTASSIUM SERPL-SCNC: 5.2 MEQ/L (ref 3.6–5)
POTASSIUM SERPL-SCNC: 5.2 MMOL/L
RBC # BLD: 2.41 10^6/ΜL
RBC # BLD: 2.41 M/UL (ref 4–4.9)
SEGMENTED NEUTROPHILS RELATIVE PERCENT: 80.8 % (ref 40–74)
SODIUM BLD-SCNC: 128 MEQ/L (ref 135–145)
SODIUM BLD-SCNC: 128 MMOL/L
TOTAL IRON BINDING CAPACITY: 287 UG/DL (ref 250–450)
TOTAL PROTEIN: 6.4
TOTAL PROTEIN: 6.4 G/DL (ref 5.9–7.8)
TRANSFERRIN: 205 MG/DL (ref 192–382)
VITAMIN B-12: 1144
VITAMIN B-12: 1144 PG/ML (ref 180–914)
WBC # BLD: 8.8 10^3/ML
WBC # BLD: 8.8 K/UL (ref 4.5–11)

## 2021-03-11 NOTE — TELEPHONE ENCOUNTER
Received an urgent call from laboratory    Patient with critical results of hemoglobin of 7.7    Call placed and discussed with     Patient showed abnormal labs of sodium of 128, potassium of 5.2, hemoglobin of 7.7    Advised patient to change potassium supplements to every other day    Advised to contact [facility] to explore possible iron infusions as her iron levels were still low despite oral therapy    Recommend to recheck blood work in 1 month or sooner if any changes    Orders Placed This Encounter   Procedures    Basic Metabolic Panel     Standing Status:   Future     Standing Expiration Date:   3/11/2022    CBC Auto Differential     Standing Status:   Future     Standing Expiration Date:   3/11/2022     Thanks

## 2021-03-12 NOTE — TELEPHONE ENCOUNTER
Is this OK to close? When you spoke with  all of the below was relayed? I don't want anything to be missed,.   Please advise

## 2021-03-12 NOTE — TELEPHONE ENCOUNTER
Please let the  know I spoke with [facility] and they will contact patient to sent up IV iron

## 2021-03-18 LAB
APPEARANCE, BODY FLUID: ABNORMAL
BACTERIA, URINE: NORMAL /HPF
BILIRUBIN URINE: NEGATIVE
BILIRUBIN, URINE: NEGATIVE
BLOOD, URINE: NEGATIVE
CALCIUM OXALATE CRYSTALS: NORMAL /HPF
CLARITY: ABNORMAL
COLOR, URINE: ABNORMAL
COLOR: ABNORMAL
ERYTHROCYTES URINE: NORMAL /HPF
GLUCOSE URINE: NEGATIVE
GLUCOSE URINE: NEGATIVE MG/DL
KETONES, URINE: NEGATIVE
KETONES, URINE: NEGATIVE MG/DL
LEUKOCYTE ESTERASE, URINE: POSITIVE
MICROSCOPIC URINE: YES
NITRATE, UA: NEGATIVE
NITRITE, URINE: NEGATIVE
PH UA: 6 (ref 4.5–8)
PH UA: 6 (ref 4.6–8)
PROTEIN UA: 30 MG/DL
PROTEIN UA: ABNORMAL
RBC URINE: NEGATIVE
SPECIFIC GRAVITY, URINE: 1.01
SPECIFIC GRAVITY, URINE: 1.01 (ref 1–1.03)
TRANSITIONAL EPITHELIAL: NORMAL /HPF
URINE CULTURE REFLEX: ABNORMAL
UROBILINOGEN, URINE: NEGATIVE MG/DL
UROBILINOGEN, URINE: NORMAL
WBC COUNT, UR AUTO: NORMAL /HPF
WBC URINE: ABNORMAL

## 2021-03-19 ENCOUNTER — TELEPHONE (OUTPATIENT)
Dept: PRIMARY CARE CLINIC | Age: 76
End: 2021-03-19

## 2021-03-19 ENCOUNTER — TELEPHONE (OUTPATIENT)
Dept: ENDOCRINOLOGY | Age: 76
End: 2021-03-19

## 2021-03-19 NOTE — TELEPHONE ENCOUNTER
Call was placed to     Reviewed urine analysis results with . Advised that current findings of white cells and protein could be related to chronic catheter    Currently patient is not having any overt symptoms of infection.       states the patient was sitting in chair is awake alert in no signs of fever    Patient was to hematologist this week and received IV iron and is due to receive another dose of IV iron next week    Patient was able to get her first Covid vaccine and no ill effects regarding this    Advised to update us if any other changes    Thanks    Addendum 3/21/2021    Urine culture was sent and finalized    Urine culture grew a bacteria Klebsiella    I am still uncertain as whether this is colonization versus actual infection and having the chronic catheter does complicate analysis    Would consider short course of ciprofloxacin 250 mg twice a day x5 days

## 2021-03-21 LAB — URINE CULTURE, ROUTINE: NORMAL

## 2021-03-22 RX ORDER — CIPROFLOXACIN 250 MG/1
250 TABLET, FILM COATED ORAL 2 TIMES DAILY
Qty: 10 TABLET | Refills: 0 | Status: SHIPPED | OUTPATIENT
Start: 2021-03-22 | End: 2021-03-27

## 2021-03-22 NOTE — TELEPHONE ENCOUNTER
Left message for Jose Miguel Godfrey and asked fort a return call. Vanessa Brink of results also faxed to urology.

## 2021-03-22 NOTE — TELEPHONE ENCOUNTER
Requested Prescriptions     Signed Prescriptions Disp Refills    ciprofloxacin (CIPRO) 250 MG tablet 10 tablet 0     Sig: Take 1 tablet by mouth 2 times daily for 5 days     Authorizing Provider: Cira Fontaine     Sent   Thanks

## 2021-04-05 NOTE — TELEPHONE ENCOUNTER
Last Appointment:  3/3/2021  Future Appointments   Date Time Provider Marisol Garcia   6/22/2021  1:40 PM Gill Fowler MD [facility]   6/29/2021  3:00 PM Shan Cordero MD [facility]   8/16/2021 11:30 AM [facility]

## 2021-04-07 ENCOUNTER — TELEPHONE (OUTPATIENT)
Dept: FAMILY MEDICINE CLINIC | Age: 76
End: 2021-04-07

## 2021-04-07 RX ORDER — CARVEDILOL 3.12 MG/1
3.12 TABLET ORAL 2 TIMES DAILY
Qty: 180 TABLET | Refills: 1 | Status: SHIPPED
Start: 2021-04-07 | End: 2021-09-16 | Stop reason: SDUPTHER

## 2021-04-07 NOTE — TELEPHONE ENCOUNTER
Pharmacy is requesting a refill on Coreg 3.125mg tabs bid.      Rx is ready to send    Last Appointment: I will call Shara Felty to schedule a f/u   3/3/2021  Future Appointments   Date Time Provider Marisol Richardsoni   6/22/2021  1:40 PM Christal Gonzalez MD [facility]   6/29/2021  3:00 PM Joo Goodwin MD [facility]   8/16/2021 11:30 AM [facility]ilar [facility]

## 2021-04-07 NOTE — TELEPHONE ENCOUNTER
I called the home number and cell number and left messages asking Lavern  to call back to schedule Michaela's 6wk f/u.  She is due around 4/14/21

## 2021-04-08 ENCOUNTER — TELEPHONE (OUTPATIENT)
Dept: FAMILY MEDICINE CLINIC | Age: 76
End: 2021-04-08

## 2021-04-08 DIAGNOSIS — E78.2 MIXED HYPERLIPIDEMIA: ICD-10-CM

## 2021-04-08 DIAGNOSIS — E11.65 TYPE 2 DIABETES MELLITUS WITH HYPERGLYCEMIA, WITHOUT LONG-TERM CURRENT USE OF INSULIN (HCC): ICD-10-CM

## 2021-04-08 DIAGNOSIS — D64.9 ANEMIA, UNSPECIFIED TYPE: Primary | ICD-10-CM

## 2021-04-08 DIAGNOSIS — E03.8 OTHER SPECIFIED HYPOTHYROIDISM: ICD-10-CM

## 2021-04-08 NOTE — PROGRESS NOTES
[address] [place] [address] Department of Endocrinology Diabetes and Metabolism   [address] [facility] [address]   Phone: [phone]  Fax: [phone]    Date of Service: 1/26/2021    Primary Care Physician: Joesph Thorne MD  Provider: Padilla Alcantar MD     Reason for the visit:  DM type 2    History of Present Illness: The history is provided by the patient. No  was used. Accuracy of the patient data is excellent.   Brandon Emery is a very pleasant 77 y.o. female CAD and MS seen today for diabetes management     Brandon Emery was diagnosed with diabetes at age   The patient is currently on Metformin 500 mg BID,   The patient has been checking blood sugar daily and most readings at goal   Most recent A1c results summarized below  Lab Results   Component Value Date    LABA1C 4.9 01/13/2021    LABA1C 6.6 07/30/2020    LABA1C 6.7 05/13/2020     Patient has had no hypoglycemic episodes   The patient has been mindful of what has been eating and following diabetic diet as encouraged  I reviewed current medications and the patient has no issues with diabetes medications  The patient is due for an eye exam   Microvascular complications:  No Retinopathy, Nephropathy or Neuropathy   Macrovascular complications: + CAD   The patient receives Flushot every year and up to date with the Pneumonia vaccine     PAST MEDICAL HISTORY   Past Medical History:   Diagnosis Date    CAD (coronary artery disease) 4/21/2020    High cholesterol     Hypertension     Hypothyroidism     MI (myocardial infarction) ([address] 04/21/2020    MS [facility]multiple sclerosis) [facility]     Osteoporosis     Sarcoidosis     Type 2 diabetes mellitus with hyperglycemia, without long-term current use of insulin ([address] 10/30/2019       PAST SURGICAL HISTORY   Past Surgical History:   Procedure Laterality Date    CHOLECYSTECTOMY  1990s    CORONARY ANGIOPLASTY WITH STENT PLACEMENT  04/21/2020    Dr. Brooks Arnett   3.0 x 22mm Fetal Non-Stress Test    Date/Time: 4/8/2021 12:39 PM  Performed by: Les Rodarte MD  Authorized by: Les Rodarte MD     Interpretation - Baby A:     Nonstress Test Interpretation: reactive         Resolute MAAME to Prox LAD. No LV    CORONARY ANGIOPLASTY WITH STENT PLACEMENT  01/14/2021    3.0 x 30 Maame to the prox LAD and a 2.5 x 20 [place] to the O[place] marginal by Dr. Keo Horton / [address] [facility] Jose / Kingsley Gallardo Left 4/23/2020    CYSTOSCOPY LEFT RETROGRADE PYELOGRAM STENT INSERTION, STRATTON CATHETER performed by Ciera Avila DO at [facility] Left 3/21/2019    LEFT FEMUR CEPHALLOMEDULLARY NAIL performed by Thania Hermosillo MD at [facility] Bilateral     HYSTERECTOMY      KNEE SURGERY      plate in lt knee    LITHOTRIPSY Left 11/23/2020    CYSTOSCOPY LEFT URETEROSCOPY,  LASER LITHOTRIPSY  BASKET EXTRACTION LEFT STONE, STENT EXCHANGE performed by Ciera Avila DO at [address] LITHOTRIPSY Left 12/30/2020    CYSTOSCOPY RETROGRADE URETEROSCOPY PYELOGRAM LASER LITHOTRIPSY LEFT J-STENT performed by Ciera Avila DO at [address]:   reports that she has never smoked. She has never used smokeless tobacco.  Alcohol:   reports no history of alcohol use. Drugs:   reports no history of drug use.     FAMILY HISTORY   Family History   Problem Relation Age of Onset    Stroke Mother     Heart Disease Mother     Cancer Father         lung    Mult Sclerosis Sister     No Known Problems Brother     No Known Problems Sister     Arthritis Sister     Cervical Cancer Sister        ALLERGIES AND DRUG REACTIONS   Allergies   Allergen Reactions    Augmentin [Amoxicillin-Pot Clavulanate] Shortness Of Breath    Macrobid [Nitrofurantoin]      Mental Changes       CURRENT MEDICATIONS   Current Outpatient Medications   Medication Sig Dispense Refill    fluticasone (FLONASE) 50 MCG/ACT nasal spray 2 sprays by Each Nostril route daily as needed for Rhinitis 1 Bottle 1    ondansetron (ZOFRAN ODT) 4 MG disintegrating tablet Take 1 tablet by mouth every 8 hours as needed for Nausea or Vomiting 30 tablet 0    nortriptyline (PAMELOR) 25 MG capsule Take 1 capsule by mouth nightly 90 capsule 0    nitroGLYCERIN (NITROSTAT) 0.4 MG SL tablet Place 1 tablet under the tongue every 5 minutes as needed for Chest pain up to max of 3 total doses.  If no relief after 1 dose, call 911. 25 tablet 3    bumetanide (BUMEX) 1 MG tablet Take 1 tablet by mouth 2 times daily (Patient taking differently: Take 1 mg by mouth 2 times daily Take if systolic blood pressure is 120 or greater) 30 tablet 3    doxycycline hyclate (VIBRAMYCIN) 100 MG capsule Take 1 capsule by mouth every 12 hours for 10 days 20 capsule 0    isosorbide mononitrate (IMDUR) 30 MG extended release tablet Take 30 mg by mouth daily      atorvastatin (LIPITOR) 10 MG tablet 1 po q hs 90 tablet 0    baclofen (LIORESAL) 20 MG tablet Take 40 mg by mouth 2 times daily      carvedilol (COREG) 3.125 MG tablet Take 1 tablet by mouth 2 times daily 180 tablet 1    Wound Dressings ([facility] WOUND/BURN DRESSING) PSTE paste as needed [place] buttocks      OXYGEN Inhale 2 L into the lungs continuous      citalopram (CELEXA) 20 MG tablet Take 1 tablet by mouth every morning 90 tablet 1    Sennosides (SENNA) 8.6 MG CAPS Take 1 capsule by mouth daily       Potassium 99 MG TABS Take 1 tablet by mouth daily      levothyroxine (SYNTHROID) 88 MCG tablet Take 1 tablet by mouth daily (Patient taking differently: Take 88 mcg by mouth nightly ) 90 tablet 0    clopidogrel (PLAVIX) 75 MG tablet Take 1 tablet by mouth daily 30 tablet 0    ipratropium-albuterol (DUONEB) 0.5-2.5 (3) MG/3ML SOLN nebulizer solution Inhale 3 mLs into the lungs every 6 hours as needed for Shortness of Breath 360 mL 6    docusate sodium (COLACE) 100 MG capsule Take 100 mg by mouth daily      aspirin 81 MG EC tablet Take 81 mg by mouth daily      ferrous sulfate (IRON 325) 325 (65 Fe) MG tablet Take 1 tablet by mouth every other day (Patient taking differently: Take 325 mg by mouth daily (with breakfast) ) 45 tablet 1    lisinopril (PRINIVIL;ZESTRIL) 2.5 MG tablet Take 0.5 tablets by mouth daily 30 tablet 3    dantrolene (DANTRIUM) 50 MG capsule Take 1 capsule by mouth 4 times daily (Patient taking differently: Take 100 mg by mouth 2 times daily 2 caps bid) 360 capsule 1    metFORMIN (GLUCOPHAGE) 500 MG tablet Take 1 tablet by mouth 2 times daily (with meals) 180 tablet 1    blood glucose test strips (ASCENSIA AUTODISC VI;ONE TOUCH ULTRA TEST VI) strip 1 each by In Vitro route 2 times daily As needed. 200 each 1    blood glucose monitor kit and supplies Dispense sufficient amount for indicated testing frequency plus additional to accommodate PRN testing needs. Dispense all needed supplies to include: monitor, strips, lancing device, lancets, control solutions, alcohol swabs. 1 kit 0    blood glucose monitor strips Test qd  & as needed for symptoms of irregular blood glucose. Dispense sufficient amount for indicated testing frequency plus additional to accommodate PRN testing needs. 50 strip 11    Lancets MISC 1 each by Does not apply route daily 50 each 11    Catheters (URETHRAL CATHETER) MISC by Does not apply route      Cyanocobalamin (VITAMIN B 12 PO) Take 500 mg by mouth daily       Calcium Carbonate-Vit D-Min (CALCIUM 600+D PLUS MINERALS) 600-400 MG-UNIT TABS Take 1 tablet by mouth nightly       polyethylene glycol (MIRALAX) PACK packet Take 17 g by mouth daily       vitamin D3 (CHOLECALCIFEROL) 25 MCG (1000 UT) TABS tablet Take 1 tablet by mouth every morning       magnesium (MAGNESIUM-OXIDE) 250 MG TABS tablet Take 250 mg by mouth every morning        No current facility-administered medications for this visit. Review of Systems  Constitutional: No fever, no chills, no diaphoresis, no generalized weakness. HEENT: No blurred vision, No sore throat, no ear pain, no hair loss  Neck: denied any neck swelling, difficulty swallowing,   Cardio-pulmonary: No CP, SOB or palpitation, No orthopnea or PND. No cough or wheezing.   GI: No N/V/D, no constipation, No abdominal pain, no melena or hematochezia   : Denied any dysuria, hematuria, flank pain, discharge, or incontinence. Skin: denied any rash, ulcer, Hirsute, or hyperpigmentation. MSK: denied any joint deformity, joint pain/swelling, muscle pain, or back pain. Neuro: no numbness, no tingling, no weakness, _    OBJECTIVE    There were no vitals taken for this visit. BP Readings from Last 4 Encounters:   01/19/21 128/66   12/30/20 (!) 106/50   12/30/20 (!) 132/57   11/23/20 (!) 119/54     Wt Readings from Last 6 Encounters:   01/19/21 139 lb 11.2 oz (63.4 kg)   12/30/20 150 lb (68 kg)   11/23/20 150 lb (68 kg)   09/02/20 143 lb (64.9 kg)   07/13/20 159 lb (72.1 kg)   06/16/20 159 lb (72.1 kg)     Physical examination:  Due to this being a TeleHealth encounter, evaluation of the following organ systems is limited: Vitals/Constitutional/EENT/Resp/CV/GI//MS/Neuro/Skin/Heme-Lymph-Imm. Modified physical exam through Telemedicine camera    General: Communicating well via camera   Neck: no obvious neck mass. No obvious neck deformity     CVS: no distress   Chest: no distress. Chest is moving with respiration    Extremities:  no visible tremor  Skin: No visible rashes as seen from camera   Musculoskeletal: no visible deformity  Neuro: Alert and oriented to person, place, and time. Psychiatric: Normal mood and affect.  Behavior is normal    Review of Laboratory Data:  I personally reviewed the following lab:  Lab Results   Component Value Date/Time    WBC 6.2 01/18/2021 10:35 AM    RBC 2.69 (L) 01/18/2021 10:35 AM    HGB 8.5 (L) 01/18/2021 10:35 AM    HCT 27.4 (L) 01/18/2021 10:35 AM    .9 (H) 01/18/2021 10:35 AM    MCH 31.6 01/18/2021 10:35 AM    MCHC 31.0 (L) 01/18/2021 10:35 AM    RDW 15.3 (H) 01/18/2021 10:35 AM     01/18/2021 10:35 AM    MPV 10.7 01/18/2021 10:35 AM      Lab Results   Component Value Date/Time     (H) 01/18/2021 10:35 AM    K 3.4 (L) 01/18/2021 10:35 AM K 6.5 (H) 01/12/2021 09:11 PM    CO2 28 01/18/2021 10:35 AM    BUN 24 (H) 01/18/2021 10:35 AM    CREATININE 0.6 01/18/2021 10:35 AM    CALCIUM 8.7 01/18/2021 10:35 AM    LABGLOM >60 01/18/2021 10:35 AM    GFRAA >60 01/18/2021 10:35 AM      Lab Results   Component Value Date/Time    TSH 3.02 09/02/2020 09:05 AM    T4FREE 0.94 09/02/2020 09:05 AM     Lab Results   Component Value Date    LABA1C 4.9 01/13/2021    GLUCOSE 234 01/18/2021    LABMICR 49.5 10/30/2019     Lab Results   Component Value Date    LABA1C 4.9 01/13/2021    LABA1C 6.6 07/30/2020    LABA1C 6.7 05/13/2020     Lab Results   Component Value Date    TRIG 148 01/13/2021    HDL 35 01/13/2021    LDLCALC 62 01/13/2021    CHOL 127 01/13/2021     Lab Results   Component Value Date    VITD25 66.8 09/02/2020     ASSESSMENT & RECOMMENDATIONS   Michaela Douglas, a 77 y.o.-old female seen in for the following issues     Diabetes Mellitus Type 2    · Under good control   · Continue Metformin 500 mg BID   · The patient was advised to check blood sugars  2 times a day before meals and send BS readings to our office in a week. · Discussed with patient A1c and blood sugar goals   · Patient will need routine diabetes maintenance and prevention  · The patient was referred to diabetic educator for further teaching   · Diabetes labs before next visit     HLD  · Continue Lipitor 10 mg daily     Hypothyroidism   · Currently on levothyroxine 88 mcg daily  · Will recheck level again before next visit and adjust the dose if needed     I personally reviewed external notes from PCP and other patient's care team providers, and personally interpreted labs associated with the above diagnosis. I also ordered labs to further assess and manage the above addressed medical conditions    Return in about 4 months (around 5/26/2021) for DM type 2 , hypothyroidism . The above issues were reviewed with the patient who understood and agreed with the plan.     Thank you for allowing us to participate in the care of this patient. Please do not hesitate to contact us with any additional questions. Diagnosis Orders   1. Type 2 diabetes mellitus with other specified complication, without long-term current use of insulin (HCC)  Basic Metabolic Panel    Hemoglobin A1C    Microalbumin / Creatinine Urine Ratio   2. Hyperlipidemia, unspecified hyperlipidemia type     3. Vitamin D deficiency  Vitamin D 25 Hydroxy   4. Hypothyroidism, unspecified type  TSH without Reflex    T4, Free     Twila Carranza MD  Endocrinologist, [facility] [place] [facility]   [address] [place] [address], [address] [place] [address],[facility] [id number]   Phone: [phone]  Fax: [phone]  --------------------------------------------  An electronic signature was used to authenticate this note. Mercedez Jolley MD on 1/26/2021 at 2:02 PM    This visit was performed as a virtual video visit using a synchronous, two-way, audio-video telehealth technology platform  This Virtual  Visit was conducted, with patient's consent, to reduce the patient's risk of exposure to COVID-19 and provide continuity of care.

## 2021-04-08 NOTE — TELEPHONE ENCOUNTER
Spoke with , Be Parkinson. He states that pt had an iron infusion on 3/24/21. He wants to know if you want pt to have blood work done?

## 2021-04-08 NOTE — TELEPHONE ENCOUNTER
Orders Placed This Encounter   Procedures    Comprehensive Metabolic Panel     Standing Status:   Future     Standing Expiration Date:   4/8/2022    Magnesium     Standing Status:   Future     Standing Expiration Date:   4/8/2022    Lipid, Fasting     Standing Status:   Future     Standing Expiration Date:   4/8/2022    TSH without Reflex     Standing Status:   Future     Standing Expiration Date:   4/8/2022    Urinalysis     Standing Status:   Future     Standing Expiration Date:   4/8/2022    CBC Auto Differential     Standing Status:   Future     Standing Expiration Date:   4/8/2022    Iron and TIBC     Standing Status:   Future     Standing Expiration Date:   4/8/2022     Order Specific Question:   Is Patient Fasting? Answer:   yes     Order Specific Question:   No of Hours?      Answer:   8    Ferritin     Standing Status:   Future     Standing Expiration Date:   4/8/2022    Microalbumin, Ur     Standing Status:   Future     Standing Expiration Date:   4/8/2022     Lab orders placed as above please send to whomever is going to draw or wherever they are going to get the blood work

## 2021-04-13 ENCOUNTER — TELEPHONE (OUTPATIENT)
Dept: FAMILY MEDICINE CLINIC | Age: 76
End: 2021-04-13

## 2021-04-13 LAB
A/G RATIO: 1.1 RATIO (ref 1.1–2.2)
ALBUMIN SERPL-MCNC: 3 G/DL (ref 3.4–4.8)
ALP BLD-CCNC: 57 U/L (ref 42–121)
ALT SERPL-CCNC: 8 U/L (ref 10–54)
ANION GAP SERPL CALCULATED.3IONS-SCNC: 11 MEQ/L (ref 3–11)
AST SERPL-CCNC: 12 U/L (ref 10–41)
BASOPHILS ABSOLUTE: 0.1 K/UL (ref 0–0.2)
BASOPHILS RELATIVE PERCENT: 0.6 % (ref 0–1.5)
BILIRUB SERPL-MCNC: 0.5 MG/DL (ref 0.3–1.5)
BUN BLDV-MCNC: 15 MG/DL (ref 8–21)
CALCIUM SERPL-MCNC: 8.9 MG/DL (ref 8.5–10.5)
CHLORIDE BLD-SCNC: 95 MEQ/L (ref 98–107)
CHOLESTEROL: 136 MG/DL (ref 140–200)
CO2: 24 MEQ/L (ref 21–31)
CREAT SERPL-MCNC: 0.6 MG/DL (ref 0.4–1)
CREATININE + EGFR PANEL: 118 ML/MIN
DIFFERENTIAL, MANUAL: ABNORMAL
EOSINOPHILS ABSOLUTE: 0.1 K/UL (ref 0–0.33)
EOSINOPHILS RELATIVE PERCENT: 1.3 % (ref 0–3)
FERRITIN: 482.4 NG/ML (ref 11–307)
GFR NON-AFRICAN AMERICAN: 97 ML/MIN
GLOBULIN: 2.8 G/DL (ref 1.9–3.9)
GLUCOSE BLD-MCNC: 79 MG/DL (ref 70–99)
HCT VFR BLD CALC: 22.9 % (ref 36–44)
HDLC SERPL-MCNC: 37 MG/DL (ref 35–85)
HEMOGLOBIN: 7.6 G/DL (ref 12–15)
IRON SATURATION: 12 % (ref 15–55)
IRON: 32 UG/DL (ref 50–170)
LDL CHOLESTEROL: 75 MG/DL
LDL/HDL RATIO: 2 RATIO
LYMPHOCYTES ABSOLUTE: 0.6 K/UL (ref 1.1–4.8)
LYMPHOCYTES RELATIVE PERCENT: 6 % (ref 24–44)
MAGNESIUM: 1.7 MEQ/L (ref 1.6–2.6)
MCH RBC QN AUTO: 32.4 PG (ref 28–34)
MCHC RBC AUTO-ENTMCNC: 33 G/DL (ref 33–37)
MCV RBC AUTO: 98.1 FL (ref 80–100)
MICROALBUMIN UR-MCNC: 39.4 UG/ML
MONOCYTES ABSOLUTE: 0.7 K/UL (ref 0.2–0.7)
MONOCYTES RELATIVE PERCENT: 7.7 % (ref 3.4–9)
NEUTROPHILS ABSOLUTE: 8 K/UL (ref 1.83–8.7)
PDW BLD-RTO: 16.8 % (ref 10.9–14.3)
PLATELET # BLD: 496 K/UL (ref 150–450)
PMV BLD AUTO: 7.8 FL (ref 7.4–10.4)
POTASSIUM SERPL-SCNC: 4.7 MEQ/L (ref 3.6–5)
RBC # BLD: 2.33 M/UL (ref 4–4.9)
SEGMENTED NEUTROPHILS RELATIVE PERCENT: 84.4 % (ref 40–74)
SODIUM BLD-SCNC: 130 MEQ/L (ref 135–145)
TOTAL IRON BINDING CAPACITY: 262 UG/DL (ref 250–450)
TOTAL PROTEIN: 5.8 G/DL (ref 5.9–7.8)
TRANSFERRIN: 187 MG/DL (ref 192–382)
TRIGL SERPL-MCNC: 164 MG/DL (ref 41–189)
TSH SERPL DL<=0.05 MIU/L-ACNC: 0.85 UIU/ML (ref 0.34–5.6)
WBC # BLD: 9.5 K/UL (ref 4.5–11)

## 2021-04-13 NOTE — TELEPHONE ENCOUNTER
[facility] called about critical results. Hemoglobin was 7.7 and Hematocrit was 22.9. Complete CBC is in the chart.

## 2021-04-13 NOTE — TELEPHONE ENCOUNTER
Blood work reviewed    Hemoglobin was low still, iron levels were also low and would recommend appointment with hematology to see if IV iron would be appropriate for patient to try to help stabilize blood counts    Platelet count was mildly elevated this could be related to chronic inflammation and is nonspecific    Protein levels were low and would recommend continue Glucerna supplements    Sodium level was mildly low of uncertain cause    Other electrolytes and kidney function were normal    Cholesterol levels were fair    Thyroid levels were normal    Thanks

## 2021-04-16 ENCOUNTER — TELEPHONE (OUTPATIENT)
Dept: PRIMARY CARE CLINIC | Age: 76
End: 2021-04-16

## 2021-04-16 LAB — URINE CULTURE, ROUTINE: NORMAL

## 2021-04-16 NOTE — TELEPHONE ENCOUNTER
Please let the patient know that urine culture did grow bacteria    Culture actually grew 2 different bacteria as    I am uncertain if this is actually infection versus just due to chronic catheter    If having symptoms would consider treatment

## 2021-04-19 DIAGNOSIS — N39.0 FREQUENT UTI: ICD-10-CM

## 2021-04-19 NOTE — TELEPHONE ENCOUNTER
Anmol Lynch called back and said she is not having any symptoms at this time. If that changes, he will let you know.

## 2021-04-22 ENCOUNTER — VIRTUAL VISIT (OUTPATIENT)
Dept: FAMILY MEDICINE CLINIC | Age: 76
End: 2021-04-22
Payer: MEDICARE

## 2021-04-22 DIAGNOSIS — E87.1 HYPONATREMIA: ICD-10-CM

## 2021-04-22 DIAGNOSIS — Z86.2 H/O SARCOIDOSIS: ICD-10-CM

## 2021-04-22 DIAGNOSIS — R91.8 LUNG MASS: ICD-10-CM

## 2021-04-22 DIAGNOSIS — I25.110 CORONARY ARTERY DISEASE INVOLVING NATIVE HEART WITH UNSTABLE ANGINA PECTORIS, UNSPECIFIED VESSEL OR LESION TYPE (HCC): ICD-10-CM

## 2021-04-22 DIAGNOSIS — K59.09 OTHER CONSTIPATION: ICD-10-CM

## 2021-04-22 DIAGNOSIS — N39.0 FREQUENT UTI: Primary | ICD-10-CM

## 2021-04-22 DIAGNOSIS — G82.50 SPASTIC QUADRIPARESIS (HCC): ICD-10-CM

## 2021-04-22 DIAGNOSIS — N20.0 NEPHROLITHIASIS: ICD-10-CM

## 2021-04-22 DIAGNOSIS — I50.22 CHRONIC SYSTOLIC (CONGESTIVE) HEART FAILURE (HCC): ICD-10-CM

## 2021-04-22 DIAGNOSIS — G35 MS (MULTIPLE SCLEROSIS) (HCC): ICD-10-CM

## 2021-04-22 DIAGNOSIS — D64.9 ANEMIA, UNSPECIFIED TYPE: ICD-10-CM

## 2021-04-22 DIAGNOSIS — J96.11 CHRONIC RESPIRATORY FAILURE WITH HYPOXIA (HCC): ICD-10-CM

## 2021-04-22 DIAGNOSIS — E78.2 MIXED HYPERLIPIDEMIA: ICD-10-CM

## 2021-04-22 DIAGNOSIS — E11.65 TYPE 2 DIABETES MELLITUS WITH HYPERGLYCEMIA, WITHOUT LONG-TERM CURRENT USE OF INSULIN (HCC): ICD-10-CM

## 2021-04-22 DIAGNOSIS — I10 ESSENTIAL HYPERTENSION: ICD-10-CM

## 2021-04-22 DIAGNOSIS — F33.0 MILD EPISODE OF RECURRENT MAJOR DEPRESSIVE DISORDER (HCC): ICD-10-CM

## 2021-04-22 DIAGNOSIS — E03.8 OTHER SPECIFIED HYPOTHYROIDISM: ICD-10-CM

## 2021-04-22 PROCEDURE — G8400 PT W/DXA NO RESULTS DOC: HCPCS | Performed by: INTERNAL MEDICINE

## 2021-04-22 PROCEDURE — 3044F HG A1C LEVEL LT 7.0%: CPT | Performed by: INTERNAL MEDICINE

## 2021-04-22 PROCEDURE — 3017F COLORECTAL CA SCREEN DOC REV: CPT | Performed by: INTERNAL MEDICINE

## 2021-04-22 PROCEDURE — 1090F PRES/ABSN URINE INCON ASSESS: CPT | Performed by: INTERNAL MEDICINE

## 2021-04-22 PROCEDURE — 1123F ACP DISCUSS/DSCN MKR DOCD: CPT | Performed by: INTERNAL MEDICINE

## 2021-04-22 PROCEDURE — 4040F PNEUMOC VAC/ADMIN/RCVD: CPT | Performed by: INTERNAL MEDICINE

## 2021-04-22 PROCEDURE — 2022F DILAT RTA XM EVC RTNOPTHY: CPT | Performed by: INTERNAL MEDICINE

## 2021-04-22 PROCEDURE — 99214 OFFICE O/P EST MOD 30 MIN: CPT | Performed by: INTERNAL MEDICINE

## 2021-04-22 PROCEDURE — G8427 DOCREV CUR MEDS BY ELIG CLIN: HCPCS | Performed by: INTERNAL MEDICINE

## 2021-04-22 RX ORDER — LEVOTHYROXINE SODIUM 88 UG/1
88 TABLET ORAL DAILY
Qty: 90 TABLET | Refills: 1 | Status: SHIPPED
Start: 2021-04-22 | End: 2021-09-16 | Stop reason: SDUPTHER

## 2021-04-22 RX ORDER — DANTROLENE SODIUM 100 MG/1
100 CAPSULE ORAL 2 TIMES DAILY
Qty: 180 CAPSULE | Refills: 1 | Status: SHIPPED
Start: 2021-04-22 | End: 2021-09-16 | Stop reason: SDUPTHER

## 2021-04-22 RX ORDER — CITALOPRAM 20 MG/1
20 TABLET ORAL EVERY MORNING
Qty: 90 TABLET | Refills: 1 | Status: SHIPPED
Start: 2021-04-22 | End: 2021-09-16 | Stop reason: SDUPTHER

## 2021-04-22 ASSESSMENT — ENCOUNTER SYMPTOMS
CONSTIPATION: 0
DIARRHEA: 0
RHINORRHEA: 0
SORE THROAT: 0
SHORTNESS OF BREATH: 1
ABDOMINAL PAIN: 0
NAUSEA: 0
VOMITING: 0
BLOOD IN STOOL: 0
EYE PAIN: 0
COUGH: 0
CHEST TIGHTNESS: 0

## 2021-04-22 NOTE — PROGRESS NOTES
TeleMedicine Video Visit    This visit was performed as a virtual video visit using a synchronous, two-way, audio-video telehealth technology platform. Patient identification was verified at the start of the visit, including the patient's telephone number and physical location. I discussed with the patient the nature of our telehealth visits, that:     1. Due to the nature of an audio- video modality, the only components of a physical exam that could be done are the elements supported by direct observation. 2. I would evaluate the patient and recommend diagnostics and treatments based on my assessment. 3. If it was felt that the patient should be evaluated in clinic or an emergency room setting, then they would be directed there. 4. Our sessions are not being recorded and that personal health information is protected. 5. Our team would provide follow up care in person if/when the patient needs it. Patient does agree to proceed with telemedicine consultation. Patient's location: home address in [facility]  Physician  location office  other people involved in call -        Time spent: Greater than Not billed by time    This visit was completed virtually using Doxy. me      2021    TELEHEALTH EVALUATION -- Audio/Visual (During [facility]- public [facility] emergency)    HPI:    Gustavo Chavira Misael (:  1945) has requested an audio/video evaluation for the following concern(s):     - States had issues with swallowing.     -Patient has CAD. Has had ST elevation MI () angio and stent to LAD and NSTEMI for instent stenosis (). Patient was found to have severe anterior and septal hypokinesia with a apical dyskinesia and ejection fraction 20 to 25% with a small apical left ventricular thrombus on echocardiogram. Off eliquis. On ranexa 1000mg twice a day. On plavix. On isosorbide. On Coreg. On bumex. On aspirin. Has hold parameters for medications and will hold if BP too low, holding most in evening.  No further chest pain or SOB. States has seen local cardio and [facility]. States [facility] suggested Echo and stress 4-6 months after last event. States saw local cardio to discuss. Did not feel echo or stress is necessary. Recommendations per reviewed note (4/21) - cont dual antiplatelet for 1 yr f/u 1 month      - States has low blood count, Anemia. Added iron. Last lab was 8.9. to see hematology. States has seen hem/onc. Last visit per reviewed note (4/21) -stop oral iron, IV iron only and plan for IV Feraheme 510mg x2, follow-up labs in 1 month, check Hemoccult if positive refer to GI, transfusions to keep Hb greater than 7    - Patient had left obstructing ureteral calculus and had a cystoscopy with ureteral stent and stone manipulation. Has saldana cath. (11/20) - Cystoscopy. Complex flexible ureteroscopy. Laser lithotripsy Stent exchange,  And since stent removed. No current symptoms. was given bactrim if develops symptoms.    - States had bladder issues and overactive bladder. Stopped oxybutynin. States has chronic saldana catheter. States was following with urology. Was given botox treatment in past. Off myrbetric.  as flushed intermittently      - elevated blood sugars. Last lab had showed elevated A1c to 7.7, most recent A1c (1/2021) was 4.9  - not known to be diabetic. Blood sugars were running . On metformin. No reported side effects.    - States has high cholesterol. States Lipitor. No reported side effects. Stopped zetia. Last lab (4/2021) - improved.      -  states constipated. States on senokot-2. States on miralax. Stopped colace. Stopped oral iron therapy. States has used mag citrate from time to time.     - States has high blood pressure. States 103/55 (4/22/2021).  has been holding some of her heart medications due to the low blood pressure at times. On coreg 3.125mg bid. On bumex. On isosorbide, off lisinopril.  Has hold parameters for medications and will hold if BP too low, holding every 2-3 days.     - States has hypothyroidism. on levothyroxine. More compliant. Last lab was 4/2021 - stable. - States has depression. On citalopram. Stable on medications. No reported side effects. No suicidal thoughts.     - States found lung mass and needs pulmonary follow up yearly (10/2020). States PET suggested scar - no further imaging needed at present. Follow up in 1 year.      - States has multiple sclerosis. States follows with neurology. States stopped ticferdia. States on baclofen 20mg 4x per day and dantrolene 50mg 4x per day. Spasms stable. States weakness to hands b/l - difficult to feed self at times. Still with right hand weakness. **she would benefit form a wheelchair that reclines because of her heart condition and low ejection fraction. To help get her out of bed to help with physical therapy and strength but also to be able to recline back when she feels dizzy and to assist with transportation. Current chiar does not meet her changed needs. Has new power chair.    - States had hip fracture s/p left ORIF. States has been to physical therapy. Not walking. States having issues with left knee and leg. Released from ortho. Review of Systems   Constitutional: Negative for appetite change, chills, fever and unexpected weight change. HENT: Negative for congestion, rhinorrhea and sore throat. Eyes: Negative for pain and visual disturbance. Respiratory: Positive for shortness of breath. Negative for cough and chest tightness. Cardiovascular: Negative for chest pain and palpitations. Gastrointestinal: Negative for abdominal pain, blood in stool, constipation, diarrhea, nausea and vomiting. Genitourinary: Negative for difficulty urinating, dysuria, frequency, pelvic pain, urgency and vaginal bleeding. Musculoskeletal: Negative for arthralgias and myalgias. Skin: Negative for rash.    Neurological: Negative for dizziness, syncope, weakness, light-headedness, numbness and headaches. Hematological: Negative for adenopathy. Psychiatric/Behavioral: Negative for suicidal ideas. The patient is not nervous/anxious. Prior to Visit Medications    Medication Sig Taking?  Authorizing Provider   citalopram (CELEXA) 20 MG tablet Take 1 tablet by mouth every morning Yes David Cherry MD   levothyroxine (SYNTHROID) 88 MCG tablet Take 1 tablet by mouth daily Yes David Cherry MD   dantrolene (DANTRIUM) 100 MG capsule Take 1 capsule by mouth 2 times daily Yes David Cherry MD   Nutritional Supplements (GLUCERNA MEAL REPLACEMENT PO) Take 1 Can by mouth 2 times daily Using SlimFast Yes Historical Provider, MD   carvedilol (COREG) 3.125 MG tablet Take 1 tablet by mouth 2 times daily If BP is 80/55 or higher and pulse should be 60 or higher Yes David Cherry MD   senna-docusate (SENNA-S) 8.6-50 MG per tablet Take 1 tablet by mouth daily Yes David Cherry MD   Acetaminophen (TYLENOL PO) Take by mouth as needed Yes Historical Provider, MD   bumetanide (BUMEX) 1 MG tablet Take 1 tablet by mouth 2 times daily  Patient taking differently: Take 1 mg by mouth 2 times daily BP must be greater than 90/50 Yes David Cherry MD   ranolazine (RANEXA) 1000 MG extended release tablet Take 1,000 mg by mouth 2 times daily  Yes Historical Provider, MD   atorvastatin (LIPITOR) 10 MG tablet 1 po q hs Yes David Cherry MD   clopidogrel (PLAVIX) 75 MG tablet Take 1 tablet by mouth daily Yes David Cherry MD   metFORMIN (GLUCOPHAGE) 500 MG tablet Take 1 tablet by mouth 2 times daily (with meals) Yes David Cherry MD   fluticasone (FLONASE) 50 MCG/ACT nasal spray 2 sprays by Each Nostril route daily as needed for Rhinitis Yes David Cherry MD   ondansetron (ZOFRAN ODT) 4 MG disintegrating tablet Take 1 tablet by mouth every 8 hours as needed for Nausea or Vomiting Yes Marianela Lopez MD   isosorbide mononitrate (IMDUR) 30 MG extended release tablet Take 30 mg by mouth daily If BP is 80/55 or higher Yes Historical Provider, MD   baclofen (LIORESAL) 20 MG tablet Take 40 mg by mouth 2 times daily Yes Historical Provider, MD   OXYGEN Inhale 3 L into the lungs continuous  Yes Historical Provider, MD   Potassium 99 MG TABS Take 1 tablet by mouth every other day  Yes Historical Provider, MD   ipratropium-albuterol (DUONEB) 0.5-2.5 (3) MG/3ML SOLN nebulizer solution Inhale 3 mLs into the lungs every 6 hours as needed for Shortness of Breath  Patient taking differently: Inhale 1 vial into the lungs every 8 hours as needed for Shortness of Breath  Yes Katelin Hutton MD   docusate sodium (COLACE) 100 MG capsule Take 100 mg by mouth daily Yes Historical Provider, MD   aspirin 81 MG EC tablet Take 81 mg by mouth daily Yes Historical Provider, MD   dantrolene (DANTRIUM) 50 MG capsule Take 1 capsule by mouth 4 times daily  Patient taking differently: Take 100 mg by mouth 2 times daily 2 caps bid Yes Katelin Hutton MD   blood glucose test strips (ASCENSIA AUTODISC VI;ONE TOUCH ULTRA TEST VI) strip 1 each by In Vitro route 2 times daily As needed. Yes Katelin Hutton MD   blood glucose monitor kit and supplies Dispense sufficient amount for indicated testing frequency plus additional to accommodate PRN testing needs. Dispense all needed supplies to include: monitor, strips, lancing device, lancets, control solutions, alcohol swabs. Yes Katelin Hutton MD   blood glucose monitor strips Test qd  & as needed for symptoms of irregular blood glucose. Dispense sufficient amount for indicated testing frequency plus additional to accommodate PRN testing needs.  Yes Katelin Hutton MD   Lancets MISC 1 each by Does not apply route daily Yes Katelin Hutton MD   Catheters (URETHRAL CATHETER) MISC by Does not apply route Yes Historical Provider, MD   Cyanocobalamin (VITAMIN B 12 PO) Take 500 mg by mouth daily  Yes Historical Provider, MD   Calcium Carbonate-Vit D-Min (CALCIUM 600+D PLUS MINERALS) 600-400 MG-UNIT TABS Take 1 tablet by mouth nightly  Yes Historical Provider, MD   polyethylene glycol (MIRALAX) PACK packet Take 17 g by mouth daily  Yes Historical Provider, MD   vitamin D3 (CHOLECALCIFEROL) 25 MCG (1000 UT) TABS tablet Take 1 tablet by mouth every morning  Yes Historical Provider, MD   magnesium (MAGNESIUM-OXIDE) 250 MG TABS tablet Take 250 mg by mouth every morning  Yes Historical Provider, MD       Social History     Tobacco Use    Smoking status: Never Smoker    Smokeless tobacco: Never Used   Substance Use Topics    Alcohol use: No     Alcohol/week: 0.0 standard drinks     Comment: Ocassionally    Drug use: No        Allergies   Allergen Reactions    Augmentin [Amoxicillin-Pot Clavulanate] Shortness Of Breath    Bactrim [Sulfamethoxazole-Trimethoprim] Anaphylaxis and Other (See Comments)     Seizures,     Macrobid [Nitrofurantoin] Anaphylaxis     Mental Changes, shaking, stopped breathing, collapsed (happened again Feb 2021)    ,   Past Medical History:   Diagnosis Date    CAD (coronary artery disease) 4/21/2020    High cholesterol     Hypertension     Hypothyroidism     MI [facility] infarction) ([facility] 04/21/2020    [facility]     Osteoporosis     Sarcoidosis     Seizure ([facility] 02/05/2021    Type 2 diabetes mellitus with hyperglycemia, without long-term current use of insulin ([facility] 10/30/2019   ,   Past Surgical History:   Procedure Laterality Date    CHOLECYSTECTOMY  1990s    CORONARY ANGIOPLASTY WITH STENT PLACEMENT  04/21/2020    Dr. Patrick Haynes   3.0 x 22mm Resolute MAAME to Prox LAD.   No LV    CORONARY ANGIOPLASTY WITH STENT PLACEMENT  01/14/2021    3.0 x 30 Maame to the prox LAD and a 2.5 x 20 Maame to the Obtuse marginal by Dr. Mark Wyman / Giovanni Ashley / Suzie Guerrier Left 4/23/2020    CYSTOSCOPY LEFT RETROGRADE PYELOGRAM STENT INSERTION, STRATTON CATHETER performed by Diego Denis Austin, DO at [address] Left 3/21/2019    LEFT FEMUR CEPHALLOMEDULLARY NAIL performed by Amol Muhammad MD at [facility] Bilateral     HYSTERECTOMY      KNEE SURGERY      plate in lt knee    LITHOTRIPSY Left 11/23/2020    CYSTOSCOPY LEFT URETEROSCOPY,  LASER LITHOTRIPSY  BASKET EXTRACTION LEFT STONE, STENT EXCHANGE performed by Neena Avila DO at [facility] LITHOTRIPSY Left 12/30/2020    CYSTOSCOPY RETROGRADE URETEROSCOPY PYELOGRAM LASER LITHOTRIPSY LEFT J-STENT performed by Neena Avila DO at [address] [place] [address]   ,   Social History     Tobacco Use    Smoking status: Never Smoker    Smokeless tobacco: Never Used   Substance Use Topics    Alcohol use: No     Alcohol/week: 0.0 standard drinks     Comment: Ocassionally    Drug use: No   ,   Immunization History   Administered Date(s) Administered    Influenza 12/15/2010    Influenza A ([id number]) Vaccine PF IM 12/02/2009    Influenza Vaccine, unspecified formulation 11/21/2008    Influenza Virus Vaccine 10/27/2014, 10/02/2017    Influenza Whole 10/02/2013, 10/27/2014, 10/21/2015    Influenza, High Dose (Fluzone 65 yrs and older) 10/10/2015, 09/11/2017    Influenza, Quadv, IM, (6 mo and older Fluzone, Flulaval, Fluarix and 3 yrs and older Afluria) 10/02/2017    Influenza, Quadv, adjuvanted, 65 yrs +, IM, PF (Fluad) 09/24/2020    Influenza, Triv, inactivated, subunit, adjuvanted, IM (Fluad 65 yrs and older) 09/19/2018    Pneumococcal Conjugate 13-valent (Eldonna Mort) 01/15/2013, 09/23/2015, 09/18/2018, 09/19/2018    Pneumococcal Polysaccharide ([id number]) 09/18/2013   ,   Health Maintenance   Topic Date Due    COVID-19 Vaccine (1) Never done    DTaP/Tdap/Td vaccine (1 - Tdap) Never done    Shingles Vaccine (1 of 2) Never done   [employer] Visit (AWV)  Never done    A1C test (Diabetic or Prediabetic)  01/13/2022    TSH testing  04/13/2022    Potassium monitoring  04/13/2022    Creatinine monitoring  04/13/2022    Colon cancer screen colonoscopy  11/04/2029    Flu vaccine Completed    Pneumococcal 65+ years Vaccine  Completed    Hepatitis A vaccine  Aged Out    Hib vaccine  Aged Out    Meningococcal (ACWY) vaccine  Aged Out       PHYSICAL EXAMINATION:  [ INSTRUCTIONS:  \"[x]\" Indicates a positive item  \"[]\" Indicates a negative item  -- DELETE ALL ITEMS NOT EXAMINED]  Vital Signs: (As obtained by patient/caregiver or practitioner observation)    Blood pressure-  Heart rate-    Respiratory rate-    Temperature-  Pulse oximetry-     Constitutional: [x] Appears well-developed and well-nourished [x] No apparent distress      [] Abnormal-   Mental status  [x] Alert and awake  [x] Oriented to person/place/time []Able to follow commands      Eyes:  EOM    [x]  Normal  [] Abnormal-  Sclera  []  Normal  [] Abnormal -         Discharge []  None visible  [] Abnormal -    HENT:   [x] Normocephalic, atraumatic.   [] Abnormal   [] Mouth/Throat: Mucous membranes are moist.     External Ears [x] Normal  [] Abnormal-     Neck: [x] No visualized mass     Pulmonary/Chest: [x] Respiratory effort normal.  [] No visualized signs of difficulty breathing or respiratory distress        [] Abnormal-      Musculoskeletal:   [] Normal gait with no signs of ataxia         [x] Normal range of motion of neck        [] Abnormal-       Neurological:        [x] No Facial Asymmetry (Cranial nerve 7 motor function) (limited exam to video visit)          [] No gaze palsy        [] Abnormal-         Skin:        [x] No significant exanthematous lesions or discoloration noted on facial skin         [] Abnormal-            Psychiatric:       [x] Normal Affect [] No Hallucinations        [] Abnormal-     Other pertinent observable physical exam findings-     ASSESSMENT/PLAN:    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, unspecified vessel or lesion type [facility])  - s/p cath and stent to LAD (2020), then instent stenosis s/p restent (1/21)   - following with cardio   - has had 2nd opinion With [facility]   - off lisinopril   - off eliquis  - on coreg   - on imdur   - on ranexa   - on bumex   - on plavix  - on aspirin  - stable at present     Chronic systolic congestive heart failure (HCC)  - EF 20% per echo (4/2020)   - on bumex   - on coreg   - off lisinopril     Frequent UTI   - at risk for further infection   - no current symptoms  - has bactrim to use if symptoms   - following with urology   - last urine culture showed 2 bacteria - uncertain if colonization - did not treat as not overt symptoms. - re-refer to urology     Nephrolithiasis  - s/p cystoscopy and lithotripsy (11/2020)   - now chronic saldana      Anemia, unspecified type  - following with hematology   - to stop oral iron   - to have IV iron   - referral to GI     Other Constipation  - on senna   - on miralax  - add mag citrate . 25 as needed      Essential hypertension  - watch diet   - off lisinopril   - on bumex   - on coreg twice a day   - on imdur   - on ranex   - stable per   - improved per patient and       Type 2 diabetes mellitus with hyperglycemia, without long-term current use of insulin (HCC)  - watch diet   - last A1c was 4.9 (1/2021)   - now on metformin since recent increase in sugars   - on endocrinology      Hypothyroidism, unspecified type  - on levothyroxine  - labs were normal (9/2020)    Chronic respiratory failure with hypoxia (HCC)  - on oxygen - 3 liters   - has trilogy at night   - has seen pulmonary - recommending to cont trilogy   - stable      MS (multiple sclerosis) ([facility]  - ticierda on hold  - stopped nortriptyline   - on baclofen and dantrolene  - non ambulatory  - stable  **she would benefit form a wheelchair that reclines because of her heart condition and low ejection fraction. To help get her out of bed to help with physical therapy and strength but also to be able to recline back when she feels dizzy and to assist with transportation.  Current chiar does not meet her changed needs.      Spastic quadriparesis ([facility]  - following with neurology   - on baclofen and dantrolene     Mixed hyperlipidemia  - watch diet  - on atrovastatin  - follow up labs     Neurogenic bladder  - off oxybutynin  - off myrbetric - has not started yet   - currently with saldana      Mild episode of recurrent major depressive disorder (HCC)  - on citalopram  - no suicidal thoughts  - stable     HUDSON (nonalcoholic steatohepatitis)  - following with GI     Osteopenia of multiple sites  - declines bone density  - follows labs     Lung mass  - possible scar (2019)   - has seen pulmonary - yearly (10/2020)      H/O sarcoidosis    Chronic obstructive pulmonary disease, unspecified COPD type ([facility]  - may be more sarcoid and MS related     Return in about 6 weeks (around 6/3/2021) for check up and review. Orders Placed This Encounter   Procedures    External Referral To Gastroenterology     Referral Priority:   Routine     Referral Type:   Eval and Treat     Referral Reason:   Specialty Services Required     Referred to Provider:   Juwan Chacon MD     Requested Specialty:   Gastroenterology     Number of Visits Requested:   1    External Referral To Urology     Referral Priority:   Routine     Referral Type:   Eval and Treat     Referral Reason:   Specialty Services Required     Referred to Provider:   Bright Avila,      Requested Specialty:   Urology     Number of Visits Requested:   1     Requested Prescriptions     Signed Prescriptions Disp Refills    citalopram (CELEXA) 20 MG tablet 90 tablet 1     Sig: Take 1 tablet by mouth every morning    levothyroxine (SYNTHROID) 88 MCG tablet 90 tablet 1     Sig: Take 1 tablet by mouth daily    dantrolene (DANTRIUM) 100 MG capsule 180 capsule 1     Sig: Take 1 capsule by mouth 2 times daily       Christelle Lozada is a 77 y.o. female being evaluated by a Virtual Visit (video visit) encounter to address concerns as mentioned above. A caregiver was present when appropriate.  Due to this being a TeleHealth encounter (During [facility] public health emergency), evaluation of the following organ systems was limited: Vitals/Constitutional/EENT/Resp/CV/GI//MS/Neuro/Skin/Heme-Lymph-Imm. Pursuant to the emergency declaration under the [address] [place] [address], [address] [place] [address] and the [employer] Act, this Virtual Visit was conducted with patient's (and/or legal guardian's) consent, to reduce the patient's risk of exposure to COVID-19 and provide necessary medical care. The patient (and/or legal guardian) has also been advised to contact this office for worsening conditions or problems, and seek emergency medical treatment and/or call 911 if deemed necessary. Patient identification was verified at the start of the visit: Yes    Total time spent on this encounter: Not billed by time    Services were provided through a video synchronous discussion virtually to substitute for in-person clinic visit. Patient and provider were located at their individual homes. --Amanda Colón MD on 4/22/2021 at 3:07 PM    An electronic signature was used to authenticate this note.

## 2021-04-22 NOTE — PROGRESS NOTES
Oral iron was d/c by Dr Carlos Cali. Will do iron infusions. He would also like her to have an EGD to rule out a bleed with Dr Conrado Serna. [facility] 4/15/21 - recommends an echocardiogram and a stress test (4-6 months after the event). Recommends a radio active wire in place of another stent if she has another cardiac event. [facility] 4/21/21 - disagreed w/ stress test and echo. If she has another event go to the cath lab. He does recommend a third stent or laser wire.      103/55 GA 77 SaO2 100% blood sugar this am was 137 temp 97.7

## 2021-05-05 ENCOUNTER — TELEPHONE (OUTPATIENT)
Dept: ENDOCRINOLOGY | Age: 76
End: 2021-05-05

## 2021-05-05 ENCOUNTER — CARE COORDINATION (OUTPATIENT)
Dept: CARE COORDINATION | Age: 76
End: 2021-05-05

## 2021-05-05 NOTE — CARE COORDINATION
Care Coordination Services explained to patient's spouse, Renne Ganser, who is listed on pt's current HIPPA form. Sharad Messi understanding, but declines at this time, reports that pt has hospital bed, wheelchair, and allother necessary DME equipment, reports that pt has a HHA as well, also reports that their daughter is a nurse and assists pt and family as well. Aidan Cox took ACM's name and contact information for any future needs. Aidan Cox encouraged to call PCP office with any questions/concerns.

## 2021-05-05 NOTE — TELEPHONE ENCOUNTER
Dr. Kevin Ramirez reviewed the patients blood sugar log and said;    Continue current regimen    Patient advised and verbalized understanding

## 2021-05-07 DIAGNOSIS — G82.50 SPASTIC QUADRIPARESIS (HCC): Primary | Chronic | ICD-10-CM

## 2021-05-07 RX ORDER — BACLOFEN 20 MG/1
40 TABLET ORAL 2 TIMES DAILY
Qty: 360 TABLET | Refills: 1 | Status: SHIPPED
Start: 2021-05-07 | End: 2021-05-13 | Stop reason: SDUPTHER

## 2021-05-07 NOTE — TELEPHONE ENCOUNTER
Name of Medication(s) Requested:  Baclofen    Pharmacy Requested:   [facility]    Medication(s) pended? [x] Yes  [] No    Last Appointment:  4/22/2021    Future appts:  Future Appointments   Date Time Provider Marisol Garcia   6/10/2021  1:30 PM Annetta Peralta MD [facility]   6/22/2021  1:40 PM Ilda Esposito MD [facility]   6/29/2021  3:00 PM Celena Shaikh MD [facility]   8/16/2021 11:30 AM [facility]        Does patient need call back?   [] Yes  [x] No

## 2021-05-12 RX ORDER — ISOSORBIDE MONONITRATE 30 MG/1
30 TABLET, EXTENDED RELEASE ORAL DAILY
Qty: 90 TABLET | Refills: 1 | Status: SHIPPED
Start: 2021-05-12 | End: 2021-09-16 | Stop reason: SDUPTHER

## 2021-05-13 DIAGNOSIS — G82.50 SPASTIC QUADRIPARESIS (HCC): Chronic | ICD-10-CM

## 2021-05-13 RX ORDER — BACLOFEN 20 MG/1
40 TABLET ORAL 2 TIMES DAILY
Qty: 60 TABLET | Refills: 0 | Status: SHIPPED
Start: 2021-05-13 | End: 2021-09-16 | Stop reason: SDUPTHER

## 2021-05-13 NOTE — TELEPHONE ENCOUNTER
Patient  calling to say they will not have her baclofen until 5/20/21.  He is requesting a emergency fill sent to [facility] in [place]  Medication pended

## 2021-05-26 ENCOUNTER — TELEPHONE (OUTPATIENT)
Dept: NEUROLOGY | Age: 76
End: 2021-05-26

## 2021-05-26 NOTE — TELEPHONE ENCOUNTER
Visiting nurse called office and notified the office that patient had a seizure 5/25/21. Seizure lasted a few minutes. Pt currently is not taking any seizure medication. .  Pt is alert and oreinted x3.

## 2021-05-27 NOTE — TELEPHONE ENCOUNTER
The patient presumably had a generalized tonic-clonic seizure, after long days seeing physicians. She shook of both arms as her  will drive her. She was unresponsive. She Do Not drop her blood pressure and her sugars were normal.  The family will consider using levetiracetam 500 mg twice daily. That drug will not interfere with her other multiple medications. He will discuss using this drug with his daughter who is a nurse. If additional spells occur, we will be compelled to use anticonvulsant. He will keep us closely informed.

## 2021-06-02 LAB
BASOPHILS ABSOLUTE: 23 /ΜL
BASOPHILS RELATIVE PERCENT: 0.2 %
EOSINOPHILS ABSOLUTE: 138 /ΜL
EOSINOPHILS RELATIVE PERCENT: 1.2 %
FERRITIN: 617 NG/ML (ref 9–150)
HCT VFR BLD CALC: 27 % (ref 36–46)
HEMOGLOBIN: 9 G/DL (ref 12–16)
IRON: 48
LYMPHOCYTES ABSOLUTE: 782 /ΜL
LYMPHOCYTES RELATIVE PERCENT: 6.8 %
MCH RBC QN AUTO: 33.3 PG
MCHC RBC AUTO-ENTMCNC: 33.3 G/DL
MCV RBC AUTO: 100 FL
MONOCYTES ABSOLUTE: 966 /ΜL
MONOCYTES RELATIVE PERCENT: 8.4 %
NEUTROPHILS ABSOLUTE: 9591 /ΜL
NEUTROPHILS RELATIVE PERCENT: 83.4 %
PDW BLD-RTO: 12.8 %
PLATELET # BLD: 488 K/ΜL
PMV BLD AUTO: 9.7 FL
RBC # BLD: 2.7 10^6/ΜL
TOTAL IRON BINDING CAPACITY: 276
WBC # BLD: 11.5 10^3/ML

## 2021-06-10 ENCOUNTER — VIRTUAL VISIT (OUTPATIENT)
Dept: FAMILY MEDICINE CLINIC | Age: 76
End: 2021-06-10
Payer: MEDICARE

## 2021-06-10 DIAGNOSIS — E03.8 OTHER SPECIFIED HYPOTHYROIDISM: ICD-10-CM

## 2021-06-10 DIAGNOSIS — G35 MS (MULTIPLE SCLEROSIS) (HCC): ICD-10-CM

## 2021-06-10 DIAGNOSIS — Z86.2 H/O SARCOIDOSIS: ICD-10-CM

## 2021-06-10 DIAGNOSIS — D64.9 ANEMIA, UNSPECIFIED TYPE: ICD-10-CM

## 2021-06-10 DIAGNOSIS — E78.2 MIXED HYPERLIPIDEMIA: ICD-10-CM

## 2021-06-10 DIAGNOSIS — G82.50 SPASTIC QUADRIPARESIS (HCC): ICD-10-CM

## 2021-06-10 DIAGNOSIS — E11.65 TYPE 2 DIABETES MELLITUS WITH HYPERGLYCEMIA, WITHOUT LONG-TERM CURRENT USE OF INSULIN (HCC): ICD-10-CM

## 2021-06-10 DIAGNOSIS — N20.0 NEPHROLITHIASIS: ICD-10-CM

## 2021-06-10 DIAGNOSIS — Z86.69 STATUS POST SEIZURE: Primary | ICD-10-CM

## 2021-06-10 DIAGNOSIS — E55.9 VITAMIN D DEFICIENCY: ICD-10-CM

## 2021-06-10 DIAGNOSIS — E87.1 HYPONATREMIA: ICD-10-CM

## 2021-06-10 DIAGNOSIS — J96.11 CHRONIC RESPIRATORY FAILURE WITH HYPOXIA (HCC): ICD-10-CM

## 2021-06-10 DIAGNOSIS — R91.8 LUNG MASS: ICD-10-CM

## 2021-06-10 DIAGNOSIS — I10 ESSENTIAL HYPERTENSION: ICD-10-CM

## 2021-06-10 DIAGNOSIS — N39.0 FREQUENT UTI: ICD-10-CM

## 2021-06-10 DIAGNOSIS — I25.110 CORONARY ARTERY DISEASE INVOLVING NATIVE HEART WITH UNSTABLE ANGINA PECTORIS, UNSPECIFIED VESSEL OR LESION TYPE (HCC): ICD-10-CM

## 2021-06-10 DIAGNOSIS — I50.22 CHRONIC SYSTOLIC (CONGESTIVE) HEART FAILURE (HCC): ICD-10-CM

## 2021-06-10 DIAGNOSIS — K59.09 OTHER CONSTIPATION: ICD-10-CM

## 2021-06-10 DIAGNOSIS — Z00.00 ROUTINE GENERAL MEDICAL EXAMINATION AT A HEALTH CARE FACILITY: ICD-10-CM

## 2021-06-10 DIAGNOSIS — F33.0 MILD EPISODE OF RECURRENT MAJOR DEPRESSIVE DISORDER (HCC): ICD-10-CM

## 2021-06-10 PROCEDURE — G8427 DOCREV CUR MEDS BY ELIG CLIN: HCPCS | Performed by: INTERNAL MEDICINE

## 2021-06-10 PROCEDURE — 3044F HG A1C LEVEL LT 7.0%: CPT | Performed by: INTERNAL MEDICINE

## 2021-06-10 PROCEDURE — 1036F TOBACCO NON-USER: CPT | Performed by: INTERNAL MEDICINE

## 2021-06-10 PROCEDURE — 1090F PRES/ABSN URINE INCON ASSESS: CPT | Performed by: INTERNAL MEDICINE

## 2021-06-10 PROCEDURE — 99213 OFFICE O/P EST LOW 20 MIN: CPT | Performed by: INTERNAL MEDICINE

## 2021-06-10 PROCEDURE — 4040F PNEUMOC VAC/ADMIN/RCVD: CPT | Performed by: INTERNAL MEDICINE

## 2021-06-10 PROCEDURE — G0439 PPPS, SUBSEQ VISIT: HCPCS | Performed by: INTERNAL MEDICINE

## 2021-06-10 PROCEDURE — 2022F DILAT RTA XM EVC RTNOPTHY: CPT | Performed by: INTERNAL MEDICINE

## 2021-06-10 PROCEDURE — G8417 CALC BMI ABV UP PARAM F/U: HCPCS | Performed by: INTERNAL MEDICINE

## 2021-06-10 PROCEDURE — 1123F ACP DISCUSS/DSCN MKR DOCD: CPT | Performed by: INTERNAL MEDICINE

## 2021-06-10 PROCEDURE — G8400 PT W/DXA NO RESULTS DOC: HCPCS | Performed by: INTERNAL MEDICINE

## 2021-06-10 PROCEDURE — 3017F COLORECTAL CA SCREEN DOC REV: CPT | Performed by: INTERNAL MEDICINE

## 2021-06-10 RX ORDER — CIPROFLOXACIN 500 MG/1
TABLET, FILM COATED ORAL
COMMUNITY
Start: 2021-04-23 | End: 2021-06-10

## 2021-06-10 RX ORDER — CLOPIDOGREL BISULFATE 75 MG/1
75 TABLET ORAL DAILY
Qty: 90 TABLET | Refills: 1 | Status: SHIPPED
Start: 2021-06-10 | End: 2021-07-16 | Stop reason: SDUPTHER

## 2021-06-10 RX ORDER — ATORVASTATIN CALCIUM 10 MG/1
TABLET, FILM COATED ORAL
Qty: 90 TABLET | Refills: 1 | Status: SHIPPED
Start: 2021-06-10 | End: 2021-09-16 | Stop reason: SDUPTHER

## 2021-06-10 SDOH — ECONOMIC STABILITY: FOOD INSECURITY: WITHIN THE PAST 12 MONTHS, YOU WORRIED THAT YOUR FOOD WOULD RUN OUT BEFORE YOU GOT MONEY TO BUY MORE.: PATIENT DECLINED

## 2021-06-10 SDOH — ECONOMIC STABILITY: FOOD INSECURITY: WITHIN THE PAST 12 MONTHS, THE FOOD YOU BOUGHT JUST DIDN'T LAST AND YOU DIDN'T HAVE MONEY TO GET MORE.: PATIENT DECLINED

## 2021-06-10 ASSESSMENT — SOCIAL DETERMINANTS OF HEALTH (SDOH): HOW HARD IS IT FOR YOU TO PAY FOR THE VERY BASICS LIKE FOOD, HOUSING, MEDICAL CARE, AND HEATING?: PATIENT DECLINED

## 2021-06-10 ASSESSMENT — PATIENT HEALTH QUESTIONNAIRE - PHQ9
1. LITTLE INTEREST OR PLEASURE IN DOING THINGS: 0
2. FEELING DOWN, DEPRESSED OR HOPELESS: 0
SUM OF ALL RESPONSES TO PHQ QUESTIONS 1-9: 0
SUM OF ALL RESPONSES TO PHQ QUESTIONS 1-9: 0
SUM OF ALL RESPONSES TO PHQ9 QUESTIONS 1 & 2: 0
SUM OF ALL RESPONSES TO PHQ QUESTIONS 1-9: 0

## 2021-06-10 ASSESSMENT — LIFESTYLE VARIABLES: HOW OFTEN DO YOU HAVE A DRINK CONTAINING ALCOHOL: 0

## 2021-06-10 NOTE — PATIENT INSTRUCTIONS
Personalized Preventive Plan for Cris Goodness - 6/10/2021  Medicare offers a range of preventive health benefits. Some of the tests and screenings are paid in full while other may be subject to a deductible, co-insurance, and/or copay. Some of these benefits include a comprehensive review of your medical history including lifestyle, illnesses that may run in your family, and various assessments and screenings as appropriate. After reviewing your medical record and screening and assessments performed today your provider may have ordered immunizations, labs, imaging, and/or referrals for you. A list of these orders (if applicable) as well as your Preventive Care list are included within your After Visit Summary for your review. Other Preventive Recommendations:    · A preventive eye exam performed by an eye specialist is recommended every 1-2 years to screen for glaucoma; cataracts, macular degeneration, and other eye disorders. · A preventive dental visit is recommended every 6 months. · Try to get at least 150 minutes of exercise per week or 10,000 steps per day on a pedometer . · Order or download the FREE \"Exercise & Physical Activity: Your Everyday Guide\" from [employer]. Call 9-485.283.4021 or search [employer] online. · You need 7133-8552 mg of calcium and 3870-4844 IU of vitamin D per day. It is possible to meet your calcium requirement with diet alone, but a vitamin D supplement is usually necessary to meet this goal.  · When exposed to the sun, use a sunscreen that protects against both UVA and UVB radiation with an SPF of 30 or greater. Reapply every 2 to 3 hours or after sweating, drying off with a towel, or swimming. · Always wear a seat belt when traveling in a car. Always wear a helmet when riding a bicycle or motorcycle. Heart-Healthy Diet   Sodium, Fat, and Cholesterol Controlled Diet       What Is a Heart Healthy Diet?    A heart-healthy diet is one that limits sodium , certain types of fat , and cholesterol . This type of diet is recommended for:   People with any form of cardiovascular disease (eg, coronary heart disease , peripheral vascular disease , previous heart attack , previous stroke )   People with risk factors for cardiovascular disease, such as high blood pressure , high cholesterol , or diabetes   Anyone who wants to lower their risk of developing cardiovascular disease   Sodium    Sodium is a mineral found in many foods. In general, most people consume much more sodium than they need. Diets high in sodium can increase blood pressure and lead to edema (water retention). On a heart-healthy diet, you should consume no more than 2,300 mg (milligrams) of sodium per dayabout the amount in one teaspoon of table salt. The foods highest in sodium include table salt (about 50% sodium), processed foods, convenience foods, and preserved foods. Cholesterol    Cholesterol is a fat-like, waxy substance in your blood. Our bodies make some cholesterol. It is also found in animal products, with the highest amounts in fatty meat, egg yolks, whole milk, cheese, shellfish, and organ meats. On a heart-healthy diet, you should limit your cholesterol intake to less than 200 mg per day. It is normal and important to have some cholesterol in your bloodstream. But too much cholesterol can cause plaque to build up within your arteries, which can eventually lead to a heart attack or stroke. The two types of cholesterol that are most commonly referred to are:   Low-density lipoprotein (LDL) cholesterol  Also known as bad cholesterol, this is the cholesterol that tends to build up along your arteries. Bad cholesterol levels are increased by eating fats that are saturated or hydrogenated. Optimal level of this cholesterol is less than 100. Over 130 starts to get risky for heart disease.    High-density lipoprotein (HDL) cholesterol  Also known as good cholesterol, this type of cholesterol actually carries cholesterol away from your arteries and may, therefore, help lower your risk of having a heart attack. You want this level to be high (ideally greater than 60). It is a risk to have a level less than 40. You can raise this good cholesterol by eating olive oil, canola oil, avocados, or nuts. Exercise raises this level, too. Fat    Fat is calorie dense and packs a lot of calories into a small amount of food. Even though fats should be limited due to their high calorie content, not all fats are bad. In fact, some fats are quite healthful. Fat can be broken down into four main types. The good-for-you fats are:   Monounsaturated fat  found in oils such as olive and canola, avocados, and nuts and natural nut butters; can decrease cholesterol levels, while keeping levels of HDL cholesterol high   Polyunsaturated fat  found in oils such as safflower, sunflower, soybean, corn, and sesame; can decrease total cholesterol and LDL cholesterol   Omega-3 fatty acids  particularly those found in fatty fish (such as salmon, trout, tuna, mackerel, herring, and sardines); can decrease risk of arrhythmias, decrease triglyceride levels, and slightly lower blood pressure   The fats that you want to limit are:   Saturated fat  found in animal products, many fast foods, and a few vegetables; increases total blood cholesterol, including LDL levels   Animal fats that are saturated include: butter, lard, whole-milk dairy products, meat fat, and poultry skin   Vegetable fats that are saturated include: hydrogenated shortening, palm oil, coconut oil, cocoa butter   Hydrogenated or trans fat  found in margarine and vegetable shortening, most shelf stable snack foods, and fried foods; increases LDL and decreases HDL     It is generally recommended that you limit your total fat for the day to less than 30% of your total calories.  If you follow an 1800-calorie heart healthy diet, for example, this would mean 60 grams of fat or less per day. Saturated fat and trans fat in your diet raises your blood cholesterol the most, much more than dietary cholesterol does. For this reason, on a heart-healthy diet, less than 7% of your calories should come from saturated fat and ideally 0% from trans fat. On an 1800-calorie diet, this translates into less than 14 grams of saturated fat per day, leaving 46 grams of fat to come from mono- and polyunsaturated fats.    Food Choices on a Heart Healthy Diet   Food Category   Foods Recommended   Foods to Avoid   Grains   Breads and rolls without salted tops Most dry and cooked cereals Unsalted crackers and breadsticks Low-sodium or homemade breadcrumbs or stuffing All rice and pastas   Breads, rolls, and crackers with salted tops High-fat baked goods (eg, muffins, donuts, pastries) Quick breads, self-rising flour, and biscuit mixes Regular bread crumbs Instant hot cereals Commercially prepared rice, pasta, or stuffing mixes   Vegetables   Most fresh, frozen, and low-sodium canned vegetables Low-sodium and salt-free vegetable juices Canned vegetables if unsalted or rinsed   Regular canned vegetables and juices, including sauerkraut and pickled vegetables Frozen vegetables with sauces Commercially prepared potato and vegetable mixes   Fruits   Most fresh, frozen, and canned fruits All fruit juices   Fruits processed with salt or sodium   Milk   Nonfat or low-fat (1%) milk Nonfat or low-fat yogurt Cottage cheese, low-fat ricotta, cheeses labeled as low-fat and low-sodium   Whole milk Reduced-fat (2%) milk Malted and chocolate milk Full fat yogurt Most cheeses (unless low-fat and low salt) Buttermilk (no more than 1 cup per week)   Meats and Beans   Lean cuts of fresh or frozen beef, veal, lamb, or pork (look for the word loin) Fresh or frozen poultry without the skin Fresh or frozen fish and some shellfish Egg whites and egg substitutes (Limit whole eggs to three per week) Tofu Nuts or seeds (unsalted, dry-roasted), low-sodium peanut butter Dried peas, beans, and lentils   Any smoked, cured, salted, or canned meat, fish, or poultry (including ernst, chipped beef, cold cuts, hot dogs, sausages, sardines, and anchovies) Poultry skins Breaded and/or fried fish or meats Canned peas, beans, and lentils Salted nuts   Fats and Oils   Olive oil and canola oil Low-sodium, low-fat salad dressings and mayonnaise   Butter, margarine, coconut and palm oils, ernst fat   Snacks, Sweets, and Condiments   Low-sodium or unsalted versions of broths, soups, soy sauce, and condiments Pepper, herbs, and spices; vinegar, lemon, or lime juice Low-fat frozen desserts (yogurt, sherbet, fruit bars) Sugar, cocoa powder, honey, syrup, jam, and preserves Low-fat, trans-fat free cookies, cakes, and pies Lucho and animal crackers, fig bars, asmita snaps   High-fat desserts Broth, soups, gravies, and sauces, made from instant mixes or other high-sodium ingredients Salted snack foods Canned olives Meat tenderizers, seasoning salt, and most flavored vinegars   Beverages   Low-sodium carbonated beverages Tea and coffee in moderation Soy milk   Commercially softened water   Suggestions   Make whole grains, fruits, and vegetables the base of your diet. Choose heart-healthy fats such as canola, olive, and flaxseed oil, and foods high in heart-healthy fats, such as nuts, seeds, soybeans, tofu, and fish. Eat fish at least twice per week; the fish highest in omega-3 fatty acids and lowest in mercury include salmon, herring, mackerel, sardines, and canned chunk light tuna. If you eat fish less than twice per week or have high triglycerides, talk to your doctor about taking fish oil supplements. Read food labels.    For products low in fat and cholesterol, look for fat free, low-fat, cholesterol free, saturated fat free, and trans fat freeAlso scan the Nutrition Facts Label, which lists saturated fat, trans fat, and cholesterol amounts. For products low in sodium, look for sodium free, very low sodium, low sodium, no added salt, and unsalted   Skip the salt when cooking or at the table; if food needs more flavor, get creative and try out different herbs and spices. Garlic and onion also add substantial flavor to foods. Trim any visible fat off meat and poultry before cooking, and drain the fat off after george. Use cooking methods that require little or no added fat, such as grilling, boiling, baking, poaching, broiling, roasting, steaming, stir-frying, and sauting. Avoid fast food and convenience food. They tend to be high in saturated and trans fat and have a lot of added salt. Talk to a registered dietitian for individualized diet advice. Last Reviewed: March 2011 Isa Posadas MS, MPH, RD   Updated: 3/29/2011   ·     Keep Your Memory Danielle Vanessaas       Many factors can affect your ability to remembera hectic lifestyle, aging, stress, chronic disease, and certain medicines. But, there are steps you can take to sharpen your mind and help preserve your memory. Challenge Your Brain   Regularly challenging your mind may help keeps it in top shape. Good mental exercises include:   Crossword puzzlesUse a dictionary if you need it; you will learn more that way. Brainteasers Try some! Crafts, such as wood working and sewing   Hobbies, such as gardening and building model airplanes   SocializingVisit old friends or join groups to meet new ones. Reading   Learning a new language   Taking a class, whether it be art history or ruddy chi   TravelingExperience the food, history, and culture of your destination   Learning to use a computer   Going to museums, the theater, or thought-provoking movies   Changing things in your daily life, such as reversing your pattern in the grocery store or brushing your teeth using your nondominant hand   Use Memory Aids   There is no need to remember every detail on your own.  These memory aids can help:   Calendars and day planners   Electronic organizers to store all sorts of helpful informationThese devices can \"beep\" to remind you of appointments. A book of days to record birthdays, anniversaries, and other occasions that occur on the same date every year   Detailed \"to-do\" lists and strategically placed sticky notes   Quick \"study\" sessionsBefore a gathering, review who will be there so their names will be fresh in your mind. Establish routinesFor example, keep your keys, wallet, and umbrella in the same place all the time or take medicine with your 8:00 AM glass of juice   Live a Healthy Life   Many actions that will keep your body strong will do the same for your mind. For example:   Talk to Your Doctor About Herbs and Supplements    Malnutrition and vitamin deficiencies can impair your mental function. For example, vitamin B12 deficiency can cause a range of symptoms, including confusion. But, what if your nutritional needs are being met? Can herbs and supplements still offer a benefit? Researchers have investigated a range of natural remedies, such as ginkgo , ginseng , and the supplement phosphatidylserine (PS). So far, though, the evidence is inconsistent as to whether these products can improve memory or thinking. If you are interested in taking herbs and supplements, talk to your doctor first because they may interact with other medicines that you are taking. Exercise Regularly    Among the many benefits of regular exercise are increased blood flow to the brain and decreased risk of certain diseases that can interfere with memory function. One study found that even moderate exercise has a beneficial effect. Examples of \"moderate\" exercise include:   Playing 18 holes of golf once a week, without a cart   Playing tennis twice a week   Walking one mile per day   Manage Stress    It can be tough to remember what is important when your mind is cluttered.  Make time for sleep, talk to your doctor. Limit your intake of alcohol. If necessary, use a cane or walker to help maintain your balance. Wear supportive, rubber-soled shoes, even at home. If you live in a region that gets wintry weather, you may want to put special cleats on your shoes to prevent you from slipping on the snow and ice. Exercise regularly to help maintain muscle tone, agility, and balance. Always hold the banister when going up or down stairs. Also, use  bars when getting in or out of the bath or shower, or using the toilet. To avoid dizziness, get up slowly from a lying down position. Sit up first, dangling your legs for a minute or two before rising to a standing position. Overall Home Safety Check   According to the [employer]'s \"Older Consumer Home Safety Checklist,\" it is important to check for potential hazards in each room. And remember, proper lighting is an essential factor in home safety. If you cannot see clearly, you are more likely to fall. Important questions to ask yourself include:   Are lamp, electric, extension, and telephone cords placed out of the flow of traffic and maintained in good condition? Have frayed cords been replaced? Are all small rugs and runners slip resistant? If not, you can secure them to the floor with a special double-sided carpet tape. Are smoke detectors properly locatedone on every floor of your home and one outside of every sleeping area? Are they in good working order? Are batteries replaced at least once a year? Do you have a well-maintained carbon monoxide detector outside every sleeping are in your home? Does your furniture layout leave plenty of space to maneuver between and around chairs, tables, beds, and sofas? Are hallways, stairs and passages between rooms well lit? Can you reach a lamp without getting out of bed? Are floor surfaces well maintained?  Shag rugs, high-pile carpeting, tile floors, and polished wood floors can be particularly slippery. Stairs should always have handrails and be carpeted or fitted with a non-skid tread. Is your telephone easily reachable. Is the cord safely tucked away? Room by Room   According to the [employer], bathrooms and [place] are the two most potentially hazardous rooms in your home. In the Kitchen    Be sure your stove is in proper working order and always make sure burners and the oven are off before you go out or go to sleep. Keep pots on the back burners, turn handles away from the front of the stove, and keep stove clean and free of grease build-up. Kitchen ventilation systems and range exhausts should be working properly. Keep flammable objects such as towels and pot holders away from the cooking area except when in use. Make sure kitchen curtains are tied back. Move cords and appliances away from the sink and hot surfaces. If extension cords are needed, install wiring guides so they do not hang over the sink, range, or working areas. Look for coffee pots, kettles and toaster ovens with automatic shut-offs. Keep a mop handy in the kitchen so you can wipe up spills instantly. You should also have a small fire extinguisher. Arrange your kitchen with frequently used items on lower shelves to avoid the need to stand on a stepstool to reach them. Make sure countertops are well-lit to avoid injuries while cutting and preparing food. In the Bathroom    Use a non-slip mat or decals in the tub and shower, since wet, soapy tile or porcelain surfaces are extremely slippery. Make sure bathroom rugs are non-skid or tape them firmly to the floor. Bathtubs should have at least one, preferably two, grab bars, firmly attached to structural supports in the wall. (Do not use built-in soap holders or glass shower doors as grab bars.)    Tub seats fitted with non-slip material on the legs allow you to wash sitting down.  For people with limited mobility, bathtub transfer benches allow you to slide safely into the tub. Raised toilet seats and toilet safety rails are helpful for those with knee or hip problems. In the [facility]    Make sure you use a nightlight and that the area around your bed is clear of potential obstacles. Be careful with electric blankets and never go to sleep with a heating pad, which can cause serious burns even if on a low setting. Use fire-resistant mattress covers and pillows, and NEVER smoke in bed. Keep a phone next to the bed that is programmed to dial 911 at the push of a button. If you have a chronic condition, you may want to sign on with an automatic call-in service. Typically the system includes a small pendant that connects directly to an emergency medical voice-response system. You should also make arrangements to stay in contact with someonefriend, neighbor, family memberon a regular schedule. Fire Prevention   According to the [employer] (Smoke Alarms for Every) [address] [place] [address], senior citizens are one of the two highest risk groups for death and serious injuries due to residential fires. When cooking, wear short-sleeved items, never a bulky long-sleeved robe. The [facility]'s Safety Checklist for Older Consumers emphasizes the importance of checking basements, garages, workshops and storage areas for fire hazards, such as volatile liquids, piles of old rags or clothing and overloaded circuits. Never smoke in bed or when lying down on a couch or recliner chair. Small portable electric or kerosene heaters are responsible for many home fires and should be used cautiously if at all. If you do use one, be sure to keep them away from flammable materials. In case of fire, make sure you have a pre-established emergency exit plan. Have a professional check your fireplace and other fuel-burning appliances yearly.     Helping Hands   Baby boomers entering the el years will continue to see

## 2021-06-10 NOTE — PROGRESS NOTES
Medicare Annual Wellness Visit  Name: Anjana Ureña Date: 6/10/2021   MRN: [id number] Sex: Female   Age: 77 y.o. Ethnicity: Non-/Non    : 1945 Race: White      Michaela Douglas is here for Telephone Appointment Visit (Karina) and Medicare AWV    Screenings for behavioral, psychosocial and functional/safety risks, and cognitive dysfunction are all negative except as indicated below. These results, as well as other patient data from the Health Risk Assessment form, are documented in Flowsheets linked to this Encounter.    - States since last visit has had seizure. States 2 weeks ago. States was getting changed. States pt started yelling and was rolled over was not responding. Placed trilogy machine. States slowly began to recover. Discussed with neurology, felt seizure. Did not think work up necessary at present. Did discuss medication - did not start unless happened again. No further episodes since.      -Patient has CAD. Has had ST elevation MI () angio and stent to LAD and NSTEMI for instent stenosis (). Patient was found to have severe anterior and septal hypokinesia with a apical dyskinesia and ejection fraction 20 to 25% with a small apical left ventricular thrombus on echocardiogram. Off eliquis. On ranexa 1000mg twice a day. On plavix. On isosorbide. On Coreg. On bumex. On aspirin. Has hold parameters for medications and will hold if BP too low, holding most in evening. No further chest pain or SOB. States has seen local cardio and [facility]. States [facility] suggested Echo and stress 4-6 months after last event. States saw local cardio to discuss. Did not feel echo or stress is necessary. Recommendations per reviewed note () - cont dual antiplatelet for 1 yr f/u 4 month      - States has low blood count, Anemia. Added iron. Last lab was 8.9. to see hematology.  States has seen hem/onc. stop oral iron, IV iron only Feraheme 510mg x2, keep Hb greater than 7, last visit with hematology per reviewed note  (6/21) -repeat blood work 4 weeks, follow-up 8 weeks, possible EGD, transfuse to keep hemoglobin greater than 7. Last lab hgb 9.0 (6/2021)      - Patient had left obstructing ureteral calculus and had a cystoscopy with ureteral stent and stone manipulation. Has saldana cath. (11/20) - Cystoscopy. Complex flexible ureteroscopy. Laser lithotripsy Stent exchange, removed. No current symptoms. was given bactrim if develops symptoms. Last visit per reviewed note (5/21) - no chnages,f/u 1 year      - States had bladder issues and overactive bladder. Stopped oxybutynin.  has chronic saldana catheter.  was following with urology. Was given botox treatment in past. Off myrbetric.  as flushed intermittently. Removed saldana, voiding ok, wears adult pads. last visit (5/21) - no chnages,f/u 1 year - does have appointment 11/2021     - elevated blood sugars. Last lab had showed elevated A1c to 7.7, most recent A1c (1/2021) was 4.9  - not known to be diabetic. Blood sugars were running . On metformin. No reported side effects.     - States has high cholesterol. States Lipitor. No reported side effects. Stopped zetia. Last lab (4/2021) - improved.      -  states constipated. States on senokot-2. States on miralax. Stopped colace. Stopped oral iron therapy.  has used mag citrate from time to time.      -  has high blood pressure. States 103/55 (4/22/2021).  has been holding some of her heart medications due to the low blood pressure at times. On coreg 3.125mg bid. On bumex. On isosorbide, off lisinopril. Has hold parameters for medications and will hold if BP too low, holding every 2-3 days.      -  has hypothyroidism. on levothyroxine. More compliant. Last lab was 4/2021 - stable.      -  has depression. On citalopram. Stable on medications. No reported side effects.  No suicidal thoughts.     - States found lung mass and needs pulmonary follow up yearly (10/2020). States PET suggested scar - no further imaging needed at present. Follow up in 1 year.      - States has multiple sclerosis. States follows with neurology. States stopped ticferdia. States on baclofen 20mg 4x per day and dantrolene 50mg 4x per day. Spasms stable. States weakness to hands b/l - difficult to feed self at times. Still with right hand weakness.   **she would benefit form a wheelchair that reclines because of her heart condition and low ejection fraction. To help get her out of bed to help with physical therapy and strength but also to be able to recline back when she feels dizzy and to assist with transportation. Current chiar does not meet her changed needs. Has new power chair.     - States had hip fracture s/p left ORIF. States has been to physical therapy. Not walking. States having issues with left knee and leg. Released from ortho. Allergies   Allergen Reactions    Augmentin [Amoxicillin-Pot Clavulanate] Shortness Of Breath    Bactrim [Sulfamethoxazole-Trimethoprim] Anaphylaxis and Other (See Comments)     Seizures,     Macrobid [Nitrofurantoin] Anaphylaxis     Mental Changes, shaking, stopped breathing, collapsed (happened again Feb 2021)          Prior to Visit Medications    Medication Sig Taking?  Authorizing Provider   clopidogrel (PLAVIX) 75 MG tablet Take 1 tablet by mouth daily Yes Brant Medrano MD   atorvastatin (LIPITOR) 10 MG tablet 1 po q hs Yes Brant Medrano MD   baclofen (LIORESAL) 20 MG tablet Take 2 tablets by mouth 2 times daily Yes Brant Medrano MD   isosorbide mononitrate (IMDUR) 30 MG extended release tablet Take 1 tablet by mouth daily If BP is 80/55 or higher  Patient taking differently: Take 30 mg by mouth nightly If BP is 80/55 or higher Yes Brant Medrano MD   citalopram (CELEXA) 20 MG tablet Take 1 tablet by mouth every morning Yes Brant Medrano MD   levothyroxine (SYNTHROID) 88 MCG tablet Take 1 tablet by mouth daily Yes Brant Medarno MD dantrolene (DANTRIUM) 100 MG capsule Take 1 capsule by mouth 2 times daily Yes Nayely Gavin MD   Nutritional Supplements (GLUCERNA MEAL REPLACEMENT PO) Take 1 Can by mouth 2 times daily Using SlimFast Only taking once a day but will start taking 2 times a day Yes Historical Provider, MD   carvedilol (COREG) 3.125 MG tablet Take 1 tablet by mouth 2 times daily If BP is 80/55 or higher and pulse should be 60 or higher Yes Nayely Gavin MD   Acetaminophen (TYLENOL PO) Take by mouth as needed Yes Historical Provider, MD   bumetanide (BUMEX) 1 MG tablet Take 1 tablet by mouth 2 times daily  Patient taking differently: Take 1 mg by mouth 2 times daily BP must be greater than 90/50 Yes Nayely Gavin MD   ranolazine (RANEXA) 1000 MG extended release tablet Take 1,000 mg by mouth 2 times daily  Yes Historical Provider, MD   metFORMIN (GLUCOPHAGE) 500 MG tablet Take 1 tablet by mouth 2 times daily (with meals) Yes Nayely Gavin MD   fluticasone (FLONASE) 50 MCG/ACT nasal spray 2 sprays by Each Nostril route daily as needed for Rhinitis Yes Nayely Gavin MD   OXYGEN Inhale 2 L into the lungs continuous  Yes Historical Provider, MD   Potassium 99 MG TABS Take 1 tablet by mouth every other day  Yes Historical Provider, MD   ipratropium-albuterol (DUONEB) 0.5-2.5 (3) MG/3ML SOLN nebulizer solution Inhale 3 mLs into the lungs every 6 hours as needed for Shortness of Breath  Patient taking differently: Inhale 1 vial into the lungs 2 times daily  Yes Nayely Gavin MD   aspirin 81 MG EC tablet Take 81 mg by mouth daily Yes Historical Provider, MD   blood glucose test strips (ASCENSIA AUTODISC VI;ONE TOUCH ULTRA TEST VI) strip 1 each by In Vitro route 2 times daily As needed. Yes Nayely Gavin MD   blood glucose monitor kit and supplies Dispense sufficient amount for indicated testing frequency plus additional to accommodate PRN testing needs.  Dispense all needed supplies to include: monitor, strips, lancing device, lancets, control solutions, alcohol swabs. Yes Mg Baez MD   blood glucose monitor strips Test qd  & as needed for symptoms of irregular blood glucose. Dispense sufficient amount for indicated testing frequency plus additional to accommodate PRN testing needs. Yes Mg Baez MD   Lancets MISC 1 each by Does not apply route daily Yes Mg aBez MD   Catheters (URETHRAL CATHETER) MISC by Does not apply route Yes Historical Provider, MD   Cyanocobalamin (VITAMIN B 12 PO) Take 500 mg by mouth daily  Yes Historical Provider, MD   Calcium Carbonate-Vit D-Min (CALCIUM 600+D PLUS MINERALS) 600-400 MG-UNIT TABS Take 1 tablet by mouth nightly  Yes Historical Provider, MD   polyethylene glycol (MIRALAX) PACK packet Take 7.3 g by mouth daily  Yes Historical Provider, MD   vitamin D3 (CHOLECALCIFEROL) 25 MCG (1000 UT) TABS tablet Take 1 tablet by mouth every morning  Yes Historical Provider, MD   magnesium (MAGNESIUM-OXIDE) 250 MG TABS tablet Take 250 mg by mouth every morning  Yes Historical Provider, MD   ondansetron (ZOFRAN ODT) 4 MG disintegrating tablet Take 1 tablet by mouth every 8 hours as needed for Nausea or Vomiting  Patient not taking: Reported on 6/10/2021  Emery Ontiveros MD         Past Medical History:   Diagnosis Date    CAD (coronary artery disease) 4/21/2020    High cholesterol     Hypertension     Hypothyroidism     MI [facility]ocardial infarction) [facility] 04/21/2020    MS (multiple sclerosis) [facility]     Osteoporosis     Sarcoidosis     Seizure ([facility] [facility] 02/05/2021    Type 2 diabetes mellitus with hyperglycemia, without long-term current use of insulin ([facility] [address] [facility] 10/30/2019       Past Surgical History:   Procedure Laterality Date    CHOLECYSTECTOMY  1990s    CORONARY ANGIOPLASTY WITH STENT PLACEMENT  04/21/2020    Dr. Daniel Mclaughlin   3.0 x 22mm Resolute MAAME to Prox LAD.   No LV    CORONARY ANGIOPLASTY WITH STENT PLACEMENT  01/14/2021    3.0 x 30 Maame to the prox LAD and a 2.5 x 20 Maame to the Obtuse marginal by Dr. Kya Aguayo / [facility] / Arabella Christal Left 4/23/2020    CYSTOSCOPY LEFT RETROGRADE PYELOGRAM STENT INSERTION, STRATTON CATHETER performed by Christiano Avila DO at [facility] SURGERY Left 3/21/2019    LEFT FEMUR CEPHALLOMEDULLARY NAIL performed by Siddharth Norton MD at [facility] Bilateral     HYSTERECTOMY      KNEE SURGERY      plate in lt knee    LITHOTRIPSY Left 11/23/2020    CYSTOSCOPY LEFT URETEROSCOPY,  LASER LITHOTRIPSY  BASKET EXTRACTION LEFT STONE, STENT EXCHANGE performed by Christiano Avila DO at [address] LITHOTRIPSY Left 12/30/2020    CYSTOSCOPY RETROGRADE URETEROSCOPY PYELOGRAM LASER LITHOTRIPSY LEFT J-STENT performed by Christiano Avila DO at [address] History   Problem Relation Age of Onset    Stroke Mother     Heart Disease Mother     Cancer Father         lung    Mult Sclerosis Sister     No Known Problems Brother     No Known Problems Sister     Arthritis Sister     Cervical Cancer Sister        CareTeam (Including outside providers/suppliers regularly involved in providing care):   Patient Care Team:  Cinthia Carty MD as PCP - General (Internal Medicine)  Cinthia Carty MD as PCP [facility]  Aram Beckham MD as Consulting Physician (Pulmonology)  Jory Justice MD as Consulting Physician (Neurology)  Siddharth Norton MD as Consulting Physician (Orthopedic Surgery)    Wt Readings from Last 3 Encounters:   01/19/21 139 lb 11.2 oz (63.4 kg)   12/30/20 150 lb (68 kg)   11/23/20 150 lb (68 kg)     Patient-Reported Vitals 6/10/2021   Patient-Reported Weight -   Patient-Reported Height -   Patient-Reported Systolic 168   Patient-Reported Diastolic 56   Patient-Reported Pulse 79   Patient-Reported Temperature -   Patient-Reported SpO2 99 2 L O2      There is no height or weight on file to calculate BMI.     Based upon direct observation of the patient, evaluation of cognition reveals  Recall 2 out of 3 words, could not draw clock due to medical condition . Video exam:   Constitutional: [x]? Appears well-developed and well-nourished [x]? No apparent distress                            []? Abnormal-   Mental status  [x]? Alert and awake  [x]? Oriented to person/place/time [x]? Able to follow commands       Eyes:  EOM    [x]? Normal  []? Abnormal-  Sclera  []? Normal  []? Abnormal -         Discharge []? None visible  []? Abnormal -     HENT:   [x]? Normocephalic, atraumatic. []? Abnormal   []? Mouth/Throat: Mucous membranes are moist.      External Ears [x]? Normal  []? Abnormal-      Neck: [x]? No visualized mass      Pulmonary/Chest: [x]? Respiratory effort normal.  [x]? No visualized signs of difficulty breathing or respiratory distress        [x]? Abnormal- wearing oxygen      Musculoskeletal:   []? Normal gait with no signs of ataxia         [x]? Normal range of motion of neck        []? Abnormal-         Neurological:        [x]? No Facial Asymmetry (Cranial nerve 7 motor function) (limited exam to video visit)                       []? No gaze palsy        []? Abnormal-  Bed bound         Skin:                     [x]? No significant exanthematous lesions or discoloration noted on facial skin         []? Abnormal-                                  Psychiatric:           [x]? Normal Affect []? No Hallucinations        []? Abnormal    Patient's complete Health Risk Assessment and screening values have been reviewed and are found in Flowsheets. The following problems were reviewed today and where indicated follow up appointments were made and/or referrals ordered. Positive Risk Factor Screenings with Interventions:      Cognitive:   Words recalled: 2 Words Recalled  Clock Drawing Test (CDT) Score:  (unable to write or draw)  Total Score Interpretation: Positive Mini-Cog  Did the patient refuse to take the cognition test?: No  Cognitive Impairment Interventions:  · recall 2 out 3 words, could not draw due to medical condition          General Health and ACP:  General  In general, how would you say your health is?: Very Good  In the past 7 days, have you experienced any of the following? New or Increased Pain, New or Increased Fatigue, Loneliness, Social Isolation, Stress or Anger?: None of These  Do you get the social and emotional support that you need?: (!) No  Do you have a Living Will?: Yes  Advance Directives     Power of  Living Will ACP-Advance Directive ACP-Power of     Not on File Filed on 02/16/21 Filed Not on File      General Health Risk Interventions:  · Social isolation: patient declines any further intervention for this issue    Health Habits/Nutrition:  Health Habits/Nutrition  Do you exercise for at least 20 minutes 2-3 times per week?: (!) No  Have you lost any weight without trying in the past 3 months?: No (Unsure unable to weigh)  Do you eat only one meal per day?: No  Have you seen the dentist within the past year?: (!) No     Health Habits/Nutrition Interventions:  · Inadequate physical activity:  educational materials provided to promote increased physical activity  · Dental exam overdue:  patient encouraged to make appointment with his/her dentist    Hearing/Vision:  No exam data present  Hearing/Vision  Do you or your family notice any trouble with your hearing that hasn't been managed with hearing aids?: No  Do you have difficulty driving, watching TV, or doing any of your daily activities because of your eyesight?: No  Have you had an eye exam within the past year?: (!) No  Hearing/Vision Interventions:  · Vision concerns:  patient encouraged to make appointment with his/her eye specialist     ADL:  ADLs  In the past 7 days, did you need help from others to perform any of the following everyday activities?  Eating, dressing, grooming, bathing, toileting, or walking/balance?: (!) Eating, Dressing, Grooming, Bathing, Toileting, Walking/Balance  In the Recommendations for Preventive Services Due: see orders and patient instructions/AVS.  . Recommended screening schedule for the next 5-10 years is provided to the patient in written form: see Patient Talisha Lyn was seen today for telephone appointment visit and medicare awv. Diagnoses and all orders for this visit:          Status post seizure  - uncertain etiology   - has discussed with neuro - poss related to MS   - has not started medication - poss keppra if recurs   - no current work up   - no further episodes     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, unspecified vessel or lesion type [facility])  - s/p cath and stent to LAD (2020), then instent stenosis s/p restent (1/21)   - following with cardio   - has had 2nd opinion With [facility]   - off lisinopril   - off eliquis  - on coreg   - on imdur   - on ranexa   - on bumex   - on plavix  - on aspirin  - will intermittently hold at night if BP is too low based on hold parameters   - stable at present     Chronic systolic congestive heart failure (HCC)  - EF 20% per echo (4/2020)   - on bumex   - on coreg   - off lisinopril      Frequent UTI   - at risk for further infection   - no current symptoms  - has bactrim to use if symptoms   - following with urology   - last urine culture showed 2 bacteria - uncertain if colonization - did not treat as not overt symptoms.      Nephrolithiasis  - s/p cystoscopy and lithotripsy (11/2020)   - now chronic saldana      Anemia, unspecified type  - following with hematology   - to stop oral iron   - to have IV iron   - referral to GI      Other Constipation  - on senna   - on miralax  - add mag citrate . 25 as needed      Essential hypertension  - watch diet   - off lisinopril   - on bumex   - on coreg twice a day   - on imdur   - on ranex   - stable per   - improved per patient and       Type 2 diabetes mellitus with hyperglycemia, without long-term current use of insulin

## 2021-06-22 ENCOUNTER — VIRTUAL VISIT (OUTPATIENT)
Dept: ENDOCRINOLOGY | Age: 76
End: 2021-06-22
Payer: MEDICARE

## 2021-06-22 DIAGNOSIS — E03.9 HYPOTHYROIDISM, UNSPECIFIED TYPE: ICD-10-CM

## 2021-06-22 DIAGNOSIS — E11.69 TYPE 2 DIABETES MELLITUS WITH OTHER SPECIFIED COMPLICATION, WITHOUT LONG-TERM CURRENT USE OF INSULIN (HCC): Primary | ICD-10-CM

## 2021-06-22 DIAGNOSIS — E55.9 VITAMIN D DEFICIENCY: ICD-10-CM

## 2021-06-22 PROCEDURE — 3017F COLORECTAL CA SCREEN DOC REV: CPT | Performed by: INTERNAL MEDICINE

## 2021-06-22 PROCEDURE — 1123F ACP DISCUSS/DSCN MKR DOCD: CPT | Performed by: INTERNAL MEDICINE

## 2021-06-22 PROCEDURE — G8400 PT W/DXA NO RESULTS DOC: HCPCS | Performed by: INTERNAL MEDICINE

## 2021-06-22 PROCEDURE — G8417 CALC BMI ABV UP PARAM F/U: HCPCS | Performed by: INTERNAL MEDICINE

## 2021-06-22 PROCEDURE — 2022F DILAT RTA XM EVC RTNOPTHY: CPT | Performed by: INTERNAL MEDICINE

## 2021-06-22 PROCEDURE — 4040F PNEUMOC VAC/ADMIN/RCVD: CPT | Performed by: INTERNAL MEDICINE

## 2021-06-22 PROCEDURE — 1036F TOBACCO NON-USER: CPT | Performed by: INTERNAL MEDICINE

## 2021-06-22 PROCEDURE — G8427 DOCREV CUR MEDS BY ELIG CLIN: HCPCS | Performed by: INTERNAL MEDICINE

## 2021-06-22 PROCEDURE — 99214 OFFICE O/P EST MOD 30 MIN: CPT | Performed by: INTERNAL MEDICINE

## 2021-06-22 PROCEDURE — 3044F HG A1C LEVEL LT 7.0%: CPT | Performed by: INTERNAL MEDICINE

## 2021-06-22 PROCEDURE — 1090F PRES/ABSN URINE INCON ASSESS: CPT | Performed by: INTERNAL MEDICINE

## 2021-06-22 NOTE — PROGRESS NOTES
Gerardo Pino was read the following message We want to confirm that, for purposes of billing, this is a virtual visit with your provider for which we will submit a claim for reimbursement with your insurance company. You will be responsible for any copays, coinsurance amounts or other amounts not covered by your insurance company. If you do not accept this, unfortunately we will not be able to schedule or proceed with a virtual visit with the provider. Do you accept? Zaki Vergara responded Yes .

## 2021-06-22 NOTE — PROGRESS NOTES
[address] [place] [address] Department of Endocrinology Diabetes and Metabolism   [address] [facility] [address]   Phone: [phone]  Fax: [phone]    Date of Service: 6/22/2021  Primary Care Physician: Dilan Mcbride MD  Provider: Ilana Arizmendi MD     Reason for the visit:  DM type 2    History of Present Illness: The history is provided by the patient. No  was used. Accuracy of the patient data is excellent.   Lacie Landry is a very pleasant 77 y.o. female CAD and MS seen today for diabetes management     Lacie Landry was diagnosed with diabetes at age   The patient is currently on Metformin 500 mg BID,   The patient has been checking blood sugar daily and most readings at goal   Most recent A1c results summarized below  Lab Results   Component Value Date    LABA1C 4.9 01/13/2021    LABA1C 6.6 07/30/2020    LABA1C 6.7 05/13/2020     Patient has had no hypoglycemic episodes   The patient has been mindful of what has been eating and following diabetic diet as encouraged  I reviewed current medications and the patient has no issues with diabetes medications  The patient is due for an eye exam   Microvascular complications:  No Retinopathy, Nephropathy or Neuropathy   Macrovascular complications: + CAD   The patient receives Flushot every year and up to date with the Pneumonia vaccine     PAST MEDICAL HISTORY   Past Medical History:   Diagnosis Date    CAD (coronary artery disease) 4/21/2020    High cholesterol     Hypertension     Hypothyroidism     MI (myocardial infarction) ([address] 04/21/2020    MS (multiple sclerosis) ([address]     Osteoporosis     Sarcoidosis     Seizure ([address] 02/05/2021    Type 2 diabetes mellitus with hyperglycemia, without long-term current use of insulin ([address] 10/30/2019       PAST SURGICAL HISTORY   Past Surgical History:   Procedure Laterality Date    CHOLECYSTECTOMY  1990s    CORONARY ANGIOPLASTY WITH STENT PLACEMENT  04/21/2020 Dr. Nate Bhatt   3.0 x 22mm Resolute MAAME to Prox LAD. No LV    CORONARY ANGIOPLASTY WITH STENT PLACEMENT  01/14/2021    3.0 x 30 [place] to the prox LAD and a 2.5 x 20 Maame to the Obtuse marginal by Dr. Andrez Patel / Dutch Goss / Lida Fleming Left 4/23/2020    CYSTOSCOPY LEFT RETROGRADE PYELOGRAM STENT INSERTION, STRATTON CATHETER performed by Haven Avila DO at [facility] Left 3/21/2019    LEFT FEMUR CEPHALLOMEDULLARY NAIL performed by Ale Davis MD at [facility] Bilateral     HYSTERECTOMY      KNEE SURGERY      plate in lt knee    LITHOTRIPSY Left 11/23/2020    CYSTOSCOPY LEFT URETEROSCOPY,  LASER LITHOTRIPSY  BASKET EXTRACTION LEFT STONE, STENT EXCHANGE performed by Haven Avila DO at [facility] LITHOTRIPSY Left 12/30/2020    CYSTOSCOPY RETROGRADE URETEROSCOPY PYELOGRAM LASER LITHOTRIPSY LEFT J-STENT performed by Haven Avila DO at [address]:   reports that she has never smoked. She has never used smokeless tobacco.  Alcohol:   reports no history of alcohol use. Drugs:   reports no history of drug use.     FAMILY HISTORY   Family History   Problem Relation Age of Onset    Stroke Mother     Heart Disease Mother     Cancer Father         lung    Mult Sclerosis Sister     No Known Problems Brother     No Known Problems Sister     Arthritis Sister     Cervical Cancer Sister        ALLERGIES AND DRUG REACTIONS   Allergies   Allergen Reactions    Augmentin [Amoxicillin-Pot Clavulanate] Shortness Of Breath    Bactrim [Sulfamethoxazole-Trimethoprim] Anaphylaxis and Other (See Comments)     Seizures,     Macrobid [Nitrofurantoin] Anaphylaxis     Mental Changes, shaking, stopped breathing, collapsed (happened again Feb 2021)        CURRENT MEDICATIONS   Current Outpatient Medications   Medication Sig Dispense Refill    clopidogrel (PLAVIX) 75 MG tablet Take 1 tablet by mouth daily 90 tablet 1    atorvastatin (LIPITOR) 10 MG tablet 1 po q hs 90 tablet 1    baclofen (LIORESAL) 20 MG tablet Take 2 tablets by mouth 2 times daily 60 tablet 0    isosorbide mononitrate (IMDUR) 30 MG extended release tablet Take 1 tablet by mouth daily If BP is 80/55 or higher (Patient taking differently: Take 30 mg by mouth nightly If BP is 80/55 or higher) 90 tablet 1    citalopram (CELEXA) 20 MG tablet Take 1 tablet by mouth every morning 90 tablet 1    levothyroxine (SYNTHROID) 88 MCG tablet Take 1 tablet by mouth daily 90 tablet 1    dantrolene (DANTRIUM) 100 MG capsule Take 1 capsule by mouth 2 times daily 180 capsule 1    Nutritional Supplements (GLUCERNA MEAL REPLACEMENT PO) Take 1 Can by mouth 2 times daily Using SlimFast Only taking once a day but will start taking 2 times a day      carvedilol (COREG) 3.125 MG tablet Take 1 tablet by mouth 2 times daily If BP is 80/55 or higher and pulse should be 60 or higher 180 tablet 1    Acetaminophen (TYLENOL PO) Take by mouth as needed      bumetanide (BUMEX) 1 MG tablet Take 1 tablet by mouth 2 times daily (Patient taking differently: Take 1 mg by mouth 2 times daily BP must be greater than 90/50) 180 tablet 3    ranolazine (RANEXA) 1000 MG extended release tablet Take 1,000 mg by mouth 2 times daily       metFORMIN (GLUCOPHAGE) 500 MG tablet Take 1 tablet by mouth 2 times daily (with meals) 180 tablet 1    fluticasone (FLONASE) 50 MCG/ACT nasal spray 2 sprays by Each Nostril route daily as needed for Rhinitis 1 Bottle 1    ondansetron (ZOFRAN ODT) 4 MG disintegrating tablet Take 1 tablet by mouth every 8 hours as needed for Nausea or Vomiting 30 tablet 0    OXYGEN Inhale 2 L into the lungs continuous       Potassium 99 MG TABS Take 1 tablet by mouth every other day       ipratropium-albuterol (DUONEB) 0.5-2.5 (3) MG/3ML SOLN nebulizer solution Inhale 3 mLs into the lungs every 6 hours as needed for Shortness of Breath (Patient taking differently: Inhale 1 vial into the lungs 2 times daily ) 360 mL 6    aspirin 81 MG EC tablet Take 81 mg by mouth daily      Lancets MISC 1 each by Does not apply route daily 50 each 11    Cyanocobalamin (VITAMIN B 12 PO) Take 500 mg by mouth daily       Calcium Carbonate-Vit D-Min (CALCIUM 600+D PLUS MINERALS) 600-400 MG-UNIT TABS Take 1 tablet by mouth nightly       polyethylene glycol (MIRALAX) PACK packet Take 7.3 g by mouth daily       vitamin D3 (CHOLECALCIFEROL) 25 MCG (1000 UT) TABS tablet Take 1 tablet by mouth every morning       magnesium (MAGNESIUM-OXIDE) 250 MG TABS tablet Take 250 mg by mouth every morning       blood glucose test strips (ASCENSIA AUTODISC VI;ONE TOUCH ULTRA TEST VI) strip 1 each by In Vitro route 2 times daily As needed. 200 each 1    blood glucose monitor kit and supplies Dispense sufficient amount for indicated testing frequency plus additional to accommodate PRN testing needs. Dispense all needed supplies to include: monitor, strips, lancing device, lancets, control solutions, alcohol swabs. 1 kit 0    blood glucose monitor strips Test qd  & as needed for symptoms of irregular blood glucose. Dispense sufficient amount for indicated testing frequency plus additional to accommodate PRN testing needs. 50 strip 11    Catheters (URETHRAL CATHETER) MISC by Does not apply route       No current facility-administered medications for this visit. Review of Systems  Constitutional: No fever, no chills, no diaphoresis, no generalized weakness. HEENT: No blurred vision, No sore throat, no ear pain, no hair loss  Neck: denied any neck swelling, difficulty swallowing,   Cardio-pulmonary: No CP, SOB or palpitation, No orthopnea or PND. No cough or wheezing. GI: No N/V/D, no constipation, No abdominal pain, no melena or hematochezia   : Denied any dysuria, hematuria, flank pain, discharge, or incontinence. Skin: denied any rash, ulcer, Hirsute, or hyperpigmentation.    MSK: denied any joint deformity, joint pain/swelling, muscle pain, or back pain. Neuro: no numbness, no tingling, no weakness, _    OBJECTIVE    There were no vitals taken for this visit. BP Readings from Last 4 Encounters:   01/19/21 128/66   12/30/20 (!) 106/50   12/30/20 (!) 132/57   11/23/20 (!) 119/54     Wt Readings from Last 6 Encounters:   01/19/21 139 lb 11.2 oz (63.4 kg)   12/30/20 150 lb (68 kg)   11/23/20 150 lb (68 kg)   09/02/20 143 lb (64.9 kg)   07/13/20 159 lb (72.1 kg)   06/16/20 159 lb (72.1 kg)     Physical examination:  Due to this being a TeleHealth encounter, evaluation of the following organ systems is limited: Vitals/Constitutional/EENT/Resp/CV/GI//MS/Neuro/Skin/Heme-Lymph-Imm. Modified physical exam through Telemedicine camera    General: Communicating well via camera   Neck: no obvious neck mass. No obvious neck deformity     CVS: no distress   Chest: no distress. Chest is moving with respiration    Extremities:  no visible tremor  Skin: No visible rashes as seen from camera   Musculoskeletal: no visible deformity  Neuro: Alert and oriented to person, place, and time. Psychiatric: Normal mood and affect.  Behavior is normal    Review of Laboratory Data:  I personally reviewed the following lab:  Lab Results   Component Value Date/Time    WBC 11.5 06/02/2021 12:00 AM    RBC 2.70 06/02/2021 12:00 AM    HGB 9.0 (A) 06/02/2021 12:00 AM    HCT 27.0 (A) 06/02/2021 12:00 AM    .0 06/02/2021 12:00 AM    MCH 33.3 06/02/2021 12:00 AM    MCHC 33.3 06/02/2021 12:00 AM    RDW 12.8 06/02/2021 12:00 AM     06/02/2021 12:00 AM    MPV 9.7 06/02/2021 12:00 AM      Lab Results   Component Value Date/Time     (L) 04/13/2021 10:00 AM    K 4.7 04/13/2021 10:00 AM    K 6.5 (H) 01/12/2021 09:11 PM    CO2 24 04/13/2021 10:00 AM    BUN 15 04/13/2021 10:00 AM    CREATININE 0.6 04/13/2021 10:00 AM    CALCIUM 8.9 04/13/2021 10:00 AM    LABGLOM 97 04/13/2021 10:00 AM    GFRAA >60 01/18/2021 10:35 AM      Lab Results   Component Value Date/Time    TSH 0.85 04/13/2021 10:00 AM    T4FREE 0.94 09/02/2020 09:05 AM     Lab Results   Component Value Date    LABA1C 4.9 01/13/2021    GLUCOSE 79 04/13/2021    LABMICR 39.4 04/13/2021     Lab Results   Component Value Date    LABA1C 4.9 01/13/2021    LABA1C 6.6 07/30/2020    LABA1C 6.7 05/13/2020     Lab Results   Component Value Date    TRIG 164 04/13/2021    HDL 37 04/13/2021    LDLCALC 62 01/13/2021    CHOL 136 04/13/2021    CHOL 127 01/13/2021     Lab Results   Component Value Date    VITD25 66.8 09/02/2020     ASSESSMENT & RECOMMENDATIONS   Michaela Douglas, a 77 y.o.-old female seen in for the following issues     Diabetes Mellitus Type 2    · Under good control   · Continue Metformin 500 mg BID   · The patient was advised to check blood sugars  2 times a day before meals and send BS readings to our office in a week. · Discussed with patient A1c and blood sugar goals   · Patient will need routine diabetes maintenance and prevention  · The patient was referred to diabetic educator for further teaching   · Diabetes labs in 10/2021     HLD  · Continue Lipitor 10 mg daily     Hypothyroidism   · Currently on levothyroxine 88 mcg daily  · Will recheck level again before next visit and adjust the dose if needed     I personally reviewed external notes from PCP and other patient's care team providers, and personally interpreted labs associated with the above diagnosis. I also ordered labs to further assess and manage the above addressed medical conditions    Return in about 5 months (around 11/22/2021) for DM type 2, VitD deficiency, hypothyroidism. The above issues were reviewed with the patient who understood and agreed with the plan. Thank you for allowing us to participate in the care of this patient. Please do not hesitate to contact us with any additional questions. Diagnosis Orders   1.  Type 2 diabetes mellitus with other specified complication, without long-term current use of insulin (HCC) Basic Metabolic Panel    Hemoglobin A1C   2. Vitamin D deficiency     3. Hypothyroidism, unspecified type  TSH without Reflex    T4, Free     Mckenna Núñez MD  Endocrinologist, [facility] Diabetes Care and Endocrinology   [address] [facility], [address] [place] [address],[address] [id number]   Phone: [phone]  Fax: [phone]  --------------------------------------------  An electronic signature was used to authenticate this note. Bart Goldstein MD on 6/22/2021 at 2:41 PM    This visit was performed as a virtual video visit using a synchronous, two-way, audio-video telehealth technology platform  This Virtual  Visit was conducted, with patient's consent, to reduce the patient's risk of exposure to COVID-19 and provide continuity of care.

## 2021-06-23 ENCOUNTER — TELEPHONE (OUTPATIENT)
Dept: FAMILY MEDICINE CLINIC | Age: 76
End: 2021-06-23

## 2021-06-23 NOTE — TELEPHONE ENCOUNTER
[employer] called to ask when you wanted the labs done that you ordered on Laureen 10?

## 2021-06-24 ENCOUNTER — TELEPHONE (OUTPATIENT)
Dept: ENDOCRINOLOGY | Age: 76
End: 2021-06-24

## 2021-06-29 ENCOUNTER — VIRTUAL VISIT (OUTPATIENT)
Dept: NEUROLOGY | Age: 76
End: 2021-06-29
Payer: MEDICARE

## 2021-06-29 DIAGNOSIS — G35 MS (MULTIPLE SCLEROSIS) (HCC): Primary | ICD-10-CM

## 2021-06-29 PROCEDURE — 99443 PR PHYS/QHP TELEPHONE EVALUATION 21-30 MIN: CPT | Performed by: PSYCHIATRY & NEUROLOGY

## 2021-06-29 NOTE — PROGRESS NOTES
l,Subjective:     Orlinda Duane was a 77 y.o. right handed woman who suffered from long-standing multiple sclerosis. Her EDSS was now 9.0. The patient and her  remained excellent historians. This visit was by telephone. Her medications were now baclofen, atorvastatin, Plavix, aspirin, isosorbide, citalopram, levothyroxine, carvedilol, dantrolene, inhalers metformin, banality, bumetanide, multivitamins, nebulizers and omega-3 fatty acids. She was stable neurologically. She continued with dantrolene and baclofen for spasticity. Her spasms remained moderately controlled. She was only transferring with a sliding board with her . Her  now rarely lifted her to the wheelchair from bed. He did not use a Eduar's lift, despite having that device. She continued mostly bedbound. She was in a hospital bed. She was still reading and speaking without issues. There were no memory issues. Following her heart attack, there remained marked weakness of the right arm and hand. She used her left hand for all eating and dressing purposes. There is no improvement or worsening of the weakness in the right hand. Her  request occupational therapy for additional evaluation. She again denied neck pains or radiating discomforts. She suffered a heart attack over 1 year ago, with persistent heart failure. She continued on 24-hour nasal cannula and receiving Ranexa and diuretics. She fatigued easily, with minimal exertion. Medically, she had suffered from a recent urinary tract infections, producing confusion. There were no recent infections. She was now being evaluated for minimal anemia. Occult stool samples were recently sent to hematology    She suffered from questionable sarcoidosis without recurrent lung disease. She was no longer on iron supplements. She still slept erratically, despite taking melatonin. She continued well at home with her 's assistance.   They both had received her COVID-19 vaccinations. Her  was now able to take her out of the home. She was eating well. Review of systems was otherwise negative. Objective:     She was elderly woman in no acute distress, who was alert, cooperative and oriented. She was breathing comfortably, without chest pain or shortness of breath. She continues with nasal cannula oxygen. She was lying comfortably in bed, appearing no thinner. She was always very pleasant. Her skin was unremarkable. There was no abdominal tenderness. Her limbs were unchanged per her . Neurological examination produced an intact mental status. I found no cognitive issues, dysarthria or aphasia. Cranial nerve testing was unchanged per her . She did not move both legs but raised her left arm better than the right. There were decreased fine finger movements and rapid alternating movements in both hands, more so the right. She appreciated light touch throughout. There was no clumsiness. Her neurological examination was reportedly unchanged. Recent CMP was unremarkable, except for a sodium level of 130; CBC with differential again revealed low hemoglobin levels with an MCV of 100. Previous MRIs revealed extensive lesion load in her cervical cord and brainwithout acute changes. 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 Her examination again displays marked weakness of her right arm and hand. I first suspect cervical radicular disease and not recurrent multiple sclerosis. Her  did not wish to pursue additional diagnostic measures. She did not require disease modifying therapies. She will continue on her current dose of dantrolene and baclofen. Hopefully, I will see her in the office soon for additional evaluation. Fortunately, there remained no cognitive involvement.     Unfortunately, she endured several bladder infections and confusion. In addition, her heart failure continues. Her anemia continues as well with pending work-up. Her long-term medical prognosis is poor. Plan:     Baclofen was maintained at 20 mg 4 times daily; dantrolene was continued at 100 mg twice daily. She will return in 3 months in the office. Her  report back after hematological work-up. He will call if any problems arise in the interim. I spent 40 minutes with the patient with over 50 % spent in counseling and disease management. All patient issues were addressed and all questions were answered. Chris Lott was a 77year old individual who was evaluated via telephone on 6/29/2021. Consent:  She and/or health care decision maker is aware that that she may receive a bill for this telephone service, depending on her insurance coverage, and has provided verbal consent to proceed--- yes. Documentation:  I communicated with the patient and/or health care decision maker about multiple sclerosis. Details of this discussion including any medical advice provided per telephone. I affirmed this is a Patient Initiated Episode with an Established Patient who has not had a related appointment within my department in the past 7 days or scheduled within the next 24 hours. Again I spent 30 minutes with the patient.       Josie Elder MD

## 2021-06-29 NOTE — PROGRESS NOTES
Davide Lafleur was read the following message We want to confirm that, for purposes of billing, this is a virtual visit with your provider for which we will submit a claim for reimbursement with your insurance company. You will be responsible for any copays, coinsurance amounts or other amounts not covered by your insurance company. If you do not accept this, unfortunately we will not be able to schedule or proceed with a virtual visit with the provider. Do you accept? Kristin Rodriguez responded Yes .

## 2021-06-30 ENCOUNTER — TELEPHONE (OUTPATIENT)
Dept: NEUROLOGY | Age: 76
End: 2021-06-30

## 2021-06-30 LAB
BASOPHILS ABSOLUTE: 0.03 E9/L (ref 0–0.2)
BASOPHILS RELATIVE PERCENT: 0.4 % (ref 0–2)
EOSINOPHILS ABSOLUTE: 0.1 E9/L (ref 0.05–0.5)
EOSINOPHILS RELATIVE PERCENT: 1.2 % (ref 0–6)
HCT VFR BLD CALC: 28.1 % (ref 34–48)
HEMOGLOBIN: 8.5 G/DL (ref 11.5–15.5)
IMMATURE GRANULOCYTES #: 0.03 E9/L
IMMATURE GRANULOCYTES %: 0.4 % (ref 0–5)
LYMPHOCYTES ABSOLUTE: 0.8 E9/L (ref 1.5–4)
LYMPHOCYTES RELATIVE PERCENT: 9.6 % (ref 20–42)
MCH RBC QN AUTO: 32 PG (ref 26–35)
MCHC RBC AUTO-ENTMCNC: 30.2 % (ref 32–34.5)
MCV RBC AUTO: 105.6 FL (ref 80–99.9)
MONOCYTES ABSOLUTE: 0.78 E9/L (ref 0.1–0.95)
MONOCYTES RELATIVE PERCENT: 9.3 % (ref 2–12)
NEUTROPHILS ABSOLUTE: 6.61 E9/L (ref 1.8–7.3)
NEUTROPHILS RELATIVE PERCENT: 79.1 % (ref 43–80)
PDW BLD-RTO: 13.5 FL (ref 11.5–15)
PLATELET # BLD: 415 E9/L (ref 130–450)
PMV BLD AUTO: 10.5 FL (ref 7–12)
RBC # BLD: 2.66 E12/L (ref 3.5–5.5)
WBC # BLD: 8.4 E9/L (ref 4.5–11.5)

## 2021-07-09 ENCOUNTER — TELEPHONE (OUTPATIENT)
Dept: ENDOCRINOLOGY | Age: 76
End: 2021-07-09

## 2021-07-11 NOTE — TELEPHONE ENCOUNTER
Blood sugar log attachment
Ct current DM regimen    Watch your carb intake
The pt's son was notified.
Patient declined information

## 2021-07-16 DIAGNOSIS — I25.110 CORONARY ARTERY DISEASE INVOLVING NATIVE HEART WITH UNSTABLE ANGINA PECTORIS, UNSPECIFIED VESSEL OR LESION TYPE (HCC): ICD-10-CM

## 2021-07-16 RX ORDER — CLOPIDOGREL BISULFATE 75 MG/1
75 TABLET ORAL DAILY
Qty: 90 TABLET | Refills: 1 | Status: SHIPPED
Start: 2021-07-16 | End: 2021-12-29 | Stop reason: SDUPTHER

## 2021-07-16 NOTE — TELEPHONE ENCOUNTER
Last Appointment:  6/10/2021  Future Appointments   Date Time Provider Marisol Garcia   8/23/2021 11:30 AM Yamil Crocker MD [facility]   9/16/2021  2:45 PM Bhanu Laughlin  [address] [place] [address]   10/13/2021  1:20 PM Cindy Deal MD [facility]   11/22/2021  8:45 AM Guille Garcia MD [facility]

## 2021-07-23 ENCOUNTER — TELEPHONE (OUTPATIENT)
Dept: ENDOCRINOLOGY | Age: 76
End: 2021-07-23

## 2021-08-06 ENCOUNTER — TELEPHONE (OUTPATIENT)
Dept: ENDOCRINOLOGY | Age: 76
End: 2021-08-06

## 2021-08-09 DIAGNOSIS — E11.65 TYPE 2 DIABETES MELLITUS WITH HYPERGLYCEMIA, WITHOUT LONG-TERM CURRENT USE OF INSULIN (HCC): ICD-10-CM

## 2021-08-09 NOTE — TELEPHONE ENCOUNTER
Pharmacy is requesting a refill    Last Appointment:  6/10/2021  Future Appointments   Date Time Provider Marisol Richardsoni   8/23/2021 11:30 AM Yamil Sanchez MD [facility]   9/16/2021  2:45 PM Maria Elena Love  [address]   10/13/2021  1:20 PM Rowan Parish MD [facility]   11/22/2021  8:45 AM Cameron Slaughter MD [facility]

## 2021-08-23 ENCOUNTER — VIRTUAL VISIT (OUTPATIENT)
Dept: PULMONOLOGY | Age: 76
End: 2021-08-23
Payer: MEDICARE

## 2021-08-23 DIAGNOSIS — R09.02 HYPOXIA: Primary | ICD-10-CM

## 2021-08-23 PROCEDURE — 99441 PR PHYS/QHP TELEPHONE EVALUATION 5-10 MIN: CPT | Performed by: INTERNAL MEDICINE

## 2021-08-23 NOTE — PROGRESS NOTES
TeleMedicine Video Visit    Vinny Ledesma, was evaluated through a synchronous (real-time) audio-video encounter. The patient (or guardian if applicable) is aware that this is a billable service. Verbal consent to proceed has been obtained within the past 12 months. The visit was conducted pursuant to the emergency declaration under the [facility], [address] [place] [address] and the [employer] and [employer] Act. Patient identification was verified, and a caregiver was present when appropriate. The patient was located in a state where the provider was credentialed to provide care. Patient identification was verified at the start of the visit, including the patient's telephone number and physical location. I discussed with the patient the nature of our telehealth visits, that:     1. Due to the nature of an audio- video modality, the only components of a physical exam that could be done are the elements supported by direct observation. 2. I would evaluate the patient and recommend diagnostics and treatments based on my assessment. 3. If it was felt that the patient should be evaluated in clinic or an emergency room setting, then they would be directed there. 4. Our sessions are not being recorded and that personal health information is protected. 5. Our team would provide follow up care in person if/when the patient needs it. Patient's location: home address in [facility]  Physician  location other address in [facility] other people involved in call        On this date 8/23/2021 I have spent 15 minutes reviewing previous notes, test results and face to face (virtual) with the patient discussing the diagnosis and importance of compliance with the treatment plan as well as documenting on the day of the visit. This visit was completed virtually using Doxy. me     4 mos follow up visit today with pt and her .  Pt doing well Wearing Oxygen and pt reports feeling good. Respiratory meds continued per orders without issues. Pt continues Trilogy NIV device. Plan for follow up in 6-8 mos and as needed if any respiratory issues develop.  should call if needs.

## 2021-08-23 NOTE — PROGRESS NOTES
[facility]                                                                 Division of Pulmonary, [facility]                                                                       Pulmonary &health [facility]                                                                   Pulmonary Consult Note            Patient: Laureen Hewitt  MRN: [id number]  : 1945      Date of Admission: . No admission date for patient encounter.         Reason for Consultation:Lung nodule   CC :   HPI:   Laureen Hewitt is a 77 y.o. y/o female with past medical history significant for MS and sarcoid and she has left hip pain after fall that happen around 2019 and she went to rehab and had surgery before that mat rehab started cough and SOB and not feeling well and she was brought to [facility] and she ahs been doing well ,no cough and SOB and Duo neb helps a lot     She was diagnosed with sarcoid  and she is not on any bronchoscopy and biopsy ,iN [facility] but it has been stable as Per CT/PET     She did not smoke but had second smoking more than 10 years from parents     She had recent PET scan  shows  Just scar but no active disease and small Right hilar lymph nodes that is calcified      She is going for Trip across the country       Today visit:  She has been doing well and she denies any SOB ,No cough ,  No fever  She is on 1.5 L O2   Does not need inhalers  Never smoke  On no inhalers   Can not get the vaccine for covid       PAST MEDICAL HISTORY:     Past Medical History:   Diagnosis Date    CAD (coronary artery disease) 2020    High cholesterol     Hypertension     Hypothyroidism     MI [facility] infarction) ([facility] [address] 2020    MS (multiple sclerosis) ([facility] [address] [facility]     Osteoporosis     Sarcoidosis     Seizure ([facility] [address] 2021    Type 2 diabetes mellitus with hyperglycemia, without long-term current Topics Concern    Not on file   Social History Narrative    Not on file     Social Determinants of Health     Financial Resource Strain: Unknown    Difficulty of Paying Living Expenses: Patient refused   Food Insecurity: Unknown    Worried About Running Out of Food in the Last Year: Patient refused    [address] in the Last Year: Patient refused   Transportation Needs:     Lack of Transportation (Medical):  Lack of Transportation (Non-Medical):    Physical Activity:     Days of Exercise per Week:     Minutes of Exercise per Session:    Stress:     Feeling of Stress :    Social Connections:     Frequency of Communication with Friends and Family:     Frequency of Social Gatherings with Friends and Family:     Attends [employer] Services:     Active Member of Clubs or Organizations:     Attends Club or Organization Meetings:     Marital Status:    Intimate Partner Violence:     Fear of Current or Ex-Partner:     Emotionally Abused:     Physically Abused:     Sexually Abused:      Social History     Tobacco Use   Smoking Status Never Smoker   Smokeless Tobacco Never Used     Social History     Substance and Sexual Activity   Alcohol Use No    Alcohol/week: 0.0 standard drinks    Comment: Ocassionally     Social History     Substance and Sexual Activity   Drug Use No        HISTORY:    HOME MEDICATIONS:  Prior to Admission medications    Medication Sig Start Date End Date Taking?  Authorizing Provider   metFORMIN (GLUCOPHAGE) 500 MG tablet Take 1 tablet by mouth 2 times daily (with meals) 8/9/21   Celeste Gipson MD   clopidogrel (PLAVIX) 75 MG tablet Take 1 tablet by mouth daily 7/16/21   Celeste Gipson MD   atorvastatin (LIPITOR) 10 MG tablet 1 po q hs 6/10/21   Celeste Gipson MD   baclofen (LIORESAL) 20 MG tablet Take 2 tablets by mouth 2 times daily 5/13/21   Celeste Gipson MD   isosorbide mononitrate (IMDUR) 30 MG extended release tablet Take 1 tablet by mouth daily If BP is 80/55 or higher  Patient taking differently: Take 30 mg by mouth nightly If BP is 80/55 or higher 5/12/21   Joy Cueva MD   citalopram (CELEXA) 20 MG tablet Take 1 tablet by mouth every morning 4/22/21   Joy Cueva MD   levothyroxine (SYNTHROID) 88 MCG tablet Take 1 tablet by mouth daily 4/22/21   Joy Cueva MD   dantrolene (DANTRIUM) 100 MG capsule Take 1 capsule by mouth 2 times daily 4/22/21   Joy Cueva MD   Nutritional Supplements ([address]) Take 1 Can by mouth 2 times daily Using SlimFast Only taking once a day but will start taking 2 times a day    Historical Provider, MD   carvedilol (COREG) 3.125 MG tablet Take 1 tablet by mouth 2 times daily If BP is 80/55 or higher and pulse should be 60 or higher 4/7/21   Joy Cueva MD   Acetaminophen (TYLENOL PO) Take by mouth as needed    Historical Provider, MD   bumetanide (BUMEX) 1 MG tablet Take 1 tablet by mouth 2 times daily  Patient taking differently: Take 1 mg by mouth 2 times daily BP must be greater than 90/50 2/5/21   Joy Cueva MD   ranolazine (RANEXA) 1000 MG extended release tablet Take 1,000 mg by mouth 2 times daily  1/29/21   Historical Provider, MD   fluticasone (FLONASE) 50 MCG/ACT nasal spray 2 sprays by Each Nostril route daily as needed for Rhinitis 1/25/21   Joy Cueva MD   ondansetron (ZOFRAN ODT) 4 MG disintegrating tablet Take 1 tablet by mouth every 8 hours as needed for Nausea or Vomiting 1/23/21   Solis Fowler MD   OXYGEN Inhale 2 L into the lungs continuous     Historical Provider, MD   Potassium 99 MG TABS Take 1 tablet by mouth every other day     Historical Provider, MD   ipratropium-albuterol (DUONEB) 0.5-2.5 (3) MG/3ML SOLN nebulizer solution Inhale 3 mLs into the lungs every 6 hours as needed for Shortness of Breath  Patient taking differently: Inhale 1 vial into the lungs 2 times daily  9/23/20   Joy Cueva MD   aspirin 81 MG EC tablet Take 81 mg by mouth daily    Historical Provider, MD   blood glucose test strips (ASCENSIA AUTODISC VI;ONE TOUCH ULTRA TEST VI) strip 1 each by In Vitro route 2 times daily As needed. 8/11/20   Sha Ortega MD   blood glucose monitor kit and supplies Dispense sufficient amount for indicated testing frequency plus additional to accommodate PRN testing needs. Dispense all needed supplies to include: monitor, strips, lancing device, lancets, control solutions, alcohol swabs. 7/22/20   Sha Ortega MD   blood glucose monitor strips Test qd  & as needed for symptoms of irregular blood glucose. Dispense sufficient amount for indicated testing frequency plus additional to accommodate PRN testing needs. 7/22/20   Sha Ortega MD   Lancets MISC 1 each by Does not apply route daily 7/22/20   Sha Ortega MD   Catheters (URETHRAL CATHETER) [address] [facility] by Does not apply route    Historical Provider, MD   Cyanocobalamin (VITAMIN B 12 PO) Take 500 mg by mouth daily     Historical Provider, MD   Calcium Carbonate-Vit D-Min (CALCIUM 600+D PLUS MINERALS) 600-400 MG-UNIT TABS Take 1 tablet by mouth nightly     Historical MD Jose   polyethylene glycol (MIRALAX) PACK packet Take 7.3 g by mouth daily     Historical Provider, MD   vitamin D3 (CHOLECALCIFEROL) 25 MCG (1000 UT) TABS tablet Take 1 tablet by mouth every morning     Jm Provider, MD   magnesium (MAGNESIUM-OXIDE) 250 MG TABS tablet Take 250 mg by mouth every morning     Jm Provider, MD       CURRENT MEDICATIONS:  No current facility-administered medications for this visit.     IV MEDICATIONS:      ALLERGIES:  Allergies   Allergen Reactions    Augmentin [Amoxicillin-Pot Clavulanate] Shortness Of Breath    Bactrim [Sulfamethoxazole-Trimethoprim] Anaphylaxis and Other (See Comments)     Seizures,     Macrobid [Nitrofurantoin] Anaphylaxis     Mental Changes, shaking, stopped breathing, collapsed (happened again Feb 2021)        REVIEW OF SYSTEMS:  General ROS:  No weight loss ,no fatigue     ENT ROS:   No Sore throat ,no lymphoadenopathy,no nasal stuffiness     Hematological and Lymphatic ROS:   No ecchymosis ,no tendency to bleed  Respiratory ROS:   Cough ,SOB   Cardiovascular ROS:   No CP,No Palpitation   Gastrointestinal ROS:   No Gi bleed,no nausea or vomiting      - Musculoskeletal ROS:      - no joint swelling ,no joint pain   Neurological ROS:     -no weakness or numbness    Dermatological ROS:   No skin rash ,no urticaria     PHYSICAL EXAMINATION:               PROBLEM LIST:  Patient Active Problem List   Diagnosis    MS [facility]) [facility] [address]    Spastic quadriparesis (HCC)    Lung mass    Essential hypertension    Hypothyroidism    H/O sarcoidosis    Hyperlipidemia    Neurogenic bladder    HUDSON (nonalcoholic steatohepatitis)    Constipation    Osteopenia of multiple sites    Type 2 diabetes mellitus with hyperglycemia, without long-term current use of insulin (HCC)    STEMI (ST elevation myocardial infarction) (HCC)    Multiple vessel coronary artery disease    Hydronephrosis    Chronic combined systolic and diastolic CHF (congestive heart failure) (HCC)    Left ventricular thrombus    Metabolic encephalopathy    UTI (urinary tract infection) due to Enterococcus    Nephrolithiasis    Vitamin D deficiency    NSTEMI (non-ST elevated myocardial infarction) (HCC)    Hypotension    SIRS (systemic inflammatory response syndrome) (HCC)    Anemia    Chronic indwelling Barger catheter    Unstable angina (HCC)    Chest pain    Palliative care by specialist    Goals of care, counseling/discussion               ASSESSMENT:  1- MS   2.)Sarcoid ,lung  3. )RUL scar /noule   4. )Hip fracture /left  5-  MS       PLAN:  *-Since Lung nodule is stable and also PET scan was negative and she is low risk patient <I think we hold on any further imaging for now  *- PET scan shows no PET avid activity in lung ,rest of finding as per primary  care (like kidney stone)  *-BD  As need   -on 1.5 L   *_incentive spirometer  *-advise to ambulate every 2-3 hours during trip as she did not want Lovenox  *-Continue Trilogy   Will see in 1 year and decide if we need any imaging   Flonase spray    Advise to use nasal spray saline      KENNETH BURNS MD  Pulmonary/Critical care Medicine   [id number] [facility]

## 2021-09-01 DIAGNOSIS — E11.59 TYPE 2 DIABETES MELLITUS WITH OTHER CIRCULATORY COMPLICATION, WITHOUT LONG-TERM CURRENT USE OF INSULIN (HCC): ICD-10-CM

## 2021-09-01 RX ORDER — LANCETS 30 GAUGE
1 EACH MISCELLANEOUS DAILY
Qty: 100 EACH | Refills: 11 | Status: SHIPPED | OUTPATIENT
Start: 2021-09-01

## 2021-09-01 NOTE — TELEPHONE ENCOUNTER
Name of Medication(s) Requested:  Lancets & Test Strips    Pharmacy Requested:   [facility]    Medication(s) pended? [x] Yes  [] No    Last Appointment:  6/10/2021    Future appts:  Future Appointments   Date Time Provider Marisol Garcia   9/16/2021  2:45 PM Mani Wagner  [address]   10/13/2021  1:20 PM Rayna Carrera MD [facility]   11/22/2021  8:45 AM Reggie Raymond MD [facility]          Does patient need call back?   [] Yes  [x] No

## 2021-09-08 LAB
25-HYDROXY VITAMIN D-3: 57.5 NG/ML (ref 30–100)
A/G RATIO: 1.3 RATIO (ref 1.1–2.2)
ALBUMIN SERPL-MCNC: 3.4 G/DL (ref 3.4–4.8)
ALP BLD-CCNC: 71 U/L (ref 42–121)
ALT SERPL-CCNC: 9 U/L (ref 10–54)
ANION GAP SERPL CALCULATED.3IONS-SCNC: 11 MEQ/L (ref 3–11)
AST SERPL-CCNC: 15 U/L (ref 10–41)
BASOPHILS ABSOLUTE: 0.1 K/UL (ref 0–0.2)
BASOPHILS RELATIVE PERCENT: 0.6 % (ref 0–1.5)
BILIRUB SERPL-MCNC: 0.7 MG/DL (ref 0.3–1.5)
BUN BLDV-MCNC: 27 MG/DL (ref 8–21)
CALCIUM SERPL-MCNC: 9.1 MG/DL (ref 8.5–10.5)
CHLORIDE BLD-SCNC: 95 MEQ/L (ref 98–107)
CHOLESTEROL: 157 MG/DL (ref 140–200)
CO2: 27 MEQ/L (ref 21–31)
CREAT SERPL-MCNC: 0.6 MG/DL (ref 0.4–1)
CREATININE + EGFR PANEL: 118 ML/MIN
EOSINOPHILS ABSOLUTE: 0.1 K/UL (ref 0–0.33)
EOSINOPHILS RELATIVE PERCENT: 0.7 % (ref 0–3)
GFR NON-AFRICAN AMERICAN: 97 ML/MIN
GLOBULIN: 2.7 G/DL (ref 1.9–3.9)
GLUCOSE BLD-MCNC: 178 MG/DL (ref 70–99)
HCT VFR BLD CALC: 27.5 % (ref 36–44)
HDLC SERPL-MCNC: 34 MG/DL (ref 35–85)
HEMOGLOBIN: 9.4 G/DL (ref 12–15)
LDL CHOLESTEROL: 80 MG/DL
LDL/HDL RATIO: 2.4 RATIO
LYMPHOCYTES ABSOLUTE: 0.8 K/UL (ref 1.1–4.8)
LYMPHOCYTES RELATIVE PERCENT: 10.2 % (ref 24–44)
MAGNESIUM: 1.8 MEQ/L (ref 1.6–2.6)
MCH RBC QN AUTO: 33.9 PG (ref 28–34)
MCHC RBC AUTO-ENTMCNC: 34.3 G/DL (ref 33–37)
MCV RBC AUTO: 98.8 FL (ref 80–100)
MONOCYTES ABSOLUTE: 0.7 K/UL (ref 0.2–0.7)
MONOCYTES RELATIVE PERCENT: 9 % (ref 3.4–9)
NEUTROPHILS ABSOLUTE: 6.5 K/UL (ref 1.83–8.7)
PDW BLD-RTO: 14.4 % (ref 10.9–14.3)
PLATELET # BLD: 414 K/UL (ref 150–450)
PMV BLD AUTO: 7.7 FL (ref 7.4–10.4)
POTASSIUM SERPL-SCNC: 4.2 MEQ/L (ref 3.6–5)
RBC # BLD: 2.78 M/UL (ref 4–4.9)
SEGMENTED NEUTROPHILS RELATIVE PERCENT: 79.5 % (ref 40–74)
SODIUM BLD-SCNC: 133 MEQ/L (ref 135–145)
T4 FREE: 1.03 NG/DL (ref 0.61–1.12)
TOTAL PROTEIN: 6.1 G/DL (ref 5.9–7.8)
TRIGL SERPL-MCNC: 211 MG/DL (ref 41–189)
TSH SERPL DL<=0.05 MIU/L-ACNC: 1.26 UIU/ML (ref 0.34–5.6)
WBC # BLD: 8.2 K/UL (ref 4.5–11)

## 2021-09-09 LAB
APPEARANCE, BODY FLUID: ABNORMAL
BACTERIA, URINE: NORMAL /HPF
BILIRUBIN URINE: NEGATIVE
COLOR, URINE: YELLOW
ERYTHROCYTES URINE: NORMAL /HPF
ESTIMATED AVERAGE GLUCOSE: 91 MG/DL
GLUCOSE URINE: NEGATIVE MG/DL
HBA1C MFR BLD: 4.8 % (ref 4–6)
KETONES, URINE: NEGATIVE MG/DL
LEUKOCYTES, UA: NORMAL /HPF
MICROALBUMIN UR-MCNC: < 2 UG/ML
MICROSCOPIC URINE: YES
NITRATE, UA: NEGATIVE
PH UA: 8 (ref 4.6–8)
PROTEIN UA: NEGATIVE MG/DL
RBC URINE: ABNORMAL
SPECIFIC GRAVITY, URINE: 1 (ref 1–1.03)
TRANSITIONAL EPITHELIAL: NORMAL /HPF
URINE CULTURE REFLEX: ABNORMAL
UROBILINOGEN, URINE: NEGATIVE MG/DL
WBC URINE: NEGATIVE

## 2021-09-11 ENCOUNTER — TELEPHONE (OUTPATIENT)
Dept: FAMILY MEDICINE CLINIC | Age: 76
End: 2021-09-11

## 2021-09-11 NOTE — TELEPHONE ENCOUNTER
Please let the patient know that urine analysis showed    Urine showed bacteria and skin cells, small microscopic blood, no protein and no white cells    Uncertain if culture was done    Thanks

## 2021-09-12 LAB — URINE CULTURE REFLEX: NORMAL

## 2021-09-15 NOTE — TELEPHONE ENCOUNTER
Urine culture was positive for bacteria    Uncertain if this is just asymptomatic bacteriuria or if actual infection    Would consider treatment with ciprofloxacin 250 twice a day x5 to 7 days

## 2021-09-16 ENCOUNTER — OFFICE VISIT (OUTPATIENT)
Dept: FAMILY MEDICINE CLINIC | Age: 76
End: 2021-09-16
Payer: MEDICARE

## 2021-09-16 VITALS
TEMPERATURE: 97.9 F | SYSTOLIC BLOOD PRESSURE: 82 MMHG | OXYGEN SATURATION: 100 % | WEIGHT: 141 LBS | HEART RATE: 76 BPM | BODY MASS INDEX: 27.68 KG/M2 | DIASTOLIC BLOOD PRESSURE: 42 MMHG | HEIGHT: 60 IN

## 2021-09-16 DIAGNOSIS — E87.1 HYPONATREMIA: ICD-10-CM

## 2021-09-16 DIAGNOSIS — D64.9 ANEMIA, UNSPECIFIED TYPE: ICD-10-CM

## 2021-09-16 DIAGNOSIS — G35 MS (MULTIPLE SCLEROSIS) (HCC): ICD-10-CM

## 2021-09-16 DIAGNOSIS — I10 ESSENTIAL HYPERTENSION: ICD-10-CM

## 2021-09-16 DIAGNOSIS — J96.11 CHRONIC RESPIRATORY FAILURE WITH HYPOXIA (HCC): ICD-10-CM

## 2021-09-16 DIAGNOSIS — I50.22 CHRONIC SYSTOLIC (CONGESTIVE) HEART FAILURE (HCC): ICD-10-CM

## 2021-09-16 DIAGNOSIS — E11.65 TYPE 2 DIABETES MELLITUS WITH HYPERGLYCEMIA, WITHOUT LONG-TERM CURRENT USE OF INSULIN (HCC): ICD-10-CM

## 2021-09-16 DIAGNOSIS — E03.8 OTHER SPECIFIED HYPOTHYROIDISM: ICD-10-CM

## 2021-09-16 DIAGNOSIS — F33.0 MILD EPISODE OF RECURRENT MAJOR DEPRESSIVE DISORDER (HCC): ICD-10-CM

## 2021-09-16 DIAGNOSIS — Z23 NEED FOR SHINGLES VACCINE: Primary | ICD-10-CM

## 2021-09-16 DIAGNOSIS — Z86.69 STATUS POST SEIZURE: ICD-10-CM

## 2021-09-16 DIAGNOSIS — N20.0 NEPHROLITHIASIS: ICD-10-CM

## 2021-09-16 DIAGNOSIS — K59.09 OTHER CONSTIPATION: ICD-10-CM

## 2021-09-16 DIAGNOSIS — N39.0 FREQUENT UTI: ICD-10-CM

## 2021-09-16 DIAGNOSIS — E78.2 MIXED HYPERLIPIDEMIA: ICD-10-CM

## 2021-09-16 DIAGNOSIS — I25.110 CORONARY ARTERY DISEASE INVOLVING NATIVE HEART WITH UNSTABLE ANGINA PECTORIS, UNSPECIFIED VESSEL OR LESION TYPE (HCC): ICD-10-CM

## 2021-09-16 DIAGNOSIS — G82.50 SPASTIC QUADRIPARESIS (HCC): Chronic | ICD-10-CM

## 2021-09-16 PROCEDURE — G8400 PT W/DXA NO RESULTS DOC: HCPCS | Performed by: INTERNAL MEDICINE

## 2021-09-16 PROCEDURE — G8417 CALC BMI ABV UP PARAM F/U: HCPCS | Performed by: INTERNAL MEDICINE

## 2021-09-16 PROCEDURE — 99214 OFFICE O/P EST MOD 30 MIN: CPT | Performed by: INTERNAL MEDICINE

## 2021-09-16 PROCEDURE — G8427 DOCREV CUR MEDS BY ELIG CLIN: HCPCS | Performed by: INTERNAL MEDICINE

## 2021-09-16 PROCEDURE — 1123F ACP DISCUSS/DSCN MKR DOCD: CPT | Performed by: INTERNAL MEDICINE

## 2021-09-16 PROCEDURE — 3017F COLORECTAL CA SCREEN DOC REV: CPT | Performed by: INTERNAL MEDICINE

## 2021-09-16 PROCEDURE — 2022F DILAT RTA XM EVC RTNOPTHY: CPT | Performed by: INTERNAL MEDICINE

## 2021-09-16 PROCEDURE — 4040F PNEUMOC VAC/ADMIN/RCVD: CPT | Performed by: INTERNAL MEDICINE

## 2021-09-16 PROCEDURE — 1036F TOBACCO NON-USER: CPT | Performed by: INTERNAL MEDICINE

## 2021-09-16 PROCEDURE — 1090F PRES/ABSN URINE INCON ASSESS: CPT | Performed by: INTERNAL MEDICINE

## 2021-09-16 PROCEDURE — 3044F HG A1C LEVEL LT 7.0%: CPT | Performed by: INTERNAL MEDICINE

## 2021-09-16 RX ORDER — IPRATROPIUM BROMIDE AND ALBUTEROL SULFATE 2.5; .5 MG/3ML; MG/3ML
1 SOLUTION RESPIRATORY (INHALATION) EVERY 6 HOURS PRN
Qty: 360 ML | Refills: 6 | Status: SHIPPED | OUTPATIENT
Start: 2021-09-16

## 2021-09-16 RX ORDER — LEVOTHYROXINE SODIUM 88 UG/1
88 TABLET ORAL DAILY
Qty: 90 TABLET | Refills: 1 | Status: SHIPPED
Start: 2021-09-16 | End: 2022-02-04 | Stop reason: SDUPTHER

## 2021-09-16 RX ORDER — IPRATROPIUM BROMIDE AND ALBUTEROL SULFATE 2.5; .5 MG/3ML; MG/3ML
1 SOLUTION RESPIRATORY (INHALATION) 2 TIMES DAILY PRN
Qty: 360 ML | Refills: 3 | Status: SHIPPED
Start: 2021-09-16 | End: 2021-12-29 | Stop reason: ALTCHOICE

## 2021-09-16 RX ORDER — CITALOPRAM 20 MG/1
20 TABLET ORAL EVERY MORNING
Qty: 90 TABLET | Refills: 1 | Status: SHIPPED
Start: 2021-09-16 | End: 2022-04-01 | Stop reason: SDUPTHER

## 2021-09-16 RX ORDER — BACLOFEN 20 MG/1
40 TABLET ORAL 2 TIMES DAILY
Qty: 180 TABLET | Refills: 1 | Status: SHIPPED
Start: 2021-09-16 | End: 2021-12-23 | Stop reason: SDUPTHER

## 2021-09-16 RX ORDER — ISOSORBIDE MONONITRATE 30 MG/1
30 TABLET, EXTENDED RELEASE ORAL DAILY
Qty: 90 TABLET | Refills: 1 | Status: SHIPPED
Start: 2021-09-16 | End: 2022-04-07 | Stop reason: SDUPTHER

## 2021-09-16 RX ORDER — NITROGLYCERIN 0.4 MG/1
TABLET SUBLINGUAL
COMMUNITY
Start: 2021-09-13

## 2021-09-16 RX ORDER — ATORVASTATIN CALCIUM 10 MG/1
TABLET, FILM COATED ORAL
Qty: 90 TABLET | Refills: 1 | Status: SHIPPED
Start: 2021-09-16 | End: 2022-02-04 | Stop reason: SDUPTHER

## 2021-09-16 RX ORDER — DANTROLENE SODIUM 100 MG/1
100 CAPSULE ORAL 2 TIMES DAILY
Qty: 180 CAPSULE | Refills: 1 | Status: SHIPPED
Start: 2021-09-16 | End: 2022-04-01 | Stop reason: SDUPTHER

## 2021-09-16 RX ORDER — CARVEDILOL 3.12 MG/1
3.12 TABLET ORAL 2 TIMES DAILY
Qty: 180 TABLET | Refills: 1 | Status: SHIPPED
Start: 2021-09-16 | End: 2022-04-07 | Stop reason: SDUPTHER

## 2021-09-16 ASSESSMENT — ENCOUNTER SYMPTOMS
CONSTIPATION: 1
CHEST TIGHTNESS: 0
COUGH: 0
VOMITING: 0
NAUSEA: 0
RHINORRHEA: 0
SHORTNESS OF BREATH: 0
SORE THROAT: 0
BLOOD IN STOOL: 0
DIARRHEA: 0
ABDOMINAL PAIN: 0
EYE PAIN: 0

## 2021-09-16 NOTE — PROGRESS NOTES
[facility]) Physicians   Internal Medicine     2021  Siddhartha Douglas : 1945 Sex: female  Age:74 y.o. Chief Complaint   Patient presents with    3 Month Follow-Up    Multiple Sclerosis    Hyperlipidemia    Diabetes     Fasting blood sugar this am was 137. Yesterday it was 117. HPI:   Patient presents to office for evaluation of the following medical conditions.    - States had seizure. States pt started yelling and was rolled over was not responding. Placed trilogy machine. States slowly began to recover. Discussed with neurology, felt seizure. Did not think work up necessary at present. Did discuss medication - did not start unless happened again. No further episodes since.      -Patient has CAD. Has had ST elevation MI () angio and stent to LAD and NSTEMI for instent stenosis (). found to have severe anterior and septal hypokinesia with a apical dyskinesia and ejection fraction 20 to 25% with a small apical left ventricular thrombus on echocardiogram. Off eliquis. On ranexa 1000mg twice a day. On plavix. On isosorbide. On Coreg. On bumex. On aspirin. Has hold parameters for medications and will hold if BP too low. No further chest pain or SOB. Last visit with cardiology per reviewed note () - no changes f/u 6mo     - States has low blood count, Anemia. States has seen hem/onc treated with  IV iron  Feraheme 510mg x2, keep Hb greater than 7, last visit with hematology per reviewed note () - IV iron & prn. Last lab hgb 9.4 (2021). Had seen GI () - do not feel gi bleeding, check hemoccult stool x 6 if neg no further      - Patient had left obstructing ureteral calculus and had a cystoscopy with ureteral stent and stone manipulation. Has saldana cath. () - Cystoscopy. Complex flexible ureteroscopy. Laser lithotripsy Stent exchange, removed. No current symptoms. was given bactrim if develops symptoms.  Last visit per reviewed note () - no changes,f/u 1 year    - States had bladder issues and overactive bladder. Stopped oxybutynin. States has chronic saldana catheter. States was following with urology. Was given botox treatment in past. Off myrbetric.  as flushed intermittently. Removed saldana, voiding ok, wears adult pads. last visit (5/21) - no chnages,f/u 1 year - does have appointment 11/2021     - elevated blood sugars. Last lab had showed elevated A1c to 7.7, most recent A1c (9/2021) was 4.8  - not known to be diabetic. Blood sugars were running . On metformin. No reported side effects.     - States has high cholesterol. States Lipitor. No reported side effects. Stopped zetia. Last lab (4/2021) - improved.      -  states constipated. States on senokot-2. States on miralax. Stopped colace. Stopped oral iron therapy. States has used mag citrate from time to time.      - States has high blood pressure.  has been holding some of her heart medications due to the low blood pressure at times. On coreg 3.125mg bid. On bumex. On isosorbide, off lisinopril. Has hold parameters for medications and will hold if BP too low, holding every 2-3 days.      - States has hypothyroidism. on levothyroxine. More compliant. Last lab was 9/2021 - stable.      - States has depression. On citalopram. Stable on medications. No reported side effects. No suicidal thoughts.     - States found lung mass and needs pulmonary follow up yearly (10/2020). States PET suggested scar - no further imaging needed at present. Follow up in 1 year.      - States has multiple sclerosis. States follows with neurology. States stopped ticferdia. States on baclofen 20mg 4x per day and dantrolene 50mg 4x per day. Spasms stable. States weakness to hands b/l - difficult to feed self at times. Still with right hand weakness.   **she would benefit form a wheelchair that reclines because of her heart condition and low ejection fraction.  To help get her out of bed to help with physical therapy and strength but also to be able to recline back when she feels dizzy and to assist with transportation. Current char does not meet her changed needs. Has new power chair.     - States had hip fracture s/p left ORIF. States has been to physical therapy. Not walking. States having issues with left knee and leg. Released from [facility] Maintenance   - immunizations:   Influenza Vaccination - (2018), (2019)   Pneumonia Vaccination - (9/2013) - Pneumococcal. (9/2018) - prevnar, CHRISTINE [place]  Shingrix Vaccine (Shingles) - declines currently  Tetanus Vaccination    - Screenings:   Bone Density Scan   Pelvic/Pap Exam  Mammogram - (12/10/2018)    Colonoscopy  - (2019) - normal, no further     ROS:  Review of Systems   Constitutional: Negative for appetite change, chills, fever and unexpected weight change. HENT: Negative for congestion, rhinorrhea and sore throat. Eyes: Negative for pain and visual disturbance. Respiratory: Negative for cough, chest tightness and shortness of breath. Cardiovascular: Negative for chest pain and palpitations. Gastrointestinal: Positive for constipation. Negative for abdominal pain, blood in stool, diarrhea, nausea and vomiting. Genitourinary: Negative for difficulty urinating, dysuria, frequency, pelvic pain, urgency and vaginal bleeding. Musculoskeletal: Negative for arthralgias and myalgias. Skin: Negative for rash. Neurological: Positive for weakness and numbness. Negative for dizziness, syncope, light-headedness and headaches. Hematological: Negative for adenopathy. Psychiatric/Behavioral: Negative for suicidal ideas. The patient is not nervous/anxious.           Current Outpatient Medications:     carvedilol (COREG) 3.125 MG tablet, Take 1 tablet by mouth 2 times daily If BP is 80/55 or higher and pulse should be 60 or higher, Disp: 180 tablet, Rfl: 1    isosorbide mononitrate (IMDUR) 30 MG extended release tablet, Take 1 tablet by mouth daily If BP is 80/55 or higher, Disp: 90 tablet, Rfl: 1    baclofen (LIORESAL) 20 MG tablet, Take 2 tablets by mouth 2 times daily, Disp: 180 tablet, Rfl: 1    dantrolene (DANTRIUM) 100 MG capsule, Take 1 capsule by mouth 2 times daily, Disp: 180 capsule, Rfl: 1    levothyroxine (SYNTHROID) 88 MCG tablet, Take 1 tablet by mouth daily, Disp: 90 tablet, Rfl: 1    atorvastatin (LIPITOR) 10 MG tablet, 1 po q hs, Disp: 90 tablet, Rfl: 1    citalopram (CELEXA) 20 MG tablet, Take 1 tablet by mouth every morning, Disp: 90 tablet, Rfl: 1    ipratropium-albuterol (DUONEB) 0.5-2.5 (3) MG/3ML SOLN nebulizer solution, Inhale 3 mLs into the lungs every 6 hours as needed for Shortness of Breath, Disp: 360 mL, Rfl: 6    ipratropium-albuterol (DUONEB) 0.5-2.5 (3) MG/3ML SOLN nebulizer solution, Inhale 3 mLs into the lungs 2 times daily as needed for Shortness of Breath, Disp: 360 mL, Rfl: 3    Lancets MISC, 1 each by Does not apply route daily, Disp: 100 each, Rfl: 11    blood glucose test strips (ASCENSIA AUTODISC VI;ONE TOUCH ULTRA TEST VI) strip, 1 each by In Vitro route 2 times daily As needed. , Disp: 100 each, Rfl: 11    metFORMIN (GLUCOPHAGE) 500 MG tablet, Take 1 tablet by mouth 2 times daily (with meals), Disp: 180 tablet, Rfl: 1    clopidogrel (PLAVIX) 75 MG tablet, Take 1 tablet by mouth daily, Disp: 90 tablet, Rfl: 1    Nutritional Supplements (GLUCERNA MEAL REPLACEMENT PO), Take 1 Can by mouth 2 times daily Using SlimFast Only taking once a day but will start taking 2 times a day, Disp: , Rfl:     Acetaminophen (TYLENOL PO), Take by mouth as needed, Disp: , Rfl:     bumetanide (BUMEX) 1 MG tablet, Take 1 tablet by mouth 2 times daily (Patient taking differently: Take 1 mg by mouth 2 times daily BP must be greater than 90/50), Disp: 180 tablet, Rfl: 3    ranolazine (RANEXA) 1000 MG extended release tablet, Take 1,000 mg by mouth 2 times daily , Disp: , Rfl:     fluticasone (FLONASE) 50 MCG/ACT nasal spray, 2 sprays by Each Nostril route daily as needed for Rhinitis, Disp: 1 Bottle, Rfl: 1    ondansetron (ZOFRAN ODT) 4 MG disintegrating tablet, Take 1 tablet by mouth every 8 hours as needed for Nausea or Vomiting, Disp: 30 tablet, Rfl: 0    OXYGEN, Inhale 2 L into the lungs continuous , Disp: , Rfl:     Potassium 99 MG TABS, Take 1 tablet by mouth every other day , Disp: , Rfl:     aspirin 81 MG EC tablet, Take 81 mg by mouth daily, Disp: , Rfl:     blood glucose monitor kit and supplies, Dispense sufficient amount for indicated testing frequency plus additional to accommodate PRN testing needs. Dispense all needed supplies to include: monitor, strips, lancing device, lancets, control solutions, alcohol swabs., Disp: 1 kit, Rfl: 0    blood glucose monitor strips, Test qd  & as needed for symptoms of irregular blood glucose. Dispense sufficient amount for indicated testing frequency plus additional to accommodate PRN testing needs. , Disp: 50 strip, Rfl: 11    Catheters (URETHRAL CATHETER) MISC, by Does not apply route, Disp: , Rfl:     Cyanocobalamin (VITAMIN B 12 PO), Take 500 mg by mouth daily , Disp: , Rfl:     Calcium Carbonate-Vit D-Min (CALCIUM 600+D PLUS MINERALS) 600-400 MG-UNIT TABS, Take 1 tablet by mouth nightly , Disp: , Rfl:     polyethylene glycol (MIRALAX) PACK packet, Take 7.3 g by mouth daily , Disp: , Rfl:     vitamin D3 (CHOLECALCIFEROL) 25 MCG (1000 UT) TABS tablet, Take 1 tablet by mouth every morning , Disp: , Rfl:     magnesium (MAGNESIUM-OXIDE) 250 MG TABS tablet, Take 250 mg by mouth every morning , Disp: , Rfl:     nitroGLYCERIN (NITROSTAT) 0.4 MG SL tablet, place 1 tablet under the tongue if needed every 5 minutes for ingrid...  (REFER TO PRESCRIPTION NOTES). , Disp: , Rfl:     Allergies   Allergen Reactions    Augmentin [Amoxicillin-Pot Clavulanate] Shortness Of Breath    Bactrim [Sulfamethoxazole-Trimethoprim] Anaphylaxis and Other (See Comments)     Seizures,     Macrobid [Nitrofurantoin] children: Not on file    Years of education: Not on file    Highest education level: Not on file   Occupational History    Not on file   Tobacco Use    Smoking status: Never Smoker    Smokeless tobacco: Never Used   Substance and Sexual Activity    Alcohol use: No     Alcohol/week: 0.0 standard drinks     Comment: Ocassionally    Drug use: No    Sexual activity: Yes     Partners: Male   Other Topics Concern    Not on file   Social History Narrative    Not on file     Social Determinants of Health     Financial Resource Strain: Unknown    Difficulty of Paying Living Expenses: Patient refused   Food Insecurity: Unknown    Worried About Running Out of Food in the Last Year: Patient refused    [address] in the Last Year: Patient refused   Transportation Needs:     Lack of Transportation (Medical):  Lack of Transportation (Non-Medical):    Physical Activity:     Days of Exercise per Week:     Minutes of Exercise per Session:    Stress:     Feeling of Stress :    Social Connections:     Frequency of Communication with Friends and Family:     Frequency of Social Gatherings with Friends and Family:     Attends Sabianism Services:     Active Member of Clubs or Organizations:     Attends Club or Organization Meetings:     Marital Status:    Intimate Partner Violence:     Fear of Current or Ex-Partner:     Emotionally Abused:     Physically Abused:     Sexually Abused:        Vitals:    09/16/21 1428   BP: (!) 82/42   Site: Left Upper Arm   Position: Sitting   Cuff Size: Medium Adult   Pulse: 76   Temp: 97.9 °F (36.6 °C)   SpO2: 100%  Comment: 2 L O2   Weight: 141 lb (64 kg)  Comment: Per pt this is an estimate   Height: 5' (1.524 m)       Exam:  Physical Exam  Constitutional:       Appearance: She is well-developed. HENT:      Head: Normocephalic and atraumatic.       Right Ear: External ear normal.      Left Ear: External ear normal.   Eyes:      Pupils: Pupils are equal, round, and reactive to light. Neck:      Thyroid: No thyromegaly. Cardiovascular:      Rate and Rhythm: Normal rate and regular rhythm. Heart sounds: Normal heart sounds. No murmur heard. Pulmonary:      Effort: Pulmonary effort is normal.      Breath sounds: Normal breath sounds. No wheezing. Abdominal:      General: Bowel sounds are normal. There is no distension. Palpations: Abdomen is soft. Tenderness: There is no abdominal tenderness. There is no guarding or rebound. Musculoskeletal:         General: Normal range of motion. Cervical back: Normal range of motion and neck supple. Lymphadenopathy:      Cervical: No cervical adenopathy. Skin:     General: Skin is warm and dry. Neurological:      Mental Status: She is alert and oriented to person, place, and time. Cranial Nerves: No cranial nerve deficit. Motor: Abnormal muscle tone present.       Comments: Paralysis  Nonambulatory - in wheelchair    Psychiatric:         Judgment: Judgment normal.         Assessment and Plan:    Diagnoses and all orders for this visit:    Status post seizure  - uncertain etiology   - has discussed with neuro - poss related to MS   - has not started medication - poss keppra if recurs   - no current work up   - no further episodes     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, unspecified vessel or lesion type ([facility] [address]  - s/p cath and stent to LAD (2020), then instent stenosis s/p restent (1/21)   - following with cardio   - has had 2nd opinion With [facility]   - off lisinopril   - off eliquis  - on coreg   - on imdur   - on ranexa   - on bumex   - on plavix  - on aspirin  - will intermittently hold at night if BP is too low based on hold parameters   - stable at present     Chronic systolic congestive heart failure (HCC)  - EF 20% per echo (4/2020)   - on bumex   - on coreg   - off lisinopril      Frequent UTI   - at risk for further infection   - no current symptoms  - has bactrim to use if symptoms   - following with urology   - last urine culture showed bacteria - uncertain if colonization - did not treat as not overt symptoms (9/2021)      Nephrolithiasis  - s/p cystoscopy and lithotripsy (11/2020)   - now chronic saldana      Anemia, unspecified type  - following with hematology   - to stop oral iron   - to have IV iron   - referral to GI - did not think GI bleeding (6/2021)      Other Constipation  - on senna   - on miralax  - add mag citrate . 25 as needed      Essential hypertension  - watch diet   - off lisinopril   - on bumex   - on coreg twice a day   - on imdur   - on ranex   - stable per   - improved per patient and       Type 2 diabetes mellitus with hyperglycemia, without long-term current use of insulin (HCC)  - watch diet   - last A1c was 4.9 (1/2021)   - now on metformin since recent increase in sugars   - on endocrinology      Hypothyroidism, unspecified type  - on levothyroxine  - labs were normal (9/2020)     Chronic respiratory failure with hypoxia ([facility])  - on oxygen - 3 liters   - has trilogy at night   - has seen pulmonary - recommending to cont trilogy   - stable     Hyponatremia  - follow labs   - last lab (9/2021) - borderline low      MS (multiple sclerosis) (HCC)  - ticierda on hold  - stopped nortriptyline   - on baclofen and dantrolene  - non ambulatory  - stable  **she would benefit form a wheelchair that reclines because of her heart condition and low ejection fraction. To help get her out of bed to help with physical therapy and strength but also to be able to recline back when she feels dizzy and to assist with transportation.  [facility]      Spastic quadriparesis ([facility] [address]  - following with neurology   - on baclofen and dantrolene     Mixed hyperlipidemia  - watch diet  - on atrovastatin  - follow up labs     Neurogenic bladder  - off oxybutynin  - off myrbetric - has not started yet   - currently with les      Mild episode of recurrent major depressive disorder ([facility] [address]  - on citalopram  - no suicidal thoughts  - stable     HUDSON (nonalcoholic steatohepatitis)  - following with GI     Osteopenia of multiple sites  - declines bone density  - follows labs     Lung mass  - possible scar (2019)   - has seen pulmonary - yearly (10/2020)      H/O sarcoidosis    Return in about 3 months (around 2021) for check up and review.     Orders Placed This Encounter   Procedures    [facility])     Requested Prescriptions     Signed Prescriptions Disp Refills    carvedilol (COREG) 3.125 MG tablet 180 tablet 1     Sig: Take 1 tablet by mouth 2 times daily If BP is 80/55 or higher and pulse should be 60 or higher    isosorbide mononitrate (IMDUR) 30 MG extended release tablet 90 tablet 1     Sig: Take 1 tablet by mouth daily If BP is 80/55 or higher    baclofen (LIORESAL) 20 MG tablet 180 tablet 1     Sig: Take 2 tablets by mouth 2 times daily    dantrolene (DANTRIUM) 100 MG capsule 180 capsule 1     Sig: Take 1 capsule by mouth 2 times daily    levothyroxine (SYNTHROID) 88 MCG tablet 90 tablet 1     Sig: Take 1 tablet by mouth daily    atorvastatin (LIPITOR) 10 MG tablet 90 tablet 1     Si po q hs    citalopram (CELEXA) 20 MG tablet 90 tablet 1     Sig: Take 1 tablet by mouth every morning    ipratropium-albuterol (DUONEB) 0.5-2.5 (3) MG/3ML SOLN nebulizer solution 360 mL 6     Sig: Inhale 3 mLs into the lungs every 6 hours as needed for Shortness of Breath    ipratropium-albuterol (DUONEB) 0.5-2.5 (3) MG/3ML SOLN nebulizer solution 360 mL 3     Sig: Inhale 3 mLs into the lungs 2 times daily as needed for Shortness of Breath     Dory Patten MD  2021  3:29 PM

## 2021-09-24 ENCOUNTER — TELEPHONE (OUTPATIENT)
Dept: FAMILY MEDICINE CLINIC | Age: 76
End: 2021-09-24

## 2021-09-24 RX ORDER — CIPROFLOXACIN 500 MG/1
500 TABLET, FILM COATED ORAL 2 TIMES DAILY
Qty: 14 TABLET | Refills: 0 | Status: SHIPPED | OUTPATIENT
Start: 2021-09-24 | End: 2021-10-01

## 2021-09-24 NOTE — TELEPHONE ENCOUNTER
Requested Prescriptions     Signed Prescriptions Disp Refills    ciprofloxacin (CIPRO) 500 MG tablet 14 tablet 0     Sig: Take 1 tablet by mouth 2 times daily for 7 days     Authorizing Provider: Claudia Damon to pharmacy

## 2021-09-24 NOTE — TELEPHONE ENCOUNTER
Wolfgang calling to tell you that Michelet Nicole is having some hallucinations this morning. He believes she is experiencing UTI after all. Please send the script to [facility] in [facility].

## 2021-09-28 ENCOUNTER — TELEPHONE (OUTPATIENT)
Dept: FAMILY MEDICINE CLINIC | Age: 76
End: 2021-09-28

## 2021-10-13 ENCOUNTER — VIRTUAL VISIT (OUTPATIENT)
Dept: NEUROLOGY | Age: 76
End: 2021-10-13
Payer: MEDICARE

## 2021-10-13 DIAGNOSIS — G35 MS (MULTIPLE SCLEROSIS) (HCC): Primary | ICD-10-CM

## 2021-10-13 PROCEDURE — 1123F ACP DISCUSS/DSCN MKR DOCD: CPT | Performed by: PSYCHIATRY & NEUROLOGY

## 2021-10-13 PROCEDURE — 99215 OFFICE O/P EST HI 40 MIN: CPT | Performed by: PSYCHIATRY & NEUROLOGY

## 2021-10-13 PROCEDURE — G8484 FLU IMMUNIZE NO ADMIN: HCPCS | Performed by: PSYCHIATRY & NEUROLOGY

## 2021-10-13 PROCEDURE — G8400 PT W/DXA NO RESULTS DOC: HCPCS | Performed by: PSYCHIATRY & NEUROLOGY

## 2021-10-13 PROCEDURE — G8417 CALC BMI ABV UP PARAM F/U: HCPCS | Performed by: PSYCHIATRY & NEUROLOGY

## 2021-10-13 PROCEDURE — 3017F COLORECTAL CA SCREEN DOC REV: CPT | Performed by: PSYCHIATRY & NEUROLOGY

## 2021-10-13 PROCEDURE — 1090F PRES/ABSN URINE INCON ASSESS: CPT | Performed by: PSYCHIATRY & NEUROLOGY

## 2021-10-13 PROCEDURE — 1036F TOBACCO NON-USER: CPT | Performed by: PSYCHIATRY & NEUROLOGY

## 2021-10-13 PROCEDURE — G8427 DOCREV CUR MEDS BY ELIG CLIN: HCPCS | Performed by: PSYCHIATRY & NEUROLOGY

## 2021-10-13 PROCEDURE — 4040F PNEUMOC VAC/ADMIN/RCVD: CPT | Performed by: PSYCHIATRY & NEUROLOGY

## 2021-10-13 NOTE — PROGRESS NOTES
l,Subjective:     Lynda Coleman was a 77 y.o. right handed woman who suffered from long-standing multiple sclerosis. Her EDSS was 9.0. The patient and her  remained excellent historians. This visit was by telemedicine. Her medications continued as baclofen, atorvastatin, clopidogrel, aspirin, isosorbide, citalopram, levothyroxine, carvedilol, dantrolene, inhalers, metformin, banality, bumetanide, multivitamins, nebulizers and omega-3 fatty acids. She was stable neurologically. She continued with dantrolene and baclofen for spasticity, remaining moderately controlled. She was only transferring with a sliding board with her . Her  now rarely lifted her to the wheelchair. Edelmira Floresloan He did not use a Eduar's lift, despite having that device. She was mostly bedbound, in the hospital bed. She still read and spoke without issues. There were no memory issues. Following her heart attack, there continued marked weakness of the right arm and hand. She used her left hand for all eating and dressing purposes. There was no improvement or worsening of the weakness in the right hand. Occupational therapy evaluated her left hand, recommending home exercises and enlarged utensils. She is able to feed herself with that hand. She reported no neck pains or radiating discomforts. She suffered a heart attack 2 years ago. She was no longer on 24-hour oxygen but continued Ranexa and diuretics. She fatigued easily, with minimal exertion. Medically, she reported recurrent urinary tract infections, producing confusion. There were no recent infections. Sarcoidosis was questioned in the past, without recurrent lung disease. She received another iron loading dose. She still slept erratically, despite taking melatonin. She remained well at home with her 's assistance. They both received her COVID-19 vaccinations. Her  occasionally took her outside the house.     She was eating well.    Review of systems was otherwise negative. Objective:     She was elderly woman in no acute distress, who was alert, cooperative and oriented. She was breathing comfortably, without chest pain or shortness of breath. She was not on nasal cannula oxygen today. She was lying comfortably in bed, appearing no thinner. She was always pleasant. Her skin was unremarkable. There was no abdominal tenderness. Her limbs displayed easily reducible flexion contractures of both hands, with a firm left . Neurological examination produced an intact mental status. I found no cognitive issues, dysarthria or aphasia. Cranial nerve testing was unchanged. She did not move both legs but raised her left arm more than the right. There were decreased fine finger movements and rapid alternating movements in both hands, more so the right. She appreciated light touch throughout. There was no clumsiness. Her neurological examination was unchanged. Recent CMP was unremarkable, except for a sodium level of 133; CBC with differential again revealed low hemoglobin levels with an MCV of 100. Previous MRIs revealed extensive lesion load in her cervical cord and brain, without acute changes. 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 Her examination still displays marked weakness of her right arm and hand. I first suspect cervical radicular disease and not recurrent multiple sclerosis. Her  did not wish to pursue other diagnostic measures. She did not require disease modifying therapies. She will continue on her current dose of dantrolene and baclofen. Hopefully, I will see her in the office next visit. Fortunately, there remained no cognitive involvement. Unfortunately, she endured several bladder infections and confusion. Her heart failure continues. Her anemia continues as well with pending work-up.   Her long-term medical prognosis is poor.    Plan:     Baclofen was maintained at 20 mg 4 times daily; dantrolene was continued at 100 mg twice daily. She will return in 4 months in the office. He will call if any problems arise in the interim. I spent 40 minutes with the patient with over 50 % spent in counseling and disease management. All patient issues were addressed and all questions were answered. TeleMedicine Patient Consent    This visit was performed as a virtual video visit using a synchronous, two-way, audio-video telehealth technology platform. Patient identification was verified at the start of the visit, including the patient's telephone number and physical location. I discussed with the patient the nature of our telehealth visits, that:     1. Due to the nature of an audio- video modality, the only components of a physical exam that could be done are the elements supported by direct observation. 2. I would evaluate the patient and recommend diagnostics and treatments based on my assessment. 3. If it was felt that the patient should be evaluated in clinic or an emergency room setting, then they would be directed there. 4. Our sessions are not being recorded and that personal health information is protected. 5. Our team would provide follow up care in person if/when the patient needs it. The patient did agree to proceed with telemedicine consultation. This video was conducted at the patient's home. Again I spent 40 minutes with the patient. This visit was completed virtually using [email or web address].

## 2021-12-20 ENCOUNTER — OFFICE VISIT (OUTPATIENT)
Dept: ENDOCRINOLOGY | Age: 76
End: 2021-12-20
Payer: MEDICARE

## 2021-12-20 VITALS
HEIGHT: 60 IN | HEART RATE: 70 BPM | BODY MASS INDEX: 27.54 KG/M2 | DIASTOLIC BLOOD PRESSURE: 62 MMHG | SYSTOLIC BLOOD PRESSURE: 98 MMHG

## 2021-12-20 DIAGNOSIS — D53.9 MACROCYTIC ANEMIA: ICD-10-CM

## 2021-12-20 DIAGNOSIS — E78.5 HYPERLIPIDEMIA, UNSPECIFIED HYPERLIPIDEMIA TYPE: ICD-10-CM

## 2021-12-20 DIAGNOSIS — E03.9 HYPOTHYROIDISM, UNSPECIFIED TYPE: ICD-10-CM

## 2021-12-20 DIAGNOSIS — E55.9 VITAMIN D DEFICIENCY: ICD-10-CM

## 2021-12-20 DIAGNOSIS — E11.69 TYPE 2 DIABETES MELLITUS WITH OTHER SPECIFIED COMPLICATION, WITHOUT LONG-TERM CURRENT USE OF INSULIN (HCC): Primary | ICD-10-CM

## 2021-12-20 PROCEDURE — 3017F COLORECTAL CA SCREEN DOC REV: CPT | Performed by: INTERNAL MEDICINE

## 2021-12-20 PROCEDURE — 1090F PRES/ABSN URINE INCON ASSESS: CPT | Performed by: INTERNAL MEDICINE

## 2021-12-20 PROCEDURE — G8400 PT W/DXA NO RESULTS DOC: HCPCS | Performed by: INTERNAL MEDICINE

## 2021-12-20 PROCEDURE — G8428 CUR MEDS NOT DOCUMENT: HCPCS | Performed by: INTERNAL MEDICINE

## 2021-12-20 PROCEDURE — 3044F HG A1C LEVEL LT 7.0%: CPT | Performed by: INTERNAL MEDICINE

## 2021-12-20 PROCEDURE — 4040F PNEUMOC VAC/ADMIN/RCVD: CPT | Performed by: INTERNAL MEDICINE

## 2021-12-20 PROCEDURE — 1036F TOBACCO NON-USER: CPT | Performed by: INTERNAL MEDICINE

## 2021-12-20 PROCEDURE — 1123F ACP DISCUSS/DSCN MKR DOCD: CPT | Performed by: INTERNAL MEDICINE

## 2021-12-20 PROCEDURE — G8417 CALC BMI ABV UP PARAM F/U: HCPCS | Performed by: INTERNAL MEDICINE

## 2021-12-20 PROCEDURE — 99214 OFFICE O/P EST MOD 30 MIN: CPT | Performed by: INTERNAL MEDICINE

## 2021-12-20 PROCEDURE — 2022F DILAT RTA XM EVC RTNOPTHY: CPT | Performed by: INTERNAL MEDICINE

## 2021-12-20 PROCEDURE — G8484 FLU IMMUNIZE NO ADMIN: HCPCS | Performed by: INTERNAL MEDICINE

## 2021-12-20 NOTE — PROGRESS NOTES
[address] [place] [address] Department of Endocrinology Diabetes and Metabolism   [address] [facility] [address]   Phone: [phone]  Fax: [phone]    Date of Service: 12/20/2021  Primary Care Physician: Boston Jolly MD  Provider: Gabriela Koroma MD     Reason for the visit:  DM type 2    History of Present Illness: The history is provided by the patient. No  was used. Accuracy of the patient data is excellent.   Cindy Olsen is a very pleasant 77 y.o. female CAD and MS seen today for diabetes management     Cindy Olsen was diagnosed with diabetes long time ago and currently doing very well on Metformin 500 mg BID,   The patient has been checking blood sugar daily in AM and most readings at goal   A1c 4.8% but she is anemic   Most recent A1c results summarized below  Lab Results   Component Value Date    LABA1C 4.8 09/08/2021    LABA1C 4.9 01/13/2021    LABA1C 6.6 07/30/2020     Patient has had no hypoglycemic episodes   The patient has been mindful of what has been eating and following diabetic diet as encouraged  I reviewed current medications and the patient has no issues with diabetes medications  The patient is due for an eye exam   Microvascular complications:  No Retinopathy, Nephropathy or Neuropathy   Macrovascular complications: + CAD   The patient receives Flushot every year and up to date with the Pneumonia vaccine     PAST MEDICAL HISTORY   Past Medical History:   Diagnosis Date    CAD (coronary artery disease) 4/21/2020    High cholesterol     Hypertension     Hypothyroidism     MI (myocardial infarction) ([address] 04/21/2020    MS [facility]     Osteoporosis     Sarcoidosis     Seizure ([address] 02/05/2021    Type 2 diabetes mellitus with hyperglycemia, without long-term current use of insulin ([address] 10/30/2019       PAST SURGICAL HISTORY   Past Surgical History:   Procedure Laterality Date    CHOLECYSTECTOMY  1990s    CORONARY ANGIOPLASTY WITH STENT PLACEMENT  04/21/2020    Dr. Philip Odell   3.0 x 22mm Resolute MAAME to Prox LAD. No LV    CORONARY ANGIOPLASTY WITH STENT PLACEMENT  01/14/2021    3.0 x 30 Maame to the prox LAD and a 2.5 x 20 Maame to the Obtuse marginal by Dr. Maryjane Yadav / [address] [facility] [address] / [facility] Left 4/23/2020    CYSTOSCOPY LEFT RETROGRADE PYELOGRAM STENT INSERTION, STRATTON CATHETER performed by Imer Avila DO at [facility] Left 3/21/2019    LEFT FEMUR CEPHALLOMEDULLARY NAIL performed by Ananya Moreno MD at [facility] Bilateral     HYSTERECTOMY      KNEE SURGERY      plate in lt knee    LITHOTRIPSY Left 11/23/2020    CYSTOSCOPY LEFT URETEROSCOPY,  LASER LITHOTRIPSY  BASKET EXTRACTION LEFT STONE, STENT EXCHANGE performed by Imer Avila DO at [address] LITHOTRIPSY Left 12/30/2020    CYSTOSCOPY RETROGRADE URETEROSCOPY PYELOGRAM LASER LITHOTRIPSY LEFT J-STENT performed by Imer Avila DO at [address]:   reports that she has never smoked. She has never used smokeless tobacco.  Alcohol:   reports no history of alcohol use. Drugs:   reports no history of drug use.     FAMILY HISTORY   Family History   Problem Relation Age of Onset    Stroke Mother     Heart Disease Mother     Cancer Father         lung    Mult Sclerosis Sister     No Known Problems Brother     No Known Problems Sister     Arthritis Sister     Cervical Cancer Sister        ALLERGIES AND DRUG REACTIONS   Allergies   Allergen Reactions    Augmentin [Amoxicillin-Pot Clavulanate] Shortness Of Breath    Bactrim [Sulfamethoxazole-Trimethoprim] Anaphylaxis and Other (See Comments)     Seizures,     Macrobid [Nitrofurantoin] Anaphylaxis     Mental Changes, shaking, stopped breathing, collapsed (happened again Feb 2021)        CURRENT MEDICATIONS   Current Outpatient Medications   Medication Sig Dispense Refill    nitroGLYCERIN (NITROSTAT) 0.4 MG SL tablet place 1 tablet under the tongue if needed every 5 minutes for ingrid...  (REFER TO PRESCRIPTION NOTES).  carvedilol (COREG) 3.125 MG tablet Take 1 tablet by mouth 2 times daily If BP is 80/55 or higher and pulse should be 60 or higher 180 tablet 1    isosorbide mononitrate (IMDUR) 30 MG extended release tablet Take 1 tablet by mouth daily If BP is 80/55 or higher 90 tablet 1    baclofen (LIORESAL) 20 MG tablet Take 2 tablets by mouth 2 times daily 180 tablet 1    dantrolene (DANTRIUM) 100 MG capsule Take 1 capsule by mouth 2 times daily 180 capsule 1    levothyroxine (SYNTHROID) 88 MCG tablet Take 1 tablet by mouth daily 90 tablet 1    atorvastatin (LIPITOR) 10 MG tablet 1 po q hs 90 tablet 1    citalopram (CELEXA) 20 MG tablet Take 1 tablet by mouth every morning 90 tablet 1    ipratropium-albuterol (DUONEB) 0.5-2.5 (3) MG/3ML SOLN nebulizer solution Inhale 3 mLs into the lungs every 6 hours as needed for Shortness of Breath 360 mL 6    ipratropium-albuterol (DUONEB) 0.5-2.5 (3) MG/3ML SOLN nebulizer solution Inhale 3 mLs into the lungs 2 times daily as needed for Shortness of Breath 360 mL 3    Lancets MISC 1 each by Does not apply route daily 100 each 11    blood glucose test strips (ASCENSIA AUTODISC VI;ONE TOUCH ULTRA TEST VI) strip 1 each by In Vitro route 2 times daily As needed.  100 each 11    metFORMIN (GLUCOPHAGE) 500 MG tablet Take 1 tablet by mouth 2 times daily (with meals) 180 tablet 1    clopidogrel (PLAVIX) 75 MG tablet Take 1 tablet by mouth daily 90 tablet 1    Acetaminophen (TYLENOL PO) Take by mouth as needed      bumetanide (BUMEX) 1 MG tablet Take 1 tablet by mouth 2 times daily (Patient taking differently: Take 1 mg by mouth 2 times daily BP must be greater than 90/50) 180 tablet 3    ranolazine (RANEXA) 1000 MG extended release tablet Take 1,000 mg by mouth 2 times daily       fluticasone (FLONASE) 50 MCG/ACT nasal spray 2 sprays by Each Nostril route daily as needed for Rhinitis (Patient not taking: Reported on 10/13/2021) 1 Bottle 1    ondansetron (ZOFRAN ODT) 4 MG disintegrating tablet Take 1 tablet by mouth every 8 hours as needed for Nausea or Vomiting 30 tablet 0    OXYGEN Inhale 2 L into the lungs continuous       Potassium 99 MG TABS Take 1 tablet by mouth every other day       aspirin 81 MG EC tablet Take 81 mg by mouth daily      blood glucose monitor kit and supplies Dispense sufficient amount for indicated testing frequency plus additional to accommodate PRN testing needs. Dispense all needed supplies to include: monitor, strips, lancing device, lancets, control solutions, alcohol swabs. 1 kit 0    blood glucose monitor strips Test qd  & as needed for symptoms of irregular blood glucose. Dispense sufficient amount for indicated testing frequency plus additional to accommodate PRN testing needs. 50 strip 11    Catheters (URETHRAL CATHETER) MISC by Does not apply route      Cyanocobalamin (VITAMIN B 12 PO) Take 500 mg by mouth daily       Calcium Carbonate-Vit D-Min (CALCIUM 600+D PLUS MINERALS) 600-400 MG-UNIT TABS Take 1 tablet by mouth nightly       polyethylene glycol (MIRALAX) PACK packet Take 7.3 g by mouth daily       vitamin D3 (CHOLECALCIFEROL) 25 MCG (1000 UT) TABS tablet Take 1 tablet by mouth every morning       magnesium (MAGNESIUM-OXIDE) 250 MG TABS tablet Take 250 mg by mouth every morning        No current facility-administered medications for this visit. Review of Systems  Constitutional: No fever, no chills, no diaphoresis, no generalized weakness. HEENT: No blurred vision, No sore throat, no ear pain, no hair loss  Neck: denied any neck swelling, difficulty swallowing,   Cardio-pulmonary: No CP, SOB or palpitation, No orthopnea or PND. No cough or wheezing. GI: No N/V/D, no constipation, No abdominal pain, no melena or hematochezia   : Denied any dysuria, hematuria, flank pain, discharge, or incontinence.    Skin: denied any rash, ulcer, Hirsute, or hyperpigmentation. MSK: denied any joint deformity, joint pain/swelling, muscle pain, or back pain. Neuro: no numbness, no tingling, no weakness, _    OBJECTIVE    BP 98/62   Pulse 70   Ht 5' (1.524 m)   BMI 27.54 kg/m²   BP Readings from Last 4 Encounters:   12/20/21 98/62   09/16/21 (!) 82/42   01/19/21 128/66   12/30/20 (!) 106/50     Wt Readings from Last 6 Encounters:   09/16/21 141 lb (64 kg)   01/19/21 139 lb 11.2 oz (63.4 kg)   12/30/20 150 lb (68 kg)   11/23/20 150 lb (68 kg)   09/02/20 143 lb (64.9 kg)   07/13/20 159 lb (72.1 kg)     Physical examination:  General: awake alert, oriented x3  HEENT: normocephalic non traumatic, no exophthalmos   Neck: supple, thyroid tenderness,  Pulm: Clear equal air entry no added sounds  CVS: S1 + S2  Abd: soft lax, no tenderness  Skin: warm, no lesions, no rash.  No open wounds, no ulcers   Neuro: CN intact, Lt side hemiplegia from previous stroke   Psych: normal mood, and affect      Review of Laboratory Data:  I personally reviewed the following lab:  Lab Results   Component Value Date/Time    WBC 9.7 12/17/2021 02:00 PM    RBC 2.94 (L) 12/17/2021 02:00 PM    HGB 9.8 (L) 12/17/2021 02:00 PM    HCT 29.6 (L) 12/17/2021 02:00 PM    .6 (H) 12/17/2021 02:00 PM    MCH 33.3 12/17/2021 02:00 PM    MCHC 33.1 12/17/2021 02:00 PM    RDW 13.9 12/17/2021 02:00 PM     12/17/2021 02:00 PM    MPV 8.3 12/17/2021 02:00 PM      Lab Results   Component Value Date/Time     (L) 12/17/2021 02:00 PM    K 5.0 12/17/2021 02:00 PM    K 6.5 (H) 01/12/2021 09:11 PM    CO2 24 12/17/2021 02:00 PM    BUN 27 (H) 12/17/2021 02:00 PM    CREATININE 0.7 12/17/2021 02:00 PM    CALCIUM 9.0 12/17/2021 02:00 PM    LABGLOM 82 12/17/2021 02:00 PM    GFRAA >60 01/18/2021 10:35 AM      Lab Results   Component Value Date/Time    TSH 1.26 09/08/2021 03:19 PM    T4FREE 1.03 09/08/2021 03:19 PM     Lab Results   Component Value Date    LABA1C 4.8 09/08/2021    GLUCOSE 101 12/17/2021    LABMICR < 2.0 09/09/2021     Lab Results   Component Value Date    LABA1C 4.8 09/08/2021    LABA1C 4.9 01/13/2021    LABA1C 6.6 07/30/2020     Lab Results   Component Value Date    TRIG 211 09/08/2021    HDL 34 09/08/2021    LDLCALC 62 01/13/2021    CHOL 157 09/08/2021    CHOL 127 01/13/2021     Lab Results   Component Value Date    VITD25 66.8 09/02/2020     ASSESSMENT & RECOMMENDATIONS   Michaela Douglas, a 77 y.o.-old female seen in for the following issues     Diabetes Mellitus Type 2    · Under good control   · Continue Metformin 500 mg BID   · The patient was advised to check blood sugars  Daily in AM   · Discussed with patient A1c and blood sugar goals   · Patient will need routine diabetes maintenance and prevention    HLD  · Continue Lipitor 10 mg daily     Hypothyroidism   · At goal, continue Levothyroxine 88 mcg daily  · TFt before next visit     I personally reviewed external notes from PCP and other patient's care team providers, and personally interpreted labs associated with the above diagnosis. I also ordered labs to further assess and manage the above addressed medical conditions    Return in about 5 months (around 5/20/2022) for DM type 2, hypothyroidism. The above issues were reviewed with the patient who understood and agreed with the plan. Thank you for allowing us to participate in the care of this patient. Please do not hesitate to contact us with any additional questions. Diagnosis Orders   1. Type 2 diabetes mellitus with other specified complication, without long-term current use of insulin (HCC)  Hemoglobin A1C   2. Vitamin D deficiency  Vitamin D 25 Hydroxy    Basic Metabolic Panel   3. Hyperlipidemia, unspecified hyperlipidemia type     4. Hypothyroidism, unspecified type  TSH without Reflex    T4, Free   5.  Macrocytic anemia  Basic Metabolic Panel    Vitamin B12     Pablo Cortes MD  Endocrinologist, [facility]   [address] [facility] [address]   Phone: [phone]  Fax: [phone]  --------------------------------------------  An electronic signature was used to authenticate this note.  Jaden Roberts MD on 12/20/2021 at 11:25 AM

## 2021-12-23 DIAGNOSIS — G82.50 SPASTIC QUADRIPARESIS (HCC): Chronic | ICD-10-CM

## 2021-12-23 RX ORDER — BACLOFEN 20 MG/1
40 TABLET ORAL 2 TIMES DAILY
Qty: 360 TABLET | Refills: 1 | Status: SHIPPED
Start: 2021-12-23 | End: 2022-06-27 | Stop reason: SDUPTHER

## 2021-12-23 NOTE — TELEPHONE ENCOUNTER
Wolfgang calling to get a new script for Baclofen sent to [facility]. I have this ready to send for another 6 months.

## 2021-12-29 ENCOUNTER — VIRTUAL VISIT (OUTPATIENT)
Dept: FAMILY MEDICINE CLINIC | Age: 76
End: 2021-12-29
Payer: MEDICARE

## 2021-12-29 DIAGNOSIS — N20.0 NEPHROLITHIASIS: ICD-10-CM

## 2021-12-29 DIAGNOSIS — D64.9 ANEMIA, UNSPECIFIED TYPE: ICD-10-CM

## 2021-12-29 DIAGNOSIS — I50.22 CHRONIC SYSTOLIC (CONGESTIVE) HEART FAILURE (HCC): ICD-10-CM

## 2021-12-29 DIAGNOSIS — N39.0 FREQUENT UTI: ICD-10-CM

## 2021-12-29 DIAGNOSIS — I10 ESSENTIAL HYPERTENSION: ICD-10-CM

## 2021-12-29 DIAGNOSIS — Z86.69 STATUS POST SEIZURE: Primary | ICD-10-CM

## 2021-12-29 DIAGNOSIS — E03.8 OTHER SPECIFIED HYPOTHYROIDISM: ICD-10-CM

## 2021-12-29 DIAGNOSIS — F33.0 MILD EPISODE OF RECURRENT MAJOR DEPRESSIVE DISORDER (HCC): ICD-10-CM

## 2021-12-29 DIAGNOSIS — E78.2 MIXED HYPERLIPIDEMIA: ICD-10-CM

## 2021-12-29 DIAGNOSIS — G82.50 SPASTIC QUADRIPARESIS (HCC): ICD-10-CM

## 2021-12-29 DIAGNOSIS — I25.110 CORONARY ARTERY DISEASE INVOLVING NATIVE HEART WITH UNSTABLE ANGINA PECTORIS, UNSPECIFIED VESSEL OR LESION TYPE (HCC): ICD-10-CM

## 2021-12-29 DIAGNOSIS — J96.11 CHRONIC RESPIRATORY FAILURE WITH HYPOXIA (HCC): ICD-10-CM

## 2021-12-29 DIAGNOSIS — E11.65 TYPE 2 DIABETES MELLITUS WITH HYPERGLYCEMIA, WITHOUT LONG-TERM CURRENT USE OF INSULIN (HCC): ICD-10-CM

## 2021-12-29 DIAGNOSIS — G35 MS (MULTIPLE SCLEROSIS) (HCC): ICD-10-CM

## 2021-12-29 PROCEDURE — 3017F COLORECTAL CA SCREEN DOC REV: CPT | Performed by: INTERNAL MEDICINE

## 2021-12-29 PROCEDURE — G8400 PT W/DXA NO RESULTS DOC: HCPCS | Performed by: INTERNAL MEDICINE

## 2021-12-29 PROCEDURE — 2022F DILAT RTA XM EVC RTNOPTHY: CPT | Performed by: INTERNAL MEDICINE

## 2021-12-29 PROCEDURE — 1090F PRES/ABSN URINE INCON ASSESS: CPT | Performed by: INTERNAL MEDICINE

## 2021-12-29 PROCEDURE — 99214 OFFICE O/P EST MOD 30 MIN: CPT | Performed by: INTERNAL MEDICINE

## 2021-12-29 PROCEDURE — G8427 DOCREV CUR MEDS BY ELIG CLIN: HCPCS | Performed by: INTERNAL MEDICINE

## 2021-12-29 PROCEDURE — 4040F PNEUMOC VAC/ADMIN/RCVD: CPT | Performed by: INTERNAL MEDICINE

## 2021-12-29 PROCEDURE — 1123F ACP DISCUSS/DSCN MKR DOCD: CPT | Performed by: INTERNAL MEDICINE

## 2021-12-29 RX ORDER — CLOPIDOGREL BISULFATE 75 MG/1
75 TABLET ORAL DAILY
Qty: 90 TABLET | Refills: 1 | Status: SHIPPED
Start: 2021-12-29 | End: 2022-05-10 | Stop reason: SDUPTHER

## 2021-12-29 RX ORDER — BUMETANIDE 1 MG/1
1 TABLET ORAL 2 TIMES DAILY
Qty: 180 TABLET | Refills: 1 | Status: SHIPPED
Start: 2021-12-29 | End: 2022-09-28 | Stop reason: SDUPTHER

## 2021-12-29 RX ORDER — CIPROFLOXACIN 500 MG/1
TABLET, FILM COATED ORAL PRN
COMMUNITY
Start: 2021-12-20

## 2021-12-29 RX ORDER — LORAZEPAM 2 MG/1
2 TABLET ORAL EVERY 6 HOURS PRN
COMMUNITY
End: 2022-04-19

## 2021-12-29 ASSESSMENT — ENCOUNTER SYMPTOMS
DIARRHEA: 0
CHEST TIGHTNESS: 0
SHORTNESS OF BREATH: 0
RHINORRHEA: 0
SORE THROAT: 0
CONSTIPATION: 0
BLOOD IN STOOL: 0
VOMITING: 0
NAUSEA: 0
ABDOMINAL PAIN: 0
COUGH: 0
EYE PAIN: 0

## 2021-12-29 NOTE — PROGRESS NOTES
TeleMedicine Video Visit    Harshal Gerard, was evaluated through a synchronous (real-time) audio-video encounter. The patient (or guardian if applicable) is aware that this is a billable service. Verbal consent to proceed has been obtained within the past 12 months. The visit was conducted pursuant to the emergency declaration under the [facility], [address] [place] [address] authority and the [email or web address] and [employer] General Act. Patient identification was verified, and a caregiver was present when appropriate. The patient was located in a state where the provider was credentialed to provide care. Patient identification was verified at the start of the visit, including the patient's telephone number and physical location. I discussed with the patient the nature of our telehealth visits, that:     1. Due to the nature of an audio- video modality, the only components of a physical exam that could be done are the elements supported by direct observation. 2. I would evaluate the patient and recommend diagnostics and treatments based on my assessment. 3. If it was felt that the patient should be evaluated in clinic or an emergency room setting, then they would be directed there. 4. Our sessions are not being recorded and that personal health information is protected. 5. Our team would provide follow up care in person if/when the patient needs it. Patient's location: home address in [facility]  Physician  location office  other people involved in call  -  and daughter     This visit was completed virtually using D[employer] me    2021    TELEHEALTH EVALUATION -- Audio/Visual (During [id number] public health emergency)    HPI:    Juliana Douglas (:  1945) has requested an audio/video evaluation for the following concern(s):    - States had seizure. States pt started yelling and was rolled over was not responding. Placed trilogy machine.  States slowly began to recover. Discussed with neurology, felt seizure. Did not think work up necessary at present. Did discuss medication - did not start unless happened again. States had episode 12/25/2021. States lasted 3 minutes per . States daughter is a nurse and evaluated. Was difficult to arouse. States slowly recovered. States was given 2mg ativan post seizure.      -Patient has CAD. Has had ST elevation MI (2020) angio and stent to LAD and NSTEMI for instent stenosis (1/21). found to have severe anterior and septal hypokinesia with a apical dyskinesia and ejection fraction 20 to 25% with a small apical left ventricular thrombus on echocardiogram. Off eliquis. On ranexa 1000mg twice a day. On plavix. On isosorbide. On Coreg. On bumex. On aspirin. Has hold parameters for medications and will hold if BP too low. No further chest pain or SOB. Last visit with cardiology per reviewed note (8/21) - no changes f/u 6mo     - States has low blood count, Anemia. States has seen hem/onc treated with  IV iron  Feraheme 510mg x2, keep Hb greater than 7, last visit with hematology per reviewed note (7/21) - IV iron & prn. Last lab hgb 9.4 (9/2021). Had seen GI (6/21) - do not feel gi bleeding, check hemoccult stool x 6 if neg no further. Last visit with hem/onc per reviewed note (12/21) -iron levels low, no hyponatremia borderline potassium, last hemoglobin 9.8.      - Patient had left obstructing ureteral calculus and had a cystoscopy with ureteral stent and stone manipulation. Has saldana cath. (11/20) - Cystoscopy. Complex flexible ureteroscopy. Laser lithotripsy Stent exchange, removed. No current symptoms. was given bactrim if develops symptoms. Last visit per reviewed note (5/21) - no changes,f/u 1 year      - States had bladder issues and overactive bladder. Stopped oxybutynin. States has chronic saldana catheter. States was following with urology. Was given botox treatment in past. Off myrbetric.   as flushed intermittently. Removed saldana, voiding ok, wears adult pads. last visit per reviewed note  (12/21) - some incontince - trial of myrbetriq, states was given a script for cipro for in case of urine.      - elevated blood sugars. Last lab had showed elevated A1c to 7.7, most recent A1c (9/2021) was 4.8  - not known to be diabetic. Blood sugars were running . On metformin. No reported side effects. Last visit with endo (12/2021) - no changes.      - States has high cholesterol. States Lipitor. No reported side effects. Stopped zetia. Last lab (4/2021) - improved.      -  states constipated. States on senokot-2. States on miralax. Stopped colace. Stopped oral iron therapy. States has used mag citrate from time to time.      - States has high blood pressure. States has been holding some of her heart medications due to the low blood pressure at times. On coreg 3.125mg bid. On bumex. On isosorbide, off lisinopril. Has hold parameters for medications and will hold if BP too low.      - States has hypothyroidism. on levothyroxine. More compliant. Last lab was 9/2021 - stable.      - States has depression. On citalopram. Stable on medications. No reported side effects. No suicidal thoughts.     - States found lung mass and needs pulmonary follow up yearly (10/2020). States PET suggested scar - no further imaging needed at present.      - States has multiple sclerosis. States follows with neurology. States stopped ticferdia. States on baclofen 20mg 4x per day and dantrolene 50mg 4x per day. Spasms stable. States weakness to hands b/l - difficult to feed self at times. Still with right hand weakness. last visit with neuro per reviewed note (10/2021) - continue meds and f/u 4m   **she would benefit form a wheelchair that reclines because of her heart condition and low ejection fraction.  To help get her out of bed to help with physical therapy and strength but also to be able to recline back when she feels dizzy and to assist with transportation. Current char does not meet her changed needs. Has new power chair.     - States had hip fracture s/p left ORIF. States has been to physical therapy. Not walking. States having issues with left knee and leg. Released from ortho    Review of Systems   Constitutional: Positive for fatigue. Negative for appetite change, chills, fever and unexpected weight change. HENT: Negative for congestion, rhinorrhea and sore throat. Eyes: Negative for pain and visual disturbance. Respiratory: Negative for cough, chest tightness and shortness of breath. Cardiovascular: Negative for chest pain and palpitations. Gastrointestinal: Negative for abdominal pain, blood in stool, constipation, diarrhea, nausea and vomiting. Genitourinary: Negative for difficulty urinating, dysuria, frequency, pelvic pain, urgency and vaginal bleeding. Musculoskeletal: Negative for arthralgias and myalgias. Skin: Negative for rash. Neurological: Negative for dizziness, syncope, weakness, light-headedness, numbness and headaches. Hematological: Negative for adenopathy. Psychiatric/Behavioral: Negative for suicidal ideas. The patient is not nervous/anxious. Prior to Visit Medications    Medication Sig Taking? Authorizing Provider   ciprofloxacin (CIPRO) 500 MG tablet take 1 tablet by mouth twice a day Yes Historical Provider, MD   LORazepam (ATIVAN) 2 MG tablet Take 2 mg by mouth every 6 hours as needed for Anxiety (seizure).  Yes Historical Provider, MD   metFORMIN (GLUCOPHAGE) 500 MG tablet Take 1 tablet by mouth 2 times daily (with meals) Yes Bria Hollis MD   clopidogrel (PLAVIX) 75 MG tablet Take 1 tablet by mouth daily Yes Bria Hollis MD   bumetanide (BUMEX) 1 MG tablet Take 1 tablet by mouth 2 times daily BP must be greater than 95/50 Yes Bria Hollis MD   baclofen (LIORESAL) 20 MG tablet Take 2 tablets by mouth 2 times daily Yes Bria Hollis MD   nitroGLYCERIN (NITROSTAT) 0.4 MG SL tablet place 1 tablet under the tongue if needed every 5 minutes for ingrid...  (REFER TO PRESCRIPTION NOTES). Yes Historical Provider, MD   carvedilol (COREG) 3.125 MG tablet Take 1 tablet by mouth 2 times daily If BP is 80/55 or higher and pulse should be 60 or higher Yes Aaron Jiménez MD   isosorbide mononitrate (IMDUR) 30 MG extended release tablet Take 1 tablet by mouth daily If BP is 80/55 or higher Yes Aaron Jiménez MD   dantrolene (DANTRIUM) 100 MG capsule Take 1 capsule by mouth 2 times daily Yes Aaron Jiménez MD   levothyroxine (SYNTHROID) 88 MCG tablet Take 1 tablet by mouth daily Yes Aaron Jiménez MD   atorvastatin (LIPITOR) 10 MG tablet 1 po q hs Yes Aaron Jiménez MD   citalopram (CELEXA) 20 MG tablet Take 1 tablet by mouth every morning Yes Aaron Jiménez MD   ipratropium-albuterol (DUONEB) 0.5-2.5 (3) MG/3ML SOLN nebulizer solution Inhale 3 mLs into the lungs every 6 hours as needed for Shortness of Breath  Patient taking differently: Inhale 1 vial into the lungs every 6 hours  Yes Aaron Jiménez MD   Lancets MISC 1 each by Does not apply route daily Yes Aaron Jiménez MD   blood glucose test strips (ASCENSIA AUTODISC VI;ONE TOUCH ULTRA TEST VI) strip 1 each by In Vitro route 2 times daily As needed.  Yes Aaron Jiménez MD   Acetaminophen (TYLENOL PO) Take by mouth as needed Yes Historical Provider, MD   ranolazine (RANEXA) 1000 MG extended release tablet Take 1,000 mg by mouth 2 times daily  Yes Historical Provider, MD   fluticasone (FLONASE) 50 MCG/ACT nasal spray 2 sprays by Each Nostril route daily as needed for Rhinitis Yes Aaron Jiménez MD   ondansetron (ZOFRAN ODT) 4 MG disintegrating tablet Take 1 tablet by mouth every 8 hours as needed for Nausea or Vomiting Yes Jarvis Peters MD   OXYGEN Inhale 2 L into the lungs continuous  Yes Historical Provider, MD   Potassium 99 MG TABS Take 1 tablet by mouth every other day  Yes Historical Provider, MD   aspirin 81 MG EC tablet Take 81 mg by mouth daily Yes Historical Provider, MD   blood glucose monitor kit and supplies Dispense sufficient amount for indicated testing frequency plus additional to accommodate PRN testing needs. Dispense all needed supplies to include: monitor, strips, lancing device, lancets, control solutions, alcohol swabs. Yes Anurag La MD   blood glucose monitor strips Test qd  & as needed for symptoms of irregular blood glucose. Dispense sufficient amount for indicated testing frequency plus additional to accommodate PRN testing needs.  Yes Anurag La MD   Catheters (URETHRAL CATHETER) MISC by Does not apply route Yes Historical Provider, MD   Cyanocobalamin (VITAMIN B 12 PO) Take 500 mg by mouth daily  Yes Historical Provider, MD   Calcium Carbonate-Vit D-Min (CALCIUM 600+D PLUS MINERALS) 600-400 MG-UNIT TABS Take 1 tablet by mouth nightly  Yes Historical Provider, MD   polyethylene glycol (MIRALAX) PACK packet Take 7.3 g by mouth daily  Yes Historical Provider, MD   vitamin D3 (CHOLECALCIFEROL) 25 MCG (1000 UT) TABS tablet Take 1 tablet by mouth every morning  Yes Historical Provider, MD   magnesium (MAGNESIUM-OXIDE) 250 MG TABS tablet Take 250 mg by mouth every morning  Yes Historical Provider, MD       Social History     Tobacco Use    Smoking status: Never Smoker    Smokeless tobacco: Never Used   Substance Use Topics    Alcohol use: No     Alcohol/week: 0.0 standard drinks     Comment: Ocassionally    Drug use: No        Allergies   Allergen Reactions    Augmentin [Amoxicillin-Pot Clavulanate] Shortness Of Breath    Bactrim [Sulfamethoxazole-Trimethoprim] Anaphylaxis and Other (See Comments)     Seizures,     Macrobid [Nitrofurantoin] Anaphylaxis     Mental Changes, shaking, stopped breathing, collapsed (happened again Feb 2021)    ,   Past Medical History:   Diagnosis Date    CAD (coronary artery disease) 4/21/2020    High cholesterol     Hypertension     Hypothyroidism     [facility]    MS (multiple sclerosis) ([facility]     Osteoporosis     Sarcoidosis     Seizure ([facility] 02/05/2021    Type 2 diabetes mellitus with hyperglycemia, without long-term current use of insulin ([facility] 10/30/2019   ,   Past Surgical History:   Procedure Laterality Date    CHOLECYSTECTOMY  1990s    CORONARY ANGIOPLASTY WITH STENT PLACEMENT  04/21/2020    Dr. Thiago Montes   3.0 x 22mm Resolute MAAME to Prox LAD.   No LV    CORONARY ANGIOPLASTY WITH STENT PLACEMENT  01/14/2021    3.0 x 30 Maame to the prox LAD and a 2.5 x 20 Maame to the Obtuse marginal by Dr. Tanner Townsend / Chapin Barron / Melba Brandt Left 4/23/2020    CYSTOSCOPY LEFT RETROGRADE PYELOGRAM STENT INSERTION, [facility] [address] performed by Grace Avila DO at [facility] Left 3/21/2019    LEFT FEMUR CEPHALLOMEDULLARY NAIL performed by Terri Gonzalez MD at [facility] Bilateral     HYSTERECTOMY      KNEE SURGERY      plate in lt knee    LITHOTRIPSY Left 11/23/2020    CYSTOSCOPY LEFT URETEROSCOPY,  LASER LITHOTRIPSY  BASKET EXTRACTION LEFT STONE, STENT EXCHANGE performed by Grace Avila DO at [address] LITHOTRIPSY Left 12/30/2020    CYSTOSCOPY RETROGRADE URETEROSCOPY PYELOGRAM LASER LITHOTRIPSY LEFT J-STENT performed by Grace Avila DO at [address]   ,   Social History     Tobacco Use    Smoking status: Never Smoker    Smokeless tobacco: Never Used   Substance Use Topics    Alcohol use: No     Alcohol/week: 0.0 standard drinks     Comment: Ocassionally    Drug use: No   ,   Family History   Problem Relation Age of Onset    Stroke Mother     Heart Disease Mother     Cancer Father         lung    Mult Sclerosis Sister     No Known Problems Brother     No Known Problems Sister     Arthritis Sister     Cervical Cancer Sister        PHYSICAL EXAMINATION:  [ INSTRUCTIONS:  \"[x]\" Indicates a positive item  \"[]\" Indicates a negative item  -- DELETE ALL ITEMS NOT EXAMINED]  Vital Signs: (As obtained by patient/caregiver or practitioner observation)    Blood pressure-  Heart rate-    Respiratory rate-    Temperature-  Pulse oximetry-     Constitutional: [] Appears well-developed and well-nourished [x] No apparent distress      [] Abnormal-   Mental status  [x] Alert and awake  [x] Oriented to person/place/time [x]Able to follow commands      Eyes:  EOM    [x]  Normal  [] Abnormal-  Sclera  []  Normal  [] Abnormal -         Discharge []  None visible  [] Abnormal -    HENT:   [x] Normocephalic, atraumatic.   [] Abnormal   [] Mouth/Throat: Mucous membranes are moist.     External Ears [x] Normal  [] Abnormal-     Neck: [x] No visualized mass     Pulmonary/Chest: [x] Respiratory effort normal.  [x] No visualized signs of difficulty breathing or respiratory distress        [] Abnormal-      Musculoskeletal:   [] Normal gait with no signs of ataxia         [] Normal range of motion of neck        [x] Abnormal- in wheelchair disabled       Neurological:        [] No Facial Asymmetry (Cranial nerve 7 motor function) (limited exam to video visit)          [] No gaze palsy        [] Abnormal-         Skin:        [x] No significant exanthematous lesions or discoloration noted on facial skin         [] Abnormal-            Psychiatric:       [x] Normal Affect [] No Hallucinations        [] Abnormal-     Other pertinent observable physical exam findings-     ASSESSMENT/PLAN:    Status post seizure  - uncertain etiology   - has discussed with neuro - poss related to MS   - has not started medication - poss keppra if recurs   - no current work up   - states new episodes  - discussed adding keppra   - to d/w neuro      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, unspecified vessel or lesion type ([address]  - s/p cath and stent to LAD (2020), then instent stenosis s/p restent (1/21)   - following with cardio   - has had 2nd opinion With [facility]   - off lisinopril - off eliquis  - on coreg   - on imdur   - on ranexa   - on bumex   - on plavix  - on aspirin  - will intermittently hold at night if BP is too low based on hold parameters   - BP has been lower lately - change hold parameters to 95/50     Chronic systolic congestive heart failure (HCC)  - EF 20% per echo (4/2020)   - on bumex   - on coreg   - off lisinopril      Frequent UTI   - at risk for further infection   - no current symptoms  - has bactrim to use if symptoms   - following with urology   - last urine culture showed bacteria - uncertain if colonization - did not treat as not overt symptoms (9/2021)   - given cipro to have on hand by urology      Nephrolithiasis  - s/p cystoscopy and lithotripsy (11/2020)   - now chronic saldana      Anemia, unspecified type  - following with hematology   - to stop oral iron   - s/p  IV iron   - referral to GI - did not think GI bleeding (6/2021)      Other Constipation  - on senna   - on miralax  - add mag citrate . 25 as needed      Essential hypertension  - watch diet   - off lisinopril   - on bumex   - on coreg twice a day   - on imdur   - on ranex   - stable per   - improved per patient and       Type 2 diabetes mellitus with hyperglycemia, without long-term current use of insulin ([facility]  - watch diet   - last A1c was 4.8 (9/2021)   - on metformin   - following with endocrinology      Hypothyroidism, unspecified type  - on levothyroxine  - labs were normal (9/2021)     Chronic respiratory failure with hypoxia (HCC)  - on oxygen - 3 liters   - has trilogy at night   - has seen pulmonary - recommending to cont trilogy   - stable      Hyponatremia  - follow labs   - last lab (9/2021) - borderline low      MS (multiple sclerosis) (HCC)  - ticierda on hold  - stopped nortriptyline   - on baclofen and dantrolene  - non ambulatory  - stable  **she would benefit form a wheelchair that reclines because of her heart condition and low ejection fraction.  To help get her out of bed to help with physical therapy and strength but also to be able to recline back when she feels dizzy and to assist with transportation. Current chiar does not meet her changed needs.      Spastic quadriparesis ([address]  - following with neurology   - on baclofen and dantrolene     Mixed hyperlipidemia  - watch diet  - on atrovastatin  - follow up labs     Neurogenic bladder  - off oxybutynin  - off myrbetric - has not started yet   - currently with saldana      Mild episode of recurrent major depressive disorder (HCC)  - on citalopram  - no suicidal thoughts  - stable     HUDSON (nonalcoholic steatohepatitis)  - following with GI     Osteopenia of multiple sites  - declines bone density  - follows labs     Lung mass  - possible scar (2019)   - has seen pulmonary - yearly (10/2020)      H/O sarcoidosis    Return in about 3 months (around 3/29/2022) for check up and review. No orders of the defined types were placed in this encounter. Requested Prescriptions     Signed Prescriptions Disp Refills    metFORMIN (GLUCOPHAGE) 500 MG tablet 180 tablet 1     Sig: Take 1 tablet by mouth 2 times daily (with meals)    clopidogrel (PLAVIX) 75 MG tablet 90 tablet 1     Sig: Take 1 tablet by mouth daily    bumetanide (BUMEX) 1 MG tablet 180 tablet 1     Sig: Take 1 tablet by mouth 2 times daily BP must be greater than 95/50       Michaela Douglas, was evaluated through a synchronous (real-time) audio-video encounter. The patient (or guardian if applicable) is aware that this is a billable service. Verbal consent to proceed has been obtained within the past 12 months. The visit was conducted pursuant to the emergency declaration under the [facility], [address] [place] [address] authority and the [employer] and [employer] General Act. Patient identification was verified, and a caregiver was present when appropriate.  The patient was located in a state where the provider was credentialed to provide care. Total time spent on this encounter: Not billed by time    --Juan Carlos Romo MD on 12/29/2021 at 1:43 PM    An electronic signature was used to authenticate this note.

## 2022-01-04 ENCOUNTER — VIRTUAL VISIT (OUTPATIENT)
Dept: NEUROLOGY | Age: 77
End: 2022-01-04
Payer: MEDICARE

## 2022-01-04 DIAGNOSIS — G35 MS (MULTIPLE SCLEROSIS) (HCC): Primary | ICD-10-CM

## 2022-01-04 DIAGNOSIS — G82.50 SPASTIC QUADRIPARESIS (HCC): ICD-10-CM

## 2022-01-04 PROCEDURE — G8400 PT W/DXA NO RESULTS DOC: HCPCS | Performed by: PSYCHIATRY & NEUROLOGY

## 2022-01-04 PROCEDURE — 1036F TOBACCO NON-USER: CPT | Performed by: PSYCHIATRY & NEUROLOGY

## 2022-01-04 PROCEDURE — G8428 CUR MEDS NOT DOCUMENT: HCPCS | Performed by: PSYCHIATRY & NEUROLOGY

## 2022-01-04 PROCEDURE — G8484 FLU IMMUNIZE NO ADMIN: HCPCS | Performed by: PSYCHIATRY & NEUROLOGY

## 2022-01-04 PROCEDURE — 1090F PRES/ABSN URINE INCON ASSESS: CPT | Performed by: PSYCHIATRY & NEUROLOGY

## 2022-01-04 PROCEDURE — 1123F ACP DISCUSS/DSCN MKR DOCD: CPT | Performed by: PSYCHIATRY & NEUROLOGY

## 2022-01-04 PROCEDURE — 3017F COLORECTAL CA SCREEN DOC REV: CPT | Performed by: PSYCHIATRY & NEUROLOGY

## 2022-01-04 PROCEDURE — 99214 OFFICE O/P EST MOD 30 MIN: CPT | Performed by: PSYCHIATRY & NEUROLOGY

## 2022-01-04 PROCEDURE — 4040F PNEUMOC VAC/ADMIN/RCVD: CPT | Performed by: PSYCHIATRY & NEUROLOGY

## 2022-01-04 PROCEDURE — G8417 CALC BMI ABV UP PARAM F/U: HCPCS | Performed by: PSYCHIATRY & NEUROLOGY

## 2022-01-04 NOTE — PROGRESS NOTES
Marcelle Mendez was read the following message We want to confirm that, for purposes of billing, this is a virtual visit with your provider for which we will submit a claim for reimbursement with your insurance company. You will be responsible for any copays, coinsurance amounts or other amounts not covered by your insurance company. If you do not accept this, unfortunately we will not be able to schedule or proceed with a virtual visit with the provider. Do you accept?  Ravin Olsen responded Yes

## 2022-01-04 NOTE — PROGRESS NOTES
l,Subjective:     Marcelle Mendez was a 78-year-old right handed woman who suffered from long-standing multiple sclerosis. Her EDSS was 9.0. The patient and her  remained excellent historians. This visit was by telemedicine. Her medications continued as baclofen, atorvastatin, clopidogrel, aspirin, isosorbide, citalopram, levothyroxine, carvedilol, dantrolene, inhalers, metformin, banality, bumetanide, multivitamins, nebulizers and omega-3 fatty acids. Lorazepam as needed was recently added. She continued with dantrolene and baclofen for spasticity, continuing moderately controlled. She was only transferring with a sliding board by her . Her  rarely lifted her to the wheelchair. He did not use a Eduar's lift, despite having that device. She remained mostly in the hospital bed. She now read sparingly but spoke without issues. There were no memory problems. Following her heart attack, there continued marked weakness of the right arm and hand. She used her left hand for all eating and dressing purposes. However, her left fourth and fifth digits were unable to flex completely, with more difficulties holding utensils to eat. There was no improvement or worsening of the right hand weakness. She again reported no neck pains or radiating discomforts. Her  now reported to seizures since her last visit. These were generalized tonic-clonic events. The more recent episode was Mckenna day when she saw many family members and was off her oxygen for over 5 hours. A generalized tonic-clonic seizure was noted, lasting minutes, but with confusion for hours. She was prescribed lorazepam as needed. Her  questioned long-term seizure prophylaxis. She suffered a heart attack over 2 years ago. She continued on 3 L nasal oxygen most of the day. She fatigued easily, with minimal exertion. Medically, there were no additional urinary tract infections.     Sarcoidosis was questioned in the past, without recurrent lung disease. She received another iron loading dose. She still slept erratically, despite taking melatonin. She remained well at home with her 's assistance. They both received her COVID-19 vaccinations and boosters. Her  occasionally took her outside the house. She continued eating well. Review of systems was otherwise negative. Objective:     She was elderly woman in no acute distress, who was alert, cooperative and oriented. She was breathing comfortably, without chest pain or shortness of breath. She was on nasal cannula oxygen today. She was lying comfortably in bed, appearing thinner. She was always pleasant. Her skin was unremarkable. There was no abdominal tenderness. Her limbs displayed easily reducible flexion contractures of both hands, with a firm left . Neurological examination produced an intact mental status. I found no cognitive issues, dysarthria or aphasia. Cranial nerve testing was unchanged. She did not move both legs but raised her left arm more than the right. There were difficulties flexing the fourth and fifth digits of the left hand. There were decreased fine finger movements and rapid alternating movements in both hands, more so the right. She appreciated light touch in all limbs there was no clumsiness. Her neurological examination was unchanged. Recent CMP was unremarkable, except for a sodium level of 132; CBC with differential again revealed low hemoglobin levels with an MCV of 100. Previous MRIs revealed extensive lesion load in her cervical cord and brain, without acute changes. 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 Her examination again displays marked weakness of her right arm and hand. I first suspect cervical radicular disease and not recurrent multiple sclerosis.   I now also suspect left ulnar neuropathy about the elbow segments. Her  did not wish to pursue other diagnostic measures. She did not require disease modifying therapies. She will continue o with dantrolene and baclofen. I also recommended using a pillow underneath her left elbow. Hopefully, I will see her in the office the next visit. There remained no cognitive involvement. She previously endured several bladder infections and confusion. Her heart failure continues. Her anemia continues as well. Her long-term medical prognosis remains poor. Plan:     Baclofen was maintained at 20 mg 4 times daily; dantrolene was continued at 100 mg twice daily. She will return in 4 months, hopefully, in the office. Her  will call if any problems arise in the interim. I spent 30 minutes with the patient with over 50 % spent in counseling and disease management. All patient issues were addressed and all questions were answered. TeleMedicine Patient Consent    This visit was performed as a virtual video visit using a synchronous, two-way, audio-video telehealth technology platform. Patient identification was verified at the start of the visit, including the patient's telephone number and physical location. I discussed with the patient the nature of our telehealth visits, that:     1. Due to the nature of an audio- video modality, the only components of a physical exam that could be done are the elements supported by direct observation. 2. I would evaluate the patient and recommend diagnostics and treatments based on my assessment. 3. If it was felt that the patient should be evaluated in clinic or an emergency room setting, then they would be directed there. 4. Our sessions are not being recorded and that personal health information is protected. 5. Our team would provide follow up care in person if/when the patient needs it. The patient did agree to proceed with telemedicine consultation.     This video was conducted at the patient's home. Again I spent 40 minutes with the patient. This visit was completed virtually using [email or web address].                  l,Subjective:     Tyrone Fletcher was a 77 y.o. right handed woman who suffered from long-standing multiple sclerosis. Her EDSS was 9.0. The patient and her  remained excellent historians. This visit was by telemedicine. Her medications continued as baclofen, atorvastatin, clopidogrel, aspirin, isosorbide, citalopram, levothyroxine, carvedilol, dantrolene, inhalers, metformin, banality, bumetanide, multivitamins, nebulizers and omega-3 fatty acids. She was stable neurologically. She continued with dantrolene and baclofen for spasticity, remaining moderately controlled. She was only transferring with a sliding board with her . Her  now rarely lifted her to the wheelchair. Inda Brittle He did not use a Eduar's lift, despite having that device. She was mostly bedbound, in the hospital bed. She still read and spoke without issues. There were no memory issues. Following her heart attack, there continued marked weakness of the right arm and hand. She used her left hand for all eating and dressing purposes. There was no improvement or worsening of the weakness in the right hand. Occupational therapy evaluated her left hand, recommending home exercises and enlarged utensils. She is able to feed herself with that hand. She reported no neck pains or radiating discomforts. She suffered a heart attack 2 years ago. She was no longer on 24-hour oxygen but continued Ranexa and diuretics. She fatigued easily, with minimal exertion. Medically, she reported recurrent urinary tract infections, producing confusion. There were no recent infections. Sarcoidosis was questioned in the past, without recurrent lung disease. She received another iron loading dose. She still slept erratically, despite taking melatonin.     She remained well at home with her 's assistance. They both received her COVID-19 vaccinations. Her  occasionally took her outside the house. She was eating well. Review of systems was otherwise negative. Objective:     She was elderly woman in no acute distress, who was alert, cooperative and oriented. She was breathing comfortably, without chest pain or shortness of breath. She was not on nasal cannula oxygen today. She was lying comfortably in bed, appearing no thinner. She was always pleasant. Her skin was unremarkable. There was no abdominal tenderness. Her limbs displayed easily reducible flexion contractures of both hands, with a firm left . Neurological examination produced an intact mental status. I found no cognitive issues, dysarthria or aphasia. Cranial nerve testing was unchanged. She did not move both legs but raised her left arm more than the right. There were decreased fine finger movements and rapid alternating movements in both hands, more so the right. She appreciated light touch throughout. There was no clumsiness. Her neurological examination was unchanged. Recent CMP was unremarkable, except for a sodium level of 133; CBC with differential again revealed low hemoglobin levels with an MCV of 100. Previous MRIs revealed extensive lesion load in her cervical cord and brain, without acute changes. 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 Her examination still displays marked weakness of her right arm and hand. I first suspect cervical radicular disease and not recurrent multiple sclerosis. Her  did not wish to pursue other diagnostic measures. She did not require disease modifying therapies. She will continue on her current dose of dantrolene and baclofen. Hopefully, I will see her in the office next visit. Fortunately, there remained no cognitive involvement.     Unfortunately, she endured several bladder infections and confusion. Her heart failure continues. Her anemia continues as well with pending work-up. Her long-term medical prognosis is poor. Plan:     Baclofen was maintained at 20 mg 4 times daily; dantrolene was continued at 100 mg twice daily. She will return in 4 months in the office. He will call if any problems arise in the interim. I spent 40 minutes with the patient with over 50 % spent in counseling and disease management. All patient issues were addressed and all questions were answered. TeleMedicine Patient Consent    This visit was performed as a virtual video visit using a synchronous, two-way, audio-video telehealth technology platform. Patient identification was verified at the start of the visit, including the patient's telephone number and physical location. I discussed with the patient the nature of our telehealth visits, that:     6. Due to the nature of an audio- video modality, the only components of a physical exam that could be done are the elements supported by direct observation. 7. I would evaluate the patient and recommend diagnostics and treatments based on my assessment. 8. If it was felt that the patient should be evaluated in clinic or an emergency room setting, then they would be directed there. 9. Our sessions are not being recorded and that personal health information is protected. 10. Our team would provide follow up care in person if/when the patient needs it. The patient did agree to proceed with telemedicine consultation. This video was conducted at the patient's home. Again I spent 40 minutes with the patient. This visit was completed virtually using [email or web address].

## 2022-01-11 ENCOUNTER — TELEPHONE (OUTPATIENT)
Dept: FAMILY MEDICINE CLINIC | Age: 77
End: 2022-01-11

## 2022-01-11 NOTE — TELEPHONE ENCOUNTER
[facility], they are faxing a form for Dr Hanley Ready to sign.

## 2022-01-12 ENCOUNTER — TELEPHONE (OUTPATIENT)
Dept: FAMILY MEDICINE CLINIC | Age: 77
End: 2022-01-12

## 2022-01-12 NOTE — TELEPHONE ENCOUNTER
called in wanting to update you. They met with Dr. Princess Thompson on 1/4/22.  called and wanted to let you what the doctor said. The patient was given Lorazepam after her seizure. The doctor said that it was the result from lack of oxygen and fatigue. At this time he did not recommend any maintenance medication for her seizures. Right now she is on 2 liters of oxygen and seems to be okay. Thy were trying Faviola Francisco Javier her off of it but it was not working in her favor. Vitals this morning were: 113/61 BP, pulse 79, oxygen 98, temp. 80

## 2022-01-21 ENCOUNTER — TELEPHONE (OUTPATIENT)
Dept: FAMILY MEDICINE CLINIC | Age: 77
End: 2022-01-21

## 2022-01-21 DIAGNOSIS — E11.65 TYPE 2 DIABETES MELLITUS WITH HYPERGLYCEMIA, WITHOUT LONG-TERM CURRENT USE OF INSULIN (HCC): ICD-10-CM

## 2022-01-21 DIAGNOSIS — E87.1 LOW SODIUM LEVELS: Primary | ICD-10-CM

## 2022-01-21 DIAGNOSIS — I10 ESSENTIAL HYPERTENSION: ICD-10-CM

## 2022-01-22 NOTE — TELEPHONE ENCOUNTER
Received blood work from hematology oncology    Sodium level was low and slightly more lower when compared to previous.  Would watch this closely and consider repeat labs in the next 4 to 6 weeks    Chloride level was also slightly low    Sugar level was slightly elevated    Albumin which is a protein level was slightly low    Iron levels were also borderline    Hemoglobin level for red blood cell count was also slightly more decreased when compared to previous    Thanks

## 2022-01-25 ENCOUNTER — CLINICAL DOCUMENTATION (OUTPATIENT)
Dept: ENDOCRINOLOGY | Age: 77
End: 2022-01-25

## 2022-01-25 NOTE — TELEPHONE ENCOUNTER
Reviewed results w/ Asuncion Chiu, he will take Michaela to the hospital in 4-6 weeks to repeat CMP.

## 2022-02-04 DIAGNOSIS — E03.8 OTHER SPECIFIED HYPOTHYROIDISM: ICD-10-CM

## 2022-02-04 DIAGNOSIS — E78.2 MIXED HYPERLIPIDEMIA: ICD-10-CM

## 2022-02-04 RX ORDER — LEVOTHYROXINE SODIUM 88 UG/1
88 TABLET ORAL DAILY
Qty: 90 TABLET | Refills: 1 | Status: SHIPPED
Start: 2022-02-04 | End: 2022-07-11 | Stop reason: SDUPTHER

## 2022-02-04 RX ORDER — ATORVASTATIN CALCIUM 10 MG/1
TABLET, FILM COATED ORAL
Qty: 90 TABLET | Refills: 1 | Status: SHIPPED
Start: 2022-02-04 | End: 2022-07-11 | Stop reason: SDUPTHER

## 2022-02-24 ENCOUNTER — TELEPHONE (OUTPATIENT)
Dept: FAMILY MEDICINE CLINIC | Age: 77
End: 2022-02-24

## 2022-02-24 NOTE — TELEPHONE ENCOUNTER
I would not recommend stopping both if that concerned I would have them at least hold the aspirin only

## 2022-02-24 NOTE — TELEPHONE ENCOUNTER
Arielle Bailey   -  [phone]          Carolann Szymanski will be going to the [facility] for some lab work. Neal Lester is asking if she can stop the baby aspirin and Plavix for a day or two before to ensure that she will clot effectively. She got a small cut on her finger last night and it took awhile for the bleeding to stop. This has him concerned about possible bleeding after the lab draw.

## 2022-03-07 ENCOUNTER — OFFICE VISIT (OUTPATIENT)
Dept: PULMONOLOGY | Age: 77
End: 2022-03-07
Payer: MEDICARE

## 2022-03-07 VITALS
SYSTOLIC BLOOD PRESSURE: 108 MMHG | BODY MASS INDEX: 24.54 KG/M2 | TEMPERATURE: 98 F | OXYGEN SATURATION: 98 % | HEART RATE: 74 BPM | RESPIRATION RATE: 18 BRPM | DIASTOLIC BLOOD PRESSURE: 55 MMHG | WEIGHT: 125 LBS | HEIGHT: 60 IN

## 2022-03-07 DIAGNOSIS — Z86.2 H/O SARCOIDOSIS: Chronic | ICD-10-CM

## 2022-03-07 DIAGNOSIS — R91.8 LUNG MASS: ICD-10-CM

## 2022-03-07 PROCEDURE — G8420 CALC BMI NORM PARAMETERS: HCPCS | Performed by: INTERNAL MEDICINE

## 2022-03-07 PROCEDURE — G8484 FLU IMMUNIZE NO ADMIN: HCPCS | Performed by: INTERNAL MEDICINE

## 2022-03-07 PROCEDURE — 99213 OFFICE O/P EST LOW 20 MIN: CPT | Performed by: INTERNAL MEDICINE

## 2022-03-07 PROCEDURE — 1036F TOBACCO NON-USER: CPT | Performed by: INTERNAL MEDICINE

## 2022-03-07 PROCEDURE — G8427 DOCREV CUR MEDS BY ELIG CLIN: HCPCS | Performed by: INTERNAL MEDICINE

## 2022-03-07 PROCEDURE — 1090F PRES/ABSN URINE INCON ASSESS: CPT | Performed by: INTERNAL MEDICINE

## 2022-03-07 PROCEDURE — G8400 PT W/DXA NO RESULTS DOC: HCPCS | Performed by: INTERNAL MEDICINE

## 2022-03-07 PROCEDURE — 1123F ACP DISCUSS/DSCN MKR DOCD: CPT | Performed by: INTERNAL MEDICINE

## 2022-03-07 PROCEDURE — 4040F PNEUMOC VAC/ADMIN/RCVD: CPT | Performed by: INTERNAL MEDICINE

## 2022-03-07 NOTE — PROGRESS NOTES
[facility]                                                                 Division of Pulmonary, [facility]                                                                       Pulmonary &health 61 [facility]                                                                   Pulmonary Consult Note            Patient: Jazmin Chambers  MRN: [id number]  : 1945      Date of Admission: . No admission date for patient encounter.         Reason for Consultation:Lung nodule   CC : SOB HPI:   Jazmin Chambers is a 69 y.o. y/o female with past medical history significant for MS and sarcoid and she has left hip pain after fall that happen around 2019 and she went to rehab and had surgery before that mat rehab started cough and SOB and not feeling well and she was brought to [facility] and she ahs been doing well ,no cough and SOB and Duo neb helps a lot     She was diagnosed with sarcoid  and she is not on any bronchoscopy and biopsy ,iN [facility] but it has been stable as Per CT/PET     She did not smoke but had second smoking more than 10 years from parents     She had recent PET scan  shows  Just scar but no active disease and small Right hilar lymph nodes that is calcified      She is going for Trip across the country       Today visit:  She has been doing well and she denies any SOB ,No cough ,  She use Trilogy 4 h at least  No fever  She is on 2 L O2   Does not need inhalers  Never smoke  On no inhalers         PAST MEDICAL HISTORY:     Past Medical History:   Diagnosis Date    CAD (coronary artery disease) 2020    High cholesterol     Hypertension     Hypothyroidism     MI [facility]myocardial infarction) ([facility] 2020    MS (multiple sclerosis) ([facility]     Osteoporosis     Sarcoidosis     Seizure ([facility] 2021    Type 2 diabetes mellitus with hyperglycemia, without long-term current use of insulin ([facility] 10/30/2019       PAST SURGICAL HISTORY:   Past Surgical History:   Procedure Laterality Date    CHOLECYSTECTOMY  1990s    CORONARY ANGIOPLASTY WITH STENT PLACEMENT  04/21/2020    Dr. Pola Ngo   3.0 x 22mm Resolute MAAME to Prox LAD.   No LV    CORONARY ANGIOPLASTY WITH STENT PLACEMENT  01/14/2021    3.0 x 30 [place] to the prox LAD and a 2.5 x 20 [place] to the Obtuse marginal by Dr. Kya Aguayo / [address] / Arabella Christal Left 4/23/2020    CYSTOSCOPY LEFT RETROGRADE PYELOGRAM STENT INSERTION, STRATTON CATHETER performed by Christiano Avila DO at [address] [facility] Left 3/21/2019    LEFT FEMUR CEPHALLOMEDULLARY NAIL performed by Siddharth Norton MD at [facility] Bilateral     HYSTERECTOMY      KNEE SURGERY      plate in lt knee    LITHOTRIPSY Left 11/23/2020    CYSTOSCOPY LEFT URETEROSCOPY,  LASER LITHOTRIPSY  BASKET EXTRACTION LEFT STONE, STENT EXCHANGE performed by Christiano Avila DO at [facility] LITHOTRIPSY Left 12/30/2020    CYSTOSCOPY RETROGRADE URETEROSCOPY PYELOGRAM LASER LITHOTRIPSY LEFT J-STENT performed by [facility]o [facility]ila DO at [facility] HISTORY:   Family History   Problem Relation Age of Onset    Stroke Mother     Heart Disease Mother     Cancer Father         lung    Mult Sclerosis Sister     No Known Problems Brother     No Known Problems Sister     Arthritis Sister     Cervical Cancer Sister        SOCIAL HISTORY:   Social History     Socioeconomic History    Marital status:      Spouse name: Not on file    Number of children: Not on file    Years of education: Not on file    Highest education level: Not on file   Occupational History    Not on file   Tobacco Use    Smoking status: Never Smoker    Smokeless tobacco: Never Used   Substance and Sexual Activity    Alcohol use: No     Alcohol/week: 0.0 standard drinks     Comment: Ocassionally    Drug use: No    Sexual activity: Yes     Partners: Male   Other Topics Concern    Not on file   Social History Narrative    Not on file     Social Determinants of Health     Financial Resource Strain: Unknown    Difficulty of Paying Living Expenses: Patient refused   Food Insecurity: Unknown    Worried About Running Out of Food in the Last Year: Patient refused    [address] in the Last Year: Patient refused   Transportation Needs:     Lack of Transportation (Medical): Not on file    Lack of Transportation (Non-Medical): Not on file   Physical Activity:     Days of Exercise per Week: Not on file    Minutes of Exercise per Session: Not on file   Stress:     Feeling of Stress : Not on file   Social Connections:     Frequency of Communication with Friends and Family: Not on file    Frequency of Social Gatherings with Friends and Family: Not on file    Attends Orthodox Services: Not on file    Active Member of [address] [place] [address] [employer] or Organizations: Not on file    Attends Club or Organization Meetings: Not on file    Marital Status: Not on file   Intimate Partner Violence:     Fear of Current or Ex-Partner: Not on file    Emotionally Abused: Not on file    Physically Abused: Not on file    Sexually Abused: Not on file   Housing Stability:     Unable to Pay for Housing in the Last Year: Not on file    Number of [place] in the Last Year: Not on file    Unstable Housing in the Last Year: Not on file     Social History     Tobacco Use   Smoking Status Never Smoker   Smokeless Tobacco Never Used     Social History     Substance and Sexual Activity   Alcohol Use No    Alcohol/week: 0.0 standard drinks    Comment: Ocassionally     Social History     Substance and Sexual Activity   Drug Use No        HISTORY:    HOME MEDICATIONS:  Prior to Admission medications    Medication Sig Start Date End Date Taking?  Authorizing Provider   levothyroxine (SYNTHROID) 88 MCG tablet Take 1 tablet by mouth daily 2/4/22  Yes Jazmin Mock MD   atorvastatin (LIPITOR) 10 MG tablet 1 po q hs 2/4/22  Yes Moise Lacy MD   ciprofloxacin (CIPRO) 500 MG tablet as needed  12/20/21  Yes Historical Provider, MD   LORazepam (ATIVAN) 2 MG tablet Take 2 mg by mouth every 6 hours as needed for Anxiety (seizure). Yes Historical Provider, MD   metFORMIN (GLUCOPHAGE) 500 MG tablet Take 1 tablet by mouth 2 times daily (with meals) 12/29/21  Yes Moise Lacy MD   clopidogrel (PLAVIX) 75 MG tablet Take 1 tablet by mouth daily 12/29/21  Yes Moise Lacy MD   bumetanide (BUMEX) 1 MG tablet Take 1 tablet by mouth 2 times daily BP must be greater than 95/50 12/29/21  Yes Moise Lacy MD   baclofen (LIORESAL) 20 MG tablet Take 2 tablets by mouth 2 times daily 12/23/21  Yes Moise Lacy MD   nitroGLYCERIN (NITROSTAT) 0.4 MG SL tablet place 1 tablet under the tongue if needed every 5 minutes for ingrid...  (REFER TO PRESCRIPTION NOTES).  9/13/21  Yes Historical Provider, MD   carvedilol (COREG) 3.125 MG tablet Take 1 tablet by mouth 2 times daily If BP is 80/55 or higher and pulse should be 60 or higher 9/16/21  Yes Moise Lacy MD   isosorbide mononitrate (IMDUR) 30 MG extended release tablet Take 1 tablet by mouth daily If BP is 80/55 or higher  Patient taking differently: Take 30 mg by mouth daily If BP is 95/55 or higher 9/16/21  Yes Moise Lacy MD   dantrolene (DANTRIUM) 100 MG capsule Take 1 capsule by mouth 2 times daily 9/16/21  Yes Moise Lacy MD   citalopram (CELEXA) 20 MG tablet Take 1 tablet by mouth every morning 9/16/21  Yes Moise Lacy MD   ipratropium-albuterol (DUONEB) 0.5-2.5 (3) MG/3ML SOLN nebulizer solution Inhale 3 mLs into the lungs every 6 hours as needed for Shortness of Breath  Patient taking differently: Inhale 1 vial into the lungs every 6 hours  9/16/21  Yes Moise Lacy MD   Lancets MISC 1 each by Does not apply route daily 9/1/21  Yes Moise Lacy MD   blood glucose test strips (ASCENSIA AUTODISC VI;ONE TOUCH ULTRA TEST VI) strip 1 each by In Vitro route 2 times daily As needed. 9/1/21  Yes Jorge Luis Sanon MD   Acetaminophen (TYLENOL PO) Take by mouth as needed   Yes Historical Provider, MD   ranolazine (RANEXA) 1000 MG extended release tablet Take 1,000 mg by mouth 2 times daily  1/29/21  Yes Historical Provider, MD   fluticasone (FLONASE) 50 MCG/ACT nasal spray 2 sprays by Each Nostril route daily as needed for Rhinitis 1/25/21  Yes Jorge Luis Sanno MD   ondansetron (ZOFRAN ODT) 4 MG disintegrating tablet Take 1 tablet by mouth every 8 hours as needed for Nausea or Vomiting 1/23/21  Yes Clarence Temple MD   OXYGEN Inhale 2 L into the lungs continuous    Yes Historical Provider, MD   Potassium 99 MG TABS Take 1 tablet by mouth every other day    Yes Historical Provider, MD   aspirin 81 MG EC tablet Take 81 mg by mouth daily   Yes Historical Provider, MD   blood glucose monitor kit and supplies Dispense sufficient amount for indicated testing frequency plus additional to accommodate PRN testing needs. Dispense all needed supplies to include: monitor, strips, lancing device, lancets, control solutions, alcohol swabs. 7/22/20  Yes Elizabethstella Sanon MD   blood glucose monitor strips Test qd  & as needed for symptoms of irregular blood glucose. Dispense sufficient amount for indicated testing frequency plus additional to accommodate PRN testing needs.  7/22/20  Yes Jorge Luis Sanon MD   Catheters (URETHRAL CATHETER) MISC by Does not apply route   Yes Historical Provider, MD   Cyanocobalamin (VITAMIN B 12 PO) Take 500 mg by mouth daily    Yes Historical Provider, MD   Calcium Carbonate-Vit D-Min (CALCIUM 600+D PLUS MINERALS) 600-400 MG-UNIT TABS Take 1 tablet by mouth nightly    Yes Historical Provider, MD   polyethylene glycol (MIRALAX) PACK packet Take 7.3 g by mouth daily    Yes Historical Provider, MD   vitamin D3 (CHOLECALCIFEROL) 25 MCG (1000 UT) TABS tablet Take 1 tablet by mouth every morning    Yes Historical Provider, MD   magnesium (MAGNESIUM-OXIDE) 250 MG TABS tablet Take 250 mg by mouth every morning    Yes Historical Provider, MD       CURRENT MEDICATIONS:  No current facility-administered medications for this visit.     IV MEDICATIONS:      ALLERGIES:  Allergies   Allergen Reactions    Augmentin [Amoxicillin-Pot Clavulanate] Shortness Of Breath    Bactrim [Sulfamethoxazole-Trimethoprim] Anaphylaxis and Other (See Comments)     Seizures,     Macrobid [Nitrofurantoin] Anaphylaxis     Mental Changes, shaking, stopped breathing, collapsed (happened again Feb 2021)        REVIEW OF SYSTEMS:  General ROS:  No weight loss ,no fatigue     ENT ROS:   No Sore throat ,no lymphoadenopathy,no nasal stuffiness     Hematological and Lymphatic ROS:   No ecchymosis ,no tendency to bleed  Respiratory ROS:   Cough ,SOB   Cardiovascular ROS:   No CP,No Palpitation   Gastrointestinal ROS:   No Gi bleed,no nausea or vomiting      - Musculoskeletal ROS:      - no joint swelling ,no joint pain   Neurological ROS:     -no weakness or numbness    Dermatological ROS:   No skin rash ,no urticaria     PHYSICAL EXAMINATION:   CVS S1,S2  Chest rhonchi   Abdomen soft  CNS no focal deficit            PROBLEM LIST:  Patient Active Problem List   Diagnosis    [facility] sclerosis) [facility]    Spastic quadriparesis ([facility])    Lung mass    Essential hypertension    Hypothyroidism    H/O sarcoidosis    Hyperlipidemia    Neurogenic bladder    HUDSON (nonalcoholic steatohepatitis)    Constipation    Osteopenia of multiple sites    Type 2 diabetes mellitus with hyperglycemia, without long-term current use of insulin ([facility])    STEMI (ST elevation myocardial infarction) ([facility]    Multiple vessel coronary artery disease    Hydronephrosis    Chronic combined systolic and diastolic CHF (congestive heart failure) ([facility])    Left ventricular thrombus    Metabolic encephalopathy    UTI (urinary tract infection) due to Enterococcus    Nephrolithiasis    Vitamin D deficiency  NSTEMI (non-ST elevated myocardial infarction) [facility]    Hypotension    SIRS (systemic inflammatory response syndrome) (HCC)    Anemia    Chronic indwelling Barger catheter    Unstable angina (HCC)    Chest pain    Palliative care by specialist    Goals of care, counseling/discussion               ASSESSMENT:  1- MS   2.)Sarcoid ,lung  3. )RUL scar /noule   4. )Hip fracture /left  5-  MS       PLAN:  *-Since Lung nodule is stable and also PET scan was negative and she is low risk patient <I think we hold on any further imaging for now  *- PET scan shows no PET avid activity in lung ,rest of finding as per primary  care (like kidney stone)  *-BD  As needed,advise to khadra as needed  -on 2 L   *_incentive spirometer  *-advise to ambulate every 2-3 hours during trip as she did not want Lovenox  *-Continue Trilogy   Will see in 1 year and decide if we need any imaging   Flonase spray    Advise to use nasal spray saline      KENNETH BURNS MD  Pulmonary/Critical care Medicine   [facility] and [address] [place] [address]

## 2022-03-07 NOTE — PROGRESS NOTES
6 mos follow up in office today. Pt doing well. O2 @ 2 liters NC.  reports pt can go without O2 for short periods of time with O2 sat remaining 98%-97%. Pt to try weaning O2 off at home. Advised  office will be mailing out provider list for other pulmonology follow up as Dr. Ildefonso Marrero is leaving the area/practice.  and Jazmin Tobiumet advised to follow up with PCP ad planned in the meantime and can be referred to any provider for pulmonary needs. Discussed provider closer to home and advised to call office if any questions.

## 2022-03-28 ENCOUNTER — OFFICE VISIT (OUTPATIENT)
Dept: FAMILY MEDICINE CLINIC | Age: 77
End: 2022-03-28
Payer: MEDICARE

## 2022-03-28 VITALS
OXYGEN SATURATION: 100 % | HEART RATE: 70 BPM | SYSTOLIC BLOOD PRESSURE: 110 MMHG | TEMPERATURE: 97.5 F | HEIGHT: 60 IN | RESPIRATION RATE: 16 BRPM | BODY MASS INDEX: 24.41 KG/M2 | DIASTOLIC BLOOD PRESSURE: 54 MMHG

## 2022-03-28 DIAGNOSIS — E11.65 TYPE 2 DIABETES MELLITUS WITH HYPERGLYCEMIA, WITHOUT LONG-TERM CURRENT USE OF INSULIN (HCC): ICD-10-CM

## 2022-03-28 DIAGNOSIS — G35 MS (MULTIPLE SCLEROSIS) (HCC): ICD-10-CM

## 2022-03-28 DIAGNOSIS — J44.9 CHRONIC OBSTRUCTIVE PULMONARY DISEASE, UNSPECIFIED COPD TYPE (HCC): ICD-10-CM

## 2022-03-28 DIAGNOSIS — R09.89 BRUIT OF RIGHT CAROTID ARTERY: ICD-10-CM

## 2022-03-28 DIAGNOSIS — I50.22 CHRONIC SYSTOLIC (CONGESTIVE) HEART FAILURE (HCC): ICD-10-CM

## 2022-03-28 DIAGNOSIS — Z86.69 STATUS POST SEIZURE: Primary | ICD-10-CM

## 2022-03-28 DIAGNOSIS — I25.110 CORONARY ARTERY DISEASE INVOLVING NATIVE HEART WITH UNSTABLE ANGINA PECTORIS, UNSPECIFIED VESSEL OR LESION TYPE (HCC): ICD-10-CM

## 2022-03-28 DIAGNOSIS — F33.0 MILD EPISODE OF RECURRENT MAJOR DEPRESSIVE DISORDER (HCC): ICD-10-CM

## 2022-03-28 DIAGNOSIS — E78.2 MIXED HYPERLIPIDEMIA: ICD-10-CM

## 2022-03-28 DIAGNOSIS — I20.9 ANGINA PECTORIS, UNSPECIFIED (HCC): ICD-10-CM

## 2022-03-28 DIAGNOSIS — N39.0 FREQUENT UTI: ICD-10-CM

## 2022-03-28 DIAGNOSIS — E03.8 OTHER SPECIFIED HYPOTHYROIDISM: ICD-10-CM

## 2022-03-28 DIAGNOSIS — K59.09 OTHER CONSTIPATION: ICD-10-CM

## 2022-03-28 DIAGNOSIS — G82.50 SPASTIC QUADRIPARESIS (HCC): ICD-10-CM

## 2022-03-28 DIAGNOSIS — R91.8 LUNG MASS: ICD-10-CM

## 2022-03-28 DIAGNOSIS — I10 ESSENTIAL HYPERTENSION: ICD-10-CM

## 2022-03-28 DIAGNOSIS — D64.9 ANEMIA, UNSPECIFIED TYPE: ICD-10-CM

## 2022-03-28 DIAGNOSIS — E87.1 LOW SODIUM LEVELS: ICD-10-CM

## 2022-03-28 DIAGNOSIS — J96.11 CHRONIC RESPIRATORY FAILURE WITH HYPOXIA (HCC): ICD-10-CM

## 2022-03-28 PROCEDURE — 1123F ACP DISCUSS/DSCN MKR DOCD: CPT | Performed by: INTERNAL MEDICINE

## 2022-03-28 PROCEDURE — G8420 CALC BMI NORM PARAMETERS: HCPCS | Performed by: INTERNAL MEDICINE

## 2022-03-28 PROCEDURE — 1036F TOBACCO NON-USER: CPT | Performed by: INTERNAL MEDICINE

## 2022-03-28 PROCEDURE — 1090F PRES/ABSN URINE INCON ASSESS: CPT | Performed by: INTERNAL MEDICINE

## 2022-03-28 PROCEDURE — G8400 PT W/DXA NO RESULTS DOC: HCPCS | Performed by: INTERNAL MEDICINE

## 2022-03-28 PROCEDURE — 4040F PNEUMOC VAC/ADMIN/RCVD: CPT | Performed by: INTERNAL MEDICINE

## 2022-03-28 PROCEDURE — G8427 DOCREV CUR MEDS BY ELIG CLIN: HCPCS | Performed by: INTERNAL MEDICINE

## 2022-03-28 PROCEDURE — G8484 FLU IMMUNIZE NO ADMIN: HCPCS | Performed by: INTERNAL MEDICINE

## 2022-03-28 PROCEDURE — 99214 OFFICE O/P EST MOD 30 MIN: CPT | Performed by: INTERNAL MEDICINE

## 2022-03-28 PROCEDURE — 3023F SPIROM DOC REV: CPT | Performed by: INTERNAL MEDICINE

## 2022-03-28 ASSESSMENT — ENCOUNTER SYMPTOMS
SHORTNESS OF BREATH: 0
ABDOMINAL PAIN: 0
EYE PAIN: 0
DIARRHEA: 0
RHINORRHEA: 0
BLOOD IN STOOL: 0
CHEST TIGHTNESS: 0
SORE THROAT: 0
VOMITING: 0
CONSTIPATION: 1
COUGH: 0
NAUSEA: 0

## 2022-03-28 NOTE — PROGRESS NOTES
[facility]) Physicians   Internal Medicine     3/28/2022  Siria Douglas : 1945 Sex: female  Age:75 y.o. Chief Complaint   Patient presents with    Hypertension     f/u    Depression     f/u    Thyroid Problem     f/u        HPI:   Patient presents to office for evaluation of the following medical conditions.    - States had seizure. States pt started yelling and was rolled over was not responding. Placed trilogy machine. States slowly began to recover. Discussed with neurology, felt seizure. Did not think work up necessary at present. Did discuss medication - did not start unless happened again. No further episodes since.  () -neuro did not recommend seizure medications     -Patient has CAD. Has had ST elevation MI () angio and stent to LAD and NSTEMI for instent stenosis (). found to have severe anterior and septal hypokinesia with a apical dyskinesia and ejection fraction 20 to 25% with a small apical left ventricular thrombus on echo. Off eliquis. On ranexa 1000mg twice a day. On plavix. On isosorbide. On Coreg. On bumex. On aspirin. Has hold parameters for medications and will hold if BP too low. No further chest pain or SOB. Last visit with cardiology per reviewed note () -no changes, lifelong dual antiplatelet therapy given incident stenosis, follow-up in 6 months     - States has low blood count, Anemia. States has seen hem/onc treated with  IV iron  Feraheme 510mg x2, keep Hb greater than 7, last visit with hematology per reviewed note () - anemia hb 8.2, na 134, k 5.1, cl 95, gluc 150, iron dec, ferritin increased (3/22) - hb 7.7, iron low, ferritin inc, na 130, k 5.2. Had seen GI () - do not feel gi bleeding, check hemoccult stool x 6 if neg no further      - Patient had left obstructing ureteral calculus and had a cystoscopy with ureteral stent and stone manipulation. Has saldana cath. () - Cystoscopy. Complex flexible ureteroscopy.  Laser lithotripsy Stent exchange, removed. No current symptoms. was given bactrim if develops symptoms. Last visit per reviewed note (5/21) - no changes,f/u 1 year      - States had bladder issues and overactive bladder. Stopped oxybutynin. States has chronic saldana catheter. States was following with urology. Was given botox treatment in past. Off myrbetric.  as flushed intermittently. Removed saldana, voiding ok, wears adult pads. last visit last visit per reviewed note  (12/21) - some incontince - trial of myrbetriq, states was given a script for cipro for in case of urine     - elevated blood sugars. most recent A1c (9/2021) was 4.8  - not known to be diabetic. Blood sugars were running . On metformin. No reported side effects. Last visit with endo (12/2021) - no changes.      - States has high cholesterol. States Lipitor. No reported side effects. Stopped zetia. Last lab (4/2021) - improved.      -  states constipated. States on senokot-2. States on miralax. Stopped colace. Stopped oral iron therapy. States has used mag citrate from time to time.      - States has high blood pressure.  has been holding some of her heart medications due to the low blood pressure at times. On coreg 3.125mg bid. On bumex. On isosorbide, off lisinopril. Has hold parameters for medications and will hold if BP too low, holding every 2-3 days.      - States has hypothyroidism. on levothyroxine. More compliant. Last lab was 9/2021 - stable.      - States has depression. On citalopram. Stable on medications. No reported side effects. No suicidal thoughts. Last PHQ score of 0  (6/10/2021)      - States found lung mass and needs pulmonary follow up yearly (10/2020). States PET suggested scar - no further imaging needed at present. Last visit with pulm per reviewed note (3/2022) - stable, cont trilogy vent, f/u 1 year - will need referral      - States has multiple sclerosis. States follows with neurology. States stopped ticferdia.  States on baclofen 20mg 4x per day and dantrolene 50mg 4x per day. Spasms stable. States weakness to hands b/l - difficult to feed self at times. Still with right hand weakness.   **she would benefit form a wheelchair that reclines because of her heart condition and low ejection fraction. To help get her out of bed to help with physical therapy and strength but also to be able to recline back when she feels dizzy and to assist with transportation. Current chiar does not meet her changed needs. Has new power chair.     - States had hip fracture s/p left ORIF. States has been to physical therapy. Not walking. States having issues with left knee and leg. Released from [address] [place] [address] of right carotid artery- declines ultrasound 3/28/2022    Health Maintenance   - immunizations:   Influenza Vaccination - (2018), (2019), (2020), (2021)   Pneumonia Vaccination - (9/2013) - Pneumococcal. (9/2018) - prevnar, RA salem  Shingrix Vaccine (Shingles) - declines currently  Tetanus Vaccination  covid (3/15/2021) #1, (4/12/2021) #2, (11/2/2021) #3 - moderna     - Screenings:   Bone Density Scan   Pelvic/Pap Exam  Mammogram - (12/10/2018)    Colonoscopy  - (2019) - normal, no further     ROS:  Review of Systems   Constitutional: Negative for appetite change, chills, fever and unexpected weight change. HENT: Negative for congestion, rhinorrhea and sore throat. Eyes: Negative for pain and visual disturbance. Respiratory: Negative for cough, chest tightness and shortness of breath. Cardiovascular: Negative for chest pain and palpitations. Gastrointestinal: Positive for constipation. Negative for abdominal pain, blood in stool, diarrhea, nausea and vomiting. Genitourinary: Negative for difficulty urinating, dysuria, frequency, pelvic pain, urgency and vaginal bleeding. Musculoskeletal: Negative for arthralgias and myalgias. Skin: Negative for rash. Neurological: Positive for weakness and numbness.  Negative for dizziness, syncope, light-headedness and headaches. Hematological: Negative for adenopathy. Psychiatric/Behavioral: Negative for suicidal ideas. The patient is not nervous/anxious. Current Outpatient Medications:     levothyroxine (SYNTHROID) 88 MCG tablet, Take 1 tablet by mouth daily, Disp: 90 tablet, Rfl: 1    atorvastatin (LIPITOR) 10 MG tablet, 1 po q hs, Disp: 90 tablet, Rfl: 1    ciprofloxacin (CIPRO) 500 MG tablet, as needed , Disp: , Rfl:     LORazepam (ATIVAN) 2 MG tablet, Take 2 mg by mouth every 6 hours as needed for Anxiety (seizure). , Disp: , Rfl:     metFORMIN (GLUCOPHAGE) 500 MG tablet, Take 1 tablet by mouth 2 times daily (with meals), Disp: 180 tablet, Rfl: 1    clopidogrel (PLAVIX) 75 MG tablet, Take 1 tablet by mouth daily, Disp: 90 tablet, Rfl: 1    bumetanide (BUMEX) 1 MG tablet, Take 1 tablet by mouth 2 times daily BP must be greater than 95/50, Disp: 180 tablet, Rfl: 1    baclofen (LIORESAL) 20 MG tablet, Take 2 tablets by mouth 2 times daily, Disp: 360 tablet, Rfl: 1    nitroGLYCERIN (NITROSTAT) 0.4 MG SL tablet, place 1 tablet under the tongue if needed every 5 minutes for ingrid...  (REFER TO PRESCRIPTION NOTES). , Disp: , Rfl:     carvedilol (COREG) 3.125 MG tablet, Take 1 tablet by mouth 2 times daily If BP is 80/55 or higher and pulse should be 60 or higher, Disp: 180 tablet, Rfl: 1    isosorbide mononitrate (IMDUR) 30 MG extended release tablet, Take 1 tablet by mouth daily If BP is 80/55 or higher (Patient taking differently: Take 30 mg by mouth daily If BP is 95/55 or higher), Disp: 90 tablet, Rfl: 1    dantrolene (DANTRIUM) 100 MG capsule, Take 1 capsule by mouth 2 times daily, Disp: 180 capsule, Rfl: 1    citalopram (CELEXA) 20 MG tablet, Take 1 tablet by mouth every morning, Disp: 90 tablet, Rfl: 1    ipratropium-albuterol (DUONEB) 0.5-2.5 (3) MG/3ML SOLN nebulizer solution, Inhale 3 mLs into the lungs every 6 hours as needed for Shortness of Breath (Patient taking differently: Inhale 1 vial into the lungs every 6 hours ), Disp: 360 mL, Rfl: 6    Lancets MISC, 1 each by Does not apply route daily, Disp: 100 each, Rfl: 11    blood glucose test strips (ASCENSIA AUTODISC VI;ONE TOUCH ULTRA TEST VI) strip, 1 each by In Vitro route 2 times daily As needed. , Disp: 100 each, Rfl: 11    Acetaminophen (TYLENOL PO), Take by mouth as needed, Disp: , Rfl:     ranolazine (RANEXA) 1000 MG extended release tablet, Take 1,000 mg by mouth 2 times daily , Disp: , Rfl:     fluticasone (FLONASE) 50 MCG/ACT nasal spray, 2 sprays by Each Nostril route daily as needed for Rhinitis, Disp: 1 Bottle, Rfl: 1    ondansetron (ZOFRAN ODT) 4 MG disintegrating tablet, Take 1 tablet by mouth every 8 hours as needed for Nausea or Vomiting, Disp: 30 tablet, Rfl: 0    OXYGEN, Inhale 2 L into the lungs continuous , Disp: , Rfl:     aspirin 81 MG EC tablet, Take 81 mg by mouth daily, Disp: , Rfl:     blood glucose monitor kit and supplies, Dispense sufficient amount for indicated testing frequency plus additional to accommodate PRN testing needs. Dispense all needed supplies to include: monitor, strips, lancing device, lancets, control solutions, alcohol swabs., Disp: 1 kit, Rfl: 0    blood glucose monitor strips, Test qd  & as needed for symptoms of irregular blood glucose. Dispense sufficient amount for indicated testing frequency plus additional to accommodate PRN testing needs. , Disp: 50 strip, Rfl: 11    Catheters (URETHRAL CATHETER) MISC, by Does not apply route, Disp: , Rfl:     Cyanocobalamin (VITAMIN B 12 PO), Take 500 mg by mouth daily , Disp: , Rfl:     Calcium Carbonate-Vit D-Min (CALCIUM 600+D PLUS MINERALS) 600-400 MG-UNIT TABS, Take 1 tablet by mouth nightly , Disp: , Rfl:     polyethylene glycol (MIRALAX) PACK packet, Take 7.3 g by mouth daily , Disp: , Rfl:     vitamin D3 (CHOLECALCIFEROL) 25 MCG (1000 UT) TABS tablet, Take 1 tablet by mouth every morning , Disp: , Rfl:    magnesium (MAGNESIUM-OXIDE) 250 MG TABS tablet, Take 250 mg by mouth every morning , Disp: , Rfl:     Allergies   Allergen Reactions    Augmentin [Amoxicillin-Pot Clavulanate] Shortness Of Breath    Bactrim [Sulfamethoxazole-Trimethoprim] Anaphylaxis and Other (See Comments)     Seizures,     Macrobid [Nitrofurantoin] Anaphylaxis     Mental Changes, shaking, stopped breathing, collapsed (happened again Feb 2021)        Past Medical History:   Diagnosis Date    CAD (coronary artery disease) 4/21/2020    High cholesterol     Hypertension     Hypothyroidism     MI [facility]ocardial infarction) ([facility] 04/21/2020    MS (multiple sclerosis) ([facility]     Osteoporosis     Sarcoidosis     Seizure ([facility] 02/05/2021    Type 2 diabetes mellitus with hyperglycemia, without long-term current use of insulin ([facility] 10/30/2019       Past Surgical History:   Procedure Laterality Date    CHOLECYSTECTOMY  1990s    CORONARY ANGIOPLASTY WITH STENT PLACEMENT  04/21/2020    Dr. Rosenda Green   3.0 x 22mm Resolute MAAME to Prox LAD.   No LV    CORONARY ANGIOPLASTY WITH STENT PLACEMENT  01/14/2021    3.0 x 30 Maame to the prox LAD and a 2.5 x 20 [place] to the Obtuse marginal by Dr. Zuniga Manual / [address] / Claudia Nagel Left 4/23/2020    CYSTOSCOPY LEFT RETROGRADE PYELOGRAM STENT INSERTION, STRATTON CATHETER performed by Martha Avila DO at [facility] Left 3/21/2019    LEFT FEMUR CEPHALLOMEDULLARY NAIL performed by Norma Skinner MD at [facility] Bilateral     HYSTERECTOMY      KNEE SURGERY      plate in lt knee    LITHOTRIPSY Left 11/23/2020    CYSTOSCOPY LEFT URETEROSCOPY,  LASER LITHOTRIPSY  BASKET EXTRACTION LEFT STONE, STENT EXCHANGE performed by Martha Avila DO at [address] LITHOTRIPSY Left 12/30/2020    CYSTOSCOPY RETROGRADE URETEROSCOPY PYELOGRAM LASER LITHOTRIPSY LEFT J-STENT performed by [facility] DO at [facility]       Family History   Problem Relation Age of Onset    Stroke Mother     Heart Disease Mother     Cancer Father         lung    Mult Sclerosis Sister     No Known Problems Brother     No Known Problems Sister     Arthritis Sister     Cervical Cancer Sister        Social History     Socioeconomic History    Marital status:      Spouse name: Not on file    Number of children: Not on file    Years of education: Not on file    Highest education level: Not on file   Occupational History    Not on file   Tobacco Use    Smoking status: Never Smoker    Smokeless tobacco: Never Used   Substance and Sexual Activity    Alcohol use: No     Alcohol/week: 0.0 standard drinks     Comment: Ocassionally    Drug use: No    Sexual activity: Yes     Partners: Male   Other Topics Concern    Not on file   Social History Narrative    Not on file     Social Determinants of Health     Financial Resource Strain: Unknown    Difficulty of Paying Living Expenses: Patient refused   Food Insecurity: Unknown    Worried About Running Out of Food in the Last Year: Patient refused    [address] in the Last Year: Patient refused   Transportation Needs:     Lack of Transportation (Medical): Not on file    Lack of Transportation (Non-Medical):  Not on file   Physical Activity:     Days of Exercise per Week: Not on file    Minutes of Exercise per Session: Not on file   Stress:     Feeling of Stress : Not on file   Social Connections:     Frequency of Communication with Friends and Family: Not on file    Frequency of Social Gatherings with Friends and Family: Not on file    Attends Latter day Services: Not on file    Active Member of Clubs or Organizations: Not on file    Attends Club or Organization Meetings: Not on file    Marital Status: Not on file   Intimate Partner Violence:     Fear of Current or Ex-Partner: Not on file    Emotionally Abused: Not on file    Physically Abused: Not on file    Sexually Abused: Not on file   Housing Stability:     Unable to Pay for Housing in the Last Year: Not on file    Number of Places Lived in the Last Year: Not on file    Unstable Housing in the Last Year: Not on file       Vitals:    03/28/22 1330   BP: (!) 110/54   Pulse: 70   Resp: 16   Temp: 97.5 °F (36.4 °C)   TempSrc: Temporal   SpO2: 100%  Comment: 2 liters oxygen via nasal canula   Weight: Comment: unable due to being in a wheelchair   Height: 5' (1.524 m)       Exam:  Physical Exam  Vitals reviewed. Constitutional:       Appearance: She is well-developed. HENT:      Head: Normocephalic and atraumatic. Right Ear: External ear normal.      Left Ear: External ear normal.   Eyes:      Pupils: Pupils are equal, round, and reactive to light. Neck:      Thyroid: No thyromegaly. Vascular: Carotid bruit present. Cardiovascular:      Rate and Rhythm: Normal rate and regular rhythm. Heart sounds: Normal heart sounds. No murmur heard. Pulmonary:      Effort: Pulmonary effort is normal.      Breath sounds: Normal breath sounds. No wheezing. Abdominal:      General: Bowel sounds are normal. There is no distension. Palpations: Abdomen is soft. Tenderness: There is no abdominal tenderness. There is no guarding or rebound. Musculoskeletal:         General: Normal range of motion. Cervical back: Normal range of motion and neck supple. Lymphadenopathy:      Cervical: No cervical adenopathy. Skin:     General: Skin is warm and dry. Neurological:      Mental Status: She is alert and oriented to person, place, and time. Cranial Nerves: No cranial nerve deficit. Motor: Abnormal muscle tone present.       Comments: Paralysis  Nonambulatory - in wheelchair    Psychiatric:         Judgment: Judgment normal.         Assessment and Plan:    Diagnoses and all orders for this visit:    Bruit of right carotid artery  - declines ultrasound as of present 3/28/2022    Status post seizure  - uncertain etiology   - has discussed with neuro - poss related to MS   - has not started medication - poss keppra if recurs   - no current work up   - no further episodes  - per neuro (1/2022) no anti seizure meds      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, unspecified vessel or lesion type [facility])  - s/p cath and stent to LAD (2020), then instent stenosis s/p restent (1/21)   - following with cardio   - has had 2nd opinion With [facility]   - off lisinopril   - off eliquis  - on coreg   - on imdur   - on ranexa   - on bumex   - on plavix  - on aspirin  - will intermittently hold at night if BP is too low based on hold parameters   - stable at present     Chronic systolic congestive heart failure (HCC)  - EF 20% per echo (4/2020)   - on bumex   - on coreg   - off lisinopril   - stable      Frequent UTI   - at risk for further infection   - no current symptoms  - has bactrim to use if symptoms   - following with urology   - given cipro to have on hand by urology      Nephrolithiasis  - s/p cystoscopy and lithotripsy (11/2020)   - saldana removed      Anemia, unspecified type  - following with hematology   - to stop oral iron   - to have IV iron   - referral to GI - did not think GI bleeding (6/2021)   - last lab (3/2022) - lower      Other Constipation  - on senna   - on miralax  - add mag citrate . 25 as needed      Essential hypertension  - watch diet   - off lisinopril   - on bumex   - on coreg twice a day   - on imdur   - on ranex a  - stable per     Type 2 diabetes mellitus with hyperglycemia, without long-term current use of insulin (HCC)  - watch diet   - last A1c was 4.9 (1/2021)   - now on metformin since recent increase in sugars   - on endocrinology      Hypothyroidism, unspecified type  - on levothyroxine  - labs were normal (9/2021)     Chronic respiratory failure with hypoxia (HCC)  - on oxygen - 3 liters   - has trilogy at night   - has seen pulmonary - recommending to cont trilogy   - stable     Hyponatremia  - follow labs   - last lab (3/2022) - borderline low      MS (multiple sclerosis) (HCC)  - ticierda on hold  - stopped nortriptyline   - on baclofen and dantrolene  - non ambulatory  - stable  **she would benefit form a wheelchair that reclines because of her heart condition and low ejection fraction. To help get her out of bed to help with physical therapy and strength but also to be able to recline back when she feels dizzy and to assist with transportation. Current chiar does not meet her changed needs.      Spastic quadriparesis ([address]  - following with neurology   - on baclofen and dantrolene     Mixed hyperlipidemia  - watch diet  - on atrovastatin  - follow up labs     Neurogenic bladder  - off oxybutynin  - off myrbetric - has not started yet   - currently with les      Mild episode of recurrent major depressive disorder (HCC)  - on citalopram  - no suicidal thoughts  - stable     HUDSON (nonalcoholic steatohepatitis)  - following with GI     Osteopenia of multiple sites  - declines bone density  - follows labs     Lung mass  - possible scar (2019)   - has seen pulmonary - yearly   - stable per last pulm note (3/2022)      H/O sarcoidosis    Return in about 3 months (around 6/28/2022) for check up and review and labs. Orders Placed This Encounter   Procedures    Lipid, Fasting     Standing Status:   Future     Standing Expiration Date:   3/28/2023    Hemoglobin A1C     Standing Status:   Future     Standing Expiration Date:   3/28/2023    TSH     Standing Status:   Future     Standing Expiration Date:   3/28/2023    CBC with Auto Differential     Standing Status:   Future     Standing Expiration Date:   3/28/2023    Comprehensive Metabolic Panel     Standing Status:   Future     Standing Expiration Date:   3/28/2023    Iron and TIBC     Standing Status:   Future     Standing Expiration Date:   3/28/2023     Order Specific Question:   Is Patient Fasting?      Answer:   yes     Order Specific Question: No of Hours?      Answer:   8    Ferritin     Standing Status:   Future     Standing Expiration Date:   3/28/2023    Vitamin B12 & Folate     Standing Status:   Future     Standing Expiration Date:   3/28/2023    Urinalysis     Standing Status:   Future     Standing Expiration Date:   3/28/2023    T4, Free     Standing Status:   Future     Standing Expiration Date:   3/28/2023   Chadwick Rothmanw, Ur     Standing Status:   Future     Standing Expiration Date:   3/28/2023   Jewel [address], DO, Pulmonary, [place]     Referral Priority:   Routine     Referral Type:   Eval and Treat     Referral Reason:   Specialty Services Required     Referred to Provider:   Presley Gold DO     Requested Specialty:   Pulmonology     Number of Visits Requested:   1    External Referral To Home Health     Referral Priority:   Routine     Referral Type:   [address] [place] [address]     Referral Reason:   Specialty Services Required     Requested Specialty:   [address]     Number of Visits Requested:   1     Requested Prescriptions      No prescriptions requested or ordered in this encounter     Malcom Chen MD  3/28/2022  2:32 PM

## 2022-03-30 ENCOUNTER — TELEPHONE (OUTPATIENT)
Dept: FAMILY MEDICINE CLINIC | Age: 77
End: 2022-03-30

## 2022-03-30 NOTE — TELEPHONE ENCOUNTER
Hi Dr Amalia Mcdaniel from [address] [place] [address] called and they will not accept this patient stating they feel she has no rehab potential due to her condition is chronic and they feel rehab will not help     I don't know if you want to try somewhere else    Thanks

## 2022-03-30 NOTE — TELEPHONE ENCOUNTER
I would reach out to the patient and  and see if they wanted a different home care service to try or looking at an outside of the house physical therapy

## 2022-03-31 NOTE — TELEPHONE ENCOUNTER
Ephraim Guillaume from [facility] called back. She stated that she spoke with [facility]  and he said that they do believe she is showing improvement and that she would benefit for the services to be covered by insurance.  She stated they would evaluate her and proceed if they feel it would be beneficial.

## 2022-04-01 DIAGNOSIS — G35 MS (MULTIPLE SCLEROSIS) (HCC): ICD-10-CM

## 2022-04-01 DIAGNOSIS — F33.0 MILD EPISODE OF RECURRENT MAJOR DEPRESSIVE DISORDER (HCC): ICD-10-CM

## 2022-04-01 RX ORDER — CITALOPRAM 20 MG/1
20 TABLET ORAL EVERY MORNING
Qty: 90 TABLET | Refills: 1 | Status: SHIPPED
Start: 2022-04-01 | End: 2022-04-04 | Stop reason: SDUPTHER

## 2022-04-01 RX ORDER — DANTROLENE SODIUM 100 MG/1
100 CAPSULE ORAL 2 TIMES DAILY
Qty: 180 CAPSULE | Refills: 1 | Status: SHIPPED
Start: 2022-04-01 | End: 2022-04-04 | Stop reason: SDUPTHER

## 2022-04-01 NOTE — TELEPHONE ENCOUNTER
Last Appointment:  3/28/2022  Future Appointments   Date Time Provider Marisol Richardsoni   5/4/2022  2:40 PM [facility]   5/23/2022  2:15 PM Apurva Lara MD [facility] END[facility]   6/28/2022  1:30 PM Kristopher Schroeder  [address] [place] [address]

## 2022-04-04 DIAGNOSIS — G35 MS (MULTIPLE SCLEROSIS) (HCC): ICD-10-CM

## 2022-04-04 DIAGNOSIS — F33.0 MILD EPISODE OF RECURRENT MAJOR DEPRESSIVE DISORDER (HCC): ICD-10-CM

## 2022-04-04 RX ORDER — DANTROLENE SODIUM 100 MG/1
100 CAPSULE ORAL 2 TIMES DAILY
Qty: 180 CAPSULE | Refills: 1 | Status: SHIPPED
Start: 2022-04-04 | End: 2022-09-28 | Stop reason: SDUPTHER

## 2022-04-04 RX ORDER — CITALOPRAM 20 MG/1
20 TABLET ORAL EVERY MORNING
Qty: 90 TABLET | Refills: 1 | Status: SHIPPED
Start: 2022-04-04 | End: 2022-08-30 | Stop reason: SDUPTHER

## 2022-04-04 NOTE — TELEPHONE ENCOUNTER
Last Appointment:  3/28/2022  Future Appointments   Date Time Provider Marisol Richardsoni   5/4/2022  2:40 PM Eliana Mayes MD [facility] NEURO [facility]   5/23/2022  2:15 PM Lon Piedra MD [facility] ENDO [facility]   6/28/2022  1:30 PM Don Lindo  [address] [place] [address]

## 2022-04-07 RX ORDER — ISOSORBIDE MONONITRATE 30 MG/1
30 TABLET, EXTENDED RELEASE ORAL DAILY
Qty: 90 TABLET | Refills: 1 | Status: SHIPPED
Start: 2022-04-07 | End: 2022-09-28 | Stop reason: SDUPTHER

## 2022-04-07 RX ORDER — CARVEDILOL 3.12 MG/1
3.12 TABLET ORAL 2 TIMES DAILY
Qty: 180 TABLET | Refills: 1 | Status: SHIPPED
Start: 2022-04-07 | End: 2022-09-28 | Stop reason: SDUPTHER

## 2022-04-07 NOTE — TELEPHONE ENCOUNTER
Last Appointment:  3/28/2022  Future Appointments   Date Time Provider Marisol Richardsoni   5/4/2022  2:40 PM Lizz Barron MD [facility] NEURO [facility]   5/23/2022  2:15 PM Gracia Simmonds, MD [facility] ENDO [facility]   6/28/2022  1:30 PM Shanell Cardoza  [address] [place] [address]

## 2022-04-09 NOTE — TELEPHONE ENCOUNTER
Please let the patient and family know that chest x-ray per radiology report was considered normal no evidence for any pneumonia or fluid accumulation or signs of congestive heart failure    Would continue to monitor symptoms closely call with any changes or worsening    Thanks stated

## 2022-04-12 NOTE — PROGRESS NOTES
TeleMedicine Video Visit    This visit was performed as a virtual video visit using a synchronous, two-way, audio-video telehealth technology platform. Patient identification was verified at the start of the visit, including the patient's telephone number and physical location. I discussed with the patient the nature of our telehealth visits, that:     1. Due to the nature of an audio- video modality, the only components of a physical exam that could be done are the elements supported by direct observation. 2. I would evaluate the patient and recommend diagnostics and treatments based on my assessment. 3. If it was felt that the patient should be evaluated in clinic or an emergency room setting, then they would be directed there. 4. Our sessions are not being recorded and that personal health information is protected. 5. Our team would provide follow up care in person if/when the patient needs it. Patient does agree to proceed with telemedicine consultation. Patient's location: home address in [facility]  Physician  location office other people involved in call -        Time spent: Greater than Not billed by time    This visit was completed virtually using D[employer]. me    2020    TELEHEALTH EVALUATION -- Audio/Visual (During SGVCY-11 public health emergency)    HPI:    Ericksonmiguelina Douglas (:  1945) has requested an audio/video evaluation for the following concern(s):    -  has power chair.  in chair twice a day 2-3 hours per time. States to get new chair this week. -  was found to have low blood count.  was seen in er - no treatment. Added iron. Last lab was improved from 7.7 to 9.4     - Was called by visiting nurses regarding elevated blood sugars. Last lab had showed elevated A1c to 7.7, most recent A1c (2020) was 6.7  - not known to be diabetic. Blood sugars were running 200-300. Added metformin.  No reported side effects.    -  had bladder issues and overactive bladder. Stopped oxybutynin. now has Saldana cath. Following with urology. States needed chronic catheter. States was following with urology. Was given botox treatment and catheter was removed 1 month. States placed on myrbetric. Now has saldana cath, to be changed Monday (7/27).  as flushed intermittently      -  states constipated. States on senokot-2. States on miralax. On iron therapy.      -Patient has CAD. Has had ST elevation MI (2020).   Patient underwent emergency cardiac catheterization where she was found to have LAD of 100% and RCA 95% stenosis.  Patient underwent angioplasty to the LAD.  Patient was found to have severe anterior and septal hypokinesia with a apical dyskinesia and ejection fraction 20 to 25% with a small apical left ventricular thrombus on echocardiogram.  On Plavix and Eliquis.      - Patient  was also found to have a left obstructing ureteral calculus and had a cystoscopy with ureteral stent and stone manipulation. May consider procedure in 3 months if improves. Has saldana cath. States has some sediment. Culture was positive (9/20), not treated due to no symptoms.      - States has high blood pressure. States 118/61, 114/60, 107/59 today. Has had lower blood pressure.  has been holding some of her heart medications due to the low blood pressure at times. Off bumex, holding coreg (95/60) and lisinopril (95/60). Has given meds mostly. Has had to hold coreg a few times mostly at night.     -Coronary artery disease status post ST elevation MI with cardiac catheterization and stent to the LAD  (4/2020).  cardiology suggested triple anticoagulation for at least 1 month (due to left atrial thrombus), now just Eliquis and Plavix alone.      - States had hip fracture s/p left ORIF. States has been to physical therapy. Not walking. States having issues with left knee and leg.  Released from [employer] found lung mass and needs pulmonary follow up yearly well appearing and in mild distress due to pain. nervous/anxious. Prior to Visit Medications    Medication Sig Taking? Authorizing Provider   docusate sodium (COLACE) 100 MG capsule Take 100 mg by mouth daily Yes Historical Provider, MD   Sennosides-Docusate Sodium (SENOKOT S PO) Take 1 tablet by mouth 2 times daily Yes Historical Provider, MD   aspirin 81 MG EC tablet Take 81 mg by mouth daily Yes Historical Provider, MD   ferrous sulfate (IRON 325) 325 (65 Fe) MG tablet Take 1 tablet by mouth every other day  Patient taking differently: Take 325 mg by mouth daily (with breakfast)  Yes Kenn Hinton MD   lisinopril (PRINIVIL;ZESTRIL) 2.5 MG tablet Take 0.5 tablets by mouth daily Yes Kenn Hinton MD   clopidogrel (PLAVIX) 75 MG tablet Take 1 tablet by mouth daily Yes Kenn Hinton MD   dantrolene (DANTRIUM) 50 MG capsule Take 1 capsule by mouth 4 times daily  Patient taking differently: Take 100 mg by mouth 2 times daily 2 caps bid Yes Kenn Hinton MD   metFORMIN (GLUCOPHAGE) 500 MG tablet Take 1 tablet by mouth 2 times daily (with meals) Yes Kenn Hinton MD   blood glucose test strips (ASCENSIA AUTODISC VI;ONE TOUCH ULTRA TEST VI) strip 1 each by In Vitro route 2 times daily As needed. Yes Kenn Hinton MD   blood glucose monitor kit and supplies Dispense sufficient amount for indicated testing frequency plus additional to accommodate PRN testing needs. Dispense all needed supplies to include: monitor, strips, lancing device, lancets, control solutions, alcohol swabs. Yes Kenn Hinton MD   blood glucose monitor strips Test qd  & as needed for symptoms of irregular blood glucose. Dispense sufficient amount for indicated testing frequency plus additional to accommodate PRN testing needs.  Yes Kenn Hinton MD   Lancets MISC 1 each by Does not apply route daily Yes Kenn Hinton MD   citalopram (CELEXA) 20 MG tablet Take 1 tablet by mouth every morning Yes Kenn Hinton MD   atorvastatin (LIPITOR) 10 MG tablet Take 1 tablet by mouth daily Yes Karen Irvin MD   senna (SENOKOT) 8.6 MG tablet Take 1 tablet by mouth 2 times daily  Yes Historical Provider, MD   ondansetron (ZOFRAN ODT) 4 MG disintegrating tablet Take 4-8 mg by mouth every 8 hours as needed for Nausea or Vomiting Yes Historical Provider, MD   baclofen (LIORESAL) 20 MG tablet Take 50 mg by mouth 4 times daily  Yes Historical Provider, MD   carvedilol (COREG) 6.25 MG tablet Take 1 tablet by mouth 2 times daily  Patient taking differently: Take 3.125 mg by mouth 2 times daily If systolic blood pressure is 95 or greater Yes Mark Alvarez MD   levothyroxine (SYNTHROID) 88 MCG tablet Take 1 tablet by mouth daily  Patient taking differently: Take 88 mcg by mouth nightly  Yes Karen Irvin MD   Catheters (URETHRAL CATHETER) MISC by Does not apply route Yes Historical Provider, MD   Cyanocobalamin (VITAMIN B 12 PO) Take 500 mg by mouth daily  Yes Historical Provider, MD   ipratropium-albuterol (DUONEB) 0.5-2.5 (3) MG/3ML SOLN nebulizer solution Inhale 3 mLs into the lungs every 6 hours as needed for Shortness of Breath Yes Yamil Lazo MD   Calcium Carbonate-Vit D-Min (CALCIUM 600+D PLUS MINERALS) 600-400 MG-UNIT TABS Take 1 tablet by mouth nightly  Yes Historical Provider, MD   polyethylene glycol (MIRALAX) PACK packet Take 17 g by mouth daily  Yes Historical Provider, MD   vitamin D3 (CHOLECALCIFEROL) 25 MCG (1000 UT) TABS tablet Take 1 tablet by mouth every morning  Yes Historical Provider, MD   magnesium (MAGNESIUM-OXIDE) 250 MG TABS tablet Take 250 mg by mouth every morning  Yes Historical Provider, MD   nortriptyline (PAMELOR) 25 MG capsule Take 1 capsule by mouth nightly  Karen Irvin MD       Social History     Tobacco Use    Smoking status: Never Smoker    Smokeless tobacco: Never Used   Substance Use Topics    Alcohol use: No     Alcohol/week: 0.0 standard drinks     Comment: Collette Caldron Drug use: No        No Known Allergies,   Past Medical History: Topic Date Due    Diabetic foot exam  03/05/1956    Diabetic retinal exam  03/05/1956    DTaP/Tdap/Td vaccine (1 - Tdap) 03/05/1965    Shingles Vaccine (1 of 2) 03/05/1996    DEXA (modify frequency per FRAX score)  03/05/2001    Annual Wellness Visit (AWV)  06/19/2019    Flu vaccine (1) 09/01/2020    Diabetic microalbuminuria test  10/30/2020    Breast cancer screen  12/10/2020    A1C test (Diabetic or Prediabetic)  07/30/2021    Lipid screen  09/02/2021    TSH testing  09/02/2021    Potassium monitoring  09/02/2021    Creatinine monitoring  09/02/2021    Colon cancer screen colonoscopy  11/04/2029    Pneumococcal 65+ years Vaccine  Completed    Hepatitis C screen  Completed    Hepatitis A vaccine  Aged Out    Hib vaccine  Aged Out    Meningococcal (ACWY) vaccine  Aged Out       PHYSICAL EXAMINATION:  [ INSTRUCTIONS:  \"[x]\" Indicates a positive item  \"[]\" Indicates a negative item  -- DELETE ALL ITEMS NOT EXAMINED]  Vital Signs: (As obtained by patient/caregiver or practitioner observation)    Blood pressure-  Heart rate-    Respiratory rate-    Temperature-  Pulse oximetry-     Constitutional: [x] Appears well-developed and well-nourished [x] No apparent distress      [] Abnormal-   Mental status  [x] Alert and awake  [x] Oriented to person/place/time [x]Able to follow commands  Appears more awake and alert compared to last visit     Eyes:  EOM    [x]  Normal  [] Abnormal-  Sclera  []  Normal  [] Abnormal -         Discharge []  None visible  [] Abnormal -    HENT:   [x] Normocephalic, atraumatic.   [] Abnormal   [] Mouth/Throat: Mucous membranes are moist.     External Ears [x] Normal  [] Abnormal-     Neck: [x] No visualized mass     Pulmonary/Chest: [x] Respiratory effort normal.  [] No visualized signs of difficulty breathing or respiratory distress        [] Abnormal- wearing nasal canula oxygen      Musculoskeletal:   [x] Normal gait with no signs of ataxia         [] Normal range of motion of neck        [] Abnormal-       Neurological:        [x] No Facial Asymmetry (Cranial nerve 7 motor function) (limited exam to video visit)          [] No gaze palsy        [] Abnormal-         Skin:        [x] No significant exanthematous lesions or discoloration noted on facial skin         [] Abnormal-            Psychiatric:       [x] Normal Affect [] No Hallucinations        [] Abnormal-     Other pertinent observable physical exam findings-     ASSESSMENT/PLAN:  Urinary tract infection without hematuria, site unspecified  - enterococcus    - still has stent   - now has saldana   - at risk for further infection   - since no symptoms - no treatment (9/2020)   - to see urology      Anemia, unspecified type  - showed decrease to 7.7 (9/2020) - repeat had increased to 9.4   - increased iron   - follow labs      Other Constipation  - on senna   - on miralax  - decrease iron to 2x per week   - add mag citrate . 25 as needed      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, unspecified vessel or lesion type ([facility] [address]  - s/p cath and stent to LAD  On lisinopril and coreg when able   - on eliquis, plavix and aspiein x 1 month, then switch to eliquis and plavix alone   - needs follow up with cardio  - follow up labs   - follow up chest xray      Chronic systolic congestive heart failure (HCC)  - EF 20% per echo (4/2020)   - off bumex   - on coreg and lisinopril if able     Essential hypertension  - Continue present treatment   - watch diet   - off bumex   - on coreg twice a day   - on lisinopril   - intermittently needing to hold due to low blood pressure   - improved per patient and       Type 2 diabetes mellitus with hyperglycemia, without long-term current use of insulin ([address]  - Continue present treatment   - watch diet   - last A1c was 6.6 (7/2020)   - now on metformin since recent increase in sugars   - on endocrinology      Hypothyroidism, unspecified type  - continue systems was limited: Vitals/Constitutional/EENT/Resp/CV/GI//MS/Neuro/Skin/Heme-Lymph-Imm. Pursuant to the emergency declaration under the [address] [place] [address] authority and the [employer] Act, this Virtual Visit was conducted with patient's (and/or legal guardian's) consent, to reduce the patient's risk of exposure to COVID-19 and provide necessary medical care. The patient (and/or legal guardian) has also been advised to contact this office for worsening conditions or problems, and seek emergency medical treatment and/or call 911 if deemed necessary. Patient identification was verified at the start of the visit: Yes    Total time spent on this encounter: Not billed by time    Services were provided through a video synchronous discussion virtually to substitute for in-person clinic visit. Patient and provider were located at their individual homes. --Taylor Lackey MD on 9/16/2020 at 3:46 PM    An electronic signature was used to authenticate this note. normal...

## 2022-04-18 ENCOUNTER — TELEPHONE (OUTPATIENT)
Dept: FAMILY MEDICINE CLINIC | Age: 77
End: 2022-04-18

## 2022-04-18 DIAGNOSIS — J44.9 CHRONIC OBSTRUCTIVE PULMONARY DISEASE, UNSPECIFIED COPD TYPE (HCC): ICD-10-CM

## 2022-04-18 DIAGNOSIS — R91.8 LUNG MASS: ICD-10-CM

## 2022-04-18 DIAGNOSIS — Z86.2 H/O SARCOIDOSIS: Primary | ICD-10-CM

## 2022-04-18 DIAGNOSIS — J96.11 CHRONIC RESPIRATORY FAILURE WITH HYPOXIA (HCC): ICD-10-CM

## 2022-04-18 NOTE — TELEPHONE ENCOUNTER
Orders Placed This Encounter   Procedures   Jewel [address], DO, Pulmonary, [place]     Referral Priority:   Routine     Referral Type:   Eval and Treat     Referral Reason:   Specialty Services Required     Referred to Provider:   Norma Mcmahon DO     Requested Specialty:   Pulmonology     Number of Visits Requested:   1     Referral placed     This was also placed earlier on March 28 so we will need to see if that did not go through    Patient is a current patient of Dr. Jaden Gomez who has left the practice

## 2022-04-18 NOTE — TELEPHONE ENCOUNTER
----- Message from Gloria Coello sent at 4/18/2022  1:33 PM EDT -----  Subject: Referral Request    QUESTIONS   Reason for referral request? Pulmonologist   Has the physician seen you for this condition before? Yes  Select a date? 2022-03-07  Select the Provider the patient wants to be referred to, if known (PCP or   Specialist)? [facility]   Preferred Specialist (if applicable)? Do you already have an appointment scheduled? No  Additional Information for Provider? Pt's  called on 04/18 @ 1:30   pm to follow up on a phone for his wife that they were supposed to receive   for her referral for Dr. GREY [facility]. If there is already a referral   prepared for Machelle Noble, please call the family back to confirm. ---------------------------------------------------------------------------  --------------  [employer] INFO  What is the best way for the office to contact you? OK to leave message on   [employer], OK to respond with electronic message via [employer] portal (only   for patients who have registered [employer] account)  Preferred Call Back Phone Number? [phone]  ---------------------------------------------------------------------------  --------------  SCRIPT ANSWERS  Relationship to Patient? Other  Representative Name? Jayy Betancourt ()  Is the Representative on the appropriate HIPAA document in Epic?  Yes

## 2022-04-19 ENCOUNTER — TELEPHONE (OUTPATIENT)
Dept: FAMILY MEDICINE CLINIC | Age: 77
End: 2022-04-19

## 2022-04-19 ENCOUNTER — TELEPHONE (OUTPATIENT)
Dept: NEUROLOGY | Age: 77
End: 2022-04-19

## 2022-04-19 RX ORDER — LORAZEPAM 0.5 MG/1
0.5 TABLET ORAL EVERY 4 HOURS
COMMUNITY
End: 2022-04-21 | Stop reason: SDUPTHER

## 2022-04-19 NOTE — TELEPHONE ENCOUNTER
Thank you for the updates! Would also consider touching base with neurology, especially if further episodes.   Any worsening or severity would seek care in emergency

## 2022-04-19 NOTE — TELEPHONE ENCOUNTER
Wolfgang calling to tell you that Dana Trinh had a brief seizure last night. It was mild and didn't last very long, however she bit the tip of her tongue pretty hard. Its pretty sore today. Nothing else to report, but wanted you to be aware.

## 2022-04-21 ENCOUNTER — TELEPHONE (OUTPATIENT)
Dept: FAMILY MEDICINE CLINIC | Age: 77
End: 2022-04-21

## 2022-04-21 DIAGNOSIS — G35 MS (MULTIPLE SCLEROSIS) (HCC): Primary | ICD-10-CM

## 2022-04-21 RX ORDER — LORAZEPAM 1 MG/1
TABLET ORAL
Qty: 60 TABLET | Refills: 5 | Status: SHIPPED | OUTPATIENT
Start: 2022-04-21 | End: 2022-05-04

## 2022-04-21 NOTE — TELEPHONE ENCOUNTER
Saw neurology today, they upped the ativan to 1mg twice daily as needed. To give her 1 tab at onset of seizure and then the other if she didn't come out of it. Also has blood work from yesterday at [facility] that  wanted you to be aware of.

## 2022-04-21 NOTE — TELEPHONE ENCOUNTER
Blood work results reviewed    Hemoglobin was still low in the anemia range    Iron levels were also low except for storage form ferritin which was elevated but also could be due to acute inflammation    Sodium level was again low but unchanged    BUN level was also slightly elevated which could be consistent with possible dehydration    Please keep me posted on any other changes    Thanks

## 2022-05-04 ENCOUNTER — OFFICE VISIT (OUTPATIENT)
Dept: NEUROLOGY | Age: 77
End: 2022-05-04
Payer: MEDICARE

## 2022-05-04 VITALS
OXYGEN SATURATION: 93 % | TEMPERATURE: 97.3 F | RESPIRATION RATE: 18 BRPM | HEART RATE: 73 BPM | SYSTOLIC BLOOD PRESSURE: 94 MMHG | DIASTOLIC BLOOD PRESSURE: 47 MMHG

## 2022-05-04 DIAGNOSIS — G35 MS (MULTIPLE SCLEROSIS) (HCC): Primary | ICD-10-CM

## 2022-05-04 PROCEDURE — 1036F TOBACCO NON-USER: CPT | Performed by: PSYCHIATRY & NEUROLOGY

## 2022-05-04 PROCEDURE — 4040F PNEUMOC VAC/ADMIN/RCVD: CPT | Performed by: PSYCHIATRY & NEUROLOGY

## 2022-05-04 PROCEDURE — G8420 CALC BMI NORM PARAMETERS: HCPCS | Performed by: PSYCHIATRY & NEUROLOGY

## 2022-05-04 PROCEDURE — 1123F ACP DISCUSS/DSCN MKR DOCD: CPT | Performed by: PSYCHIATRY & NEUROLOGY

## 2022-05-04 PROCEDURE — 1090F PRES/ABSN URINE INCON ASSESS: CPT | Performed by: PSYCHIATRY & NEUROLOGY

## 2022-05-04 PROCEDURE — 99215 OFFICE O/P EST HI 40 MIN: CPT | Performed by: PSYCHIATRY & NEUROLOGY

## 2022-05-04 PROCEDURE — G8400 PT W/DXA NO RESULTS DOC: HCPCS | Performed by: PSYCHIATRY & NEUROLOGY

## 2022-05-04 PROCEDURE — G8427 DOCREV CUR MEDS BY ELIG CLIN: HCPCS | Performed by: PSYCHIATRY & NEUROLOGY

## 2022-05-04 RX ORDER — LORAZEPAM 1 MG/1
1 TABLET ORAL 2 TIMES DAILY PRN
Qty: 60 TABLET | Refills: 5 | Status: SHIPPED | OUTPATIENT
Start: 2022-05-04 | End: 2022-10-31

## 2022-05-10 DIAGNOSIS — I25.110 CORONARY ARTERY DISEASE INVOLVING NATIVE HEART WITH UNSTABLE ANGINA PECTORIS, UNSPECIFIED VESSEL OR LESION TYPE (HCC): ICD-10-CM

## 2022-05-10 RX ORDER — CLOPIDOGREL BISULFATE 75 MG/1
75 TABLET ORAL DAILY
Qty: 90 TABLET | Refills: 1 | Status: SHIPPED
Start: 2022-05-10 | End: 2022-09-28 | Stop reason: SDUPTHER

## 2022-05-10 NOTE — TELEPHONE ENCOUNTER
Last Appointment:  3/28/2022  Future Appointments   Date Time Provider Marisol Garcia   5/23/2022  2:15 PM Migdalia King MD [facility]   6/28/2022  1:30 PM Benja Lawson  [address] [place] [address]   11/2/2022  1:40 PM Arlen Jones MD [facility]

## 2022-05-23 ENCOUNTER — TELEMEDICINE (OUTPATIENT)
Dept: ENDOCRINOLOGY | Age: 77
End: 2022-05-23
Payer: MEDICARE

## 2022-05-23 DIAGNOSIS — E55.9 VITAMIN D DEFICIENCY: Primary | ICD-10-CM

## 2022-05-23 DIAGNOSIS — E78.5 HYPERLIPIDEMIA, UNSPECIFIED HYPERLIPIDEMIA TYPE: ICD-10-CM

## 2022-05-23 DIAGNOSIS — E03.9 HYPOTHYROIDISM, UNSPECIFIED TYPE: ICD-10-CM

## 2022-05-23 DIAGNOSIS — E11.69 TYPE 2 DIABETES MELLITUS WITH OTHER SPECIFIED COMPLICATION, WITHOUT LONG-TERM CURRENT USE OF INSULIN (HCC): ICD-10-CM

## 2022-05-23 PROCEDURE — G8400 PT W/DXA NO RESULTS DOC: HCPCS | Performed by: INTERNAL MEDICINE

## 2022-05-23 PROCEDURE — 1090F PRES/ABSN URINE INCON ASSESS: CPT | Performed by: INTERNAL MEDICINE

## 2022-05-23 PROCEDURE — 99213 OFFICE O/P EST LOW 20 MIN: CPT | Performed by: INTERNAL MEDICINE

## 2022-05-23 PROCEDURE — 1123F ACP DISCUSS/DSCN MKR DOCD: CPT | Performed by: INTERNAL MEDICINE

## 2022-05-23 PROCEDURE — G8420 CALC BMI NORM PARAMETERS: HCPCS | Performed by: INTERNAL MEDICINE

## 2022-05-23 PROCEDURE — 1036F TOBACCO NON-USER: CPT | Performed by: INTERNAL MEDICINE

## 2022-05-23 PROCEDURE — G8427 DOCREV CUR MEDS BY ELIG CLIN: HCPCS | Performed by: INTERNAL MEDICINE

## 2022-05-23 NOTE — PROGRESS NOTES
[address] [place] [address] Department of Endocrinology Diabetes and Metabolism   [address] [facility] [address]   Phone: [phone]  Fax: [phone]    Date of Service: 5/23/2022  Primary Care Physician: Chloe Mckenna MD  Provider: Tobias Escamilla MD     Reason for the visit:  DM type 2    History of Present Illness: The history is provided by the patient. No  was used. Accuracy of the patient data is excellent.   Monica Millan is a very pleasant 69 y.o. female CAD and MS seen today for diabetes management     Monica Millan was diagnosed with diabetes long time ago and currently doing very well on Metformin 500 mg BID,   The patient has been checking blood sugar daily in AM and most readings at goal   A1c 4.8% but she is anemic   Most recent A1c results summarized below  Lab Results   Component Value Date    LABA1C 4.8 09/08/2021    LABA1C 4.9 01/13/2021    LABA1C 6.6 07/30/2020     Patient has had no hypoglycemic episodes   The patient has been mindful of what has been eating and following diabetic diet as encouraged  I reviewed current medications and the patient has no issues with diabetes medications  The patient is due for an eye exam   Microvascular complications:  No Retinopathy, Nephropathy or Neuropathy   Macrovascular complications: + CAD   The patient receives Flushot every year and up to date with the Pneumonia vaccine     PAST MEDICAL HISTORY   Past Medical History:   Diagnosis Date    CAD (coronary artery disease) 4/21/2020    High cholesterol     Hypertension     Hypothyroidism     MI (myocardial infarction) ([address] 04/21/2020    [facility] sclerosis) [facility]     Osteoporosis     Sarcoidosis     Seizure ([address] 02/05/2021    Type 2 diabetes mellitus with hyperglycemia, without long-term current use of insulin ([address] 10/30/2019       PAST SURGICAL HISTORY   Past Surgical History:   Procedure Laterality Date    CHOLECYSTECTOMY  1990s    CORONARY ANGIOPLASTY WITH STENT PLACEMENT  04/21/2020    Dr. Hope Humphrey   3.0 x 22mm Resolute MAAME to Prox LAD. No LV    CORONARY ANGIOPLASTY WITH STENT PLACEMENT  01/14/2021    3.0 x 30 Cowan to the prox LAD and a 2.5 x 20 Cowan to the Obtuse marginal by Dr. Norris Fair / Rupali Mohan / Alfonso Lara Left 4/23/2020    CYSTOSCOPY LEFT RETROGRADE PYELOGRAM STENT INSERTION, STRATTON CATHETER performed by Lu Avila DO at [facility] Left 3/21/2019    LEFT FEMUR CEPHALLOMEDULLARY NAIL performed by Makenna Wallace MD at [facility] Bilateral     HYSTERECTOMY      KNEE SURGERY      plate in lt knee    LITHOTRIPSY Left 11/23/2020    CYSTOSCOPY LEFT URETEROSCOPY,  LASER LITHOTRIPSY  BASKET EXTRACTION LEFT STONE, STENT EXCHANGE performed by Lu Avila DO at [address] LITHOTRIPSY Left 12/30/2020    CYSTOSCOPY RETROGRADE URETEROSCOPY PYELOGRAM LASER LITHOTRIPSY LEFT J-STENT performed by Lu Avila DO at [address]:   reports that she has never smoked. She has never used smokeless tobacco.  Alcohol:   reports no history of alcohol use. Drugs:   reports no history of drug use.     FAMILY HISTORY   Family History   Problem Relation Age of Onset    Stroke Mother     Heart Disease Mother     Cancer Father         lung    Mult Sclerosis Sister     No Known Problems Brother     No Known Problems Sister     Arthritis Sister     Cervical Cancer Sister        ALLERGIES AND DRUG REACTIONS   Allergies   Allergen Reactions    Augmentin [Amoxicillin-Pot Clavulanate] Shortness Of Breath    Bactrim [Sulfamethoxazole-Trimethoprim] Anaphylaxis and Other (See Comments)     Seizures,     Macrobid [Nitrofurantoin] Anaphylaxis     Mental Changes, shaking, stopped breathing, collapsed (happened again Feb 2021)        CURRENT MEDICATIONS   Current Outpatient Medications   Medication Sig Dispense Refill    clopidogrel (PLAVIX) 75 MG tablet Take 1 tablet by (ZOFRAN ODT) 4 MG disintegrating tablet Take 1 tablet by mouth every 8 hours as needed for Nausea or Vomiting 30 tablet 0    OXYGEN Inhale 2 L into the lungs continuous       aspirin 81 MG EC tablet Take 81 mg by mouth daily      blood glucose monitor kit and supplies Dispense sufficient amount for indicated testing frequency plus additional to accommodate PRN testing needs. Dispense all needed supplies to include: monitor, strips, lancing device, lancets, control solutions, alcohol swabs. 1 kit 0    blood glucose monitor strips Test qd  & as needed for symptoms of irregular blood glucose. Dispense sufficient amount for indicated testing frequency plus additional to accommodate PRN testing needs. 50 strip 11    Catheters (URETHRAL CATHETER) MISC by Does not apply route      Cyanocobalamin (VITAMIN B 12 PO) Take 500 mg by mouth daily       Calcium Carbonate-Vit D-Min (CALCIUM 600+D PLUS MINERALS) 600-400 MG-UNIT TABS Take 1 tablet by mouth nightly       polyethylene glycol (MIRALAX) PACK packet Take 7.3 g by mouth daily       vitamin D3 (CHOLECALCIFEROL) 25 MCG (1000 UT) TABS tablet Take 1 tablet by mouth every morning       magnesium (MAGNESIUM-OXIDE) 250 MG TABS tablet Take 250 mg by mouth every morning       ciprofloxacin (CIPRO) 500 MG tablet as needed  (Patient not taking: Reported on 5/23/2022)       No current facility-administered medications for this visit. Review of Systems  Constitutional: No fever, no chills, no diaphoresis, no generalized weakness. HEENT: No blurred vision, No sore throat, no ear pain, no hair loss  Neck: denied any neck swelling, difficulty swallowing,   Cardio-pulmonary: No CP, SOB or palpitation, No orthopnea or PND. No cough or wheezing. GI: No N/V/D, no constipation, No abdominal pain, no melena or hematochezia   : Denied any dysuria, hematuria, flank pain, discharge, or incontinence. Skin: denied any rash, ulcer, Hirsute, or hyperpigmentation.    MSK: denied any joint deformity, joint pain/swelling, muscle pain, or back pain. Neuro: no numbness, no tingling, no weakness, _    OBJECTIVE    There were no vitals taken for this visit. BP Readings from Last 4 Encounters:   05/04/22 (!) 94/47   03/28/22 (!) 110/54   03/07/22 (!) 108/55   12/20/21 98/62     Wt Readings from Last 6 Encounters:   03/07/22 125 lb (56.7 kg)   09/16/21 141 lb (64 kg)   01/19/21 139 lb 11.2 oz (63.4 kg)   12/30/20 150 lb (68 kg)   11/23/20 150 lb (68 kg)   09/02/20 143 lb (64.9 kg)     Physical examination:  General: awake alert, oriented x3  HEENT: normocephalic non traumatic, no exophthalmos   Neck: supple, thyroid tenderness,  Pulm: Clear equal air entry no added sounds  CVS: S1 + S2  Abd: soft lax, no tenderness  Skin: warm, no lesions, no rash.  No open wounds, no ulcers   Neuro: CN intact, Lt side hemiplegia from previous stroke   Psych: normal mood, and affect      Review of Laboratory Data:  I personally reviewed the following lab:  Lab Results   Component Value Date/Time    WBC 8.9 04/20/2022 01:54 PM    RBC 2.47 (L) 04/20/2022 01:54 PM    HGB 8.1 (L) 04/20/2022 01:54 PM    HCT 24.7 (L) 04/20/2022 01:54 PM    MCV 99.9 04/20/2022 01:54 PM    MCH 32.6 04/20/2022 01:54 PM    MCHC 32.7 (L) 04/20/2022 01:54 PM    RDW 15.8 (H) 04/20/2022 01:54 PM     04/20/2022 01:54 PM    MPV 8.1 04/20/2022 01:54 PM      Lab Results   Component Value Date/Time     (L) 04/20/2022 01:54 PM    K 4.3 04/20/2022 01:54 PM    K 6.5 (H) 01/12/2021 09:11 PM    CO2 28 04/20/2022 01:54 PM    BUN 33 (H) 04/20/2022 01:54 PM    CREATININE 0.6 04/20/2022 01:54 PM    CALCIUM 8.7 04/20/2022 01:54 PM    LABGLOM 97 04/20/2022 01:54 PM    GFRAA >60 01/18/2021 10:35 AM      Lab Results   Component Value Date/Time    TSH 1.26 09/08/2021 03:19 PM    T4FREE 1.03 09/08/2021 03:19 PM     Lab Results   Component Value Date    LABA1C 4.8 09/08/2021    GLUCOSE 181 04/20/2022    LABMICR < 2.0 09/09/2021     Lab Results Component Value Date    LABA1C 4.8 09/08/2021    LABA1C 4.9 01/13/2021    LABA1C 6.6 07/30/2020     Lab Results   Component Value Date    TRIG 211 09/08/2021    HDL 34 09/08/2021    LDLCALC 62 01/13/2021    CHOL 157 09/08/2021    CHOL 127 01/13/2021     Lab Results   Component Value Date    VITD25 66.8 09/02/2020     ASSESSMENT & RECOMMENDATIONS   Michaela Douglas, a 69 y.o.-old female seen in for the following issues     Diabetes Mellitus Type 2    · BG still under good control   · Will continue Metformin 500 mg BID   · The patient was advised to check blood sugars  Daily in AM   · Discussed with patient A1c and blood sugar goals   · Patient will need routine diabetes maintenance and prevention    HLD  · Continue statin     Hypothyroidism   · continue Levothyroxine 88 mcg daily  · TFt before next visit     I personally reviewed external notes from PCP and other patient's care team providers, and personally interpreted labs associated with the above diagnosis. I also ordered labs to further assess and manage the above addressed medical conditions    Return in about 4 months (around 9/23/2022) for DM type 2, hypothyroidism, VitD deficiency. The above issues were reviewed with the patient who understood and agreed with the plan. Thank you for allowing us to participate in the care of this patient. Please do not hesitate to contact us with any additional questions. Diagnosis Orders   1. Vitamin D deficiency     2. Type 2 diabetes mellitus with other specified complication, without long-term current use of insulin ([address]     3. Hyperlipidemia, unspecified hyperlipidemia type     4. Hypothyroidism, unspecified type  TSH    T4, Free     Raji Hammond MD  Endocrinologist, PHANI BROUSSARD [facility] Diabetes Care and Endocrinology   [address] [place] [address], [address] [place] [address],[address] [id number]   Phone: [phone]  Fax: [phone]  --------------------------------------------  An electronic signature was used to authenticate this note.  Lon Piedra MD on 5/23/2022 at 2:45 PM

## 2022-05-23 NOTE — PROGRESS NOTES
Juliet Hernandez was read the following message We want to confirm that, for purposes of billing, this is a virtual visit with your provider for which we will submit a claim for reimbursement with your insurance company. You will be responsible for any copays, coinsurance amounts or other amounts not covered by your insurance company. If you do not accept this, unfortunately we will not be able to schedule or proceed with a virtual visit with the provider. Do you accept? Bonnie José responded Yes .

## 2022-05-25 LAB
A/G RATIO: 0.9 RATIO (ref 1.1–2.2)
ALBUMIN SERPL-MCNC: 3.4 G/DL (ref 3.4–4.8)
ALP BLD-CCNC: 54 U/L (ref 42–121)
ALT SERPL-CCNC: 9 U/L (ref 10–54)
ANION GAP SERPL CALCULATED.3IONS-SCNC: 14 MEQ/L (ref 3–11)
AST SERPL-CCNC: 16 U/L (ref 10–41)
BILIRUB SERPL-MCNC: 0.4 MG/DL (ref 0.3–1.5)
BUN BLDV-MCNC: 25 MG/DL (ref 8–21)
CALCIUM SERPL-MCNC: 9.1 MG/DL (ref 8.5–10.5)
CHLORIDE BLD-SCNC: 94 MEQ/L (ref 98–107)
CO2: 24 MEQ/L (ref 21–31)
CREAT SERPL-MCNC: 0.7 MG/DL (ref 0.4–1)
CREATININE + EGFR PANEL: 98 ML/MIN
GFR NON-AFRICAN AMERICAN: 81 ML/MIN
GLOBULIN: 3.7 G/DL (ref 1.9–3.9)
GLUCOSE BLD-MCNC: 134 MG/DL (ref 70–99)
POTASSIUM SERPL-SCNC: 4.7 MEQ/L (ref 3.6–5)
SODIUM BLD-SCNC: 132 MEQ/L (ref 135–145)
T4 FREE: 1.02
T4 FREE: 1.02 NG/DL (ref 0.61–1.12)
TOTAL PROTEIN: 7.1 G/DL (ref 5.9–7.8)
TSH SERPL DL<=0.05 MIU/L-ACNC: 1.34 UIU/ML
TSH SERPL DL<=0.05 MIU/L-ACNC: 1.34 UIU/ML (ref 0.34–5.6)

## 2022-05-26 ENCOUNTER — TELEPHONE (OUTPATIENT)
Dept: ENDOCRINOLOGY | Age: 77
End: 2022-05-26

## 2022-05-26 DIAGNOSIS — E03.9 HYPOTHYROIDISM, UNSPECIFIED TYPE: ICD-10-CM

## 2022-05-27 NOTE — TELEPHONE ENCOUNTER
Called and informed pt  Claudia Farris who verbalized understanding and states he will relay the message to kaylyn.

## 2022-06-02 DIAGNOSIS — E11.65 TYPE 2 DIABETES MELLITUS WITH HYPERGLYCEMIA, WITHOUT LONG-TERM CURRENT USE OF INSULIN (HCC): ICD-10-CM

## 2022-06-02 NOTE — TELEPHONE ENCOUNTER
Last Appointment:  3/28/2022  Future Appointments   Date Time Provider Marisol Garcia   6/28/2022  1:30 PM Víctor Mirza  [address] [place] [address]   10/24/2022  2:15 PM Javi Armas MD [facility]   11/2/2022  1:40 PM Shadia Vivar MD [facility]

## 2022-06-08 ENCOUNTER — TELEPHONE (OUTPATIENT)
Dept: FAMILY MEDICINE CLINIC | Age: 77
End: 2022-06-08

## 2022-06-08 NOTE — TELEPHONE ENCOUNTER
Quenten Goodness -        He is asking you to mail her a script for a new handicap placard. The one she has expires soon.

## 2022-06-08 NOTE — LETTER
[address] [place], [place] [id number]  Phone: [phone]  Fax: [phone]    Cece Casillas MD         June 8, 2022     Patient: Pastor Mika Douglas   YOB: 1945   Date of Visit: 6/8/2022       To Whom It May Concern: It is my medical opinion that Machelle Noble requires a disability parking placard for the following reasons:  She cannot walk without assistance from another person or the use of an assistance device (cane, crutch, prosthetic device, wheelchair, etc.). Duration of need: 5 years    If you have any questions or concerns, please don't hesitate to call.     Sincerely,        Cece Casillas MD

## 2022-06-16 ENCOUNTER — TELEPHONE (OUTPATIENT)
Dept: FAMILY MEDICINE CLINIC | Age: 77
End: 2022-06-16

## 2022-06-16 NOTE — TELEPHONE ENCOUNTER
Please let the patient know that blood work results from a few weeks ago were received    Electrolytes showed a decrease in sodium and decrease in chloride and appeared similar when compared to previous    Kidney function was normal, iron level was borderline elevated which is a measure of kidney function and could be related to slight dehydration    Sugar was elevated    Blood counts were low, suggestive of anemia and slightly decreased when compared to previous    Iron levels were low and similar when compared to previous    Thyroid studies were normal    Please asked the patient if she has any updates from other specialist since blood work has been completed    Thanks

## 2022-06-20 NOTE — TELEPHONE ENCOUNTER
Lisbet Thorpe called back and went over the lab results with me. She has not had other labs done, but has orders from another doctor and going for that wed or thurs this week.

## 2022-06-24 LAB
T4 FREE: 0.98 NG/DL (ref 0.61–1.12)
TSH SERPL DL<=0.05 MIU/L-ACNC: 2.29 UIU/ML (ref 0.34–5.6)

## 2022-06-27 DIAGNOSIS — G82.50 SPASTIC QUADRIPARESIS (HCC): Chronic | ICD-10-CM

## 2022-06-27 RX ORDER — BACLOFEN 20 MG/1
40 TABLET ORAL 2 TIMES DAILY
Qty: 360 TABLET | Refills: 1 | Status: SHIPPED
Start: 2022-06-27 | End: 2022-09-28 | Stop reason: SDUPTHER

## 2022-06-27 NOTE — TELEPHONE ENCOUNTER
Last Appointment:  3/28/2022  Future Appointments   Date Time Provider Marisol Garcia   6/28/2022  1:30 PM Bhanu Laughlin  [address] [place] [address]   10/24/2022  2:15 PM Vianey Turner MD [facility]   11/2/2022  1:40 PM Cindy Deal MD [address] [facility]

## 2022-06-28 ENCOUNTER — TELEMEDICINE (OUTPATIENT)
Dept: FAMILY MEDICINE CLINIC | Age: 77
End: 2022-06-28
Payer: MEDICARE

## 2022-06-28 DIAGNOSIS — I50.22 CHRONIC SYSTOLIC (CONGESTIVE) HEART FAILURE (HCC): ICD-10-CM

## 2022-06-28 DIAGNOSIS — I10 ESSENTIAL HYPERTENSION: Chronic | ICD-10-CM

## 2022-06-28 DIAGNOSIS — D64.9 ANEMIA, UNSPECIFIED TYPE: ICD-10-CM

## 2022-06-28 DIAGNOSIS — E11.65 TYPE 2 DIABETES MELLITUS WITH HYPERGLYCEMIA, WITHOUT LONG-TERM CURRENT USE OF INSULIN (HCC): ICD-10-CM

## 2022-06-28 DIAGNOSIS — Z86.2 H/O SARCOIDOSIS: ICD-10-CM

## 2022-06-28 DIAGNOSIS — N39.0 FREQUENT UTI: ICD-10-CM

## 2022-06-28 DIAGNOSIS — Z86.69 STATUS POST SEIZURE: Primary | ICD-10-CM

## 2022-06-28 DIAGNOSIS — E03.8 OTHER SPECIFIED HYPOTHYROIDISM: ICD-10-CM

## 2022-06-28 DIAGNOSIS — J96.11 CHRONIC RESPIRATORY FAILURE WITH HYPOXIA (HCC): ICD-10-CM

## 2022-06-28 DIAGNOSIS — K59.09 OTHER CONSTIPATION: ICD-10-CM

## 2022-06-28 DIAGNOSIS — G82.50 SPASTIC QUADRIPARESIS (HCC): ICD-10-CM

## 2022-06-28 DIAGNOSIS — G35 MS (MULTIPLE SCLEROSIS) (HCC): ICD-10-CM

## 2022-06-28 DIAGNOSIS — R91.8 LUNG MASS: ICD-10-CM

## 2022-06-28 DIAGNOSIS — E87.1 HYPONATREMIA: ICD-10-CM

## 2022-06-28 DIAGNOSIS — F33.0 MILD EPISODE OF RECURRENT MAJOR DEPRESSIVE DISORDER (HCC): ICD-10-CM

## 2022-06-28 DIAGNOSIS — I25.110 CORONARY ARTERY DISEASE INVOLVING NATIVE HEART WITH UNSTABLE ANGINA PECTORIS, UNSPECIFIED VESSEL OR LESION TYPE (HCC): ICD-10-CM

## 2022-06-28 DIAGNOSIS — E78.2 MIXED HYPERLIPIDEMIA: ICD-10-CM

## 2022-06-28 PROCEDURE — 1090F PRES/ABSN URINE INCON ASSESS: CPT | Performed by: INTERNAL MEDICINE

## 2022-06-28 PROCEDURE — G8427 DOCREV CUR MEDS BY ELIG CLIN: HCPCS | Performed by: INTERNAL MEDICINE

## 2022-06-28 PROCEDURE — 1123F ACP DISCUSS/DSCN MKR DOCD: CPT | Performed by: INTERNAL MEDICINE

## 2022-06-28 PROCEDURE — G8400 PT W/DXA NO RESULTS DOC: HCPCS | Performed by: INTERNAL MEDICINE

## 2022-06-28 PROCEDURE — 99214 OFFICE O/P EST MOD 30 MIN: CPT | Performed by: INTERNAL MEDICINE

## 2022-06-28 SDOH — ECONOMIC STABILITY: FOOD INSECURITY: WITHIN THE PAST 12 MONTHS, THE FOOD YOU BOUGHT JUST DIDN'T LAST AND YOU DIDN'T HAVE MONEY TO GET MORE.: PATIENT DECLINED

## 2022-06-28 SDOH — ECONOMIC STABILITY: FOOD INSECURITY: WITHIN THE PAST 12 MONTHS, YOU WORRIED THAT YOUR FOOD WOULD RUN OUT BEFORE YOU GOT MONEY TO BUY MORE.: PATIENT DECLINED

## 2022-06-28 ASSESSMENT — ENCOUNTER SYMPTOMS
CONSTIPATION: 0
SORE THROAT: 0
CHEST TIGHTNESS: 0
DIARRHEA: 0
NAUSEA: 0
BLOOD IN STOOL: 0
ABDOMINAL PAIN: 0
COUGH: 0
RHINORRHEA: 0
VOMITING: 0
SHORTNESS OF BREATH: 0
EYE PAIN: 0

## 2022-06-28 ASSESSMENT — SOCIAL DETERMINANTS OF HEALTH (SDOH): HOW HARD IS IT FOR YOU TO PAY FOR THE VERY BASICS LIKE FOOD, HOUSING, MEDICAL CARE, AND HEATING?: PATIENT DECLINED

## 2022-06-28 NOTE — PROGRESS NOTES
TeleMedicine Video Visit    Margie Valiente, was evaluated through a synchronous (real-time) audio-video encounter. The patient (or guardian if applicable) is aware that this is a billable service. , which includes applicable co-pays. This virtual visit was conducted with the patient's  (and/or legal guardian's) consent. The visit was conducted pursuant to the emergency declaration under the [address] [place] [address], [address] [place] [address] and the [employer] Act. Patient identification was verified, and a caregiver was present when appropriate. The patient was located in a state where the provider was licensed to provide care. Patient identification was verified at the start of the visit, including the patient's telephone number and physical location. I discussed with the patient the nature of our telehealth visits, that:     1. Due to the nature of an audio- video modality, the only components of a physical exam that could be done are the elements supported by direct observation. 2. I would evaluate the patient and recommend diagnostics and treatments based on my assessment. 3. If it was felt that the patient should be evaluated in clinic or an emergency room setting, then they would be directed there. 4. Our sessions are not being recorded and that personal health information is protected. 5. Our team would provide follow up care in person if/when the patient needs it. Patient's location: home address in [facility]  Physician  location office  other people involved in call  - none     This visit was completed virtually using Doxy. me    2022    TELEHEALTH EVALUATION -- Audio/Visual (During XOZBM-06 public health emergency)    HPI:    Darlene Douglas (:  1945) has requested an audio/video evaluation for the following concern(s):    derm () rt inf malar cheek, seborrheic keratosis    - States had seizure.  States pt started yelling and was rolled over was not responding. Placed trilogy machine. States slowly began to recover. Discussed with neurology, felt seizure. Did not think work up necessary at present. Per patient phone call (4/22) - brief seizure last night, that the tip of her tongue, no other complication, did not seek care. Last visit with neuro per reviewed note (4/22) - give her lorazepam 1 tab at onset of seizure. States has had another seizure (6/2022). Was given lorazepam x2 , lasted 5 minutes and had post ictal      -Patient has CAD. Has had ST elevation MI (2020) angio and stent to LAD and NSTEMI for instent stenosis (1/21). found to have severe anterior and septal hypokinesia with a apical dyskinesia and ejection fraction 20 to 25% with a small apical left ventricular thrombus on echo. Off eliquis. On ranexa 1000mg twice a day. On plavix. On isosorbide. On Coreg. On bumex. On aspirin. Has hold parameters for medications and will hold if BP too low. No further chest pain or SOB. Last visit with cardiology per reviewed note (2/22) -no changes, lifelong dual antiplatelet therapy given incident stenosis, follow-up in 6 months     - States has low blood count, Anemia. States has seen hem/onc treated with  IV iron  Feraheme 510mg x2, keep Hb greater than 7, last visit with hematology per reviewed note  (4/22) - f/u lab in 1m, if iron low. Had seen GI (6/21) - do not feel gi bleeding, check hemoccult stool x 6 if neg no further      - Patient had left obstructing ureteral calculus and had a cystoscopy with ureteral stent and stone manipulation. Has saldana cath. (11/20) - Cystoscopy. Complex flexible ureteroscopy. Laser lithotripsy Stent exchange, removed. No current symptoms.      - States had bladder issues and overactive bladder. Stopped oxybutynin. States has chronic saldana catheter. States was following with urology. Was given botox treatment in past. Off myrbetric. Removed saldana, voiding ok, wears adult pads. last visit last visit per reviewed note  (12/21) - some incontince - trial of myrbetriq, states was given a script for cipro for in case of urine     - elevated blood sugars. most recent A1c (9/2021) was 4.8  - not known to be diabetic. Blood sugars were running . On metformin. No reported side effects. Last visit with endo (12/2021) - no changes.      - States has high cholesterol. States Lipitor. No reported side effects. Stopped zetia. Last lab (9/2021) - improved.      -  states constipated. States on senokot-2. States on miralax. Stopped colace. Stopped oral iron therapy.      - States has high blood pressure. States has been holding some of her heart medications due to the low blood pressure at times. On coreg 3.125mg bid. On bumex. On isosorbide, off lisinopril. Has hold parameters for medications and will hold if BP too low     - States has hypothyroidism. on levothyroxine. More compliant. Last lab was 6/2022 - stable.      - States has depression. On citalopram. Stable on medications. No reported side effects. No suicidal thoughts. Last PHQ score of 0  (6/10/2021)      - States found lung mass and needs pulmonary follow up yearly (10/2020). States PET suggested scar - no further imaging needed at present. Last visit with pulm per reviewed note (3/2022) - stable, cont trilogy vent, f/u 1 year - will need referral      - States has multiple sclerosis. States follows with neurology. States stopped ticferdia. States on baclofen 20mg 4x per day and dantrolene 50mg 4x per day. Spasms stable. States weakness to hands b/l - difficult to feed self at times. Still with right hand weakness.      - States had hip fracture s/p left ORIF. States has been to physical therapy. Not walking. States having issues with left knee and leg.  Released from [address] [place] [address] of right carotid artery- declines ultrasound 3/28/2022    Review of Systems   Constitutional: Negative for appetite change, chills, fever and unexpected weight change. HENT: Negative for congestion, rhinorrhea and sore throat. Eyes: Negative for pain and visual disturbance. Respiratory: Negative for cough, chest tightness and shortness of breath. Cardiovascular: Negative for chest pain and palpitations. Gastrointestinal: Negative for abdominal pain, blood in stool, constipation, diarrhea, nausea and vomiting. Genitourinary: Negative for difficulty urinating, dysuria, frequency, pelvic pain, urgency and vaginal bleeding. Musculoskeletal: Negative for arthralgias and myalgias. Skin: Negative for rash. Neurological: Negative for dizziness, syncope, weakness, light-headedness, numbness and headaches. Hematological: Negative for adenopathy. Psychiatric/Behavioral: Negative for suicidal ideas. The patient is not nervous/anxious. Prior to Visit Medications    Medication Sig Taking? Authorizing Provider   baclofen (LIORESAL) 20 MG tablet Take 2 tablets by mouth 2 times daily Yes Blanche Salcido MD   metFORMIN (GLUCOPHAGE) 500 MG tablet Take 1 tablet by mouth 2 times daily (with meals) Yes Blanche Salcido MD   clopidogrel (PLAVIX) 75 MG tablet Take 1 tablet by mouth daily Yes Blanche Salcido MD   LORazepam (ATIVAN) 1 MG tablet Take 1 tablet by mouth 2 times daily as needed for Anxiety for up to 180 days.  Yes Tamera Melendez MD   carvedilol (COREG) 3.125 MG tablet Take 1 tablet by mouth 2 times daily If BP is 80/55 or higher and pulse should be 60 or higher Yes Blanche Salcido MD   isosorbide mononitrate (IMDUR) 30 MG extended release tablet Take 1 tablet by mouth daily If BP is 80/55 or higher Yes Blanche Salcido MD   citalopram (CELEXA) 20 MG tablet Take 1 tablet by mouth every morning Yes Blanche Salcido MD   dantrolene (DANTRIUM) 100 MG capsule Take 1 capsule by mouth 2 times daily Yes Blanche Salcido MD   levothyroxine (SYNTHROID) 88 MCG tablet Take 1 tablet by mouth daily Yes Blanche Salcido MD   atorvastatin (LIPITOR) 10 MG tablet 1 po q hs Yes Bhanu Laughlin MD   bumetanide (BUMEX) 1 MG tablet Take 1 tablet by mouth 2 times daily BP must be greater than 95/50 Yes Bhanu Laughlin MD   nitroGLYCERIN (NITROSTAT) 0.4 MG SL tablet place 1 tablet under the tongue if needed every 5 minutes for inrgid...  (REFER TO PRESCRIPTION NOTES). Yes Historical Provider, MD   ipratropium-albuterol (DUONEB) 0.5-2.5 (3) MG/3ML SOLN nebulizer solution Inhale 3 mLs into the lungs every 6 hours as needed for Shortness of Breath  Patient taking differently: Inhale 1 vial into the lungs every 6 hours  Yes Bhanu Laughlin MD   Lancets MISC 1 each by Does not apply route daily Yes Bhanu Laughlin MD   blood glucose test strips (ASCENSIA AUTODISC VI;ONE TOUCH ULTRA TEST VI) strip 1 each by In Vitro route 2 times daily As needed. Yes Bhanu Laughlin MD   Acetaminophen (TYLENOL PO) Take by mouth as needed Yes Historical Provider, MD   ranolazine (RANEXA) 1000 MG extended release tablet Take 1,000 mg by mouth 2 times daily  Yes Historical Provider, MD   fluticasone (FLONASE) 50 MCG/ACT nasal spray 2 sprays by Each Nostril route daily as needed for Rhinitis Yes Bhanu Laughlin MD   ondansetron (ZOFRAN ODT) 4 MG disintegrating tablet Take 1 tablet by mouth every 8 hours as needed for Nausea or Vomiting Yes Telma Bowman MD   OXYGEN Inhale 2 L into the lungs continuous  Yes Historical Provider, MD   aspirin 81 MG EC tablet Take 81 mg by mouth daily Yes Historical Provider, MD   blood glucose monitor kit and supplies Dispense sufficient amount for indicated testing frequency plus additional to accommodate PRN testing needs. Dispense all needed supplies to include: monitor, strips, lancing device, lancets, control solutions, alcohol swabs. Yes Bhanu Laughlin MD   blood glucose monitor strips Test qd  & as needed for symptoms of irregular blood glucose.  Dispense sufficient amount for indicated testing frequency plus additional to accommodate PRN testing needs.  Yes Chloe Mckenna MD   Catheters (URETHRAL CATHETER) MISC by Does not apply route Yes Historical Provider, MD   Cyanocobalamin (VITAMIN B 12 PO) Take 500 mg by mouth daily  Yes Historical Provider, MD   Calcium Carbonate-Vit D-Min (CALCIUM 600+D PLUS MINERALS) 600-400 MG-UNIT TABS Take 1 tablet by mouth nightly  Yes Historical Provider, MD   polyethylene glycol (MIRALAX) PACK packet Take 7.3 g by mouth daily  Yes Historical Provider, MD   vitamin D3 (CHOLECALCIFEROL) 25 MCG (1000 UT) TABS tablet Take 1 tablet by mouth every morning  Yes Historical Provider, MD   magnesium (MAGNESIUM-OXIDE) 250 MG TABS tablet Take 250 mg by mouth every morning  Yes Historical Provider, MD   ciprofloxacin (CIPRO) 500 MG tablet as needed   Patient not taking: Reported on 5/23/2022  Historical Provider, MD       Social History     Tobacco Use    Smoking status: Never Smoker    Smokeless tobacco: Never Used   Substance Use Topics    Alcohol use: No     Alcohol/week: 0.0 standard drinks     Comment: Ocassionally    Drug use: No        Allergies   Allergen Reactions    Augmentin [Amoxicillin-Pot Clavulanate] Shortness Of Breath    Bactrim [Sulfamethoxazole-Trimethoprim] Anaphylaxis and Other (See Comments)     Seizures,     Macrobid [Nitrofurantoin] Anaphylaxis     Mental Changes, shaking, stopped breathing, collapsed (happened again Feb 2021)    ,   Past Medical History:   Diagnosis Date    CAD (coronary artery disease) 4/21/2020    High cholesterol     Hypertension     Hypothyroidism     MI (myocardial infarction) ([facility] 04/21/2020    MS (multiple sclerosis) ([facility]     Osteoporosis     Sarcoidosis     Seizure ([facility] 02/05/2021    Type 2 diabetes mellitus with hyperglycemia, without long-term current use of insulin ([facility] 10/30/2019   ,   Past Surgical History:   Procedure Laterality Date    CHOLECYSTECTOMY  1990s    CORONARY ANGIOPLASTY WITH STENT PLACEMENT  04/21/2020    Dr. Caro Ferguson   3.0 x 22mm Resolute MAAME to Prox LAD.   No LV    CORONARY ANGIOPLASTY WITH STENT PLACEMENT  01/14/2021    3.0 x 30 Maame to the prox LAD and a 2.5 x 20 Maame to the Obtuse marginal by Dr. Shakir Saucedo / [address] / [place] Left 4/23/2020    CYSTOSCOPY LEFT RETROGRADE PYELOGRAM STENT INSERTION, [address] performed by Crow Avila DO at [address] Left 3/21/2019    LEFT FEMUR CEPHALLOMEDULLARY NAIL performed by Jean Stewart MD at [facility] Bilateral     HYSTERECTOMY      KNEE SURGERY      plate in lt knee    LITHOTRIPSY Left 11/23/2020    CYSTOSCOPY LEFT URETEROSCOPY,  LASER LITHOTRIPSY  BASKET EXTRACTION LEFT STONE, STENT EXCHANGE performed by Crow Avila DO at [facility] LITHOTRIPSY Left 12/30/2020    CYSTOSCOPY RETROGRADE URETEROSCOPY PYELOGRAM LASER LITHOTRIPSY LEFT J-STENT performed by Crow Avila DO at [address]   ,   Social History     Tobacco Use    Smoking status: Never Smoker    Smokeless tobacco: Never Used   Substance Use Topics    Alcohol use: No     Alcohol/week: 0.0 standard drinks     Comment: Ocassionally    Drug use: No   ,   Immunization History   Administered Date(s) Administered    COVID-19, MODERNA BLUE border, Primary or Immunocompromised, (age 12y+), IM, 100 mcg/0.5mL 03/15/2021, 04/12/2021, 11/02/2021    Influenza 12/15/2010    Influenza A (C8N8-28) Vaccine PF IM 12/02/2009    Influenza Vaccine, unspecified formulation 11/21/2008    Influenza Virus Vaccine 10/27/2014, 10/02/2017    Influenza Whole 10/02/2013, 10/27/2014, 10/21/2015    Influenza, High Dose (Fluzone 65 yrs and older) 10/10/2015, 09/11/2017    Influenza, High-dose, Quadv, 65 yrs +, IM (Fluzone) 09/08/2021    Influenza, Quadv, IM, (6 mo and older Fluzone, Flulaval, Fluarix and 3 yrs and older Afluria) 10/02/2017    Influenza, Quadv, adjuvanted, 65 yrs +, IM, PF (Fluad) 09/24/2020    Influenza, Triv, inactivated, subunit, adjuvanted, IM (Fluad 65 yrs and older) Abnormal-         Skin:        [] No significant exanthematous lesions or discoloration noted on facial skin         [x] Abnormal- ecchymosis to left hand            Psychiatric:       [x] Normal Affect [] No Hallucinations        [] Abnormal-     Other pertinent observable physical exam findings-     ASSESSMENT/PLAN:     Status post seizure  - uncertain etiology   - has discussed with neuro - poss related to MS   - has not started medication - poss keppra if recurs   - no current work up   - no further episodes  - per neuro lorazepam if seizure.      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, unspecified vessel or lesion type [facility])  - s/p cath and stent to LAD (2020), then instent stenosis s/p restent (1/21)   - following with cardio   - has had 2nd opinion With [facility]   - off lisinopril   - off eliquis  - on coreg   - on imdur   - on ranexa   - on bumex   - on plavix  - on aspirin  - will intermittently hold at night if BP is too low based on hold parameters   - stable at present     Chronic systolic congestive heart failure (HCC)  - EF 20% per echo (4/2020)   - on bumex   - on coreg   - off lisinopril   - stable      Frequent UTI   - at risk for further infection   - no current symptoms  - has bactrim to use if symptoms   - following with urology   - given cipro to have on hand by urology      Nephrolithiasis  - s/p cystoscopy and lithotripsy (11/2020)   - saldana removed      Anemia, unspecified type  - following with hematology   - to stop oral iron   - to have IV iron   - referral to GI - did not think GI bleeding (6/2021)   - last lab (3/2022) - lower      Other Constipation  - on senna   - on miralax  - add mag citrate . 25 as needed      Essential hypertension  - watch diet   - off lisinopril   - on bumex   - on coreg twice a day   - on imdur   - on ranex a  - stable per      Type 2 diabetes mellitus with hyperglycemia, without long-term current use of insulin ([facility]  - watch diet   - last A1c was 4.9 (1/2021)   - now on metformin since recent increase in sugars   - on endocrinology      Hypothyroidism, unspecified type  - on levothyroxine  - labs were normal (9/2021)     Chronic respiratory failure with hypoxia (HCC)  - on oxygen - 3 liters   - has trilogy at night   - has seen pulmonary - recommending to cont trilogy   - stable      Hyponatremia  - follow labs   - last lab (3/2022) - borderline low      MS (multiple sclerosis) (HCC)  - ticierda on hold  - stopped nortriptyline   - on baclofen and dantrolene  - non ambulatory  - stable  **she would benefit form a wheelchair that reclines because of her heart condition and low ejection fraction. To help get her out of bed to help with physical therapy and strength but also to be able to recline back when she feels dizzy and to assist with transportation. Current chiar does not meet her changed needs.      Spastic quadriparesis ([address]  - following with neurology   - on baclofen and dantrolene     Mixed hyperlipidemia  - watch diet  - on atrovastatin  - follow up labs     Neurogenic bladder  - off oxybutynin  - off myrbetric - has not started yet   - currently with les      Mild episode of recurrent major depressive disorder (HCC)  - on citalopram  - no suicidal thoughts  - stable     HUDSON (nonalcoholic steatohepatitis)  - following with GI     Osteopenia of multiple sites  - declines bone density  - follows labs     Lung mass  - possible scar (2019)   - has seen pulmonary - yearly   - stable per last pulm note (3/2022)     Bruit of right carotid artery  - declines ultrasound as of present 3/28/2022     H/O sarcoidosis    **she would benefit form a wheelchair that reclines because of her heart condition and low ejection fraction. To help get her out of bed to help with physical therapy and strength but also to be able to recline back when she feels dizzy and to assist with transportation.  Current chiar does not meet her changed needs. Has new power chair. Return in about 3 months (around 9/28/2022) for check up and review. Orders Placed This Encounter   Procedures   Jewel Rankin DO, Pulmonary, [place]     Referral Priority:   Routine     Referral Type:   Eval and Treat     Referral Reason:   Specialty Services Required     Referred to Provider:   Cuco North DO     Requested Specialty:   Pulmonology     Number of Visits Requested:   1     Requested Prescriptions      No prescriptions requested or ordered in this encounter       Ricky Gonzales, was evaluated through a synchronous (real-time) audio-video encounter. The patient (or guardian if applicable) is aware that this is a billable service, which includes applicable co-pays. This Virtual Visit was conducted with patient's (and/or legal guardian's) consent. The visit was conducted pursuant to the emergency declaration under the [address] [place] [address], [address] [place] [address] waiver authority and the [employer]. Patient identification was verified, and a caregiver was present when appropriate. The patient was located at Home: [address] [place] [address]  [address] Provider was located at [facility] (Appt Dept): [address] [place] [address],  [address] [place] [address]. Total time spent on this encounter: Not billed by time    --Craig Harrington MD on 6/28/2022 at 2:23 PM    An electronic signature was used to authenticate this note.

## 2022-06-29 ENCOUNTER — TELEPHONE (OUTPATIENT)
Dept: ENDOCRINOLOGY | Age: 77
End: 2022-06-29

## 2022-06-29 NOTE — TELEPHONE ENCOUNTER
----- Message from ELIE Alcantara sent at 6/27/2022  5:47 PM EDT -----  Please call patient and inform her thyroid function is normal.  No change in therapy at this time

## 2022-07-11 DIAGNOSIS — E03.8 OTHER SPECIFIED HYPOTHYROIDISM: ICD-10-CM

## 2022-07-11 DIAGNOSIS — E78.2 MIXED HYPERLIPIDEMIA: ICD-10-CM

## 2022-07-11 RX ORDER — LEVOTHYROXINE SODIUM 88 UG/1
88 TABLET ORAL DAILY
Qty: 90 TABLET | Refills: 0 | Status: SHIPPED
Start: 2022-07-11 | End: 2022-09-28 | Stop reason: SDUPTHER

## 2022-07-11 RX ORDER — ATORVASTATIN CALCIUM 10 MG/1
TABLET, FILM COATED ORAL
Qty: 90 TABLET | Refills: 0 | Status: SHIPPED
Start: 2022-07-11 | End: 2022-09-28 | Stop reason: SDUPTHER

## 2022-07-11 NOTE — TELEPHONE ENCOUNTER
Last Appointment:  6/28/2022  Future Appointments   Date Time Provider Marisol Garcia   8/8/2022 11:00 AM Miguel Jett MD [facility]   9/28/2022  1:30 PM Ren Farfan  [address] [place] [address]   10/24/2022  2:15 PM Yudy White MD [facility]   11/2/2022  1:40 PM Shiela Escamilla MD [id number]

## 2022-08-08 ENCOUNTER — OFFICE VISIT (OUTPATIENT)
Dept: PULMONOLOGY | Age: 77
End: 2022-08-08
Payer: MEDICARE

## 2022-08-08 DIAGNOSIS — Z86.2 H/O SARCOIDOSIS: Chronic | ICD-10-CM

## 2022-08-08 DIAGNOSIS — J96.12 CHRONIC RESPIRATORY FAILURE WITH HYPOXIA AND HYPERCAPNIA (HCC): ICD-10-CM

## 2022-08-08 DIAGNOSIS — J90 BILATERAL PLEURAL EFFUSION: ICD-10-CM

## 2022-08-08 DIAGNOSIS — Z23 NEED FOR 23-POLYVALENT PNEUMOCOCCAL POLYSACCHARIDE VACCINE: Primary | ICD-10-CM

## 2022-08-08 DIAGNOSIS — J98.4 RESTRICTIVE LUNG DISEASE: ICD-10-CM

## 2022-08-08 DIAGNOSIS — J96.11 CHRONIC RESPIRATORY FAILURE WITH HYPOXIA AND HYPERCAPNIA (HCC): ICD-10-CM

## 2022-08-08 LAB
DLCO %PRED: 79 %
DLCO PRED: 18.87 ML/MIN/MMHG
DLCO/VA %PRED: NORMAL
DLCO/VA PRED: NORMAL
DLCO/VA: NORMAL
DLCO: 15.09 ML/MIN/MMHG
EXPIRATORY TIME-POST: NORMAL
EXPIRATORY TIME: NORMAL
FEF 25-75% %CHNG: NORMAL
FEF 25-75% %PRED-POST: NORMAL
FEF 25-75% %PRED-PRE: NORMAL
FEF 25-75% PRED: NORMAL
FEF 25-75%-POST: NORMAL
FEF 25-75%-PRE: NORMAL
FEV1 %PRED-POST: 42 %
FEV1 %PRED-PRE: 40 %
FEV1 PRED: 1.77 L
FEV1-POST: 0.75 L
FEV1-PRE: 0.72 L
FEV1/FVC %PRED-POST: 90 %
FEV1/FVC %PRED-PRE: 89 %
FEV1/FVC PRED: 78 %
FEV1/FVC-POST: 71 %
FEV1/FVC-PRE: 70 %
FVC %PRED-POST: 46 L
FVC %PRED-PRE: 44 %
FVC PRED: 2.29 L
FVC-POST: 1.06 L
FVC-PRE: 1.03 L
GAW %PRED: NORMAL
GAW PRED: NORMAL
GAW: NORMAL
IC %PRED: NORMAL
IC PRED: NORMAL
IC: NORMAL
MEP: NORMAL
MIP: NORMAL
MVV %PRED-PRE: NORMAL
MVV PRED: NORMAL
MVV-PRE: NORMAL
PEF %PRED-POST: NORMAL
PEF %PRED-PRE: NORMAL
PEF PRED: NORMAL
PEF%CHNG: NORMAL
PEF-POST: NORMAL
PEF-PRE: NORMAL
RAW %PRED: NORMAL
RAW PRED: NORMAL
RAW: NORMAL
RV %PRED: NORMAL
RV PRED: NORMAL
RV: NORMAL
SVC %PRED: NORMAL
SVC PRED: NORMAL
SVC: NORMAL
TLC %PRED: 65 %
TLC PRED: 4.45 L
TLC: 2.9 L
VA %PRED: NORMAL
VA PRED: NORMAL
VA: NORMAL
VTG %PRED: NORMAL
VTG PRED: NORMAL
VTG: NORMAL

## 2022-08-08 PROCEDURE — G8400 PT W/DXA NO RESULTS DOC: HCPCS | Performed by: INTERNAL MEDICINE

## 2022-08-08 PROCEDURE — G0009 ADMIN PNEUMOCOCCAL VACCINE: HCPCS | Performed by: INTERNAL MEDICINE

## 2022-08-08 PROCEDURE — 1123F ACP DISCUSS/DSCN MKR DOCD: CPT | Performed by: INTERNAL MEDICINE

## 2022-08-08 PROCEDURE — 90732 PPSV23 VACC 2 YRS+ SUBQ/IM: CPT | Performed by: INTERNAL MEDICINE

## 2022-08-08 PROCEDURE — 99215 OFFICE O/P EST HI 40 MIN: CPT | Performed by: INTERNAL MEDICINE

## 2022-08-08 PROCEDURE — 94726 PLETHYSMOGRAPHY LUNG VOLUMES: CPT | Performed by: INTERNAL MEDICINE

## 2022-08-08 PROCEDURE — 1090F PRES/ABSN URINE INCON ASSESS: CPT | Performed by: INTERNAL MEDICINE

## 2022-08-08 PROCEDURE — 94060 EVALUATION OF WHEEZING: CPT | Performed by: INTERNAL MEDICINE

## 2022-08-08 PROCEDURE — 1036F TOBACCO NON-USER: CPT | Performed by: INTERNAL MEDICINE

## 2022-08-08 PROCEDURE — G8420 CALC BMI NORM PARAMETERS: HCPCS | Performed by: INTERNAL MEDICINE

## 2022-08-08 PROCEDURE — 94727 GAS DIL/WSHOT DETER LNG VOL: CPT | Performed by: INTERNAL MEDICINE

## 2022-08-08 PROCEDURE — G8428 CUR MEDS NOT DOCUMENT: HCPCS | Performed by: INTERNAL MEDICINE

## 2022-08-08 PROCEDURE — 99213 OFFICE O/P EST LOW 20 MIN: CPT | Performed by: INTERNAL MEDICINE

## 2022-08-08 PROCEDURE — 94729 DIFFUSING CAPACITY: CPT | Performed by: INTERNAL MEDICINE

## 2022-08-08 ASSESSMENT — PULMONARY FUNCTION TESTS
FEV1_POST: 0.75
FEV1_PERCENT_PREDICTED_POST: 42
FEV1/FVC_POST: 71
FEV1/FVC_PRE: 70
FEV1/FVC_PERCENT_PREDICTED_POST: 90
FVC_PRE: 1.03
FVC_PERCENT_PREDICTED_POST: 46
FEV1_PREDICTED: 1.77
FVC_PERCENT_PREDICTED_PRE: 44
FVC_POST: 1.06
FVC_PREDICTED: 2.29
FEV1/FVC_PREDICTED: 78
FEV1/FVC_PERCENT_PREDICTED_PRE: 89
FEV1_PERCENT_PREDICTED_PRE: 40
FEV1_PRE: 0.72

## 2022-08-08 NOTE — PROGRESS NOTES
[facility]  Department of Pulmonary, Critical Care and Sleep Medicine      Pulmonary & Critical Care Office Note - Follow up      Assessment/Plan     Assessment & Plan     No problem-specific Assessment & Plan notes found for this encounter. Problem List Items Addressed This Visit          Respiratory    Chronic respiratory failure with hypoxia and hypercapnia (HCC)    Relevant Orders    Full PFT Study With Bronchodilator (Completed)    Pneumococcal, PPSV23, PNEUMOVAX 23, (age 2 yrs+), SC/IM (Completed)    Bilateral pleural effusion    Relevant Orders    Full PFT Study With Bronchodilator (Completed)    Restrictive lung disease       Other    H/O sarcoidosis (Chronic)    Relevant Orders    Full PFT Study With Bronchodilator (Completed)     Other Visit Diagnoses       Need for 23-polyvalent pneumococcal polysaccharide vaccine    -  Primary    Relevant Orders    Pneumococcal, PPSV23, PNEUMOVAX 21, (age 3 yrs+), SC/IM (Completed)             Plan:     Chani Llamas has advanced multiple sclerosis with restrictive lung disease. Continue DuoNeb for symptom management  Continue Trilogy ([employer]) nightly and with naps. Will obtain settings and compliance reports. Sarcoidosis is stable with calcified granulomas. Monitor clinically    Continue Flonase for her allergies. Discussed in detail high risk for aspiration. Strict aspiration precautions. She was recommended SLP evaluation. They will consider this after discussing with her neurologist.    She looks euvolemic. Continue Bumex, low-salt diet  Will consider thoracentesis if her pleural effusions worsen and she becomes symptomatic. She was also advised on staying up-to-date with her vaccinations. Administered PPSV 23 vaccine. Follow up: Return in about 6 months (around 2/8/2023).     Immunization History   Administered Date(s) Administered    COVID-19, MODERNA BLUE border, Primary or Immunocompromised, (age 12y+), IM, 100 mcg/0.5mL 03/15/2021, 04/12/2021, 11/02/2021    Influenza 12/15/2010    Influenza A ([id number]) Vaccine PF IM 12/02/2009    Influenza Vaccine, unspecified formulation 11/21/2008    Influenza Virus Vaccine 10/27/2014, 10/02/2017    Influenza Whole 10/02/2013, 10/27/2014, 10/21/2015    Influenza, High Dose (Fluzone 65 yrs and older) 10/10/2015, 09/11/2017, 09/08/2021    Influenza, High-dose, Quadv, 65 yrs +, IM (Fluzone) 09/08/2021    Influenza, Quadv, IM, (6 mo and older Fluzone, Flulaval, Fluarix and 3 yrs and older Afluria) 10/02/2017    Influenza, Quadv, adjuvanted, 65 yrs +, IM, PF (Fluad) 09/24/2020    Influenza, Triv, inactivated, subunit, adjuvanted, IM (Fluad 65 yrs and older) 09/19/2018    Pneumococcal Conjugate 13-valent ([place] [employer]) 01/15/2013, 09/23/2015, 09/18/2018, 09/19/2018    Pneumococcal Polysaccharide (Ckickosab32) 09/18/2013, 08/08/2022       Subjective   Patient ID: Abdirahman Nolan is a 69 y.o. female  Chief Complaint:   HPI: Patient is a 69 y.o. female is here for followup. LOV: 3/7/22. H/o advanced multiple sclerosis with chronic respiratory failure on 2 L home oxygen and trilogy, sarcoidosis (1980), coronary artery disease s/p stents, ischemic cardiomyopathy with EF 20%, bilateral pleural effusions, moderate pulmonary hypertension. Pulm Meds: Duoneb    Patient is wheelchair-bound and her  who is her caretaker is also here with the visit. She denies any significant shortness of breath. Denies any fever, chills, chest pain or cough. She uses DuoNeb nebulizers at home once daily and this improves her shortness of breath per patient. She also on trilogy at night using at least 4 hours nightly and during daytime naps. PFT Current   FEV1 / % Pred 0.72 (40%)   FVC 1.03 (44%)   TLC 2.9 (65%)   DLCO 18.87 (79%)     No obstruction  + Air trapping  Moderate Restriction  Mild Diffusion defect.      PET CT May 2019 -   Nodular area of attenuation seen on prior CT chest located in right 5/23/2022)      bumetanide (BUMEX) 1 MG tablet Take 1 tablet by mouth 2 times daily BP must be greater than 95/50 180 tablet 1    nitroGLYCERIN (NITROSTAT) 0.4 MG SL tablet place 1 tablet under the tongue if needed every 5 minutes for ingrid...  (REFER TO PRESCRIPTION NOTES). ipratropium-albuterol (DUONEB) 0.5-2.5 (3) MG/3ML SOLN nebulizer solution Inhale 3 mLs into the lungs every 6 hours as needed for Shortness of Breath (Patient taking differently: Inhale 1 vial into the lungs every 6 hours ) 360 mL 6    Lancets MISC 1 each by Does not apply route daily 100 each 11    blood glucose test strips (ASCENSIA AUTODISC VI;ONE TOUCH ULTRA TEST VI) strip 1 each by In Vitro route 2 times daily As needed. 100 each 11    Acetaminophen (TYLENOL PO) Take by mouth as needed      ranolazine (RANEXA) 1000 MG extended release tablet Take 1,000 mg by mouth 2 times daily       fluticasone (FLONASE) 50 MCG/ACT nasal spray 2 sprays by Each Nostril route daily as needed for Rhinitis 1 Bottle 1    ondansetron (ZOFRAN ODT) 4 MG disintegrating tablet Take 1 tablet by mouth every 8 hours as needed for Nausea or Vomiting 30 tablet 0    OXYGEN Inhale 2 L into the lungs continuous       aspirin 81 MG EC tablet Take 81 mg by mouth daily      blood glucose monitor kit and supplies Dispense sufficient amount for indicated testing frequency plus additional to accommodate PRN testing needs. Dispense all needed supplies to include: monitor, strips, lancing device, lancets, control solutions, alcohol swabs. 1 kit 0    blood glucose monitor strips Test qd  & as needed for symptoms of irregular blood glucose. Dispense sufficient amount for indicated testing frequency plus additional to accommodate PRN testing needs.  50 strip 11    Catheters (URETHRAL CATHETER) MISC by Does not apply route      Cyanocobalamin (VITAMIN B 12 PO) Take 500 mg by mouth daily       Calcium Carbonate-Vit D-Min (CALCIUM 600+D PLUS MINERALS) 600-400 MG-UNIT TABS Take 1 tablet by mouth nightly       polyethylene glycol (MIRALAX) PACK packet Take 7.3 g by mouth daily       vitamin D3 (CHOLECALCIFEROL) 25 MCG (1000 UT) TABS tablet Take 1 tablet by mouth every morning       magnesium (MAGNESIUM-OXIDE) 250 MG TABS tablet Take 250 mg by mouth every morning        No current facility-administered medications for this visit. Review of Systems: -ve other than specified above. Objective       Vitals:  BP: (!) (P) 127/56, Heart Rate: (P) 70, Resp: (P) 18, Temp: (P) 97.1 °F (36.2 °C),  , SpO2: (P) 100 %, Height: (P) 5' (152.4 cm), Weight: (P) 125 lb (56.7 kg)    BMI body mass index is 24.41 kg/m² (pended). Ideal Body Weight: Patient weight not recorded  ---------------  Physical Exam:    General: Alert and oriented x 3. No acute distress. Eyes:  Vision - grossly normal, PERRLA  HENT:  Head is atraumatic and normocephalic. Neck is supple, no jugular venous distention. Respiratory: Mildly decreased bilateral basilar breath sounds, with some crackles basally, respirations are nonlabored, breath sounds are equal.  Cardiovascular:  S1, S2 normal, regular rate and rhythm, no murmur, no pedal edema  Gastrointestinal:  Soft, nontender, nondistended. Normal bowel sounds. No organomegaly  Neurologic:  Awake and alert, no new focal motor or sensory deficits.       Labs     CBC:   Lab Results   Component Value Date    WBC 7.4 08/08/2022    HGB 7.7 (L) 08/08/2022    HCT 23.0 (L) 08/08/2022    MCV 94.5 08/08/2022     (H) 08/08/2022     BMP:     Chemistry        Component Value Date/Time     (L) 08/08/2022 1331    K 4.3 08/08/2022 1331    K 6.5 (H) 01/12/2021 2111    CL 93 (L) 08/08/2022 1331    CO2 29 08/08/2022 1331    BUN 28 (H) 08/08/2022 1331    CREATININE 0.7 08/08/2022 1331        Component Value Date/Time    CALCIUM 9.0 08/08/2022 1331    ALKPHOS 60 08/08/2022 1331    AST 18 08/08/2022 1331    ALT 11 08/08/2022 1331    BILITOT 0.2 (L) 08/08/2022 1331          LFTs:   Lab

## 2022-08-30 DIAGNOSIS — F33.0 MILD EPISODE OF RECURRENT MAJOR DEPRESSIVE DISORDER (HCC): ICD-10-CM

## 2022-08-30 RX ORDER — CITALOPRAM 20 MG/1
20 TABLET ORAL EVERY MORNING
Qty: 90 TABLET | Refills: 1 | Status: SHIPPED
Start: 2022-08-30 | End: 2022-09-28 | Stop reason: SDUPTHER

## 2022-09-28 ENCOUNTER — OFFICE VISIT (OUTPATIENT)
Dept: FAMILY MEDICINE CLINIC | Age: 77
End: 2022-09-28
Payer: MEDICARE

## 2022-09-28 VITALS
TEMPERATURE: 97.7 F | OXYGEN SATURATION: 100 % | SYSTOLIC BLOOD PRESSURE: 120 MMHG | RESPIRATION RATE: 18 BRPM | DIASTOLIC BLOOD PRESSURE: 60 MMHG | BODY MASS INDEX: 24.41 KG/M2 | HEART RATE: 78 BPM | HEIGHT: 60 IN

## 2022-09-28 VITALS
HEIGHT: 60 IN | BODY MASS INDEX: 24.41 KG/M2 | DIASTOLIC BLOOD PRESSURE: 60 MMHG | OXYGEN SATURATION: 100 % | RESPIRATION RATE: 18 BRPM | HEART RATE: 78 BPM | SYSTOLIC BLOOD PRESSURE: 120 MMHG | TEMPERATURE: 97.7 F

## 2022-09-28 DIAGNOSIS — E78.2 MIXED HYPERLIPIDEMIA: ICD-10-CM

## 2022-09-28 DIAGNOSIS — I50.22 CHRONIC SYSTOLIC (CONGESTIVE) HEART FAILURE (HCC): ICD-10-CM

## 2022-09-28 DIAGNOSIS — E87.1 HYPONATREMIA: ICD-10-CM

## 2022-09-28 DIAGNOSIS — I10 ESSENTIAL HYPERTENSION: ICD-10-CM

## 2022-09-28 DIAGNOSIS — E11.65 TYPE 2 DIABETES MELLITUS WITH HYPERGLYCEMIA, WITHOUT LONG-TERM CURRENT USE OF INSULIN (HCC): ICD-10-CM

## 2022-09-28 DIAGNOSIS — G35 MS (MULTIPLE SCLEROSIS) (HCC): ICD-10-CM

## 2022-09-28 DIAGNOSIS — N39.0 FREQUENT UTI: ICD-10-CM

## 2022-09-28 DIAGNOSIS — E03.8 OTHER SPECIFIED HYPOTHYROIDISM: ICD-10-CM

## 2022-09-28 DIAGNOSIS — G82.50 SPASTIC QUADRIPARESIS (HCC): Chronic | ICD-10-CM

## 2022-09-28 DIAGNOSIS — Z00.00 MEDICARE ANNUAL WELLNESS VISIT, SUBSEQUENT: Primary | ICD-10-CM

## 2022-09-28 DIAGNOSIS — R91.8 LUNG MASS: ICD-10-CM

## 2022-09-28 DIAGNOSIS — Z79.899 LONG TERM CURRENT USE OF THERAPEUTIC DRUG: ICD-10-CM

## 2022-09-28 DIAGNOSIS — N20.0 NEPHROLITHIASIS: ICD-10-CM

## 2022-09-28 DIAGNOSIS — E55.9 VITAMIN D DEFICIENCY: ICD-10-CM

## 2022-09-28 DIAGNOSIS — F33.0 MILD EPISODE OF RECURRENT MAJOR DEPRESSIVE DISORDER (HCC): ICD-10-CM

## 2022-09-28 DIAGNOSIS — Z86.69 STATUS POST SEIZURE: Primary | ICD-10-CM

## 2022-09-28 DIAGNOSIS — D64.9 ANEMIA, UNSPECIFIED TYPE: ICD-10-CM

## 2022-09-28 DIAGNOSIS — Z86.2 H/O SARCOIDOSIS: ICD-10-CM

## 2022-09-28 DIAGNOSIS — K59.09 OTHER CONSTIPATION: ICD-10-CM

## 2022-09-28 DIAGNOSIS — I25.110 CORONARY ARTERY DISEASE INVOLVING NATIVE HEART WITH UNSTABLE ANGINA PECTORIS, UNSPECIFIED VESSEL OR LESION TYPE (HCC): ICD-10-CM

## 2022-09-28 PROCEDURE — 1090F PRES/ABSN URINE INCON ASSESS: CPT | Performed by: INTERNAL MEDICINE

## 2022-09-28 PROCEDURE — G8400 PT W/DXA NO RESULTS DOC: HCPCS | Performed by: INTERNAL MEDICINE

## 2022-09-28 PROCEDURE — G8427 DOCREV CUR MEDS BY ELIG CLIN: HCPCS | Performed by: INTERNAL MEDICINE

## 2022-09-28 PROCEDURE — G8420 CALC BMI NORM PARAMETERS: HCPCS | Performed by: INTERNAL MEDICINE

## 2022-09-28 PROCEDURE — 99214 OFFICE O/P EST MOD 30 MIN: CPT | Performed by: INTERNAL MEDICINE

## 2022-09-28 PROCEDURE — 1036F TOBACCO NON-USER: CPT | Performed by: INTERNAL MEDICINE

## 2022-09-28 PROCEDURE — 1123F ACP DISCUSS/DSCN MKR DOCD: CPT | Performed by: INTERNAL MEDICINE

## 2022-09-28 PROCEDURE — G0439 PPPS, SUBSEQ VISIT: HCPCS | Performed by: INTERNAL MEDICINE

## 2022-09-28 RX ORDER — ATORVASTATIN CALCIUM 10 MG/1
TABLET, FILM COATED ORAL
Qty: 90 TABLET | Refills: 3 | Status: SHIPPED | OUTPATIENT
Start: 2022-09-28

## 2022-09-28 RX ORDER — BACLOFEN 20 MG/1
40 TABLET ORAL 2 TIMES DAILY
Qty: 360 TABLET | Refills: 3 | Status: SHIPPED | OUTPATIENT
Start: 2022-09-28

## 2022-09-28 RX ORDER — LEVOTHYROXINE SODIUM 88 UG/1
88 TABLET ORAL DAILY
Qty: 90 TABLET | Refills: 3 | Status: SHIPPED | OUTPATIENT
Start: 2022-09-28

## 2022-09-28 RX ORDER — SODIUM CHLORIDE 1000 MG
1 TABLET, SOLUBLE MISCELLANEOUS DAILY
Qty: 90 TABLET | Refills: 3 | Status: SHIPPED | OUTPATIENT
Start: 2022-09-28

## 2022-09-28 RX ORDER — CLOPIDOGREL BISULFATE 75 MG/1
75 TABLET ORAL DAILY
Qty: 90 TABLET | Refills: 3 | Status: SHIPPED | OUTPATIENT
Start: 2022-09-28

## 2022-09-28 RX ORDER — BUMETANIDE 1 MG/1
1 TABLET ORAL 2 TIMES DAILY
Qty: 180 TABLET | Refills: 3 | Status: SHIPPED | OUTPATIENT
Start: 2022-09-28

## 2022-09-28 RX ORDER — CITALOPRAM 20 MG/1
20 TABLET ORAL EVERY MORNING
Qty: 90 TABLET | Refills: 3 | Status: SHIPPED | OUTPATIENT
Start: 2022-09-28

## 2022-09-28 RX ORDER — CARVEDILOL 3.12 MG/1
3.12 TABLET ORAL 2 TIMES DAILY
Qty: 180 TABLET | Refills: 3 | Status: SHIPPED | OUTPATIENT
Start: 2022-09-28

## 2022-09-28 RX ORDER — DANTROLENE SODIUM 100 MG/1
100 CAPSULE ORAL 2 TIMES DAILY
Qty: 180 CAPSULE | Refills: 3 | Status: SHIPPED | OUTPATIENT
Start: 2022-09-28

## 2022-09-28 RX ORDER — ISOSORBIDE MONONITRATE 30 MG/1
30 TABLET, EXTENDED RELEASE ORAL DAILY
Qty: 90 TABLET | Refills: 3 | Status: SHIPPED | OUTPATIENT
Start: 2022-09-28

## 2022-09-28 ASSESSMENT — PATIENT HEALTH QUESTIONNAIRE - PHQ9
5. POOR APPETITE OR OVEREATING: 0
SUM OF ALL RESPONSES TO PHQ QUESTIONS 1-9: 0
8. MOVING OR SPEAKING SO SLOWLY THAT OTHER PEOPLE COULD HAVE NOTICED. OR THE OPPOSITE, BEING SO FIGETY OR RESTLESS THAT YOU HAVE BEEN MOVING AROUND A LOT MORE THAN USUAL: 0
2. FEELING DOWN, DEPRESSED OR HOPELESS: 0
SUM OF ALL RESPONSES TO PHQ QUESTIONS 1-9: 0
7. TROUBLE CONCENTRATING ON THINGS, SUCH AS READING THE NEWSPAPER OR WATCHING TELEVISION: 0
SUM OF ALL RESPONSES TO PHQ QUESTIONS 1-9: 0
3. TROUBLE FALLING OR STAYING ASLEEP: 0
6. FEELING BAD ABOUT YOURSELF - OR THAT YOU ARE A FAILURE OR HAVE LET YOURSELF OR YOUR FAMILY DOWN: 0
10. IF YOU CHECKED OFF ANY PROBLEMS, HOW DIFFICULT HAVE THESE PROBLEMS MADE IT FOR YOU TO DO YOUR WORK, TAKE CARE OF THINGS AT HOME, OR GET ALONG WITH OTHER PEOPLE: 0
9. THOUGHTS THAT YOU WOULD BE BETTER OFF DEAD, OR OF HURTING YOURSELF: 0
4. FEELING TIRED OR HAVING LITTLE ENERGY: 0
1. LITTLE INTEREST OR PLEASURE IN DOING THINGS: 0
SUM OF ALL RESPONSES TO PHQ QUESTIONS 1-9: 0
SUM OF ALL RESPONSES TO PHQ9 QUESTIONS 1 & 2: 0

## 2022-09-28 ASSESSMENT — ENCOUNTER SYMPTOMS
BLOOD IN STOOL: 0
SORE THROAT: 0
EYE PAIN: 0
CONSTIPATION: 1
DIARRHEA: 0
RHINORRHEA: 0
SHORTNESS OF BREATH: 0
COUGH: 0
VOMITING: 0
NAUSEA: 0
ABDOMINAL PAIN: 0
CHEST TIGHTNESS: 0

## 2022-09-28 ASSESSMENT — LIFESTYLE VARIABLES
HOW OFTEN DO YOU HAVE A DRINK CONTAINING ALCOHOL: NEVER
HOW MANY STANDARD DRINKS CONTAINING ALCOHOL DO YOU HAVE ON A TYPICAL DAY: PATIENT DOES NOT DRINK

## 2022-09-28 NOTE — PROGRESS NOTES
Medicare Annual Wellness Visit    Samuel Aguayo is here for Medicare AWV    Assessment & Plan   Medicare annual wellness visit, subsequent        Recommendations for Preventive Services Due: see orders and patient instructions/AVS.  Recommended screening schedule for the next 5-10 years is provided to the patient in written form: see Patient Instructions/AVS.     Return for Medicare Annual Wellness Visit in 1 year. Subjective       Patient's complete Health Risk Assessment and screening values have been reviewed and are found in Flowsheets. The following problems were reviewed today and where indicated follow up appointments were made and/or referrals ordered. Positive Risk Factor Screenings with Interventions:     Cognitive: Words recalled: 0 Words Recalled  Clock Drawing Test (CDT):  (patient cannot write due to her ms)  Total Score Interpretation: Abnormal Mini-Cog  Did the patient refuse to take the cognition test?: No  Cognitive Impairment Interventions: Following with neurology             Health Habits/Nutrition:  Physical Activity: Inactive    Days of Exercise per Week: 0 days    Minutes of Exercise per Session: 0 min     Have you lost any weight without trying in the past 3 months?:  (not sure because she is unable to be weighed)     Have you seen the dentist within the past year?: Yes  Health Habits/Nutrition Interventions:  Nutritional issues:  educational materials for healthy, well-balanced diet provided    Hearing/Vision:  Do you or your family notice any trouble with your hearing that hasn't been managed with hearing aids?: No  Do you have difficulty driving, watching TV, or doing any of your daily activities because of your eyesight?: No  Have you had an eye exam within the past year?: (!) No  No results found.   Hearing/Vision Interventions:  Vision concerns:  patient encouraged to make appointment with his/her eye specialist     ADLs:  In the past 7 days, did you need help from others MD   dantrolene (DANTRIUM) 100 MG capsule Take 1 capsule by mouth 2 times daily  Prasanna Grimes MD   isosorbide mononitrate (IMDUR) 30 MG extended release tablet Take 1 tablet by mouth daily If BP is 80/55 or higher  Prasanna Grimes MD   levothyroxine (SYNTHROID) 88 MCG tablet Take 1 tablet by mouth daily  Prasanna Grimes MD   metFORMIN (GLUCOPHAGE) 500 MG tablet Take 1 tablet by mouth 2 times daily (with meals)  Prasanna Grimes MD   sodium chloride 1 g tablet Take 1 tablet by mouth daily  Prasanna Grimes MD   nystatin (MYCOSTATIN) 011236 UNIT/ML suspension Take 5 mLs by mouth 4 times daily  Prasanna Grimes MD   LORazepam (ATIVAN) 1 MG tablet Take 1 tablet by mouth 2 times daily as needed for Anxiety for up to 180 days. Pk Fernandez MD   ciprofloxacin (CIPRO) 500 MG tablet as needed  Historical Provider, MD   nitroGLYCERIN (NITROSTAT) 0.4 MG SL tablet place 1 tablet under the tongue if needed every 5 minutes for ingrid...  (REFER TO PRESCRIPTION NOTES). Historical Provider, MD   ipratropium-albuterol (DUONEB) 0.5-2.5 (3) MG/3ML SOLN nebulizer solution Inhale 3 mLs into the lungs every 6 hours as needed for Shortness of Breath  Patient taking differently: Inhale 1 vial into the lungs in the morning and 1 vial at noon and 1 vial in the evening and 1 vial before bedtime. Prasanna Grimes MD   Lancets MISC 1 each by Does not apply route daily  Prasanna Grimes MD   blood glucose test strips (ASCENSIA AUTODISC VI;ONE TOUCH ULTRA TEST VI) strip 1 each by In Vitro route 2 times daily As needed.   Prasanna Grimes MD   Acetaminophen (TYLENOL PO) Take by mouth as needed  Historical Provider, MD   ranolazine (RANEXA) 1000 MG extended release tablet Take 1,000 mg by mouth 2 times daily   Historical Provider, MD   fluticasone (FLONASE) 50 MCG/ACT nasal spray 2 sprays by Each Nostril route daily as needed for Rhinitis  Prasanna Grimes MD   ondansetron (ZOFRAN ODT) 4 MG disintegrating tablet Take 1 tablet by mouth every 8 hours as needed for Nausea or Vomiting  Tenisha Tabares MD   OXYGEN Inhale 2 L into the lungs continuous   Historical Provider, MD   aspirin 81 MG EC tablet Take 81 mg by mouth daily  Historical Provider, MD   blood glucose monitor kit and supplies Dispense sufficient amount for indicated testing frequency plus additional to accommodate PRN testing needs. Dispense all needed supplies to include: monitor, strips, lancing device, lancets, control solutions, alcohol swabs. Peace Reid MD   blood glucose monitor strips Test qd  & as needed for symptoms of irregular blood glucose. Dispense sufficient amount for indicated testing frequency plus additional to accommodate PRN testing needs.   Peace Reid MD   Cyanocobalamin (VITAMIN B 12 PO) Take 500 mg by mouth daily   Historical Provider, MD   Calcium Carbonate-Vit D-Min (CALCIUM 600+D PLUS MINERALS) 600-400 MG-UNIT TABS Take 1 tablet by mouth nightly   Historical Provider, MD   polyethylene glycol (MIRALAX) PACK packet Take 7.3 g by mouth daily   Historical Provider, MD   vitamin D3 (CHOLECALCIFEROL) 25 MCG (1000 UT) TABS tablet Take 1 tablet by mouth every morning   Historical Provider, MD   magnesium (MAGNESIUM-OXIDE) 250 MG TABS tablet Take 250 mg by mouth every morning   Historical Provider, MD Vasquez (Including outside providers/suppliers regularly involved in providing care):   Patient Care Team:  Peace Reid MD as PCP - General (Internal Medicine)  Peace Reid [facility]  Thang Varela MD as Consulting Physician (Pulmonology)  Ilana Cardenas MD as Consulting Physician (Neurology)  Esau Paniagua MD as Consulting Physician (Orthopedic Surgery)     Reviewed and updated this visit:  Tobacco  Allergies  Meds  Problems  Med Hx  Surg Hx  Soc Hx  Fam Hx                 Electronically signed by Peace Reid MD on 9/28/2022 at 4:45 PM

## 2022-09-28 NOTE — PROGRESS NOTES
[facility]) Physicians   Internal Medicine     2022  Lazarus Layer Dattilio : 1945 Sex: female  Age:75 y.o. Chief Complaint   Patient presents with    Depression     F/u    Cholesterol Problem     F/u    Diabetes     F/u    Anxiety     F/u    Congestive Heart Failure     F/u        HPI:   Patient presents to office for evaluation of the following medical conditions.     - States had seizure. States pt started yelling and was rolled over was not responding. Placed trilogy machine. States slowly began to recover. Discussed with neurology, felt seizure. Did not think work up necessary at present. Per patient phone call () - brief seizure last night, that the tip of her tongue, no other complication, did not seek care. Last visit with neuro per reviewed note () - give her lorazepam 1 tab at onset of seizure. States has had another seizure (2022). Was given lorazepam x2 , lasted 5 minutes and had post ictal      -Patient has CAD. Has had ST elevation MI () angio and stent to LAD and NSTEMI for instent stenosis (). found to have severe anterior and septal hypokinesia with a apical dyskinesia and ejection fraction 20 to 25% with a small apical left ventricular thrombus on echo. Off eliquis. On ranexa 1000mg twice a day. On plavix. On isosorbide. On Coreg. On bumex. On aspirin. Has hold parameters for medications and will hold if BP too low. No further chest pain or SOB. Last visit with cardiology per reviewed note () -no changes, lifelong dual antiplatelet therapy given incident stenosis, follow-up in 6 months     - States has low blood count, Anemia. States has seen hem/onc treated with  IV iron  Feraheme 510mg x2, keep Hb greater than 7, last visit with hematology per reviewed note  () - f/u lab in 1m, if iron low. Had seen GI () - do not feel gi bleeding, check hemoccult stool x 6 if neg no further.  Last lab (2022) cmp - hyponatreami, hyperglycemia, chloride dec, bun inc, iron low     - Patient had left obstructing ureteral calculus and had a cystoscopy with ureteral stent and stone manipulation. Has saldana cath. (11/20) - Cystoscopy. Complex flexible ureteroscopy. Laser lithotripsy Stent exchange, removed. No current symptoms.      - States had bladder issues and overactive bladder. Stopped oxybutynin. States has chronic saldana catheter. States was following with urology. Was given botox treatment in past. Off myrbetric. Removed saldana, voiding ok, wears adult pads. last visit last visit per reviewed note  (12/21) - some incontince - trial of myrbetriq, states was given a script for cipro for in case of urine     - elevated blood sugars. most recent A1c (9/2021) was 4.8  - not known to be diabetic. Blood sugars were running . On metformin. No reported side effects. Last visit with endo (15/2022) - no changes.      - States has high cholesterol. States Lipitor. No reported side effects. Stopped zetia. Last lab (9/2021) - improved. -  states constipated. States on senokot-2. States on miralax. Stopped colace. Stopped oral iron therapy. - States has high blood pressure. States has been holding some of her heart medications due to the low blood pressure at times. On coreg 3.125mg bid. On bumex. On isosorbide, off lisinopril. Has hold parameters for medications and will hold if BP too low     - States has hypothyroidism. on levothyroxine. More compliant. Last lab was 6/2022 - stable. - States has depression. On citalopram. Stable on medications. No reported side effects. No suicidal thoughts. Last PHQ score of 0  (6/10/2021)      - States found lung mass and needs pulmonary follow up yearly (10/2020). States PET suggested scar - no further imaging needed at present. Last visit with pulm per reviewed note (9/2022) - stable, cont trilogy vent, f/u 6 months      - States has multiple sclerosis. States follows with neurology. States stopped ticferdia.  States on baclofen 20mg 4x per day and dantrolene 50mg 4x per day. Spasms stable. States weakness to hands b/l - difficult to feed self at times. Still with right hand weakness.      - States had hip fracture s/p left ORIF. States has been to physical therapy. Not walking. States having issues with left knee and leg. Released from [address] of right carotid artery- declines ultrasound    hem/onc labs (8/22) cmp - hyponatreami, hyperglycemia, chloride dec, bun inc, iron low reviewed with patient     derm (5/22) rt inf malar cheek, seborrheic keratosis    Health Maintenance   - immunizations:   Influenza Vaccination - (2018), (2019), (2020), (2021), (2022)   Pneumonia Vaccination - (9/2013) - Pneumococcal. (9/2018) - prevnar, RA riazm  Shingrix Vaccine (Shingles) - declines currently  Tetanus Vaccination  covid (3/15/2021) #1, (4/12/2021) #2, (11/2/2021) #3 - moderna     - Screenings:   Bone Density Scan   Pelvic/Pap Exam  Mammogram - (12/10/2018)    Colonoscopy  - (2019) - normal, no further     ROS:  Review of Systems   Constitutional:  Negative for appetite change, chills, fever and unexpected weight change. HENT:  Negative for congestion, rhinorrhea and sore throat. Eyes:  Negative for pain and visual disturbance. Respiratory:  Negative for cough, chest tightness and shortness of breath. Cardiovascular:  Negative for chest pain and palpitations. Gastrointestinal:  Positive for constipation. Negative for abdominal pain, blood in stool, diarrhea, nausea and vomiting. Genitourinary:  Negative for difficulty urinating, dysuria, frequency, pelvic pain, urgency and vaginal bleeding. Musculoskeletal:  Negative for arthralgias and myalgias. Skin:  Negative for rash. Neurological:  Positive for weakness and numbness. Negative for dizziness, syncope, light-headedness and headaches. Hematological:  Negative for adenopathy. Psychiatric/Behavioral:  Negative for suicidal ideas.  The patient is not nervous/anxious. Current Outpatient Medications:     atorvastatin (LIPITOR) 10 MG tablet, 1 po q hs, Disp: 90 tablet, Rfl: 3    baclofen (LIORESAL) 20 MG tablet, Take 2 tablets by mouth 2 times daily, Disp: 360 tablet, Rfl: 3    bumetanide (BUMEX) 1 MG tablet, Take 1 tablet by mouth 2 times daily BP must be greater than 95/50, Disp: 180 tablet, Rfl: 3    carvedilol (COREG) 3.125 MG tablet, Take 1 tablet by mouth 2 times daily If BP is 80/55 or higher and pulse should be 60 or higher, Disp: 180 tablet, Rfl: 3    citalopram (CELEXA) 20 MG tablet, Take 1 tablet by mouth every morning, Disp: 90 tablet, Rfl: 3    clopidogrel (PLAVIX) 75 MG tablet, Take 1 tablet by mouth daily, Disp: 90 tablet, Rfl: 3    dantrolene (DANTRIUM) 100 MG capsule, Take 1 capsule by mouth 2 times daily, Disp: 180 capsule, Rfl: 3    isosorbide mononitrate (IMDUR) 30 MG extended release tablet, Take 1 tablet by mouth daily If BP is 80/55 or higher, Disp: 90 tablet, Rfl: 3    levothyroxine (SYNTHROID) 88 MCG tablet, Take 1 tablet by mouth daily, Disp: 90 tablet, Rfl: 3    metFORMIN (GLUCOPHAGE) 500 MG tablet, Take 1 tablet by mouth 2 times daily (with meals), Disp: 180 tablet, Rfl: 3    sodium chloride 1 g tablet, Take 1 tablet by mouth daily, Disp: 90 tablet, Rfl: 3    nystatin (MYCOSTATIN) 044005 UNIT/ML suspension, Take 5 mLs by mouth 4 times daily, Disp: 473 mL, Rfl: 1    LORazepam (ATIVAN) 1 MG tablet, Take 1 tablet by mouth 2 times daily as needed for Anxiety for up to 180 days. , Disp: 60 tablet, Rfl: 5    ciprofloxacin (CIPRO) 500 MG tablet, as needed, Disp: , Rfl:     nitroGLYCERIN (NITROSTAT) 0.4 MG SL tablet, place 1 tablet under the tongue if needed every 5 minutes for ingrid...  (REFER TO PRESCRIPTION NOTES). , Disp: , Rfl:     ipratropium-albuterol (DUONEB) 0.5-2.5 (3) MG/3ML SOLN nebulizer solution, Inhale 3 mLs into the lungs every 6 hours as needed for Shortness of Breath (Patient taking differently: Inhale 1 vial into the lungs in the morning and 1 vial at noon and 1 vial in the evening and 1 vial before bedtime.), Disp: 360 mL, Rfl: 6    Lancets MISC, 1 each by Does not apply route daily, Disp: 100 each, Rfl: 11    blood glucose test strips (ASCENSIA AUTODISC VI;ONE TOUCH ULTRA TEST VI) strip, 1 each by In Vitro route 2 times daily As needed. , Disp: 100 each, Rfl: 11    Acetaminophen (TYLENOL PO), Take by mouth as needed, Disp: , Rfl:     ranolazine (RANEXA) 1000 MG extended release tablet, Take 1,000 mg by mouth 2 times daily , Disp: , Rfl:     fluticasone (FLONASE) 50 MCG/ACT nasal spray, 2 sprays by Each Nostril route daily as needed for Rhinitis, Disp: 1 Bottle, Rfl: 1    ondansetron (ZOFRAN ODT) 4 MG disintegrating tablet, Take 1 tablet by mouth every 8 hours as needed for Nausea or Vomiting, Disp: 30 tablet, Rfl: 0    OXYGEN, Inhale 2 L into the lungs continuous , Disp: , Rfl:     aspirin 81 MG EC tablet, Take 81 mg by mouth daily, Disp: , Rfl:     blood glucose monitor kit and supplies, Dispense sufficient amount for indicated testing frequency plus additional to accommodate PRN testing needs. Dispense all needed supplies to include: monitor, strips, lancing device, lancets, control solutions, alcohol swabs., Disp: 1 kit, Rfl: 0    blood glucose monitor strips, Test qd  & as needed for symptoms of irregular blood glucose. Dispense sufficient amount for indicated testing frequency plus additional to accommodate PRN testing needs. , Disp: 50 strip, Rfl: 11    Cyanocobalamin (VITAMIN B 12 PO), Take 500 mg by mouth daily , Disp: , Rfl:     Calcium Carbonate-Vit D-Min (CALCIUM 600+D PLUS MINERALS) 600-400 MG-UNIT TABS, Take 1 tablet by mouth nightly , Disp: , Rfl:     polyethylene glycol (MIRALAX) PACK packet, Take 7.3 g by mouth daily , Disp: , Rfl:     vitamin D3 (CHOLECALCIFEROL) 25 MCG (1000 UT) TABS tablet, Take 1 tablet by mouth every morning , Disp: , Rfl:     magnesium (MAGNESIUM-OXIDE) 250 MG TABS tablet, Take 250 mg by mouth every morning , Disp: , Rfl:     Allergies   Allergen Reactions    Augmentin [Amoxicillin-Pot Clavulanate] Shortness Of Breath    Bactrim [Sulfamethoxazole-Trimethoprim] Anaphylaxis and Other (See Comments)     Seizures,     Macrobid [Nitrofurantoin] Anaphylaxis     Mental Changes, shaking, stopped breathing, collapsed (happened again Feb 2021)        Past Medical History:   Diagnosis Date    CAD (coronary artery disease) 4/21/2020    High cholesterol     Hypertension     Hypothyroidism     MI (myocardial infarction) ([facility] [address] 04/21/2020    MS (multiple sclerosis) ([facility] [address]     Osteoporosis     Sarcoidosis     Seizure ([facility] [address] 02/05/2021    Type 2 diabetes mellitus with hyperglycemia, without long-term current use of insulin ([facility] [address] 10/30/2019       Past Surgical History:   Procedure Laterality Date    CHOLECYSTECTOMY  1990s    CORONARY ANGIOPLASTY WITH STENT PLACEMENT  04/21/2020    Dr. Anneliese Wilson   3.0 x 22mm Resolute MAAME to Prox LAD.   No LV    CORONARY ANGIOPLASTY WITH STENT PLACEMENT  01/14/2021    3.0 x 30 Maame to the prox LAD and a 2.5 x 20 [place] to the Obtuse marginal by Dr. Annamarie Mederos / Karis Mendez / Antoinette Hearing Left 4/23/2020    CYSTOSCOPY LEFT RETROGRADE PYELOGRAM STENT INSERTION, [address] performed by Piyush Rainey Memo, DO at [facility] Left 3/21/2019    LEFT FEMUR CEPHALLOMEDULLARY NAIL performed by Grace Callejas MD at . [facility] Bilateral     HYSTERECTOMY (CERVIX STATUS UNKNOWN)      KNEE SURGERY      plate in lt knee    LITHOTRIPSY Left 11/23/2020    CYSTOSCOPY LEFT URETEROSCOPY,  LASER LITHOTRIPSY  BASKET EXTRACTION LEFT STONE, STENT EXCHANGE performed by Piyush Rainey Memo, DO at [address]    LITHOTRIPSY Left 12/30/2020    CYSTOSCOPY RETROGRADE URETEROSCOPY PYELOGRAM LASER LITHOTRIPSY LEFT J-STENT performed by P[place]ush Rainey Memo[place] [facility] [place]       Family History   Problem Relation Age of Onset    Stroke Mother     Heart Disease Mother     Cancer Father         lung Mult Sclerosis Sister     No Known Problems Brother     No Known Problems Sister     Arthritis Sister     Cervical Cancer Sister        Social History     Socioeconomic History    Marital status:      Spouse name: Not on file    Number of children: Not on file    Years of education: Not on file    Highest education level: Not on file   Occupational History    Not on file   Tobacco Use    Smoking status: Never    Smokeless tobacco: Never   Substance and Sexual Activity    Alcohol use: No     Alcohol/week: 0.0 standard drinks     Comment: Ocassionally    Drug use: No    Sexual activity: Yes     Partners: Male   Other Topics Concern    Not on file   Social History Narrative    Not on file     Social Determinants of Health     Financial Resource Strain: Unknown    Difficulty of Paying Living Expenses: Patient refused   Food Insecurity: Unknown    Worried About Running Out of Food in the Last Year: Patient refused    Ran Out of Food in the Last Year: Patient refused   Transportation Needs: Not on file   Physical Activity: Inactive    Days of Exercise per Week: 0 days    Minutes of Exercise per Session: 0 min   Stress: Not on file   Social Connections: Not on file   Intimate Partner Violence: Not on file   Housing Stability: Not on file       Vitals:    09/28/22 1336   BP: 120/60   Site: Left Upper Arm   Position: Sitting   Cuff Size: Medium Adult   Pulse: 78   Resp: 18   Temp: 97.7 °F (36.5 °C)   TempSrc: Temporal   SpO2: 100%  Comment: on 2 liters oxygen via nc   Weight: Comment: unable to stand on the scale   Height: 5' (1.524 m)       Exam:  Physical Exam  Vitals reviewed. Constitutional:       Appearance: She is well-developed. HENT:      Head: Normocephalic and atraumatic. Right Ear: External ear normal.      Left Ear: External ear normal.   Eyes:      Pupils: Pupils are equal, round, and reactive to light. Neck:      Thyroid: No thyromegaly. Vascular: Carotid bruit present.    Cardiovascular: Rate and Rhythm: Normal rate and regular rhythm. Heart sounds: Normal heart sounds. No murmur heard. Pulmonary:      Effort: Pulmonary effort is normal.      Breath sounds: Normal breath sounds. No wheezing. Abdominal:      General: Bowel sounds are normal. There is no distension. Palpations: Abdomen is soft. Tenderness: no abdominal tenderness There is no guarding or rebound. Musculoskeletal:         General: Normal range of motion. Cervical back: Normal range of motion and neck supple. Lymphadenopathy:      Cervical: No cervical adenopathy. Skin:     General: Skin is warm and dry. Neurological:      Mental Status: She is alert and oriented to person, place, and time. Cranial Nerves: No cranial nerve deficit. Motor: Abnormal muscle tone present. Comments: Paralysis  Nonambulatory - in wheelchair    Psychiatric:         Judgment: Judgment normal.       Assessment and Plan:    Diagnoses and all orders for this visit:    Hyponatremia  - uncertain as to cause  - poss related to medications   - will treat with sodium chloride tablet daily   - follow labs     Status post seizure  - uncertain etiology   - has discussed with neuro - poss related to MS   - has not started medication - poss keppra if recurs   - no current work up   - no further episodes  - per neuro lorazepam if seizure.       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, unspecified vessel or lesion type [facility])  - s/p cath and stent to LAD (2020), then instent stenosis s/p restent (1/21)   - following with cardio   - has had 2nd opinion With [facility]   - off lisinopril   - off eliquis  - on coreg   - on imdur   - on ranexa   - on bumex   - on plavix  - on aspirin  - will intermittently hold at night if BP is too low based on hold parameters   - stable at present     Chronic systolic congestive heart failure (HCC)  - EF 20% per echo (4/2020)   - on bumex   - on coreg   - off lisinopril   - stable      Frequent UTI   - at risk for further infection   - no current symptoms  - has bactrim to use if symptoms   - following with urology   - given cipro to have on hand by urology      Nephrolithiasis  - s/p cystoscopy and lithotripsy (11/2020)   - saldana removed      Anemia, unspecified type  - following with hematology   - to stop oral iron   - to have IV iron   - referral to GI - did not think GI bleeding (6/2021)   - last lab (9/2022) - low     Other Constipation  - on senna   - on miralax  - add mag citrate . 25 as needed      Essential hypertension  - watch diet   - off lisinopril   - on bumex   - on coreg twice a day   - on imdur   - on ranex a  - stable per      Type 2 diabetes mellitus with hyperglycemia, without long-term current use of insulin (HCC)  - watch diet   - last A1c was 4.9 (1/2021)   - now on metformin since recent increase in sugars   - on endocrinology      Hypothyroidism, unspecified type  - on levothyroxine  - labs were normal (9/2021)     Chronic respiratory failure with hypoxia (HCC)  - on oxygen - 3 liters   - has trilogy at night   - has seen pulmonary - recommending to cont trilogy   - stable      Hyponatremia  - follow labs   - last lab (3/2022) - borderline low      MS (multiple sclerosis) (HCC)  - ticierda on hold  - stopped nortriptyline   - on baclofen and dantrolene  - non ambulatory  - stable  **she would benefit form a wheelchair that reclines because of her heart condition and low ejection fraction. To help get her out of bed to help with physical therapy and strength but also to be able to recline back when she feels dizzy and to assist with transportation. Current chiar does not meet her changed needs.       Spastic quadriparesis ([address]  - following with neurology   - on baclofen and dantrolene     Mixed hyperlipidemia  - watch diet  - on atrovastatin  - follow up labs     Neurogenic bladder  - off oxybutynin  - off myrbetric - has not started yet - currently with saldana      Mild episode of recurrent major depressive disorder ([facility] [address]  - on citalopram  - no suicidal thoughts  - stable     HUDSON (nonalcoholic steatohepatitis)  - following with GI     Osteopenia of multiple sites  - declines bone density  - follows labs     Lung mass  - possible scar (2019)   - has seen pulmonary - yearly   - stable per last pulm note (3/2022)      Bruit of right carotid artery  - declines ultrasound as of present 3/28/2022     H/O sarcoidosis    Return in about 3 months (around 2022) for check up and review and labs.     Orders Placed This Encounter   Procedures    Comprehensive Metabolic Panel     Standing Status:   Future     Standing Expiration Date:   2023    Magnesium     Standing Status:   Future     Standing Expiration Date:   2023    Lipid, Fasting     Standing Status:   Future     Standing Expiration Date:   2023    Hemoglobin A1C     Standing Status:   Future     Standing Expiration Date:   2023    Vitamin D 25 Hydroxy     Standing Status:   Future     Standing Expiration Date:   2023    TSH     Standing Status:   Future     Standing Expiration Date:   2023    CBC with Auto Differential     Standing Status:   Future     Standing Expiration Date:   2023    Vitamin B12 & Folate     Standing Status:   Future     Standing Expiration Date:   2023     Requested Prescriptions     Signed Prescriptions Disp Refills    atorvastatin (LIPITOR) 10 MG tablet 90 tablet 3     Si po q hs    baclofen (LIORESAL) 20 MG tablet 360 tablet 3     Sig: Take 2 tablets by mouth 2 times daily    bumetanide (BUMEX) 1 MG tablet 180 tablet 3     Sig: Take 1 tablet by mouth 2 times daily BP must be greater than 95/50    carvedilol (COREG) 3.125 MG tablet 180 tablet 3     Sig: Take 1 tablet by mouth 2 times daily If BP is 80/55 or higher and pulse should be 60 or higher    citalopram (CELEXA) 20 MG tablet 90 tablet 3     Sig: Take 1 tablet by mouth every morning    clopidogrel (PLAVIX) 75 MG tablet 90 tablet 3     Sig: Take 1 tablet by mouth daily    dantrolene (DANTRIUM) 100 MG capsule 180 capsule 3     Sig: Take 1 capsule by mouth 2 times daily    isosorbide mononitrate (IMDUR) 30 MG extended release tablet 90 tablet 3     Sig: Take 1 tablet by mouth daily If BP is 80/55 or higher    levothyroxine (SYNTHROID) 88 MCG tablet 90 tablet 3     Sig: Take 1 tablet by mouth daily    metFORMIN (GLUCOPHAGE) 500 MG tablet 180 tablet 3     Sig: Take 1 tablet by mouth 2 times daily (with meals)    sodium chloride 1 g tablet 90 tablet 3     Sig: Take 1 tablet by mouth daily    nystatin (MYCOSTATIN) [id number] UNIT/ML suspension 473 mL 1     Sig: Take 5 mLs by mouth 4 times daily     Pooja Galeano MD  9/28/2022  2:31 PM

## 2022-09-28 NOTE — PATIENT INSTRUCTIONS
Personalized Preventive Plan for Chiara Vila - 9/28/2022  Medicare offers a range of preventive health benefits. Some of the tests and screenings are paid in full while other may be subject to a deductible, co-insurance, and/or copay. Some of these benefits include a comprehensive review of your medical history including lifestyle, illnesses that may run in your family, and various assessments and screenings as appropriate. After reviewing your medical record and screening and assessments performed today your provider may have ordered immunizations, labs, imaging, and/or referrals for you. A list of these orders (if applicable) as well as your Preventive Care list are included within your After Visit Summary for your review. Other Preventive Recommendations:    A preventive eye exam performed by an eye specialist is recommended every 1-2 years to screen for glaucoma; cataracts, macular degeneration, and other eye disorders. A preventive dental visit is recommended every 6 months. Try to get at least 150 minutes of exercise per week or 10,000 steps per day on a pedometer . Order or download the FREE \"Exercise & Physical Activity: Your Everyday Guide\" from [employer]. Call 3-483.528.9183 or search [employer] online. You need 5699-5265 mg of calcium and 8381-9812 IU of vitamin D per day. It is possible to meet your calcium requirement with diet alone, but a vitamin D supplement is usually necessary to meet this goal.  When exposed to the sun, use a sunscreen that protects against both UVA and UVB radiation with an SPF of 30 or greater. Reapply every 2 to 3 hours or after sweating, drying off with a towel, or swimming. Always wear a seat belt when traveling in a car. Always wear a helmet when riding a bicycle or motorcycle. Heart-Healthy Diet   Sodium, Fat, and Cholesterol Controlled Diet       What Is a Heart Healthy Diet?    A heart-healthy diet is one that limits sodium , certain types of fat , and cholesterol . This type of diet is recommended for:   People with any form of cardiovascular disease (eg, coronary heart disease , peripheral vascular disease , previous heart attack , previous stroke )   People with risk factors for cardiovascular disease, such as high blood pressure , high cholesterol , or diabetes   Anyone who wants to lower their risk of developing cardiovascular disease   Sodium    Sodium is a mineral found in many foods. In general, most people consume much more sodium than they need. Diets high in sodium can increase blood pressure and lead to edema (water retention). On a heart-healthy diet, you should consume no more than 2,300 mg (milligrams) of sodium per dayabout the amount in one teaspoon of table salt. The foods highest in sodium include table salt (about 50% sodium), processed foods, convenience foods, and preserved foods. Cholesterol    Cholesterol is a fat-like, waxy substance in your blood. Our bodies make some cholesterol. It is also found in animal products, with the highest amounts in fatty meat, egg yolks, whole milk, cheese, shellfish, and organ meats. On a heart-healthy diet, you should limit your cholesterol intake to less than 200 mg per day. It is normal and important to have some cholesterol in your bloodstream. But too much cholesterol can cause plaque to build up within your arteries, which can eventually lead to a heart attack or stroke. The two types of cholesterol that are most commonly referred to are:   Low-density lipoprotein (LDL) cholesterol  Also known as bad cholesterol, this is the cholesterol that tends to build up along your arteries. Bad cholesterol levels are increased by eating fats that are saturated or hydrogenated. Optimal level of this cholesterol is less than 100. Over 130 starts to get risky for heart disease.    High-density lipoprotein (HDL) cholesterol  Also known as good cholesterol, this type of cholesterol actually carries cholesterol away from your arteries and may, therefore, help lower your risk of having a heart attack. You want this level to be high (ideally greater than 60). It is a risk to have a level less than 40. You can raise this good cholesterol by eating olive oil, canola oil, avocados, or nuts. Exercise raises this level, too. Fat    Fat is calorie dense and packs a lot of calories into a small amount of food. Even though fats should be limited due to their high calorie content, not all fats are bad. In fact, some fats are quite healthful. Fat can be broken down into four main types. The good-for-you fats are:   Monounsaturated fat  found in oils such as olive and canola, avocados, and nuts and natural nut butters; can decrease cholesterol levels, while keeping levels of HDL cholesterol high   Polyunsaturated fat  found in oils such as safflower, sunflower, soybean, corn, and sesame; can decrease total cholesterol and LDL cholesterol   Omega-3 fatty acids  particularly those found in fatty fish (such as salmon, trout, tuna, mackerel, herring, and sardines); can decrease risk of arrhythmias, decrease triglyceride levels, and slightly lower blood pressure   The fats that you want to limit are:   Saturated fat  found in animal products, many fast foods, and a few vegetables; increases total blood cholesterol, including LDL levels   Animal fats that are saturated include: butter, lard, whole-milk dairy products, meat fat, and poultry skin   Vegetable fats that are saturated include: hydrogenated shortening, palm oil, coconut oil, cocoa butter   Hydrogenated or trans fat  found in margarine and vegetable shortening, most shelf stable snack foods, and fried foods; increases LDL and decreases HDL     It is generally recommended that you limit your total fat for the day to less than 30% of your total calories.  If you follow an 1800-calorie heart healthy diet, for example, this would mean 60 grams of fat or less per day. Saturated fat and trans fat in your diet raises your blood cholesterol the most, much more than dietary cholesterol does. For this reason, on a heart-healthy diet, less than 7% of your calories should come from saturated fat and ideally 0% from trans fat. On an 1800-calorie diet, this translates into less than 14 grams of saturated fat per day, leaving 46 grams of fat to come from mono- and polyunsaturated fats.    Food Choices on a Heart Healthy Diet   Food Category   Foods Recommended   Foods to Avoid   Grains   Breads and rolls without salted tops Most dry and cooked cereals Unsalted crackers and breadsticks Low-sodium or homemade breadcrumbs or stuffing All rice and pastas   Breads, rolls, and crackers with salted tops High-fat baked goods (eg, muffins, donuts, pastries) Quick breads, self-rising flour, and biscuit mixes Regular bread crumbs Instant hot cereals Commercially prepared rice, pasta, or stuffing mixes   Vegetables   Most fresh, frozen, and low-sodium canned vegetables Low-sodium and salt-free vegetable juices Canned vegetables if unsalted or rinsed   Regular canned vegetables and juices, including sauerkraut and pickled vegetables Frozen vegetables with sauces Commercially prepared potato and vegetable mixes   Fruits   Most fresh, frozen, and canned fruits All fruit juices   Fruits processed with salt or sodium   Milk   Nonfat or low-fat (1%) milk Nonfat or low-fat yogurt Cottage cheese, low-fat ricotta, cheeses labeled as low-fat and low-sodium   Whole milk Reduced-fat (2%) milk Malted and chocolate milk Full fat yogurt Most cheeses (unless low-fat and low salt) Buttermilk (no more than 1 cup per week)   Meats and Beans   Lean cuts of fresh or frozen beef, veal, lamb, or pork (look for the word loin) Fresh or frozen poultry without the skin Fresh or frozen fish and some shellfish Egg whites and egg substitutes (Limit whole eggs to three per week) Tofu Nuts or seeds (unsalted, dry-roasted), low-sodium peanut butter Dried peas, beans, and lentils   Any smoked, cured, salted, or canned meat, fish, or poultry (including ernst, chipped beef, cold cuts, hot dogs, sausages, sardines, and anchovies) Poultry skins Breaded and/or fried fish or meats Canned peas, beans, and lentils Salted nuts   Fats and Oils   Olive oil and canola oil Low-sodium, low-fat salad dressings and mayonnaise   Butter, margarine, coconut and palm oils, ernst fat   Snacks, Sweets, and Condiments   Low-sodium or unsalted versions of broths, soups, soy sauce, and condiments Pepper, herbs, and spices; vinegar, lemon, or lime juice Low-fat frozen desserts (yogurt, sherbet, fruit bars) Sugar, cocoa powder, honey, syrup, jam, and preserves Low-fat, trans-fat free cookies, cakes, and pies Lucho and animal crackers, fig bars, asmita snaps   High-fat desserts Broth, soups, gravies, and sauces, made from instant mixes or other high-sodium ingredients Salted snack foods Canned olives Meat tenderizers, seasoning salt, and most flavored vinegars   Beverages   Low-sodium carbonated beverages Tea and coffee in moderation Soy milk   Commercially softened water   Suggestions   Make whole grains, fruits, and vegetables the base of your diet. Choose heart-healthy fats such as canola, olive, and flaxseed oil, and foods high in heart-healthy fats, such as nuts, seeds, soybeans, tofu, and fish. Eat fish at least twice per week; the fish highest in omega-3 fatty acids and lowest in mercury include salmon, herring, mackerel, sardines, and canned chunk light tuna. If you eat fish less than twice per week or have high triglycerides, talk to your doctor about taking fish oil supplements. Read food labels. For products low in fat and cholesterol, look for fat free, low-fat, cholesterol free, saturated fat free, and trans fat freeAlso scan the Nutrition Facts Label, which lists saturated fat, trans fat, and cholesterol amounts. For products low in sodium, look for sodium free, very low sodium, low sodium, no added salt, and unsalted   Skip the salt when cooking or at the table; if food needs more flavor, get creative and try out different herbs and spices. Garlic and onion also add substantial flavor to foods. Trim any visible fat off meat and poultry before cooking, and drain the fat off after george. Use cooking methods that require little or no added fat, such as grilling, boiling, baking, poaching, broiling, roasting, steaming, stir-frying, and sauting. Avoid fast food and convenience food. They tend to be high in saturated and trans fat and have a lot of added salt. Talk to a registered dietitian for individualized diet advice. Last Reviewed: March 2011 Alex Leon MS, MPH, RD   Updated: 3/29/2011     Keep Your Memory Silvio Felix       Many factors can affect your ability to remembera hectic lifestyle, aging, stress, chronic disease, and certain medicines. But, there are steps you can take to sharpen your mind and help preserve your memory. Challenge Your Brain   Regularly challenging your mind may help keeps it in top shape. Good mental exercises include:   Crossword puzzlesUse a dictionary if you need it; you will learn more that way. Brainteasers Try some! Crafts, such as wood working and sewing   Hobbies, such as gardening and building model airplanes   SocializingVisit old friends or join groups to meet new ones. Reading   Learning a new language   Taking a class, whether it be art history or ruddy chi   TravelingExperience the food, history, and culture of your destination   Learning to use a computer   Going to museums, the theater, or thought-provoking movies   Changing things in your daily life, such as reversing your pattern in the grocery store or brushing your teeth using your nondominant hand   Use Memory Aids   There is no need to remember every detail on your own.  These memory aids can help: Calendars and day planners   Electronic organizers to store all sorts of helpful informationThese devices can \"beep\" to remind you of appointments. A book of days to record birthdays, anniversaries, and other occasions that occur on the same date every year   Detailed \"to-do\" lists and strategically placed sticky notes   Quick \"study\" sessionsBefore a gathering, review who will be there so their names will be fresh in your mind. Establish routinesFor example, keep your keys, wallet, and umbrella in the same place all the time or take medicine with your 8:00 AM glass of juice   Live a Healthy Life   Many actions that will keep your body strong will do the same for your mind. For example:   Talk to Your Doctor About Herbs and Supplements    Malnutrition and vitamin deficiencies can impair your mental function. For example, vitamin B12 deficiency can cause a range of symptoms, including confusion. But, what if your nutritional needs are being met? Can herbs and supplements still offer a benefit? Researchers have investigated a range of natural remedies, such as ginkgo , ginseng , and the supplement phosphatidylserine (PS). So far, though, the evidence is inconsistent as to whether these products can improve memory or thinking. If you are interested in taking herbs and supplements, talk to your doctor first because they may interact with other medicines that you are taking. Exercise Regularly    Among the many benefits of regular exercise are increased blood flow to the brain and decreased risk of certain diseases that can interfere with memory function. One study found that even moderate exercise has a beneficial effect. Examples of \"moderate\" exercise include:   Playing 18 holes of golf once a week, without a cart   Playing tennis twice a week   Walking one mile per day   Manage Stress    It can be tough to remember what is important when your mind is cluttered. Make time for relaxation.  Choose activities that calm you down, and make it routine. Manage Chronic Conditions    Side effects of high blood pressure , diabetes, and heart disease can interfere with mental function. Many of the lifestyle steps discussed here can help manage these conditions. Strive to eat a healthy diet, exercise regularly, get stress under control, and follow your doctor's advice for your condition. Minimize Medications    Talk to your doctor about the medicines that you take. Some may be unnecessary. Also, healthy lifestyle habits may lower the need for certain drugs. Last Reviewed: April 2010 Denys Dillard MD   Updated: 4/13/2010     Keeping Home a [facility]       As we get older, changes in balance, gait, strength, vision, hearing, and cognition make even the most youthful senior more prone to accidents. Falls are one of the leading health risks for older people. This increased risk of falling is related to:   Aging process (eg, decreased muscle strength, slowed reflexes)   Higher incidence of chronic health problems (eg, arthritis, diabetes) that may limit mobility, agility or sensory awareness   Side effects of medicine (eg, dizziness, blurred vision)especially medicines like prescription pain medicines and drugs used to treat mental health conditions   Depending on the brittleness of your bones, the consequences of a fall can be serious and long lasting. Home Life   Research by the Association of [employer] [facility]) shows that some home accidents among older adults can be prevented by making simple lifestyle changes and basic modifications and repairs to the home environment. Here are some lifestyle changes that experts recommend:   Have your hearing and vision checked regularly. Be sure to wear prescription glasses that are right for you. Speak to your doctor or pharmacist about the possible side effects of your medicines. A number of medicines can cause dizziness. If you have problems with sleep, talk to your doctor.    Limit your intake of alcohol. If necessary, use a cane or walker to help maintain your balance. Wear supportive, rubber-soled shoes, even at home. If you live in a region that gets wintry weather, you may want to put special cleats on your shoes to prevent you from slipping on the snow and ice. Exercise regularly to help maintain muscle tone, agility, and balance. Always hold the banister when going up or down stairs. Also, use  bars when getting in or out of the bath or shower, or using the toilet. To avoid dizziness, get up slowly from a lying down position. Sit up first, dangling your legs for a minute or two before rising to a standing position. Overall Home Safety Check   According to the Consumer [employer] Safety [employer]'s \"Older Consumer Home Safety Checklist,\" it is important to check for potential hazards in each room. And remember, proper lighting is an essential factor in home safety. If you cannot see clearly, you are more likely to fall. Important questions to ask yourself include:   Are lamp, electric, extension, and telephone cords placed out of the flow of traffic and maintained in good condition? Have frayed cords been replaced? Are all small rugs and runners slip resistant? If not, you can secure them to the floor with a special double-sided carpet tape. Are smoke detectors properly locatedone on every floor of your home and one outside of every sleeping area? Are they in good working order? Are batteries replaced at least once a year? Do you have a well-maintained carbon monoxide detector outside every sleeping are in your home? Does your furniture layout leave plenty of space to maneuver between and around chairs, tables, beds, and sofas? Are hallways, stairs and passages between rooms well lit? Can you reach a lamp without getting out of bed? Are floor surfaces well maintained?  Shag rugs, high-pile carpeting, tile floors, and polished wood floors can be particularly slippery. Stairs should always have handrails and be carpeted or fitted with a non-skid tread. Is your telephone easily reachable. Is the cord safely tucked away? Room by Room   According to the Association of Aging, bathrooms and [place] are the two most potentially hazardous rooms in your home. In the Kitchen    Be sure your stove is in proper working order and always make sure burners and the oven are off before you go out or go to sleep. Keep pots on the back burners, turn handles away from the front of the stove, and keep stove clean and free of grease build-up. Kitchen ventilation systems and range exhausts should be working properly. Keep flammable objects such as towels and pot holders away from the cooking area except when in use. Make sure kitchen curtains are tied back. Move cords and appliances away from the sink and hot surfaces. If extension cords are needed, install wiring guides so they do not hang over the sink, range, or working areas. Look for coffee pots, kettles and toaster ovens with automatic shut-offs. Keep a mop handy in the kitchen so you can wipe up spills instantly. You should also have a small fire extinguisher. Arrange your kitchen with frequently used items on lower shelves to avoid the need to stand on a stepstool to reach them. Make sure countertops are well-lit to avoid injuries while cutting and preparing food. In the Bathroom    Use a non-slip mat or decals in the tub and shower, since wet, soapy tile or porcelain surfaces are extremely slippery. Make sure bathroom rugs are non-skid or tape them firmly to the floor. Bathtubs should have at least one, preferably two, grab bars, firmly attached to structural supports in the wall. (Do not use built-in soap holders or glass shower doors as grab bars.)    Tub seats fitted with non-slip material on the legs allow you to wash sitting down.  For people with limited mobility, bathtub transfer benches allow you to slide safely into the tub. Raised toilet seats and toilet safety rails are helpful for those with knee or hip problems. In the [facility]    Make sure you use a nightlight and that the area around your bed is clear of potential obstacles. Be careful with electric blankets and never go to sleep with a heating pad, which can cause serious burns even if on a low setting. Use fire-resistant mattress covers and pillows, and NEVER smoke in bed. Keep a phone next to the bed that is programmed to dial 911 at the push of a button. If you have a chronic condition, you may want to sign on with an automatic call-in service. Typically the system includes a small pendant that connects directly to an emergency medical voice-response system. You should also make arrangements to stay in contact with someonefriend, neighbor, family memberon a regular schedule. Fire Prevention   According to the [employer] (Smoke Alarms for Every) [address] [place] [address], senior citizens are one of the two highest risk groups for death and serious injuries due to residential fires. When cooking, wear short-sleeved items, never a bulky long-sleeved robe. The [facility]'s Safety Checklist for Older Consumers emphasizes the importance of checking basements, garages, workshops and storage areas for fire hazards, such as volatile liquids, piles of old rags or clothing and overloaded circuits. Never smoke in bed or when lying down on a couch or recliner chair. Small portable electric or kerosene heaters are responsible for many home fires and should be used cautiously if at all. If you do use one, be sure to keep them away from flammable materials. In case of fire, make sure you have a pre-established emergency exit plan. Have a professional check your fireplace and other fuel-burning appliances yearly.     Helping Hands   Baby boomers entering the el years will continue to see the development of new products to help older adults live safely and independently in spite of age-related changes. Making Life More Livable  , by Manan Haile, lists over 1,000 products for \"living well in the mature years,\" such as bathing and mobility aids, household security devices, ergonomically designed knives and peelers, and faucet valves and knobs for temperature control. Medical supply stores and organizations are good sources of information about products that improve your quality of life and insure your safety.      Last Reviewed: November 2009 Blake Sidhu MD   Updated: 3/7/2011

## 2022-09-30 NOTE — TELEPHONE ENCOUNTER
Luis Daniel Sheppard called because [facility] will not fill Nystatin because it is available OTC. [facility] would like the Rx sent to [facility] in [facility].

## 2022-10-03 ENCOUNTER — TELEPHONE (OUTPATIENT)
Dept: FAMILY MEDICINE CLINIC | Age: 77
End: 2022-10-03

## 2022-10-03 NOTE — TELEPHONE ENCOUNTER
Wolfgang calling to tell you that he tried to get a refill on [facility] testing supplies, but cant pick them up until you complete the DWO form they will be faxing you. I let him know that this is normal for [facility] pharmacies and that you would get to it when you can.

## 2022-10-04 NOTE — TELEPHONE ENCOUNTER
I just called the pharmacy for a new form to be faxed to the phone room. I will get it to Dr Jeff Kwan as soon as it gets here.

## 2022-10-05 ENCOUNTER — TELEPHONE (OUTPATIENT)
Dept: FAMILY MEDICINE CLINIC | Age: 77
End: 2022-10-05

## 2022-10-05 NOTE — TELEPHONE ENCOUNTER
Spouse jatin calling to alert you he started her on cipro 3 days ago for a uti, ordered by dr wagner. He wanted to update you. She is also seeing dr Megan harmon at 2:00pm for monthly visit. fyi    She doesn't see you till dec. Does she need labs you ordered in sept done on mon, or a week prior to dec appt?

## 2022-10-06 ENCOUNTER — OFFICE VISIT (OUTPATIENT)
Dept: FAMILY MEDICINE CLINIC | Age: 77
End: 2022-10-06
Payer: MEDICARE

## 2022-10-06 VITALS
TEMPERATURE: 97.3 F | HEIGHT: 60 IN | WEIGHT: 125 LBS | BODY MASS INDEX: 24.54 KG/M2 | OXYGEN SATURATION: 100 % | HEART RATE: 70 BPM

## 2022-10-06 DIAGNOSIS — S69.91XA INJURY OF RIGHT HAND, INITIAL ENCOUNTER: Primary | ICD-10-CM

## 2022-10-06 PROCEDURE — 1090F PRES/ABSN URINE INCON ASSESS: CPT | Performed by: INTERNAL MEDICINE

## 2022-10-06 PROCEDURE — G8400 PT W/DXA NO RESULTS DOC: HCPCS | Performed by: INTERNAL MEDICINE

## 2022-10-06 PROCEDURE — G8427 DOCREV CUR MEDS BY ELIG CLIN: HCPCS | Performed by: INTERNAL MEDICINE

## 2022-10-06 PROCEDURE — G8484 FLU IMMUNIZE NO ADMIN: HCPCS | Performed by: INTERNAL MEDICINE

## 2022-10-06 PROCEDURE — 99213 OFFICE O/P EST LOW 20 MIN: CPT | Performed by: INTERNAL MEDICINE

## 2022-10-06 PROCEDURE — 1123F ACP DISCUSS/DSCN MKR DOCD: CPT | Performed by: INTERNAL MEDICINE

## 2022-10-06 PROCEDURE — 1036F TOBACCO NON-USER: CPT | Performed by: INTERNAL MEDICINE

## 2022-10-06 PROCEDURE — G8420 CALC BMI NORM PARAMETERS: HCPCS | Performed by: INTERNAL MEDICINE

## 2022-10-07 ENCOUNTER — TELEPHONE (OUTPATIENT)
Dept: FAMILY MEDICINE CLINIC | Age: 77
End: 2022-10-07

## 2022-10-07 ASSESSMENT — ENCOUNTER SYMPTOMS
ABDOMINAL PAIN: 0
SHORTNESS OF BREATH: 0

## 2022-10-07 NOTE — TELEPHONE ENCOUNTER
I did speak with daughter Sophie(nurse) this morning after speaking with radiology regarding fracture to her hand. Elroy Austin is a nurse. I did ask her to immobilize mom's hand/splinted and follow-up in orthopedic walk-in clinic on Monday for more definitive care. Notify us of problems in the interim.

## 2022-10-07 NOTE — PROGRESS NOTES
[address] [place]     10/7/22  Michaela Douglas : 1945 Sex: female  Age: 69 y.o. Chief Complaint   Patient presents with    Hand Injury     Right hand is swollen & bruised; patient had a stroke so she is unable to move that hand;  seems to think she hit that hand off of the car; middle finger is tender to touch and bruised; top of hand is bruised       HPI  Patient presents today in wheelchair accompanied by her  to express care stating that she injured her right hand yesterday with  trying to get her out of the car. She has had a stroke in the past and it does not move that right upper extremity well and he grabbed her hand and pulled and apparently she had pain with this. Thinks it might have hit up against a car. States it became swollen and bruised. He did not feel anything snap or pop. She did not move the hand or arm well status post CVA. She denies any numbness or tingling. They did put some ice on it for short-time. Patient does have multiple medical problems rather complicated medical history.  states she had a recent seizure yesterday and is being treated for UTI. Review of Systems   Constitutional:  Negative for chills and fever. Respiratory:  Negative for shortness of breath. Cardiovascular:  Negative for chest pain. History CAD   Gastrointestinal:  Negative for abdominal pain. Endocrine:        Type II diabetic   Genitourinary:         Recent UTI   Musculoskeletal:         See above   Skin:         Bruising to the right hand   Neurological:  Positive for seizures. History of CVA and MS           REST OF PERTINENT ROS GONE OVER AND WAS NEGATIVE.                Current Outpatient Medications:     nystatin (MYCOSTATIN) 072131 UNIT/ML suspension, Take 5 mLs by mouth 4 times daily, Disp: 473 mL, Rfl: 1    atorvastatin (LIPITOR) 10 MG tablet, 1 po q hs, Disp: 90 tablet, Rfl: 3    baclofen (LIORESAL) 20 MG tablet, Take 2 tablets by mouth 2 times daily, Disp: 360 tablet, Rfl: 3    bumetanide (BUMEX) 1 MG tablet, Take 1 tablet by mouth 2 times daily BP must be greater than 95/50, Disp: 180 tablet, Rfl: 3    carvedilol (COREG) 3.125 MG tablet, Take 1 tablet by mouth 2 times daily If BP is 80/55 or higher and pulse should be 60 or higher, Disp: 180 tablet, Rfl: 3    citalopram (CELEXA) 20 MG tablet, Take 1 tablet by mouth every morning, Disp: 90 tablet, Rfl: 3    clopidogrel (PLAVIX) 75 MG tablet, Take 1 tablet by mouth daily, Disp: 90 tablet, Rfl: 3    dantrolene (DANTRIUM) 100 MG capsule, Take 1 capsule by mouth 2 times daily, Disp: 180 capsule, Rfl: 3    isosorbide mononitrate (IMDUR) 30 MG extended release tablet, Take 1 tablet by mouth daily If BP is 80/55 or higher, Disp: 90 tablet, Rfl: 3    levothyroxine (SYNTHROID) 88 MCG tablet, Take 1 tablet by mouth daily, Disp: 90 tablet, Rfl: 3    metFORMIN (GLUCOPHAGE) 500 MG tablet, Take 1 tablet by mouth 2 times daily (with meals), Disp: 180 tablet, Rfl: 3    sodium chloride 1 g tablet, Take 1 tablet by mouth daily, Disp: 90 tablet, Rfl: 3    LORazepam (ATIVAN) 1 MG tablet, Take 1 tablet by mouth 2 times daily as needed for Anxiety for up to 180 days. , Disp: 60 tablet, Rfl: 5    ciprofloxacin (CIPRO) 500 MG tablet, as needed, Disp: , Rfl:     nitroGLYCERIN (NITROSTAT) 0.4 MG SL tablet, place 1 tablet under the tongue if needed every 5 minutes for ingrid...  (REFER TO PRESCRIPTION NOTES). , Disp: , Rfl:     ipratropium-albuterol (DUONEB) 0.5-2.5 (3) MG/3ML SOLN nebulizer solution, Inhale 3 mLs into the lungs every 6 hours as needed for Shortness of Breath (Patient taking differently: Inhale 1 vial into the lungs in the morning and 1 vial at noon and 1 vial in the evening and 1 vial before bedtime.), Disp: 360 mL, Rfl: 6    Lancets MISC, 1 each by Does not apply route daily, Disp: 100 each, Rfl: 11    blood glucose test strips (ASCENSIA AUTODISC VI;ONE TOUCH ULTRA TEST VI) strip, 1 each by In Vitro route 2 times daily As needed. , Disp: 100 each, Rfl: 11    Acetaminophen (TYLENOL PO), Take by mouth as needed, Disp: , Rfl:     ranolazine (RANEXA) 1000 MG extended release tablet, Take 1,000 mg by mouth 2 times daily , Disp: , Rfl:     fluticasone (FLONASE) 50 MCG/ACT nasal spray, 2 sprays by Each Nostril route daily as needed for Rhinitis, Disp: 1 Bottle, Rfl: 1    ondansetron (ZOFRAN ODT) 4 MG disintegrating tablet, Take 1 tablet by mouth every 8 hours as needed for Nausea or Vomiting, Disp: 30 tablet, Rfl: 0    OXYGEN, Inhale 2 L into the lungs continuous , Disp: , Rfl:     aspirin 81 MG EC tablet, Take 81 mg by mouth daily, Disp: , Rfl:     blood glucose monitor kit and supplies, Dispense sufficient amount for indicated testing frequency plus additional to accommodate PRN testing needs. Dispense all needed supplies to include: monitor, strips, lancing device, lancets, control solutions, alcohol swabs., Disp: 1 kit, Rfl: 0    blood glucose monitor strips, Test qd  & as needed for symptoms of irregular blood glucose. Dispense sufficient amount for indicated testing frequency plus additional to accommodate PRN testing needs. , Disp: 50 strip, Rfl: 11    Cyanocobalamin (VITAMIN B 12 PO), Take 500 mg by mouth daily , Disp: , Rfl:     Calcium Carbonate-Vit D-Min (CALCIUM 600+D PLUS MINERALS) 600-400 MG-UNIT TABS, Take 1 tablet by mouth nightly , Disp: , Rfl:     polyethylene glycol (MIRALAX) PACK packet, Take 7.3 g by mouth daily , Disp: , Rfl:     vitamin D3 (CHOLECALCIFEROL) 25 MCG (1000 UT) TABS tablet, Take 1 tablet by mouth every morning , Disp: , Rfl:     magnesium (MAGNESIUM-OXIDE) 250 MG TABS tablet, Take 250 mg by mouth every morning , Disp: , Rfl:   Allergies   Allergen Reactions    Augmentin [Amoxicillin-Pot Clavulanate] Shortness Of Breath    Bactrim [Sulfamethoxazole-Trimethoprim] Anaphylaxis and Other (See Comments)     Seizures,     Macrobid [Nitrofurantoin] Anaphylaxis     Mental Changes, shaking, stopped breathing, collapsed (happened again Feb 2021)        Past Medical History:   Diagnosis Date    CAD (coronary artery disease) 4/21/2020    High cholesterol     Hypertension     Hypothyroidism     MI (myocardial infarction) ([facility] 04/21/2020    MS (multiple sclerosis) ([facility] [facility]     Osteoporosis     Sarcoidosis     Seizure ([facility] [address] [facility] 02/05/2021    Type 2 diabetes mellitus with hyperglycemia, without long-term current use of insulin ([facility] [address] [facility] 10/30/2019     Past Surgical History:   Procedure Laterality Date    CHOLECYSTECTOMY  1990s    CORONARY ANGIOPLASTY WITH STENT PLACEMENT  04/21/2020    Dr. Oumou Arias   3.0 x 22mm Resolute MAAME to Prox LAD.   No LV    CORONARY ANGIOPLASTY WITH STENT PLACEMENT  01/14/2021    3.0 x 30 Maame to the prox LAD and a 2.5 x 20 Maame to the Obtuse marginal by Dr. Pia Amezcua / Leatha Sanders / Qiana Ramires Left 4/23/2020    CYSTOSCOPY LEFT RETROGRADE PYELOGRAM STENT INSERTION, [facility] [address] performed by April Avila DO at [facility] Left 3/21/2019    LEFT FEMUR CEPHALLOMEDULLARY NAIL performed by Keyshawn Fernandes MD at . [facility] Bilateral     HYSTERECTOMY (CERVIX STATUS UNKNOWN)      KNEE SURGERY      plate in lt knee    LITHOTRIPSY Left 11/23/2020    CYSTOSCOPY LEFT URETEROSCOPY,  LASER LITHOTRIPSY  BASKET EXTRACTION LEFT STONE, STENT EXCHANGE performed by April Avila DO at [address]    LITHOTRIPSY Left 12/30/2020    CYSTOSCOPY RETROGRADE URETEROSCOPY PYELOGRAM LASER LITHOTRIPSY LEFT J-STENT performed by April Avila DO at [facility]     Family History   Problem Relation Age of Onset    Stroke Mother     Heart Disease Mother     Cancer Father         lung    Mult Sclerosis Sister     No Known Problems Brother     No Known Problems Sister     Arthritis Sister     Cervical Cancer Sister      Social History     Socioeconomic History    Marital status:      Spouse name: Not on file    Number of children: Not on file    Years of education: Not on file    Highest education level: Not on file   Occupational History    Not on file   Tobacco Use    Smoking status: Never    Smokeless tobacco: Never   Substance and Sexual Activity    Alcohol use: No     Alcohol/week: 0.0 standard drinks     Comment: Ocassionally    Drug use: No    Sexual activity: Yes     Partners: Male   Other Topics Concern    Not on file   Social History Narrative    Not on file     Social Determinants of Health     Financial Resource Strain: Unknown    Difficulty of Paying Living Expenses: Patient refused   Food Insecurity: Unknown    Worried About Running Out of Food in the Last Year: Patient refused    Ran Out of Food in the Last Year: Patient refused   Transportation Needs: Not on file   Physical Activity: Inactive    Days of Exercise per Week: 0 days    Minutes of Exercise per Session: 0 min   Stress: Not on file   Social Connections: Not on file   Intimate Partner Violence: Not on file   Housing Stability: Not on file       Vitals:    10/06/22 1309   Pulse: 70   Temp: 97.3 °F (36.3 °C)   TempSrc: Temporal   SpO2: 100%   Weight: 125 lb (56.7 kg)   Height: 5' (1.524 m)       Physical Exam  Vitals and nursing note reviewed. Musculoskeletal:         General: Swelling and tenderness present. Comments: On examination to right hand she does have at flexed with inability to open at secondary to her stroke. Sensation appeared to be okay. She does have tenderness to palpation over the hand metacarpals primarily 2 through 4 along with her middle finger. She does have bruising noted to her posterior hand. Good pulses. Capillary refill okay. Skin:     General: Skin is warm and dry. Findings: Bruising present. Neurological:      Mental Status: She is oriented to person, place, and time. Sensory: Sensory deficit present.    Psychiatric:         Mood and Affect: Mood normal.         Behavior: Behavior normal.               Assessment and Plan:  Bela Villatoro was seen today for hand injury. Diagnoses and all orders for this visit:    Injury of right hand, initial encounter  -     XR HAND RIGHT (MIN 3 VIEWS); Future    Plan: I did get x-ray of the hand which I visualized. I could not see any definitive fracture although I did ask radiology to read it stat. Recommended continued ice can use Tylenol for discomfort. Follow-up with PCP. Notify us if not improving. Await final reading. Return for fu pcp. Seen By:  Cris Hsu MD      *Document was created using voice recognition software. Note was reviewed however may contain grammatical errors.

## 2022-10-10 ENCOUNTER — OFFICE VISIT (OUTPATIENT)
Dept: ORTHOPEDIC SURGERY | Age: 77
End: 2022-10-10
Payer: MEDICARE

## 2022-10-10 VITALS — WEIGHT: 125 LBS | HEIGHT: 60 IN | BODY MASS INDEX: 24.54 KG/M2

## 2022-10-10 DIAGNOSIS — S62.609A CLOSED NONDISPLACED FRACTURE OF PHALANX OF FINGER, UNSPECIFIED FINGER, UNSPECIFIED PHALANX, INITIAL ENCOUNTER: Primary | ICD-10-CM

## 2022-10-10 PROCEDURE — G8420 CALC BMI NORM PARAMETERS: HCPCS | Performed by: PHYSICIAN ASSISTANT

## 2022-10-10 PROCEDURE — G8400 PT W/DXA NO RESULTS DOC: HCPCS | Performed by: PHYSICIAN ASSISTANT

## 2022-10-10 PROCEDURE — G8484 FLU IMMUNIZE NO ADMIN: HCPCS | Performed by: PHYSICIAN ASSISTANT

## 2022-10-10 PROCEDURE — 1036F TOBACCO NON-USER: CPT | Performed by: PHYSICIAN ASSISTANT

## 2022-10-10 PROCEDURE — 1123F ACP DISCUSS/DSCN MKR DOCD: CPT | Performed by: PHYSICIAN ASSISTANT

## 2022-10-10 PROCEDURE — 99203 OFFICE O/P NEW LOW 30 MIN: CPT | Performed by: PHYSICIAN ASSISTANT

## 2022-10-10 PROCEDURE — 1090F PRES/ABSN URINE INCON ASSESS: CPT | Performed by: PHYSICIAN ASSISTANT

## 2022-10-10 PROCEDURE — G8428 CUR MEDS NOT DOCUMENT: HCPCS | Performed by: PHYSICIAN ASSISTANT

## 2022-10-10 NOTE — PROGRESS NOTES
[address] [facility],[address] In Care  New Patient Note      CHIEF COMPLAINT:   Chief Complaint   Patient presents with    Hand Pain     Pt presents this AM with c/o R hand/wrist pain. Was seen in walk-in care last week, and fractures were found in fingers. Pt's  states that he was aiding her with getting into the car on sliding board, and they noticed swelling later on on Wednesday. HISTORY OF PRESENT ILLNESS:                The patient is a 69 y.o. female who presents today for follow-up of right third and fourth phalanx fractures. She was initially seen through primary care [facility] on 10/6/2022 by Dr. Molly Rodriguez and was referred to orthopedic walk-in care for further evaluation and treatment options. Patient states she no longer has pain in the fingers. Her  is with her and states the swelling in the hand has gone down with ice and elevation. She has remote history of a stroke that affects her right side. He states her right hand has chronic spasticity with minimal function. Today she does have a brace on the palmar aspect of the forearm and hand helping to straighten the fingers. She denies numbness, tingling, loss sensation or radiation of symptoms. She states her fingers are not painful unless you press on them. She is not taking any medication at this time for symptomatic relief. Past Medical History:        Diagnosis Date    CAD (coronary artery disease) 4/21/2020    High cholesterol     Hypertension     Hypothyroidism     MI (myocardial infarction) ([address] 04/21/2020    MS (multiple sclerosis) ([address]     Osteoporosis     Sarcoidosis     Seizure ([address] 02/05/2021    Type 2 diabetes mellitus with hyperglycemia, without long-term current use of insulin ([address] 10/30/2019     Past Surgical History:        Procedure Laterality Date    CHOLECYSTECTOMY  1990s    CORONARY ANGIOPLASTY WITH STENT PLACEMENT  04/21/2020    Dr. Jey Walker   3.0 x 22mm Resolute MAAME to Prox LAD.   No LV    CORONARY ANGIOPLASTY WITH STENT PLACEMENT  01/14/2021    3.0 x 30 Tk to the prox LAD and a 2.5 x 20 Tk to the Obtuse marginal by Dr. Elida Epstein / [address] / Mariluz Gusman Left 4/23/2020    CYSTOSCOPY LEFT RETROGRADE PYELOGRAM STENT INSERTION, [address] [facility] [address] performed by Simon Avila DO at [address] Left 3/21/2019    LEFT FEMUR CEPHALLOMEDULLARY NAIL performed by Messi Bedolla MD at . [facility] [address] Bilateral     HYSTERECTOMY (CERVIX STATUS UNKNOWN)      KNEE SURGERY      plate in lt knee    LITHOTRIPSY Left 11/23/2020    CYSTOSCOPY LEFT URETEROSCOPY,  LASER LITHOTRIPSY  BASKET EXTRACTION LEFT STONE, STENT EXCHANGE performed by Simon Avila DO at [address]    LITHOTRIPSY Left 12/30/2020    CYSTOSCOPY RETROGRADE URETEROSCOPY PYELOGRAM LASER LITHOTRIPSY LEFT J-STENT performed by Amee Avila DO at [address]     Current Medications:   No current facility-administered medications for this visit. Allergies:  Augmentin [amoxicillin-pot clavulanate], Bactrim [sulfamethoxazole-trimethoprim], and Macrobid [nitrofurantoin]    Social History:   TOBACCO:   reports that she has never smoked. She has never used smokeless tobacco.  ETOH:   reports no history of alcohol use. DRUGS:   reports no history of drug use. OCCUPATION:      Review of Systems   Constitutional: Negative for fever, chills, diaphoresis, appetite change and fatigue. HENT: Negative for dental issues, hearing loss and tinnitus. Negative for congestion, sinus pressure, sneezing, sore throat. Negative for headache. Eyes: Negative for visual disturbance, blurred and double vision. Negative for pain, discharge, redness and itching  Respiratory: Negative for cough, shortness of breath and wheezing. Cardiovascular: Negative for chest pain, palpitations and leg swelling.  No dyspnea on exertion   Gastrointestinal:   Negative for nausea, vomiting, abdominal pain, diarrhea, constipation  or black or bloody. Hematologic\Lymphatic:  negative for bleeding, petechiae,   Genitourinary: Negative for hematuria and difficulty urinating. Musculoskeletal: Negative for neck pain and stiffness. Negative for back pain, joint swelling and gait problem. + Right finger pain  Skin: Negative for pallor, rash and wound. Neurological: Negative for dizziness, tremors, seizures, weakness, light-headedness, no TIA or stroke symptoms. No numbness and headaches. Psychiatric/Behavioral: Negative. Physical Examination:   General appearance: alert, well appearing, and in no distress,  normal appearing weight  Mental status: alert, oriented to person, place, and time, normal mood, behavior, speech, dress, motor activity, and thought processes  Resp:   resp easy and unlabored, no audible wheezes note  Cardiac: distal pulses palpable, skin well perfused  Neurological: alert, oriented X3, normal speech, no focal findings or movement disorder noted, motor and sensory grossly normal bilaterally, normal muscle tone  HEENT: normochephalic atraumatic, external ears and eyes normal, sclera normal, neck supple  Extremities:   peripheral pulses normal, no edema, redness or tenderness in the calves   Skin: normal coloration, no rashes or open wounds, no suspicious skin lesions noted  Psych: Affect euthymic   Musculoskeletal:   Wrist/Hand:  On visual inspection there is no obvious deformity of the right wrist or hand. There is no erythema, edema, ecchymosis or open wounds. There is no decreased sensation to light touch throughout the right wrist or hand. Patient is grossly neurovascularly intact. Right wrist/hand: Patient is not tender to palpation in the wrist, hand, or fingers. Strong radial pulse noted. Patient is unable to actively perform range of motion of the wrist or fingers secondary to process from stroke. Passive range of motion of the fingers and thumb is within normal limits.       Ht 5' (1.524 m)   Wt 125 lb (56.7 kg)   BMI 24.41 kg/m²      XR: Patient had previous hand x-rays on 10/6/2022 with Dr. Julianne Amaya which were independently reviewed in the clinic today and show evidence of fractures involving the base of the proximal third and fourth phalanges. She has diffuse ostial degenerative changes with osteopenia. ASSESSMENT:   Diagnosis Orders   1. Closed nondisplaced fracture of phalanx of finger, unspecified finger, unspecified phalanx, initial encounter            PLAN:  Patient is a pleasant 71-year-old female who presents clinic today for evaluation and definitive treatment options for fractures involving the proximal phalanx of the third and fourth fingers. On exam patient reports no pain and improvements in swelling since being seen last week. At this time since pain is resolved and she has limited use of that hand secondary to paralysis and spasticity I recommended we monitor this. She can continue use of her forearm palmar splint for immobilization of the fingers. If the swelling would return, she would have increase in pain she should return to the clinic for further evaluation and definitive treatment options. Pt should apply ice to the effected area for no more than 20 minutes at a time repeating throughout the day as necessary. They should elevate the extremity and rest.     Patient voiced understanding and agrees with the treatment plan outlined for them in the office today. If they have any additional questions or concerns they should call the office. If symptoms return she should return to the clinic otherwise, I will see the patient back on a PRN basis. Electronically signed by Dana Brambila PA-C on 10/10/22 at 9:08 AM EDT    **This report was transcribed using voice recognition software. Every effort was made to ensure accuracy; however, inadvertent computerized transcription errors may be present. **

## 2022-10-24 ENCOUNTER — OFFICE VISIT (OUTPATIENT)
Dept: ENDOCRINOLOGY | Age: 77
End: 2022-10-24
Payer: MEDICARE

## 2022-10-24 VITALS — HEART RATE: 75 BPM | DIASTOLIC BLOOD PRESSURE: 60 MMHG | SYSTOLIC BLOOD PRESSURE: 108 MMHG | OXYGEN SATURATION: 100 %

## 2022-10-24 DIAGNOSIS — E11.69 TYPE 2 DIABETES MELLITUS WITH OTHER SPECIFIED COMPLICATION, WITHOUT LONG-TERM CURRENT USE OF INSULIN (HCC): Primary | ICD-10-CM

## 2022-10-24 DIAGNOSIS — E55.9 VITAMIN D DEFICIENCY: ICD-10-CM

## 2022-10-24 DIAGNOSIS — E03.9 HYPOTHYROIDISM, UNSPECIFIED TYPE: ICD-10-CM

## 2022-10-24 LAB — HBA1C MFR BLD: 4.7 %

## 2022-10-24 PROCEDURE — G8400 PT W/DXA NO RESULTS DOC: HCPCS | Performed by: INTERNAL MEDICINE

## 2022-10-24 PROCEDURE — G8484 FLU IMMUNIZE NO ADMIN: HCPCS | Performed by: INTERNAL MEDICINE

## 2022-10-24 PROCEDURE — 99214 OFFICE O/P EST MOD 30 MIN: CPT | Performed by: INTERNAL MEDICINE

## 2022-10-24 PROCEDURE — G8427 DOCREV CUR MEDS BY ELIG CLIN: HCPCS | Performed by: INTERNAL MEDICINE

## 2022-10-24 PROCEDURE — 1123F ACP DISCUSS/DSCN MKR DOCD: CPT | Performed by: INTERNAL MEDICINE

## 2022-10-24 PROCEDURE — 3044F HG A1C LEVEL LT 7.0%: CPT | Performed by: INTERNAL MEDICINE

## 2022-10-24 PROCEDURE — 1090F PRES/ABSN URINE INCON ASSESS: CPT | Performed by: INTERNAL MEDICINE

## 2022-10-24 PROCEDURE — 1036F TOBACCO NON-USER: CPT | Performed by: INTERNAL MEDICINE

## 2022-10-24 PROCEDURE — 83036 HEMOGLOBIN GLYCOSYLATED A1C: CPT | Performed by: INTERNAL MEDICINE

## 2022-10-24 PROCEDURE — G8420 CALC BMI NORM PARAMETERS: HCPCS | Performed by: INTERNAL MEDICINE

## 2022-10-24 NOTE — PROGRESS NOTES
[address] [facility] Department of Endocrinology Diabetes and Metabolism   [address] [facility] [address]   Phone: [phone]  Fax: [phone]    Date of Service: 10/24/2022  Primary Care Physician: Jessica Baez MD  Provider: Minal Patterson MD     Reason for the visit:  DM type 2    History of Present Illness: The history is provided by the patient. No  was used. Accuracy of the patient data is excellent.   Aren Kam is a very pleasant 69 y.o. female CAD and MS seen today for diabetes management     Aren Kam was diagnosed with diabetes long time ago and currently doing very well on Metformin 500 mg BID,   The patient has been checking blood sugar twice a day and most readings at goal   Most recent A1c results summarized below  Lab Results   Component Value Date/Time    LABA1C 4.7 10/24/2022 02:16 PM    LABA1C 4.8 09/08/2021 03:21 PM    LABA1C 4.9 01/13/2021 02:18 AM     Patient has had no hypoglycemic episodes   The patient has been mindful of what has been eating and following diabetic diet as encouraged  I reviewed current medications and the patient has no issues with diabetes medications  The patient is due for an eye exam   Microvascular complications:  No Retinopathy, Nephropathy or Neuropathy   Macrovascular complications: + CAD   The patient receives Flushot every year and up to date with the Pneumonia vaccine     PAST MEDICAL HISTORY   Past Medical History:   Diagnosis Date    CAD (coronary artery disease) 4/21/2020    High cholesterol     Hypertension     Hypothyroidism     MI (myocardial infarction) ([address] 04/21/2020    MS (multiple sclerosis) ([facility]     Osteoporosis     Sarcoidosis     Seizure ([facility] 02/05/2021    Type 2 diabetes mellitus with hyperglycemia, without long-term current use of insulin ([address] 10/30/2019       PAST SURGICAL HISTORY   Past Surgical History:   Procedure Laterality Date    CHOLECYSTECTOMY  1990s    CORONARY ANGIOPLASTY WITH STENT PLACEMENT  04/21/2020    Dr. Irlanda Marion   3.0 x 22mm Resolute MAAME to Prox LAD. No LV    CORONARY ANGIOPLASTY WITH STENT PLACEMENT  01/14/2021    3.0 x 30 Maame to the prox LAD and a 2.5 x 20 Maame to the Obtuse marginal by Dr. Cesar Lira / [address] / Alexandre Munoz Left 4/23/2020    CYSTOSCOPY LEFT RETROGRADE PYELOGRAM STENT INSERTION, [address] performed by Miguel Angel Avila DO at [facility] Left 3/21/2019    LEFT FEMUR CEPHALLOMEDULLARY NAIL [facility] Bilateral     HYSTERECTOMY (CERVIX STATUS UNKNOWN)      KNEE SURGERY      plate in lt knee    LITHOTRIPSY Left 11/23/2020    CYSTOSCOPY LEFT URETEROSCOPY,  LASER LITHOTRIPSY  BASKET EXTRACTION LEFT STONE, STENT EXCHANGE performed by Miguel Angel Avila DO at [address]    LITHOTRIPSY Left 12/30/2020    CYSTOSCOPY RETROGRADE URETEROSCOPY PYELOGRAM LASER LITHOTRIPSY LEFT J-STENT performed by Miguel Angel Avila DO at [address]   Tobacco:   reports that she has never smoked. She has never used smokeless tobacco.  Alcohol:   reports no history of alcohol use. Drugs:   reports no history of drug use.     FAMILY HISTORY   Family History   Problem Relation Age of Onset    Stroke Mother     Heart Disease Mother     Cancer Father         lung    Mult Sclerosis Sister     No Known Problems Brother     No Known Problems Sister     Arthritis Sister     Cervical Cancer Sister        ALLERGIES AND DRUG REACTIONS   Allergies   Allergen Reactions    Augmentin [Amoxicillin-Pot Clavulanate] Shortness Of Breath    Bactrim [Sulfamethoxazole-Trimethoprim] Anaphylaxis and Other (See Comments)     Seizures,     Macrobid [Nitrofurantoin] Anaphylaxis     Mental Changes, shaking, stopped breathing, collapsed (happened again Feb 2021)        CURRENT MEDICATIONS   Current Outpatient Medications   Medication Sig Dispense Refill    nystatin (MYCOSTATIN) 065657 UNIT/ML suspension Take 5 mLs by mouth 4 times daily 473 mL 1    atorvastatin (LIPITOR) 10 MG tablet 1 po q hs 90 tablet 3    baclofen (LIORESAL) 20 MG tablet Take 2 tablets by mouth 2 times daily 360 tablet 3    bumetanide (BUMEX) 1 MG tablet Take 1 tablet by mouth 2 times daily BP must be greater than 95/50 180 tablet 3    carvedilol (COREG) 3.125 MG tablet Take 1 tablet by mouth 2 times daily If BP is 80/55 or higher and pulse should be 60 or higher 180 tablet 3    citalopram (CELEXA) 20 MG tablet Take 1 tablet by mouth every morning 90 tablet 3    clopidogrel (PLAVIX) 75 MG tablet Take 1 tablet by mouth daily 90 tablet 3    dantrolene (DANTRIUM) 100 MG capsule Take 1 capsule by mouth 2 times daily 180 capsule 3    isosorbide mononitrate (IMDUR) 30 MG extended release tablet Take 1 tablet by mouth daily If BP is 80/55 or higher 90 tablet 3    levothyroxine (SYNTHROID) 88 MCG tablet Take 1 tablet by mouth daily 90 tablet 3    metFORMIN (GLUCOPHAGE) 500 MG tablet Take 1 tablet by mouth 2 times daily (with meals) 180 tablet 3    sodium chloride 1 g tablet Take 1 tablet by mouth daily 90 tablet 3    LORazepam (ATIVAN) 1 MG tablet Take 1 tablet by mouth 2 times daily as needed for Anxiety for up to 180 days. 60 tablet 5    ciprofloxacin (CIPRO) 500 MG tablet as needed      nitroGLYCERIN (NITROSTAT) 0.4 MG SL tablet place 1 tablet under the tongue if needed every 5 minutes for ingrid...  (REFER TO PRESCRIPTION NOTES). ipratropium-albuterol (DUONEB) 0.5-2.5 (3) MG/3ML SOLN nebulizer solution Inhale 3 mLs into the lungs every 6 hours as needed for Shortness of Breath (Patient taking differently: Inhale 1 vial into the lungs in the morning and 1 vial at noon and 1 vial in the evening and 1 vial before bedtime.) 360 mL 6    Lancets MISC 1 each by Does not apply route daily 100 each 11    blood glucose test strips (ASCENSIA AUTODISC VI;ONE TOUCH ULTRA TEST VI) strip 1 each by In Vitro route 2 times daily As needed.  100 each 11 Acetaminophen (TYLENOL PO) Take by mouth as needed      ranolazine (RANEXA) 1000 MG extended release tablet Take 1,000 mg by mouth 2 times daily       fluticasone (FLONASE) 50 MCG/ACT nasal spray 2 sprays by Each Nostril route daily as needed for Rhinitis 1 Bottle 1    ondansetron (ZOFRAN ODT) 4 MG disintegrating tablet Take 1 tablet by mouth every 8 hours as needed for Nausea or Vomiting 30 tablet 0    OXYGEN Inhale 2 L into the lungs continuous       aspirin 81 MG EC tablet Take 81 mg by mouth daily      blood glucose monitor kit and supplies Dispense sufficient amount for indicated testing frequency plus additional to accommodate PRN testing needs. Dispense all needed supplies to include: monitor, strips, lancing device, lancets, control solutions, alcohol swabs. 1 kit 0    blood glucose monitor strips Test qd  & as needed for symptoms of irregular blood glucose. Dispense sufficient amount for indicated testing frequency plus additional to accommodate PRN testing needs. 50 strip 11    Cyanocobalamin (VITAMIN B 12 PO) Take 500 mg by mouth daily       Calcium Carbonate-Vit D-Min (CALCIUM 600+D PLUS MINERALS) 600-400 MG-UNIT TABS Take 1 tablet by mouth nightly       polyethylene glycol (MIRALAX) PACK packet Take 7.3 g by mouth daily       vitamin D3 (CHOLECALCIFEROL) 25 MCG (1000 UT) TABS tablet Take 1 tablet by mouth every morning       magnesium (MAGNESIUM-OXIDE) 250 MG TABS tablet Take 250 mg by mouth every morning        No current facility-administered medications for this visit. Review of Systems  Constitutional: No fever, no chills, no diaphoresis, no generalized weakness. HEENT: No blurred vision, No sore throat, no ear pain, no hair loss  Neck: denied any neck swelling, difficulty swallowing,   Cardio-pulmonary: No CP, SOB or palpitation, No orthopnea or PND. No cough or wheezing.   GI: No N/V/D, no constipation, No abdominal pain, no melena or hematochezia   : Denied any dysuria, hematuria, flank pain, discharge, or incontinence. Skin: denied any rash, ulcer, Hirsute, or hyperpigmentation. MSK: denied any joint deformity, joint pain/swelling, muscle pain, or back pain. Neuro: no numbness, no tingling, no weakness, _    OBJECTIVE    /60   Pulse 75   SpO2 100%   BP Readings from Last 4 Encounters:   10/24/22 108/60   09/28/22 120/60   09/28/22 120/60   08/08/22 (!) (P) 127/56     Wt Readings from Last 6 Encounters:   10/10/22 125 lb (56.7 kg)   10/06/22 125 lb (56.7 kg)   08/08/22 (P) 125 lb (56.7 kg)   03/07/22 125 lb (56.7 kg)   09/16/21 141 lb (64 kg)   01/19/21 139 lb 11.2 oz (63.4 kg)     Physical examination:  General: awake alert, oriented x3  HEENT: normocephalic non traumatic, no exophthalmos   Neck: supple, thyroid tenderness,  Pulm: Clear equal air entry no added sounds  CVS: S1 + S2  Abd: soft lax, no tenderness  Skin: warm, no lesions, no rash.  No open wounds, no ulcers   Neuro: CN intact, Lt side hemiplegia from previous stroke   Psych: normal mood, and affect      Review of Laboratory Data:  I personally reviewed the following lab:  Lab Results   Component Value Date/Time    WBC 10.5 10/10/2022 10:21 AM    RBC 2.49 (L) 10/10/2022 10:21 AM    HGB 7.6 (L) 10/10/2022 10:21 AM    HCT 23.1 (L) 10/10/2022 10:21 AM    MCV 92.8 10/10/2022 10:21 AM    MCH 30.5 10/10/2022 10:21 AM    MCHC 32.9 (L) 10/10/2022 10:21 AM    RDW 17.6 (H) 10/10/2022 10:21 AM     (H) 10/10/2022 10:21 AM    MPV 8.1 10/10/2022 10:21 AM      Lab Results   Component Value Date/Time     (L) 10/10/2022 10:21 AM    K 4.9 10/10/2022 10:21 AM    K 6.5 (H) 01/12/2021 09:11 PM    CO2 27 10/10/2022 10:21 AM    BUN 31 (H) 10/10/2022 10:21 AM    CREATININE 0.9 10/10/2022 10:21 AM    CALCIUM 9.2 10/10/2022 10:21 AM    LABGLOM 61 10/10/2022 10:21 AM    GFRAA >60 01/18/2021 10:35 AM      Lab Results   Component Value Date/Time    TSH 2.29 06/24/2022 02:07 PM    T4FREE 0.98 06/24/2022 02:07 PM     Lab Results   Component Value Date/Time    LABA1C 4.7 10/24/2022 02:16 PM    GLUCOSE 87 10/10/2022 10:21 AM    LABMICR < 2.0 09/09/2021 03:01 PM     Lab Results   Component Value Date/Time    LABA1C 4.7 10/24/2022 02:16 PM    LABA1C 4.8 09/08/2021 03:21 PM    LABA1C 4.9 01/13/2021 02:18 AM     Lab Results   Component Value Date/Time    TRIG 211 09/08/2021 03:21 PM    HDL 34 09/08/2021 03:21 PM    LDLCALC 62 01/13/2021 02:18 AM    CHOL 157 09/08/2021 03:21 PM    CHOL 127 01/13/2021 02:18 AM     Lab Results   Component Value Date/Time    VITD25 66.8 09/02/2020 12:00 AM     ASSESSMENT & RECOMMENDATIONS   Michaela Douglas, a 69 y.o.-old female seen in for the following issues     Diabetes Mellitus Type 2    BG still under good control   Will continue Metformin 500 mg BID   The patient was advised to check blood sugars  Daily in AM   Discussed with patient A1c and blood sugar goals   Patient will need routine diabetes maintenance and prevention    HLD  Continue Lipitor  10 mg daily     Hypothyroidism   continue Levothyroxine 88 mcg daily  Check level     I personally reviewed external notes from PCP and other patient's care team providers, and personally interpreted labs associated with the above diagnosis. I also ordered labs to further assess and manage the above addressed medical conditions    Return in about 6 months (around 4/24/2023) for DM type 2, VitD deficiency. The above issues were reviewed with the patient who understood and agreed with the plan. Thank you for allowing us to participate in the care of this patient. Please do not hesitate to contact us with any additional questions. Diagnosis Orders   1. Type 2 diabetes mellitus with other specified complication, without long-term current use of insulin (HCC)  POCT glycosylated hemoglobin (Hb A1C)    Hemoglobin A1C      2. Hypothyroidism, unspecified type  TSH    T4, Free      3.  Vitamin D deficiency  Vitamin D 25 Hydroxy        Guillermo Lazo MD  Endocrinologist, [facility] Diabetes Care and Endocrinology   [address] [place] [address]   Phone: [phone]  Fax: [phone]  --------------------------------------------  An electronic signature was used to authenticate this note.  Oriana Price MD on 10/24/2022 at 2:25 PM

## 2022-11-01 ENCOUNTER — TELEPHONE (OUTPATIENT)
Dept: FAMILY MEDICINE CLINIC | Age: 77
End: 2022-11-01

## 2022-11-01 NOTE — TELEPHONE ENCOUNTER
I do not think it is coming from the sodium    Mild dehydration could contribute    May wish to speak to endocrinology, given sugars have been so controlled lately and may not need to check as frequently

## 2022-11-02 ENCOUNTER — OFFICE VISIT (OUTPATIENT)
Dept: NEUROLOGY | Age: 77
End: 2022-11-02
Payer: MEDICARE

## 2022-11-02 ENCOUNTER — TELEPHONE (OUTPATIENT)
Dept: ENDOCRINOLOGY | Age: 77
End: 2022-11-02

## 2022-11-02 VITALS
TEMPERATURE: 97.5 F | OXYGEN SATURATION: 100 % | HEART RATE: 72 BPM | DIASTOLIC BLOOD PRESSURE: 45 MMHG | RESPIRATION RATE: 18 BRPM | SYSTOLIC BLOOD PRESSURE: 102 MMHG

## 2022-11-02 DIAGNOSIS — R56.9 SEIZURES (HCC): Primary | ICD-10-CM

## 2022-11-02 PROCEDURE — 1036F TOBACCO NON-USER: CPT | Performed by: PSYCHIATRY & NEUROLOGY

## 2022-11-02 PROCEDURE — 1090F PRES/ABSN URINE INCON ASSESS: CPT | Performed by: PSYCHIATRY & NEUROLOGY

## 2022-11-02 PROCEDURE — G8427 DOCREV CUR MEDS BY ELIG CLIN: HCPCS | Performed by: PSYCHIATRY & NEUROLOGY

## 2022-11-02 PROCEDURE — 3078F DIAST BP <80 MM HG: CPT | Performed by: PSYCHIATRY & NEUROLOGY

## 2022-11-02 PROCEDURE — G8400 PT W/DXA NO RESULTS DOC: HCPCS | Performed by: PSYCHIATRY & NEUROLOGY

## 2022-11-02 PROCEDURE — G8420 CALC BMI NORM PARAMETERS: HCPCS | Performed by: PSYCHIATRY & NEUROLOGY

## 2022-11-02 PROCEDURE — 1123F ACP DISCUSS/DSCN MKR DOCD: CPT | Performed by: PSYCHIATRY & NEUROLOGY

## 2022-11-02 PROCEDURE — 99215 OFFICE O/P EST HI 40 MIN: CPT | Performed by: PSYCHIATRY & NEUROLOGY

## 2022-11-02 PROCEDURE — G8484 FLU IMMUNIZE NO ADMIN: HCPCS | Performed by: PSYCHIATRY & NEUROLOGY

## 2022-11-02 PROCEDURE — 3074F SYST BP LT 130 MM HG: CPT | Performed by: PSYCHIATRY & NEUROLOGY

## 2022-11-02 RX ORDER — MIDAZOLAM 5 MG/.1ML
1 SPRAY NASAL PRN
Qty: 2 EACH | Refills: 1 | Status: SHIPPED | OUTPATIENT
Start: 2022-11-02 | End: 2023-05-01

## 2022-11-02 NOTE — PROGRESS NOTES
Subjective:     Jessica Lynch was a 58-year-old right handed woman who suffered from long-standing multiple sclerosis. Her EDSS remained 8.5. The patient and her  remained excellent historians. Her medications were multiple, and included lorazepam, metformin, baclofen, atorvastatin, citalopram, levothyroxine, bumetanide, carvedilol, ranolazine, dantrolene, clopidogrel, inhalers and multivitamins. She remained well off dimethyl fumarate for years, without additional neurological decline. She continued dantrolene and baclofen for spasticity. Her spasms remained moderately controlled. She was still transferring with a sliding board and a Eduar lift, with her 's assistance. He was now alone at home performing these duties. The bathroom had been remodeled, using access to it. She denied increasing fatigue; she was sleeping better and eating well. She continued to read and watch television in her bed. Her  again questioned increasing cognitive decline, especially short-term memories. She now experienced 1 seizure every 6 months. The most recent one was several weeks ago, consisting of unresponsiveness with generalized tonic-clonic movements and tongue biting. This event followed a COVID-19 vaccination. For the spells, he would grind up lorazepam and attempt to give these medications per mouth. Believe he was willing to add routine anticonvulsants if these episodes increase in frequency or severity, but not now. The left femoral fracture was completely healed. She was now off her catheter, using diapers instead. There were no other neurological issues. She suffered from coronary artery disease. She continued controlled with carvedilol and bumetanide. She denied other medical issues. The diagnosis of sarcoidosis was never proven. Review of systems was otherwise negative.     Objective:     BP (!) 102/45 (Site: Left Upper Arm, Position: Sitting)   Pulse 72 Temp 97.5 °F (36.4 °C) (Infrared)   Resp 18   SpO2 100%      She was wheelchair-bound and thinner; she was in no acute distress; she remained pleasant. Throat: lips, mucosa, and tongue normal; teeth and gums normal  Neck: no adenopathy, no carotid bruit, no JVD, supple, symmetrical, trachea midline and thyroid not enlarged, symmetric, no tenderness/mass/nodules full ROM of neck without tenderness  Lungs: clear to auscultation bilaterally  Heart: regular rate and rhythm, S1, S2 normal, no murmur, click, rub or gallop  Pulses: 1+ and symmetric  Skin: color, texture, turgor normal; no rashes or lesions     Neurological examination:    Mental Status: alert, oriented, thought content appropriate; pleasant, I again found no cognitive issues    Cranial Nerves:  I: smell    II: visual acuity     II: visual fields Full   II: pupils DAX   III,VII: ptosis none   III,IV,VI: extraocular muscles  Full ROM   V: mastication Normal   V: facial light touch sensation  Normal   V,VII: corneal reflex  Present   VII: facial muscle function - upper     VII: facial muscle function - lower Normal   VIII: hearing Normal   IX: soft palate elevation  Normal   IX,X: gag reflex Present   XI: trapezius strength  5/5   XI: sternocleidomastoid strength 5/5   XI: neck flexion strength  5/5   XII: tongue strength  Normal     No red desaturation  No ADRIA pupil    Motor:  No drift in arms  Right   +4/5              Left   4/5               Right Bicep  4/5           Left Bicep  4/5              Right Triceps   4/5       Left Trceps  4/5          Right Deltoid  4/5     Left Deltoid  4/5         Right IPS  0/5            Left IPS  0/5               Right Quadriceps  0/5          Left Quadriceps    0/5           Right Gastrocnemius    0/5    Left Gastrocnemius   0/5  Right Ant Tibialis   0/5  Left Ant Tibialis   0/5    Normal bulk  Moderate spasticity in both legs, especially both thigh adductors    Sensory:   Moderately decreased vibratory sensation to both knees  No sensory level was present over her spinous processes    Gait:  In a wheelchair; she remained on able to stand despite assistance    Coordination:   Fine finger movements and rapid alternating movements continued decreased in both arms  In addition, finger-to-nose testing on the right side    DTR:   Right Brachioradialis reflex 1+  Left Brachioradialis reflex 1+  Right Biceps reflex 1+  Left Biceps reflex 1+  +2 at both knees but absent at the ankles    Her neurological examination was unchanged      Recent CMP was unremarkable; CBC with differential displayed recurrent anemia, with elevated platelets. There were normal abso decreased lymphocyte counts as well. Anemia panels reveal decreased iron and iron saturation, with previously increased ferritin levels. Previous MRIs revealed extensive lesion load in her cervical cord and brain, without acute changes. Assessment: This patient suffers from longstanding, severe secondary progressive multiple sclerosis, with an EDSS of 8.5. Her neurological examination remains unchanged. We will continue her current regimen. She still experiences rare generalized tonic seizures, from her multiple sclerosis. I will switch her to nasal midazolam for seizure control. If the seizure frequency increases, levetiracetam will be added. I also suspect early dementia, which will be followed serially. Her fatigue has improved, likely from coronary artery disease. Her cardiac status remains stable    She otherwise stable medically. Plan:     She will continue her current regimen; lorazepam was discontinued and nasal midazolam was prescribed for seizure control as needed. She will return in 6 months, to see Dr. Edd Dobbs. Her  will report back if additional seizures occur. She will call if any problems arise in the interim    I spent 40 minutes with the patient with over 50 % spent in counseling and disease management. All patient issues were addressed and all questions were answered.

## 2022-11-02 NOTE — TELEPHONE ENCOUNTER
Patient  called regarding getting blood sugars he is having a hard time getting blood from her fingers, I told him to try to turn the lancets up to get a deeper , make sure she is drinking a lot of fluids, to keep her body hydrated dr Lisbet Caicedo he told me to have him try her forearm,  he understood and will try and call us if he has a problem

## 2022-11-03 ENCOUNTER — TELEPHONE (OUTPATIENT)
Dept: NEUROLOGY | Age: 77
End: 2022-11-03

## 2022-11-09 LAB
25-HYDROXY VITAMIN D-3: 61.2 NG/ML (ref 30–100)
T4 FREE: 0.99 NG/DL (ref 0.61–1.12)
TSH SERPL DL<=0.05 MIU/L-ACNC: 2.57 UIU/ML (ref 0.34–5.6)

## 2022-11-10 ENCOUNTER — TELEPHONE (OUTPATIENT)
Dept: ENDOCRINOLOGY | Age: 77
End: 2022-11-10

## 2022-11-22 NOTE — TELEPHONE ENCOUNTER
Appeal was denied for [facility].   Reason: [facility] is not FDA approved for the treatment of epilepsy. Shakira advise.

## 2022-12-08 LAB
25-HYDROXY VITAMIN D-3: 79.8 NG/ML (ref 30–100)
CHOLESTEROL: 137 MG/DL (ref 140–200)
FOLATE: 11.3 NG/ML
HDLC SERPL-MCNC: 37 MG/DL (ref 35–85)
LDL CHOLESTEROL: 72 MG/DL
LDL/HDL RATIO: 1.9 RATIO
MAGNESIUM: 2.1 MEQ/L (ref 1.6–2.6)
TRIGL SERPL-MCNC: 185 MG/DL (ref 41–189)
TSH SERPL DL<=0.05 MIU/L-ACNC: 3.58 UIU/ML (ref 0.34–5.6)
VITAMIN B-12: > 1500 PG/ML (ref 180–914)

## 2022-12-09 LAB
ESTIMATED AVERAGE GLUCOSE: 80 MG/DL
HBA1C MFR BLD: 4.4 % (ref 4–6)

## 2022-12-10 ENCOUNTER — TELEPHONE (OUTPATIENT)
Dept: FAMILY MEDICINE CLINIC | Age: 77
End: 2022-12-10

## 2022-12-10 NOTE — TELEPHONE ENCOUNTER
Please let the patient know that blood work results showed    Blood counts still show anemia with a hemoglobin of 7.5    Platelet count was borderline increased    White blood cell count was normal    Iron levels were still borderline decreased and ferritin storage form of iron was borderline increased    BUN level as a measure of kidney function was borderline increased which gets signify slight dehydration. Other kidney function values were normal    Sugar was mildly elevated.   Hemoglobin A1c is a measure 3-month sugar control was normal at 4.4    Sodium level and chloride level were low    Other electrolytes, liver functions and other kidney function values are normal    Thyroid levels were normal    Cholesterol levels were good    Vitamin E27 and folic acid levels were normal    Vitamin D levels are normal    Thanks

## 2022-12-12 RX ORDER — IPRATROPIUM BROMIDE AND ALBUTEROL SULFATE 2.5; .5 MG/3ML; MG/3ML
1 SOLUTION RESPIRATORY (INHALATION) EVERY 6 HOURS PRN
Qty: 360 ML | Refills: 6 | Status: SHIPPED | OUTPATIENT
Start: 2022-12-12

## 2022-12-12 NOTE — TELEPHONE ENCOUNTER
Last Appointment:  9/28/2022  Future Appointments   Date Time Provider Marisol Garcia   12/22/2022  1:30 PM MD DIMITRI Howell [facility]   1/9/2023 10:30 AM Rosi Peacock MD [facility]   4/24/2023  2:15 PM Virgil Campos MD [facility] [place]   5/2/2023  2:00 PM Vinnie De Anda, APRN - CNS [facility] Neuro Neurology -

## 2022-12-21 ENCOUNTER — TELEPHONE (OUTPATIENT)
Dept: NEUROLOGY | Age: 77
End: 2022-12-21

## 2023-01-01 ENCOUNTER — TELEPHONE (OUTPATIENT)
Dept: ENDOCRINOLOGY | Age: 78
End: 2023-01-01

## 2023-01-05 ENCOUNTER — TELEMEDICINE (OUTPATIENT)
Dept: FAMILY MEDICINE CLINIC | Age: 78
End: 2023-01-05

## 2023-01-05 DIAGNOSIS — G82.50 SPASTIC QUADRIPARESIS (HCC): ICD-10-CM

## 2023-01-05 DIAGNOSIS — G35 MS (MULTIPLE SCLEROSIS) (HCC): ICD-10-CM

## 2023-01-05 DIAGNOSIS — F33.0 MILD EPISODE OF RECURRENT MAJOR DEPRESSIVE DISORDER (HCC): ICD-10-CM

## 2023-01-05 DIAGNOSIS — E87.1 HYPONATREMIA: Primary | ICD-10-CM

## 2023-01-05 DIAGNOSIS — I50.22 CHRONIC SYSTOLIC (CONGESTIVE) HEART FAILURE (HCC): ICD-10-CM

## 2023-01-05 DIAGNOSIS — R56.9 SEIZURES (HCC): ICD-10-CM

## 2023-01-05 DIAGNOSIS — I25.110 CORONARY ARTERY DISEASE INVOLVING NATIVE HEART WITH UNSTABLE ANGINA PECTORIS, UNSPECIFIED VESSEL OR LESION TYPE (HCC): ICD-10-CM

## 2023-01-05 DIAGNOSIS — E03.8 OTHER SPECIFIED HYPOTHYROIDISM: ICD-10-CM

## 2023-01-05 DIAGNOSIS — E11.69 TYPE 2 DIABETES MELLITUS WITH OTHER SPECIFIED COMPLICATION, WITHOUT LONG-TERM CURRENT USE OF INSULIN (HCC): ICD-10-CM

## 2023-01-05 DIAGNOSIS — D64.9 ANEMIA, UNSPECIFIED TYPE: ICD-10-CM

## 2023-01-05 DIAGNOSIS — E78.2 MIXED HYPERLIPIDEMIA: ICD-10-CM

## 2023-01-05 DIAGNOSIS — N20.0 NEPHROLITHIASIS: ICD-10-CM

## 2023-01-05 DIAGNOSIS — N39.0 FREQUENT UTI: ICD-10-CM

## 2023-01-05 ASSESSMENT — ENCOUNTER SYMPTOMS
BLOOD IN STOOL: 0
DIARRHEA: 0
NAUSEA: 0
RHINORRHEA: 0
EYE PAIN: 0
CONSTIPATION: 1
ABDOMINAL PAIN: 0
COUGH: 0
CHEST TIGHTNESS: 0
SHORTNESS OF BREATH: 0
SORE THROAT: 0
VOMITING: 0

## 2023-01-05 NOTE — PROGRESS NOTES
TeleMedicine Video Visit    Tiffany Campo, was evaluated through a synchronous (real-time) audio-video encounter. The patient (or guardian if applicable) is aware that this is a billable service. , which includes applicable co-pays. This virtual visit was conducted with the patient's  (and/or legal guardian's) consent. The visit was conducted pursuant to the emergency declaration under the [address] [place] [address], [address] [place] [address] and the [employer] Act. Patient identification was verified, and a caregiver was present when appropriate. The patient was located in a state where the provider was licensed to provide care. Patient identification was verified at the start of the visit, including the patient's telephone number and physical location. I discussed with the patient the nature of our telehealth visits, that:     Due to the nature of an audio- video modality, the only components of a physical exam that could be done are the elements supported by direct observation. I would evaluate the patient and recommend diagnostics and treatments based on my assessment. If it was felt that the patient should be evaluated in clinic or an emergency room setting, then they would be directed there. Our sessions are not being recorded and that personal health information is protected. Our team would provide follow up care in person if/when the patient needs it. Patient's location: home address in [facility]  Physician  location  office   other people involved in call         This visit was completed virtually using Doxy. me    2023    TELEHEALTH EVALUATION -- Audio/Visual (During PIUAS- public health emergency)    HPI:    Jaqueline Douglas (:  1945) has requested an audio/video evaluation for the following concern(s):    - States has been more drowsy lately     ortho walk in (10/22) -right hand swollen bruise, xray abn, nondisplaced fracture of phalanx of finger 3 and 4 swelling has improved pain resolved continue forearm palmar splint ice, improved. - States had seizure. States pt started yelling and was rolled over was not responding. Placed trilogy machine. States slowly began to recover. Discussed with neurology, felt seizure. Did not think work up necessary at present. Per patient phone call (4/22) - brief seizure last night, that the tip of her tongue, no other complication, did not seek care. Last visit with neuro per reviewed note (11/22) - was to stop lorazepam for nasal midazolam. Otherwise give her lorazepam 1 tab at onset of seizure. States has had another seizure (9/2022). Was given lorazepam x1 , lasted 5 minutes and had post ictal      -Patient has CAD. Has had ST elevation MI (2020) angio and stent to LAD and NSTEMI for instent stenosis (1/21). found to have severe anterior and septal hypokinesia with a apical dyskinesia and ejection fraction 20 to 25% with a small apical left ventricular thrombus on echo. Off eliquis. On ranexa 1000mg twice a day. On plavix. On isosorbide. On Coreg. On bumex. On aspirin. Has hold parameters for medications and will hold if BP too low. No further chest pain or SOB. Last visit with cardiology per reviewed note (10/22) - cad no changes recommend indefinaite dual antiplatelet therapy due to instent restenosis f/u 6m     - States has low blood count, Anemia. States has seen hem/onc treated with  IV iron  Feraheme 510mg x2, keep Hb greater than 7, last visit with hematology per reviewed note  (10/22) -iron deficiency anemia multifactorial possibly MDS declines bone marrow biopsy, recommending Monoferric IV iron, CBC & iron studies monthly transfuse to keep hemoglobin greater than 7 no transfusion needed at present. Had seen GI (6/21) - do not feel gi bleeding, check hemoccult stool x 6 if neg no further.  Last lab (12/22) - anemia Hgb 7.5, cmp no 133, bun 33, iron low, ferritin increased     - Patient had left obstructing ureteral calculus and had a cystoscopy with ureteral stent and stone manipulation. Has saldana cath. (11/20) - Cystoscopy. Complex flexible ureteroscopy. Laser lithotripsy Stent exchange, removed. No current symptoms.      - States had bladder issues and overactive bladder. Stopped oxybutynin. States has chronic saldana catheter. States was following with urology. Was given botox treatment in past. Off myrbetric. Removed saldana, voiding ok, wears adult pads. last visit last visit per reviewed note  (12/22) -incontinence, recurrent UTI, okay to self start antibiotic.  states (10/22) started her on cipro 3 days ago for a uti     - elevated blood sugars. most recent A1c (12/22) was 4.4  - not known to be diabetic. Blood sugars were running . On metformin. No reported side effects. Last visit with endo (10/2022) - no changes.      - States has high cholesterol. States Lipitor. No reported side effects. Stopped zetia. Last lab (12/2022) - improved. -  states constipated. States on mag citrate as needed. States on miralax. Stopped colace. Stopped oral iron therapy. - States has high blood pressure. States has been holding some of her heart medications due to the low blood pressure at times. On coreg 3.125mg bid. On bumex. On isosorbide, off lisinopril. Has hold parameters for medications and will hold if BP too low     - States has hypothyroidism. on levothyroxine. More compliant. Last lab was 6/2022 - stable. - States has depression. On citalopram. Stable on medications. No reported side effects. No suicidal thoughts. Last PHQ score of 0  (9/28/2022)      - States found lung mass and needs pulmonary follow up yearly (10/2020). States PET suggested scar - no further imaging needed at present. Last visit with pulm per reviewed note (9/2022) - stable, cont trilogy vent, f/u 6 months. To see pulmonary 1/2023     - States has multiple sclerosis.  States follows with neurology. States stopped ticferdia. States on baclofen 20mg 4x per day and dantrolene 50mg 4x per day. Spasms stable. States weakness to hands b/l - difficult to feed self at times. Still with right hand weakness.      - States had hip fracture s/p left ORIF. States has been to physical therapy. Not walking. States having issues with left knee and leg. Released from [facility] 1540 of right carotid artery- declines ultrasound     derm (5/22) rt inf malar cheek, seborrheic keratosis    - labs from 12/8/2022 reviewed with patient 1/5/2023     Health Maintenance   - immunizations:   Influenza Vaccination - (2018), (2019), (2020), (2021), (2022)   Pneumonia Vaccination - (9/2013) - Pneumococcal. (9/2018) - prevnar, RA salem  Shingrix Vaccine (Shingles) - declines currently  Tetanus Vaccination  covid (3/15/2021) #1, (4/12/2021) #2, (11/2/2021) #3 - moderna      - Screenings:   Bone Density Scan   Pelvic/Pap Exam  Mammogram - (12/10/2018)     Colonoscopy  - (2019) - normal, no further     Review of Systems   Constitutional:  Negative for appetite change, chills, fever and unexpected weight change. HENT:  Negative for congestion, rhinorrhea and sore throat. Eyes:  Negative for pain and visual disturbance. Respiratory:  Negative for cough, chest tightness and shortness of breath. Cardiovascular:  Negative for chest pain and palpitations. Gastrointestinal:  Positive for constipation. Negative for abdominal pain, blood in stool, diarrhea, nausea and vomiting. Genitourinary:  Negative for difficulty urinating, dysuria, frequency, pelvic pain, urgency and vaginal bleeding. Musculoskeletal:  Negative for arthralgias and myalgias. Skin:  Negative for rash. Neurological:  Negative for dizziness, syncope, weakness, light-headedness, numbness and headaches. Hematological:  Negative for adenopathy. Psychiatric/Behavioral:  Negative for suicidal ideas. The patient is not nervous/anxious. Prior to Visit Medications    Medication Sig Taking? Authorizing Provider   VALTOCO 5 MG DOSE 5 MG/0.1ML LIQD 5 mg by Nasal route as needed (Seizures) Yes Matthew Servin MD   ipratropium-albuterol (DUONEB) 0.5-2.5 (3) MG/3ML SOLN nebulizer solution Inhale 3 mLs into the lungs every 6 hours as needed for Shortness of Breath Yes Abigail Kong MD   Midazolam (NAYZILAM) 5 MG/0.1ML SOLN 1 Device by Nasal route as needed (For seizures) Yes Matthew Servin MD   nystatin (MYCOSTATIN) 594693 UNIT/ML suspension Take 5 mLs by mouth 4 times daily Yes Abigail Kong MD   atorvastatin (LIPITOR) 10 MG tablet 1 po q hs Yes Abigail Kong MD   baclofen (LIORESAL) 20 MG tablet Take 2 tablets by mouth 2 times daily Yes Abigail Kong MD   bumetanide (BUMEX) 1 MG tablet Take 1 tablet by mouth 2 times daily BP must be greater than 95/50 Yes Abigail Kong MD   carvedilol (COREG) 3.125 MG tablet Take 1 tablet by mouth 2 times daily If BP is 80/55 or higher and pulse should be 60 or higher Yes Abigail Knog MD   citalopram (CELEXA) 20 MG tablet Take 1 tablet by mouth every morning Yes Abigail Kong MD   clopidogrel (PLAVIX) 75 MG tablet Take 1 tablet by mouth daily Yes Abigail Kong MD   dantrolene (DANTRIUM) 100 MG capsule Take 1 capsule by mouth 2 times daily Yes Abigail Kong MD   isosorbide mononitrate (IMDUR) 30 MG extended release tablet Take 1 tablet by mouth daily If BP is 80/55 or higher Yes Abigail Kong MD   levothyroxine (SYNTHROID) 88 MCG tablet Take 1 tablet by mouth daily Yes Abigail Kong MD   metFORMIN (GLUCOPHAGE) 500 MG tablet Take 1 tablet by mouth 2 times daily (with meals) Yes Abigail Kong MD   sodium chloride 1 g tablet Take 1 tablet by mouth daily Yes Abigail Kong MD   ciprofloxacin (CIPRO) 500 MG tablet as needed Yes Historical Provider, MD   nitroGLYCERIN (NITROSTAT) 0.4 MG SL tablet place 1 tablet under the tongue if needed every 5 minutes for ingrid... (REFER TO PRESCRIPTION NOTES). Yes Historical Provider, MD   Lancets MISC 1 each by Does not apply route daily Yes Julio César Gallagher MD   blood glucose test strips (ASCENSIA AUTODISC VI;ONE TOUCH ULTRA TEST VI) strip 1 each by In Vitro route 2 times daily As needed. Yes Julio César Gallagher MD   Acetaminophen (TYLENOL PO) Take by mouth as needed Yes Historical Provider, MD   ranolazine (RANEXA) 1000 MG extended release tablet Take 1,000 mg by mouth 2 times daily  Yes Historical Provider, MD   fluticasone (FLONASE) 50 MCG/ACT nasal spray 2 sprays by Each Nostril route daily as needed for Rhinitis Yes Julio César Gallagher MD   ondansetron (ZOFRAN ODT) 4 MG disintegrating tablet Take 1 tablet by mouth every 8 hours as needed for Nausea or Vomiting Yes Ni Burks MD   OXYGEN Inhale 2 L into the lungs continuous  Yes Historical Provider, MD   aspirin 81 MG EC tablet Take 81 mg by mouth daily Yes Historical Provider, MD   blood glucose monitor kit and supplies Dispense sufficient amount for indicated testing frequency plus additional to accommodate PRN testing needs. Dispense all needed supplies to include: monitor, strips, lancing device, lancets, control solutions, alcohol swabs. Yes Julio César Gallagher MD   blood glucose monitor strips Test qd  & as needed for symptoms of irregular blood glucose. Dispense sufficient amount for indicated testing frequency plus additional to accommodate PRN testing needs.  Yes Julio César Gallagher MD   Cyanocobalamin (VITAMIN B 12 PO) Take 500 mg by mouth daily  Yes Historical Provider, MD   Calcium Carbonate-Vit D-Min (CALCIUM 600+D PLUS MINERALS) 600-400 MG-UNIT TABS Take 1 tablet by mouth nightly  Yes Historical Provider, MD   polyethylene glycol (MIRALAX) PACK packet Take 7.3 g by mouth daily  Yes Historical Provider, MD   vitamin D3 (CHOLECALCIFEROL) 25 MCG (1000 UT) TABS tablet Take 1 tablet by mouth every morning  Yes Historical Provider, MD   magnesium (MAGNESIUM-OXIDE) 250 MG TABS tablet Take 250 mg by mouth every morning  Yes Historical Provider, MD       Social History     Tobacco Use    Smoking status: Never    Smokeless tobacco: Never   Substance Use Topics    Alcohol use: No     Alcohol/week: 0.0 standard drinks     Comment: Ocassionally    Drug use: No        Allergies   Allergen Reactions    Augmentin [Amoxicillin-Pot Clavulanate] Shortness Of Breath    Bactrim [Sulfamethoxazole-Trimethoprim] Anaphylaxis and Other (See Comments)     Seizures,     Macrobid [Nitrofurantoin] Anaphylaxis     Mental Changes, shaking, stopped breathing, collapsed (happened again Feb 2021)    ,   Past Medical History:   Diagnosis Date    CAD (coronary artery disease) 4/21/2020    High cholesterol     Hypertension     Hypothyroidism     MI (myocardial infarction) ([facility] 04/21/2020    MS (multiple sclerosis) ([facility]     Osteoporosis     Sarcoidosis     [facility] 02/05/2021    Type 2 diabetes mellitus with hyperglycemia, without long-term current use of insulin ([facility] 10/30/2019   ,   Past Surgical History:   Procedure Laterality Date    CHOLECYSTECTOMY  1990s    CORONARY ANGIOPLASTY WITH STENT PLACEMENT  04/21/2020    Dr. Ryley Rojsa   3.0 x 22mm Resolute TK to Prox LAD.   No LV    CORONARY ANGIOPLASTY WITH STENT PLACEMENT  01/14/2021    3.0 x 30 Tk to the prox LAD and a 2.5 x 20 [place] to the Obtuse marginal by Dr. Agusto Ewing / Sotero Ayon / Peyman Montgomery Left 4/23/2020    CYSTOSCOPY LEFT RETROGRADE PYELOGRAM [address], [address] performed by Don Avila DO at [facility] Left 3/21/2019    LEFT FEMUR CEPHALLOMEDULLARY NAIL performed by Aubrie Jara MD at . [facility] 35 Bilateral     HYSTERECTOMY (CERVIX STATUS UNKNOWN)      KNEE SURGERY      plate in lt knee    LITHOTRIPSY Left 11/23/2020    CYSTOSCOPY LEFT URETEROSCOPY,  LASER LITHOTRIPSY  BASKET EXTRACTION LEFT STONE, STENT EXCHANGE performed by Don Avila DO at [address] [place] [address]    LITHOTRIPSY Left 12/30/2020 CYSTOSCOPY RETROGRADE URETEROSCOPY PYELOGRAM LASER LITHOTRIPSY LEFT J-STENT performed by Belem Avila DO at [address]   ,   Social History     Tobacco Use    Smoking status: Never    Smokeless tobacco: Never   Substance Use Topics    Alcohol use: No     Alcohol/week: 0.0 standard drinks     Comment: Ocassionally    Drug use: No   ,   Immunization History   Administered Date(s) Administered    COVID-19, MODERNA BLUE border, Primary or Immunocompromised, (age 12y+), IM, 100 mcg/0.5mL 03/15/2021, 04/12/2021, 11/02/2021    COVID-19, MODERNA Bivalent BOOSTER, (age 12y+), IM, 50 mcg/0.5 mL 10/04/2022    Influenza 12/15/2010    Influenza A (R1V3-51) Vaccine PF IM 12/02/2009    Influenza Vaccine, unspecified formulation 11/21/2008    Influenza Virus Vaccine 10/27/2014, 10/02/2017    Influenza Whole 10/02/2013, 10/27/2014, 10/21/2015    Influenza, AFLURIA (age 1 yrs+), FLUZONE, (age 10 mo+), MDV, 0.5mL 10/02/2017    Influenza, FLUAD, (age 72 y+), Adjuvanted, 0.5mL 09/24/2020    Influenza, FLUZONE (age 72 y+), High Dose, 0.7mL 09/08/2021    Influenza, High Dose (Fluzone 65 yrs and older) 10/10/2015, 09/11/2017, 09/08/2021    Influenza, Triv, inactivated, subunit, adjuvanted, IM (Fluad 65 yrs and older) 09/19/2018    Pneumococcal Conjugate 13-valent ([place] ) 01/15/2013, 09/23/2015, 09/18/2018, 09/19/2018    Pneumococcal Polysaccharide ([id number]) 09/18/2013, 08/08/2022   ,   Health Maintenance   Topic Date Due    DTaP/Tdap/Td vaccine (1 - Tdap) Never done    Shingles vaccine (1 of 2) Never done    DEXA (modify frequency per FRAX score)  Never done    Depression Monitoring  09/28/2023    Annual Wellness Visit (AWV)  09/29/2023    Lipids  12/08/2023    Flu vaccine  Completed    Pneumococcal 65+ years Vaccine  Completed    COVID-19 Vaccine  Completed    Hepatitis C screen  Completed    Hepatitis A vaccine  Aged Out    Hib vaccine  Aged Out    Meningococcal (ACWY) vaccine  Aged Out       PHYSICAL EXAMINATION:  [ INSTRUCTIONS: \"[x]\" Indicates a positive item  \"[]\" Indicates a negative item  -- DELETE ALL ITEMS NOT EXAMINED]  Vital Signs: (As obtained by patient/caregiver or practitioner observation)    Blood pressure-  Heart rate-    Respiratory rate-    Temperature-  Pulse oximetry-     Constitutional: [x] Appears well-developed and well-nourished [] No apparent distress      [] Abnormal-   Mental status  [x] Alert and awake  [] Oriented to person/place/time []Able to follow commands      Eyes:  EOM    [x]  Normal  [] Abnormal-  Sclera  []  Normal  [] Abnormal -         Discharge []  None visible  [] Abnormal -    HENT:   [x] Normocephalic, atraumatic. [] Abnormal   [] Mouth/Throat: Mucous membranes are moist.     External Ears [x] Normal  [] Abnormal-     Neck: [x] No visualized mass     Pulmonary/Chest: [x] Respiratory effort normal.  [] No visualized signs of difficulty breathing or respiratory distress        [] Abnormal-      Musculoskeletal:   [] Normal gait with no signs of ataxia         [] Normal range of motion of neck        [x] Abnormal- bed confined       Neurological:        [x] No Facial Asymmetry (Cranial nerve 7 motor function) (limited exam to video visit)          [] No gaze palsy        [] Abnormal-         Skin:        [x] No significant exanthematous lesions or discoloration noted on facial skin         [] Abnormal-            Psychiatric:       [x] Normal Affect [] No Hallucinations        [] Abnormal-     Other pertinent observable physical exam findings-     ASSESSMENT/PLAN:    Hyponatremia  - uncertain as to cause  - poss related to medications   - will treat with sodium chloride tablet daily   - follow labs   - last lab 12/2022 slight low      Seizures ([address]  - uncertain etiology   - has discussed with neuro - poss related to MS   - has not started medication - poss keppra if recurs   - no current work up   - no further episodes  - per neuro lorazepam if seizure.       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, unspecified vessel or lesion type [facility])  - s/p cath and stent to LAD (2020), then instent stenosis s/p restent (1/21)   - following with cardio   - has had 2nd opinion With [facility]   - off lisinopril   - off eliquis  - on coreg   - on imdur   - on ranexa   - on bumex   - on plavix  - on aspirin  - will intermittently hold at night if BP is too low based on hold parameters   - stable at present     Chronic systolic congestive heart failure (HCC)  - EF 20% per echo (4/2020)   - on bumex   - on coreg   - off lisinopril   - stable      Frequent UTI   - at risk for further infection   - no current symptoms  - has bactrim to use if symptoms   - following with urology   - given cipro to have on hand by urology   - last antibiotic (10/2022)      Nephrolithiasis  - s/p cystoscopy and lithotripsy (11/2020)   - saldana removed      Anemia, unspecified type  - following with hematology   - to stop oral iron   - to have IV iron   - referral to GI - did not think GI bleeding (6/2021)   - last lab (12/2022) - low     Other Constipation  - on senna   - on miralax  - add mag citrate . 25 as needed      Essential hypertension  - watch diet   - off lisinopril   - on bumex   - on coreg twice a day   - on imdur   - on ranex a  - stable per      Type 2 diabetes mellitus with hyperglycemia, without long-term current use of insulin (HCC)  - watch diet   - last A1c was 4.4 (12/2022)   - now on metformin since recent increase in sugars   - on endocrinology      Hypothyroidism, unspecified type  - on levothyroxine  - labs were normal (12/2022)     Chronic respiratory failure with hypoxia (HCC)  - on oxygen - 3 liters   - has trilogy at night   - has seen pulmonary - recommending to cont trilogy   - stable      MS (multiple sclerosis) ([facility]  - ticierda on hold  - stopped nortriptyline   - on baclofen and dantrolene  - non ambulatory  - stable  **she would benefit form a wheelchair that reclines because of her heart condition and low ejection fraction. To help get her out of bed to help with physical therapy and strength but also to be able to recline back when she feels dizzy and to assist with transportation. Current chiar does not meet her changed needs. Spastic quadriparesis ([facility]  - following with neurology   - on baclofen and dantrolene     Mixed hyperlipidemia  - watch diet  - on atrovastatin  - follow up labs     Neurogenic bladder  - off oxybutynin  - off myrbetric - has not started yet   - currently with saldana      Mild episode of recurrent major depressive disorder ([facility]  - on citalopram  - no suicidal thoughts  - stable     HUDSON (nonalcoholic steatohepatitis)  - following with GI     Osteopenia of multiple sites  - declines bone density  - follows labs     Lung mass  - possible scar (2019)   - has seen pulmonary - yearly   - stable per last pulm note (3/2022)      Bruit of right carotid artery  - declines ultrasound as of present 3/28/2022    H/O sarcoidosis     Return in about 3 months (around 4/5/2023) for check up and review. No orders of the defined types were placed in this encounter. Requested Prescriptions      No prescriptions requested or ordered in this encounter         Terry Cowden, was evaluated through a synchronous (real-time) audio-video encounter. The patient (or guardian if applicable) is aware that this is a billable service, which includes applicable co-pays. This Virtual Visit was conducted with patient's (and/or legal guardian's) consent. The visit was conducted pursuant to the emergency declaration under the [address], [address] [facility] waiver authority and the [employer] General Act. Patient identification was verified, and a caregiver was present when appropriate. The patient was located at Home: [address]    Provider was located at [facility] ([address] [place] [address]): [phone] [facility],  [address] Total time spent on this encounter: Not billed by time    --Ana Rico MD on 1/5/2023 at 5:52 PM    An electronic signature was used to authenticate this note. 50.5

## 2023-01-09 ENCOUNTER — TELEMEDICINE (OUTPATIENT)
Dept: PULMONOLOGY | Age: 78
End: 2023-01-09
Payer: MEDICARE

## 2023-01-09 DIAGNOSIS — J98.4 RESTRICTIVE LUNG DISEASE: ICD-10-CM

## 2023-01-09 DIAGNOSIS — Z86.2 H/O SARCOIDOSIS: Chronic | ICD-10-CM

## 2023-01-09 DIAGNOSIS — J96.11 CHRONIC RESPIRATORY FAILURE WITH HYPOXIA AND HYPERCAPNIA (HCC): Primary | ICD-10-CM

## 2023-01-09 DIAGNOSIS — J96.12 CHRONIC RESPIRATORY FAILURE WITH HYPOXIA AND HYPERCAPNIA (HCC): Primary | ICD-10-CM

## 2023-01-09 PROCEDURE — 99443 PR PHYS/QHP TELEPHONE EVALUATION 21-30 MIN: CPT | Performed by: INTERNAL MEDICINE

## 2023-01-09 NOTE — PROGRESS NOTES
[place]  Department of Pulmonary, Critical Care and Sleep Medicine      Pulmonary & Critical Care Office Note - Follow up  This visit was conducted with the use of interactive audio and video telecommunications system that permits real-time communication between the patient and myself. Consent for the virtual visit obtained on 1/9/23. Originating site: [address]  Distant site: Patient's home. Assessment/Plan     Assessment & Plan     No problem-specific Assessment & Plan notes found for this encounter. Problem List Items Addressed This Visit          Respiratory    Chronic respiratory failure with hypoxia and hypercapnia (HCC) - Primary    Restrictive lung disease       Other    H/O sarcoidosis (Chronic)          Plan:     Noah Kirk has advanced multiple sclerosis with restrictive lung disease. Continue DuoNeb for symptom management  Continue Trilogy ([employer]) nightly and with naps. Will obtain settings and compliance reports. Sarcoidosis is stable with calcified granulomas. Monitor clinically    Continue Flonase for her allergies. Discussed in detail high risk for aspiration. Strict aspiration precautions. Continue Bumex, low-salt diet    She was also advised on staying up-to-date with her vaccinations. Follow up: Return in about 1 year (around 1/9/2024).     Immunization History   Administered Date(s) Administered    COVID-19, MODERNA BLUE border, Primary or Immunocompromised, (age 12y+), IM, 100 mcg/0.5mL 03/15/2021, 04/12/2021, 11/02/2021    COVID-19, MODERNA Bivalent BOOSTER, (age 12y+), IM, 50 mcg/0.5 mL 10/04/2022    Influenza 12/15/2010    Influenza A (W3N1-96) Vaccine PF IM 12/02/2009    Influenza Vaccine, unspecified formulation 11/21/2008    Influenza Virus Vaccine 10/27/2014, 10/02/2017    Influenza Whole 10/02/2013, 10/27/2014, 10/21/2015    Influenza, AFLURIA (age 1 yrs+), FLUZONE, (age 10 mo+), MDV, 0.5mL 10/02/2017    Influenza, FLUAD, (age 72 y+), Adjuvanted, 0.5mL 09/24/2020    Influenza, FLUZONE (age 72 y+), High Dose, 0.7mL 09/08/2021    Influenza, High Dose (Fluzone 65 yrs and older) 10/10/2015, 09/11/2017, 09/08/2021    Influenza, Triv, inactivated, subunit, adjuvanted, IM (Fluad 65 yrs and older) 09/19/2018    Pneumococcal Conjugate 13-valent (Nyoka Saber) 01/15/2013, 09/23/2015, 09/18/2018, 09/19/2018    Pneumococcal Polysaccharide ([id number]) 09/18/2013, 08/08/2022       Subjective   Patient ID: Robert Jose is a 69 y.o. female  Chief Complaint:   HPI: Patient is a 69 y.o. female is here for followup. H/o advanced multiple sclerosis with chronic respiratory failure on 2 L home oxygen and trilogy at night, sarcoidosis (1980), coronary artery disease s/p stents, ischemic cardiomyopathy with EF 20%, bilateral pleural effusions, moderate pulmonary hypertension. Pulm Meds: Marioonedwain    Patient is wheelchair-bound and her  is her caregiver. Randy Stephen states that she is doing well, denies any shortness of breath, cough, chest pain. Her  Ramón Sethi is also on the call and states that she has been stable since last visit. Randy Stephen is on 2 to 2.5 L oxygen and uses trilogy (Barrios Bun) nightly. She also uses DuoNeb nebulizations at home. Ramón Sethi her  states that patient gets poorly 4 to 5 hours of sleep nightly as she has to wake up at 3 AM for her thyroid medications. I encouraged him to talk to his PCP and see if she can take her thyroid medication later so she can get longer sleep and longer trilogy use. Also encouraged trilogy use for daytime naps. She is following with her neurologist for her multiple sclerosis and seizures which has been relatively stable. PFT 8/8/2022   FEV1 / % Pred 0.72 (40%)   FVC 1.03 (44%)   TLC 2.9 (65%)   DLCO 18.87 (79%)     No obstruction  + Air trapping  Moderate Restriction  Mild Diffusion defect.      PET CT May 2019 -   Nodular area of attenuation seen on prior CT chest located in right lung apex is related to scarring. Calcified mediastinal lymph nodes suggestive of prior granulomatous disease. CTC April 2020 - Cardiomegaly, Bilateral pleural effusions. Calcified granulomas in the mediastinum and in the right lung.      ALLERGIES:  Allergies   Allergen Reactions    Augmentin [Amoxicillin-Pot Clavulanate] Shortness Of Breath    Bactrim [Sulfamethoxazole-Trimethoprim] Anaphylaxis and Other (See Comments)     Seizures,     Macrobid [Nitrofurantoin] Anaphylaxis     Mental Changes, shaking, stopped breathing, collapsed (happened again Feb 2021)      SOCIAL HISTORY:   Social History     Tobacco Use    Smoking status: Never    Smokeless tobacco: Never   Substance Use Topics    Alcohol use: No     Alcohol/week: 0.0 standard drinks     Comment: Ocassionally    Drug use: No     MEDS:   Current Outpatient Medications   Medication Sig Dispense Refill    ipratropium-albuterol (DUONEB) 0.5-2.5 (3) MG/3ML SOLN nebulizer solution Inhale 3 mLs into the lungs every 6 hours as needed for Shortness of Breath 360 mL 6    OXYGEN Inhale 2 L into the lungs continuous       VALTOCO 5 MG DOSE 5 MG/0.1ML LIQD 5 mg by Nasal route as needed (Seizures) 2 each 5    Midazolam (NAYZILAM) 5 MG/0.1ML SOLN 1 Device by Nasal route as needed (For seizures) 2 each 1    nystatin (MYCOSTATIN) 045370 UNIT/ML suspension Take 5 mLs by mouth 4 times daily 473 mL 1    atorvastatin (LIPITOR) 10 MG tablet 1 po q hs 90 tablet 3    baclofen (LIORESAL) 20 MG tablet Take 2 tablets by mouth 2 times daily 360 tablet 3    bumetanide (BUMEX) 1 MG tablet Take 1 tablet by mouth 2 times daily BP must be greater than 95/50 180 tablet 3    carvedilol (COREG) 3.125 MG tablet Take 1 tablet by mouth 2 times daily If BP is 80/55 or higher and pulse should be 60 or higher 180 tablet 3    citalopram (CELEXA) 20 MG tablet Take 1 tablet by mouth every morning 90 tablet 3    clopidogrel (PLAVIX) 75 MG tablet Take 1 tablet by mouth daily 90 tablet 3    dantrolene (DANTRIUM) 100 MG capsule Take 1 capsule by mouth 2 times daily 180 capsule 3    isosorbide mononitrate (IMDUR) 30 MG extended release tablet Take 1 tablet by mouth daily If BP is 80/55 or higher 90 tablet 3    levothyroxine (SYNTHROID) 88 MCG tablet Take 1 tablet by mouth daily 90 tablet 3    metFORMIN (GLUCOPHAGE) 500 MG tablet Take 1 tablet by mouth 2 times daily (with meals) 180 tablet 3    sodium chloride 1 g tablet Take 1 tablet by mouth daily 90 tablet 3    ciprofloxacin (CIPRO) 500 MG tablet as needed      nitroGLYCERIN (NITROSTAT) 0.4 MG SL tablet place 1 tablet under the tongue if needed every 5 minutes for ingrid...  (REFER TO PRESCRIPTION NOTES). Lancets MISC 1 each by Does not apply route daily 100 each 11    blood glucose test strips (ASCENSIA AUTODISC VI;ONE TOUCH ULTRA TEST VI) strip 1 each by In Vitro route 2 times daily As needed. 100 each 11    Acetaminophen (TYLENOL PO) Take by mouth as needed      ranolazine (RANEXA) 1000 MG extended release tablet Take 1,000 mg by mouth 2 times daily       fluticasone (FLONASE) 50 MCG/ACT nasal spray 2 sprays by Each Nostril route daily as needed for Rhinitis 1 Bottle 1    ondansetron (ZOFRAN ODT) 4 MG disintegrating tablet Take 1 tablet by mouth every 8 hours as needed for Nausea or Vomiting 30 tablet 0    aspirin 81 MG EC tablet Take 81 mg by mouth daily      blood glucose monitor kit and supplies Dispense sufficient amount for indicated testing frequency plus additional to accommodate PRN testing needs. Dispense all needed supplies to include: monitor, strips, lancing device, lancets, control solutions, alcohol swabs. 1 kit 0    blood glucose monitor strips Test qd  & as needed for symptoms of irregular blood glucose. Dispense sufficient amount for indicated testing frequency plus additional to accommodate PRN testing needs.  50 strip 11    Cyanocobalamin (VITAMIN B 12 PO) Take 500 mg by mouth daily       Calcium Carbonate-Vit D-Min (CALCIUM 600+D PLUS MINERALS) 600-400 MG-UNIT TABS Take 1 tablet by mouth nightly       polyethylene glycol (MIRALAX) PACK packet Take 7.3 g by mouth daily       vitamin D3 (CHOLECALCIFEROL) 25 MCG (1000 UT) TABS tablet Take 1 tablet by mouth every morning       magnesium (MAGNESIUM-OXIDE) 250 MG TABS tablet Take 250 mg by mouth every morning        No current facility-administered medications for this visit. Review of Systems: -ve other than specified above. Objective       Vitals:   ,  ,  ,  ,  ,  ,  ,      BMI body mass index is unknown because there is no height or weight on file. Ideal Body Weight: Patient weight not recorded  ---------------  Physical Exam:    General: Alert and oriented. No acute distress. Labs     CBC:   Lab Results   Component Value Date    WBC 7.8 12/08/2022    HGB 7.5 (L) 12/08/2022    HCT 22.8 (L) 12/08/2022    MCV 91.8 12/08/2022     (H) 12/08/2022     BMP:     Chemistry        Component Value Date/Time     (L) 12/08/2022 1052    K 4.5 12/08/2022 1052    K 6.5 (H) 01/12/2021 2111    CL 92 (L) 12/08/2022 1052    CO2 31 12/08/2022 1052    BUN 36 (H) 12/08/2022 1052    CREATININE 0.7 12/08/2022 1052        Component Value Date/Time    CALCIUM 9.5 12/08/2022 1052    ALKPHOS 65 12/08/2022 1052    AST 15 12/08/2022 1052    ALT 9 (L) 12/08/2022 1052    BILITOT 0.5 12/08/2022 1052          LFTs:   Lab Results   Component Value Date    ALT 9 (L) 12/08/2022    AST 15 12/08/2022    ALKPHOS 65 12/08/2022    BILITOT 0.5 12/08/2022    PROT 7.3 12/08/2022     Coags:   Lab Results   Component Value Date    INR 1.0 01/13/2021       Imaging   Reviewed imaging studies personally and findings as below  No results found.     TLC   Date Value Ref Range Status   08/08/2022 2.90 L Final     DLCO   Date Value Ref Range Status   08/08/2022 15.09 ml/min/mmHg Final       Jerzy Hall MD MS  Pulmonary & [address] [place] [address] [facility]

## 2023-01-09 NOTE — PROGRESS NOTES
Pavan Baez is a 69 y.o. female evaluated via telephone on 1/9/2023 for Follow-up  . Documentation:  I communicated with the patient and/or health care decision maker about 6 mos follow up; unable top do video connection today. Details of this discussion including any medical advice provided: Pt and  on today's call. No problems with breathing issues. Denies cough or nasal congestion. Using NIV device 4-6 hours per night; encouraged use with sleep at HS and any naps pt takes. Pt using Duoneb aerosols daily and will increased to BID as needed. Pt  manages care at home. Pt using O2 at 2 liters NC per orders. PCP Dr Radha Raymond is managing the orders for Home O2 and NIV device. Plan for follow up in 1 year and prn. Telehealth visit is preferred due to difficulty to bring pt to office. Total Time: minutes: 11-20 minutes    Michaela Douglas was evaluated through a synchronous (real-time) audio encounter. Patient identification was verified at the start of the visit. She (or guardian if applicable) is aware that this is a billable service, which includes applicable co-pays. This visit was conducted with the patient's (and/or legal guardian's) verbal consent. She has not had a related appointment within my department in the past 7 days or scheduled within the next 24 hours. The patient was located at Home: [address] [facility] [address]  [address]. The provider was located at [facility] (Appt Dept): [facility],  [address] [place] [address].     Note: not billable if this call serves to triage the patient into an appointment for the relevant concern    Fraser Burkitt, RN

## 2023-01-31 ENCOUNTER — TELEPHONE (OUTPATIENT)
Dept: ENDOCRINOLOGY | Age: 78
End: 2023-01-31

## 2023-02-10 NOTE — PROGRESS NOTES
COVID testing completed on: 11/17/2020  Results of the test: negative  The patient verbally confirms that they did adhere to the self-quarantine guidelines. No signs or symptoms expressed or observed.  11/17 History & Physical  Gynecology      SUBJECTIVE:     Chief Complaint: bacteria vaginosis       History of Present Illness:  Stephen Reyes is a 34 y.o. F who presents for possible BV recurrence. Treated for BV in September last year with PO flagyl. Feels this worked for maybe up to a month but she feels the symptoms have returned. They seem to always flare up around the time of her cycle. She doesn't have irritation, just a discharge with odor. Was screened for STIs by her PCP last month.       Review of patient's allergies indicates:  No Known Allergies    Past Medical History:   Diagnosis Date    Abnormal Pap smear of cervix      Past Surgical History:   Procedure Laterality Date    NO PAST SURGERIES       OB History          1    Para   1    Term   1            AB        Living   1         SAB        IAB        Ectopic        Multiple   0    Live Births   1           Obstetric Comments   Menarche- 13             Family History   Problem Relation Age of Onset    Breast cancer Maternal Grandmother     Diabetes Maternal Grandmother     Colon cancer Maternal Uncle     Cancer Neg Hx     Eclampsia Neg Hx     Hypertension Neg Hx     Miscarriages / Stillbirths Neg Hx     Ovarian cancer Neg Hx      labor Neg Hx     Stroke Neg Hx      Social History     Tobacco Use    Smoking status: Never    Smokeless tobacco: Never   Substance Use Topics    Alcohol use: Yes     Comment: socially    Drug use: No       Current Outpatient Medications   Medication Sig    tretinoin (RETIN-A) 0.025 % cream Apply topically every evening.    clindamycin (CLEOCIN T) 1 % lotion Apply topically.    clobetasol 0.05% (TEMOVATE) 0.05 % Oint Apply topically 2 (two) times daily. (Patient not taking: Reported on 2023)    doxycycline (MONODOX) 100 MG capsule Take 100 mg by mouth once daily.    erythromycin with ethanoL (EMGEL) 2 % gel Apply topically once daily.    metroNIDAZOLE (FLAGYL) 500 MG tablet Take 1 tablet (500 mg total)  by mouth every 12 (twelve) hours. for 7 days    metroNIDAZOLE (METROGEL) 0.75 % (37.5mg/5 gram) vaginal gel Place 1 applicator vaginally twice a week.     No current facility-administered medications for this visit.         Review of Systems:  Review of Systems   Constitutional:  Negative for fever.   Respiratory:  Negative for shortness of breath.    Cardiovascular:  Negative for chest pain.   Gastrointestinal:  Negative for abdominal pain, nausea and vomiting.   Genitourinary:  Positive for vaginal discharge and vaginal odor. Negative for menstrual problem and pelvic pain.   Neurological:  Negative for headaches.      OBJECTIVE:     Physical Exam:  Physical Exam  Vitals reviewed.   Constitutional:       General: She is not in acute distress.     Appearance: She is well-developed. She is not ill-appearing or diaphoretic.   HENT:      Head: Normocephalic and atraumatic.   Eyes:      Conjunctiva/sclera: Conjunctivae normal.   Cardiovascular:      Rate and Rhythm: Normal rate.   Pulmonary:      Effort: Pulmonary effort is normal. No respiratory distress.   Abdominal:      Palpations: Abdomen is soft. There is no mass.      Tenderness: There is no abdominal tenderness. There is no guarding or rebound.   Genitourinary:     Vagina: Normal. No vaginal discharge or bleeding.      Cervix: No cervical motion tenderness.      Uterus: Not enlarged and not tender.       Adnexa:         Right: No mass, tenderness or fullness.          Left: No mass, tenderness or fullness.        Comments: External genitalia: Normal  Urethral: Nontender, no masses  Urethral meatus: Normal  Bladder: Non-tender  Musculoskeletal:         General: Normal range of motion.      Cervical back: Normal range of motion.   Skin:     General: Skin is warm and dry.   Neurological:      Mental Status: She is alert.   Psychiatric:         Mood and Affect: Mood normal.         Behavior: Behavior normal.         ASSESSMENT:       ICD-10-CM ICD-9-CM    1.  Bacterial vaginosis  N76.0 616.10 VAGINOSIS SCREEN BY DNA PROBE    B96.89 041.9 metroNIDAZOLE (FLAGYL) 500 MG tablet      metroNIDAZOLE (METROGEL) 0.75 % (37.5mg/5 gram) vaginal gel             Plan:      -- Affirm collected.  -- Discussed prolonged treatment with 7 days of PO flagyl followed by twice weekly metrogel.   -- Also recommend vaginal probiotic, information for Eliseo provided.      Carie Funez MD

## 2023-02-17 ENCOUNTER — TELEPHONE (OUTPATIENT)
Dept: ENDOCRINOLOGY | Age: 78
End: 2023-02-17

## 2023-03-07 ENCOUNTER — TELEPHONE (OUTPATIENT)
Dept: ENDOCRINOLOGY | Age: 78
End: 2023-03-07

## 2023-03-14 ENCOUNTER — TELEPHONE (OUTPATIENT)
Dept: FAMILY MEDICINE CLINIC | Age: 78
End: 2023-03-14

## 2023-03-14 NOTE — TELEPHONE ENCOUNTER
I would be okay with using the mag citrate which started off with just a half a bottle    If did not want to use magnesium citrate, could consider MiraLAX    If no progress may need to consider enema and/or evaluation in the emergency

## 2023-03-14 NOTE — TELEPHONE ENCOUNTER
Lavanda Goldberg has not had a BM since Friday. She is eating every day and drinking. She was very sluggish yesterday, and is still in bed now. He does have a bottle Mag Citrase in the home and some dulcolax. He does not think he can get her out of the house today.

## 2023-03-14 NOTE — TELEPHONE ENCOUNTER
I would go ahead and recommend emergency room evaluation as I am not certain I can explain why she is this drowsy and would at least want to do a basic work-up to make sure were not missing something

## 2023-03-14 NOTE — TELEPHONE ENCOUNTER
Angélica Oro notified and verbalized understanding. They have already tried the MiraLAX. He states that she is sleeping a lot and doesn't want to get out of bed. I recommended that he go ahead and take her to the ER because according to him, she does not normally act this way even when she is constipated. He is able to wake her up, but then she just wants to go right back to sleep.

## 2023-03-22 ENCOUNTER — TELEPHONE (OUTPATIENT)
Dept: ENDOCRINOLOGY | Age: 78
End: 2023-03-22

## 2023-04-04 ENCOUNTER — TELEPHONE (OUTPATIENT)
Dept: ENDOCRINOLOGY | Age: 78
End: 2023-04-04

## 2023-04-06 ENCOUNTER — TELEPHONE (OUTPATIENT)
Dept: PULMONOLOGY | Age: 78
End: 2023-04-06

## 2023-04-06 NOTE — TELEPHONE ENCOUNTER
Call returned to Mr Henri Merlin re: Leigha Cole. He reports she had a seizure yesterday and she was \"out of it yesterday\". Pt is doing much better today. Pt's  not sure when  follow up with Dr Esvin Fitzgerald is to be.  would like her to follow up. Last note indicates follow up next Jan 2024. RN discussed seizure activity and likely need for follow up with PCP and or neurology if symptoms persists. Pt has appt 4/19/23 with PCP and office will update Dr Esvin Fitzgerald and will arrange for July(6 mos follow up). Appt card to be mailed.

## 2023-04-14 ENCOUNTER — TELEPHONE (OUTPATIENT)
Dept: FAMILY MEDICINE CLINIC | Age: 78
End: 2023-04-14

## 2023-04-19 ENCOUNTER — OFFICE VISIT (OUTPATIENT)
Dept: PRIMARY CARE CLINIC | Age: 78
End: 2023-04-19

## 2023-04-19 VITALS
BODY MASS INDEX: 24.41 KG/M2 | SYSTOLIC BLOOD PRESSURE: 118 MMHG | HEART RATE: 74 BPM | HEIGHT: 60 IN | OXYGEN SATURATION: 100 % | TEMPERATURE: 97.3 F | DIASTOLIC BLOOD PRESSURE: 70 MMHG

## 2023-04-19 DIAGNOSIS — E03.9 HYPOTHYROIDISM, UNSPECIFIED TYPE: Chronic | ICD-10-CM

## 2023-04-19 DIAGNOSIS — I50.22 CHRONIC SYSTOLIC CHF (CONGESTIVE HEART FAILURE) (HCC): ICD-10-CM

## 2023-04-19 DIAGNOSIS — Z86.2 H/O SARCOIDOSIS: Chronic | ICD-10-CM

## 2023-04-19 DIAGNOSIS — N39.0 FREQUENT UTI: ICD-10-CM

## 2023-04-19 DIAGNOSIS — E87.1 HYPONATREMIA: Primary | ICD-10-CM

## 2023-04-19 DIAGNOSIS — I25.10 CORONARY ARTERY DISEASE INVOLVING NATIVE CORONARY ARTERY OF NATIVE HEART, UNSPECIFIED WHETHER ANGINA PRESENT: ICD-10-CM

## 2023-04-19 DIAGNOSIS — R56.9 SEIZURES (HCC): ICD-10-CM

## 2023-04-19 DIAGNOSIS — J96.11 CHRONIC RESPIRATORY FAILURE WITH HYPOXIA AND HYPERCAPNIA (HCC): ICD-10-CM

## 2023-04-19 DIAGNOSIS — N31.9 NEUROGENIC BLADDER: ICD-10-CM

## 2023-04-19 DIAGNOSIS — G35 MS (MULTIPLE SCLEROSIS) (HCC): Chronic | ICD-10-CM

## 2023-04-19 DIAGNOSIS — G82.50 SPASTIC QUADRIPARESIS (HCC): Chronic | ICD-10-CM

## 2023-04-19 DIAGNOSIS — R91.8 LUNG MASS: ICD-10-CM

## 2023-04-19 DIAGNOSIS — E78.2 MIXED HYPERLIPIDEMIA: ICD-10-CM

## 2023-04-19 DIAGNOSIS — K59.09 OTHER CONSTIPATION: ICD-10-CM

## 2023-04-19 DIAGNOSIS — F33.0 MILD EPISODE OF RECURRENT MAJOR DEPRESSIVE DISORDER (HCC): ICD-10-CM

## 2023-04-19 DIAGNOSIS — E11.65 TYPE 2 DIABETES MELLITUS WITH HYPERGLYCEMIA, WITHOUT LONG-TERM CURRENT USE OF INSULIN (HCC): ICD-10-CM

## 2023-04-19 DIAGNOSIS — I10 ESSENTIAL HYPERTENSION: Chronic | ICD-10-CM

## 2023-04-19 DIAGNOSIS — D64.9 ANEMIA, UNSPECIFIED TYPE: ICD-10-CM

## 2023-04-19 DIAGNOSIS — J96.12 CHRONIC RESPIRATORY FAILURE WITH HYPOXIA AND HYPERCAPNIA (HCC): ICD-10-CM

## 2023-04-19 DIAGNOSIS — N20.0 NEPHROLITHIASIS: ICD-10-CM

## 2023-04-19 DIAGNOSIS — E55.9 VITAMIN D DEFICIENCY: ICD-10-CM

## 2023-04-19 PROBLEM — Z97.8 CHRONIC INDWELLING FOLEY CATHETER: Status: RESOLVED | Noted: 2021-01-13 | Resolved: 2023-04-19

## 2023-04-19 RX ORDER — IPRATROPIUM BROMIDE AND ALBUTEROL SULFATE 2.5; .5 MG/3ML; MG/3ML
1 SOLUTION RESPIRATORY (INHALATION) EVERY 6 HOURS PRN
Qty: 360 ML | Refills: 6 | Status: SHIPPED | OUTPATIENT
Start: 2023-04-19

## 2023-04-19 SDOH — ECONOMIC STABILITY: FOOD INSECURITY: WITHIN THE PAST 12 MONTHS, THE FOOD YOU BOUGHT JUST DIDN'T LAST AND YOU DIDN'T HAVE MONEY TO GET MORE.: PATIENT DECLINED

## 2023-04-19 SDOH — ECONOMIC STABILITY: INCOME INSECURITY: HOW HARD IS IT FOR YOU TO PAY FOR THE VERY BASICS LIKE FOOD, HOUSING, MEDICAL CARE, AND HEATING?: PATIENT DECLINED

## 2023-04-19 SDOH — ECONOMIC STABILITY: FOOD INSECURITY: WITHIN THE PAST 12 MONTHS, YOU WORRIED THAT YOUR FOOD WOULD RUN OUT BEFORE YOU GOT MONEY TO BUY MORE.: PATIENT DECLINED

## 2023-04-19 SDOH — ECONOMIC STABILITY: HOUSING INSECURITY
IN THE LAST 12 MONTHS, WAS THERE A TIME WHEN YOU DID NOT HAVE A STEADY PLACE TO SLEEP OR SLEPT IN A SHELTER (INCLUDING NOW)?: PATIENT REFUSED

## 2023-04-19 ASSESSMENT — ENCOUNTER SYMPTOMS
CHEST TIGHTNESS: 0
COUGH: 0
SORE THROAT: 0
NAUSEA: 0
ABDOMINAL PAIN: 0
BLOOD IN STOOL: 0
VOMITING: 0
RHINORRHEA: 0
SHORTNESS OF BREATH: 0
EYE PAIN: 0
DIARRHEA: 0
CONSTIPATION: 1

## 2023-04-19 NOTE — PROGRESS NOTES
vessel or lesion type [facility])  - s/p cath and stent to LAD (2020), then instent stenosis s/p restent (1/21)   - following with cardio   - has had 2nd opinion With [facility]   - off lisinopril   - off eliquis  - on coreg   - on imdur   - on ranexa   - on bumex   - on plavix  - on aspirin  - will intermittently hold at night if BP is too low based on hold parameters   - stable at present     Chronic systolic congestive heart failure (HCC)  - EF 20% per echo (4/2020)   - on bumex   - on coreg   - off lisinopril   - states feels is becoming dehydrated - trial of decreased bumex to 0.5mg twice a day (4/2023)      Frequent UTI   - at risk for further infection   - no current symptoms  - has bactrim to use if symptoms   - following with urology   - given cipro to have on hand by urology   - last antibiotic (4/2023)      Nephrolithiasis  - s/p cystoscopy and lithotripsy (11/2020)   - saldana removed      Anemia, unspecified type  - following with hematology   - to stop oral iron   - intermittent IV iron treatments   - referral to GI - did not think GI bleeding (6/2021)   - states transfusion prbc (3/2023)   - last lab (3/2023) - low     Other Constipation  - on senna   - on miralax  - add mag citrate . 25 as needed      Essential hypertension  - watch diet   - off lisinopril   - on bumex   - on coreg twice a day   - on imdur   - on ranex a  - stable per      Type 2 diabetes mellitus with hyperglycemia, without long-term current use of insulin (HCC)  - watch diet   - last A1c was 4.4 (12/2022)   - now on metformin since recent increase in sugars   - on endocrinology      Hypothyroidism, unspecified type  - on levothyroxine  - labs were normal (12/2022)     Chronic respiratory failure with hypoxia (HCC)  - on oxygen - 3 liters   - has trilogy at night   - has seen pulmonary - recommending to cont trilogy   - stable      MS (multiple sclerosis) ([facility] [address]  - ticierda on hold  - stopped nortriptyline   - on baclofen and dantrolene  -

## 2023-04-21 LAB
APPEARANCE, BODY FLUID: NORMAL
BILIRUBIN URINE: NEGATIVE
COLOR, URINE: YELLOW
GLUCOSE URINE: NEGATIVE MG/DL
KETONES, URINE: NEGATIVE MG/DL
NITRATE, UA: NEGATIVE
PH UA: 7 (ref 4.6–8)
PROTEIN UA: NEGATIVE MG/DL
RBC URINE: NEGATIVE
SPECIFIC GRAVITY, URINE: 1.02 (ref 1–1.03)
UROBILINOGEN, URINE: NEGATIVE MG/DL
WBC URINE: NEGATIVE

## 2023-04-23 LAB — URINE CULTURE, ROUTINE: NORMAL

## 2023-04-24 ENCOUNTER — TELEPHONE (OUTPATIENT)
Dept: FAMILY MEDICINE CLINIC | Age: 78
End: 2023-04-24

## 2023-04-24 ENCOUNTER — TELEMEDICINE (OUTPATIENT)
Dept: ENDOCRINOLOGY | Age: 78
End: 2023-04-24
Payer: MEDICARE

## 2023-04-24 DIAGNOSIS — D50.9 IRON DEFICIENCY ANEMIA, UNSPECIFIED IRON DEFICIENCY ANEMIA TYPE: ICD-10-CM

## 2023-04-24 DIAGNOSIS — E78.5 HYPERLIPIDEMIA, UNSPECIFIED HYPERLIPIDEMIA TYPE: ICD-10-CM

## 2023-04-24 DIAGNOSIS — E03.9 HYPOTHYROIDISM, UNSPECIFIED TYPE: ICD-10-CM

## 2023-04-24 DIAGNOSIS — E55.9 VITAMIN D DEFICIENCY: ICD-10-CM

## 2023-04-24 DIAGNOSIS — E11.69 TYPE 2 DIABETES MELLITUS WITH OTHER SPECIFIED COMPLICATION, WITHOUT LONG-TERM CURRENT USE OF INSULIN (HCC): Primary | ICD-10-CM

## 2023-04-24 PROCEDURE — 99214 OFFICE O/P EST MOD 30 MIN: CPT | Performed by: INTERNAL MEDICINE

## 2023-04-24 PROCEDURE — 1090F PRES/ABSN URINE INCON ASSESS: CPT | Performed by: INTERNAL MEDICINE

## 2023-04-24 PROCEDURE — G8400 PT W/DXA NO RESULTS DOC: HCPCS | Performed by: INTERNAL MEDICINE

## 2023-04-24 PROCEDURE — G8420 CALC BMI NORM PARAMETERS: HCPCS | Performed by: INTERNAL MEDICINE

## 2023-04-24 PROCEDURE — G8427 DOCREV CUR MEDS BY ELIG CLIN: HCPCS | Performed by: INTERNAL MEDICINE

## 2023-04-24 PROCEDURE — 1123F ACP DISCUSS/DSCN MKR DOCD: CPT | Performed by: INTERNAL MEDICINE

## 2023-04-24 PROCEDURE — 1036F TOBACCO NON-USER: CPT | Performed by: INTERNAL MEDICINE

## 2023-04-24 RX ORDER — HEPARIN SODIUM (PORCINE) LOCK FLUSH IV SOLN 100 UNIT/ML 100 UNIT/ML
500 SOLUTION INTRAVENOUS PRN
OUTPATIENT
Start: 2023-04-24

## 2023-04-24 RX ORDER — SODIUM CHLORIDE 0.9 % (FLUSH) 0.9 %
5-40 SYRINGE (ML) INJECTION PRN
OUTPATIENT
Start: 2023-04-24

## 2023-04-24 RX ORDER — ONDANSETRON 2 MG/ML
8 INJECTION INTRAMUSCULAR; INTRAVENOUS
OUTPATIENT
Start: 2023-04-24

## 2023-04-24 RX ORDER — DIPHENHYDRAMINE HYDROCHLORIDE 50 MG/ML
50 INJECTION INTRAMUSCULAR; INTRAVENOUS
OUTPATIENT
Start: 2023-04-24

## 2023-04-24 RX ORDER — 0.9 % SODIUM CHLORIDE 0.9 %
250 INTRAVENOUS SOLUTION INTRAVENOUS PRN
Start: 2023-04-24

## 2023-04-24 RX ORDER — ALBUTEROL SULFATE 90 UG/1
4 AEROSOL, METERED RESPIRATORY (INHALATION) PRN
OUTPATIENT
Start: 2023-04-24

## 2023-04-24 RX ORDER — 0.9 % SODIUM CHLORIDE 0.9 %
1000 INTRAVENOUS SOLUTION INTRAVENOUS CONTINUOUS
Start: 2023-04-24

## 2023-04-24 RX ORDER — ACETAMINOPHEN 325 MG/1
650 TABLET ORAL
OUTPATIENT
Start: 2023-04-24

## 2023-04-24 RX ORDER — EPINEPHRINE 1 MG/ML
0.3 INJECTION, SOLUTION, CONCENTRATE INTRAVENOUS PRN
OUTPATIENT
Start: 2023-04-24

## 2023-04-24 RX ORDER — MEPERIDINE HYDROCHLORIDE 25 MG/ML
25 INJECTION INTRAMUSCULAR; INTRAVENOUS; SUBCUTANEOUS ONCE
Start: 2023-04-24 | End: 2023-04-24

## 2023-04-24 NOTE — PROGRESS NOTES
Jessica Lynch was read the following message We want to confirm that, for purposes of billing, this is a virtual visit with your provider for which we will submit a claim for reimbursement with your insurance company. You will be responsible for any copays, coinsurance amounts or other amounts not covered by your insurance company. If you do not accept this, unfortunately we will not be able to schedule or proceed with a virtual visit with the provider. Do you accept? Jessica Omeror responded Yes .
or back pain. Neuro: no numbness, no tingling, no weakness, _    OBJECTIVE    There were no vitals taken for this visit. BP Readings from Last 4 Encounters:   04/19/23 118/70   11/02/22 (!) 102/45   10/24/22 108/60   09/28/22 120/60     Wt Readings from Last 6 Encounters:   10/10/22 125 lb (56.7 kg)   10/06/22 125 lb (56.7 kg)   08/08/22 (P) 125 lb (56.7 kg)   03/07/22 125 lb (56.7 kg)   09/16/21 141 lb (64 kg)   01/19/21 139 lb 11.2 oz (63.4 kg)     Physical examination:  General: awake alert, oriented x3  HEENT: normocephalic non traumatic, no exophthalmos   Neck: supple, thyroid tenderness,  Pulm: Clear equal air entry no added sounds  CVS: S1 + S2  Abd: soft lax, no tenderness  Skin: warm, no lesions, no rash.  No open wounds, no ulcers   Neuro: CN intact, Lt side hemiplegia from previous stroke   Psych: normal mood, and affect      Review of Laboratory Data:  I personally reviewed the following lab:  Lab Results   Component Value Date/Time    WBC 7.4 03/16/2023 01:22 PM    RBC 2.21 (L) 03/16/2023 01:22 PM    HGB 7.1 (L) 03/16/2023 01:22 PM    HCT 21.9 (L) 03/16/2023 01:22 PM    MCV 98.9 03/16/2023 01:22 PM    MCH 32.3 03/16/2023 01:22 PM    MCHC 32.6 (L) 03/16/2023 01:22 PM    RDW 17.7 (H) 03/16/2023 01:22 PM     03/16/2023 01:22 PM    MPV 8.5 03/16/2023 01:22 PM      Lab Results   Component Value Date/Time     (L) 03/16/2023 01:22 PM    K 4.6 03/16/2023 01:22 PM    K 6.5 (H) 01/12/2021 09:11 PM    CO2 28 03/16/2023 01:22 PM    BUN 27 (H) 03/16/2023 01:22 PM    CREATININE 0.6 03/16/2023 01:22 PM    CALCIUM 8.6 03/16/2023 01:22 PM    LABGLOM 97 03/16/2023 01:22 PM    GFRAA >60 01/18/2021 10:35 AM      Lab Results   Component Value Date/Time    TSH 3.58 12/08/2022 10:52 AM    T4FREE 0.99 11/09/2022 02:08 PM     Lab Results   Component Value Date/Time    LABA1C 4.4 12/08/2022 10:52 AM    GLUCOSE 142 03/16/2023 01:22 PM    LABMICR < 2.0 09/09/2021 03:01 PM     Lab Results   Component Value Date/Time

## 2023-04-25 RX ORDER — CEFDINIR 300 MG/1
300 CAPSULE ORAL 2 TIMES DAILY
Qty: 14 CAPSULE | Refills: 0 | Status: SHIPPED | OUTPATIENT
Start: 2023-04-25 | End: 2023-05-02

## 2023-04-25 NOTE — TELEPHONE ENCOUNTER
Requested Prescriptions     Signed Prescriptions Disp Refills    cefdinir (OMNICEF) 300 MG capsule 14 capsule 0     Sig: Take 1 capsule by mouth 2 times daily for 7 days     Authorizing Provider: Ben Whitt

## 2023-04-25 NOTE — TELEPHONE ENCOUNTER
notified. He is uncertain as to whether she had a reaction to Cefdinir back in 2020. He would like to go ahead and try the Cefdinir.

## 2023-05-01 NOTE — TELEPHONE ENCOUNTER
Called pt regarding bs logs dated: 7/30-8/12    Dr Ne Panchal recommends: current dm regimen    Pt response: Notified pt's , Adam Null, who expressed verbal understanding, however stated that he is not checking the bs 4 times a day only bid due to not having enough strips, educated pt, enough refills remain, pt's  said he isn't driving to the pharmacy all the time. [Annual] : an annual visit.

## 2023-05-02 ENCOUNTER — HOSPITAL ENCOUNTER (OUTPATIENT)
Dept: INFUSION THERAPY | Age: 78
Setting detail: INFUSION SERIES
Discharge: HOME | End: 2023-05-02
Payer: MEDICARE

## 2023-05-02 ENCOUNTER — OFFICE VISIT (OUTPATIENT)
Dept: NEUROLOGY | Age: 78
End: 2023-05-02
Payer: MEDICARE

## 2023-05-02 VITALS
HEART RATE: 83 BPM | OXYGEN SATURATION: 100 % | SYSTOLIC BLOOD PRESSURE: 111 MMHG | WEIGHT: 125 LBS | BODY MASS INDEX: 24.41 KG/M2 | TEMPERATURE: 97.6 F | DIASTOLIC BLOOD PRESSURE: 57 MMHG

## 2023-05-02 VITALS
OXYGEN SATURATION: 100 % | DIASTOLIC BLOOD PRESSURE: 45 MMHG | SYSTOLIC BLOOD PRESSURE: 110 MMHG | HEART RATE: 78 BPM | RESPIRATION RATE: 12 BRPM | TEMPERATURE: 97.7 F

## 2023-05-02 DIAGNOSIS — G82.50 SPASTIC QUADRIPARESIS (HCC): ICD-10-CM

## 2023-05-02 DIAGNOSIS — G35 MS (MULTIPLE SCLEROSIS) (HCC): Primary | ICD-10-CM

## 2023-05-02 DIAGNOSIS — D50.9 IRON DEFICIENCY ANEMIA, UNSPECIFIED IRON DEFICIENCY ANEMIA TYPE: Primary | ICD-10-CM

## 2023-05-02 PROCEDURE — 3078F DIAST BP <80 MM HG: CPT | Performed by: CLINICAL NURSE SPECIALIST

## 2023-05-02 PROCEDURE — 6360000002 HC RX W HCPCS: Performed by: NURSE PRACTITIONER

## 2023-05-02 PROCEDURE — 2580000003 HC RX 258: Performed by: NURSE PRACTITIONER

## 2023-05-02 PROCEDURE — 1090F PRES/ABSN URINE INCON ASSESS: CPT | Performed by: CLINICAL NURSE SPECIALIST

## 2023-05-02 PROCEDURE — 1036F TOBACCO NON-USER: CPT | Performed by: CLINICAL NURSE SPECIALIST

## 2023-05-02 PROCEDURE — G8420 CALC BMI NORM PARAMETERS: HCPCS | Performed by: CLINICAL NURSE SPECIALIST

## 2023-05-02 PROCEDURE — G8400 PT W/DXA NO RESULTS DOC: HCPCS | Performed by: CLINICAL NURSE SPECIALIST

## 2023-05-02 PROCEDURE — 96365 THER/PROPH/DIAG IV INF INIT: CPT

## 2023-05-02 PROCEDURE — G8427 DOCREV CUR MEDS BY ELIG CLIN: HCPCS | Performed by: CLINICAL NURSE SPECIALIST

## 2023-05-02 PROCEDURE — 3074F SYST BP LT 130 MM HG: CPT | Performed by: CLINICAL NURSE SPECIALIST

## 2023-05-02 PROCEDURE — 99215 OFFICE O/P EST HI 40 MIN: CPT | Performed by: CLINICAL NURSE SPECIALIST

## 2023-05-02 PROCEDURE — 1123F ACP DISCUSS/DSCN MKR DOCD: CPT | Performed by: CLINICAL NURSE SPECIALIST

## 2023-05-02 RX ORDER — ONDANSETRON 2 MG/ML
8 INJECTION INTRAMUSCULAR; INTRAVENOUS
Status: CANCELLED | OUTPATIENT
Start: 2023-05-09

## 2023-05-02 RX ORDER — EPINEPHRINE 1 MG/ML
0.3 INJECTION, SOLUTION, CONCENTRATE INTRAVENOUS PRN
Status: CANCELLED | OUTPATIENT
Start: 2023-05-09

## 2023-05-02 RX ORDER — SODIUM CHLORIDE 0.9 % (FLUSH) 0.9 %
5-40 SYRINGE (ML) INJECTION PRN
Status: CANCELLED | OUTPATIENT
Start: 2023-05-09

## 2023-05-02 RX ORDER — MEPERIDINE HYDROCHLORIDE 25 MG/ML
25 INJECTION INTRAMUSCULAR; INTRAVENOUS; SUBCUTANEOUS ONCE
Status: CANCELLED
Start: 2023-05-09 | End: 2023-05-09

## 2023-05-02 RX ORDER — 0.9 % SODIUM CHLORIDE 0.9 %
250 INTRAVENOUS SOLUTION INTRAVENOUS PRN
Status: CANCELLED
Start: 2023-05-09

## 2023-05-02 RX ORDER — SODIUM CHLORIDE 0.9 % (FLUSH) 0.9 %
5-40 SYRINGE (ML) INJECTION PRN
Status: DISCONTINUED | OUTPATIENT
Start: 2023-05-02 | End: 2023-05-03 | Stop reason: HOSPADM

## 2023-05-02 RX ORDER — ALBUTEROL SULFATE 90 UG/1
4 AEROSOL, METERED RESPIRATORY (INHALATION) PRN
Status: CANCELLED | OUTPATIENT
Start: 2023-05-09

## 2023-05-02 RX ORDER — HEPARIN SODIUM (PORCINE) LOCK FLUSH IV SOLN 100 UNIT/ML 100 UNIT/ML
500 SOLUTION INTRAVENOUS PRN
Status: CANCELLED | OUTPATIENT
Start: 2023-05-09

## 2023-05-02 RX ORDER — LORAZEPAM 0.5 MG/1
0.5 TABLET ORAL EVERY 4 HOURS PRN
COMMUNITY

## 2023-05-02 RX ORDER — ACETAMINOPHEN 325 MG/1
650 TABLET ORAL
Status: CANCELLED | OUTPATIENT
Start: 2023-05-09

## 2023-05-02 RX ORDER — DIPHENHYDRAMINE HYDROCHLORIDE 50 MG/ML
50 INJECTION INTRAMUSCULAR; INTRAVENOUS
Status: CANCELLED | OUTPATIENT
Start: 2023-05-09

## 2023-05-02 RX ORDER — 0.9 % SODIUM CHLORIDE 0.9 %
1000 INTRAVENOUS SOLUTION INTRAVENOUS CONTINUOUS
Status: CANCELLED
Start: 2023-05-09

## 2023-05-02 RX ORDER — 0.9 % SODIUM CHLORIDE 0.9 %
250 INTRAVENOUS SOLUTION INTRAVENOUS PRN
Status: DISCONTINUED | OUTPATIENT
Start: 2023-05-02 | End: 2023-05-03 | Stop reason: HOSPADM

## 2023-05-02 RX ADMIN — FERUMOXYTOL 510 MG: 510 INJECTION INTRAVENOUS at 10:39

## 2023-05-02 RX ADMIN — SODIUM CHLORIDE, PRESERVATIVE FREE 10 ML: 5 INJECTION INTRAVENOUS at 10:38

## 2023-05-02 RX ADMIN — SODIUM CHLORIDE 250 ML: 9 INJECTION, SOLUTION INTRAVENOUS at 11:00

## 2023-05-02 NOTE — PROGRESS NOTES
Cruz Phillip is a 71 y.o. right handed woman     Patient had recently been following with Dr. Beatriz Norton however I have also evaluated her in the past.  It has been 5 years since I have last seen her. Unfortunately she suffers from longstanding multiple sclerosis with recent EDSS of 8.5    She was previously on dimethyl fumarate but has been discontinued for many years at this point. She continues on dantrolene and baclofen    Previously took Copaxone as well as Tecfidera     MRIs May 2016 stable --- no progression     She was diagnosed with multiple sclerosis in 1985 -----and did not start medication until the mid 90s; she did not take interferons due to her previous history of sarcoidosis  Also suffered numerous heart attacks as well as strokes    Objective:      Blood pressure (!) 111/57, pulse 83, temperature 97.6 °F (36.4 °C), weight 125 lb (56.7 kg), SpO2 100 %.      Head: normocephalic, without obvious abnormality, atraumatic  Pulses: 1+ and symmetric  Skin: no rashes or lesions      Mental Status: alert, oriented, thought content appropriate; pleasant    Speech dysarthric    Language appropriate laconic     Cranial Nerves:  I: smell     II: visual acuity      II: visual fields    II: pupils DAX   III,VII: ptosis none   III,IV,VI: extraocular muscles  Decrease eye movements   V: mastication Normal   V: facial light touch sensation  Normal   V,VII: corneal reflex  Present   VII: facial muscle function - upper      VII: facial muscle function - lower Normal   VIII: hearing    IX: soft palate elevation  Normal   IX,X: gag reflex    XI: trapezius strength     XI: sternocleidomastoid strength    XI: neck flexion strength     XII: tongue strength        Motor:  Spastic quadriparesis  Minimal movement right arm  3/5 movement left      Sensory:  Appreciates LT in all limbs     Gait:  In a wheelchair - not ambulating        MRIs May 2016              Unchanged      labs from her PCP revealed absolute

## 2023-05-02 NOTE — FLOWSHEET NOTE
Patient tolerated infusion well. Remained on unit for 30 minutes after treatment. Patient alert and oriented x3. No distress noted. Vital signs stable. Patient denies any new or worsening pain. Educated patient on possible side effects and treatment of medication. Patient verbalized understanding. Offered patient education and/or discharge material. Patient received. Patient denies any needs. All questions answered. D/C in stable condition.

## 2023-05-02 NOTE — DISCHARGE INSTRUCTIONS
ferumoxytol  Pronunciation:  NIMISHA ue MOX i lucy  Brand:  Feraheme  What is ferumoxytol? Ferumoxytol is a type of iron. You normally get iron from the foods you eat. In your body, iron becomes a part of your hemoglobin (HEEM o tabby bin) and myoglobin (MY o tabby bin). Hemoglobin carries oxygen through your blood to tissues and organs. Myoglobin helps your muscle cells store oxygen. Ferumoxytol is used to treat iron deficiency anemia in people with chronic kidney disease. Anemia is a lack of red blood cells caused by having too little iron in the body. Ferumoxytol may also be used for purposes not listed in this medication guide. What should I discuss with my healthcare provider before receiving ferumoxytol? You should not use this medication if you have ever had an allergic reaction to an injectable form of iron (including ferumoxytol), or if you have:  iron load syndrome; or  any type of anemia that is not caused by iron deficiency. Before you receive ferumoxytol, tell your doctor if you are on dialysis. FDA pregnancy category C. It is not known whether ferumoxytol will harm an unborn baby. Tell your doctor if you are pregnant or plan to become pregnant while using this medication. It is not known whether ferumoxytol passes into breast milk or if it could harm a nursing baby. You should not breast-feed while you are using ferumoxytol. How is ferumoxytol given? Ferumoxytol is injected into a vein through an IV. You will receive this injection in a clinic or hospital setting. You will be watched closely for at least 30 minutes after receiving ferumoxytol, to make sure you do not have an allergic reaction to the medication. Ferumoxytol is usually given as a single injection followed by a second injection 3 to 8 days later. To be sure this medication is helping your condition, your blood will need to be tested often. This will help your doctor determine how long to treat you with ferumoxytol.  Visit your doctor

## 2023-05-10 ENCOUNTER — HOSPITAL ENCOUNTER (OUTPATIENT)
Dept: INFUSION THERAPY | Age: 78
Setting detail: INFUSION SERIES
Discharge: HOME | End: 2023-05-10
Payer: MEDICARE

## 2023-05-10 VITALS
HEART RATE: 76 BPM | RESPIRATION RATE: 16 BRPM | OXYGEN SATURATION: 100 % | TEMPERATURE: 97.7 F | DIASTOLIC BLOOD PRESSURE: 55 MMHG | SYSTOLIC BLOOD PRESSURE: 124 MMHG

## 2023-05-10 DIAGNOSIS — D50.9 IRON DEFICIENCY ANEMIA, UNSPECIFIED IRON DEFICIENCY ANEMIA TYPE: Primary | ICD-10-CM

## 2023-05-10 PROCEDURE — 2580000003 HC RX 258: Performed by: NURSE PRACTITIONER

## 2023-05-10 PROCEDURE — 96365 THER/PROPH/DIAG IV INF INIT: CPT

## 2023-05-10 PROCEDURE — 6360000002 HC RX W HCPCS: Performed by: NURSE PRACTITIONER

## 2023-05-10 RX ORDER — 0.9 % SODIUM CHLORIDE 0.9 %
250 INTRAVENOUS SOLUTION INTRAVENOUS PRN
Status: DISCONTINUED | OUTPATIENT
Start: 2023-05-10 | End: 2023-05-11 | Stop reason: HOSPADM

## 2023-05-10 RX ORDER — HEPARIN SODIUM (PORCINE) LOCK FLUSH IV SOLN 100 UNIT/ML 100 UNIT/ML
500 SOLUTION INTRAVENOUS PRN
OUTPATIENT
Start: 2023-05-16

## 2023-05-10 RX ORDER — MEPERIDINE HYDROCHLORIDE 25 MG/ML
25 INJECTION INTRAMUSCULAR; INTRAVENOUS; SUBCUTANEOUS ONCE
Start: 2023-05-16 | End: 2023-05-16

## 2023-05-10 RX ORDER — DIPHENHYDRAMINE HYDROCHLORIDE 50 MG/ML
50 INJECTION INTRAMUSCULAR; INTRAVENOUS
OUTPATIENT
Start: 2023-05-16

## 2023-05-10 RX ORDER — SODIUM CHLORIDE 0.9 % (FLUSH) 0.9 %
5-40 SYRINGE (ML) INJECTION PRN
OUTPATIENT
Start: 2023-05-16

## 2023-05-10 RX ORDER — EPINEPHRINE 1 MG/ML
0.3 INJECTION, SOLUTION, CONCENTRATE INTRAVENOUS PRN
OUTPATIENT
Start: 2023-05-16

## 2023-05-10 RX ORDER — SODIUM CHLORIDE 0.9 % (FLUSH) 0.9 %
5-40 SYRINGE (ML) INJECTION PRN
Status: DISCONTINUED | OUTPATIENT
Start: 2023-05-10 | End: 2023-05-11 | Stop reason: HOSPADM

## 2023-05-10 RX ORDER — 0.9 % SODIUM CHLORIDE 0.9 %
250 INTRAVENOUS SOLUTION INTRAVENOUS PRN
Status: CANCELLED
Start: 2023-05-16

## 2023-05-10 RX ORDER — ACETAMINOPHEN 325 MG/1
650 TABLET ORAL
OUTPATIENT
Start: 2023-05-16

## 2023-05-10 RX ORDER — ONDANSETRON 2 MG/ML
8 INJECTION INTRAMUSCULAR; INTRAVENOUS
OUTPATIENT
Start: 2023-05-16

## 2023-05-10 RX ORDER — ALBUTEROL SULFATE 90 UG/1
4 AEROSOL, METERED RESPIRATORY (INHALATION) PRN
OUTPATIENT
Start: 2023-05-16

## 2023-05-10 RX ORDER — 0.9 % SODIUM CHLORIDE 0.9 %
1000 INTRAVENOUS SOLUTION INTRAVENOUS CONTINUOUS
Start: 2023-05-16

## 2023-05-10 RX ADMIN — FERUMOXYTOL 510 MG: 510 INJECTION INTRAVENOUS at 15:30

## 2023-05-10 RX ADMIN — SODIUM CHLORIDE, PRESERVATIVE FREE 10 ML: 5 INJECTION INTRAVENOUS at 15:42

## 2023-05-10 RX ADMIN — SODIUM CHLORIDE, PRESERVATIVE FREE 10 ML: 5 INJECTION INTRAVENOUS at 15:41

## 2023-05-10 NOTE — FLOWSHEET NOTE
Patient tolerated infusion well. Patient alert and oriented x3. No distress noted. Vital signs stable. Patient denies any new or worsening pain. Educated patient on possible side effects and treatment of medication.  given number for Medical Infusion manager if he needs to talk to her about today's delay in treatment. Patient verbalized understanding. Offered patient education and/or discharge material. Patient declined. Patient denies any needs. All questions answered. D/C in stable condition.

## 2023-05-10 NOTE — PROGRESS NOTES
upset about now 2 hours wait. IV team has not responded to perfect serve or call.  called and reached out to manager of the [address]  our manager.

## 2023-05-10 NOTE — PROGRESS NOTES
Patient here for IV iron. She tolerated her treatment of iron very well last week. IV team called for line placement.

## 2023-05-10 NOTE — PROGRESS NOTES
Patient tolerated infusion well. Patient alert and oriented x3. No distress noted. Vital signs stable. Patient denies any new or worsening pain. Educated patient on possible side effects and treatment of medication. Patient verbalized understanding. Offered patient education and/or discharge material. Patient's  declined. Patient denies any needs. All questions answered. D/C in stable condition.

## 2023-05-10 NOTE — PROGRESS NOTES
- papular rash to abdomen, neck and back  - seen by derm on 3/8, biopsies x2 taken  - on valacyclovir; per derm varicella is indeterminate at this time  - will continue to monitor biopsy results closely as patient currently requires airborne precautions as possbility of VZV/HSV-1   IV team still not here. Perfect serve sent to them and call placed to them and message left.

## 2023-05-19 ENCOUNTER — TELEPHONE (OUTPATIENT)
Dept: ORTHOPEDIC SURGERY | Age: 78
End: 2023-05-19

## 2023-05-19 ENCOUNTER — TELEPHONE (OUTPATIENT)
Dept: FAMILY MEDICINE CLINIC | Age: 78
End: 2023-05-19

## 2023-05-19 DIAGNOSIS — S42.291S CLOSED FRACTURE OF HEAD OF RIGHT HUMERUS, SEQUELA: Primary | ICD-10-CM

## 2023-05-19 NOTE — TELEPHONE ENCOUNTER
Arnoldo Lopez had to get Michaela to the ER last evening after a fall. He was trying to transport her home from the [facility], and had a problem with the [employer] lift. She fell out of the wheelchair and  fractured the ball on the left shoulder. She was given a referral to an orthopedic Evelyn Saldivar in [place]. He dose not want to see this doctor. He wants to see an orthopedic with [facility], and wants to see the orthopedic you referred her to for a leg problem about 3 years ago.

## 2023-05-19 NOTE — TELEPHONE ENCOUNTER
Future Appointments   Date Time Provider Marisol Garcia   5/25/2023  1:00 PM ELIE Bianchi - CNP SE BD ORTHO [facility]   7/12/2023 11:30 AM Jerzy Martinez MD [facility]   7/20/2023  1:15 PM Serafin Hassan MD [facility]   10/2/2023  2:00 PM Bhavana Perdomo MD [facility] ENDO [facility]   11/1/2023  2:00 PM ELIE Mattson - CNS [facility] -

## 2023-05-19 NOTE — TELEPHONE ENCOUNTER
Left a message letting Uday Johnson know that Dr Reinaldo Workman placed a referral for Dr Rupinder Garcia.

## 2023-05-19 NOTE — TELEPHONE ENCOUNTER
LVM with  Wolfgang's phone, preferred contact number, to return call for scheduling for shoulder injury.

## 2023-05-19 NOTE — TELEPHONE ENCOUNTER
Orders Placed This Encounter   Procedures    Aye Foss MD, Orthopaedics, [place]     Referral Priority:   Urgent     Referral Type:   Eval and Treat     Referral Reason:   Specialty Services Required     Referred to Provider:   Иван Sanabria MD     Requested Specialty:   Orthopedic Surgery     Number of Visits Requested:   1     Referral placed

## 2023-05-23 ENCOUNTER — TELEPHONE (OUTPATIENT)
Dept: FAMILY MEDICINE CLINIC | Age: 78
End: 2023-05-23

## 2023-05-30 ENCOUNTER — TELEPHONE (OUTPATIENT)
Dept: ENDOCRINOLOGY | Age: 78
End: 2023-05-30

## 2023-05-31 ENCOUNTER — TELEPHONE (OUTPATIENT)
Dept: FAMILY MEDICINE CLINIC | Age: 78
End: 2023-05-31

## 2023-05-31 NOTE — TELEPHONE ENCOUNTER
ISADORA mcneal is calling in to verify what labs you want her to draw on pt?  has your orders there from 04/19 but it looks like dr Jazmin Palafox also ordered some. So she does not want to repeat anything and wants to ensure she is drawing the lab orders you want for insurance to cover  04/19 you ordered; b12&folate, Vitamin D, A1C, UA, TSH, Lipid, and a Magnesium. 04/25 abusag orderd; A1C, BMP, T4 free&TSH    She is aware you are OOO and checking computer intermittently.

## 2023-06-01 NOTE — TELEPHONE ENCOUNTER
Please call her and tell them just to not dupicate labs, do not draw the tsh and the a1c    Thank you

## 2023-06-06 ENCOUNTER — OFFICE VISIT (OUTPATIENT)
Dept: ORTHOPEDIC SURGERY | Age: 78
End: 2023-06-06
Payer: MEDICARE

## 2023-06-06 VITALS — HEIGHT: 60 IN | WEIGHT: 125 LBS | BODY MASS INDEX: 24.54 KG/M2

## 2023-06-06 DIAGNOSIS — S62.609A CLOSED NONDISPLACED FRACTURE OF PHALANX OF FINGER, UNSPECIFIED FINGER, UNSPECIFIED PHALANX, INITIAL ENCOUNTER: Primary | ICD-10-CM

## 2023-06-06 PROCEDURE — 1090F PRES/ABSN URINE INCON ASSESS: CPT | Performed by: NURSE PRACTITIONER

## 2023-06-06 PROCEDURE — G8400 PT W/DXA NO RESULTS DOC: HCPCS | Performed by: NURSE PRACTITIONER

## 2023-06-06 PROCEDURE — 99203 OFFICE O/P NEW LOW 30 MIN: CPT | Performed by: NURSE PRACTITIONER

## 2023-06-06 PROCEDURE — G8420 CALC BMI NORM PARAMETERS: HCPCS | Performed by: NURSE PRACTITIONER

## 2023-06-06 PROCEDURE — 1036F TOBACCO NON-USER: CPT | Performed by: NURSE PRACTITIONER

## 2023-06-06 PROCEDURE — 1123F ACP DISCUSS/DSCN MKR DOCD: CPT | Performed by: NURSE PRACTITIONER

## 2023-06-06 PROCEDURE — G8427 DOCREV CUR MEDS BY ELIG CLIN: HCPCS | Performed by: NURSE PRACTITIONER

## 2023-06-06 NOTE — PROGRESS NOTES
[facility] HEALTH  ORTHOPAEDICS AND SPORTS MEDICINE  DATE OF VISIT: 06/06/23  New Shoulder Patient Visit     Referring Provider:   Lord Svtea MD  [facility],  [facility]    CHIEF COMPLAINT:   Chief Complaint   Patient presents with    Shoulder Injury     Almost 3 weeks out right humerus fx. HPI:      Josh Alvarado is a 69y.o. year old female who is seen today  for evaluation of right shoulder proximal humerus fracture sustained roughly 3 weeks ago. She is accompanied by her daughter and  today. Patient suffered a stroke is nonambulatory and does not have use of her right arm. Injury occurred when performing patient transportation from wheelchair to car. Patient was seen and treated initially at the [facility] emergency department. She was placed in a sling and swath. She does report pain to the right arm. Reports swelling towards the distal upper arm and elbow. Patient is known to the practice was seen and treated by Dr. Cristiano Tabares in 2019 undergoing left hip open reduction internal fixation of left hip fracture. PAST MEDICAL HISTORY  Past Medical History:   Diagnosis Date    Anemia     CAD (coronary artery disease) 04/21/2020    High cholesterol     Hypertension     Hypothyroidism     Malabsorption     MI (myocardial infarction) ([address] 04/21/2020    MS (multiple sclerosis) ([address]     Osteoporosis     Sarcoidosis     Seizure ([address] 02/05/2021    Type 2 diabetes mellitus with hyperglycemia, without long-term current use of insulin ([address] 10/30/2019       PAST SURGICAL HISTORY  Past Surgical History:   Procedure Laterality Date    CHOLECYSTECTOMY 1990s    CORONARY ANGIOPLASTY WITH STENT PLACEMENT  04/21/2020    Dr. Dante Joiner   3.0 x 22mm Resolute MAAME to Prox LAD.   No LV    CORONARY ANGIOPLASTY WITH STENT PLACEMENT  01/14/2021    3.0 x 30 [place] to the prox LAD and a 2.5 x 20 [place] to the Obtuse marginal by Dr. Sherwin Mcmahon / Annabel Christian / Solis Darling Left 4/23/2020

## 2023-06-16 ENCOUNTER — TELEPHONE (OUTPATIENT)
Dept: ENDOCRINOLOGY | Age: 78
End: 2023-06-16

## 2023-06-19 ENCOUNTER — TELEPHONE (OUTPATIENT)
Dept: FAMILY MEDICINE CLINIC | Age: 78
End: 2023-06-19

## 2023-06-19 DIAGNOSIS — R41.82 ALTERED MENTAL STATUS, UNSPECIFIED ALTERED MENTAL STATUS TYPE: Primary | ICD-10-CM

## 2023-06-19 NOTE — TELEPHONE ENCOUNTER
Orders Placed This Encounter   Procedures    Culture, Urine     [place] Are Visiting Nurses to Collect     Standing Status:   Future     Standing Expiration Date:   6/19/2024     Order Specific Question:   Specify (ex-cath, midstream, cysto, etc)?      Answer:   Straight Cath    Urinalysis     Standing Status:   Future     Standing Expiration Date:   6/19/2024     Orders signed

## 2023-06-19 NOTE — TELEPHONE ENCOUNTER
Chel Fair is concerned that Tonia Orozco may be getting a UTI - memory problems. He is asking for a order to be placed for [facility] to do a UA C&S - straight cath when they are out tomorrow. Orders are ready to be signed if you are in agreement. Chel Fair would like a return call w/ an update. I faxed a copy of lab orders you placed to [facility].  Visiting nurses were not able to get blood when they attempted and suggested patient have done at the hospital.

## 2023-06-20 ENCOUNTER — TELEPHONE (OUTPATIENT)
Dept: FAMILY MEDICINE CLINIC | Age: 78
End: 2023-06-20

## 2023-06-20 LAB
APPEARANCE, BODY FLUID: ABNORMAL
BACTERIA, URINE: NORMAL /HPF
BILIRUBIN URINE: NEGATIVE
COLOR, URINE: ABNORMAL
ERYTHROCYTES URINE: NORMAL /HPF
GLUCOSE URINE: NEGATIVE MG/DL
HYALINE CASTS: NORMAL /HPF
KETONES, URINE: NEGATIVE MG/DL
MICROSCOPIC URINE: YES
NITRATE, UA: NEGATIVE
PH UA: 5 (ref 4.6–8)
PROTEIN UA: 30 MG/DL
RBC URINE: NEGATIVE
SPECIFIC GRAVITY, URINE: 1.02 (ref 1–1.03)
URINE CULTURE REFLEX: ABNORMAL
UROBILINOGEN, URINE: NEGATIVE MG/DL
WBC CLUMPS, URINE: NORMAL /HPF
WBC COUNT, UR AUTO: >100 /HPF
WBC URINE: ABNORMAL

## 2023-06-20 NOTE — TELEPHONE ENCOUNTER
Thank you for the update!   I would recommend emergency room evaluation if not improving or any new changes    Current urinalysis results showed white blood cells and protein but otherwise was normal    Please make sure that the lab was doing a urine culture    Thanks

## 2023-06-20 NOTE — TELEPHONE ENCOUNTER
Jared Romo called to let you know that Joshua Appiah had a seizure today. He gave her 1mg of Lorazepam under her tongue but only 1/2 the tablet stayed in her mouth because she was shaking so much. Seizure stopped shortly after medication was given. Wolfgang placed Joshua Appiah on her non invasive ventilator and she is sleeping now. States that she usually sleeps for a while after a seizure. He does not want to take her to the hospital, states that he has been through this before. BP 90/58 P84 SaO2 98%. Nurse was out earlier today and collected urine sample. Sample was taken to [facility] lab.

## 2023-06-21 NOTE — TELEPHONE ENCOUNTER
Spoke w/ Roro Conde, he gave Wilian Mackey another dose of Lorazepam last night and she slept through the night. This morning she seems better and was able to eat breakfast. If symptoms return or worsen he will take he to the ER. Urine culture was submitted.

## 2023-06-22 ENCOUNTER — TELEPHONE (OUTPATIENT)
Dept: FAMILY MEDICINE CLINIC | Age: 78
End: 2023-06-22

## 2023-06-22 ENCOUNTER — TELEPHONE (OUTPATIENT)
Dept: ENDOCRINOLOGY | Age: 78
End: 2023-06-22

## 2023-06-22 ENCOUNTER — TELEPHONE (OUTPATIENT)
Dept: PRIMARY CARE CLINIC | Age: 78
End: 2023-06-22

## 2023-06-22 LAB
25-HYDROXY VITAMIN D-3: 73.8 NG/ML (ref 30–100)
AVERAGE GLUCOSE: NORMAL
BUN BLDV-MCNC: NORMAL MG/DL
CALCIUM SERPL-MCNC: 9.5 MG/DL
CHLORIDE BLD-SCNC: 99 MMOL/L
CHOLESTEROL: 159 MG/DL (ref 140–200)
CO2: NORMAL
CREAT SERPL-MCNC: 0.8 MG/DL
EGFR: NORMAL
FOLATE: 10.2 NG/ML
GLUCOSE BLD-MCNC: NORMAL MG/DL
HBA1C MFR BLD: 4 %
HDLC SERPL-MCNC: 50 MG/DL (ref 35–85)
LDL CHOLESTEROL: 87 MG/DL
LDL/HDL RATIO: 1.7 RATIO
MAGNESIUM: 1.9 MEQ/L (ref 1.6–2.6)
POTASSIUM SERPL-SCNC: 5.4 MMOL/L
SODIUM BLD-SCNC: 136 MMOL/L
T4 FREE: 1.04
T4 FREE: 1.04 NG/DL (ref 0.61–1.12)
TRIGL SERPL-MCNC: 176 MG/DL (ref 41–189)
TSH SERPL DL<=0.05 MIU/L-ACNC: 2.24 UIU/ML
TSH SERPL DL<=0.05 MIU/L-ACNC: 2.24 UIU/ML (ref 0.34–5.6)
URINE CULTURE, ROUTINE: NORMAL
VITAMIN B-12: > 1500 PG/ML (ref 180–914)
VITAMIN D 25-HYDROXY: 73.8
VITAMIN D2, 25 HYDROXY: NORMAL
VITAMIN D3,25 HYDROXY: NORMAL

## 2023-06-22 RX ORDER — CIPROFLOXACIN 250 MG/1
250 TABLET, FILM COATED ORAL 2 TIMES DAILY
Qty: 14 TABLET | Refills: 0 | Status: ON HOLD | OUTPATIENT
Start: 2023-06-22 | End: 2023-06-29

## 2023-06-22 NOTE — TELEPHONE ENCOUNTER
Please let the patient know that urine culture did finalize    Urine culture was positive for bacteria    2 sets of bacteria were seen 1 was mixed not tested against antibiotics for susceptibility    1 predominant bacteria was identified and tested against antibiotic sensitivities and was sensitive to Cipro type medications    Would recommend taking the medication and notify if symptoms or not improving or reevaluation or ER if worsening    Thanks

## 2023-06-22 NOTE — TELEPHONE ENCOUNTER
Requested Prescriptions     Signed Prescriptions Disp Refills    ciprofloxacin (CIPRO) 250 MG tablet 14 tablet 0     Sig: Take 1 tablet by mouth 2 times daily for 7 days     Authorizing Provider: Saima Buchanan

## 2023-06-22 NOTE — TELEPHONE ENCOUNTER
Urine culture was still pending however preliminary analysis shows most likely to be positive for bacteria would consider empiric treatment with ciprofloxacin 250 mg twice a day x7 days until culture finalizes    Requested Prescriptions     Pending Prescriptions Disp Refills    ciprofloxacin (CIPRO) 250 MG tablet 14 tablet 0     Sig: Take 1 tablet by mouth 2 times daily for 7 days     Not sent

## 2023-06-22 NOTE — TELEPHONE ENCOUNTER
Please let the patient know that blood work results showed    Potassium level was elevated I am uncertain as to the exact cause but could be lab error and would suggest repeat next week    BUN level was borderline elevated, this is a measure of kidney function that could be related to slight dehydration, other kidney function values are normal    Other electrolytes including magnesium and liver functions were normal    Hemoglobin level was low at 7.3 with changes in size shape of the red blood cells    Iron levels were decreased    Storage form of iron ferritin was elevated which could be related to chronic inflammation    Vitamin A36 and folic acid levels were normal    Thyroid levels TSH and free T4 were normal    Vitamin D level was normal    Cholesterol levels were good    Thanks

## 2023-06-22 NOTE — TELEPHONE ENCOUNTER
notified and verbalized understanding. Med is pended. He also wanted to let you know that he is taking her to get her labs drawn at [facility] this morning.

## 2023-06-23 ENCOUNTER — APPOINTMENT (OUTPATIENT)
Dept: GENERAL RADIOLOGY | Age: 78
DRG: 689 | End: 2023-06-23
Payer: MEDICARE

## 2023-06-23 ENCOUNTER — APPOINTMENT (OUTPATIENT)
Dept: CT IMAGING | Age: 78
DRG: 689 | End: 2023-06-23
Attending: EMERGENCY MEDICINE
Payer: MEDICARE

## 2023-06-23 ENCOUNTER — HOSPITAL ENCOUNTER (INPATIENT)
Age: 78
LOS: 4 days | Discharge: HOME HEALTH | DRG: 689 | End: 2023-06-27
Attending: EMERGENCY MEDICINE | Admitting: INTERNAL MEDICINE
Payer: MEDICARE

## 2023-06-23 DIAGNOSIS — R56.9 SEIZURES (HCC): ICD-10-CM

## 2023-06-23 DIAGNOSIS — R31.9 URINARY TRACT INFECTION WITH HEMATURIA, SITE UNSPECIFIED: ICD-10-CM

## 2023-06-23 DIAGNOSIS — N39.0 URINARY TRACT INFECTION WITH HEMATURIA, SITE UNSPECIFIED: ICD-10-CM

## 2023-06-23 DIAGNOSIS — E11.69 TYPE 2 DIABETES MELLITUS WITH OTHER SPECIFIED COMPLICATION, WITHOUT LONG-TERM CURRENT USE OF INSULIN (HCC): ICD-10-CM

## 2023-06-23 DIAGNOSIS — R41.82 ALTERED MENTAL STATUS, UNSPECIFIED ALTERED MENTAL STATUS TYPE: Primary | ICD-10-CM

## 2023-06-23 DIAGNOSIS — E03.9 HYPOTHYROIDISM, UNSPECIFIED TYPE: ICD-10-CM

## 2023-06-23 DIAGNOSIS — E55.9 VITAMIN D DEFICIENCY: ICD-10-CM

## 2023-06-23 DIAGNOSIS — D64.9 ANEMIA, UNSPECIFIED TYPE: ICD-10-CM

## 2023-06-23 PROBLEM — R44.3 HALLUCINATIONS: Status: ACTIVE | Noted: 2023-06-23

## 2023-06-23 LAB
ABO + RH BLD: NORMAL
ALBUMIN SERPL-MCNC: 4.2 G/DL (ref 3.5–5.2)
ALP SERPL-CCNC: 70 U/L (ref 35–104)
ALT SERPL-CCNC: 6 U/L (ref 0–32)
ANION GAP SERPL CALCULATED.3IONS-SCNC: 16 MMOL/L (ref 7–16)
AST SERPL-CCNC: 22 U/L (ref 0–31)
BACTERIA URNS QL MICRO: ABNORMAL /HPF
BASOPHILS # BLD: 0.02 E9/L (ref 0–0.2)
BASOPHILS NFR BLD: 0.2 % (ref 0–2)
BILIRUB SERPL-MCNC: <0.2 MG/DL (ref 0–1.2)
BILIRUB UR QL STRIP: NEGATIVE
BLD GP AB SCN SERPL QL: NORMAL
BLOOD BANK DISPENSE STATUS: NORMAL
BLOOD BANK PRODUCT CODE: NORMAL
BPU ID: NORMAL
BUN SERPL-MCNC: 31 MG/DL (ref 6–23)
CALCIUM SERPL-MCNC: 9.7 MG/DL (ref 8.6–10.2)
CHLORIDE SERPL-SCNC: 97 MMOL/L (ref 98–107)
CK SERPL-CCNC: 41 U/L (ref 20–180)
CLARITY UR: ABNORMAL
CO2 SERPL-SCNC: 26 MMOL/L (ref 22–29)
COLOR UR: YELLOW
CREAT SERPL-MCNC: 0.7 MG/DL (ref 0.5–1)
DESCRIPTION BLOOD BANK: NORMAL
EKG ATRIAL RATE: 74 BPM
EKG P AXIS: 34 DEGREES
EKG P-R INTERVAL: 158 MS
EKG Q-T INTERVAL: 424 MS
EKG QRS DURATION: 94 MS
EKG QTC CALCULATION (BAZETT): 470 MS
EKG R AXIS: -9 DEGREES
EKG T AXIS: 61 DEGREES
EKG VENTRICULAR RATE: 74 BPM
EOSINOPHIL # BLD: 0.12 E9/L (ref 0.05–0.5)
EOSINOPHIL NFR BLD: 1.4 % (ref 0–6)
ERYTHROCYTE [DISTWIDTH] IN BLOOD BY AUTOMATED COUNT: 14.1 FL (ref 11.5–15)
ESTIMATED AVERAGE GLUCOSE: 68 MG/DL
GLUCOSE SERPL-MCNC: 102 MG/DL (ref 74–99)
GLUCOSE UR STRIP-MCNC: NEGATIVE MG/DL
HBA1C MFR BLD: 4 % (ref 4–6)
HCT VFR BLD AUTO: 22.7 % (ref 34–48)
HGB BLD-MCNC: 6.9 G/DL (ref 11.5–15.5)
HGB UR QL STRIP: ABNORMAL
HYPOCHROMIA: ABNORMAL
IMM GRANULOCYTES # BLD: 0.05 E9/L
IMM GRANULOCYTES NFR BLD: 0.6 % (ref 0–5)
KETONES UR STRIP-MCNC: NEGATIVE MG/DL
LACTATE BLDV-SCNC: 3.2 MMOL/L (ref 0.5–2.2)
LEUKOCYTE ESTERASE UR QL STRIP: ABNORMAL
LYMPHOCYTES # BLD: 0.85 E9/L (ref 1.5–4)
LYMPHOCYTES NFR BLD: 10 % (ref 20–42)
MCH RBC QN AUTO: 34.3 PG (ref 26–35)
MCHC RBC AUTO-ENTMCNC: 30.4 % (ref 32–34.5)
MCV RBC AUTO: 112.9 FL (ref 80–99.9)
MONOCYTES # BLD: 0.6 E9/L (ref 0.1–0.95)
MONOCYTES NFR BLD: 7 % (ref 2–12)
NEUTROPHILS # BLD: 6.9 E9/L (ref 1.8–7.3)
NEUTS SEG NFR BLD: 80.8 % (ref 43–80)
NITRITE UR QL STRIP: NEGATIVE
PH UR STRIP: >=9 [PH] (ref 5–9)
PLATELET # BLD AUTO: 496 E9/L (ref 130–450)
PMV BLD AUTO: 10.9 FL (ref 7–12)
POLYCHROMASIA: ABNORMAL
POTASSIUM SERPL-SCNC: 5.6 MMOL/L (ref 3.5–5)
PROT SERPL-MCNC: 6.9 G/DL (ref 6.4–8.3)
PROT UR STRIP-MCNC: 100 MG/DL
RBC # BLD AUTO: 2.01 E12/L (ref 3.5–5.5)
RBC #/AREA URNS HPF: ABNORMAL /HPF (ref 0–2)
REASON FOR REJECTION: NORMAL
REJECTED TEST: NORMAL
SODIUM SERPL-SCNC: 139 MMOL/L (ref 132–146)
SP GR UR STRIP: 1.02 (ref 1–1.03)
TROPONIN, HIGH SENSITIVITY: 81 NG/L (ref 0–9)
TROPONIN, HIGH SENSITIVITY: 83 NG/L (ref 0–9)
UROBILINOGEN UR STRIP-ACNC: 0.2 E.U./DL
WBC # BLD: 8.5 E9/L (ref 4.5–11.5)
WBC #/AREA URNS HPF: >20 /HPF (ref 0–5)

## 2023-06-23 PROCEDURE — 87088 URINE BACTERIA CULTURE: CPT

## 2023-06-23 PROCEDURE — 87040 BLOOD CULTURE FOR BACTERIA: CPT

## 2023-06-23 PROCEDURE — 70450 CT HEAD/BRAIN W/O DYE: CPT

## 2023-06-23 PROCEDURE — 86923 COMPATIBILITY TEST ELECTRIC: CPT

## 2023-06-23 PROCEDURE — 93010 ELECTROCARDIOGRAM REPORT: CPT | Performed by: INTERNAL MEDICINE

## 2023-06-23 PROCEDURE — 86901 BLOOD TYPING SEROLOGIC RH(D): CPT

## 2023-06-23 PROCEDURE — 84484 ASSAY OF TROPONIN QUANT: CPT

## 2023-06-23 PROCEDURE — 80053 COMPREHEN METABOLIC PANEL: CPT

## 2023-06-23 PROCEDURE — 36415 COLL VENOUS BLD VENIPUNCTURE: CPT

## 2023-06-23 PROCEDURE — 2140000000 HC CCU INTERMEDIATE R&B

## 2023-06-23 PROCEDURE — 83605 ASSAY OF LACTIC ACID: CPT

## 2023-06-23 PROCEDURE — 87150 DNA/RNA AMPLIFIED PROBE: CPT

## 2023-06-23 PROCEDURE — 82550 ASSAY OF CK (CPK): CPT

## 2023-06-23 PROCEDURE — 86900 BLOOD TYPING SEROLOGIC ABO: CPT

## 2023-06-23 PROCEDURE — 2580000003 HC RX 258: Performed by: EMERGENCY MEDICINE

## 2023-06-23 PROCEDURE — 99285 EMERGENCY DEPT VISIT HI MDM: CPT

## 2023-06-23 PROCEDURE — 85025 COMPLETE CBC W/AUTO DIFF WBC: CPT

## 2023-06-23 PROCEDURE — 71045 X-RAY EXAM CHEST 1 VIEW: CPT

## 2023-06-23 PROCEDURE — 30233N1 TRANSFUSION OF NONAUTOLOGOUS RED BLOOD CELLS INTO PERIPHERAL VEIN, PERCUTANEOUS APPROACH: ICD-10-PCS | Performed by: INTERNAL MEDICINE

## 2023-06-23 PROCEDURE — 93005 ELECTROCARDIOGRAM TRACING: CPT | Performed by: EMERGENCY MEDICINE

## 2023-06-23 PROCEDURE — 81001 URINALYSIS AUTO W/SCOPE: CPT

## 2023-06-23 PROCEDURE — P9016 RBC LEUKOCYTES REDUCED: HCPCS

## 2023-06-23 PROCEDURE — 6360000002 HC RX W HCPCS: Performed by: EMERGENCY MEDICINE

## 2023-06-23 PROCEDURE — 36430 TRANSFUSION BLD/BLD COMPNT: CPT

## 2023-06-23 PROCEDURE — 86850 RBC ANTIBODY SCREEN: CPT

## 2023-06-23 RX ORDER — 0.9 % SODIUM CHLORIDE 0.9 %
1000 INTRAVENOUS SOLUTION INTRAVENOUS ONCE
Status: COMPLETED | OUTPATIENT
Start: 2023-06-23 | End: 2023-06-23

## 2023-06-23 RX ORDER — SODIUM CHLORIDE 9 MG/ML
INJECTION, SOLUTION INTRAVENOUS PRN
Status: DISCONTINUED | OUTPATIENT
Start: 2023-06-23 | End: 2023-06-26

## 2023-06-23 RX ADMIN — SODIUM CHLORIDE 1000 ML: 9 INJECTION, SOLUTION INTRAVENOUS at 16:08

## 2023-06-23 RX ADMIN — CEFEPIME 2000 MG: 2 INJECTION, POWDER, FOR SOLUTION INTRAVENOUS at 20:14

## 2023-06-23 ASSESSMENT — LIFESTYLE VARIABLES
HOW MANY STANDARD DRINKS CONTAINING ALCOHOL DO YOU HAVE ON A TYPICAL DAY: PATIENT DOES NOT DRINK
HOW OFTEN DO YOU HAVE A DRINK CONTAINING ALCOHOL: NEVER

## 2023-06-23 ASSESSMENT — PAIN - FUNCTIONAL ASSESSMENT: PAIN_FUNCTIONAL_ASSESSMENT: NONE - DENIES PAIN

## 2023-06-23 NOTE — TELEPHONE ENCOUNTER
Wolfgang informed. Verbalized understanding.  He will take her to [facility] now for eval.

## 2023-06-23 NOTE — TELEPHONE ENCOUNTER
Reviewed result with Trina Scruggs he started antibiotic for UTI yesterday. He mentioned that Sujey Rosenberg started having hallucinations last night and did not sleep. States that she seems a little better this morning and is eating. He thinks that she will sleep most of the day. I expressed concern and said that Dr Merari Abel is likely going to recommend evaluation in the ER. Trina Scruggs said that he would like to give it another day if possible. He feels she needs more time on the antibiotic and has noticed some improvement today. Katharine Orantes know that I will forward the message to Dr Merari Abel to see what he recommends.

## 2023-06-23 NOTE — TELEPHONE ENCOUNTER
With the increase in seizures and mental status change it is difficult to assess what is exactly the cause of the change in her mental status.   -With increased seizures it could be related to something CNS, however the medication that was given for the seizures can also cause mental status changes.     The urine infection can obviously lead to mental status changes and a medication for the infection can also lead to at times mental status changes    There are multiple variables and given that the safest would be for evaluation in the emergency room to make sure were not overlooking something else

## 2023-06-24 LAB
A BAUMANNII DNA BLD POS QL NAA+NON-PROBE: NOT DETECTED
ANION GAP SERPL CALCULATED.3IONS-SCNC: 12 MMOL/L (ref 7–16)
BASOPHILS # BLD: 0.02 E9/L (ref 0–0.2)
BASOPHILS NFR BLD: 0.3 % (ref 0–2)
BOTTLE TYPE: ABNORMAL
BUN SERPL-MCNC: 28 MG/DL (ref 6–23)
C ALBICANS DNA BLD POS QL NAA+NON-PROBE: NOT DETECTED
C AURIS DNA BLD POS QL NAA+PROBE: NOT DETECTED
C GLABRATA DNA BLD POS QL NAA+NON-PROBE: NOT DETECTED
C KRUSEI DNA BLD POS QL NAA+NON-PROBE: NOT DETECTED
C PARAP DNA BLD POS QL NAA+NON-PROBE: NOT DETECTED
C TROPICLS DNA BLD POS QL NAA+NON-PROBE: NOT DETECTED
CALCIUM SERPL-MCNC: 8.8 MG/DL (ref 8.6–10.2)
CHLORIDE SERPL-SCNC: 101 MMOL/L (ref 98–107)
CO2 SERPL-SCNC: 27 MMOL/L (ref 22–29)
CREAT SERPL-MCNC: 0.6 MG/DL (ref 0.5–1)
CRYPTOCOCCUS NEOFORMANS/GATTII BY PCR: NOT DETECTED
E CLOAC COMP DNA BLD POS NAA+NON-PROBE: NOT DETECTED
E COLI DNA BLD POS QL NAA+NON-PROBE: NOT DETECTED
E FAECALIS DNA BLD POS QL NAA+PROBE: NOT DETECTED
E FAECIUM DNA BLD POS QL NAA+PROBE: NOT DETECTED
ENTEROBACT DNA BLD POS QL NAA+NON-PROBE: NOT DETECTED
ENTEROCOC DNA BLD POS QL NAA+NON-PROBE: NOT DETECTED
EOSINOPHIL # BLD: 0.08 E9/L (ref 0.05–0.5)
EOSINOPHIL NFR BLD: 1.1 % (ref 0–6)
ERYTHROCYTE [DISTWIDTH] IN BLOOD BY AUTOMATED COUNT: 18.6 FL (ref 11.5–15)
GLUCOSE SERPL-MCNC: 88 MG/DL (ref 74–99)
GN BLD CULTURE PNL BLD POS NAA+PROBE: NOT DETECTED
HCT VFR BLD AUTO: 25.6 % (ref 34–48)
HCT VFR BLD AUTO: 25.9 % (ref 34–48)
HCT VFR BLD AUTO: 25.9 % (ref 34–48)
HGB BLD-MCNC: 7.9 G/DL (ref 11.5–15.5)
HGB BLD-MCNC: 8.1 G/DL (ref 11.5–15.5)
HGB BLD-MCNC: 8.3 G/DL (ref 11.5–15.5)
IMM GRANULOCYTES # BLD: 0.04 E9/L
IMM GRANULOCYTES NFR BLD: 0.6 % (ref 0–5)
K OXYTOCA DNA BLD POS QL NAA+NON-PROBE: NOT DETECTED
K PNEUMON DNA SPEC QL NAA+PROBE: NOT DETECTED
K. AEROGENES DNA SPEC QL NAA+PROBE: NOT DETECTED
L MONOCYTOG DNA BLD POS QL NAA+NON-PROBE: NOT DETECTED
LACTATE BLDV-SCNC: 0.6 MMOL/L (ref 0.5–2.2)
LYMPHOCYTES # BLD: 0.64 E9/L (ref 1.5–4)
LYMPHOCYTES NFR BLD: 9 % (ref 20–42)
MCH RBC QN AUTO: 32.5 PG (ref 26–35)
MCHC RBC AUTO-ENTMCNC: 31.3 % (ref 32–34.5)
MCV RBC AUTO: 104 FL (ref 80–99.9)
MECA BLD POS QL NAA+NON-PROBE: DETECTED
METER GLUCOSE: 102 MG/DL (ref 74–99)
METER GLUCOSE: 111 MG/DL (ref 74–99)
METER GLUCOSE: 120 MG/DL (ref 74–99)
METER GLUCOSE: 136 MG/DL (ref 74–99)
METER GLUCOSE: 141 MG/DL (ref 74–99)
MONOCYTES # BLD: 0.59 E9/L (ref 0.1–0.95)
MONOCYTES NFR BLD: 8.3 % (ref 2–12)
N MEN DNA BLD POS QL NAA+NON-PROBE: NOT DETECTED
NEUTROPHILS # BLD: 5.76 E9/L (ref 1.8–7.3)
NEUTS SEG NFR BLD: 80.7 % (ref 43–80)
ORDER NUMBER: ABNORMAL
P AERUGINOSA DNA BLD POS NAA+NON-PROBE: NOT DETECTED
PLATELET # BLD AUTO: 412 E9/L (ref 130–450)
PMV BLD AUTO: 10.6 FL (ref 7–12)
POTASSIUM SERPL-SCNC: 3.9 MMOL/L (ref 3.5–5)
POTASSIUM SERPL-SCNC: 4.3 MMOL/L (ref 3.5–5)
PROTEUS SP DNA BLD POS QL NAA+NON-PROBE: NOT DETECTED
RBC # BLD AUTO: 2.49 E12/L (ref 3.5–5.5)
S AUREUS DNA BLD POS QL NAA+NON-PROBE: NOT DETECTED
S AUREUS+CONS DNA BLD POS NAA+NON-PROBE: DETECTED
S EPIDERMIDIS DNA BLD POS QL NAA+PROBE: DETECTED
S LUGDUNENSIS DNA BLD POS QL NAA+PROBE: NOT DETECTED
S MALTOPH DNA BLD POS QL NAA+PROBE: NOT DETECTED
S MARCESCENS DNA BLD POS NAA+NON-PROBE: NOT DETECTED
S PNEUM DNA BLD POS QL NAA+NON-PROBE: NOT DETECTED
S PYO DNA BLD POS QL NAA+NON-PROBE: NOT DETECTED
SALMONELLA DNA BLD POS QL NAA+PROBE: NOT DETECTED
SODIUM SERPL-SCNC: 140 MMOL/L (ref 132–146)
SOURCE OF BLOOD CULTURE: ABNORMAL
STREPTOCOCCUS AGALACTIAE BY PCR: NOT DETECTED
STREPTOCOCCUS DNA BLD POS NAA+NON-PROBE: NOT DETECTED
TROPONIN, HIGH SENSITIVITY: 77 NG/L (ref 0–9)
TROPONIN, HIGH SENSITIVITY: 89 NG/L (ref 0–9)
WBC # BLD: 7.1 E9/L (ref 4.5–11.5)

## 2023-06-24 PROCEDURE — 51798 US URINE CAPACITY MEASURE: CPT

## 2023-06-24 PROCEDURE — 6370000000 HC RX 637 (ALT 250 FOR IP): Performed by: NURSE PRACTITIONER

## 2023-06-24 PROCEDURE — 87040 BLOOD CULTURE FOR BACTERIA: CPT

## 2023-06-24 PROCEDURE — 2140000000 HC CCU INTERMEDIATE R&B

## 2023-06-24 PROCEDURE — 6360000002 HC RX W HCPCS: Performed by: NURSE PRACTITIONER

## 2023-06-24 PROCEDURE — 80048 BASIC METABOLIC PNL TOTAL CA: CPT

## 2023-06-24 PROCEDURE — 85014 HEMATOCRIT: CPT

## 2023-06-24 PROCEDURE — 84484 ASSAY OF TROPONIN QUANT: CPT

## 2023-06-24 PROCEDURE — 84132 ASSAY OF SERUM POTASSIUM: CPT

## 2023-06-24 PROCEDURE — 83605 ASSAY OF LACTIC ACID: CPT

## 2023-06-24 PROCEDURE — 85018 HEMOGLOBIN: CPT

## 2023-06-24 PROCEDURE — 85025 COMPLETE CBC W/AUTO DIFF WBC: CPT

## 2023-06-24 PROCEDURE — 82962 GLUCOSE BLOOD TEST: CPT

## 2023-06-24 PROCEDURE — 36415 COLL VENOUS BLD VENIPUNCTURE: CPT

## 2023-06-24 PROCEDURE — 99223 1ST HOSP IP/OBS HIGH 75: CPT | Performed by: PSYCHIATRY & NEUROLOGY

## 2023-06-24 PROCEDURE — 2580000003 HC RX 258: Performed by: NURSE PRACTITIONER

## 2023-06-24 RX ORDER — RANOLAZINE 500 MG/1
1000 TABLET, EXTENDED RELEASE ORAL 2 TIMES DAILY
Status: DISCONTINUED | OUTPATIENT
Start: 2023-06-24 | End: 2023-06-24

## 2023-06-24 RX ORDER — CLOPIDOGREL BISULFATE 75 MG/1
75 TABLET ORAL DAILY
Status: DISCONTINUED | OUTPATIENT
Start: 2023-06-24 | End: 2023-06-25

## 2023-06-24 RX ORDER — VITAMIN B COMPLEX
1000 TABLET ORAL EVERY MORNING
Status: DISCONTINUED | OUTPATIENT
Start: 2023-06-24 | End: 2023-06-27 | Stop reason: HOSPADM

## 2023-06-24 RX ORDER — SODIUM CHLORIDE 9 MG/ML
INJECTION, SOLUTION INTRAVENOUS PRN
Status: DISCONTINUED | OUTPATIENT
Start: 2023-06-24 | End: 2023-06-27 | Stop reason: HOSPADM

## 2023-06-24 RX ORDER — SODIUM CHLORIDE 9 MG/ML
INJECTION, SOLUTION INTRAVENOUS CONTINUOUS
Status: DISCONTINUED | OUTPATIENT
Start: 2023-06-24 | End: 2023-06-27 | Stop reason: HOSPADM

## 2023-06-24 RX ORDER — LANOLIN ALCOHOL/MO/W.PET/CERES
500 CREAM (GRAM) TOPICAL DAILY
Status: DISCONTINUED | OUTPATIENT
Start: 2023-06-24 | End: 2023-06-27 | Stop reason: HOSPADM

## 2023-06-24 RX ORDER — CARVEDILOL 3.12 MG/1
3.12 TABLET ORAL 2 TIMES DAILY
Status: DISCONTINUED | OUTPATIENT
Start: 2023-06-24 | End: 2023-06-25

## 2023-06-24 RX ORDER — ASPIRIN 81 MG/1
81 TABLET ORAL DAILY
Status: DISCONTINUED | OUTPATIENT
Start: 2023-06-24 | End: 2023-06-27 | Stop reason: HOSPADM

## 2023-06-24 RX ORDER — TRAMADOL HYDROCHLORIDE 50 MG/1
50 TABLET ORAL EVERY 6 HOURS PRN
Status: DISCONTINUED | OUTPATIENT
Start: 2023-06-24 | End: 2023-06-27 | Stop reason: HOSPADM

## 2023-06-24 RX ORDER — ACETAMINOPHEN 650 MG/1
650 SUPPOSITORY RECTAL EVERY 6 HOURS PRN
Status: DISCONTINUED | OUTPATIENT
Start: 2023-06-24 | End: 2023-06-27 | Stop reason: HOSPADM

## 2023-06-24 RX ORDER — LANOLIN ALCOHOL/MO/W.PET/CERES
200 CREAM (GRAM) TOPICAL EVERY MORNING
Status: DISCONTINUED | OUTPATIENT
Start: 2023-06-24 | End: 2023-06-27 | Stop reason: HOSPADM

## 2023-06-24 RX ORDER — RANOLAZINE 500 MG/1
1000 TABLET, EXTENDED RELEASE ORAL 2 TIMES DAILY
Status: DISCONTINUED | OUTPATIENT
Start: 2023-06-24 | End: 2023-06-27 | Stop reason: HOSPADM

## 2023-06-24 RX ORDER — SODIUM CHLORIDE 1 G/1
1 TABLET ORAL DAILY
Status: DISCONTINUED | OUTPATIENT
Start: 2023-06-24 | End: 2023-06-27 | Stop reason: HOSPADM

## 2023-06-24 RX ORDER — ATORVASTATIN CALCIUM 10 MG/1
10 TABLET, FILM COATED ORAL NIGHTLY
Status: DISCONTINUED | OUTPATIENT
Start: 2023-06-24 | End: 2023-06-27 | Stop reason: HOSPADM

## 2023-06-24 RX ORDER — BACLOFEN 10 MG/1
40 TABLET ORAL 2 TIMES DAILY
Status: DISCONTINUED | OUTPATIENT
Start: 2023-06-24 | End: 2023-06-27 | Stop reason: HOSPADM

## 2023-06-24 RX ORDER — DANTROLENE SODIUM 25 MG/1
100 CAPSULE ORAL 2 TIMES DAILY
Status: DISCONTINUED | OUTPATIENT
Start: 2023-06-24 | End: 2023-06-27 | Stop reason: HOSPADM

## 2023-06-24 RX ORDER — INSULIN LISPRO 100 [IU]/ML
0-4 INJECTION, SOLUTION INTRAVENOUS; SUBCUTANEOUS NIGHTLY
Status: DISCONTINUED | OUTPATIENT
Start: 2023-06-24 | End: 2023-06-27 | Stop reason: HOSPADM

## 2023-06-24 RX ORDER — NITROGLYCERIN 0.4 MG/1
0.4 TABLET SUBLINGUAL EVERY 5 MIN PRN
Status: DISCONTINUED | OUTPATIENT
Start: 2023-06-24 | End: 2023-06-27 | Stop reason: HOSPADM

## 2023-06-24 RX ORDER — LEVOTHYROXINE SODIUM 88 UG/1
88 TABLET ORAL DAILY
Status: DISCONTINUED | OUTPATIENT
Start: 2023-06-24 | End: 2023-06-27 | Stop reason: HOSPADM

## 2023-06-24 RX ORDER — INSULIN LISPRO 100 [IU]/ML
0-4 INJECTION, SOLUTION INTRAVENOUS; SUBCUTANEOUS
Status: DISCONTINUED | OUTPATIENT
Start: 2023-06-24 | End: 2023-06-27 | Stop reason: HOSPADM

## 2023-06-24 RX ORDER — SODIUM CHLORIDE 0.9 % (FLUSH) 0.9 %
5-40 SYRINGE (ML) INJECTION EVERY 12 HOURS SCHEDULED
Status: DISCONTINUED | OUTPATIENT
Start: 2023-06-24 | End: 2023-06-27 | Stop reason: HOSPADM

## 2023-06-24 RX ORDER — SODIUM CHLORIDE 0.9 % (FLUSH) 0.9 %
5-40 SYRINGE (ML) INJECTION PRN
Status: DISCONTINUED | OUTPATIENT
Start: 2023-06-24 | End: 2023-06-27 | Stop reason: HOSPADM

## 2023-06-24 RX ORDER — BUMETANIDE 1 MG/1
1 TABLET ORAL 2 TIMES DAILY
Status: DISCONTINUED | OUTPATIENT
Start: 2023-06-24 | End: 2023-06-27 | Stop reason: HOSPADM

## 2023-06-24 RX ORDER — POLYETHYLENE GLYCOL 3350 17 G/17G
17 POWDER, FOR SOLUTION ORAL DAILY
Status: DISCONTINUED | OUTPATIENT
Start: 2023-06-24 | End: 2023-06-27 | Stop reason: HOSPADM

## 2023-06-24 RX ORDER — DEXTROSE MONOHYDRATE 100 MG/ML
INJECTION, SOLUTION INTRAVENOUS CONTINUOUS PRN
Status: DISCONTINUED | OUTPATIENT
Start: 2023-06-24 | End: 2023-06-27 | Stop reason: HOSPADM

## 2023-06-24 RX ORDER — ISOSORBIDE MONONITRATE 30 MG/1
30 TABLET, EXTENDED RELEASE ORAL DAILY
Status: DISCONTINUED | OUTPATIENT
Start: 2023-06-24 | End: 2023-06-25

## 2023-06-24 RX ORDER — FLUTICASONE PROPIONATE 50 MCG
2 SPRAY, SUSPENSION (ML) NASAL DAILY PRN
Status: DISCONTINUED | OUTPATIENT
Start: 2023-06-24 | End: 2023-06-27 | Stop reason: HOSPADM

## 2023-06-24 RX ORDER — CITALOPRAM 20 MG/1
20 TABLET ORAL EVERY MORNING
Status: DISCONTINUED | OUTPATIENT
Start: 2023-06-24 | End: 2023-06-27 | Stop reason: HOSPADM

## 2023-06-24 RX ORDER — IPRATROPIUM BROMIDE AND ALBUTEROL SULFATE 2.5; .5 MG/3ML; MG/3ML
1 SOLUTION RESPIRATORY (INHALATION) EVERY 6 HOURS PRN
Status: DISCONTINUED | OUTPATIENT
Start: 2023-06-24 | End: 2023-06-27 | Stop reason: HOSPADM

## 2023-06-24 RX ORDER — PROCHLORPERAZINE EDISYLATE 5 MG/ML
5 INJECTION INTRAMUSCULAR; INTRAVENOUS EVERY 6 HOURS PRN
Status: DISCONTINUED | OUTPATIENT
Start: 2023-06-24 | End: 2023-06-27 | Stop reason: HOSPADM

## 2023-06-24 RX ORDER — ACETAMINOPHEN 325 MG/1
650 TABLET ORAL EVERY 6 HOURS PRN
Status: DISCONTINUED | OUTPATIENT
Start: 2023-06-24 | End: 2023-06-27 | Stop reason: HOSPADM

## 2023-06-24 RX ADMIN — ISOSORBIDE MONONITRATE 30 MG: 30 TABLET, EXTENDED RELEASE ORAL at 08:46

## 2023-06-24 RX ADMIN — SODIUM CHLORIDE: 9 INJECTION, SOLUTION INTRAVENOUS at 01:43

## 2023-06-24 RX ADMIN — Medication 200 MG: at 08:48

## 2023-06-24 RX ADMIN — ATORVASTATIN CALCIUM 10 MG: 10 TABLET, FILM COATED ORAL at 01:43

## 2023-06-24 RX ADMIN — Medication 1000 UNITS: at 08:48

## 2023-06-24 RX ADMIN — CEFEPIME 1000 MG: 1 INJECTION, POWDER, FOR SOLUTION INTRAMUSCULAR; INTRAVENOUS at 20:29

## 2023-06-24 RX ADMIN — CYANOCOBALAMIN TAB 1000 MCG 500 MCG: 1000 TAB at 08:48

## 2023-06-24 RX ADMIN — BACLOFEN 40 MG: 10 TABLET ORAL at 20:29

## 2023-06-24 RX ADMIN — CARVEDILOL 3.12 MG: 3.12 TABLET, FILM COATED ORAL at 08:48

## 2023-06-24 RX ADMIN — SODIUM CHLORIDE 1 G: 1 TABLET ORAL at 08:47

## 2023-06-24 RX ADMIN — RANOLAZINE 1000 MG: 500 TABLET, EXTENDED RELEASE ORAL at 01:43

## 2023-06-24 RX ADMIN — RANOLAZINE 1000 MG: 500 TABLET, EXTENDED RELEASE ORAL at 08:46

## 2023-06-24 RX ADMIN — CARVEDILOL 3.12 MG: 3.12 TABLET, FILM COATED ORAL at 01:43

## 2023-06-24 RX ADMIN — BUMETANIDE 1 MG: 1 TABLET ORAL at 20:30

## 2023-06-24 RX ADMIN — ACETAMINOPHEN 650 MG: 325 TABLET ORAL at 11:06

## 2023-06-24 RX ADMIN — BACLOFEN 40 MG: 10 TABLET ORAL at 01:43

## 2023-06-24 RX ADMIN — CITALOPRAM HYDROBROMIDE 20 MG: 20 TABLET ORAL at 08:48

## 2023-06-24 RX ADMIN — FLUTICASONE PROPIONATE 2 SPRAY: 50 SPRAY, METERED NASAL at 08:53

## 2023-06-24 RX ADMIN — BUMETANIDE 1 MG: 1 TABLET ORAL at 08:47

## 2023-06-24 RX ADMIN — BACLOFEN 40 MG: 10 TABLET ORAL at 08:44

## 2023-06-24 RX ADMIN — FLUTICASONE PROPIONATE 2 SPRAY: 50 SPRAY, METERED NASAL at 08:58

## 2023-06-24 RX ADMIN — CEFEPIME 1000 MG: 1 INJECTION, POWDER, FOR SOLUTION INTRAMUSCULAR; INTRAVENOUS at 09:09

## 2023-06-24 RX ADMIN — RANOLAZINE 1000 MG: 500 TABLET, EXTENDED RELEASE ORAL at 20:29

## 2023-06-24 RX ADMIN — ATORVASTATIN CALCIUM 10 MG: 10 TABLET, FILM COATED ORAL at 20:29

## 2023-06-24 RX ADMIN — POLYETHYLENE GLYCOL 3350 17 G: 17 POWDER, FOR SOLUTION ORAL at 09:11

## 2023-06-24 RX ADMIN — DANTROLENE SODIUM 100 MG: 25 CAPSULE ORAL at 08:46

## 2023-06-24 RX ADMIN — BUMETANIDE 1 MG: 1 TABLET ORAL at 01:44

## 2023-06-24 RX ADMIN — CARVEDILOL 3.12 MG: 3.12 TABLET, FILM COATED ORAL at 20:29

## 2023-06-24 RX ADMIN — SODIUM CHLORIDE: 9 INJECTION, SOLUTION INTRAVENOUS at 14:19

## 2023-06-24 RX ADMIN — LEVOTHYROXINE SODIUM 88 MCG: 0.09 TABLET ORAL at 06:48

## 2023-06-24 RX ADMIN — ASPIRIN 81 MG: 81 TABLET, COATED ORAL at 08:48

## 2023-06-24 RX ADMIN — CALCIUM CARBONATE-VITAMIN D TAB 500 MG-200 UNIT 1 TABLET: 500-200 TAB at 20:29

## 2023-06-24 RX ADMIN — DANTROLENE SODIUM 100 MG: 25 CAPSULE ORAL at 20:29

## 2023-06-24 ASSESSMENT — PAIN SCALES - GENERAL: PAINLEVEL_OUTOF10: 8

## 2023-06-24 ASSESSMENT — PAIN DESCRIPTION - ORIENTATION: ORIENTATION: RIGHT

## 2023-06-24 ASSESSMENT — PAIN DESCRIPTION - LOCATION: LOCATION: SHOULDER

## 2023-06-24 ASSESSMENT — PAIN DESCRIPTION - DESCRIPTORS: DESCRIPTORS: ACHING;SORE;DISCOMFORT

## 2023-06-25 PROBLEM — E43 SEVERE PROTEIN-CALORIE MALNUTRITION (HCC): Chronic | Status: ACTIVE | Noted: 2023-06-25

## 2023-06-25 LAB
ANION GAP SERPL CALCULATED.3IONS-SCNC: 9 MMOL/L (ref 7–16)
BACTERIA UR CULT: NORMAL
BUN SERPL-MCNC: 29 MG/DL (ref 6–23)
CALCIUM SERPL-MCNC: 8.9 MG/DL (ref 8.6–10.2)
CHLORIDE SERPL-SCNC: 103 MMOL/L (ref 98–107)
CO2 SERPL-SCNC: 27 MMOL/L (ref 22–29)
CREAT SERPL-MCNC: 0.7 MG/DL (ref 0.5–1)
GLUCOSE SERPL-MCNC: 93 MG/DL (ref 74–99)
HCT VFR BLD AUTO: 25.3 % (ref 34–48)
HCT VFR BLD AUTO: 26.6 % (ref 34–48)
HCT VFR BLD AUTO: 27.5 % (ref 34–48)
HGB BLD-MCNC: 7.9 G/DL (ref 11.5–15.5)
HGB BLD-MCNC: 8.2 G/DL (ref 11.5–15.5)
HGB BLD-MCNC: 8.6 G/DL (ref 11.5–15.5)
METER GLUCOSE: 109 MG/DL (ref 74–99)
METER GLUCOSE: 188 MG/DL (ref 74–99)
METER GLUCOSE: 190 MG/DL (ref 74–99)
METER GLUCOSE: 94 MG/DL (ref 74–99)
POTASSIUM SERPL-SCNC: 4 MMOL/L (ref 3.5–5)
SODIUM SERPL-SCNC: 139 MMOL/L (ref 132–146)

## 2023-06-25 PROCEDURE — 82962 GLUCOSE BLOOD TEST: CPT

## 2023-06-25 PROCEDURE — 6370000000 HC RX 637 (ALT 250 FOR IP): Performed by: INTERNAL MEDICINE

## 2023-06-25 PROCEDURE — 80048 BASIC METABOLIC PNL TOTAL CA: CPT

## 2023-06-25 PROCEDURE — 36415 COLL VENOUS BLD VENIPUNCTURE: CPT

## 2023-06-25 PROCEDURE — 2140000000 HC CCU INTERMEDIATE R&B

## 2023-06-25 PROCEDURE — 6370000000 HC RX 637 (ALT 250 FOR IP): Performed by: NURSE PRACTITIONER

## 2023-06-25 PROCEDURE — 6370000000 HC RX 637 (ALT 250 FOR IP): Performed by: PSYCHIATRY & NEUROLOGY

## 2023-06-25 PROCEDURE — 85014 HEMATOCRIT: CPT

## 2023-06-25 PROCEDURE — 6360000002 HC RX W HCPCS: Performed by: NURSE PRACTITIONER

## 2023-06-25 PROCEDURE — 85018 HEMOGLOBIN: CPT

## 2023-06-25 PROCEDURE — 2580000003 HC RX 258: Performed by: NURSE PRACTITIONER

## 2023-06-25 RX ORDER — LEVETIRACETAM 500 MG/1
500 TABLET ORAL 2 TIMES DAILY
Status: DISCONTINUED | OUTPATIENT
Start: 2023-06-25 | End: 2023-06-27 | Stop reason: HOSPADM

## 2023-06-25 RX ORDER — CLOPIDOGREL BISULFATE 75 MG/1
75 TABLET ORAL DAILY
Status: DISCONTINUED | OUTPATIENT
Start: 2023-06-25 | End: 2023-06-27 | Stop reason: HOSPADM

## 2023-06-25 RX ORDER — CARVEDILOL 6.25 MG/1
6.25 TABLET ORAL 2 TIMES DAILY
Status: DISCONTINUED | OUTPATIENT
Start: 2023-06-25 | End: 2023-06-27 | Stop reason: HOSPADM

## 2023-06-25 RX ADMIN — SODIUM CHLORIDE: 9 INJECTION, SOLUTION INTRAVENOUS at 15:54

## 2023-06-25 RX ADMIN — LEVETIRACETAM 500 MG: 500 TABLET, FILM COATED ORAL at 20:34

## 2023-06-25 RX ADMIN — LEVOTHYROXINE SODIUM 88 MCG: 0.09 TABLET ORAL at 06:45

## 2023-06-25 RX ADMIN — CALCIUM CARBONATE-VITAMIN D TAB 500 MG-200 UNIT 1 TABLET: 500-200 TAB at 20:34

## 2023-06-25 RX ADMIN — CEFEPIME 1000 MG: 1 INJECTION, POWDER, FOR SOLUTION INTRAMUSCULAR; INTRAVENOUS at 20:33

## 2023-06-25 RX ADMIN — DANTROLENE SODIUM 100 MG: 25 CAPSULE ORAL at 20:33

## 2023-06-25 RX ADMIN — Medication 200 MG: at 08:20

## 2023-06-25 RX ADMIN — CARVEDILOL 6.25 MG: 6.25 TABLET, FILM COATED ORAL at 08:21

## 2023-06-25 RX ADMIN — PROCHLORPERAZINE EDISYLATE 5 MG: 5 INJECTION INTRAMUSCULAR; INTRAVENOUS at 11:49

## 2023-06-25 RX ADMIN — BACLOFEN 40 MG: 10 TABLET ORAL at 20:34

## 2023-06-25 RX ADMIN — POLYETHYLENE GLYCOL 3350 17 G: 17 POWDER, FOR SOLUTION ORAL at 08:25

## 2023-06-25 RX ADMIN — ASPIRIN 81 MG: 81 TABLET, COATED ORAL at 08:23

## 2023-06-25 RX ADMIN — RANOLAZINE 1000 MG: 500 TABLET, EXTENDED RELEASE ORAL at 08:22

## 2023-06-25 RX ADMIN — CITALOPRAM HYDROBROMIDE 20 MG: 20 TABLET ORAL at 08:20

## 2023-06-25 RX ADMIN — BUMETANIDE 1 MG: 1 TABLET ORAL at 08:23

## 2023-06-25 RX ADMIN — DANTROLENE SODIUM 100 MG: 25 CAPSULE ORAL at 08:22

## 2023-06-25 RX ADMIN — ATORVASTATIN CALCIUM 10 MG: 10 TABLET, FILM COATED ORAL at 20:34

## 2023-06-25 RX ADMIN — CYANOCOBALAMIN TAB 1000 MCG 500 MCG: 1000 TAB at 08:23

## 2023-06-25 RX ADMIN — BACLOFEN 40 MG: 10 TABLET ORAL at 08:55

## 2023-06-25 RX ADMIN — RANOLAZINE 1000 MG: 500 TABLET, EXTENDED RELEASE ORAL at 20:35

## 2023-06-25 RX ADMIN — SODIUM CHLORIDE 1 G: 1 TABLET ORAL at 08:21

## 2023-06-25 RX ADMIN — CLOPIDOGREL BISULFATE 75 MG: 75 TABLET ORAL at 18:22

## 2023-06-25 RX ADMIN — CEFEPIME 1000 MG: 1 INJECTION, POWDER, FOR SOLUTION INTRAMUSCULAR; INTRAVENOUS at 08:20

## 2023-06-25 RX ADMIN — Medication 1000 UNITS: at 08:23

## 2023-06-26 ENCOUNTER — APPOINTMENT (OUTPATIENT)
Dept: MRI IMAGING | Age: 78
DRG: 689 | End: 2023-06-26
Payer: MEDICARE

## 2023-06-26 ENCOUNTER — APPOINTMENT (OUTPATIENT)
Dept: NEUROLOGY | Age: 78
DRG: 689 | End: 2023-06-26
Payer: MEDICARE

## 2023-06-26 LAB
ANION GAP SERPL CALCULATED.3IONS-SCNC: 11 MMOL/L (ref 7–16)
ANION GAP SERPL CALCULATED.3IONS-SCNC: 8 MMOL/L (ref 7–16)
BUN SERPL-MCNC: 25 MG/DL (ref 6–23)
BUN SERPL-MCNC: 26 MG/DL (ref 6–23)
CALCIUM SERPL-MCNC: 8.2 MG/DL (ref 8.6–10.2)
CALCIUM SERPL-MCNC: 8.9 MG/DL (ref 8.6–10.2)
CHLORIDE SERPL-SCNC: 105 MMOL/L (ref 98–107)
CHLORIDE SERPL-SCNC: 108 MMOL/L (ref 98–107)
CO2 SERPL-SCNC: 25 MMOL/L (ref 22–29)
CO2 SERPL-SCNC: 27 MMOL/L (ref 22–29)
CREAT SERPL-MCNC: 0.7 MG/DL (ref 0.5–1)
CREAT SERPL-MCNC: 0.7 MG/DL (ref 0.5–1)
ERYTHROCYTE [DISTWIDTH] IN BLOOD BY AUTOMATED COUNT: 16.2 FL (ref 11.5–15)
GLUCOSE SERPL-MCNC: 114 MG/DL (ref 74–99)
GLUCOSE SERPL-MCNC: 136 MG/DL (ref 74–99)
HCT VFR BLD AUTO: 26.9 % (ref 34–48)
HGB BLD-MCNC: 8.2 G/DL (ref 11.5–15.5)
LV EF: 53 %
LVEF MODALITY: NORMAL
MCH RBC QN AUTO: 32.9 PG (ref 26–35)
MCHC RBC AUTO-ENTMCNC: 30.5 % (ref 32–34.5)
MCV RBC AUTO: 108 FL (ref 80–99.9)
METER GLUCOSE: 115 MG/DL (ref 74–99)
METER GLUCOSE: 126 MG/DL (ref 74–99)
METER GLUCOSE: 133 MG/DL (ref 74–99)
METER GLUCOSE: 169 MG/DL (ref 74–99)
PLATELET # BLD AUTO: 350 E9/L (ref 130–450)
PMV BLD AUTO: 10.2 FL (ref 7–12)
POTASSIUM SERPL-SCNC: 4.2 MMOL/L (ref 3.5–5)
POTASSIUM SERPL-SCNC: 4.4 MMOL/L (ref 3.5–5)
RBC # BLD AUTO: 2.49 E12/L (ref 3.5–5.5)
SODIUM SERPL-SCNC: 141 MMOL/L (ref 132–146)
SODIUM SERPL-SCNC: 143 MMOL/L (ref 132–146)
WBC # BLD: 6.1 E9/L (ref 4.5–11.5)

## 2023-06-26 PROCEDURE — 6370000000 HC RX 637 (ALT 250 FOR IP): Performed by: INTERNAL MEDICINE

## 2023-06-26 PROCEDURE — 85027 COMPLETE CBC AUTOMATED: CPT

## 2023-06-26 PROCEDURE — 36415 COLL VENOUS BLD VENIPUNCTURE: CPT

## 2023-06-26 PROCEDURE — 95816 EEG AWAKE AND DROWSY: CPT | Performed by: PSYCHIATRY & NEUROLOGY

## 2023-06-26 PROCEDURE — 97165 OT EVAL LOW COMPLEX 30 MIN: CPT

## 2023-06-26 PROCEDURE — 80048 BASIC METABOLIC PNL TOTAL CA: CPT

## 2023-06-26 PROCEDURE — 99232 SBSQ HOSP IP/OBS MODERATE 35: CPT | Performed by: NURSE PRACTITIONER

## 2023-06-26 PROCEDURE — 95819 EEG AWAKE AND ASLEEP: CPT

## 2023-06-26 PROCEDURE — 97530 THERAPEUTIC ACTIVITIES: CPT

## 2023-06-26 PROCEDURE — 6370000000 HC RX 637 (ALT 250 FOR IP): Performed by: PSYCHIATRY & NEUROLOGY

## 2023-06-26 PROCEDURE — 2580000003 HC RX 258: Performed by: NURSE PRACTITIONER

## 2023-06-26 PROCEDURE — 6370000000 HC RX 637 (ALT 250 FOR IP): Performed by: NURSE PRACTITIONER

## 2023-06-26 PROCEDURE — 70553 MRI BRAIN STEM W/O & W/DYE: CPT

## 2023-06-26 PROCEDURE — 93306 TTE W/DOPPLER COMPLETE: CPT

## 2023-06-26 PROCEDURE — 6360000004 HC RX CONTRAST MEDICATION: Performed by: RADIOLOGY

## 2023-06-26 PROCEDURE — 6360000002 HC RX W HCPCS: Performed by: NURSE PRACTITIONER

## 2023-06-26 PROCEDURE — 97161 PT EVAL LOW COMPLEX 20 MIN: CPT

## 2023-06-26 PROCEDURE — 82962 GLUCOSE BLOOD TEST: CPT

## 2023-06-26 PROCEDURE — A9577 INJ MULTIHANCE: HCPCS | Performed by: RADIOLOGY

## 2023-06-26 PROCEDURE — 2140000000 HC CCU INTERMEDIATE R&B

## 2023-06-26 RX ADMIN — LEVOTHYROXINE SODIUM 88 MCG: 0.09 TABLET ORAL at 05:57

## 2023-06-26 RX ADMIN — POLYETHYLENE GLYCOL 3350 17 G: 17 POWDER, FOR SOLUTION ORAL at 08:51

## 2023-06-26 RX ADMIN — BUMETANIDE 1 MG: 1 TABLET ORAL at 20:58

## 2023-06-26 RX ADMIN — SODIUM CHLORIDE 1 G: 1 TABLET ORAL at 08:52

## 2023-06-26 RX ADMIN — Medication 1000 UNITS: at 08:52

## 2023-06-26 RX ADMIN — CARVEDILOL 6.25 MG: 6.25 TABLET, FILM COATED ORAL at 08:51

## 2023-06-26 RX ADMIN — Medication 200 MG: at 08:52

## 2023-06-26 RX ADMIN — SODIUM CHLORIDE, PRESERVATIVE FREE 10 ML: 5 INJECTION INTRAVENOUS at 20:57

## 2023-06-26 RX ADMIN — BUMETANIDE 1 MG: 1 TABLET ORAL at 08:53

## 2023-06-26 RX ADMIN — NYSTATIN 500000 UNITS: 100000 SUSPENSION ORAL at 20:57

## 2023-06-26 RX ADMIN — RANOLAZINE 1000 MG: 500 TABLET, EXTENDED RELEASE ORAL at 08:52

## 2023-06-26 RX ADMIN — CYANOCOBALAMIN TAB 1000 MCG 500 MCG: 1000 TAB at 08:51

## 2023-06-26 RX ADMIN — CARVEDILOL 6.25 MG: 6.25 TABLET, FILM COATED ORAL at 20:57

## 2023-06-26 RX ADMIN — CLOPIDOGREL BISULFATE 75 MG: 75 TABLET ORAL at 08:53

## 2023-06-26 RX ADMIN — CALCIUM CARBONATE-VITAMIN D TAB 500 MG-200 UNIT 1 TABLET: 500-200 TAB at 20:57

## 2023-06-26 RX ADMIN — BACLOFEN 40 MG: 10 TABLET ORAL at 08:52

## 2023-06-26 RX ADMIN — ATORVASTATIN CALCIUM 10 MG: 10 TABLET, FILM COATED ORAL at 20:57

## 2023-06-26 RX ADMIN — CITALOPRAM HYDROBROMIDE 20 MG: 20 TABLET ORAL at 08:52

## 2023-06-26 RX ADMIN — LEVETIRACETAM 500 MG: 500 TABLET, FILM COATED ORAL at 20:57

## 2023-06-26 RX ADMIN — DANTROLENE SODIUM 100 MG: 25 CAPSULE ORAL at 08:52

## 2023-06-26 RX ADMIN — ASPIRIN 81 MG: 81 TABLET, COATED ORAL at 08:53

## 2023-06-26 RX ADMIN — GADOBENATE DIMEGLUMINE 11 ML: 529 INJECTION, SOLUTION INTRAVENOUS at 19:24

## 2023-06-26 RX ADMIN — SODIUM CHLORIDE: 9 INJECTION, SOLUTION INTRAVENOUS at 20:55

## 2023-06-26 RX ADMIN — BACLOFEN 40 MG: 10 TABLET ORAL at 20:58

## 2023-06-26 RX ADMIN — LEVETIRACETAM 500 MG: 500 TABLET, FILM COATED ORAL at 08:52

## 2023-06-26 RX ADMIN — CEFEPIME 1000 MG: 1 INJECTION, POWDER, FOR SOLUTION INTRAMUSCULAR; INTRAVENOUS at 20:56

## 2023-06-26 RX ADMIN — DANTROLENE SODIUM 100 MG: 25 CAPSULE ORAL at 20:57

## 2023-06-26 RX ADMIN — CEFEPIME 1000 MG: 1 INJECTION, POWDER, FOR SOLUTION INTRAMUSCULAR; INTRAVENOUS at 08:16

## 2023-06-26 RX ADMIN — RANOLAZINE 1000 MG: 500 TABLET, EXTENDED RELEASE ORAL at 20:57

## 2023-06-26 ASSESSMENT — PAIN DESCRIPTION - LOCATION: LOCATION: SHOULDER

## 2023-06-26 ASSESSMENT — PAIN SCALES - GENERAL
PAINLEVEL_OUTOF10: 0
PAINLEVEL_OUTOF10: 3

## 2023-06-26 ASSESSMENT — PAIN DESCRIPTION - DESCRIPTORS: DESCRIPTORS: ACHING;DISCOMFORT;SORE

## 2023-06-26 ASSESSMENT — PAIN DESCRIPTION - ORIENTATION: ORIENTATION: RIGHT

## 2023-06-27 VITALS
OXYGEN SATURATION: 100 % | WEIGHT: 111.55 LBS | TEMPERATURE: 97.3 F | HEIGHT: 60 IN | RESPIRATION RATE: 16 BRPM | BODY MASS INDEX: 21.9 KG/M2 | HEART RATE: 73 BPM | DIASTOLIC BLOOD PRESSURE: 51 MMHG | SYSTOLIC BLOOD PRESSURE: 114 MMHG

## 2023-06-27 LAB
METER GLUCOSE: 108 MG/DL (ref 74–99)
METER GLUCOSE: 186 MG/DL (ref 74–99)

## 2023-06-27 PROCEDURE — 82962 GLUCOSE BLOOD TEST: CPT

## 2023-06-27 PROCEDURE — 6360000002 HC RX W HCPCS: Performed by: NURSE PRACTITIONER

## 2023-06-27 PROCEDURE — 6370000000 HC RX 637 (ALT 250 FOR IP): Performed by: INTERNAL MEDICINE

## 2023-06-27 PROCEDURE — 2580000003 HC RX 258: Performed by: NURSE PRACTITIONER

## 2023-06-27 PROCEDURE — 6370000000 HC RX 637 (ALT 250 FOR IP): Performed by: NURSE PRACTITIONER

## 2023-06-27 PROCEDURE — 6370000000 HC RX 637 (ALT 250 FOR IP): Performed by: PSYCHIATRY & NEUROLOGY

## 2023-06-27 RX ORDER — LEVOFLOXACIN 750 MG/1
750 TABLET ORAL EVERY OTHER DAY
Status: DISCONTINUED | OUTPATIENT
Start: 2023-06-27 | End: 2023-06-27 | Stop reason: HOSPADM

## 2023-06-27 RX ORDER — LEVOFLOXACIN 750 MG/1
750 TABLET ORAL DAILY
Qty: 5 TABLET | Refills: 0 | Status: SHIPPED | OUTPATIENT
Start: 2023-06-27 | End: 2023-07-02

## 2023-06-27 RX ORDER — LEVOFLOXACIN 750 MG/1
750 TABLET ORAL DAILY
Status: DISCONTINUED | OUTPATIENT
Start: 2023-06-27 | End: 2023-06-27 | Stop reason: DRUGHIGH

## 2023-06-27 RX ORDER — FERROUS SULFATE 325(65) MG
325 TABLET ORAL
Qty: 30 TABLET | Refills: 3 | Status: SHIPPED | OUTPATIENT
Start: 2023-06-28

## 2023-06-27 RX ORDER — LEVETIRACETAM 500 MG/1
500 TABLET ORAL 2 TIMES DAILY
Qty: 60 TABLET | Refills: 3 | Status: SHIPPED | OUTPATIENT
Start: 2023-06-27

## 2023-06-27 RX ORDER — FERROUS SULFATE 325(65) MG
325 TABLET ORAL
Status: DISCONTINUED | OUTPATIENT
Start: 2023-06-28 | End: 2023-06-27 | Stop reason: HOSPADM

## 2023-06-27 RX ADMIN — CLOPIDOGREL BISULFATE 75 MG: 75 TABLET ORAL at 08:33

## 2023-06-27 RX ADMIN — RANOLAZINE 1000 MG: 500 TABLET, EXTENDED RELEASE ORAL at 08:32

## 2023-06-27 RX ADMIN — SODIUM CHLORIDE, PRESERVATIVE FREE 10 ML: 5 INJECTION INTRAVENOUS at 08:34

## 2023-06-27 RX ADMIN — CARVEDILOL 6.25 MG: 6.25 TABLET, FILM COATED ORAL at 08:33

## 2023-06-27 RX ADMIN — POLYETHYLENE GLYCOL 3350 17 G: 17 POWDER, FOR SOLUTION ORAL at 08:31

## 2023-06-27 RX ADMIN — SODIUM CHLORIDE 1 G: 1 TABLET ORAL at 08:33

## 2023-06-27 RX ADMIN — SODIUM CHLORIDE: 9 INJECTION, SOLUTION INTRAVENOUS at 12:57

## 2023-06-27 RX ADMIN — CYANOCOBALAMIN TAB 1000 MCG 500 MCG: 1000 TAB at 08:34

## 2023-06-27 RX ADMIN — LEVETIRACETAM 500 MG: 500 TABLET, FILM COATED ORAL at 08:33

## 2023-06-27 RX ADMIN — BUMETANIDE 1 MG: 1 TABLET ORAL at 09:04

## 2023-06-27 RX ADMIN — CITALOPRAM HYDROBROMIDE 20 MG: 20 TABLET ORAL at 08:33

## 2023-06-27 RX ADMIN — ASPIRIN 81 MG: 81 TABLET, COATED ORAL at 08:34

## 2023-06-27 RX ADMIN — DANTROLENE SODIUM 100 MG: 25 CAPSULE ORAL at 08:31

## 2023-06-27 RX ADMIN — Medication 1000 UNITS: at 08:33

## 2023-06-27 RX ADMIN — CEFEPIME 1000 MG: 1 INJECTION, POWDER, FOR SOLUTION INTRAMUSCULAR; INTRAVENOUS at 08:52

## 2023-06-27 RX ADMIN — LEVOTHYROXINE SODIUM 88 MCG: 0.09 TABLET ORAL at 07:03

## 2023-06-27 RX ADMIN — Medication 200 MG: at 08:33

## 2023-06-27 RX ADMIN — BACLOFEN 40 MG: 10 TABLET ORAL at 08:31

## 2023-06-27 ASSESSMENT — PAIN SCALES - GENERAL: PAINLEVEL_OUTOF10: 0

## 2023-06-28 ENCOUNTER — CARE COORDINATION (OUTPATIENT)
Dept: CASE MANAGEMENT | Age: 78
End: 2023-06-28

## 2023-06-28 DIAGNOSIS — N39.0 URINARY TRACT INFECTION WITHOUT HEMATURIA, SITE UNSPECIFIED: Primary | ICD-10-CM

## 2023-06-28 LAB — BACTERIA BLD CULT ORG #2: NORMAL

## 2023-06-28 PROCEDURE — 1111F DSCHRG MED/CURRENT MED MERGE: CPT | Performed by: INTERNAL MEDICINE

## 2023-06-29 LAB
BACTERIA BLD CULT: ABNORMAL
BACTERIA BLD CULT: NORMAL
ORGANISM: ABNORMAL

## 2023-07-03 ENCOUNTER — OFFICE VISIT (OUTPATIENT)
Dept: FAMILY MEDICINE CLINIC | Age: 78
End: 2023-07-03

## 2023-07-03 ENCOUNTER — TELEPHONE (OUTPATIENT)
Dept: FAMILY MEDICINE CLINIC | Age: 78
End: 2023-07-03

## 2023-07-03 VITALS
WEIGHT: 111 LBS | DIASTOLIC BLOOD PRESSURE: 62 MMHG | OXYGEN SATURATION: 100 % | RESPIRATION RATE: 15 BRPM | BODY MASS INDEX: 21.79 KG/M2 | TEMPERATURE: 96.9 F | HEIGHT: 60 IN | SYSTOLIC BLOOD PRESSURE: 112 MMHG | HEART RATE: 67 BPM

## 2023-07-03 DIAGNOSIS — N39.0 URINARY TRACT INFECTION WITHOUT HEMATURIA, SITE UNSPECIFIED: Primary | ICD-10-CM

## 2023-07-03 DIAGNOSIS — D64.9 ANEMIA, UNSPECIFIED TYPE: ICD-10-CM

## 2023-07-03 DIAGNOSIS — Z09 HOSPITAL DISCHARGE FOLLOW-UP: ICD-10-CM

## 2023-07-03 DIAGNOSIS — R56.9 SEIZURES (HCC): ICD-10-CM

## 2023-07-03 RX ORDER — CARVEDILOL 3.12 MG/1
6.25 TABLET ORAL 2 TIMES DAILY
Qty: 360 TABLET | Refills: 3 | Status: SHIPPED | OUTPATIENT
Start: 2023-07-03

## 2023-07-03 NOTE — PROGRESS NOTES
hyperkalemia, elevated lactic acid, ct head no acute, cefepime, 1 U prbc, cardio c/s, neuro c/s, ID c/s,  hem/on, c/s cont cefipime, stopped isosorbide and increased carvedilol to 6.25mg twice a day echo ef 50-55%, mod to sev MR, EEG neg, had MRI brain no acute disease, transfused 1 unit of prbc, neuro added  kepra d/c on levaquin     - States discharge has been doing well.  states some issues with memory. No further seizures at present. Completed. Still on antibiotic.      Patient Active Problem List   Diagnosis    MS (multiple sclerosis) ([address])    Spastic quadriparesis ([facility])    Lung mass    Essential hypertension    Hypothyroidism    H/O sarcoidosis    Hyperlipidemia    Neurogenic bladder    Mild episode of recurrent major depressive disorder ([facility])    HUDSON (nonalcoholic steatohepatitis)    Constipation    Osteopenia of multiple sites    Type 2 diabetes mellitus with hyperglycemia, without long-term current use of insulin (HCC)    STEMI (ST elevation myocardial infarction) ([address])    Coronary artery disease involving native coronary artery of native heart    Hydronephrosis    Chronic systolic CHF (congestive heart failure) (HCC)    Left ventricular thrombus    Ischemic cardiomyopathy    Metabolic encephalopathy    UTI (urinary tract infection) due to Enterococcus    Nephrolithiasis    Vitamin D deficiency    NSTEMI (non-ST elevated myocardial infarction) ([facility])    Hypotension    SIRS (systemic inflammatory response syndrome) ([facility])    Anemia    Unstable angina ([facility])    Chest pain    Palliative care by specialist    Goals of care, counseling/discussion    Chronic respiratory failure with hypoxia and hypercapnia ([facility])    Bilateral pleural effusion    Restrictive lung disease    Seizures (HCC)    Iron deficiency anemia, unspecified    Altered mental status    Hallucinations    UTI (urinary tract infection)    Severe protein-calorie malnutrition ([facility])       Medications listed as ordered at the time of discharge from

## 2023-07-03 NOTE — TELEPHONE ENCOUNTER
Care Transitions Initial Follow Up Call    Outreach made within 2 business days of discharge: Yes    Patient: Lucien Nice Patient : 1945   MRN: [id number]  Reason for Admission: Hallucinations  Discharge Date: 23       Spoke with: Omar Calixto    Discharge department/facility: [facility] Interactive Patient Contact:  Was patient able to fill all prescriptions: Yes  Was patient instructed to bring all medications to the follow-up visit: Yes  Is patient taking all medications as directed in the discharge summary?  Yes  Does patient understand their discharge instructions: Yes  Does patient have questions or concerns that need addressed prior to 7-14 day follow up office visit: no    Scheduled appointment with PCP within 7-14 days    Follow Up  Future Appointments   Date Time Provider [address] [place] [address]   7/3/2023 11:00 AM Yulisa Harris MD [facility] [employer]   2023  1:00 PM [facility] Rios [facility]   2023  1:30 PM ELIE Barahona - CNP SE [facility] ORTHO [facility]   2023  1:15 PM Yulisa Harris MD [facility]   10/2/2023  2:00 PM Cameron Caba MD [facility] ENDO [facility]   2023  2:00 PM ELIE Kern - CNS [facility]rology -       Stephan Hernandez MA

## 2023-07-06 ENCOUNTER — CARE COORDINATION (OUTPATIENT)
Dept: CASE MANAGEMENT | Age: 78
End: 2023-07-06

## 2023-07-06 NOTE — CARE COORDINATION
[facility] Care Transitions Follow Up Call    Care Transition Nurse attempted subsequent CT outreach leaving Hipaa VM w/ my contact info/purpose of call. Patient: Iain Douglas  Patient : 1945   MRN: <[id number]>  Reason for Admission: 2023 - 2023 [address]. Hallucinations/AMS, UTI. Discharge Date: 23 RARS: Readmission Risk Score: 18.8  NR  CT     [facility]     PCP 7/3 11:00. (/62, HR 67) Attended. Pulm/V Villgran  1:00  Ortho/M Colaluca  1:30  Endo/M Abusag 10/2 2:00  Neuro/J Emmanuel  2:00      Primary decision maker:  Brenda Francois [phone]     PMH: MS, Sarcoidosis, O2 2L NC, HTN, Hypothyroidism, Restrictive lung disease, SZ, Neurogenic bladder, HUDSON, DM, MI, CHF. Has Hospital Bed, Oxygen Therapy, NIV, Shower Chair, Power chair, [facility].     Jacques Quintero RN

## 2023-07-07 ENCOUNTER — CARE COORDINATION (OUTPATIENT)
Dept: CASE MANAGEMENT | Age: 78
End: 2023-07-07

## 2023-07-07 NOTE — CARE COORDINATION
[facility] Care Transitions Follow Up Call    Care Transition Nurse attempted second subsequent CT outreach leaving Hipaa VM w/ my contact info. CTN s/o. Patient: Lindsay Douglas  Patient : 1945   MRN: <[id number]>  Reason for Admission: 2023 - 2023 [address]. Hallucinations/AMS, UTI. Discharge Date: 23 RARS: Readmission Risk Score: 18.8  NR  CT     [place] [facility]     PCP 7/3 11:00. (/62, HR 67) Attended. Pulm/V Villgran  1:00  Ortho/M Colaluca  1:30  Endo/M Abusag 10/2 2:00  Neuro/J Emmanuel  2:00      Primary decision maker:  [facility] [phone]     PMH: MS, Sarcoidosis, O2 2L NC, HTN, Hypothyroidism, Restrictive lung disease, SZ, Neurogenic bladder, HUDSON, DM, MI, CHF. Has Hospital Bed, Oxygen Therapy, NIV, Shower Chair, Power chair, [facility].     Guerda Walsh RN

## 2023-07-10 ENCOUNTER — TELEPHONE (OUTPATIENT)
Dept: FAMILY MEDICINE CLINIC | Age: 78
End: 2023-07-10

## 2023-07-10 NOTE — TELEPHONE ENCOUNTER
I would recommend having the visiting nurses make an assessment of the wound see if the recommend anything different and will be doing at the present

## 2023-07-10 NOTE — TELEPHONE ENCOUNTER
Donny Rodriguez called because Michaela's buttocks is red and has a small area where a sore is starting to break through the skin. Donny Rodriguez thought maybe she was getting a UTI and the urine was causing skin to breakdown. States that he changes Michaela frequently and keeps her clean. He has also noticed that she has a small amount of BM every time he changes her. Now that she is on iron, it takes longer for her to produce a BM and it usually happens throughout the day. He has been apply a topical with calamine and then A&D. Are you able to recommend something or send a prescription to [facility] in [facility]. Visiting Nurse is coming out Wednesday if you would like a urine sample or an update on how her skin is looking.

## 2023-07-10 NOTE — TELEPHONE ENCOUNTER
Jerry Fuchs at [facility] informed. She will have the nurse look at area on Wednesday and call the office w/ her findings. Wolfgang informed.

## 2023-07-12 ENCOUNTER — OFFICE VISIT (OUTPATIENT)
Dept: PULMONOLOGY | Age: 78
End: 2023-07-12
Payer: MEDICARE

## 2023-07-12 VITALS
OXYGEN SATURATION: 97 % | DIASTOLIC BLOOD PRESSURE: 49 MMHG | HEART RATE: 73 BPM | SYSTOLIC BLOOD PRESSURE: 108 MMHG | RESPIRATION RATE: 18 BRPM | TEMPERATURE: 97.4 F

## 2023-07-12 DIAGNOSIS — J96.12 CHRONIC RESPIRATORY FAILURE WITH HYPOXIA AND HYPERCAPNIA (HCC): ICD-10-CM

## 2023-07-12 DIAGNOSIS — Z86.2 H/O SARCOIDOSIS: Primary | ICD-10-CM

## 2023-07-12 DIAGNOSIS — J90 BILATERAL PLEURAL EFFUSION: ICD-10-CM

## 2023-07-12 DIAGNOSIS — J96.11 CHRONIC RESPIRATORY FAILURE WITH HYPOXIA AND HYPERCAPNIA (HCC): ICD-10-CM

## 2023-07-12 DIAGNOSIS — J98.4 RESTRICTIVE LUNG DISEASE: ICD-10-CM

## 2023-07-12 PROCEDURE — 3078F DIAST BP <80 MM HG: CPT | Performed by: INTERNAL MEDICINE

## 2023-07-12 PROCEDURE — 1036F TOBACCO NON-USER: CPT | Performed by: INTERNAL MEDICINE

## 2023-07-12 PROCEDURE — 1111F DSCHRG MED/CURRENT MED MERGE: CPT | Performed by: INTERNAL MEDICINE

## 2023-07-12 PROCEDURE — 1123F ACP DISCUSS/DSCN MKR DOCD: CPT | Performed by: INTERNAL MEDICINE

## 2023-07-12 PROCEDURE — 1090F PRES/ABSN URINE INCON ASSESS: CPT | Performed by: INTERNAL MEDICINE

## 2023-07-12 PROCEDURE — 99213 OFFICE O/P EST LOW 20 MIN: CPT | Performed by: INTERNAL MEDICINE

## 2023-07-12 PROCEDURE — 99214 OFFICE O/P EST MOD 30 MIN: CPT | Performed by: INTERNAL MEDICINE

## 2023-07-12 PROCEDURE — G8427 DOCREV CUR MEDS BY ELIG CLIN: HCPCS | Performed by: INTERNAL MEDICINE

## 2023-07-12 PROCEDURE — G8400 PT W/DXA NO RESULTS DOC: HCPCS | Performed by: INTERNAL MEDICINE

## 2023-07-12 PROCEDURE — 3074F SYST BP LT 130 MM HG: CPT | Performed by: INTERNAL MEDICINE

## 2023-07-12 PROCEDURE — G8420 CALC BMI NORM PARAMETERS: HCPCS | Performed by: INTERNAL MEDICINE

## 2023-07-12 RX ORDER — CITALOPRAM 20 MG/1
TABLET ORAL
COMMUNITY
Start: 2023-07-05

## 2023-07-12 RX ORDER — IPRATROPIUM BROMIDE AND ALBUTEROL SULFATE 2.5; .5 MG/3ML; MG/3ML
1 SOLUTION RESPIRATORY (INHALATION)
Qty: 360 ML | Refills: 5 | Status: SHIPPED
Start: 2023-07-12 | End: 2023-07-12

## 2023-07-12 RX ORDER — IPRATROPIUM BROMIDE AND ALBUTEROL SULFATE 2.5; .5 MG/3ML; MG/3ML
1 SOLUTION RESPIRATORY (INHALATION)
Qty: 360 ML | Refills: 5 | Status: SHIPPED | OUTPATIENT
Start: 2023-07-12

## 2023-07-12 NOTE — PROGRESS NOTES
6 mos follow up in office today with . Pt doing well with her respiratory status no issues with shortness of breath or cough. Pt to have Chest CT and follow up in 6 mos. Med refills sent for nebulizer meds.

## 2023-07-12 NOTE — PROGRESS NOTES
[facility]  Department of Pulmonary, Critical Care and Sleep Medicine      Pulmonary & Critical Care Office Note - Follow up      Assessment/Plan     Assessment & Plan     No problem-specific Assessment & Plan notes found for this encounter. Problem List Items Addressed This Visit          Respiratory    Chronic respiratory failure with hypoxia and hypercapnia (HCC)    Relevant Medications    ipratropium 0.5 mg-albuterol 2.5 mg (DUONEB) 0.5-2.5 (3) MG/3ML SOLN nebulizer solution    Bilateral pleural effusion    Restrictive lung disease       Other    H/O sarcoidosis - Primary (Chronic)    Relevant Orders    CT CHEST WO CONTRAST          Plan:     Reina Mcadams has advanced multiple sclerosis with restrictive lung disease, chronic hypoxic and hypercapnic respiratory failure on 2 L home oxygen and NIV at night, sarcoidosis (1980), CAD s/p stents, ischemic cardiomyopathy with EF 20%, bilateral pleural effusions, moderate pulmonary hypertension. Continue DuoNeb for symptom management. Once daily and then as needed. Max 4 times daily. Continue Trilogy ([employer]) nightly and with naps. Sarcoidosis is stable with calcified granulomas. Monitor clinically. We will repeat CT chest    Continue Flonase for her allergies. Discussed in detail high risk for aspiration. Strict aspiration precautions. Continue Bumex, low-salt diet    She was also advised on staying up-to-date with her COVID, pneumonia and influenza vaccinations. COVID-19 precautions    The patient is here with her caregiver and  Anton Edwards  I reviewed the patients chart including prior labs and images prior to our office visit and we also reviewed them together today. Reviewed treatment plan and answered all questions to satisfaction. Bud Rivass 39 Minutes of which greater than 50% was spent counseling or coordinating care.     My signature verifies the accuracy and relevance of my note, including documentation of

## 2023-07-14 ENCOUNTER — TELEPHONE (OUTPATIENT)
Dept: PULMONOLOGY | Age: 78
End: 2023-07-14

## 2023-07-14 ENCOUNTER — TELEPHONE (OUTPATIENT)
Dept: FAMILY MEDICINE CLINIC | Age: 78
End: 2023-07-14

## 2023-07-14 LAB
APPEARANCE, BODY FLUID: CLEAR
BILIRUBIN URINE: NEGATIVE
COLOR, URINE: YELLOW
GLUCOSE URINE: NEGATIVE MG/DL
KETONES, URINE: NEGATIVE MG/DL
NITRATE, UA: NEGATIVE
PH UA: 5 (ref 4.6–8)
PROTEIN UA: NEGATIVE MG/DL
RBC URINE: NEGATIVE
SPECIFIC GRAVITY, URINE: 1.01 (ref 1–1.03)
UROBILINOGEN, URINE: NEGATIVE MG/DL
WBC URINE: NEGATIVE

## 2023-07-14 NOTE — TELEPHONE ENCOUNTER
I am okay with trying to get a urine for analysis and culture through a catheter if visiting nurses able to do so

## 2023-07-14 NOTE — TELEPHONE ENCOUNTER
Pema with [facility] called to give Dr. Adelina Moreno and update on [address] regarding her butt wound. Pema stated that it is a stage 2 buttocks wound and they have been using a moist barrier cream for the area.  was instructed to when to move patient. Also [address] and  are concerned that patient is getting a UTI. Patient is having some confusion and being more irritated. Patient is typically not like this. Requesting that they get a urine from her but it will have to come from a cath. Please advise.

## 2023-07-14 NOTE — TELEPHONE ENCOUNTER
Call re: question about Chest CT being ordered in July 2023.  says pt just had a CT in June 2023. Advised  that CT was of the Head in June when pt was seen in ER and she also had a CXR then. No Chest CT has been done for several years.  states he appreciated the call back.

## 2023-07-17 ENCOUNTER — TELEPHONE (OUTPATIENT)
Dept: FAMILY MEDICINE CLINIC | Age: 78
End: 2023-07-17

## 2023-07-17 LAB — URINE CULTURE, ROUTINE: NORMAL

## 2023-07-17 NOTE — TELEPHONE ENCOUNTER
Please let the patient know that urine analysis was normal and urine culture was negative for bacteria    No current sign for infection based on current sample    Please let us know if symptoms change or any new symptoms or concerns    Thanks

## 2023-07-18 ENCOUNTER — OFFICE VISIT (OUTPATIENT)
Dept: ORTHOPEDIC SURGERY | Age: 78
End: 2023-07-18
Payer: MEDICARE

## 2023-07-18 VITALS — HEIGHT: 60 IN | WEIGHT: 111 LBS | BODY MASS INDEX: 21.79 KG/M2

## 2023-07-18 DIAGNOSIS — S42.201A CLOSED FRACTURE OF PROXIMAL END OF RIGHT HUMERUS, UNSPECIFIED FRACTURE MORPHOLOGY, INITIAL ENCOUNTER: Primary | ICD-10-CM

## 2023-07-18 PROCEDURE — 1111F DSCHRG MED/CURRENT MED MERGE: CPT | Performed by: NURSE PRACTITIONER

## 2023-07-18 PROCEDURE — G8400 PT W/DXA NO RESULTS DOC: HCPCS | Performed by: NURSE PRACTITIONER

## 2023-07-18 PROCEDURE — 99213 OFFICE O/P EST LOW 20 MIN: CPT | Performed by: NURSE PRACTITIONER

## 2023-07-18 PROCEDURE — G8427 DOCREV CUR MEDS BY ELIG CLIN: HCPCS | Performed by: NURSE PRACTITIONER

## 2023-07-18 PROCEDURE — G8420 CALC BMI NORM PARAMETERS: HCPCS | Performed by: NURSE PRACTITIONER

## 2023-07-18 PROCEDURE — 1123F ACP DISCUSS/DSCN MKR DOCD: CPT | Performed by: NURSE PRACTITIONER

## 2023-07-18 PROCEDURE — 1036F TOBACCO NON-USER: CPT | Performed by: NURSE PRACTITIONER

## 2023-07-18 PROCEDURE — 1090F PRES/ABSN URINE INCON ASSESS: CPT | Performed by: NURSE PRACTITIONER

## 2023-07-18 NOTE — PROGRESS NOTES
[facility]   ORTHOPAEDIC SURGERY AND SPORTS MEDICINE  DATE OF VISIT: 07/18/23  Follow Up Visit     CHIEF COMPLAINT:   Chief Complaint   Patient presents with    Follow-up     Right shoulder nondisplaced proximal humerus fracture. Overall doing well. HPI:    Ruben Del Real is a 69y.o. year old female who presented to the office today for follow up of right nondisplaced proximal humerus fracture, previously evaluated on 6/6/2023. Previous treatment has included sling for comfort purposes. At baseline patient does not have function of the right arm due to history of stroke. Today she reports pain has improved. She follows up today for repeat imaging of the right shoulder. Patient presents to the office wearing sling to the right arm. REVIEW OF SYSTEMS:     General: Negative Fever, chills, fatigue   Cardiovascular: Negative chest pain, palpitations  Respiratory: Equal chest expansion, negative shortness of breath   Skin: Negative rash, open wounds  Psych: Normal affect, mood stable  Neurologic: sensation grossly intact in extremities   Musculoskeletal: See HPI      Physical Exam:     Height: 5' (1.524 m), Weight - Scale: 111 lb (50.3 kg)    General: Alert and oriented x3, no acute distress  Cardiovascular/pulmonary: No labored breathing, peripheral perfusion intact  Musculoskeletal:    Exam of the right arm displays no swelling or deformity. No tenderness of the shoulder over fracture site. Neurovascular sensation grossly intact. Controlled Substances Monitoring:      Imaging:  X-rays obtained including 2 views of the right shoulder display stable alignment of nondisplaced proximal humerus  fracture      Assessment: Right proximal humerus fracture x9 weeks out      Plan: Today we discussed her right shoulder. He is on 9 weeks out from injury resulting in a nondisplaced fracture of the right proximal humerus.   X-rays obtained today show stable alignment of fracture with out significant normal

## 2023-07-19 ASSESSMENT — PATIENT HEALTH QUESTIONNAIRE - PHQ9
8. MOVING OR SPEAKING SO SLOWLY THAT OTHER PEOPLE COULD HAVE NOTICED. OR THE OPPOSITE - BEING SO FIDGETY OR RESTLESS THAT YOU HAVE BEEN MOVING AROUND A LOT MORE THAN USUAL: NOT AT ALL
8. MOVING OR SPEAKING SO SLOWLY THAT OTHER PEOPLE COULD HAVE NOTICED. OR THE OPPOSITE, BEING SO FIGETY OR RESTLESS THAT YOU HAVE BEEN MOVING AROUND A LOT MORE THAN USUAL: 0
10. IF YOU CHECKED OFF ANY PROBLEMS, HOW DIFFICULT HAVE THESE PROBLEMS MADE IT FOR YOU TO DO YOUR WORK, TAKE CARE OF THINGS AT HOME, OR GET ALONG WITH OTHER PEOPLE: NOT DIFFICULT AT ALL
7. TROUBLE CONCENTRATING ON THINGS, SUCH AS READING THE NEWSPAPER OR WATCHING TELEVISION: NOT AT ALL
9. THOUGHTS THAT YOU WOULD BE BETTER OFF DEAD, OR OF HURTING YOURSELF: 0
1. LITTLE INTEREST OR PLEASURE IN DOING THINGS: 0
SUM OF ALL RESPONSES TO PHQ9 QUESTIONS 1 & 2: 0
2. FEELING DOWN, DEPRESSED OR HOPELESS: NOT AT ALL
4. FEELING TIRED OR HAVING LITTLE ENERGY: NEARLY EVERY DAY
3. TROUBLE FALLING OR STAYING ASLEEP: NOT AT ALL
SUM OF ALL RESPONSES TO PHQ QUESTIONS 1-9: 3
2. FEELING DOWN, DEPRESSED OR HOPELESS: 0
3. TROUBLE FALLING OR STAYING ASLEEP: 0
5. POOR APPETITE OR OVEREATING: 0
SUM OF ALL RESPONSES TO PHQ QUESTIONS 1-9: 3
1. LITTLE INTEREST OR PLEASURE IN DOING THINGS: NOT AT ALL
6. FEELING BAD ABOUT YOURSELF - OR THAT YOU ARE A FAILURE OR HAVE LET YOURSELF OR YOUR FAMILY DOWN: NOT AT ALL
10. IF YOU CHECKED OFF ANY PROBLEMS, HOW DIFFICULT HAVE THESE PROBLEMS MADE IT FOR YOU TO DO YOUR WORK, TAKE CARE OF THINGS AT HOME, OR GET ALONG WITH OTHER PEOPLE: 0
5. POOR APPETITE OR OVEREATING: NOT AT ALL
SUM OF ALL RESPONSES TO PHQ QUESTIONS 1-9: 3
9. THOUGHTS THAT YOU WOULD BE BETTER OFF DEAD, OR OF HURTING YOURSELF: NOT AT ALL
4. FEELING TIRED OR HAVING LITTLE ENERGY: 3
7. TROUBLE CONCENTRATING ON THINGS, SUCH AS READING THE NEWSPAPER OR WATCHING TELEVISION: 0
SUM OF ALL RESPONSES TO PHQ QUESTIONS 1-9: 3
6. FEELING BAD ABOUT YOURSELF - OR THAT YOU ARE A FAILURE OR HAVE LET YOURSELF OR YOUR FAMILY DOWN: 0
SUM OF ALL RESPONSES TO PHQ QUESTIONS 1-9: 3

## 2023-07-20 ENCOUNTER — TELEMEDICINE (OUTPATIENT)
Dept: FAMILY MEDICINE CLINIC | Age: 78
End: 2023-07-20
Payer: MEDICARE

## 2023-07-20 DIAGNOSIS — I10 ESSENTIAL HYPERTENSION: ICD-10-CM

## 2023-07-20 DIAGNOSIS — D64.9 ANEMIA, UNSPECIFIED TYPE: ICD-10-CM

## 2023-07-20 DIAGNOSIS — N39.0 FREQUENT UTI: ICD-10-CM

## 2023-07-20 DIAGNOSIS — N31.9 NEUROGENIC BLADDER: ICD-10-CM

## 2023-07-20 DIAGNOSIS — E87.1 HYPONATREMIA: ICD-10-CM

## 2023-07-20 DIAGNOSIS — I25.110 CORONARY ARTERY DISEASE INVOLVING NATIVE HEART WITH UNSTABLE ANGINA PECTORIS, UNSPECIFIED VESSEL OR LESION TYPE (HCC): ICD-10-CM

## 2023-07-20 DIAGNOSIS — R91.8 LUNG MASS: ICD-10-CM

## 2023-07-20 DIAGNOSIS — R56.9 SEIZURES (HCC): ICD-10-CM

## 2023-07-20 DIAGNOSIS — Z86.2 H/O SARCOIDOSIS: ICD-10-CM

## 2023-07-20 DIAGNOSIS — F33.0 MILD EPISODE OF RECURRENT MAJOR DEPRESSIVE DISORDER (HCC): ICD-10-CM

## 2023-07-20 DIAGNOSIS — G82.50 SPASTIC QUADRIPARESIS (HCC): ICD-10-CM

## 2023-07-20 DIAGNOSIS — S42.291S CLOSED FRACTURE OF HEAD OF RIGHT HUMERUS, SEQUELA: ICD-10-CM

## 2023-07-20 DIAGNOSIS — E78.2 MIXED HYPERLIPIDEMIA: ICD-10-CM

## 2023-07-20 DIAGNOSIS — E03.8 OTHER SPECIFIED HYPOTHYROIDISM: ICD-10-CM

## 2023-07-20 DIAGNOSIS — E11.69 TYPE 2 DIABETES MELLITUS WITH OTHER SPECIFIED COMPLICATION, WITHOUT LONG-TERM CURRENT USE OF INSULIN (HCC): ICD-10-CM

## 2023-07-20 DIAGNOSIS — J96.11 CHRONIC RESPIRATORY FAILURE WITH HYPOXIA (HCC): ICD-10-CM

## 2023-07-20 DIAGNOSIS — I50.22 CHRONIC SYSTOLIC (CONGESTIVE) HEART FAILURE (HCC): ICD-10-CM

## 2023-07-20 DIAGNOSIS — G35 MS (MULTIPLE SCLEROSIS) (HCC): ICD-10-CM

## 2023-07-20 DIAGNOSIS — N39.0 URINARY TRACT INFECTION WITHOUT HEMATURIA, SITE UNSPECIFIED: Primary | ICD-10-CM

## 2023-07-20 PROCEDURE — 1090F PRES/ABSN URINE INCON ASSESS: CPT | Performed by: INTERNAL MEDICINE

## 2023-07-20 PROCEDURE — 1111F DSCHRG MED/CURRENT MED MERGE: CPT | Performed by: INTERNAL MEDICINE

## 2023-07-20 PROCEDURE — G8427 DOCREV CUR MEDS BY ELIG CLIN: HCPCS | Performed by: INTERNAL MEDICINE

## 2023-07-20 PROCEDURE — 1123F ACP DISCUSS/DSCN MKR DOCD: CPT | Performed by: INTERNAL MEDICINE

## 2023-07-20 PROCEDURE — G8400 PT W/DXA NO RESULTS DOC: HCPCS | Performed by: INTERNAL MEDICINE

## 2023-07-20 PROCEDURE — 3044F HG A1C LEVEL LT 7.0%: CPT | Performed by: INTERNAL MEDICINE

## 2023-07-20 PROCEDURE — 99214 OFFICE O/P EST MOD 30 MIN: CPT | Performed by: INTERNAL MEDICINE

## 2023-07-20 RX ORDER — RANOLAZINE 1000 MG/1
1000 TABLET, EXTENDED RELEASE ORAL 2 TIMES DAILY
Qty: 180 TABLET | Refills: 1 | Status: SHIPPED | OUTPATIENT
Start: 2023-07-20

## 2023-07-20 NOTE — PROGRESS NOTES
cancer screen  Discontinued    Colorectal Cancer Screen  Discontinued       PHYSICAL EXAMINATION:  [ INSTRUCTIONS:  \"[x]\" Indicates a positive item  \"[]\" Indicates a negative item  -- DELETE ALL ITEMS NOT EXAMINED]  Vital Signs: (As obtained by patient/caregiver or practitioner observation)    Blood pressure-  Heart rate-    Respiratory rate-    Temperature-  Pulse oximetry-     Constitutional: [x] Appears well-developed and well-nourished [x] No apparent distress      [] Abnormal-   Mental status  [x] Alert and awake  [] Oriented to person/place/time [x]Able to follow commands      Eyes:  EOM    [x]  Normal  [] Abnormal-  Sclera  []  Normal  [] Abnormal -         Discharge []  None visible  [] Abnormal -    HENT:   [x] Normocephalic, atraumatic. [] Abnormal   [] Mouth/Throat: Mucous membranes are moist.     External Ears [] Normal  [] Abnormal-     Neck: [] No visualized mass     Pulmonary/Chest: [] Respiratory effort normal.  [x] No visualized signs of difficulty breathing or respiratory distress        [] Abnormal-      Musculoskeletal:   [] Normal gait with no signs of ataxia         [] Normal range of motion of neck        [x] Abnormal- bed bound       Neurological:        [x] No Facial Asymmetry (Cranial nerve 7 motor function) (limited exam to video visit)          [] No gaze palsy        [] Abnormal-         Skin:      []   No significant exanthematous lesions or discoloration noted on facial skin         [] Abnormal-            Psychiatric:       [x] Normal Affect [] No Hallucinations        [] Abnormal-     Other pertinent observable physical exam findings-     ASSESSMENT/PLAN:    Closed fracture of head of right humerus, sequela  - following with ortho   - not surgical candidate   - conservative treatment     Seizures ([address])  - uncertain etiology   - has discussed with neuro - poss related to MS   - was on lorazepam prn if seizure.   - s/p EEG (6/2023) - negative   - added keppra 500mg twice a day (

## 2023-07-23 PROBLEM — N39.0 UTI (URINARY TRACT INFECTION): Status: RESOLVED | Noted: 2023-01-01 | Resolved: 2023-01-01

## 2023-07-27 ENCOUNTER — HOSPITAL ENCOUNTER (OUTPATIENT)
Dept: CT IMAGING | Age: 78
Discharge: HOME | End: 2023-07-29
Attending: INTERNAL MEDICINE
Payer: MEDICARE

## 2023-07-27 DIAGNOSIS — Z86.2 H/O SARCOIDOSIS: ICD-10-CM

## 2023-07-27 PROCEDURE — 71250 CT THORAX DX C-: CPT

## 2023-07-30 ENCOUNTER — TELEPHONE (OUTPATIENT)
Dept: FAMILY MEDICINE CLINIC | Age: 78
End: 2023-07-30

## 2023-07-30 DIAGNOSIS — N28.89 KIDNEY MASS: Primary | ICD-10-CM

## 2023-07-30 NOTE — TELEPHONE ENCOUNTER
Received notification from [facility] regarding an abnormality on CT of the chest in the area of the kidneys    Radiology is requesting additional imaging of the kidneys with a dedicated CT of the abdomen or MRI    Orders Placed This Encounter   Procedures    CT ABDOMEN PELVIS W IV CONTRAST Additional Contrast? None     Kidney/Renal protocol for mass lesion     Standing Status:   Future     Standing Expiration Date:   7/30/2024     Order Specific Question:   Additional Contrast?     Answer:   None     Order Specific Question:   STAT Creatinine as needed:     Answer:   No     Order Specific Question:   Reason for exam:     Answer:   abnormal lesion in left kidney seen on CT chest     Order placed for CT abdomen pelvis for further evaluation if patient is willing to move forward with additional work-up    Thanks

## 2023-08-08 ENCOUNTER — APPOINTMENT (OUTPATIENT)
Dept: GENERAL RADIOLOGY | Age: 78
End: 2023-08-08
Payer: MEDICARE

## 2023-08-08 ENCOUNTER — HOSPITAL ENCOUNTER (EMERGENCY)
Age: 78
Discharge: HOME | End: 2023-08-09
Attending: STUDENT IN AN ORGANIZED HEALTH CARE EDUCATION/TRAINING PROGRAM
Payer: MEDICARE

## 2023-08-08 ENCOUNTER — TELEPHONE (OUTPATIENT)
Dept: FAMILY MEDICINE CLINIC | Age: 78
End: 2023-08-08

## 2023-08-08 VITALS
TEMPERATURE: 97.8 F | HEART RATE: 84 BPM | RESPIRATION RATE: 18 BRPM | SYSTOLIC BLOOD PRESSURE: 100 MMHG | DIASTOLIC BLOOD PRESSURE: 41 MMHG | OXYGEN SATURATION: 100 %

## 2023-08-08 DIAGNOSIS — R22.40 MASS OF LOWER EXTREMITY, UNSPECIFIED LATERALITY: Primary | ICD-10-CM

## 2023-08-08 LAB
ANION GAP SERPL CALCULATED.3IONS-SCNC: 10 MMOL/L (ref 7–16)
BASOPHILS # BLD: 0.02 K/UL (ref 0–0.2)
BASOPHILS NFR BLD: 0 % (ref 0–2)
BUN SERPL-MCNC: 25 MG/DL (ref 6–23)
CALCIUM SERPL-MCNC: 9.6 MG/DL (ref 8.6–10.2)
CHLORIDE SERPL-SCNC: 95 MMOL/L (ref 98–107)
CO2 SERPL-SCNC: 29 MMOL/L (ref 22–29)
CREAT SERPL-MCNC: 0.6 MG/DL (ref 0.5–1)
EOSINOPHIL # BLD: 0.13 K/UL (ref 0.05–0.5)
EOSINOPHILS RELATIVE PERCENT: 1 % (ref 0–6)
ERYTHROCYTE [DISTWIDTH] IN BLOOD BY AUTOMATED COUNT: 13.4 % (ref 11.5–15)
GFR SERPL CREATININE-BSD FRML MDRD: >60 ML/MIN/1.73M2
GLUCOSE SERPL-MCNC: 185 MG/DL (ref 74–99)
HCT VFR BLD AUTO: 29.7 % (ref 34–48)
HGB BLD-MCNC: 9.1 G/DL (ref 11.5–15.5)
IMM GRANULOCYTES # BLD AUTO: 0.05 K/UL (ref 0–0.58)
IMM GRANULOCYTES NFR BLD: 1 % (ref 0–5)
LYMPHOCYTES NFR BLD: 1.03 K/UL (ref 1.5–4)
LYMPHOCYTES RELATIVE PERCENT: 9 % (ref 20–42)
MCH RBC QN AUTO: 32.5 PG (ref 26–35)
MCHC RBC AUTO-ENTMCNC: 30.6 G/DL (ref 32–34.5)
MCV RBC AUTO: 106.1 FL (ref 80–99.9)
MONOCYTES NFR BLD: 0.85 K/UL (ref 0.1–0.95)
MONOCYTES NFR BLD: 8 % (ref 2–12)
NEUTROPHILS NFR BLD: 81 % (ref 43–80)
NEUTS SEG NFR BLD: 9.02 K/UL (ref 1.8–7.3)
PLATELET # BLD AUTO: 433 K/UL (ref 130–450)
PMV BLD AUTO: 10.3 FL (ref 7–12)
POTASSIUM SERPL-SCNC: 4.4 MMOL/L (ref 3.5–5)
RBC # BLD AUTO: 2.8 M/UL (ref 3.5–5.5)
SODIUM SERPL-SCNC: 134 MMOL/L (ref 132–146)
WBC OTHER # BLD: 11.1 K/UL (ref 4.5–11.5)

## 2023-08-08 PROCEDURE — 80048 BASIC METABOLIC PNL TOTAL CA: CPT

## 2023-08-08 PROCEDURE — 73502 X-RAY EXAM HIP UNI 2-3 VIEWS: CPT

## 2023-08-08 PROCEDURE — 85025 COMPLETE CBC W/AUTO DIFF WBC: CPT

## 2023-08-08 PROCEDURE — 73552 X-RAY EXAM OF FEMUR 2/>: CPT

## 2023-08-08 PROCEDURE — 99284 EMERGENCY DEPT VISIT MOD MDM: CPT

## 2023-08-08 ASSESSMENT — ENCOUNTER SYMPTOMS
COUGH: 0
ABDOMINAL PAIN: 0
VOMITING: 0

## 2023-08-08 NOTE — ED NOTES
Department of Emergency Medicine  FIRST PROVIDER TRIAGE NOTE             Independent MLP           8/8/23  5:24 PM EDT    Date of Encounter: 8/8/23   MRN: [id number]      HPI: Indio Joshi is a 69 y.o. female who presents to the ED for Mass (Patients  noticed left hip lump a couple day ago that is painful, had left hip surgery in 2019)    noticed lump to the right hip 2 days ago. [place] 2019 [place] Uses eliseo lift to move. Hx osteoporosis. Current right shoulder fracture as well. ROS: Negative for cp or sob. PE: Gen Appearance/Constitutional: alert  HEENT: NC/NT. PERRLA,  Airway patent. Initial Plan of Care: All treatment areas with department are currently occupied. Plan to order/Initiate the following while awaiting opening in ED: imaging studies.   Initiate Treatment-Testing, Proceed toTreatment Area When Bed Available for ED Attending/MLP to Continue Care    Electronically signed by ANTHONY Kimball   DD: 8/8/23        Jasmin Suárez, [address] [place] [address] Road  08/08/23 [id number]

## 2023-08-08 NOTE — TELEPHONE ENCOUNTER
After reviewing this message it appears that the patient has gone to emergency for evaluation    Please reach out to the patient after ER evaluation for possible follow-up

## 2023-08-08 NOTE — TELEPHONE ENCOUNTER
I recommend patient be seen on urgent care and/or emergency as if it is an abscess that may need incision and drainage

## 2023-08-08 NOTE — ED PROVIDER NOTES
HPI   This is a 81-year-old female patient who is wheelchair-bound presents to emergency department for evaluation of left femur pain. It was noted that she had previous ORIF with medullary nail to the left femur. Patient nonambulatory at baseline. It was noted that she has a mass to the left femur there is no overlying redness or swelling although her  states that she has been having pain there. No fevers or abdominal pain. Review of Systems   Constitutional:  Negative for chills and fever. HENT:  Negative for congestion. Respiratory:  Negative for cough. Cardiovascular:  Negative for chest pain. Gastrointestinal:  Negative for abdominal pain and vomiting. Musculoskeletal:         Left hip pain   Skin:  Negative for wound. All other systems reviewed and are negative. Physical Exam  Vitals and nursing note reviewed. Constitutional:       Appearance: Normal appearance. HENT:      Head: Normocephalic and atraumatic. Nose: Nose normal. No congestion. Mouth/Throat:      Mouth: Mucous membranes are moist.      Pharynx: Oropharynx is clear. Eyes:      Conjunctiva/sclera: Conjunctivae normal.      Pupils: Pupils are equal, round, and reactive to light. Cardiovascular:      Rate and Rhythm: Normal rate and regular rhythm. Pulses: Normal pulses. Heart sounds: Normal heart sounds. Pulmonary:      Effort: Pulmonary effort is normal. No respiratory distress. Breath sounds: Normal breath sounds. Abdominal:      General: Bowel sounds are normal. There is no distension. Tenderness: There is no abdominal tenderness. Musculoskeletal:         General: Normal range of motion. Cervical back: Normal range of motion and neck supple. Comments: Diffuse muscle wasting, there is a palpable mass to the left lateral femur, there are overlying scars to the left lower extremity, no redness no swelling or warmth. Skin:     General: Skin is warm and dry.

## 2023-08-08 NOTE — TELEPHONE ENCOUNTER
Abram Dus that she needs seen. He states that the visiting nurse is on her way there now. His concern is how he will get her out of the chair once he gets her here. Once the nurse has a chance to evaluate the abscess, her he will call back with their decision.

## 2023-08-08 NOTE — TELEPHONE ENCOUNTER
Wolfgang calling in [place] has an abscess on her left outer thigh under the skin; not open or draining. She has been complaining of leg pain for 3 days and she does have a metal shanti in that leg and a lot of weight loss. She declines fever, fatigue or chills. Does have a decreased appetite. Denies redness warmth or swelling to surrounding area. He states it is hard to get her out and she is a eliseo lift. The visiting nurse is coming tomorrow and can evaluate her. He did not not know if anything else needs done; rx in person eval with pcp or just wait for nurse tomorrow?

## 2023-08-08 NOTE — TELEPHONE ENCOUNTER
Darcie from [facility] called from the home:    There is no redness or warmth. She has a contracture of the leg and it turns in on the left side. Looks to be out of alignment with how she lays w/ contracture. The area is soft and about 2\". Painful to the touch but has no pain when she is laying on her side or when she is propped upright. Nanci Erazo said it is at the top of the femur where here rods and screws are. Pain x 2 days.

## 2023-08-09 NOTE — TELEPHONE ENCOUNTER
[employer]ory Drivers returned call and scheduled appt for patient.     Last Appointment:  7/20/2023  Future Appointments   Date Time Provider [address]   8/10/2023  2:00 PM [facility] CT SCAN 3 [facility] CT [facility] Radiolo   8/23/2023  1:00 PM Jordana Hurt MD N [facility]   8/29/2023  1:15 PM Channing Albright, APRN - CNP SE [facility] ORTH[facility]   10/2/2023  2:00 PM Sepideh Weinberg MD [facility] ENDO [facility]   10/23/2023  2:30 PM Jordana Hurt  [address]   11/1/2023  2:00 PM ELIE Colón - CNS [facility] Neuro Neurology -   1/15/2024  1:00 PM Sy Hooker  E [facility]

## 2023-08-09 NOTE — DISCHARGE INSTRUCTIONS
Ectopic bone formation adjacent to the mediolateral greater trochanters. There topic bone formation adjacent to the proximal lateral femoral shaft.

## 2023-08-10 ENCOUNTER — HOSPITAL ENCOUNTER (OUTPATIENT)
Dept: CT IMAGING | Age: 78
Discharge: HOME | End: 2023-08-12
Attending: INTERNAL MEDICINE
Payer: MEDICARE

## 2023-08-10 DIAGNOSIS — N28.89 KIDNEY MASS: ICD-10-CM

## 2023-08-10 PROCEDURE — 74177 CT ABD & PELVIS W/CONTRAST: CPT

## 2023-08-10 NOTE — PROGRESS NOTES
Phoned Pt 8/10 at 8:13 am; LVM advising we do not need to see Pt sooner since already being seen 8/29 by Mirtha Barrios.

## 2023-08-14 ENCOUNTER — HOSPITAL ENCOUNTER (INPATIENT)
Age: 78
LOS: 11 days | Discharge: HOSPICE | End: 2023-08-25
Attending: STUDENT IN AN ORGANIZED HEALTH CARE EDUCATION/TRAINING PROGRAM | Admitting: INTERNAL MEDICINE
Payer: MEDICARE

## 2023-08-14 ENCOUNTER — TELEPHONE (OUTPATIENT)
Dept: FAMILY MEDICINE CLINIC | Age: 78
End: 2023-08-14

## 2023-08-14 DIAGNOSIS — Z51.5 PALLIATIVE CARE ENCOUNTER: ICD-10-CM

## 2023-08-14 DIAGNOSIS — N20.0 NEPHROLITHIASIS: ICD-10-CM

## 2023-08-14 DIAGNOSIS — R06.03 RESPIRATORY DISTRESS: ICD-10-CM

## 2023-08-14 DIAGNOSIS — N20.0 KIDNEY STONE: ICD-10-CM

## 2023-08-14 DIAGNOSIS — K68.19 RETROPERITONEAL ABSCESS (HCC): ICD-10-CM

## 2023-08-14 DIAGNOSIS — N13.6 PYONEPHROSIS: ICD-10-CM

## 2023-08-14 LAB
ALBUMIN SERPL-MCNC: 3.7 G/DL (ref 3.5–5.2)
ALP SERPL-CCNC: 69 U/L (ref 35–104)
ALT SERPL-CCNC: <5 U/L (ref 0–32)
ANION GAP SERPL CALCULATED.3IONS-SCNC: 10 MMOL/L (ref 7–16)
AST SERPL-CCNC: 10 U/L (ref 0–31)
BASOPHILS # BLD: 0.02 K/UL (ref 0–0.2)
BASOPHILS NFR BLD: 0 % (ref 0–2)
BILIRUB SERPL-MCNC: <0.2 MG/DL (ref 0–1.2)
BUN SERPL-MCNC: 21 MG/DL (ref 6–23)
CALCIUM SERPL-MCNC: 9.5 MG/DL (ref 8.6–10.2)
CHLORIDE SERPL-SCNC: 96 MMOL/L (ref 98–107)
CO2 SERPL-SCNC: 28 MMOL/L (ref 22–29)
CREAT SERPL-MCNC: 0.5 MG/DL (ref 0.5–1)
EOSINOPHIL # BLD: 0.11 K/UL (ref 0.05–0.5)
EOSINOPHILS RELATIVE PERCENT: 1 % (ref 0–6)
ERYTHROCYTE [DISTWIDTH] IN BLOOD BY AUTOMATED COUNT: 13.7 % (ref 11.5–15)
GFR SERPL CREATININE-BSD FRML MDRD: >60 ML/MIN/1.73M2
GLUCOSE SERPL-MCNC: 82 MG/DL (ref 74–99)
HCT VFR BLD AUTO: 26.7 % (ref 34–48)
HGB BLD-MCNC: 8.3 G/DL (ref 11.5–15.5)
IMM GRANULOCYTES # BLD AUTO: 0.06 K/UL (ref 0–0.58)
IMM GRANULOCYTES NFR BLD: 1 % (ref 0–5)
LACTATE BLDV-SCNC: 1.3 MMOL/L (ref 0.5–1.9)
LYMPHOCYTES NFR BLD: 0.89 K/UL (ref 1.5–4)
LYMPHOCYTES RELATIVE PERCENT: 8 % (ref 20–42)
MCH RBC QN AUTO: 32.3 PG (ref 26–35)
MCHC RBC AUTO-ENTMCNC: 31.1 G/DL (ref 32–34.5)
MCV RBC AUTO: 103.9 FL (ref 80–99.9)
MONOCYTES NFR BLD: 0.97 K/UL (ref 0.1–0.95)
MONOCYTES NFR BLD: 9 % (ref 2–12)
NEUTROPHILS NFR BLD: 81 % (ref 43–80)
NEUTS SEG NFR BLD: 8.91 K/UL (ref 1.8–7.3)
PLATELET # BLD AUTO: 425 K/UL (ref 130–450)
PMV BLD AUTO: 10.6 FL (ref 7–12)
POTASSIUM SERPL-SCNC: 4.6 MMOL/L (ref 3.5–5)
PROT SERPL-MCNC: 6.9 G/DL (ref 6.4–8.3)
RBC # BLD AUTO: 2.57 M/UL (ref 3.5–5.5)
SODIUM SERPL-SCNC: 134 MMOL/L (ref 132–146)
WBC OTHER # BLD: 11 K/UL (ref 4.5–11.5)

## 2023-08-14 PROCEDURE — 36415 COLL VENOUS BLD VENIPUNCTURE: CPT

## 2023-08-14 PROCEDURE — 87040 BLOOD CULTURE FOR BACTERIA: CPT

## 2023-08-14 PROCEDURE — 80053 COMPREHEN METABOLIC PANEL: CPT

## 2023-08-14 PROCEDURE — 83605 ASSAY OF LACTIC ACID: CPT

## 2023-08-14 PROCEDURE — 85025 COMPLETE CBC W/AUTO DIFF WBC: CPT

## 2023-08-14 PROCEDURE — 81001 URINALYSIS AUTO W/SCOPE: CPT

## 2023-08-14 PROCEDURE — 2060000000 HC ICU INTERMEDIATE R&B

## 2023-08-14 PROCEDURE — 99285 EMERGENCY DEPT VISIT HI MDM: CPT

## 2023-08-14 PROCEDURE — P9612 CATHETERIZE FOR URINE SPEC: HCPCS

## 2023-08-14 RX ORDER — 0.9 % SODIUM CHLORIDE 0.9 %
500 INTRAVENOUS SOLUTION INTRAVENOUS ONCE
Status: COMPLETED | OUTPATIENT
Start: 2023-08-14 | End: 2023-08-15

## 2023-08-14 RX ORDER — 0.9 % SODIUM CHLORIDE 0.9 %
1000 INTRAVENOUS SOLUTION INTRAVENOUS ONCE
Status: DISCONTINUED | OUTPATIENT
Start: 2023-08-14 | End: 2023-08-14

## 2023-08-14 ASSESSMENT — PAIN SCALES - GENERAL: PAINLEVEL_OUTOF10: 8

## 2023-08-14 ASSESSMENT — PAIN - FUNCTIONAL ASSESSMENT: PAIN_FUNCTIONAL_ASSESSMENT: 0-10

## 2023-08-14 NOTE — TELEPHONE ENCOUNTER
Please let the patient CT scan of the abdomen pelvis per radiology report suggested    Kidney stone noted in the left ureter. This was associated with swelling around the kidney and fluid collection around the kidney.     It also appeared that the fluid around the kidney may have become infected and now there is a concern for a deep abscess type infection in the fat of the lower back    This is also caused issues with the left kidney showing chronic changes of shrinking of the left kidney    Also suggesting compression fracture to L4 of the vertebral spine      Given the above findings with the kidney I reached out and discussed the CAT scan with Dr. Deric Mackey    Both of us recommend patient to the emergency room for further evaluation and treatment of these changes related to the kidney    We will need to consider evaluation of the compression fracture as well but more urgency related to the kidneys    Thanks

## 2023-08-14 NOTE — ED PROVIDER NOTES
[facility]        Pt Name: Crow Barker  MRN: [id number]  [facility] 1945  Date of evaluation: 8/14/2023  Provider: Adriana Trinidad DO  PCP: Domenica Greene MD  Note Started: 5:07 PM EDT 8/14/23    CHIEF COMPLAINT       Chief Complaint   Patient presents with    Flank Pain     Had a CT that showed a stone, told by PCP and Dr Avila to come here to have surgery        HISTORY OF PRESENT ILLNESS: 1 or more Elements   History From: Patient and       Crow Barker is a 69 y.o. female who presents emergency room returned. Patient was found to have a kidney stone approximately 4 days ago. Patient was sent in by her primary care doctor as well as Dr. Ju Matute for surgery per . Patient  states that she has had some increased confusion since this event. Patient has a history of renal stents. Patient received a CT scan of the abdomen pelvis yesterday showing nephrolithiasis on the left side. There is also perinephric stranding and concerns for an abscess. Patient is motorized chair at baseline. Patient is unable to provide much history which is her baseline. Review of Systems   Constitutional:  Negative for chills and fever. Cardiovascular:  Negative for chest pain. Gastrointestinal:  Negative for nausea and vomiting. Endocrine: Negative for polyuria. Genitourinary:  Negative for dysuria. Musculoskeletal:  Positive for back pain. Skin:  Negative for wound. Neurological:  Negative for headaches. Psychiatric/Behavioral:  Positive for confusion. Nursing Notes were all reviewed and agreed with or any disagreements were addressed in the HPI. REVIEW OF SYSTEMS :      Positives and Pertinent negatives as per HPI.      SURGICAL HISTORY     Past Surgical History:   Procedure Laterality Date    CHOLECYSTECTOMY  1990s    CORONARY ANGIOPLASTY WITH STENT PLACEMENT  04/21/2020    Dr. Ruben Parikh present. Mental Status: She is alert. Mental status is at baseline. Cranial Nerves: No cranial nerve deficit. Motor: Weakness present. Psychiatric:         Mood and Affect: Mood normal.         DIAGNOSTIC RESULTS   LABS:    Labs Reviewed   CBC WITH AUTO DIFFERENTIAL - Abnormal; Notable for the following components:       Result Value    RBC 2.57 (*)     Hemoglobin 8.3 (*)     Hematocrit 26.7 (*)     .9 (*)     MCHC 31.1 (*)     Neutrophils % 81 (*)     Lymphocytes % 8 (*)     Neutrophils Absolute 8.91 (*)     Lymphocytes Absolute 0.89 (*)     Monocytes Absolute 0.97 (*)     All other components within normal limits   COMPREHENSIVE METABOLIC PANEL - Abnormal; Notable for the following components:    Chloride 96 (*)     All other components within normal limits   CULTURE, BLOOD 1   CULTURE, BLOOD 2   CULTURE, URINE   LACTATE, SEPSIS   LACTATE, SEPSIS   URINALYSIS WITH MICROSCOPIC       As interpreted by me, the above displayed labs are abnormal. All other labs obtained during this visit were within normal range or not returned as of this dictation. Interpretation per the Radiologist below, if available at the time of this note:    No orders to display     XR FEMUR LEFT (MIN 2 VIEWS)    Result Date: 8/8/2023  EXAMINATION: XRAY VIEWS OF THE LEFT FEMUR 8/8/2023 8:09 pm COMPARISON: None. HISTORY: ORDERING SYSTEM PROVIDED HISTORY: lateral mass TECHNOLOGIST PROVIDED HISTORY: Reason for exam:->lateral mass What reading provider will be dictating this exam?->CRC FINDINGS: There is no evidence of acute fracture. There is normal alignment. No acute joint abnormality. No focal osseous lesion. No focal soft tissue abnormality. Intramedullary shanti and locking screws transfixing a healed fracture of the intertrochanteric region. Extensive heterotopic bone formation adjacent to the lesser trochanter.   Heterotopic bone formation adjacent to the greater trochanter and lateral proximal femoral

## 2023-08-15 ENCOUNTER — ANESTHESIA (OUTPATIENT)
Dept: OPERATING ROOM | Age: 78
End: 2023-08-15
Payer: MEDICARE

## 2023-08-15 ENCOUNTER — ANESTHESIA EVENT (OUTPATIENT)
Dept: OPERATING ROOM | Age: 78
End: 2023-08-15
Payer: MEDICARE

## 2023-08-15 ENCOUNTER — APPOINTMENT (OUTPATIENT)
Dept: GENERAL RADIOLOGY | Age: 78
End: 2023-08-15
Payer: MEDICARE

## 2023-08-15 PROBLEM — S32.040A CLOSED COMPRESSION FRACTURE OF L4 LUMBAR VERTEBRA, INITIAL ENCOUNTER (HCC): Status: ACTIVE | Noted: 2023-08-15

## 2023-08-15 LAB
BACTERIA URNS QL MICRO: ABNORMAL
BILIRUB UR QL STRIP: NEGATIVE
CHP ED QC CHECK: 107
CLARITY UR: CLEAR
COLOR UR: YELLOW
EKG ATRIAL RATE: 75 BPM
EKG P AXIS: 46 DEGREES
EKG P-R INTERVAL: 186 MS
EKG Q-T INTERVAL: 434 MS
EKG QRS DURATION: 94 MS
EKG QTC CALCULATION (BAZETT): 484 MS
EKG R AXIS: -19 DEGREES
EKG T AXIS: 63 DEGREES
EKG VENTRICULAR RATE: 75 BPM
GLUCOSE BLD-MCNC: 100 MG/DL
GLUCOSE BLD-MCNC: 100 MG/DL (ref 74–99)
GLUCOSE BLD-MCNC: 107 MG/DL (ref 74–99)
GLUCOSE BLD-MCNC: 137 MG/DL (ref 74–99)
GLUCOSE BLD-MCNC: 94 MG/DL (ref 74–99)
GLUCOSE UR STRIP-MCNC: NEGATIVE MG/DL
HBA1C MFR BLD: 4.6 % (ref 4–5.6)
HGB UR QL STRIP.AUTO: NEGATIVE
KETONES UR STRIP-MCNC: NEGATIVE MG/DL
LEUKOCYTE ESTERASE UR QL STRIP: ABNORMAL
NITRITE UR QL STRIP: POSITIVE
PH UR STRIP: 5.5 [PH] (ref 5–9)
PROT UR STRIP-MCNC: NEGATIVE MG/DL
RBC #/AREA URNS HPF: ABNORMAL /HPF
SP GR UR STRIP: 1.01 (ref 1–1.03)
UROBILINOGEN UR STRIP-ACNC: 0.2 EU/DL (ref 0–1)
WBC #/AREA URNS HPF: ABNORMAL /HPF

## 2023-08-15 PROCEDURE — 2700000000 HC OXYGEN THERAPY PER DAY

## 2023-08-15 PROCEDURE — 93005 ELECTROCARDIOGRAM TRACING: CPT | Performed by: INTERNAL MEDICINE

## 2023-08-15 PROCEDURE — BT1F1ZZ FLUOROSCOPY OF LEFT KIDNEY, URETER AND BLADDER USING LOW OSMOLAR CONTRAST: ICD-10-PCS | Performed by: UROLOGY

## 2023-08-15 PROCEDURE — 2580000003 HC RX 258

## 2023-08-15 PROCEDURE — 2580000003 HC RX 258: Performed by: INTERNAL MEDICINE

## 2023-08-15 PROCEDURE — 87040 BLOOD CULTURE FOR BACTERIA: CPT

## 2023-08-15 PROCEDURE — 83036 HEMOGLOBIN GLYCOSYLATED A1C: CPT

## 2023-08-15 PROCEDURE — 6370000000 HC RX 637 (ALT 250 FOR IP): Performed by: UROLOGY

## 2023-08-15 PROCEDURE — 6360000002 HC RX W HCPCS

## 2023-08-15 PROCEDURE — 2580000003 HC RX 258: Performed by: UROLOGY

## 2023-08-15 PROCEDURE — 3700000001 HC ADD 15 MINUTES (ANESTHESIA): Performed by: UROLOGY

## 2023-08-15 PROCEDURE — 74420 UROGRAPHY RTRGR +-KUB: CPT

## 2023-08-15 PROCEDURE — 94664 DEMO&/EVAL PT USE INHALER: CPT

## 2023-08-15 PROCEDURE — 6360000002 HC RX W HCPCS: Performed by: INTERNAL MEDICINE

## 2023-08-15 PROCEDURE — C1758 CATHETER, URETERAL: HCPCS | Performed by: UROLOGY

## 2023-08-15 PROCEDURE — 94640 AIRWAY INHALATION TREATMENT: CPT

## 2023-08-15 PROCEDURE — 3600000003 HC SURGERY LEVEL 3 BASE: Performed by: UROLOGY

## 2023-08-15 PROCEDURE — C2617 STENT, NON-COR, TEM W/O DEL: HCPCS | Performed by: UROLOGY

## 2023-08-15 PROCEDURE — 3600000013 HC SURGERY LEVEL 3 ADDTL 15MIN: Performed by: UROLOGY

## 2023-08-15 PROCEDURE — 7100000000 HC PACU RECOVERY - FIRST 15 MIN: Performed by: UROLOGY

## 2023-08-15 PROCEDURE — 7100000001 HC PACU RECOVERY - ADDTL 15 MIN: Performed by: UROLOGY

## 2023-08-15 PROCEDURE — 99222 1ST HOSP IP/OBS MODERATE 55: CPT | Performed by: NEUROLOGICAL SURGERY

## 2023-08-15 PROCEDURE — 6360000004 HC RX CONTRAST MEDICATION: Performed by: UROLOGY

## 2023-08-15 PROCEDURE — 2709999900 HC NON-CHARGEABLE SUPPLY: Performed by: UROLOGY

## 2023-08-15 PROCEDURE — 6370000000 HC RX 637 (ALT 250 FOR IP)

## 2023-08-15 PROCEDURE — C1769 GUIDE WIRE: HCPCS | Performed by: UROLOGY

## 2023-08-15 PROCEDURE — 2060000000 HC ICU INTERMEDIATE R&B

## 2023-08-15 PROCEDURE — 87086 URINE CULTURE/COLONY COUNT: CPT

## 2023-08-15 PROCEDURE — 3700000000 HC ANESTHESIA ATTENDED CARE: Performed by: UROLOGY

## 2023-08-15 PROCEDURE — 0T778DZ DILATION OF LEFT URETER WITH INTRALUMINAL DEVICE, VIA NATURAL OR ARTIFICIAL OPENING ENDOSCOPIC: ICD-10-PCS | Performed by: UROLOGY

## 2023-08-15 PROCEDURE — 82962 GLUCOSE BLOOD TEST: CPT

## 2023-08-15 PROCEDURE — 0TJB8ZZ INSPECTION OF BLADDER, VIA NATURAL OR ARTIFICIAL OPENING ENDOSCOPIC: ICD-10-PCS | Performed by: UROLOGY

## 2023-08-15 DEVICE — URETERAL STENT
Type: IMPLANTABLE DEVICE | Site: URETER | Status: FUNCTIONAL
Brand: PERCUFLEX™

## 2023-08-15 RX ORDER — MEPERIDINE HYDROCHLORIDE 25 MG/ML
12.5 INJECTION INTRAMUSCULAR; INTRAVENOUS; SUBCUTANEOUS ONCE
Status: CANCELLED | OUTPATIENT
Start: 2023-08-15 | End: 2023-08-15

## 2023-08-15 RX ORDER — LANOLIN ALCOHOL/MO/W.PET/CERES
400 CREAM (GRAM) TOPICAL EVERY MORNING
Status: DISCONTINUED | OUTPATIENT
Start: 2023-08-15 | End: 2023-08-25

## 2023-08-15 RX ORDER — LEVOTHYROXINE SODIUM 88 UG/1
88 TABLET ORAL DAILY
Status: DISCONTINUED | OUTPATIENT
Start: 2023-08-15 | End: 2023-08-25

## 2023-08-15 RX ORDER — VITAMIN B COMPLEX
1 TABLET ORAL EVERY MORNING
Status: DISCONTINUED | OUTPATIENT
Start: 2023-08-15 | End: 2023-08-25

## 2023-08-15 RX ORDER — BUMETANIDE 1 MG/1
1 TABLET ORAL 2 TIMES DAILY
Status: DISCONTINUED | OUTPATIENT
Start: 2023-08-15 | End: 2023-08-25

## 2023-08-15 RX ORDER — BACLOFEN 10 MG/1
40 TABLET ORAL 2 TIMES DAILY
Status: DISCONTINUED | OUTPATIENT
Start: 2023-08-15 | End: 2023-08-25

## 2023-08-15 RX ORDER — DEXTROSE MONOHYDRATE 100 MG/ML
INJECTION, SOLUTION INTRAVENOUS CONTINUOUS PRN
Status: DISCONTINUED | OUTPATIENT
Start: 2023-08-15 | End: 2023-08-25

## 2023-08-15 RX ORDER — LORAZEPAM 0.5 MG/1
0.5 TABLET ORAL EVERY 4 HOURS PRN
Status: DISCONTINUED | OUTPATIENT
Start: 2023-08-15 | End: 2023-08-18

## 2023-08-15 RX ORDER — DANTROLENE SODIUM 25 MG/1
100 CAPSULE ORAL 2 TIMES DAILY
Status: DISCONTINUED | OUTPATIENT
Start: 2023-08-15 | End: 2023-08-25

## 2023-08-15 RX ORDER — CEFEPIME HYDROCHLORIDE 2 G/1
INJECTION, POWDER, FOR SOLUTION INTRAVENOUS PRN
Status: DISCONTINUED | OUTPATIENT
Start: 2023-08-15 | End: 2023-08-15 | Stop reason: SDUPTHER

## 2023-08-15 RX ORDER — PROPOFOL 10 MG/ML
INJECTION, EMULSION INTRAVENOUS PRN
Status: DISCONTINUED | OUTPATIENT
Start: 2023-08-15 | End: 2023-08-15 | Stop reason: SDUPTHER

## 2023-08-15 RX ORDER — FENTANYL CITRATE 50 UG/ML
25 INJECTION, SOLUTION INTRAMUSCULAR; INTRAVENOUS EVERY 5 MIN PRN
Status: CANCELLED | OUTPATIENT
Start: 2023-08-15

## 2023-08-15 RX ORDER — SODIUM CHLORIDE 1 G/1
1 TABLET ORAL DAILY
Status: DISCONTINUED | OUTPATIENT
Start: 2023-08-15 | End: 2023-08-25

## 2023-08-15 RX ORDER — ONDANSETRON 2 MG/ML
4 INJECTION INTRAMUSCULAR; INTRAVENOUS EVERY 6 HOURS PRN
Status: DISCONTINUED | OUTPATIENT
Start: 2023-08-15 | End: 2023-08-21

## 2023-08-15 RX ORDER — LEVETIRACETAM 500 MG/1
500 TABLET ORAL 2 TIMES DAILY
Status: DISCONTINUED | OUTPATIENT
Start: 2023-08-15 | End: 2023-08-21 | Stop reason: SDUPTHER

## 2023-08-15 RX ORDER — RANOLAZINE 500 MG/1
1000 TABLET, EXTENDED RELEASE ORAL 2 TIMES DAILY
Status: DISCONTINUED | OUTPATIENT
Start: 2023-08-15 | End: 2023-08-25

## 2023-08-15 RX ORDER — CLOPIDOGREL BISULFATE 75 MG/1
75 TABLET ORAL DAILY
Status: DISCONTINUED | OUTPATIENT
Start: 2023-08-15 | End: 2023-08-25

## 2023-08-15 RX ORDER — SODIUM CHLORIDE 0.9 % (FLUSH) 0.9 %
5-40 SYRINGE (ML) INJECTION EVERY 12 HOURS SCHEDULED
Status: CANCELLED | OUTPATIENT
Start: 2023-08-15

## 2023-08-15 RX ORDER — ACETAMINOPHEN 650 MG/1
650 SUPPOSITORY RECTAL EVERY 6 HOURS PRN
Status: DISCONTINUED | OUTPATIENT
Start: 2023-08-15 | End: 2023-08-21

## 2023-08-15 RX ORDER — SODIUM CHLORIDE 0.9 % (FLUSH) 0.9 %
10 SYRINGE (ML) INJECTION PRN
Status: DISCONTINUED | OUTPATIENT
Start: 2023-08-15 | End: 2023-08-25

## 2023-08-15 RX ORDER — DIPHENHYDRAMINE HYDROCHLORIDE 50 MG/ML
12.5 INJECTION INTRAMUSCULAR; INTRAVENOUS
Status: CANCELLED | OUTPATIENT
Start: 2023-08-15 | End: 2023-08-16

## 2023-08-15 RX ORDER — FERROUS SULFATE 325(65) MG
325 TABLET ORAL
Status: DISCONTINUED | OUTPATIENT
Start: 2023-08-15 | End: 2023-08-20

## 2023-08-15 RX ORDER — SODIUM CHLORIDE 9 MG/ML
INJECTION, SOLUTION INTRAVENOUS PRN
Status: CANCELLED | OUTPATIENT
Start: 2023-08-15

## 2023-08-15 RX ORDER — PROCHLORPERAZINE EDISYLATE 5 MG/ML
5 INJECTION INTRAMUSCULAR; INTRAVENOUS
Status: CANCELLED | OUTPATIENT
Start: 2023-08-15 | End: 2023-08-16

## 2023-08-15 RX ORDER — ACETAMINOPHEN 325 MG/1
650 TABLET ORAL EVERY 6 HOURS PRN
Status: DISCONTINUED | OUTPATIENT
Start: 2023-08-15 | End: 2023-08-21

## 2023-08-15 RX ORDER — ONDANSETRON 4 MG/1
4 TABLET, ORALLY DISINTEGRATING ORAL EVERY 8 HOURS PRN
Status: DISCONTINUED | OUTPATIENT
Start: 2023-08-15 | End: 2023-08-18 | Stop reason: SDUPTHER

## 2023-08-15 RX ORDER — ENOXAPARIN SODIUM 100 MG/ML
30 INJECTION SUBCUTANEOUS DAILY
Status: DISCONTINUED | OUTPATIENT
Start: 2023-08-15 | End: 2023-08-25

## 2023-08-15 RX ORDER — SODIUM CHLORIDE 0.9 % (FLUSH) 0.9 %
5-40 SYRINGE (ML) INJECTION EVERY 12 HOURS SCHEDULED
Status: DISCONTINUED | OUTPATIENT
Start: 2023-08-15 | End: 2023-08-25

## 2023-08-15 RX ORDER — IPRATROPIUM BROMIDE AND ALBUTEROL SULFATE 2.5; .5 MG/3ML; MG/3ML
1 SOLUTION RESPIRATORY (INHALATION)
Status: DISCONTINUED | OUTPATIENT
Start: 2023-08-15 | End: 2023-08-25

## 2023-08-15 RX ORDER — LANOLIN ALCOHOL/MO/W.PET/CERES
500 CREAM (GRAM) TOPICAL DAILY
Status: DISCONTINUED | OUTPATIENT
Start: 2023-08-15 | End: 2023-08-25

## 2023-08-15 RX ORDER — SODIUM CHLORIDE 9 MG/ML
INJECTION, SOLUTION INTRAVENOUS CONTINUOUS PRN
Status: DISCONTINUED | OUTPATIENT
Start: 2023-08-15 | End: 2023-08-15 | Stop reason: SDUPTHER

## 2023-08-15 RX ORDER — POLYETHYLENE GLYCOL 3350 17 G/17G
17 POWDER, FOR SOLUTION ORAL DAILY PRN
Status: DISCONTINUED | OUTPATIENT
Start: 2023-08-15 | End: 2023-08-20

## 2023-08-15 RX ORDER — SODIUM CHLORIDE 9 MG/ML
INJECTION, SOLUTION INTRAVENOUS CONTINUOUS
Status: DISCONTINUED | OUTPATIENT
Start: 2023-08-15 | End: 2023-08-20

## 2023-08-15 RX ORDER — INSULIN LISPRO 100 [IU]/ML
0-4 INJECTION, SOLUTION INTRAVENOUS; SUBCUTANEOUS
Status: DISCONTINUED | OUTPATIENT
Start: 2023-08-15 | End: 2023-08-25

## 2023-08-15 RX ORDER — INSULIN LISPRO 100 [IU]/ML
0-4 INJECTION, SOLUTION INTRAVENOUS; SUBCUTANEOUS NIGHTLY
Status: DISCONTINUED | OUTPATIENT
Start: 2023-08-15 | End: 2023-08-25

## 2023-08-15 RX ORDER — SODIUM CHLORIDE 0.9 % (FLUSH) 0.9 %
5-40 SYRINGE (ML) INJECTION PRN
Status: CANCELLED | OUTPATIENT
Start: 2023-08-15

## 2023-08-15 RX ORDER — ATORVASTATIN CALCIUM 10 MG/1
10 TABLET, FILM COATED ORAL NIGHTLY
Status: DISCONTINUED | OUTPATIENT
Start: 2023-08-15 | End: 2023-08-25

## 2023-08-15 RX ORDER — ASPIRIN 81 MG/1
81 TABLET ORAL DAILY
Status: DISCONTINUED | OUTPATIENT
Start: 2023-08-15 | End: 2023-08-25

## 2023-08-15 RX ORDER — POLYETHYLENE GLYCOL 3350 17 G/17G
17 POWDER, FOR SOLUTION ORAL DAILY
Status: DISCONTINUED | OUTPATIENT
Start: 2023-08-15 | End: 2023-08-18 | Stop reason: SDUPTHER

## 2023-08-15 RX ORDER — SODIUM CHLORIDE 9 MG/ML
INJECTION, SOLUTION INTRAVENOUS PRN
Status: DISCONTINUED | OUTPATIENT
Start: 2023-08-15 | End: 2023-08-25

## 2023-08-15 RX ADMIN — SODIUM CHLORIDE 1000 MG: 9 INJECTION, SOLUTION INTRAVENOUS at 04:36

## 2023-08-15 RX ADMIN — BACLOFEN 40 MG: 10 TABLET ORAL at 21:28

## 2023-08-15 RX ADMIN — SODIUM CHLORIDE: 9 INJECTION, SOLUTION INTRAVENOUS at 15:01

## 2023-08-15 RX ADMIN — RANOLAZINE 1000 MG: 500 TABLET, FILM COATED, EXTENDED RELEASE ORAL at 08:41

## 2023-08-15 RX ADMIN — RANOLAZINE 1000 MG: 500 TABLET, FILM COATED, EXTENDED RELEASE ORAL at 21:28

## 2023-08-15 RX ADMIN — POLYETHYLENE GLYCOL 3350 17 G: 17 POWDER, FOR SOLUTION ORAL at 08:49

## 2023-08-15 RX ADMIN — Medication 10 ML: at 21:33

## 2023-08-15 RX ADMIN — CEFEPIME 2000 MG: 2 INJECTION, POWDER, FOR SOLUTION INTRAVENOUS at 02:36

## 2023-08-15 RX ADMIN — ATORVASTATIN CALCIUM 10 MG: 10 TABLET, FILM COATED ORAL at 21:27

## 2023-08-15 RX ADMIN — VANCOMYCIN HYDROCHLORIDE 750 MG: 10 INJECTION, POWDER, LYOPHILIZED, FOR SOLUTION INTRAVENOUS at 18:11

## 2023-08-15 RX ADMIN — IPRATROPIUM BROMIDE AND ALBUTEROL SULFATE 1 DOSE: .5; 2.5 SOLUTION RESPIRATORY (INHALATION) at 07:54

## 2023-08-15 RX ADMIN — CEFEPIME 2000 MG: 2 INJECTION, POWDER, FOR SOLUTION INTRAVENOUS at 16:59

## 2023-08-15 RX ADMIN — LEVETIRACETAM 500 MG: 500 TABLET, FILM COATED ORAL at 21:27

## 2023-08-15 RX ADMIN — Medication 10 ML: at 08:48

## 2023-08-15 RX ADMIN — SODIUM CHLORIDE 500 ML: 9 INJECTION, SOLUTION INTRAVENOUS at 02:40

## 2023-08-15 RX ADMIN — SODIUM CHLORIDE: 9 INJECTION, SOLUTION INTRAVENOUS at 09:25

## 2023-08-15 RX ADMIN — PHENYLEPHRINE HYDROCHLORIDE 100 MCG: 10 INJECTION INTRAVENOUS at 15:30

## 2023-08-15 RX ADMIN — IPRATROPIUM BROMIDE AND ALBUTEROL SULFATE 1 DOSE: .5; 2.5 SOLUTION RESPIRATORY (INHALATION) at 11:35

## 2023-08-15 RX ADMIN — CEFEPIME 2 G: 2 INJECTION, POWDER, FOR SOLUTION INTRAVENOUS at 15:45

## 2023-08-15 RX ADMIN — PROPOFOL 65 MCG/KG/MIN: 10 INJECTION, EMULSION INTRAVENOUS at 15:11

## 2023-08-15 RX ADMIN — BUMETANIDE 1 MG: 1 TABLET ORAL at 21:27

## 2023-08-15 RX ADMIN — PROPOFOL 30 MG: 10 INJECTION, EMULSION INTRAVENOUS at 15:10

## 2023-08-15 RX ADMIN — PHENYLEPHRINE HYDROCHLORIDE 100 MCG: 10 INJECTION INTRAVENOUS at 15:20

## 2023-08-15 ASSESSMENT — LIFESTYLE VARIABLES: SMOKING_STATUS: 0

## 2023-08-15 ASSESSMENT — ENCOUNTER SYMPTOMS
VOMITING: 0
BACK PAIN: 1
NAUSEA: 0

## 2023-08-15 ASSESSMENT — PAIN - FUNCTIONAL ASSESSMENT: PAIN_FUNCTIONAL_ASSESSMENT: NONE - DENIES PAIN

## 2023-08-15 NOTE — CONSULTS
[facility]  [facility]    Consultation Note     Admit Date: 8/14/2023  3:29 PM    Reason for Consult:   Left-sided kidney stone with left-sided pyelonephritis and possible left-sided renal abscess    Attending Physician:  Lc Flores DO     Chief Complaint: Left pyelonephritis with possible left renal abscess    HISTORY OF PRESENT ILLNESS:   13-year-old female with past medical history of CAD, HLD, HTN, hypothyroidism, MS, sarcoidosis, DM presented with left-sided kidney stone and left-sided pyelonephritis. She had a previous history of Enterobacter cloacae UTI. Urinalysis is positive for pyuria and bacteriuria. CT abdomen showing left-sided kidney stone with left-sided pyelonephritis and possible renal abscess. Status post cystoscopy and left-sided JJ stent insertion on 08/15 by urology. ID consulted for UTI. Past Medical History:        Diagnosis Date    Anemia     CAD (coronary artery disease) 04/21/2020    High cholesterol     Hypertension     Hypothyroidism     Malabsorption     MI (myocardial infarction) ([address]) 04/21/2020    MS (multiple sclerosis) ([address])     Osteoporosis     Sarcoidosis     Seizure ([address]) 02/05/2021    Type 2 diabetes mellitus with hyperglycemia, without long-term current use of insulin ([address]) 10/30/2019     Past Surgical History:        Procedure Laterality Date    CHOLECYSTECTOMY  1990s    CORONARY ANGIOPLASTY WITH STENT PLACEMENT  04/21/2020    Dr. Ketty Chun   3.0 x 22mm Resolute MAAME to Prox LAD.   No LV    CORONARY ANGIOPLASTY WITH STENT PLACEMENT  01/14/2021    3.0 x 30 [place] Knot to the prox LAD and a 2.5 x 20 [place] K[place] to the Obtuse marginal by Dr. Alvaro Gupta / Latrice Yadav / You Lawson Left 4/23/2020    CYSTOSCOPY LEFT RETROGRADE PYELOGRAM STENT INSERTION, STRATTON CATHETER performed by Regine Avila DO at [address] Left 3/21/2019    LEFT FEMUR CEPHALLOMEDULLARY NAIL performed by Kurtis Jacques MD at [facility]

## 2023-08-15 NOTE — PROGRESS NOTES
Called and left voicemail for Tesfaye Tate NP, regarding pt  states pt should be a DNR-CCA not  a full code.

## 2023-08-15 NOTE — ED NOTES
Ed chau landrum, to bedside to attempt IV, for blood cultures. Patient moved to bed, from personal chair, by lift equipment and lucita zhou and patient's . Patient tolerated well.  Akbar Sotelo RN  08/15/23 [id number]

## 2023-08-15 NOTE — ANESTHESIA POSTPROCEDURE EVALUATION
Department of Anesthesiology  Postprocedure Note    Patient: Maximiliano Chaudhry  MRN: [id number]  YOB: 1945  Date of evaluation: 8/15/2023      Procedure Summary     Date: 08/15/23 Room / Location: [facility] OR 11 / [facility]    Anesthesia Start: 1501 Anesthesia Stop: 1645    Procedure: CYSTOSCOPY RETROGRADE PYELOGRAM, URETEROSCOPY, LEFT STENT INSERTION, STRATTON CATHETER INSERTION (Left: Bladder) Diagnosis:       Kidney stone      (Kidney stone [N20.0])    Surgeons: Summer Tate MD Responsible Provider: Shelli Post MD    Anesthesia Type: MAC ASA Status: 4          Anesthesia Type: MAC    Devora Phase I:      Devora Phase II:        Anesthesia Post Evaluation    Patient location during evaluation: PACU  Patient participation: complete - patient participated  Level of consciousness: awake  Pain score: 0  Airway patency: patent  Nausea & Vomiting: no nausea  Complications: no  Cardiovascular status: hemodynamically stable  Respiratory status: acceptable  Hydration status: stable

## 2023-08-15 NOTE — PROGRESS NOTES
Patient known to [address] [place] [address] will defer consult to them.   Electronically signed by Derrell Chapman MD on 8/15/2023 at 12:42 PM

## 2023-08-15 NOTE — ED NOTES
Blood cultures obtained from right hand per policy. Set  two of two drawn at this time by Rosibel dan lpn.            Kylee White LPN  77/85/25 [id number]

## 2023-08-15 NOTE — PROGRESS NOTES
4 Eyes Skin Assessment     NAME:  Michaela Douglas  YOB: 1945  MEDICAL RECORD NUMBER:  [id number]    The patient is being assessed for  Admission    I agree that at least one RN has performed a thorough Head to Toe Skin Assessment on the patient. ALL assessment sites listed below have been assessed. Areas assessed by both nurses:    Head, Face, Ears, Shoulders, Back, Chest, Arms, Elbows, Hands, Sacrum. Buttock, Coccyx, Ischium, and Legs. Feet and Heels        Does the Patient have a Wound? Yes wound(s) were present on assessment.  LDA wound assessment was Initiated and completed by RN       Gary Prevention initiated by RN: Yes  Wound Care Orders initiated by RN: Yes    Pressure Injury (Stage 3,4, Unstageable, DTI, NWPT, and Complex wounds) if present, place Wound referral order by RN under : No    New Ostomies, if present place, Ostomy referral order under : No     Nurse 1 eSignature: Electronically signed by Jay Zayas RN on 8/15/23 at 5:54 PM EDT    **SHARE this note so that the co-signing nurse can place an eSignature**    Nurse 2 eSignature: Electronically signed by Tila Narayanan RN on 8/15/23 at 5:55 PM EDT DATE OF ADMISSION: 06/14/2017

 

DATE OF DISCHARGE: 06/17/2017

 

PRIMARY CARE PROVIDER: Leo Ray MD.

 

DISCHARGE DIAGNOSES:

1. Acute kidney injury, resolved.

2. Hypoglycemia due to impaired insulin clearance with acute kidney injury.

3. Collagenous colitis.

4. Chronic diarrhea. 

 

PROCEDURES: None.

 

CONSULTATIONS: Informal discussion with the patient's gastroenterologist at 
[facility], Dr. Tobias Benitez, was made by phone.

 

DIAGNOSTIC IMAGING STUDIES:

A head CT on 06/14/2017: Normal head CT.

Chest x-ray 06/14/2017: Normal chest. 

 

Diagnostic laboratory studies: BUN and creatinine on 06/14/2017, date of 
admission, BUN 49 and creatinine 5.1. Chemistry otherwise sodium 134, potassium 
4.5, chloride 100, bicarbonate 23, and glucose 119. An A1c was 8.9, BUN and 
creatinine on discharge 3 days later following hydration with a BUN of 17 and 
creatinine of 1.1 respectively.

 

BRIEF HOSPITAL COURSE BY PROBLEMS: Please see the dictated admission history 
and physical from 06/14/2017 for details of the presentation. Briefly, the 
patient is a type 1 diabetic who over the last month has had several ER 
presentations for hypoglycemia. On the day of admission his son found him 
unresponsive near the edge of the bed. His glucoses was 33 and on additional 
evaluation in the emergency room, he was found to be in acute kidney injury 
with a BUN and creatinine of 49 and 5.1. 

 

1. Hypoglycemia. This was most consistent with hypoglycemia due to impaired 
insulin clearance in the setting of poor aglomerular filtration rate. The 
patient's acute kidney injury as per #2 below was due to primarily volume 
depletion. During hospitalization, the patient's insulin pump was held (EMT's 
had disconnected it) and he was given Lantus until his renal function improved. 
He had a dramatic improvement in his renal function as below with discharge BUN 
and creatinine essentially at his baseline of 17 and 1.0. He was able to resume 
his home insulin pump and now that we have addressed the cause of his acute 
kidney injury, hopefully, this will not be a recurrent event.

2. Acute kidney injury, although a FENA, or fractional excretion of sodium was 
1.7. Ultimately this was a very volume responsive acute kidney injury and after 
addressing his chronic diarrhea, please see 3 below, and volume repletion, his 
renal function fortunately very markedly improved back to his baseline. 
Unfortunately, he had not told anyone that he was having rather marked diarrhea 
with his untreated collagenous colitis and the chronic volume loss with some 
superimposed effect of impaired renal autoregulation on ACEI and spironolactone 
was likely the precipitant.

3. Chronic diarrhea and collagenous colitis. The patient evidently spends as 
much as half an hour in the bathroom because of diarrhea at home. He had not 
been taking his budesonide for some time. It is not clear why. I spoke with his 
gastroenterologist, Dr. Tobias Benitez, from [facility], who indicated that 
he should resume his budesonide orally. We have also given him Imodium and 
Metamucil as a bulk former after ruling out infection. This had a remarkably 
quick effect and the patient remarked that he was having a formed bowel 
movement for the first time in as long as he can remember, which also had him 
obviously quite pleased.

4. Confusion. For the first 3 days of hospitalization the patient had a very 
nonfocal confusion which was primarily memory loss and difficulty with some 
executive functions such as using his pump. This seemed to primarily be due to 
reduced clearance of many of the pharmacologics his agency has, including 
gabapentin, Xanax, oxycodone, amitriptyline and others. By day 4, day of 
discharge, he was back to his baseline and was alert, oriented, completely 
cognizant. Most notably on the day prior to this his cognition was still of 
enough concern that he had an evaluation by occupational therapy for his 
cognitive function. He could only name 6 animals in a minute. On the subsequent 
day he was able to do a normal listing of animals and do normal clock drawing 
which he had been unable to do the prior day (and could program his insulin 
pump reliabley.) 

 

DISCHARGE MEDICATIONS: There are no change of his medications other than the 
addition of oral budesonide.

 

DISCHARGE MEDICATIONS INCLUDE:

1. Tylenol 650 mg every 4 hours as needed for pain.

2. Entocort EC or budesonide 9 mg orally once daily, which is a new and resumed 
prescription.

3. Loperamide 2 mg every 2 hours as needed for diarrhea.

4. Metamucil 1 packet twice daily if needed for bulk forming.

5. Beclomethasone 80 mcg. 2 puffs inhaled daily.

6. Humulin insulin via insulin pump.

7. Cetrizine 10 mg. 1 daily.

8. Nortriptyline 20 mg. twice daily.

9. Ranitidine 300 mg. once daily.

10. Lisinopril 20 mg. once daily.

11. Simvastatin 80 mg each evening.

12. Zolpidem 10 mg each evening if needed for sleep.

13. Spironolactone 12.5 mg daily.

14. Tamsulosin 0.4 mg twice daily.

15. Alprazolam 0.5 mg twice daily.

16. Hydrocortisone 10 mg daily at breakfast.

17. Hydrocortisone 10 mg daily in the evening. (The patient was not entirely 
certain of his hydrocortisone dose at the time of admission. He should resume 
whatever the prior one was).

18. Oxycodone 5 mg every 6 hours as needed for pain.

19. Bupropion 150 mg once daily.

20. Multivitamin 1 daily.

22. Pregabalin 300 mg twice daily.

23. Aspirin 81 mg twice daily.

24. Carvedilol 12.5 mg twice daily.

25. Duloxetine at 60 mg twice daily.

26. Gabapentin 300 mg twice daily.

27. Pantoprazole 40 mg twice daily.

28. Lidocaine patch if needed for pain.

29. Diclofenac sodium 4 grams topical if needed p.r.n.

 

FOLLOWUP: He was advised to have followup with Dr. Ray just to ensure that 
his renal function as well as his mentation remained stable with the resumption 
of home medications and that the Entocort is adequately controlling his stool, 
hopefully.



 

PHYSICAL EXAMINATION ON THE DAY OF DISCHARGE:

VITAL SIGNS: Afebrile 36.8, heart rate 73, glucose 137/80, respiratory rate 19, 
room air oxygenation 96%.

GENERAL: The patient is a very pleasant man with balding hair, he is wearing 
glasses. Extraocular movements intact. Oral mucosa is moist.

CHEST: Clear to auscultation with unlabored respirations.

HEART: Regular S1, S2. He still has a resolving bruising on his left upper 
shoulder and forearm from when he was lying on the ground.

ABDOMEN: Benign, soft, nontender and the patient remarks "I can't even remember 
the last time I had a formed bowel movement," which he did have one today.

EXTREMITIES: Notable for no edema. Additionally, the patient did a clock 
drawing test prior to discharge, he was able to adequately demonstrate 11:20, 
and of note, the prior day, he showed lines which made no sense for a clock. He 
also could name many animals in a 1 minute period, where he could only name 6 
the day before.

 

 

 

DD:06/23/2017 07:47:00  DT: 06/23/2017 15:42  JOB #: [id number]  EXT JOB #:[id number]

PAZ

## 2023-08-15 NOTE — CONSULTS
[address], [address] [place] [address]                                  CONSULTATION    PATIENT NAME: Bernard Pan                    :        1945  MED REC NO:   [id number]                            ROOM:  ACCOUNT NO:   [de-identified]                           ADMIT DATE: 2023  PROVIDER:     Anastasiya Aguillon MD    CONSULT DATE:  08/15/2023    CHIEF COMPLAINT:  Kidney stones as well as possible abscess. HISTORY OF PRESENT ILLNESS:  This 72-year-old female who is debilitated  from multiple sclerosis and has not been ambulatory since ,  underwent a CT of her abdomen as an outpatient, was found to have an  upper ureteral stone on the left side with hydronephrosis and possibly  an abscess around the kidney. The patient was advised to come to the  hospital.  She is alert, but did not able to give much of a history. She denies any pain in her abdomen. The patient does wear Depends for  urinary control. PAST MEDICAL HISTORY:  Includes coronary artery disease, hypertension,  hypercholesterolemia, hypothyroidism, history of myocardial infarction,  multiple sclerosis, sarcoidosis, osteoporosis, and diabetes. PAST SURGICAL HISTORY:  Includes cholecystectomy, coronary angioplasty  with stent placement, cystoscopy, and removal of calculi on the left  side in 2020. She also had a femoral fracture on her left side and  she is not able to bear any weight on this. She has also had a  hysterectomy, knee surgery, and laser lithotripsy in 2020. MEDICATIONS:  Include Tylenol, aspirin, atorvastatin, Lipitor, baclofen,  Bumex, calcium, Coreg, Celexa, Plavix, vitamin B12, dantrolene, ferrous  sulfate, albuterol, DuoNeb, Keppra, levothyroxine, lorazepam,  Glucophage, Nitrostat, nystatin, Zofran, MiraLAX, and cholecalciferol. ALLERGIES:  To AUGMENTIN, BACTRIM, and MACROBID. FAMILY HISTORY:  Unremarkable.     SOCIAL HISTORY:  The patient is , has never been a cigarette  smoker. She is cared for by her . PHYSICAL EXAMINATION:  GENERAL:  The patient is awake. Her level of orientation is  questionable. She does not appear to be in any pain. ABDOMEN:  Soft. There are no palpable masses. There is no apparent  tenderness. EXTREMITIES:  There is deformity of her left femur. IMPRESSION:  Left ureteral calculus with obstruction, questionable left  perirenal abscess. PLAN:  To do a cystoscopy, retrograde pyelogram, and possible stent  placement.         Veronica Pantoja MD    D: 08/15/2023 15:04:11       T: 08/15/2023 15:09:08     [id number]  Job#: [id number]     Doc#: [id number]    CC:

## 2023-08-15 NOTE — ED NOTES
Blood cultures obtained from right forearm, per policy. Set (one of two drawn at this time by Behzad dan lpn.            Sam Ramirez LPN  75/17/84 7809

## 2023-08-15 NOTE — BRIEF OP NOTE
Brief Postoperative Note      Patient: Bernard Pan  YOB: 1945  MRN: [id number]    Date of Procedure: 8/15/2023    Pre-Op Diagnosis Codes:     * Kidney stone [N20.0]    Post-Op Diagnosis: Same       Procedure(s):  CYSTOSCOPY RETROGRADE PYELOGRAM, URETEROSCOPY, LEFT STENT INSERTION, STRATTON CATHETER INSERTION    Surgeon(s):  Denice Severs, MD Theta Jakob, MD    Assistant:  * No surgical staff found *    Anesthesia: Monitor Anesthesia Care    Estimated Blood Loss (mL): less than 50     Complications: None    Specimens:   ID Type Source Tests Collected by Time Destination   1 : URINE Urine Urine, Cystoscopic CULTURE, URINE Denice Severs, MD 8/15/2023 1522        Implants:  Implant Name Type Inv.  Item Serial No.  Lot No. LRB No. Used Action   Luther Nelson [facility] PERCFLX FIRM DUROMETER DBL Singharely Gutings [id number]  Finn Blow 6FR [facility] PERCFLX FIRM DUROMETER DBL [facility]- [id number] Left 1 Implanted         Drains:   Ureteral Drain/Stent 08/15/23 Left Ureter (Active)       Urinary Catheter 08/15/23 2 Way (Active)       [REMOVED] External Urinary Catheter (Removed)   Site Assessment Clean,dry & intact 08/15/23 0955   Placement Initiated 08/15/23 0212   Securement Method Other (Comment) 08/15/23 0212   Perineal Care Yes 08/15/23 0212   Suction 40 mmgHg continuous 08/15/23 0212       Findings: stone      Electronically signed by Rich Brooks MD on 8/15/2023 at 4:47 PM

## 2023-08-15 NOTE — ED NOTES
Patients  at bedside very upset that patient hasn't eaten and that he hasn't been updated on plan of care. [facility] ER Manager at bedside speaking with patient and  at this time.       Claudeen Gut, RN  08/15/23 2405

## 2023-08-15 NOTE — CONSULTS
[facility] [address] [place] [address]    Name: Maximiliano Chaudhry    Age: 69 y.o. Date of Admission: 8/14/2023  3:29 PM    Date of Service: 8/15/2023    Reason for Consultation: pre-op cardiac evaluation    Referring Physician: Dr Angelika Hitchcock  Primary Care Physician: Shashi Carbajal MD    History of Present Illness: The patient is a 69y.o. year old female with history of CAD, sent to ED for further evaluation of flank pain and a CT scan showing a kidney stone and possibly and abscess. History of renal stents. She is arousable and denies any chest pain, SOB, palpitations. History of CAD with remote LAD stent. Non-STEMI 2021 post IRINA to ISR of old LAD stent and IRINA obtuse marginal at that time. Ischemic cardiomyopathy with echo early 2021 showing LVEF 20-25% and mild mitral regurgitation. Repeat echo 6/26/23 showed EF improved to 50-55%, moderately severe mitral insufficiency . Also, history of Hypertension, hyperlipidemia, diabetes, multiple sclerosis- gets around in wheelchair. Rt hip fracture, C7 fracture, right humerus fx this summer. Past Medical History:   Past Medical History:   Diagnosis Date    Anemia     CAD (coronary artery disease) 04/21/2020    High cholesterol     Hypertension     Hypothyroidism     Malabsorption     MI (myocardial infarction) ([address]) 04/21/2020    MS (multiple sclerosis) ([address])     Osteoporosis     Sarcoidosis     Seizure ([address]) 02/05/2021    Type 2 diabetes mellitus with hyperglycemia, without long-term current use of insulin ([address]) 10/30/2019       Review of Systems:   Constitutional: No fever, chills, sweats  Cardiac: As per HPI  Pulmonary: No cough, wheeze, hemoptysis  HEENT: No visual disturbances, difficult swallowing  GI: No nausea, vomiting, diarrhea, abdominal pain, rectal bleeding  : No dysuria or hematuria  Endocrine: No excessive thirst, heat or cold intolerance. Musculoskeletal: fractures as above  Skin: No skin breakdown or rashes  Neuro:  No

## 2023-08-15 NOTE — ED NOTES
Patient awake alert and oriented at this time. Patient denies need. Checked to see if patient is wet from incontinence noted to be dry and clean. Resp easy and unlabored.       Bisi Stroud RN  08/15/23 2856

## 2023-08-15 NOTE — ED NOTES
Spoke with Karl Badillo From Urology updated patient and  that patient would be going to surgery today around 2pm per tamiko and patient would continue to be NPO at this time.      Roxanne Philippe RN  08/15/23 7617

## 2023-08-15 NOTE — H&P
Hospital Medicine History & Physical      PCP: Jordana Hurt MD    Date of Admission: 8/14/2023    Date of Service: . AUGUST 15, 2023    Chief Complaint:   HAD A TEST AS AN OP AND SHE WAS FOUND TO HAVE A LEFT RENAL CALCULI AN ABSCESS IN THE RETROPERITONEAL AREA       History Of Present Illness:     69 y.o. female presented with HAD A TEST AS AN OP AND SHE WAS FOUND TO HAVE A LEFT RENAL CALCULI AN ABSCESS IN THE RETROPERITONEAL AREA **  AND ALSO FOUND TO HAVE A L4 COMPRESSION FRACTURE     Past Medical History:          Diagnosis Date    Anemia     CAD (coronary artery disease) 04/21/2020    High cholesterol     Hypertension     Hypothyroidism     Malabsorption     MI (myocardial infarction) ([address]) 04/21/2020    MS (multiple sclerosis) ([address])     Osteoporosis     Sarcoidosis     Seizure ([address]) 02/05/2021    Type 2 diabetes mellitus with hyperglycemia, without long-term current use of insulin ([address]) 10/30/2019       Past Surgical History:          Procedure Laterality Date    CHOLECYSTECTOMY  1990s    CORONARY ANGIOPLASTY WITH STENT PLACEMENT  04/21/2020    Dr. Burr Cluster   3.0 x 22mm Resolute MAAME to Prox LAD.   No LV    CORONARY ANGIOPLASTY WITH STENT PLACEMENT  01/14/2021    3.0 x 30 Maame to the prox LAD and a 2.5 x 20 [place] to the Obtuse marginal by Dr. Jeffry Moon / Tran Flynn / Philip Wilson Left 4/23/2020    CYSTOSCOPY LEFT RETROGRADE PYELOGRAM [facility], [address] [place] [address] performed by Oral Bishop Austin, DO at [address] Left 3/21/2019    LEFT FEMUR CEPHALLOMEDULLARY NAIL performed by Anjana Capps MD at [address] Bilateral     HYSTERECTOMY (CERVIX STATUS UNKNOWN)      KNEE SURGERY      plate in lt knee    LITHOTRIPSY Left 11/23/2020    CYSTOSCOPY LEFT URETEROSCOPY,  LASER LITHOTRIPSY  BASKET EXTRACTION LEFT STONE, STENT EXCHANGE performed by

## 2023-08-16 ENCOUNTER — APPOINTMENT (OUTPATIENT)
Dept: CT IMAGING | Age: 78
End: 2023-08-16
Attending: UROLOGY
Payer: MEDICARE

## 2023-08-16 ENCOUNTER — APPOINTMENT (OUTPATIENT)
Dept: GENERAL RADIOLOGY | Age: 78
End: 2023-08-16
Payer: MEDICARE

## 2023-08-16 LAB
ANION GAP SERPL CALCULATED.3IONS-SCNC: 16 MMOL/L (ref 7–16)
BASOPHILS # BLD: 0.03 K/UL (ref 0–0.2)
BASOPHILS NFR BLD: 0 % (ref 0–2)
BUN SERPL-MCNC: 19 MG/DL (ref 6–23)
CALCIUM SERPL-MCNC: 9 MG/DL (ref 8.6–10.2)
CHLORIDE SERPL-SCNC: 98 MMOL/L (ref 98–107)
CO2 SERPL-SCNC: 24 MMOL/L (ref 22–29)
CREAT SERPL-MCNC: 0.6 MG/DL (ref 0.5–1)
DATE LAST DOSE: NORMAL
EOSINOPHIL # BLD: 0.08 K/UL (ref 0.05–0.5)
EOSINOPHILS RELATIVE PERCENT: 1 % (ref 0–6)
ERYTHROCYTE [DISTWIDTH] IN BLOOD BY AUTOMATED COUNT: 13.6 % (ref 11.5–15)
GFR SERPL CREATININE-BSD FRML MDRD: >60 ML/MIN/1.73M2
GLUCOSE BLD-MCNC: 129 MG/DL (ref 74–99)
GLUCOSE BLD-MCNC: 168 MG/DL (ref 74–99)
GLUCOSE BLD-MCNC: 172 MG/DL (ref 74–99)
GLUCOSE SERPL-MCNC: 93 MG/DL (ref 74–99)
HCT VFR BLD AUTO: 27.5 % (ref 34–48)
HGB BLD-MCNC: 8.4 G/DL (ref 11.5–15.5)
IMM GRANULOCYTES # BLD AUTO: 0.08 K/UL (ref 0–0.58)
IMM GRANULOCYTES NFR BLD: 1 % (ref 0–5)
LYMPHOCYTES NFR BLD: 0.7 K/UL (ref 1.5–4)
LYMPHOCYTES RELATIVE PERCENT: 5 % (ref 20–42)
MCH RBC QN AUTO: 32.2 PG (ref 26–35)
MCHC RBC AUTO-ENTMCNC: 30.5 G/DL (ref 32–34.5)
MCV RBC AUTO: 105.4 FL (ref 80–99.9)
MONOCYTES NFR BLD: 0.87 K/UL (ref 0.1–0.95)
MONOCYTES NFR BLD: 6 % (ref 2–12)
NEUTROPHILS NFR BLD: 88 % (ref 43–80)
NEUTS SEG NFR BLD: 13.17 K/UL (ref 1.8–7.3)
PLATELET # BLD AUTO: 437 K/UL (ref 130–450)
PMV BLD AUTO: 10.7 FL (ref 7–12)
POTASSIUM SERPL-SCNC: 4.2 MMOL/L (ref 3.5–5)
RBC # BLD AUTO: 2.61 M/UL (ref 3.5–5.5)
SODIUM SERPL-SCNC: 138 MMOL/L (ref 132–146)
TME LAST DOSE: NORMAL H
VANCOMYCIN DOSE: NORMAL MG
VANCOMYCIN SERPL-MCNC: 19.3 UG/ML (ref 5–40)
WBC OTHER # BLD: 14.9 K/UL (ref 4.5–11.5)

## 2023-08-16 PROCEDURE — 6360000002 HC RX W HCPCS: Performed by: UROLOGY

## 2023-08-16 PROCEDURE — 6360000004 HC RX CONTRAST MEDICATION: Performed by: RADIOLOGY

## 2023-08-16 PROCEDURE — 74178 CT ABD&PLV WO CNTR FLWD CNTR: CPT

## 2023-08-16 PROCEDURE — 6370000000 HC RX 637 (ALT 250 FOR IP): Performed by: UROLOGY

## 2023-08-16 PROCEDURE — 2700000000 HC OXYGEN THERAPY PER DAY

## 2023-08-16 PROCEDURE — 94640 AIRWAY INHALATION TREATMENT: CPT

## 2023-08-16 PROCEDURE — 2580000003 HC RX 258: Performed by: UROLOGY

## 2023-08-16 PROCEDURE — 82962 GLUCOSE BLOOD TEST: CPT

## 2023-08-16 PROCEDURE — 6370000000 HC RX 637 (ALT 250 FOR IP): Performed by: INTERNAL MEDICINE

## 2023-08-16 PROCEDURE — 36415 COLL VENOUS BLD VENIPUNCTURE: CPT

## 2023-08-16 PROCEDURE — 85025 COMPLETE CBC W/AUTO DIFF WBC: CPT

## 2023-08-16 PROCEDURE — 74018 RADEX ABDOMEN 1 VIEW: CPT

## 2023-08-16 PROCEDURE — 94664 DEMO&/EVAL PT USE INHALER: CPT

## 2023-08-16 PROCEDURE — 80048 BASIC METABOLIC PNL TOTAL CA: CPT

## 2023-08-16 PROCEDURE — 80202 ASSAY OF VANCOMYCIN: CPT

## 2023-08-16 PROCEDURE — 2060000000 HC ICU INTERMEDIATE R&B

## 2023-08-16 RX ORDER — SODIUM CHLORIDE 0.9 % (FLUSH) 0.9 %
10 SYRINGE (ML) INJECTION
Status: DISPENSED | OUTPATIENT
Start: 2023-08-16 | End: 2023-08-17

## 2023-08-16 RX ADMIN — ATORVASTATIN CALCIUM 10 MG: 10 TABLET, FILM COATED ORAL at 20:28

## 2023-08-16 RX ADMIN — BUMETANIDE 1 MG: 1 TABLET ORAL at 08:45

## 2023-08-16 RX ADMIN — RANOLAZINE 1000 MG: 500 TABLET, FILM COATED, EXTENDED RELEASE ORAL at 20:28

## 2023-08-16 RX ADMIN — SODIUM CHLORIDE 1 G: 1 TABLET ORAL at 08:51

## 2023-08-16 RX ADMIN — DANTROLENE SODIUM 100 MG: 25 CAPSULE ORAL at 08:46

## 2023-08-16 RX ADMIN — LEVETIRACETAM 500 MG: 500 TABLET, FILM COATED ORAL at 08:46

## 2023-08-16 RX ADMIN — IOPAMIDOL 75 ML: 755 INJECTION, SOLUTION INTRAVENOUS at 09:41

## 2023-08-16 RX ADMIN — DANTROLENE SODIUM 100 MG: 25 CAPSULE ORAL at 20:28

## 2023-08-16 RX ADMIN — Medication 400 MG: at 08:50

## 2023-08-16 RX ADMIN — LEVOTHYROXINE SODIUM 88 MCG: 0.09 TABLET ORAL at 06:34

## 2023-08-16 RX ADMIN — RANOLAZINE 1000 MG: 500 TABLET, FILM COATED, EXTENDED RELEASE ORAL at 08:47

## 2023-08-16 RX ADMIN — FERROUS SULFATE TAB 325 MG (65 MG ELEMENTAL FE) 325 MG: 325 (65 FE) TAB at 08:46

## 2023-08-16 RX ADMIN — PETROLATUM: 420 OINTMENT TOPICAL at 12:41

## 2023-08-16 RX ADMIN — BUMETANIDE 1 MG: 1 TABLET ORAL at 20:28

## 2023-08-16 RX ADMIN — IPRATROPIUM BROMIDE AND ALBUTEROL SULFATE 1 DOSE: .5; 2.5 SOLUTION RESPIRATORY (INHALATION) at 16:34

## 2023-08-16 RX ADMIN — CALCIUM CARBONATE-VITAMIN D TAB 500 MG-200 UNIT 1 TABLET: 500-200 TAB at 20:29

## 2023-08-16 RX ADMIN — IPRATROPIUM BROMIDE AND ALBUTEROL SULFATE 1 DOSE: .5; 2.5 SOLUTION RESPIRATORY (INHALATION) at 12:41

## 2023-08-16 RX ADMIN — IPRATROPIUM BROMIDE AND ALBUTEROL SULFATE 1 DOSE: .5; 2.5 SOLUTION RESPIRATORY (INHALATION) at 19:36

## 2023-08-16 RX ADMIN — CEFEPIME 2000 MG: 2 INJECTION, POWDER, FOR SOLUTION INTRAVENOUS at 03:46

## 2023-08-16 RX ADMIN — SODIUM CHLORIDE: 9 INJECTION, SOLUTION INTRAVENOUS at 15:54

## 2023-08-16 RX ADMIN — Medication 1000 UNITS: at 08:47

## 2023-08-16 RX ADMIN — CEFEPIME 2000 MG: 2 INJECTION, POWDER, FOR SOLUTION INTRAVENOUS at 15:57

## 2023-08-16 RX ADMIN — PETROLATUM: 420 OINTMENT TOPICAL at 21:04

## 2023-08-16 RX ADMIN — CYANOCOBALAMIN TAB 1000 MCG 500 MCG: 1000 TAB at 08:46

## 2023-08-16 RX ADMIN — BACLOFEN 40 MG: 10 TABLET ORAL at 08:46

## 2023-08-16 RX ADMIN — BACLOFEN 40 MG: 10 TABLET ORAL at 20:28

## 2023-08-16 RX ADMIN — LEVETIRACETAM 500 MG: 500 TABLET, FILM COATED ORAL at 20:29

## 2023-08-16 RX ADMIN — VANCOMYCIN HYDROCHLORIDE 750 MG: 10 INJECTION, POWDER, LYOPHILIZED, FOR SOLUTION INTRAVENOUS at 17:25

## 2023-08-16 NOTE — PROGRESS NOTES
[address] [facility]  [facility]  Progress note      HISTORY OF PRESENT ILLNESS:   60-year-old female with past medical history of CAD, HLD, HTN, hypothyroidism, MS, sarcoidosis, DM presented with left-sided kidney stone and left-sided pyelonephritis. She had a previous history of Enterobacter cloacae UTI. Urinalysis is positive for pyuria and bacteriuria. CT abdomen showing left-sided kidney stone with left-sided pyelonephritis and possible renal abscess. Status post cystoscopy and left-sided JJ stent insertion on 08/15 by urology. ID consulted for UTI. Past Medical History:        Diagnosis Date    Anemia     CAD (coronary artery disease) 04/21/2020    High cholesterol     Hypertension     Hypothyroidism     Malabsorption     MI (myocardial infarction) ([address]) 04/21/2020    MS (multiple sclerosis) ([address])     Osteoporosis     Sarcoidosis     Seizure ([address]) 02/05/2021    Type 2 diabetes mellitus with hyperglycemia, without long-term current use of insulin ([address]) 10/30/2019     Past Surgical History:        Procedure Laterality Date    BLADDER SURGERY Left 8/15/2023    CYSTOSCOPY RETROGRADE PYELOGRAM, URETEROSCOPY, LEFT STENT INSERTION, STRATTON CATHETER INSERTION performed by Elda Fountain MD at [address]  04/21/2020    Dr. Mary Chavez   3.0 x 22mm Resolute MAAME to Prox LAD.   No LV    CORONARY ANGIOPLASTY WITH STENT PLACEMENT  01/14/2021    3.0 x 30 Maame to the prox LAD and a 2.5 x 20 [place] to the Obtuse marginal by Dr. Carrie Cahse / Lexie Sinclair / Aurora Braswell Left 4/23/2020    CYSTOSCOPY LEFT RETROGRADE PYELOGRAM STENT INSERTION, [address] performed by Ruby Avila DO at [address] Left 3/21/2019    LEFT FEMUR CEPHALLOMEDULLARY NAIL performed by Enzo Jaimes MD at [address] Bilateral     HYSTERECTOMY (CERVIX STATUS UNKNOWN)      KNEE SURGERY      plate in lt to follow    Thank you for having us see this patient in consultation. I will be discussing this case with the treating physicians.       Electronically signed by Jacqui Schroeder MD on 8/16/2023 at 5:14 PM

## 2023-08-16 NOTE — PROGRESS NOTES
OT SESSION ATTEMPT     Date:2023  Patient Name: Hilary Montalvo  MRN: [id number]  : 1945  Room: [address] [place], [place] [address]     Attempted OT session this date:    [] unavailable due to other medical staff currently with pt   [x] on hold, await MRI/ neurosurgical recommendations. [] on hold per nursing staff secondary to lab / radiology results    [] Pt declined Occupational Therapy  this date. Benefits of participation in therapy reviewed with pt.    [] off unit   [] Other:     Will reattempt OT eval at a later time.     [address] [facility], [place] [address]

## 2023-08-16 NOTE — OP NOTE
[address], [address] [place] [address]                                OPERATIVE REPORT    PATIENT NAME: Blas Vallecillo                    :        1945  MED REC NO:   [id number]                            ROOM:       8506  ACCOUNT NO:   [de-identified]                           ADMIT DATE: 2023  PROVIDER:     Joo Loredo MD    DATE OF PROCEDURE:  08/15/2023    PREOPERATIVE DIAGNOSIS:  Left hydronephrosis with obstruction from  calculi. POSTOPERATIVE DIAGNOSIS:  Left hydronephrosis with obstruction from  calculi. OPERATION:  Cystopanendoscopy, retrograde pyelogram, ureteroscopy,  placement of stent. SURGEONS:  Joo Loredo MD and Anamika Cerrato MD    ANESTHESIA:  [facility]. ESTIMATED BLOOD LOSS:  Less than 50 mL. OPERATION REPORT:  With the patient in the lithotomy position under  satisfactory sedation, the genitalia were prepped and draped in a  sterile manner. A 21-Bulgarian panendoscope was passed into the bladder. Inspection revealed the left ureteral orifice to be somewhat gaping. A  retrograde was performed. The ureter was normal up to the upper ureter  where there was a large calculus just below the ureteropelvic junction. Attempts to pass a wire beyond this point were unsuccessful. Therefore,  ureteroscopy was carried out and a passage around this calculus was  made. However, the ureteroscope could not be advanced into the renal  pelvis, and there was some question as to whether the wire was in extra  renal space. After multiple attempts, a 26-cm 6-Bulgarian double-J stent  was passed. It did curl above the stone in the upper ureter and  appeared to be in good position. Barger catheter was left in the  bladder. The patient was sent to the recovery room. The patient will  undergo a CT. There is a question of perinephric abscess on her CAT  scan. This will be re-evaluated.         Ramakrishna Patel

## 2023-08-16 NOTE — ACP (ADVANCE CARE PLANNING)
Advance Care Planning   Healthcare Decision Maker:    Primary Decision Maker: Owen Douglas - Spouse - [phone]    Secondary Decision Maker: Michelle Lee - Child - [phone]    Click here to complete Healthcare Decision Makers including selection of the Healthcare Decision Maker Relationship (ie \"Primary\").

## 2023-08-16 NOTE — CARE COORDINATION
Presents emergency room returned. Patient was found to have a kidney stone approximately 4 days ago. Patient was sent in by her primary care doctor as well as Dr. Ahmet Orozco for surgery per . AND ALSO FOUND TO HAVE A L4 COMPRESSION FRACTURE  POD #1 CYSTOSCOPY RETROGRADE PYELOGRAM, URETEROSCOPY, LEFT STENT INSERTION, STRATTON CATHETER INSERTION. Neurosurgery consult. Met with patient and  Teresita Ga in room to discuss role/transition of care. They live in a trilevel with 1 st floor set up. Her PCP is Dr Kendra Phelan  and they use [facility] or [facility] in [place]. They have a eliseo lift, power cjair, NIV,  shower chair and wc. She has an aid from [employer]  3X /week for 2 hr/day. SHe has been to [employer]. Discharge plan is to reutrn home  and  would transport- has wc van and power chair. Await pt/ot to assist with discharge plan. Cm /sw to follow. Electronically signed by Radha Mina RN on 8/16/2023 at 12:33 PM    Case Management Assessment  Initial Evaluation    Date/Time of Evaluation: 8/16/2023 12:37 PM  Assessment Completed by: Radha Mina RN    If patient is discharged prior to next notation, then this note serves as note for discharge by case management. Patient Name: Laverne Charles                   YOB: 1945  Diagnosis: Pyonephrosis [N13.6]  Nephrolithiasis [N20.0]  Retroperitoneal abscess [facility]) [K68.19]                   Date / Time: 8/14/2023  3:29 PM    Patient Admission Status: Inpatient   Readmission Risk (Low < 19, Mod (19-27), High > 27): Readmission Risk Score: 22.7    Current PCP: Olivia Bryan MD  PCP verified by CM? Yes    Chart Reviewed: Yes      History Provided by: Significant Other  Patient Orientation: Person    Patient Cognition: Alert (to person only)    Hospitalization in the last 30 days (Readmission):  No    If yes, Readmission Assessment in  Navigator will be completed.     Advance Directives:      Code Status: DNR-CCA   Patient's Primary support    Barriers to discharge: Additional Case Management Notes: The Plan for Transition of Care is related to the following treatment goals of Pyonephrosis [N13.6]  Nephrolithiasis [N20.0]  Retroperitoneal abscess ([address]) [O63.83]    IF APPLICABLE: The Patient and/or patient representative Ebony Troy and her family were provided with a choice of provider and agrees with the discharge plan. Freedom of choice list with basic dialogue that supports the patient's individualized plan of care/goals and shares the quality data associated with the providers was provided to:     Patient Representative Name:  Teresita Ga       The Patient and/or Patient Representative Agree with the Discharge Plan?    Yes     Radha Mina RN  Case Management Department

## 2023-08-16 NOTE — PROGRESS NOTES
Physical Therapy  Physical Therapy Attempt    Name: Jamie Hudson  : 1945  MRN: [id number]      Date of Service: 2023  Chart reviewed. Awaiting MRI of lumbar spine to determine acuity of L4 compression fx. Will re-attempt as able.     Vicky Ames PT, DPT  [id number]

## 2023-08-16 NOTE — FLOWSHEET NOTE
Inpatient Wound Care (Initial consult) [id number]    Admit Date: 8/14/2023  3:29 PM    Reason for consult:  Coccyx    Significant history: Per H&P    Chief Complaint:   HAD A TEST AS AN OP AND SHE WAS FOUND TO HAVE A LEFT RENAL CALCULI AN ABSCESS IN THE RETROPERITONEAL AREA         History Of Present Illness:      69 y.o. female presented with HAD A TEST AS AN OP AND SHE WAS FOUND TO HAVE A LEFT RENAL CALCULI AN ABSCESS IN THE RETROPERITONEAL AREA **  AND ALSO FOUND TO HAVE A L4 COMPRESSION FRACTURE        Findings:     08/16/23 1047   Skin Integumentary    Skin Integrity   (dry flaky)   Location BLE   Skin Integrity Site 2   Skin Integrity Location 2 Ecchymosis   Location 2 BUE   Skin Integrity Site 3   Skin Integrity Location 3   (old healed areas)    Location 3 bilateral buttocks   Wound 08/16/23 Buttocks Left   Date First Assessed/Time First Assessed: 08/16/23 1047   Present on Hospital Admission: Yes  Location: Buttocks  Wound Location Orientation: Left   Wound Image    Wound Etiology Deep tissue/Injury   Dressing/Treatment Pharmaceutical agent (see MAR)   Wound Length (cm) 1.8 cm   Wound Width (cm) 1.8 cm   Wound Depth (cm)   (Unable to determine)   Wound Surface Area (cm^2) 3.24 cm^2   Wound Assessment Purple/maroon   Drainage Amount None (dry)   Odor None   Gloria-wound Assessment Dry/flaky   Wound 08/16/23 Buttocks Left;Upper   Date First Assessed/Time First Assessed: 08/16/23 1047   Present on Hospital Admission: Yes  Location: Buttocks  Wound Location Orientation: Left;Upper   Wound Image    Wound Etiology Deep tissue/Injury   Dressing/Treatment Pharmaceutical agent (see MAR)   Wound Length (cm) 1 cm   Wound Width (cm) 1 cm   Wound Depth (cm)   (unable to determine)   Wound Surface Area (cm^2) 1 cm^2   Change in Wound Size % (l*w) 0   Wound Assessment Purple/maroon   Drainage Amount None (dry)   Odor None   Gloria-wound Assessment Dry/flaky       **Informed Consent**    The patient has given verbal consent to have

## 2023-08-16 NOTE — PROGRESS NOTES
[facility] [address]  progress    Name: Morena Rosales    Age: 69 y.o. Date of Admission: 8/14/2023  3:29 PM    Date of Service: 8/16/2023    Reason for Consultation: pre-op cardiac evaluation    Referring Physician: Dr Mary Kate Mcdermott  Primary Care Physician: Nicki Bacon MD    History of Present Illness: The patient is a 69y.o. year old female with history of CAD, sent to ED for further evaluation of flank pain and a CT scan showing a kidney stone and possibly and abscess. History of renal stents. She is arousable and denies any chest pain, SOB, palpitations. History of CAD with remote LAD stent. Non-STEMI 2021 post IRINA to ISR of old LAD stent and IRINA obtuse marginal at that time. Ischemic cardiomyopathy with echo early 2021 showing LVEF 20-25% and mild mitral regurgitation. Repeat echo 6/26/23 showed EF improved to 50-55%, moderately severe mitral insufficiency . Also, history of Hypertension, hyperlipidemia, diabetes, multiple sclerosis- gets around in wheelchair. Rt hip fracture, C7 fracture, right humerus fx this summer. 8/16/23:  Had ureteral stent but stone could not be retrieved. WBC up, Patient less interactive per . Denies any pain.    Tele sinus       Past Medical History:   Past Medical History:   Diagnosis Date    Anemia     CAD (coronary artery disease) 04/21/2020    High cholesterol     Hypertension     Hypothyroidism     Malabsorption     MI (myocardial infarction) ([address]) 04/21/2020    MS (multiple sclerosis) ([address] )     Osteoporosis     Sarcoidosis     Seizure ([address]) 02/05/2021    Type 2 diabetes mellitus with hyperglycemia, without long-term current use of insulin ([address]) 10/30/2019       Review of Systems:   Constitutional: No fever, chills, sweats  Cardiac: As per HPI  Pulmonary: No cough, wheeze, hemoptysis  HEENT: No visual disturbances, difficult swallowing  GI: No nausea, vomiting, diarrhea, abdominal pain, rectal bleeding  : No dysuria or

## 2023-08-16 NOTE — CONSULTS
[address], [address] [place] [address]                                  CONSULTATION    PATIENT NAME: Alfreda De La Cruz                    :        1945  MED REC NO:   [id number]                            ROOM:       8506  ACCOUNT NO:   [de-identified]                           ADMIT DATE: 2023  PROVIDER:     Sharon Booth MD    CONSULT DATE:  08/15/2023    REASON FOR CONSULT:  L4 compression fracture. HISTORY OF PRESENT ILLNESS:  The patient is a 21-year-old lady who  presented to the hospital after being diagnosed with a left renal  calculus and a retroperitoneal abscess. Part of her workup included a  CT scan that picked an L4 compression fracture. As a result of that,  neurosurgery service was consulted. At this time, she denies any real  back pain or any significant discomfort and she states that she is able  to move around in bed without significant difficulty. However, she does  state that she has weakness in her legs. It is unclear as to whether or  not this is new or old. PAST MEDICAL HISTORY:  Positive for anemia, coronary artery disease,  hyperlipidemia, hypertension, hypothyroidism, multiple sclerosis,  osteoporosis, sarcoidosis, and diabetes. PAST SURGICAL HISTORY:  Positive for cholecystectomy, coronary  angioplasty with stent placement, cystoscopy, humeral fracture surgery,  hysterectomy, lithotripsy. FAMILY HISTORY:  Positive for stroke in her mother. SOCIAL HISTORY:  Negative for tobacco or alcohol use. CURRENT MEDICATIONS:  Include aspirin, Plavix, Bumex, baclofen, Lipitor. REVIEW OF SYSTEMS:  HEENT:  Negative for headache, double vision, blurry  vision. CARDIOVASCULAR:  Negative for chest pain, arrhythmia, or  palpitation. RESPIRATORY:  Positive for shortness of breath. GASTROINTESTINAL:  Negative for nausea, vomiting, diarrhea, or  constipation.   GENITOURINARY:  Negative for hematuria or dysuria. HEMATOLOGIC:  Positive for easy bruising. INFECTIOUS:  Positive for  retroperitoneal abscess. MUSCULOSKELETAL:  Positive for joint stiffness  and swelling. PSYCHIATRIC:  Negative for anxiety or depression. NEUROLOGIC:  Negative for seizure or stroke. ENDOCRINE:  Positive for  diabetes. PHYSICAL EXAMINATION:  VITAL SIGNS:  She is currently afebrile with a T-current of 36.4 degrees  Celsius, pulse 76, blood pressure is 118/49, respiratory rate is 16. GENERAL:  She is resting in bed. She does have her CPAP mask on. She  does not appear to be in any acute distress. Appears her stated age. HEENT:  Her head is normocephalic and atraumatic. Pupils are 3 to 2 mm  and reactive. She has no drainage out of her eyes, ears, nose or  throat. SKIN:  Her skin is warm and dry. MUSCULOSKELETAL:  She has got decreased range of motion in her bilateral  lower extremities. ABDOMEN:  Soft, nontender, nondistended. RESPIRATORY:  She is not using any accessory muscles of respiration. NEUROLOGIC:  Rest of her neurologic exam, she is awake, alert, and  oriented to person in the hospital, disoriented to time. Cranial nerves  II through XII intact bilaterally. Motor exam reveals 0 out of 5  strength distally in her lower extremities. Has about 2 out of 5  strength proximally in her lower extremity and 2 out of 5 strength in  her bilateral upper extremity. Sensation is grossly intact to light  touch. LAB VALUES:  She has sodium of 134, potassium 4.6, BUN 21, creatinine  0.5. On review of imaging, CT scan of her abdomen and pelvis shows an  L4 compression fracture of indeterminate age. ASSESSMENT AND PLAN:  The patient is a 32-year-old lady with an L4  compression fracture of indeterminate age. She is neurologically  stable. Plan is to obtain an MRI of her lumbar spine to determine  acuity of the fracture.         Aislinn Torres MD    D: 08/15/2023 23:02:46       T: 08/15/2023 23:31:11

## 2023-08-17 ENCOUNTER — APPOINTMENT (OUTPATIENT)
Dept: CT IMAGING | Age: 78
End: 2023-08-17
Payer: MEDICARE

## 2023-08-17 PROBLEM — E43 SEVERE PROTEIN-CALORIE MALNUTRITION (HCC): Chronic | Status: ACTIVE | Noted: 2023-08-17

## 2023-08-17 LAB
ANION GAP SERPL CALCULATED.3IONS-SCNC: 10 MMOL/L (ref 7–16)
BASOPHILS # BLD: 0.03 K/UL (ref 0–0.2)
BASOPHILS NFR BLD: 0 % (ref 0–2)
BUN SERPL-MCNC: 21 MG/DL (ref 6–23)
CALCIUM SERPL-MCNC: 8.7 MG/DL (ref 8.6–10.2)
CHLORIDE SERPL-SCNC: 102 MMOL/L (ref 98–107)
CO2 SERPL-SCNC: 25 MMOL/L (ref 22–29)
CREAT SERPL-MCNC: 0.7 MG/DL (ref 0.5–1)
DATE LAST DOSE: NORMAL
EOSINOPHIL # BLD: 0.15 K/UL (ref 0.05–0.5)
EOSINOPHILS RELATIVE PERCENT: 1 % (ref 0–6)
ERYTHROCYTE [DISTWIDTH] IN BLOOD BY AUTOMATED COUNT: 13.6 % (ref 11.5–15)
GFR SERPL CREATININE-BSD FRML MDRD: >60 ML/MIN/1.73M2
GLUCOSE BLD-MCNC: 115 MG/DL (ref 74–99)
GLUCOSE BLD-MCNC: 121 MG/DL (ref 74–99)
GLUCOSE BLD-MCNC: 124 MG/DL (ref 74–99)
GLUCOSE BLD-MCNC: 134 MG/DL (ref 74–99)
GLUCOSE SERPL-MCNC: 123 MG/DL (ref 74–99)
HCT VFR BLD AUTO: 21.7 % (ref 34–48)
HCT VFR BLD AUTO: 22.1 % (ref 34–48)
HCT VFR BLD AUTO: 33.1 % (ref 34–48)
HGB BLD-MCNC: 10.3 G/DL (ref 11.5–15.5)
HGB BLD-MCNC: 6.6 G/DL (ref 11.5–15.5)
HGB BLD-MCNC: 6.9 G/DL (ref 11.5–15.5)
IMM GRANULOCYTES # BLD AUTO: 0.07 K/UL (ref 0–0.58)
IMM GRANULOCYTES NFR BLD: 1 % (ref 0–5)
INR PPP: 1.1
LYMPHOCYTES NFR BLD: 0.81 K/UL (ref 1.5–4)
LYMPHOCYTES RELATIVE PERCENT: 7 % (ref 20–42)
MCH RBC QN AUTO: 31.4 PG (ref 26–35)
MCHC RBC AUTO-ENTMCNC: 30.4 G/DL (ref 32–34.5)
MCV RBC AUTO: 103.3 FL (ref 80–99.9)
MICROORGANISM SPEC CULT: NO GROWTH
MONOCYTES NFR BLD: 0.88 K/UL (ref 0.1–0.95)
MONOCYTES NFR BLD: 8 % (ref 2–12)
NEUTROPHILS NFR BLD: 83 % (ref 43–80)
NEUTS SEG NFR BLD: 9.38 K/UL (ref 1.8–7.3)
PLATELET # BLD AUTO: 369 K/UL (ref 130–450)
PMV BLD AUTO: 10.7 FL (ref 7–12)
POTASSIUM SERPL-SCNC: 4.2 MMOL/L (ref 3.5–5)
PROTHROMBIN TIME: 12.2 SEC (ref 9.3–12.4)
RBC # BLD AUTO: 2.1 M/UL (ref 3.5–5.5)
REASON FOR REJECTION: NORMAL
SODIUM SERPL-SCNC: 137 MMOL/L (ref 132–146)
SPECIMEN DESCRIPTION: NORMAL
SPECIMEN SOURCE: NORMAL
TME LAST DOSE: NORMAL H
VANCOMYCIN DOSE: NORMAL MG
VANCOMYCIN SERPL-MCNC: 17 UG/ML (ref 5–40)
WBC OTHER # BLD: 11.3 K/UL (ref 4.5–11.5)
ZZ NTE CLEAN UP: ORDERED TEST: NORMAL

## 2023-08-17 PROCEDURE — 2580000003 HC RX 258: Performed by: UROLOGY

## 2023-08-17 PROCEDURE — 2060000000 HC ICU INTERMEDIATE R&B

## 2023-08-17 PROCEDURE — 6360000004 HC RX CONTRAST MEDICATION: Performed by: RADIOLOGY

## 2023-08-17 PROCEDURE — 80202 ASSAY OF VANCOMYCIN: CPT

## 2023-08-17 PROCEDURE — 0T9180Z DRAINAGE OF LEFT KIDNEY WITH DRAINAGE DEVICE, VIA NATURAL OR ARTIFICIAL OPENING ENDOSCOPIC: ICD-10-PCS | Performed by: RADIOLOGY

## 2023-08-17 PROCEDURE — 87077 CULTURE AEROBIC IDENTIFY: CPT

## 2023-08-17 PROCEDURE — 6370000000 HC RX 637 (ALT 250 FOR IP): Performed by: UROLOGY

## 2023-08-17 PROCEDURE — 80048 BASIC METABOLIC PNL TOTAL CA: CPT

## 2023-08-17 PROCEDURE — 6360000002 HC RX W HCPCS: Performed by: RADIOLOGY

## 2023-08-17 PROCEDURE — 87205 SMEAR GRAM STAIN: CPT

## 2023-08-17 PROCEDURE — 6360000002 HC RX W HCPCS: Performed by: UROLOGY

## 2023-08-17 PROCEDURE — 36415 COLL VENOUS BLD VENIPUNCTURE: CPT

## 2023-08-17 PROCEDURE — 75989 ABSCESS DRAINAGE UNDER X-RAY: CPT

## 2023-08-17 PROCEDURE — 86923 COMPATIBILITY TEST ELECTRIC: CPT

## 2023-08-17 PROCEDURE — C1769 GUIDE WIRE: HCPCS

## 2023-08-17 PROCEDURE — 36430 TRANSFUSION BLD/BLD COMPNT: CPT

## 2023-08-17 PROCEDURE — 85025 COMPLETE CBC W/AUTO DIFF WBC: CPT

## 2023-08-17 PROCEDURE — 85014 HEMATOCRIT: CPT

## 2023-08-17 PROCEDURE — 86900 BLOOD TYPING SEROLOGIC ABO: CPT

## 2023-08-17 PROCEDURE — 94640 AIRWAY INHALATION TREATMENT: CPT

## 2023-08-17 PROCEDURE — 2700000000 HC OXYGEN THERAPY PER DAY

## 2023-08-17 PROCEDURE — 82962 GLUCOSE BLOOD TEST: CPT

## 2023-08-17 PROCEDURE — 85610 PROTHROMBIN TIME: CPT

## 2023-08-17 PROCEDURE — P9016 RBC LEUKOCYTES REDUCED: HCPCS

## 2023-08-17 PROCEDURE — 86901 BLOOD TYPING SEROLOGIC RH(D): CPT

## 2023-08-17 PROCEDURE — 50432 PLMT NEPHROSTOMY CATHETER: CPT

## 2023-08-17 PROCEDURE — 86850 RBC ANTIBODY SCREEN: CPT

## 2023-08-17 PROCEDURE — 85018 HEMOGLOBIN: CPT

## 2023-08-17 PROCEDURE — 87070 CULTURE OTHR SPECIMN AEROBIC: CPT

## 2023-08-17 PROCEDURE — 2500000003 HC RX 250 WO HCPCS: Performed by: RADIOLOGY

## 2023-08-17 PROCEDURE — 87075 CULTR BACTERIA EXCEPT BLOOD: CPT

## 2023-08-17 RX ORDER — SODIUM CHLORIDE 9 MG/ML
INJECTION, SOLUTION INTRAVENOUS PRN
Status: DISCONTINUED | OUTPATIENT
Start: 2023-08-17 | End: 2023-08-25

## 2023-08-17 RX ORDER — DIPHENHYDRAMINE HYDROCHLORIDE 50 MG/ML
INJECTION INTRAMUSCULAR; INTRAVENOUS PRN
Status: COMPLETED | OUTPATIENT
Start: 2023-08-17 | End: 2023-08-17

## 2023-08-17 RX ORDER — LIDOCAINE HYDROCHLORIDE AND EPINEPHRINE BITARTRATE 20; .01 MG/ML; MG/ML
INJECTION, SOLUTION SUBCUTANEOUS PRN
Status: COMPLETED | OUTPATIENT
Start: 2023-08-17 | End: 2023-08-17

## 2023-08-17 RX ORDER — LIDOCAINE HYDROCHLORIDE 20 MG/ML
INJECTION, SOLUTION INFILTRATION; PERINEURAL PRN
Status: COMPLETED | OUTPATIENT
Start: 2023-08-17 | End: 2023-08-17

## 2023-08-17 RX ADMIN — CEFEPIME 2000 MG: 2 INJECTION, POWDER, FOR SOLUTION INTRAVENOUS at 15:21

## 2023-08-17 RX ADMIN — IOPAMIDOL 10 ML: 755 INJECTION, SOLUTION INTRAVENOUS at 17:46

## 2023-08-17 RX ADMIN — LIDOCAINE HYDROCHLORIDE 3 ML: 20 INJECTION, SOLUTION INFILTRATION; PERINEURAL at 16:36

## 2023-08-17 RX ADMIN — Medication 10 ML: at 08:54

## 2023-08-17 RX ADMIN — RANOLAZINE 1000 MG: 500 TABLET, FILM COATED, EXTENDED RELEASE ORAL at 08:53

## 2023-08-17 RX ADMIN — LEVETIRACETAM 500 MG: 500 TABLET, FILM COATED ORAL at 08:54

## 2023-08-17 RX ADMIN — LEVOTHYROXINE SODIUM 88 MCG: 0.09 TABLET ORAL at 06:32

## 2023-08-17 RX ADMIN — CYANOCOBALAMIN TAB 1000 MCG 500 MCG: 1000 TAB at 08:53

## 2023-08-17 RX ADMIN — LIDOCAINE HYDROCHLORIDE 2 ML: 20 INJECTION, SOLUTION INFILTRATION; PERINEURAL at 17:01

## 2023-08-17 RX ADMIN — CEFEPIME 2000 MG: 2 INJECTION, POWDER, FOR SOLUTION INTRAVENOUS at 03:04

## 2023-08-17 RX ADMIN — IPRATROPIUM BROMIDE AND ALBUTEROL SULFATE 1 DOSE: .5; 2.5 SOLUTION RESPIRATORY (INHALATION) at 12:12

## 2023-08-17 RX ADMIN — PETROLATUM: 420 OINTMENT TOPICAL at 09:04

## 2023-08-17 RX ADMIN — LIDOCAINE HYDROCHLORIDE,EPINEPHRINE BITARTRATE 2 ML: 20; .01 INJECTION, SOLUTION INFILTRATION; PERINEURAL at 16:42

## 2023-08-17 RX ADMIN — LIDOCAINE HYDROCHLORIDE,EPINEPHRINE BITARTRATE 3 ML: 20; .01 INJECTION, SOLUTION INFILTRATION; PERINEURAL at 16:46

## 2023-08-17 RX ADMIN — IPRATROPIUM BROMIDE AND ALBUTEROL SULFATE 1 DOSE: .5; 2.5 SOLUTION RESPIRATORY (INHALATION) at 09:05

## 2023-08-17 RX ADMIN — DIPHENHYDRAMINE HYDROCHLORIDE 25 MG: 50 INJECTION, SOLUTION INTRAMUSCULAR; INTRAVENOUS at 16:33

## 2023-08-17 RX ADMIN — LIDOCAINE HYDROCHLORIDE 3 ML: 20 INJECTION, SOLUTION INFILTRATION; PERINEURAL at 16:46

## 2023-08-17 RX ADMIN — DANTROLENE SODIUM 100 MG: 25 CAPSULE ORAL at 08:54

## 2023-08-17 RX ADMIN — BUMETANIDE 1 MG: 1 TABLET ORAL at 08:53

## 2023-08-17 RX ADMIN — IPRATROPIUM BROMIDE AND ALBUTEROL SULFATE 1 DOSE: .5; 2.5 SOLUTION RESPIRATORY (INHALATION) at 20:50

## 2023-08-17 RX ADMIN — VANCOMYCIN HYDROCHLORIDE 750 MG: 10 INJECTION, POWDER, LYOPHILIZED, FOR SOLUTION INTRAVENOUS at 17:53

## 2023-08-17 RX ADMIN — PETROLATUM: 420 OINTMENT TOPICAL at 21:18

## 2023-08-17 RX ADMIN — Medication 1000 UNITS: at 08:53

## 2023-08-17 RX ADMIN — BACLOFEN 40 MG: 10 TABLET ORAL at 08:54

## 2023-08-17 RX ADMIN — FERROUS SULFATE TAB 325 MG (65 MG ELEMENTAL FE) 325 MG: 325 (65 FE) TAB at 08:53

## 2023-08-17 RX ADMIN — LIDOCAINE HYDROCHLORIDE,EPINEPHRINE BITARTRATE 2 ML: 20; .01 INJECTION, SOLUTION INFILTRATION; PERINEURAL at 17:01

## 2023-08-17 RX ADMIN — SODIUM CHLORIDE: 9 INJECTION, SOLUTION INTRAVENOUS at 14:25

## 2023-08-17 RX ADMIN — Medication 400 MG: at 08:53

## 2023-08-17 RX ADMIN — SODIUM CHLORIDE 1 G: 1 TABLET ORAL at 08:53

## 2023-08-17 ASSESSMENT — PAIN SCALES - GENERAL
PAINLEVEL_OUTOF10: 0

## 2023-08-17 NOTE — PROGRESS NOTES
06/22/2023    HDL 37 12/08/2022    HDL 34 (L) 09/08/2021     Lab Results   Component Value Date    TRIG 176 06/22/2023    TRIG 185 12/08/2022    TRIG 211 (H) 09/08/2021     No components found for: CHLPL    TSH:  No results for input(s): TSH in the last 72 hours. Lab Results   Component Value Date/Time    TSH 2.24 06/22/2023 10:23 AM       ABGs:  No results for input(s): PH, PO2, PCO2, HCO3, BE, O2SAT in the last 72 hours. Lactic Acid:  No results for input(s): LACTA in the last 72 hours. Radiology:  RAD Results:  XR ABDOMEN (KUB) (SINGLE AP VIEW)   Final Result   Retained colonic stool consistent with constipation. Malposition left proximal ureteral stent as demonstrated on recent CT. Right hydronephrosis and hydroureter of uncertain etiology. CT ABDOMEN PELVIS W WO CONTRAST Additional Contrast? None   Final Result   Mild positioning of the proximal portion of the left ureteral stent outside   of the left renal pelvis in the perinephric fat. There is a small amount of   fluid seen surrounding the proximal pigtail. The mid and distal portion of   the stent are within the ureter and bladder. There is moderate distension   identified of the renal collecting system on the right with enhancement and   thickening of the urothelium. Small amount of air seen within the ureter   possibly from instrumentation is well as within the bladder. Nonobstructing   stones seen within the left kidney. The adjacent mixed density fluid   collection posteriorly within the perinephric fat extending into the   subcutaneous tissues in the left back are stable and unchanged. Extensive stool seen scattered diffusely throughout the colon and distal   small bowel to suggest clinical presentation of constipation. There is   abnormal wall thickening identified of the distal sigmoid colon and rectum to   suggest a distal colitis/proctitis. No Gloria rectal abscess identified at   this time.       Stable skin thickening seen overlying the coccyx. Pressure ulcer cannot be   excluded. Abnormal wall thickening identified of the distal duodenum and proximal small   bowel to suggest possibly a mild colitis. FL RETROGRADE PYELOGRAM W WO KUB   Final Result   Left retrograde pyelogram and left ureteral stent insertion with fluoroscopy. Please see separate procedure report for details. MRI LUMBAR SPINE WO CONTRAST    (Results Pending)   IR INTERVENTIONAL RADIOLOGY PROCEDURE REQUEST    (Results Pending)           ASSESSMENT / PLAN:    1. Gloria-op cardiac evaluation- clinically appears stable and at baseline from a cardiac standpoint. Elevated risk due to CAD history and co morbid conditions, but recent echo less than 2 months ago was good and she is asymptomatic. She does not need further cardiac testing for cystoscopy/stent. 2 CAD with LAD stenting and ISR with repeat stenting on lifelong DAPT with asa and plavix. Continue coreg, atorvastatin  3 Kidney stone/ possible abscess- per urology. 4 Hyperlipidemia- on atorvastatin  5 Multiple sclerosis- chronic - wheelchair/bed bound  6 DM her hospitalist  7  Moderately severe mitral insufficiency- monitor with serial echos, LVEF was improved last echo  8 Anemia-will probably need transfused, may need to temporarily hold plavix.         Kristian Turner MD, [facility] [place]  [facility]    Electronically signed by Kristian Turner MD on 8/17/2023 at 9:40 AM

## 2023-08-17 NOTE — PROGRESS NOTES
Patient arrived via bed with spouse to Radiology department for neph tube placement and abscess drain placement. Allergies and fasting instructions reviewed with patient. Vital signs taken. Procedural instructions given, questions answered, understanding expressed and consent signed. Patient given fluoroscopy education, no questions at this time. Blood infusing, nurse to nurse handoff given at bedside.

## 2023-08-17 NOTE — BRIEF OP NOTE
Brief Postoperative Note      Patient: Angela Neal  YOB: 1945  MRN: [id number]    Date of Procedure: 8/17/2023    Septicemia. Pyuria, hydronephrosis    Post-Op Diagnosis: Same       (1) superficial abscesses drained and  2 catheters were placed, 2) left nephrostomy catheter placed. Cosme Galvez    Assistant:  * No surgical staff found *    Anesthesia: *Moderate sedation*    Estimated Blood Loss (mL): Minimal    Complications: None    Specimens:   Pus from abscess and pus from kidney    Implants:  3 drains. The superior is in the kidney and  the two lower drains are  superficial in skin abscesses at ÓSCAR bulbs. Drains:   Ureteral Drain/Stent 08/15/23 Left Ureter (Active)       Closed/Suction Drain Inferior; Lateral;Left Back (Active)       Closed/Suction Drain Inferior; Left Back Bulb (Active)       Nephrostomy Tube Left Flank 8 fr (Active)       Urinary Catheter 08/16/23 (Active)   Site Assessment No urethral drainage 08/17/23 1120   Urine Color Bloody; Alice 08/17/23 1120   Urine Appearance Cloudy 08/17/23 1120   Urine Odor Malodorous 08/17/23 0845   Collection Container Standard 08/17/23 1120   Securement Method Leg strap 08/17/23 1120   Catheter Care  Soap and water 08/17/23 0845   Catheter Best Practices  Drainage tube clipped to bed;Catheter secured to thigh; Tamper seal intact; Bag below bladder;Bag not on floor; Lack of dependent loop in tubing;Drainage bag less than half full 08/17/23 0845   Status Draining 08/17/23 1120   Manual Irrigation Volume Input (mL) 650 mL 08/17/23 0555   Output (mL) 325 mL 08/16/23 1908       [REMOVED] Urinary Catheter 08/15/23 2 Way (Removed)   $ Urethral catheter insertion Inserted for procedure 08/16/23 0840   Catheter Indications Perioperative use for selected surgical procedures 08/16/23 1930   Site Assessment No urethral drainage 08/16/23 1930   Urine Color Yellow 08/16/23 1930   Urine Appearance Clear 08/16/23 1930   Catheter Care  Soap and water 08/16/23 1029

## 2023-08-17 NOTE — PROGRESS NOTES
Comprehensive Nutrition Assessment    Type and Reason for Visit:  Initial, Consult, Wound    Nutrition Recommendations/Plan:   Pt. Currently NPO for procedure. Recommend to start ONS w/ nutrition progression: Cedric BID and Glucerna BID to optimize nutrient intake/wound healing. Malnutrition Assessment:  Malnutrition Status:  Severe malnutrition (08/17/23 1045)    Context:  Chronic Illness     Findings of the 6 clinical characteristics of malnutrition:  Energy Intake:  75% or less estimated energy requirements for 1 month or longer  Weight Loss:  Unable to assess (lack of wt hx)     Body Fat Loss:  Severe body fat loss Orbital   Muscle Mass Loss:  Severe muscle mass loss Temples (temporalis), Clavicles (pectoralis & deltoids)  Fluid Accumulation:  No significant fluid accumulation     Strength:  Not Performed    Nutrition Assessment:    Pt. admitted for evaluation of flank pain and a CT scan showing a kidney stone and possible abscess. Now s/p Cystoscopy, retrograde pyelography, left ureteral stent placement 8/15. Pt. also found to have L4 compression fx. Noted IR plans for potential perinephric abscess drain and L nephrostomy tube today. PMHx of DM,CAD,MS. Hx of malnutrition. Pt. does meet criteria for severe malnutrition. Noted wounds. Pt. is at risk d/t severe malnutiriton and increased nutrient needs for wound healing. Pt. currently NPO for procedure. Will start ONS w/ nutrition progression and monitor.     Nutrition Related Findings:    Oriented x2, GI/BS WNL, hyperglycemia, no edema, +I/O Wound Type: Multiple, Deep Tissue Injury (Left buttock and left upper buttock; DTI)       Current Nutrition Intake & Therapies:    Average Meal Intake: NPO, 51-75% (x1 ; currently NPO for procedure)  Average Supplements Intake: None Ordered  Diet NPO Exceptions are: Sips of Water with Meds    Anthropometric Measures:  Height: 5' (152.4 cm)  Ideal Body Weight (IBW): 100 lbs (45 kg)    Admission Body Weight: 111 lb (50.3

## 2023-08-17 NOTE — PROGRESS NOTES
Physical Therapy    Date: 2023       Patient Name: Alfreda De La Cruz  : 1945      MRN: [id number]    PT held, await MRI to determine acuity of L4 compression fx  Will follow up as appropriate    Gabriela Hernandes PT, DPT  [id number]

## 2023-08-17 NOTE — PROGRESS NOTES
bladder. There is moderate distension   identified of the renal collecting system on the right with enhancement and   thickening of the urothelium. Small amount of air seen within the ureter   possibly from instrumentation is well as within the bladder. Nonobstructing   stones seen within the left kidney. The adjacent mixed density fluid   collection posteriorly within the perinephric fat extending into the   subcutaneous tissues in the left back are stable and unchanged. Extensive stool seen scattered diffusely throughout the colon and distal   small bowel to suggest clinical presentation of constipation. There is   abnormal wall thickening identified of the distal sigmoid colon and rectum to   suggest a distal colitis/proctitis. No Gloria rectal abscess identified at   this time. Stable skin thickening seen overlying the coccyx. Pressure ulcer cannot be   excluded. Abnormal wall thickening identified of the distal duodenum and proximal small   bowel to suggest possibly a mild colitis. FL RETROGRADE PYELOGRAM W WO KUB   Final Result   Left retrograde pyelogram and left ureteral stent insertion with fluoroscopy. Please see separate procedure report for details. MRI LUMBAR SPINE WO CONTRAST    (Results Pending)   CT ABSCESS DRAINAGE W CATH PLACEMENT S&I    (Results Pending)   CT NEPHROSTOMY CATHETER PLACEMENT    (Results Pending)       Microbiology:  Pending  No results for input(s): BC in the last 72 hours. No results for input(s): ORG in the last 72 hours. No results for input(s): Beola Alu in the last 72 hours. No results for input(s): STREPNEUMAGU in the last 72 hours. No results for input(s): LP1UAG in the last 72 hours. No results for input(s): ASO in the last 72 hours. No results for input(s): CULTRESP in the last 72 hours.     Assessment:  Left pyelonephritis  Left-sided kidney stone  S/P cystoscopy with left-sided JJ stent insertion by urology on 08/15  Possible

## 2023-08-17 NOTE — PROGRESS NOTES
Patient's blood transfusion stopped by staff in angio. H&H ordered post transfusion when patient returned to unit.

## 2023-08-17 NOTE — PROGRESS NOTES
Pt will need new cbc and INR for procedure today in IR. Please continue to keep NPO and thinner free.

## 2023-08-17 NOTE — PRE SEDATION
Sedation Pre-Procedure Note    Patient Name: Elda Douglas   YOB: 1945  Room/Bed: 8506/8506-B  Medical Record Number: [id number]  Date: 2023   Time: 5:48 PM       Indication:  bacteremia, abscesses    Consent: I have discussed with the patient and/or the patient representative the indication, alternatives, and the possible risks and/or complications of the planned procedure and the anesthesia methods. The patient and/or patient representative appear to understand and agree to proceed. Vital Signs:   Vitals:    23 1740   BP: (!) 169/82   Pulse: (!) 102   Resp: 15   Temp:    SpO2: 100%       Past Medical History:   has a past medical history of Anemia, CAD (coronary artery disease), High cholesterol, Hypertension, Hypothyroidism, Malabsorption, MI (myocardial infarction) ([address]), MS (multiple sclerosis) ([address]), Osteoporosis, Sarcoidosis, Seizure ([address]), and Type 2 diabetes mellitus with hyperglycemia, without long-term current use of insulin ([address]). Past Surgical History:   has a past surgical history that includes knee surgery; Hysterectomy; First rib removal (Bilateral); Cholecystectomy (); Femur fracture surgery (Left, 3/21/2019); Coronary angioplasty with stent (2020); CYSTOSCOPY INSERTION / REMOVAL STENT / STONE (Left, 2020); Lithotripsy (Left, 2020); Lithotripsy (Left, 2020); Coronary angioplasty with stent (2021); and Bladder surgery (Left, 8/15/2023).     Medications:   Scheduled Meds:    vancomycin  750 mg IntraVENous Q24H    white petrolatum   Topical BID    [Held by provider] aspirin  81 mg Oral Daily    atorvastatin  10 mg Oral Nightly    baclofen  40 mg Oral BID    bumetanide  1 mg Oral BID    oyster shell calcium w/D  1 tablet Oral Nightly    [Held by provider] clopidogrel  75 mg Oral Daily    vitamin B-12  500 mcg Oral Daily    ferrous sulfate  325 mg Oral Daily with breakfast    ipratropium 0.5 mg-albuterol 2.5 mg  1 Dose Nebulization Q4H WA RT

## 2023-08-17 NOTE — PROGRESS NOTES
Occupational Therapy    Date:2023  Patient Name: Janette Null  MRN: [id number]  : 1945  Room: [address] [place] [address]     OT orders received and chart reviewed. OT eval on hold at this time awaiting MRI and neurosurgerical recommendations. OT will follow and re-attempt eval as appropriate, pending NS POC, at a later time/date.     Abdirahman Lr, [address] OTR/L #[id number]

## 2023-08-17 NOTE — CARE COORDINATION
SOCIAL WORK/CASEMANAGEMENT TRANSITION OF CARE PLANNING( [address], [address]):  met with spouse in the room this a.m. while pt was sleeping. The plan is home but I provided a heriberto and hhc list if needed. PT and OT to eval. Pt is for IR procedure today for nephrostomy tube and possible drainage of abcess. Pt is on 2 iv abx's with ID with urine and blood cx's pending. Wound care is following. Mri of the brain with neurosurgery. Urology is on s/p POD#2--S/P Cystoscopy, retrograde pyelography, left ureteral stent placement. Kierra Manzo, STEVENSON  8/17/2023  Pt is active with [facility] at [phone]. Her 60 days expires tomorrow with them and will need a new referral if plan is home and family want them. STEVENSON Hays  8/17/2023    The Plan for Transition of Care is related to the following treatment goals: heriberto and hhc   The Patient and/or patient representative  was provided with a choice of provider and agrees   with the discharge plan. [x] Yes [] No  Freedom of choice list was provided with basic dialogue that supports the patient's individualized plan of care/goals, treatment preferences and shares the quality data associated with the providers.  [x] Yes [] No

## 2023-08-18 ENCOUNTER — APPOINTMENT (OUTPATIENT)
Dept: GENERAL RADIOLOGY | Age: 78
End: 2023-08-18
Payer: MEDICARE

## 2023-08-18 LAB
ABO/RH: NORMAL
ANION GAP SERPL CALCULATED.3IONS-SCNC: 13 MMOL/L (ref 7–16)
ANTIBODY SCREEN: NEGATIVE
ARM BAND NUMBER: NORMAL
BLOOD BANK BLOOD PRODUCT EXPIRATION DATE: NORMAL
BLOOD BANK DISPENSE STATUS: NORMAL
BLOOD BANK ISBT PRODUCT BLOOD TYPE: 5100
BLOOD BANK PRODUCT CODE: NORMAL
BLOOD BANK SAMPLE EXPIRATION: NORMAL
BLOOD BANK UNIT TYPE AND RH: NORMAL
BPU ID: NORMAL
BUN SERPL-MCNC: 16 MG/DL (ref 6–23)
CALCIUM SERPL-MCNC: 8.7 MG/DL (ref 8.6–10.2)
CHLORIDE SERPL-SCNC: 104 MMOL/L (ref 98–107)
CO2 SERPL-SCNC: 20 MMOL/L (ref 22–29)
COMPONENT: NORMAL
CREAT SERPL-MCNC: 0.5 MG/DL (ref 0.5–1)
CROSSMATCH RESULT: NORMAL
GFR SERPL CREATININE-BSD FRML MDRD: >60 ML/MIN/1.73M2
GLUCOSE BLD-MCNC: 127 MG/DL (ref 74–99)
GLUCOSE BLD-MCNC: 127 MG/DL (ref 74–99)
GLUCOSE BLD-MCNC: 230 MG/DL (ref 74–99)
GLUCOSE BLD-MCNC: 243 MG/DL (ref 74–99)
GLUCOSE BLD-MCNC: 301 MG/DL (ref 74–99)
GLUCOSE SERPL-MCNC: 119 MG/DL (ref 74–99)
POTASSIUM SERPL-SCNC: 3.7 MMOL/L (ref 3.5–5)
SODIUM SERPL-SCNC: 137 MMOL/L (ref 132–146)
TRANSFUSION STATUS: NORMAL
UNIT DIVISION: 0
UNIT ISSUE DATE/TIME: NORMAL

## 2023-08-18 PROCEDURE — 94640 AIRWAY INHALATION TREATMENT: CPT

## 2023-08-18 PROCEDURE — 84484 ASSAY OF TROPONIN QUANT: CPT

## 2023-08-18 PROCEDURE — 74018 RADEX ABDOMEN 1 VIEW: CPT

## 2023-08-18 PROCEDURE — 2500000003 HC RX 250 WO HCPCS

## 2023-08-18 PROCEDURE — 06HY33Z INSERTION OF INFUSION DEVICE INTO LOWER VEIN, PERCUTANEOUS APPROACH: ICD-10-PCS

## 2023-08-18 PROCEDURE — 6370000000 HC RX 637 (ALT 250 FOR IP): Performed by: UROLOGY

## 2023-08-18 PROCEDURE — 83880 ASSAY OF NATRIURETIC PEPTIDE: CPT

## 2023-08-18 PROCEDURE — 04HY32Z INSERTION OF MONITORING DEVICE INTO LOWER ARTERY, PERCUTANEOUS APPROACH: ICD-10-PCS

## 2023-08-18 PROCEDURE — 6360000002 HC RX W HCPCS

## 2023-08-18 PROCEDURE — 71045 X-RAY EXAM CHEST 1 VIEW: CPT

## 2023-08-18 PROCEDURE — 83735 ASSAY OF MAGNESIUM: CPT

## 2023-08-18 PROCEDURE — 87081 CULTURE SCREEN ONLY: CPT

## 2023-08-18 PROCEDURE — 2580000003 HC RX 258: Performed by: UROLOGY

## 2023-08-18 PROCEDURE — 83605 ASSAY OF LACTIC ACID: CPT

## 2023-08-18 PROCEDURE — 85652 RBC SED RATE AUTOMATED: CPT

## 2023-08-18 PROCEDURE — 82962 GLUCOSE BLOOD TEST: CPT

## 2023-08-18 PROCEDURE — 51702 INSERT TEMP BLADDER CATH: CPT

## 2023-08-18 PROCEDURE — 0BH17EZ INSERTION OF ENDOTRACHEAL AIRWAY INTO TRACHEA, VIA NATURAL OR ARTIFICIAL OPENING: ICD-10-PCS

## 2023-08-18 PROCEDURE — 6360000002 HC RX W HCPCS: Performed by: UROLOGY

## 2023-08-18 PROCEDURE — 84145 PROCALCITONIN (PCT): CPT

## 2023-08-18 PROCEDURE — 2700000000 HC OXYGEN THERAPY PER DAY

## 2023-08-18 PROCEDURE — 6370000000 HC RX 637 (ALT 250 FOR IP): Performed by: FAMILY MEDICINE

## 2023-08-18 PROCEDURE — 2580000003 HC RX 258

## 2023-08-18 PROCEDURE — 87040 BLOOD CULTURE FOR BACTERIA: CPT

## 2023-08-18 PROCEDURE — 36415 COLL VENOUS BLD VENIPUNCTURE: CPT

## 2023-08-18 PROCEDURE — 31500 INSERT EMERGENCY AIRWAY: CPT

## 2023-08-18 PROCEDURE — 2000000000 HC ICU R&B

## 2023-08-18 PROCEDURE — 87086 URINE CULTURE/COLONY COUNT: CPT

## 2023-08-18 PROCEDURE — 81001 URINALYSIS AUTO W/SCOPE: CPT

## 2023-08-18 PROCEDURE — 6370000000 HC RX 637 (ALT 250 FOR IP)

## 2023-08-18 PROCEDURE — 5A1955Z RESPIRATORY VENTILATION, GREATER THAN 96 CONSECUTIVE HOURS: ICD-10-PCS

## 2023-08-18 PROCEDURE — 85025 COMPLETE CBC W/AUTO DIFF WBC: CPT

## 2023-08-18 PROCEDURE — 36556 INSERT NON-TUNNEL CV CATH: CPT

## 2023-08-18 PROCEDURE — 80048 BASIC METABOLIC PNL TOTAL CA: CPT

## 2023-08-18 PROCEDURE — 84100 ASSAY OF PHOSPHORUS: CPT

## 2023-08-18 PROCEDURE — 86140 C-REACTIVE PROTEIN: CPT

## 2023-08-18 PROCEDURE — 94002 VENT MGMT INPAT INIT DAY: CPT

## 2023-08-18 RX ORDER — FENTANYL CITRATE 50 UG/ML
75 INJECTION, SOLUTION INTRAMUSCULAR; INTRAVENOUS ONCE
Status: DISCONTINUED | OUTPATIENT
Start: 2023-08-18 | End: 2023-08-21

## 2023-08-18 RX ORDER — CHLORHEXIDINE GLUCONATE 0.12 MG/ML
15 RINSE ORAL 2 TIMES DAILY
Status: DISCONTINUED | OUTPATIENT
Start: 2023-08-18 | End: 2023-08-25 | Stop reason: HOSPADM

## 2023-08-18 RX ORDER — POLYVINYL ALCOHOL 14 MG/ML
1 SOLUTION/ DROPS OPHTHALMIC EVERY 4 HOURS
Status: DISCONTINUED | OUTPATIENT
Start: 2023-08-18 | End: 2023-08-25

## 2023-08-18 RX ORDER — ONDANSETRON 2 MG/ML
4 INJECTION INTRAMUSCULAR; INTRAVENOUS EVERY 6 HOURS PRN
Status: DISCONTINUED | OUTPATIENT
Start: 2023-08-18 | End: 2023-08-18 | Stop reason: SDUPTHER

## 2023-08-18 RX ORDER — MINERAL OIL AND WHITE PETROLATUM 150; 830 MG/G; MG/G
OINTMENT OPHTHALMIC EVERY 4 HOURS
Status: DISCONTINUED | OUTPATIENT
Start: 2023-08-18 | End: 2023-08-25

## 2023-08-18 RX ORDER — MIDAZOLAM HYDROCHLORIDE 2 MG/2ML
4 INJECTION, SOLUTION INTRAMUSCULAR; INTRAVENOUS ONCE
Status: COMPLETED | OUTPATIENT
Start: 2023-08-18 | End: 2023-08-18

## 2023-08-18 RX ORDER — ACETAMINOPHEN 650 MG/1
650 SUPPOSITORY RECTAL EVERY 6 HOURS PRN
Status: DISCONTINUED | OUTPATIENT
Start: 2023-08-18 | End: 2023-08-25

## 2023-08-18 RX ORDER — ACETAMINOPHEN 325 MG/1
650 TABLET ORAL EVERY 6 HOURS PRN
Status: DISCONTINUED | OUTPATIENT
Start: 2023-08-18 | End: 2023-08-25

## 2023-08-18 RX ORDER — LIDOCAINE 4 G/G
1 PATCH TOPICAL DAILY
Status: DISCONTINUED | OUTPATIENT
Start: 2023-08-18 | End: 2023-08-25

## 2023-08-18 RX ORDER — 0.9 % SODIUM CHLORIDE 0.9 %
500 INTRAVENOUS SOLUTION INTRAVENOUS ONCE
Status: COMPLETED | OUTPATIENT
Start: 2023-08-18 | End: 2023-08-19

## 2023-08-18 RX ORDER — SODIUM CHLORIDE 0.9 % (FLUSH) 0.9 %
5-40 SYRINGE (ML) INJECTION PRN
Status: DISCONTINUED | OUTPATIENT
Start: 2023-08-18 | End: 2023-08-25

## 2023-08-18 RX ORDER — ONDANSETRON 4 MG/1
4 TABLET, ORALLY DISINTEGRATING ORAL EVERY 8 HOURS PRN
Status: DISCONTINUED | OUTPATIENT
Start: 2023-08-18 | End: 2023-08-25

## 2023-08-18 RX ORDER — FENTANYL CITRATE-0.9 % NACL/PF 10 MCG/ML
25-200 PLASTIC BAG, INJECTION (ML) INTRAVENOUS CONTINUOUS
Status: DISCONTINUED | OUTPATIENT
Start: 2023-08-18 | End: 2023-08-25

## 2023-08-18 RX ORDER — MIDAZOLAM HYDROCHLORIDE 1 MG/ML
1-10 INJECTION, SOLUTION INTRAVENOUS CONTINUOUS
Status: DISCONTINUED | OUTPATIENT
Start: 2023-08-18 | End: 2023-08-20

## 2023-08-18 RX ORDER — SODIUM CHLORIDE 0.9 % (FLUSH) 0.9 %
5-40 SYRINGE (ML) INJECTION EVERY 12 HOURS SCHEDULED
Status: DISCONTINUED | OUTPATIENT
Start: 2023-08-18 | End: 2023-08-25

## 2023-08-18 RX ORDER — POLYETHYLENE GLYCOL 3350 17 G/17G
17 POWDER, FOR SOLUTION ORAL DAILY
Status: DISCONTINUED | OUTPATIENT
Start: 2023-08-18 | End: 2023-08-25

## 2023-08-18 RX ORDER — MIDAZOLAM HYDROCHLORIDE 1 MG/ML
INJECTION INTRAMUSCULAR; INTRAVENOUS
Status: COMPLETED
Start: 2023-08-18 | End: 2023-08-18

## 2023-08-18 RX ORDER — SODIUM CHLORIDE 9 MG/ML
INJECTION, SOLUTION INTRAVENOUS PRN
Status: DISCONTINUED | OUTPATIENT
Start: 2023-08-18 | End: 2023-08-25

## 2023-08-18 RX ORDER — NOREPINEPHRINE BITARTRATE 0.06 MG/ML
1-100 INJECTION, SOLUTION INTRAVENOUS CONTINUOUS
Status: DISCONTINUED | OUTPATIENT
Start: 2023-08-18 | End: 2023-08-25

## 2023-08-18 RX ORDER — SENNOSIDES A AND B 8.6 MG/1
2 TABLET, FILM COATED ORAL NIGHTLY
Status: DISCONTINUED | OUTPATIENT
Start: 2023-08-18 | End: 2023-08-21

## 2023-08-18 RX ADMIN — POLYVINYL ALCOHOL 1 DROP: 14 SOLUTION/ DROPS OPHTHALMIC at 22:18

## 2023-08-18 RX ADMIN — LEVOTHYROXINE SODIUM 88 MCG: 0.09 TABLET ORAL at 06:58

## 2023-08-18 RX ADMIN — MIDAZOLAM 4 MG: 1 INJECTION INTRAMUSCULAR; INTRAVENOUS at 21:18

## 2023-08-18 RX ADMIN — VANCOMYCIN HYDROCHLORIDE 750 MG: 10 INJECTION, POWDER, LYOPHILIZED, FOR SOLUTION INTRAVENOUS at 17:13

## 2023-08-18 RX ADMIN — MIDAZOLAM HYDROCHLORIDE 4 MG: 2 INJECTION, SOLUTION INTRAMUSCULAR; INTRAVENOUS at 21:18

## 2023-08-18 RX ADMIN — BACLOFEN 40 MG: 10 TABLET ORAL at 08:55

## 2023-08-18 RX ADMIN — CYANOCOBALAMIN TAB 1000 MCG 500 MCG: 1000 TAB at 08:56

## 2023-08-18 RX ADMIN — INSULIN LISPRO 3 UNITS: 100 INJECTION, SOLUTION INTRAVENOUS; SUBCUTANEOUS at 12:31

## 2023-08-18 RX ADMIN — Medication 50 MCG/HR: at 20:11

## 2023-08-18 RX ADMIN — IPRATROPIUM BROMIDE AND ALBUTEROL SULFATE 1 DOSE: .5; 2.5 SOLUTION RESPIRATORY (INHALATION) at 16:03

## 2023-08-18 RX ADMIN — IPRATROPIUM BROMIDE AND ALBUTEROL SULFATE 1 DOSE: .5; 2.5 SOLUTION RESPIRATORY (INHALATION) at 12:41

## 2023-08-18 RX ADMIN — ENOXAPARIN SODIUM 30 MG: 100 INJECTION SUBCUTANEOUS at 08:55

## 2023-08-18 RX ADMIN — SODIUM CHLORIDE: 9 INJECTION, SOLUTION INTRAVENOUS at 17:11

## 2023-08-18 RX ADMIN — LEVETIRACETAM 500 MG: 500 TABLET, FILM COATED ORAL at 08:56

## 2023-08-18 RX ADMIN — SODIUM CHLORIDE 500 ML: 9 INJECTION, SOLUTION INTRAVENOUS at 23:43

## 2023-08-18 RX ADMIN — DANTROLENE SODIUM 100 MG: 25 CAPSULE ORAL at 08:56

## 2023-08-18 RX ADMIN — RANOLAZINE 1000 MG: 500 TABLET, FILM COATED, EXTENDED RELEASE ORAL at 08:55

## 2023-08-18 RX ADMIN — PETROLATUM: 420 OINTMENT TOPICAL at 08:58

## 2023-08-18 RX ADMIN — Medication 1000 UNITS: at 08:56

## 2023-08-18 RX ADMIN — Medication 20 MCG/MIN: at 21:52

## 2023-08-18 RX ADMIN — Medication 400 MG: at 08:57

## 2023-08-18 RX ADMIN — ACETAMINOPHEN 650 MG: 325 TABLET ORAL at 09:45

## 2023-08-18 RX ADMIN — CEFEPIME 2000 MG: 2 INJECTION, POWDER, FOR SOLUTION INTRAVENOUS at 15:19

## 2023-08-18 RX ADMIN — BUMETANIDE 1 MG: 1 TABLET ORAL at 08:56

## 2023-08-18 RX ADMIN — SODIUM CHLORIDE 1 G: 1 TABLET ORAL at 08:56

## 2023-08-18 RX ADMIN — Medication 2 MG/HR: at 21:24

## 2023-08-18 RX ADMIN — CEFEPIME 2000 MG: 2 INJECTION, POWDER, FOR SOLUTION INTRAVENOUS at 03:54

## 2023-08-18 RX ADMIN — IPRATROPIUM BROMIDE AND ALBUTEROL SULFATE 1 DOSE: .5; 2.5 SOLUTION RESPIRATORY (INHALATION) at 10:14

## 2023-08-18 RX ADMIN — POLYETHYLENE GLYCOL 3350 17 G: 17 POWDER, FOR SOLUTION ORAL at 08:57

## 2023-08-18 RX ADMIN — SODIUM CHLORIDE, PRESERVATIVE FREE 10 ML: 5 INJECTION INTRAVENOUS at 21:49

## 2023-08-18 ASSESSMENT — PAIN DESCRIPTION - ORIENTATION
ORIENTATION: RIGHT
ORIENTATION: RIGHT

## 2023-08-18 ASSESSMENT — PAIN DESCRIPTION - PAIN TYPE
TYPE: ACUTE PAIN
TYPE: ACUTE PAIN

## 2023-08-18 ASSESSMENT — PAIN DESCRIPTION - DESCRIPTORS
DESCRIPTORS: ACHING;DISCOMFORT
DESCRIPTORS: ACHING;DISCOMFORT

## 2023-08-18 ASSESSMENT — PAIN DESCRIPTION - FREQUENCY
FREQUENCY: INTERMITTENT
FREQUENCY: CONTINUOUS

## 2023-08-18 ASSESSMENT — PAIN DESCRIPTION - LOCATION
LOCATION: SHOULDER
LOCATION: ELBOW

## 2023-08-18 ASSESSMENT — PULMONARY FUNCTION TESTS
PIF_VALUE: 26
PIF_VALUE: 28
PIF_VALUE: 28
PIF_VALUE: 29
PIF_VALUE: 23

## 2023-08-18 ASSESSMENT — PAIN SCALES - GENERAL
PAINLEVEL_OUTOF10: 2
PAINLEVEL_OUTOF10: 0
PAINLEVEL_OUTOF10: 3
PAINLEVEL_OUTOF10: 2
PAINLEVEL_OUTOF10: 0

## 2023-08-18 NOTE — PROGRESS NOTES
Lab unable to perform cell count ordered for kidney drainage specimen as it is not the correct source of specimen for the test.

## 2023-08-18 NOTE — PROGRESS NOTES
8/18/2023 11:55 AM  Maggie Stubbsconcepción  [id number]    Subjective:    She is sleeping comfortably  Her  is present  All of his questions were answered in detail regarding her urological issues   Left PNT with scant urine, both drains with scant output  Saldana with yellow urine     Review of Systems  Unable to accurately obtain      Scheduled Meds:   vancomycin  750 mg IntraVENous Q24H    white petrolatum   Topical BID    [Held by provider] aspirin  81 mg Oral Daily    atorvastatin  10 mg Oral Nightly    baclofen  40 mg Oral BID    bumetanide  1 mg Oral BID    oyster shell calcium w/D  1 tablet Oral Nightly    [Held by provider] clopidogrel  75 mg Oral Daily    vitamin B-12  500 mcg Oral Daily    ferrous sulfate  325 mg Oral Daily with breakfast    ipratropium 0.5 mg-albuterol 2.5 mg  1 Dose Nebulization Q4H WA RT    levETIRAcetam  500 mg Oral BID    levothyroxine  88 mcg Oral Daily    magnesium oxide  400 mg Oral QAM    ranolazine  1,000 mg Oral BID    sodium chloride  1 g Oral Daily    Vitamin D  1 tablet Oral QAM    dantrolene  100 mg Oral BID    polyethylene glycol  17 g Oral Daily    sodium chloride flush  5-40 mL IntraVENous 2 times per day    enoxaparin  30 mg SubCUTAneous Daily    insulin lispro  0-4 Units SubCUTAneous TID WC    insulin lispro  0-4 Units SubCUTAneous Nightly    cefepime  2,000 mg IntraVENous Q12H       Objective:  Vitals:    08/18/23 1014   BP:    Pulse:    Resp:    Temp:    SpO2: 96%         Allergies: Augmentin [amoxicillin-pot clavulanate], Bactrim [sulfamethoxazole-trimethoprim], and Macrobid [nitrofurantoin]    General Appearance: eyes closed, lethargic  Skin: no rash or erythema  Head: normocephalic and atraumatic  Pulmonary/Chest: normal air movement, no respiratory distress  Abdomen: soft, non-tender, non-distended  Genitourinary: saldana with herbert urine, left pnt with scant yellow urine, drains with scant output   Extremities: no cyanosis, clubbing or edema         Labs: Recent Labs     08/18/23  0550      K 3.7      CO2 20*   BUN 16   CREATININE 0.5   GLUCOSE 119*   CALCIUM 8.7       Lab Results   Component Value Date/Time    HGB 10.3 08/17/2023 08:59 PM    HCT 33.1 08/17/2023 08:59 PM       No results found for: PSA      Assessment/Plan:  POD#3--cystoscopy, retrograde pyelogram, left ureteral stent placement   POD#1--placement of left PNT with IR   POD#1--placement  IR drain to superficial left flank abscess  UTI  Left renal calculi   Left ureteral calculi   Left perinephric abscess   Left renal atrophy  Malpositioned left ureteral stent        -Creatinine remains stable  -Continue the antibiotics per ID  -Cultures from IR procedure yesterday are pending   -Continue the left ureteral stent  -The left ureteral stent will need to be removed in the hospital setting, she is not able to have the stent removed in the office due to bedbound and eliseo lift  -Continue the left PNT  -Will monitor the output from the left PNT and will need follow up nephrostogram in about 1 to 2 weeks with possible antegrade stent placement if kidney is functioning with good output   -Continue the saldana, change every 4 weeks   Possible stent removal closer to discharge       Tiney Osgood, APRN - CNP   [facility]

## 2023-08-18 NOTE — PROGRESS NOTES
Occupational Therapy    Date:2023  Patient Name: Thaddeus Parrish  MRN: [id number]  : 1945  Room: [address] [place] [address]     OT orders received and chart reviewed. OT eval on hold at this time awaiting MRI and neurosurgerical recommendations. OT will follow and re-attempt eval as appropriate, pending NS POC, at a later time/date.     Shahida Siddiqi, Shyann Thomas OTR/L #[id number]

## 2023-08-18 NOTE — PLAN OF CARE
Problem: Discharge Planning  Goal: Discharge to home or other facility with appropriate resources  Outcome: Progressing     Problem: Safety - Adult  Goal: Free from fall injury  Outcome: Progressing     Problem: Chronic Conditions and Co-morbidities  Goal: Patient's chronic conditions and co-morbidity symptoms are monitored and maintained or improved  Outcome: Progressing     Problem: Skin/Tissue Integrity  Goal: Absence of new skin breakdown  Description: 1. Monitor for areas of redness and/or skin breakdown  2. Assess vascular access sites hourly  3. Every 4-6 hours minimum:  Change oxygen saturation probe site  4. Every 4-6 hours:  If on nasal continuous positive airway pressure, respiratory therapy assess nares and determine need for appliance change or resting period.   Outcome: Progressing     Problem: Pain  Goal: Verbalizes/displays adequate comfort level or baseline comfort level  Outcome: Progressing     Problem: Nutrition Deficit:  Goal: Optimize nutritional status  Outcome: Progressing

## 2023-08-18 NOTE — CARE COORDINATION
SOCIAL WORK/CASEMANAGEMENT TRANSITION OF CARE PLANNING( [address], [address] [facility]):  PT and OT to eval once mri of the of the lumbar spineis done. Pt is on iv vanco and cefepime. Neurosurgery, ID, and urology , are following. IR placed yesterday a superficial abscesses drained and  2 catheters and  left nephrostomy catheter. Pt is also on 2l o2 nc and received 1 unit prbc's yesterday. I provided heriberto and hhc list to spouse yesterday. STEVENSON Villalobos  8/18/2023    Met with spouse and rn today. He requested that I call his daughter, minor, who is a acute care rn in [facility]. Ada Barger said that she lives 5 minutes away and she moved back to [facility] to help care for her mother and her brother lives 2 minutes away and helps out. The spouse has been been providing 24/7 care to pt for the past 18 years that pt has been bed ridden. He bathes, dresses, cleans, changes and feeds pt. They use lift to move pt from hospital bed to w/c if able. Nsg and aides (3 days a week but no longer after aug 30 ) were coming in from [facility] but as of today will need new referral and orders for pt. Ada Barger thinks pt will be fine to go home with spouse caring for the tubes and drains with family help. If heriberto is decided upon they have had pt at [facility]. We will touch base on Monday to see where pt is with discharge. Pt will need to go home via ambulance. PT and OT were not able to work with pt today. Per minor, spouse went thru chemo about a couple of months ago and is in remission.  STEVENSON Villalobos  8/18/2023

## 2023-08-18 NOTE — PROGRESS NOTES
nephrostomy tube placement      2. Antegrade pyelography      3. Left flank superior abscess drainage and catheter placement      4. Left flank inferior abscess drainage and catheter placement         CT ABSCESS DRAINAGE W CATH PLACEMENT S&I   Final Result   1. Successful percutaneous nephrostomy tube placement      2. Antegrade pyelography      3. Left flank superior abscess drainage and catheter placement      4. Left flank inferior abscess drainage and catheter placement         XR ABDOMEN (KUB) (SINGLE AP VIEW)   Final Result   Retained colonic stool consistent with constipation. Malposition left proximal ureteral stent as demonstrated on recent CT. Right hydronephrosis and hydroureter of uncertain etiology. CT ABDOMEN PELVIS W WO CONTRAST Additional Contrast? None   Final Result   Mild positioning of the proximal portion of the left ureteral stent outside   of the left renal pelvis in the perinephric fat. There is a small amount of   fluid seen surrounding the proximal pigtail. The mid and distal portion of   the stent are within the ureter and bladder. There is moderate distension   identified of the renal collecting system on the right with enhancement and   thickening of the urothelium. Small amount of air seen within the ureter   possibly from instrumentation is well as within the bladder. Nonobstructing   stones seen within the left kidney. The adjacent mixed density fluid   collection posteriorly within the perinephric fat extending into the   subcutaneous tissues in the left back are stable and unchanged. Extensive stool seen scattered diffusely throughout the colon and distal   small bowel to suggest clinical presentation of constipation. There is   abnormal wall thickening identified of the distal sigmoid colon and rectum to   suggest a distal colitis/proctitis. No Gloria rectal abscess identified at   this time. Stable skin thickening seen overlying the coccyx. Pressure ulcer cannot be   excluded. Abnormal wall thickening identified of the distal duodenum and proximal small   bowel to suggest possibly a mild colitis. FL RETROGRADE PYELOGRAM W WO KUB   Final Result   Left retrograde pyelogram and left ureteral stent insertion with fluoroscopy. Please see separate procedure report for details. MRI LUMBAR SPINE WO CONTRAST    (Results Pending)       Microbiology:  Pending  No results for input(s): BC in the last 72 hours. No results for input(s): ORG in the last 72 hours. No results for input(s): Phan Tao in the last 72 hours. No results for input(s): STREPNEUMAGU in the last 72 hours. No results for input(s): LP1UAG in the last 72 hours. No results for input(s): ASO in the last 72 hours. No results for input(s): CULTRESP in the last 72 hours. Assessment:  Left pyelonephritis  Left-sided kidney stone  S/P cystoscopy with left-sided JJ stent insertion by urology on 08/15  Possible left-sided renal abscess  Previous history of Enterobacter cloacae UTI  Drop in H&H noted   Left kidney fluid gm pos cocci in pairs    Plan:    Continue cefepime and vancomycin  Urine & Blood Cx NG so far   Primary team planning for transfusion  ID will continue to follow\  Chart reviewed  Anticipate ten days of antbitoics      Thank you for having us see this patient in consultation. I will be discussing this case with the treating physicians.       Electronically signed by Ghazala Chamberlain MD on 8/18/2023 at 2:25 PM

## 2023-08-18 NOTE — PROGRESS NOTES
Date: 2023       Patient Name: Stefan Casillas  : 1945      MRN: [id number]    PT order received. Chart has been reviewed. PT evaluation will be on hold awaiting MRI/NS recommendations. Will continue to follow and complete evaluation at later time.      Jenny Pandya, PT

## 2023-08-19 ENCOUNTER — APPOINTMENT (OUTPATIENT)
Dept: GENERAL RADIOLOGY | Age: 78
End: 2023-08-19
Payer: MEDICARE

## 2023-08-19 ENCOUNTER — APPOINTMENT (OUTPATIENT)
Dept: CT IMAGING | Age: 78
End: 2023-08-19
Payer: MEDICARE

## 2023-08-19 LAB
AADO2: 256.8 MMHG
AADO2: 366.7 MMHG
AADO2: 374.8 MMHG
AMORPH SED URNS QL MICRO: PRESENT
ANION GAP SERPL CALCULATED.3IONS-SCNC: 10 MMOL/L (ref 7–16)
ANION GAP SERPL CALCULATED.3IONS-SCNC: 12 MMOL/L (ref 7–16)
B.E.: -4.7 MMOL/L (ref -3–3)
B.E.: -5.8 MMOL/L
B.E.: -5.8 MMOL/L (ref -3–3)
B.E.: -6.1 MMOL/L (ref -3–3)
BACTERIA URNS QL MICRO: ABNORMAL
BASOPHILS # BLD: 0.06 K/UL (ref 0–0.2)
BASOPHILS # BLD: 0.06 K/UL (ref 0–0.2)
BASOPHILS NFR BLD: 0 % (ref 0–2)
BASOPHILS NFR BLD: 0 % (ref 0–2)
BILIRUB UR QL STRIP: ABNORMAL
BNP SERPL-MCNC: ABNORMAL PG/ML (ref 0–450)
BUN SERPL-MCNC: 20 MG/DL (ref 6–23)
BUN SERPL-MCNC: 20 MG/DL (ref 6–23)
CALCIUM SERPL-MCNC: 8.1 MG/DL (ref 8.6–10.2)
CALCIUM SERPL-MCNC: 8.1 MG/DL (ref 8.6–10.2)
CHLORIDE SERPL-SCNC: 106 MMOL/L (ref 98–107)
CHLORIDE SERPL-SCNC: 108 MMOL/L (ref 98–107)
CLARITY UR: ABNORMAL
CO2 SERPL-SCNC: 19 MMOL/L (ref 22–29)
CO2 SERPL-SCNC: 23 MMOL/L (ref 22–29)
COHB: 0.3 % (ref 0–1.5)
COHB: 0.8 % (ref 0–1.5)
COLOR UR: ABNORMAL
CREAT SERPL-MCNC: 0.5 MG/DL (ref 0.5–1)
CREAT SERPL-MCNC: 0.6 MG/DL (ref 0.5–1)
CRITICAL: ABNORMAL
CRP SERPL HS-MCNC: 210 MG/L (ref 0–5)
DATE ANALYZED: ABNORMAL
DATE LAST DOSE: ABNORMAL
DATE OF COLLECTION: ABNORMAL
EOSINOPHIL # BLD: 0 K/UL (ref 0.05–0.5)
EOSINOPHIL # BLD: 0.04 K/UL (ref 0.05–0.5)
EOSINOPHILS RELATIVE PERCENT: 0 % (ref 0–6)
EOSINOPHILS RELATIVE PERCENT: 0 % (ref 0–6)
EPI CELLS #/AREA URNS HPF: ABNORMAL /HPF
ERYTHROCYTE [DISTWIDTH] IN BLOOD BY AUTOMATED COUNT: 14.8 % (ref 11.5–15)
ERYTHROCYTE [DISTWIDTH] IN BLOOD BY AUTOMATED COUNT: 15.3 % (ref 11.5–15)
ERYTHROCYTE [SEDIMENTATION RATE] IN BLOOD BY WESTERGREN METHOD: 100 MM/HR (ref 0–20)
FIO2: 60 %
FIO2: 75 %
FIO2: 75 %
FIO2: 85 %
GFR SERPL CREATININE-BSD FRML MDRD: >60 ML/MIN/1.73M2
GFR SERPL CREATININE-BSD FRML MDRD: ABNORMAL ML/MIN/1.73M2
GLUCOSE BLD-MCNC: 167 MG/DL (ref 74–99)
GLUCOSE BLD-MCNC: 176 MG/DL (ref 74–99)
GLUCOSE BLD-MCNC: 182 MG/DL (ref 74–99)
GLUCOSE SERPL-MCNC: 159 MG/DL (ref 74–99)
GLUCOSE SERPL-MCNC: 172 MG/DL (ref 74–99)
GLUCOSE UR STRIP-MCNC: NEGATIVE MG/DL
HCO3: 19.2 MMOL/L (ref 22–26)
HCO3: 19.5 MMOL/L (ref 22–26)
HCO3: 20.3 MMOL/L (ref 22–26)
HCO3: 22.2 MMOL/L
HCT VFR BLD AUTO: 29.1 % (ref 34–48)
HCT VFR BLD AUTO: 30.6 % (ref 34–48)
HGB BLD-MCNC: 9.4 G/DL (ref 11.5–15.5)
HGB BLD-MCNC: 9.7 G/DL (ref 11.5–15.5)
HGB UR QL STRIP.AUTO: ABNORMAL
HHB: 1.7 % (ref 0–5)
HHB: 2.1 % (ref 0–5)
HHB: 2.5 % (ref 0–5)
HHB: 40.8 %
IMM GRANULOCYTES # BLD AUTO: 0.23 K/UL (ref 0–0.58)
IMM GRANULOCYTES # BLD AUTO: 0.36 K/UL (ref 0–0.58)
IMM GRANULOCYTES NFR BLD: 1 % (ref 0–5)
IMM GRANULOCYTES NFR BLD: 1 % (ref 0–5)
KETONES UR STRIP-MCNC: ABNORMAL MG/DL
LAB: ABNORMAL
LACTATE BLDV-SCNC: 0.6 MMOL/L (ref 0.5–2.2)
LACTATE BLDV-SCNC: 0.7 MMOL/L (ref 0.5–2.2)
LACTATE BLDV-SCNC: 1.6 MMOL/L (ref 0.5–2.2)
LEUKOCYTE ESTERASE UR QL STRIP: ABNORMAL
LYMPHOCYTES NFR BLD: 0.61 K/UL (ref 1.5–4)
LYMPHOCYTES NFR BLD: 1.16 K/UL (ref 1.5–4)
LYMPHOCYTES RELATIVE PERCENT: 2 % (ref 20–42)
LYMPHOCYTES RELATIVE PERCENT: 4 % (ref 20–42)
Lab: ABNORMAL
MAGNESIUM SERPL-MCNC: 1.9 MG/DL (ref 1.6–2.6)
MCH RBC QN AUTO: 31.7 PG (ref 26–35)
MCH RBC QN AUTO: 32 PG (ref 26–35)
MCHC RBC AUTO-ENTMCNC: 31.7 G/DL (ref 32–34.5)
MCHC RBC AUTO-ENTMCNC: 32.3 G/DL (ref 32–34.5)
MCV RBC AUTO: 100 FL (ref 80–99.9)
MCV RBC AUTO: 99 FL (ref 80–99.9)
METHB: 0.3 % (ref 0–1.5)
METHB: 0.4 % (ref 0–1.5)
METHB: 0.5 % (ref 0–1.5)
METHB: 0.5 % (ref 0–1.5)
MICROORGANISM SPEC CULT: NORMAL
MICROORGANISM SPEC CULT: NORMAL
MODE: AC
MONOCYTES NFR BLD: 1.14 K/UL (ref 0.1–0.95)
MONOCYTES NFR BLD: 1.71 K/UL (ref 0.1–0.95)
MONOCYTES NFR BLD: 4 % (ref 2–12)
MONOCYTES NFR BLD: 6 % (ref 2–12)
NEUTROPHILS NFR BLD: 89 % (ref 43–80)
NEUTROPHILS NFR BLD: 93 % (ref 43–80)
NEUTS SEG NFR BLD: 25.45 K/UL (ref 1.8–7.3)
NEUTS SEG NFR BLD: 28.84 K/UL (ref 1.8–7.3)
NITRITE UR QL STRIP: POSITIVE
O2 CONTENT: 13.5 ML/DL
O2 CONTENT: 14.2 ML/DL
O2 CONTENT: 15.2 ML/DL
O2 SATURATION: 58.7 %
O2 SATURATION: 97.5 % (ref 92–98.5)
O2 SATURATION: 97.9 % (ref 92–98.5)
O2 SATURATION: 98.3 % (ref 92–98.5)
O2HB: 58.1 %
O2HB: 96.8 % (ref 94–97)
O2HB: 97.1 % (ref 94–97)
O2HB: 97.5 % (ref 94–97)
OPERATOR ID: 1893
OPERATOR ID: ABNORMAL
PATIENT TEMP: 37 C
PCO2: 36.6 MMHG (ref 35–45)
PCO2: 36.9 MMHG (ref 35–45)
PCO2: 37.2 MMHG (ref 35–45)
PCO2: 55.8 MMHG (ref 40–52)
PEEP/CPAP: 5 CMH2O
PFO2: 0.41 MMHG/%
PFO2: 1.36 MMHG/%
PFO2: 1.47 MMHG/%
PFO2: 1.93 MMHG/%
PH BLOOD GAS: 7.22 (ref 7.3–7.42)
PH BLOOD GAS: 7.33 (ref 7.35–7.45)
PH BLOOD GAS: 7.34 (ref 7.35–7.45)
PH BLOOD GAS: 7.36 (ref 7.35–7.45)
PH UR STRIP: 6.5 [PH] (ref 5–9)
PHOSPHATE SERPL-MCNC: 2.9 MG/DL (ref 2.5–4.5)
PLATELET # BLD AUTO: 508 K/UL (ref 130–450)
PLATELET # BLD AUTO: 550 K/UL (ref 130–450)
PMV BLD AUTO: 10.3 FL (ref 7–12)
PMV BLD AUTO: 10.3 FL (ref 7–12)
PO2: 101.7 MMHG (ref 75–100)
PO2: 110.1 MMHG (ref 75–100)
PO2: 115.7 MMHG (ref 75–100)
PO2: 34.5 MMHG (ref 30–50)
POTASSIUM SERPL-SCNC: 3.5 MMOL/L (ref 3.5–5)
POTASSIUM SERPL-SCNC: 3.8 MMOL/L (ref 3.5–5)
PROCALCITONIN SERPL-MCNC: 0.51 NG/ML (ref 0–0.08)
PROCALCITONIN SERPL-MCNC: 0.97 NG/ML (ref 0–0.08)
PROT UR STRIP-MCNC: >=300 MG/DL
RBC # BLD AUTO: 2.94 M/UL (ref 3.5–5.5)
RBC # BLD AUTO: 3.06 M/UL (ref 3.5–5.5)
RBC #/AREA URNS HPF: ABNORMAL /HPF
RI(T): 2.22
RI(T): 3.33
RI(T): 3.69
RR MECHANICAL: 16 B/MIN
RR MECHANICAL: 18 B/MIN
RR MECHANICAL: 18 B/MIN
RR MECHANICAL: 20 B/MIN
SODIUM SERPL-SCNC: 139 MMOL/L (ref 132–146)
SODIUM SERPL-SCNC: 139 MMOL/L (ref 132–146)
SOURCE, BLOOD GAS: ABNORMAL
SP GR UR STRIP: >1.03 (ref 1–1.03)
SPECIMEN DESCRIPTION: NORMAL
SPECIMEN DESCRIPTION: NORMAL
T4 FREE SERPL-MCNC: 1.2 NG/DL (ref 0.9–1.7)
THB: 10.3 G/DL (ref 11.5–16.5)
THB: 11 G/DL (ref 11.5–16.5)
THB: 11 G/DL (ref 11.5–16.5)
THB: 9.7 G/DL (ref 11.5–16.5)
TIME ANALYZED: 15
TIME ANALYZED: 434
TIME ANALYZED: 659
TIME ANALYZED: 943
TME LAST DOSE: ABNORMAL H
TROPONIN I SERPL HS-MCNC: 194 NG/L (ref 0–9)
TROPONIN I SERPL HS-MCNC: 203 NG/L (ref 0–9)
TROPONIN I SERPL HS-MCNC: 204 NG/L (ref 0–9)
TSH SERPL DL<=0.05 MIU/L-ACNC: 1.17 UIU/ML (ref 0.27–4.2)
UROBILINOGEN UR STRIP-ACNC: 1 EU/DL (ref 0–1)
VANCOMYCIN DOSE: ABNORMAL MG
VANCOMYCIN TROUGH SERPL-MCNC: 19.4 UG/ML (ref 5–16)
VT MECHANICAL: 300 ML
WBC #/AREA URNS HPF: ABNORMAL /HPF
WBC OTHER # BLD: 28.7 K/UL (ref 4.5–11.5)
WBC OTHER # BLD: 31 K/UL (ref 4.5–11.5)

## 2023-08-19 PROCEDURE — 94003 VENT MGMT INPAT SUBQ DAY: CPT

## 2023-08-19 PROCEDURE — 2580000003 HC RX 258: Performed by: UROLOGY

## 2023-08-19 PROCEDURE — 74177 CT ABD & PELVIS W/CONTRAST: CPT

## 2023-08-19 PROCEDURE — 6370000000 HC RX 637 (ALT 250 FOR IP): Performed by: INTERNAL MEDICINE

## 2023-08-19 PROCEDURE — 84443 ASSAY THYROID STIM HORMONE: CPT

## 2023-08-19 PROCEDURE — 85025 COMPLETE CBC W/AUTO DIFF WBC: CPT

## 2023-08-19 PROCEDURE — 71275 CT ANGIOGRAPHY CHEST: CPT

## 2023-08-19 PROCEDURE — 2500000003 HC RX 250 WO HCPCS

## 2023-08-19 PROCEDURE — 82805 BLOOD GASES W/O2 SATURATION: CPT

## 2023-08-19 PROCEDURE — 71045 X-RAY EXAM CHEST 1 VIEW: CPT

## 2023-08-19 PROCEDURE — 6370000000 HC RX 637 (ALT 250 FOR IP): Performed by: UROLOGY

## 2023-08-19 PROCEDURE — 36620 INSERTION CATHETER ARTERY: CPT

## 2023-08-19 PROCEDURE — 6370000000 HC RX 637 (ALT 250 FOR IP)

## 2023-08-19 PROCEDURE — 80202 ASSAY OF VANCOMYCIN: CPT

## 2023-08-19 PROCEDURE — 2580000003 HC RX 258

## 2023-08-19 PROCEDURE — 84484 ASSAY OF TROPONIN QUANT: CPT

## 2023-08-19 PROCEDURE — 83605 ASSAY OF LACTIC ACID: CPT

## 2023-08-19 PROCEDURE — 84439 ASSAY OF FREE THYROXINE: CPT

## 2023-08-19 PROCEDURE — 6360000002 HC RX W HCPCS: Performed by: UROLOGY

## 2023-08-19 PROCEDURE — 6360000004 HC RX CONTRAST MEDICATION: Performed by: RADIOLOGY

## 2023-08-19 PROCEDURE — 82962 GLUCOSE BLOOD TEST: CPT

## 2023-08-19 PROCEDURE — 87070 CULTURE OTHR SPECIMN AEROBIC: CPT

## 2023-08-19 PROCEDURE — 6360000002 HC RX W HCPCS

## 2023-08-19 PROCEDURE — 80048 BASIC METABOLIC PNL TOTAL CA: CPT

## 2023-08-19 PROCEDURE — 87205 SMEAR GRAM STAIN: CPT

## 2023-08-19 PROCEDURE — 02HV33Z INSERTION OF INFUSION DEVICE INTO SUPERIOR VENA CAVA, PERCUTANEOUS APPROACH: ICD-10-PCS

## 2023-08-19 PROCEDURE — 94640 AIRWAY INHALATION TREATMENT: CPT

## 2023-08-19 PROCEDURE — C9113 INJ PANTOPRAZOLE SODIUM, VIA: HCPCS

## 2023-08-19 PROCEDURE — A4216 STERILE WATER/SALINE, 10 ML: HCPCS

## 2023-08-19 PROCEDURE — 2000000000 HC ICU R&B

## 2023-08-19 PROCEDURE — 84145 PROCALCITONIN (PCT): CPT

## 2023-08-19 RX ADMIN — LEVETIRACETAM 500 MG: 500 TABLET, FILM COATED ORAL at 09:03

## 2023-08-19 RX ADMIN — Medication 50 MCG/HR: at 21:17

## 2023-08-19 RX ADMIN — SODIUM CHLORIDE, PRESERVATIVE FREE 40 MG: 5 INJECTION INTRAVENOUS at 00:27

## 2023-08-19 RX ADMIN — HYDROCORTISONE SODIUM SUCCINATE 100 MG: 100 INJECTION, POWDER, FOR SOLUTION INTRAMUSCULAR; INTRAVENOUS at 09:04

## 2023-08-19 RX ADMIN — SODIUM CHLORIDE, PRESERVATIVE FREE 40 MG: 5 INJECTION INTRAVENOUS at 09:04

## 2023-08-19 RX ADMIN — LEVETIRACETAM 500 MG: 500 TABLET, FILM COATED ORAL at 20:45

## 2023-08-19 RX ADMIN — IPRATROPIUM BROMIDE AND ALBUTEROL SULFATE 1 DOSE: .5; 2.5 SOLUTION RESPIRATORY (INHALATION) at 08:04

## 2023-08-19 RX ADMIN — HYDROCORTISONE SODIUM SUCCINATE 100 MG: 100 INJECTION, POWDER, FOR SOLUTION INTRAMUSCULAR; INTRAVENOUS at 23:51

## 2023-08-19 RX ADMIN — SODIUM CHLORIDE, PRESERVATIVE FREE 10 ML: 5 INJECTION INTRAVENOUS at 23:51

## 2023-08-19 RX ADMIN — CEFEPIME 2000 MG: 2 INJECTION, POWDER, FOR SOLUTION INTRAVENOUS at 15:39

## 2023-08-19 RX ADMIN — BACLOFEN 40 MG: 10 TABLET ORAL at 09:03

## 2023-08-19 RX ADMIN — CHLORHEXIDINE GLUCONATE 15 ML: 1.2 RINSE ORAL at 00:35

## 2023-08-19 RX ADMIN — Medication 400 MG: at 09:03

## 2023-08-19 RX ADMIN — BACLOFEN 40 MG: 10 TABLET ORAL at 20:46

## 2023-08-19 RX ADMIN — POLYETHYLENE GLYCOL 3350 17 G: 17 POWDER, FOR SOLUTION ORAL at 09:03

## 2023-08-19 RX ADMIN — IPRATROPIUM BROMIDE AND ALBUTEROL SULFATE 1 DOSE: .5; 2.5 SOLUTION RESPIRATORY (INHALATION) at 11:30

## 2023-08-19 RX ADMIN — CHLORHEXIDINE GLUCONATE 15 ML: 1.2 RINSE ORAL at 20:41

## 2023-08-19 RX ADMIN — POLYVINYL ALCOHOL 1 DROP: 14 SOLUTION/ DROPS OPHTHALMIC at 22:00

## 2023-08-19 RX ADMIN — IOPAMIDOL 75 ML: 755 INJECTION, SOLUTION INTRAVENOUS at 01:14

## 2023-08-19 RX ADMIN — CHLORHEXIDINE GLUCONATE 15 ML: 1.2 RINSE ORAL at 09:04

## 2023-08-19 RX ADMIN — CEFEPIME 2000 MG: 2 INJECTION, POWDER, FOR SOLUTION INTRAVENOUS at 03:23

## 2023-08-19 RX ADMIN — HYDROCORTISONE SODIUM SUCCINATE 100 MG: 100 INJECTION, POWDER, FOR SOLUTION INTRAMUSCULAR; INTRAVENOUS at 15:40

## 2023-08-19 RX ADMIN — CALCIUM CARBONATE-VITAMIN D TAB 500 MG-200 UNIT 1 TABLET: 500-200 TAB at 20:41

## 2023-08-19 RX ADMIN — HYDROCORTISONE SODIUM SUCCINATE 100 MG: 100 INJECTION, POWDER, FOR SOLUTION INTRAMUSCULAR; INTRAVENOUS at 00:27

## 2023-08-19 RX ADMIN — Medication 75 MCG/HR: at 03:28

## 2023-08-19 RX ADMIN — POLYVINYL ALCOHOL 1 DROP: 14 SOLUTION/ DROPS OPHTHALMIC at 03:02

## 2023-08-19 RX ADMIN — VANCOMYCIN HYDROCHLORIDE 750 MG: 10 INJECTION, POWDER, LYOPHILIZED, FOR SOLUTION INTRAVENOUS at 17:47

## 2023-08-19 RX ADMIN — Medication 8 MCG/MIN: at 21:06

## 2023-08-19 RX ADMIN — POLYVINYL ALCOHOL 1 DROP: 14 SOLUTION/ DROPS OPHTHALMIC at 05:48

## 2023-08-19 RX ADMIN — Medication 1000 UNITS: at 09:03

## 2023-08-19 RX ADMIN — ATORVASTATIN CALCIUM 10 MG: 10 TABLET, FILM COATED ORAL at 20:45

## 2023-08-19 RX ADMIN — SODIUM CHLORIDE 500 ML: 9 INJECTION, SOLUTION INTRAVENOUS at 00:15

## 2023-08-19 RX ADMIN — IPRATROPIUM BROMIDE AND ALBUTEROL SULFATE 1 DOSE: .5; 2.5 SOLUTION RESPIRATORY (INHALATION) at 15:58

## 2023-08-19 RX ADMIN — IPRATROPIUM BROMIDE AND ALBUTEROL SULFATE 1 DOSE: .5; 2.5 SOLUTION RESPIRATORY (INHALATION) at 20:09

## 2023-08-19 RX ADMIN — FERROUS SULFATE TAB 325 MG (65 MG ELEMENTAL FE) 325 MG: 325 (65 FE) TAB at 09:03

## 2023-08-19 RX ADMIN — PETROLATUM: 420 OINTMENT TOPICAL at 21:20

## 2023-08-19 RX ADMIN — DANTROLENE SODIUM 100 MG: 25 CAPSULE ORAL at 20:41

## 2023-08-19 RX ADMIN — CYANOCOBALAMIN TAB 1000 MCG 500 MCG: 1000 TAB at 09:03

## 2023-08-19 RX ADMIN — RANOLAZINE 1000 MG: 500 TABLET, FILM COATED, EXTENDED RELEASE ORAL at 20:45

## 2023-08-19 RX ADMIN — SENNOSIDES 17.2 MG: 8.6 TABLET, FILM COATED ORAL at 20:45

## 2023-08-19 ASSESSMENT — PULMONARY FUNCTION TESTS
PIF_VALUE: 21
PIF_VALUE: 29
PIF_VALUE: 23
PIF_VALUE: 29
PIF_VALUE: 23
PIF_VALUE: 22
PIF_VALUE: 22
PIF_VALUE: 21
PIF_VALUE: 29
PIF_VALUE: 23
PIF_VALUE: 27
PIF_VALUE: 21
PIF_VALUE: 26
PIF_VALUE: 21
PIF_VALUE: 26
PIF_VALUE: 21
PIF_VALUE: 22
PIF_VALUE: 26
PIF_VALUE: 21
PIF_VALUE: 27
PIF_VALUE: 23
PIF_VALUE: 24
PIF_VALUE: 23
PIF_VALUE: 21
PIF_VALUE: 24
PIF_VALUE: 21
PIF_VALUE: 23
PIF_VALUE: 22

## 2023-08-19 ASSESSMENT — PAIN SCALES - GENERAL
PAINLEVEL_OUTOF10: 0

## 2023-08-19 NOTE — CONSULTS
enlarged. Coronary artery calcifications identified. Organs: The liver is homogeneous in appearance. Surgical clips seen from prior cholecystectomy. The pancreas is homogeneous in appearance. The spleen is unremarkable. Both adrenal glands are within normal limits. There is distension identified of the right renal collecting system and right ureter with some narrowing identified of the distal ureter. There is distension of the bladder. Findings may be related to urinary back flow. No stones identified in the right renal collecting system. There is abnormal distension identified of the right renal collecting system with enhancement and thickening identified of the urothelial lining. The ureteral stent is not position within the renal pelvis proximally on the right. There is positioning of the ureteral stent within the perinephric fat. There is a small amount of fluid seen surrounding the proximal pigtail of the left ureteral stent. The previously seen calcification within the ureter on the left is not well seen. Small amount of air identified within the ureter as well as within the bladder from proximal instrumentation. There is perinephric stranding identified on the left with multiple stones identified within the left renal collecting system. There is some increased density identified within the perinephric soft tissues posteriorly which extends to a mixed density fluid collection within the left posterior back within the subcutaneous tissues adjacent to the paraspinal muscles. This is similar and unchanged when compared to the prior study possibly suggesting a hematoma measuring 6.1 x 2.9 cm. GI/Bowel: The stomach is unremarkable in appearance. There is abnormal wall thickening identified of the duodenum distally and proximal small bowel. Mild enteritis cannot be excluded. Some fluid seen in the distal small bowel loops with fecalization identified of the distal small bowel.   Redundancy procedure time was 71 minutes. CONTRAST: Fluoroscopy time: 25.4 seconds FLUOROSCOPY DOSE AND TYPE OR TIME AND EXPOSURES: Total dose: 3.7288 Gycm2 AK: 0.783 mGy TECHNIQUE Left nephrostomy: Informed consent was obtained following detailed description of the procedure including risks, benefits, and alternatives. Universal protocol was followed. The patient's flank was prepped and draped in sterile fashion and local anesthesia was achieved with lidocaine. An Accustick needle was advanced into a posterior mid pole calyx using ultrasound guidance and a percutaneous antegrade pyelogram was performed. A 0.035 guidewire was used to place a 8 FR percutaneous nephrostomy tube after the tract was dilated. The catheter was sutured to the skin and the patient tolerated the procedure well. Left flank abscess: The left flank was prepped and draped using sterile technique. 1% lidocaine was used for local anesthesia. Using a one-step needle access into the fluid collection was achieved. Rashad pus was aspirated. A guidewire was placed and the tract was dilated to 10 [place] and a 10 [place] all-purpose drain was placed and locked. Follow-up images continue to reveal a zone of noncommunicating purulent material more inferior. A new 1 step needle was advanced into the lower loculated fluid collection and purulent material was achieved. A guidewire was placed the tract was dilated to 8 [place]. An 8 [place] all-purpose drain was guided into the fluid collection and locked. Vigorous flushing irrigation of the fluid pockets was performed and each catheter was sutured in place with 2-0 Ethilon and set to a Denys-Casarez device. FINDINGS: Percutaneous antegrade pyelogram:  There is hydronephrosis of the left renal collecting system. 28 cc of thick purulent malodorous material was removed and a sample was submitted to the lab. Vigorous flushing irrigation of the pelvocaliceal system was performed.   Injection of contrast revealed a

## 2023-08-19 NOTE — PROGRESS NOTES
4 Eyes Skin Assessment     NAME:  Michaela Douglas  YOB: 1945  MEDICAL RECORD NUMBER:  [id number]    The patient is being assessed for  Admission    I agree that at least one RN has performed a thorough Head to Toe Skin Assessment on the patient. ALL assessment sites listed below have been assessed. Areas assessed by both nurses:    Head, Face, Ears, Shoulders, Back, Chest, Arms, Elbows, Hands, Sacrum. Buttock, Coccyx, Ischium, and Legs. Feet and Heels        Does the Patient have a Wound? Yes wound(s) were present on assessment. LDA wound assessment was Initiated and completed by RN. Open area on sacrum 2.8cmx1.5cm.  Large scar on sacrum       Gary Prevention initiated by RN: Yes  Wound Care Orders initiated by RN: Yes    Pressure Injury (Stage 3,4, Unstageable, DTI, NWPT, and Complex wounds) if present, place Wound referral order by RN under : Yes    New Ostomies, if present place, Ostomy referral order under : No     Nurse 1 eSignature: Electronically signed by Poli Hoang RN on 8/18/23 at 9:03 PM EDT    **SHARE this note so that the co-signing nurse can place an eSignature**    Nurse 2 eSignature: Electronically signed by Sujit Peter RN on 8/19/23 at 3:57 AM EDT

## 2023-08-19 NOTE — PROCEDURES
Arterial line placement    Procedure: Right femoral arterial line placement. Indications: Continuous monitoring of blood pressure in a patient with hypotension +/- shock, on Levophed. Anesthesia: Local infiltration of 1% lidocaine. Consent:  The spouse was counseled regarding the procedure, its indications, risks, potential complications and alternatives, and any questions were answered. Consent was obtained to proceed. Technique: Time Out: Immediately prior to the procedure a \"timeout\" was called to verify the correct patient and procedure. Procedure was done using strict aseptic technique. Zach's test was performed and was normal. right femoral site was cleaned with chloraprep and draped. Radial artery was identified, then Lidocaine 1% was infiltrated locally. Radial arterial line was inserted, a good blood flow was obtained, after which guidewire was inserted all the way with no resistance. Then the canula was inserted and needle with guidewire was withdrawn. Pulsatile bright red blood flow was observed. The canula was connected to BP monitoring apparatus and a good quality waveform was noted. Then the canula was secured with 2 stay sutures of 3-0 silk after Lidocaine infiltration, following which dressing was applied. Number of sticks: 4. Number of Kits used: 4. Complications: No immediate complication. Estimated blood loss: About 5 ml. Comment: Patient tolerated the procedure well.      Estefany Villegas MD PGY-2  8/19/2023 3:29 AM

## 2023-08-19 NOTE — PROGRESS NOTES
Pharmacy Consultation Note  (Antibiotic Dosing and Monitoring)    Initial consult date: 8/15/23  Consulting physician/provider: Jung Lindo  Drug: Vancomycin  Indication: UTI    Age/  Gender Height Weight IBW  Allergy Information   78 y.o./female 5' (152.4 cm) 111 lb (50.3 kg)     Ideal body weight: 45.5 kg (100 lb 4.9 oz)  Adjusted ideal body weight: 49.5 kg (109 lb 0.4 oz)   Augmentin [amoxicillin-pot clavulanate], Bactrim [sulfamethoxazole-trimethoprim], and Macrobid [nitrofurantoin]      Renal Function:  Recent Labs     08/17/23  0609 08/18/23  0550 08/18/23  2328   BUN 21 16 20   CREATININE 0.7 0.5 0.6         Intake/Output Summary (Last 24 hours) at 8/19/2023 0651  Last data filed at 8/19/2023 0600  Gross per 24 hour   Intake 3004.11 ml   Output 1488 ml   Net 1516.11 ml         CBC:  Lab Results   Component Value Date/Time    WBC 31.0 08/19/2023 04:23 AM       Vitals:    08/19/23 0600   BP: (!) 121/50   Pulse: 69   Resp: 18   Temp:    SpO2: 100%       Vancomycin Monitoring:  Trough:  No results for input(s): VANCOTROUGH in the last 72 hours. Random:    Recent Labs     08/17/23  0609   VANCORANDOM 17.0         No results for input(s): Fayne Spark in the last 72 hours. Historical Cultures:  Organism   Date Value Ref Range Status   06/23/2023 Staphylococcus coagulase-negative (A)  Final     No results for input(s): BC in the last 72 hours. Vancomycin Administration Times:  Recent vancomycin administrations                     vancomycin (VANCOCIN) 750 mg in sodium chloride 0.9 % 250 mL IVPB (mg) 750 mg New Bag 08/17/23 1753     750 mg New Bag 08/16/23 1725    vancomycin (VANCOCIN) 750 mg in sodium chloride 0.9 % 250 mL IVPB (mg) 750 mg New Bag 08/15/23 1811                    Assessment:  Patient is a 69 y.o. female who has been initiated on vancomycin  Estimated Creatinine Clearance: 61 mL/min (based on SCr of 0.6 mg/dL). Goal AUC:YIN = 400-600 mcg*hr/mL.        Plan:  Continue Vancomycin 750 mg q 24h  Trough

## 2023-08-19 NOTE — PROGRESS NOTES
[facility]  [facility]  Progress note      HISTORY OF PRESENT ILLNESS:   81-year-old female with past medical history of CAD, HLD, HTN, hypothyroidism, MS, sarcoidosis, DM presented with left-sided kidney stone and left-sided pyelonephritis. She had a previous history of Enterobacter cloacae UTI. Urinalysis is positive for pyuria and bacteriuria. CT abdomen showing left-sided kidney stone with left-sided pyelonephritis and possible renal abscess. Status post cystoscopy and left-sided JJ stent insertion on 08/15 by urology. ID consulted for UTI. Past Medical History:        Diagnosis Date    Anemia     CAD (coronary artery disease) 04/21/2020    High cholesterol     Hypertension     Hypothyroidism     Malabsorption     MI (myocardial infarction) ([address]) 04/21/2020    MS (multiple sclerosis) ([address])     Osteoporosis     Sarcoidosis     Seizure ([address]) 02/05/2021    Type 2 diabetes mellitus with hyperglycemia, without long-term current use of insulin ([address]) 10/30/2019     Past Surgical History:        Procedure Laterality Date    BLADDER SURGERY Left 8/15/2023    CYSTOSCOPY RETROGRADE PYELOGRAM, URETEROSCOPY, LEFT STENT INSERTION, STRATTON CATHETER INSERTION performed by Nicky Neal MD at [address]  04/21/2020    Dr. Linda Patel   3.0 x 22mm Resolute MAAME to Prox LAD.   No LV    CORONARY ANGIOPLASTY WITH STENT PLACEMENT  01/14/2021    3.0 x 30 Maame to the prox LAD and a 2.5 x 20 [place] to the Obtuse marginal by Dr. Ann-Marie Bautista  8/17/2023    CT NEPHROSTOMY CATHETER PLACEMENT 8/17/2023 Romulo Arellano MD [facility] CT    CYSTOSCOPY INSERTION / REMOVAL STENT / STONE Left 4/23/2020    CYSTOSCOPY LEFT RETROGRADE PYELOGRAM STENT INSERTION, STRATTON CATHETER performed by Aidan Avila DO at [address] Left 3/21/2019    LEFT FEMUR CEPHALLOMEDULLARY NAIL performed by Jessica Mejias MD

## 2023-08-19 NOTE — PROGRESS NOTES
Patient admitted to MICU with the following belongings:  None. The following belongings admitted with the patient, None, were sent home with the patient's family.

## 2023-08-19 NOTE — PLAN OF CARE
Problem: Safety - Medical Restraint  Goal: Remains free of injury from restraints (Restraint for Interference with Medical Device)  Description: INTERVENTIONS:  1. Determine that other, less restrictive measures have been tried or would not be effective before applying the restraint  2. Evaluate the patient's condition at the time of restraint application  3. Inform patient/family regarding the reason for restraint  4.  Q2H: Monitor safety, psychosocial status, comfort, nutrition and hydration  8/19/2023 1120 by Adelina Rios RN  Outcome: Completed

## 2023-08-19 NOTE — PROGRESS NOTES
[facility]  [address] [place] [address]. Paulino Stacy  [phone]  FAX [phone]      CHIEF UROLOGIC COMPLAINT: Perinephric abscess    HISTORY OF PRESENT ILLNESS:  Patient RRT event last night. In ICU and intubated. Does not respond to painful stimuli.  at bedside has many questions.     REVIEW OF SYSTEMS:   Unable to obtain    PAST FAMILY HISTORY:    Family History   Problem Relation Age of Onset    Stroke Mother     Heart Disease Mother     Cancer Father         lung    Mult Sclerosis Sister     No Known Problems Brother     No Known Problems Sister     Arthritis Sister     Cervical Cancer Sister      PAST SOCIAL HISTORY:    Social History     Socioeconomic History    Marital status:    Tobacco Use    Smoking status: Never    Smokeless tobacco: Never   Substance and Sexual Activity    Alcohol use: No     Alcohol/week: 0.0 standard drinks     Comment: Ocassionally    Drug use: No    Sexual activity: Yes     Partners: Male     Social Determinants of Health     Financial Resource Strain: Unknown    Difficulty of Paying Living Expenses: Patient refused   Food Insecurity: Unknown    Worried About Running Out of Food in the Last Year: Patient refused    [address] in the Last Year: Patient refused   Transportation Needs: Unknown    Lack of Transportation (Non-Medical): Patient refused   Physical Activity: Inactive    Days of Exercise per Week: 0 days    Minutes of Exercise per Session: 0 min   Housing Stability: Unknown    Unstable Housing in the Last Year: Patient refused       Scheduled Meds:   lidocaine  1 patch TransDERmal Daily    polyvinyl alcohol  1 drop Both Eyes Q4H    Or    artificial tears   Both Eyes Q4H    chlorhexidine  15 mL Mouth/Throat BID    fentanNYL  75 mcg IntraVENous Once    sodium chloride flush  5-40 mL IntraVENous 2 times per day    polyethylene glycol  17 g Oral Daily    senna  2 tablet Oral Nightly    pantoprazole (PROTONIX) 40 mg Value Date    WBC 31.0 (H) 08/19/2023    HGB 9.4 (L) 08/19/2023    HCT 29.1 (L) 08/19/2023    MCV 99.0 08/19/2023     (H) 08/19/2023       Lab Results   Component Value Date    CREATININE 0.5 08/19/2023       No results found for: PSA    Lab Results   Component Value Date    LABURIN  07/14/2023      Comment:      Urine CultureBacteria Ur Cult    LABURIN  06/23/2023     10 to 100,000 CFU/mL  Mixed jason isolated. Further workup and sensitivity testing  is not routinely indicated and will not be performed. Mixed jason isolated includes:  Mixed gram positive organisms      LABURIN  06/20/2023      Comment:      Urine CultureORGANISM ID: 1.1 - Enterobacter cloacae       Lab Results   Component Value Date    BC 5 Days no growth 06/24/2023       Lab Results   Component Value Date    BLOODCULT2 5 Days no growth 06/23/2023       PHYSICAL EXAMINATION:  Skin dry, without rashes  Respirations non-labored, intact  Abdomen soft, non-tender, non-distended  Barger draining clear urine      ASSESSMENT AND PLAN:  1. POD#4--cystoscopy, retrograde pyelogram, left ureteral stent placement   POD#2--placement of left PNT with IR   POD#2--placement  IR drain to superficial left flank abscess  UTI  Left renal calculi   Left ureteral calculi   Left perinephric abscess   Left renal atrophy  Malpositioned left ureteral     Reviewed CT images and case with Dr. Halie Hill and I recommend focusing on current neurologic and pulmonary issues. If interventional radiology feels that it would be easy to place a nephrostomy tube and if it is deemed safe from a ventilator standpoint then there may be a role for reattempted drainage of the left perinephric abscess. I do not feel that this was necessarily related to her RRT and current condition but this would certainly benefit from being drained in the future.     Rod Diego MD  8/19/2023  12:06 PM

## 2023-08-19 NOTE — SIGNIFICANT EVENT
Rapid Response Team Note  Date of event: 8/18/2023   Time of event: Julianna Douglas 69y.o. year old female   YOB: 1945   Admit date:  8/14/2023   Location: 200/80-A   Witnessed? : [x]Yes  [] No  Monitored? : [x]Yes  [] No  Code status: [] Full  [x] DNR-CCA  []DNR-CC  ______________________________________________________________________  Reason for RRT:    [] RR < 8     [] RR > 28   [] SpO2 <90%   [] HR < 40 bpm   [] HR > 130 bpm  [] SBP < 90 mmHg    [] LOC    [] Seizures    [] Significant Bleeding Event   [x] Other: inceasing oxygen demand    Subjective:   CTSP regarding the above event, patient had increasing oxygen bands from 2 to 3 L to 30 L high flow nasal cannula, blood pressure rapidly dropped. Fluids running wide open. Patient started on levo on floors. Patient ended up having to be emergently intubated.  at bedside with daughter on phone. Patient is DNR CCA but they wish for her to be intubated at this time. Patient then transferred to the MICU for further management.     Objective:   Vital signs: Temp: 98.4, BP: 98/49 down to 60/40, HR: 120, RR: 30  Initial Condition:  Conscious   [x] Yes  [] No     Breathing [x] Yes  [] No     Pulse  [x] Yes  [] No    Airway:   [] Open/ Clear     Intervention: [] None  [] Pooled secretions     [] Suctioned  [] Stridor      [x] Intubation    Lungs:   [] Symmetrical chest rise/ CTABL Intervention: [] None  [] Use of accessory muscles    [] NIV (CPAP/BiPAP)  [] Cyanosis      [] Nasal Oxygen/Mask  [] Wheezing       [x] ABG             [x] CXR  [] Other:     Circulation:   Rhythm:  [] Sinus [] Other:   Intervention: [] None            [] IV Access  [] Peripheral              [] Central            [] EKG            [] Cardioversion            [] Defibrillation     Capillary Refill:  [] > 2 seconds [] < 2 seconds    Neurologic:   [] NIHSS      [] Pupillary Response:     Response to pain:   [] Yes   [] No  Follow commands:  [] Yes   [] No  Facial asymmetry:  [] Yes   [] No  Motor strength:  [] Equal   [] Focal deficit:   Diagnostic Test:  Blood Sugar:  2 6 0 mg/dL   EKG:      ABG:      Lab Results   Component Value Date/Time    PH 7.510 04/30/2020 10:01 AM    PCO2 37.6 04/30/2020 10:01 AM    PO2 64.6 04/30/2020 10:01 AM    HCO3 29.3 04/30/2020 10:01 AM    O2SAT 93.7 04/30/2020 10:01 AM     Medication(s) Given:  []  Narcan (Naloxone)   []  Romazicon (Flumazenil)    [x]  Breathing Treatment    []  Steroids (Prednisone, Solu-Medrol)  []  Adenosine  [] Cardiac Medicines:     Infusion(s):   [x] Normal Saline    Amount: 500 cc/hr      [] Lactate Ringers      [] Other:     Imaging:   [x] CXR:   [] Normal         [] Pneumothorax         [x] Pulmonary Edema  [] Infiltrate          [] CT Head  [] Normal          [] ICB          [] [place]          [] Other:     Laboratory Tests:   Complete Blood Count:   Recent Labs     08/17/23  0821 08/17/23  1058 08/17/23 2059   WBC 11.3  --   --    HGB 6.6*   < > 10.3*   HCT 21.7*   < > 33.1*     --   --     < > = values in this interval not displayed. Last 3 Blood Glucose:   Recent Labs     08/16/23  0423 08/17/23  0609 08/18/23  0550   GLUCOSE 93 123* 119*      PT/INR:    Lab Results   Component Value Date/Time    PROTIME 12.2 08/17/2023 08:21 AM    INR 1.1 08/17/2023 08:21 AM     PTT:    Lab Results   Component Value Date/Time    APTT 30.8 09/02/2020 07:07 PM     Comprehensive Metabolic Profile:   Recent Labs     08/18/23  0550      K 3.7      CO2 20*   BUN 16   CREATININE 0.5   GLUCOSE 119*   CALCIUM 8.7      Magnesium:   Lab Results   Component Value Date/Time    MG 1.9 06/22/2023 10:23 AM     Phosphorus:   Lab Results   Component Value Date/Time    PHOS 4.9 04/21/2020 11:00 PM     Troponin: No results for input(s): TROPONINI in the last 72 hours.       Teams Assessment and Plan:  Acute hypoxic hypercapnic respiratory failure secondary to sepsis versus rule out pulmonary embolism  Labs

## 2023-08-19 NOTE — PROCEDURES
Central Line Insertion     Procedure: left femoral vein Triple Lumen Catheter placement. Indications: centrally administered medications    Consent: The spouse was counseled regarding the procedure, its indications, risks, potential complications and alternatives, and any questions were answered. Consent was obtained to proceed. Number of sticks: 1    Number of Kits used: 1    Procedure: Time Out: Immediately prior to the procedure a \"timeout\" was called to verify the correct patient and procedure. The patient was place in the trendelenburg position and the skin over the left femoral vein was prepped with betadine and draped in a sterile fashion. Local anesthesia was obtained by infiltration using 1% Lidocaine without epinephrine. With Ultrasound guidance a large bore needle was used to identify the vein, dark non pulsatile blood returned. The guide wire was then inserted through the needle with minimal resistance. 2 mm nick was made in the skin beside the guidewire. Then a dilator was inserted and removed. A triple lumen catheter was then inserted into the vessel over the guide wire using the Seldinger technique to the  18 cm shantell. All ports showed good, free flowing blood return and were flushed with saline solution. The catheter was then securely fastened to the skin with sutures and covered with a bio patch and sterile dressing. A post procedure X-ray was ordered and showed good line position. Complications: None   The patient tolerated the procedure well. Estimated blood loss: 10 ml.     Sharon Frias MD PGY-2  8/18/2023  11:12 PM      Attending: Dr. Anjel Funes

## 2023-08-19 NOTE — PROGRESS NOTES
Hospitalist Progress Note      PCP: Nicki Bacon MD    Date of Admission: 8/14/2023        Hospital Course:  69 y.o. female presented with HAD A TEST AS AN OP AND SHE WAS FOUND TO HAVE A LEFT RENAL CALCULI AN ABSCESS IN THE RETROPERITONEAL AREA **  AND ALSO FOUND TO HAVE A L4 COMPRESSION FRACTURE    LAST PM HAD TO BE INTUBATED DUE TO HYPOXIA. AND HYPOTENSION.          Subjective: SEDATED AND INTUBATED           Medications:  Reviewed    Infusion Medications    norepinephrine 8 mcg/min (08/19/23 1549)    fentaNYL 50 mcg/hr (08/19/23 0700)    sodium chloride      midazolam 2 mg/hr (08/19/23 0700)    sodium chloride      dextrose      sodium chloride Stopped (08/18/23 1920)    sodium chloride       Scheduled Medications    lidocaine  1 patch TransDERmal Daily    polyvinyl alcohol  1 drop Both Eyes Q4H    Or    artificial tears   Both Eyes Q4H    chlorhexidine  15 mL Mouth/Throat BID    fentanNYL  75 mcg IntraVENous Once    sodium chloride flush  5-40 mL IntraVENous 2 times per day    polyethylene glycol  17 g Oral Daily    senna  2 tablet Oral Nightly    pantoprazole (PROTONIX) 40 mg injection  40 mg IntraVENous Daily    hydrocortisone sodium succinate PF  100 mg IntraVENous Q8H    vancomycin  750 mg IntraVENous Q24H    white petrolatum   Topical BID    [Held by provider] aspirin  81 mg Oral Daily    atorvastatin  10 mg Oral Nightly    baclofen  40 mg Oral BID    [Held by provider] bumetanide  1 mg Oral BID    oyster shell calcium w/D  1 tablet Oral Nightly    [Held by provider] clopidogrel  75 mg Oral Daily    vitamin B-12  500 mcg Oral Daily    ferrous sulfate  325 mg Oral Daily with breakfast    ipratropium 0.5 mg-albuterol 2.5 mg  1 Dose Nebulization Q4H WA RT    levETIRAcetam  500 mg Oral BID    levothyroxine  88 mcg Oral Daily    magnesium oxide  400 mg Oral QAM    ranolazine  1,000 mg Oral BID    sodium chloride  1 g Oral Daily    Vitamin D  1 tablet Oral QAM    dantrolene  100 mg Oral BID    sodium

## 2023-08-19 NOTE — PROCEDURES
ETT retracted 2cm per order. ETT found at 23cm at the inner lower lip. Retracted to 21cm and resecured. Bilateral breath sounds present.

## 2023-08-19 NOTE — FLOWSHEET NOTE
Patient making involuntary movements while unrestrained that may cause accidental removal of lines/ETT. Restraints continued.

## 2023-08-19 NOTE — PROGRESS NOTES
Successful percutaneous nephrostomy tube placement      2. Antegrade pyelography      3. Left flank superior abscess drainage and catheter placement      4. Left flank inferior abscess drainage and catheter placement         XR ABDOMEN (KUB) (SINGLE AP VIEW)   Final Result   Retained colonic stool consistent with constipation. Malposition left proximal ureteral stent as demonstrated on recent CT. Right hydronephrosis and hydroureter of uncertain etiology. CT ABDOMEN PELVIS W WO CONTRAST Additional Contrast? None   Final Result   Mild positioning of the proximal portion of the left ureteral stent outside   of the left renal pelvis in the perinephric fat. There is a small amount of   fluid seen surrounding the proximal pigtail. The mid and distal portion of   the stent are within the ureter and bladder. There is moderate distension   identified of the renal collecting system on the right with enhancement and   thickening of the urothelium. Small amount of air seen within the ureter   possibly from instrumentation is well as within the bladder. Nonobstructing   stones seen within the left kidney. The adjacent mixed density fluid   collection posteriorly within the perinephric fat extending into the   subcutaneous tissues in the left back are stable and unchanged. Extensive stool seen scattered diffusely throughout the colon and distal   small bowel to suggest clinical presentation of constipation. There is   abnormal wall thickening identified of the distal sigmoid colon and rectum to   suggest a distal colitis/proctitis. No Gloria rectal abscess identified at   this time. Stable skin thickening seen overlying the coccyx. Pressure ulcer cannot be   excluded. Abnormal wall thickening identified of the distal duodenum and proximal small   bowel to suggest possibly a mild colitis.          FL RETROGRADE PYELOGRAM W WO KUB   Final Result   Left retrograde pyelogram and left ureteral stent insertion with fluoroscopy. Please see separate procedure report for details. MRI LUMBAR SPINE WO CONTRAST    (Results Pending)   XR CHEST PORTABLE    (Results Pending)   XR CHEST PORTABLE    (Results Pending)   XR CHEST PORTABLE    (Results Pending)   XR CHEST PORTABLE    (Results Pending)       Microbiology:  Pending  No results for input(s): BC in the last 72 hours. No results for input(s): ORG in the last 72 hours. No results for input(s): Dinah Slough in the last 72 hours. No results for input(s): STREPNEUMAGU in the last 72 hours. No results for input(s): LP1UAG in the last 72 hours. No results for input(s): ASO in the last 72 hours. No results for input(s): CULTRESP in the last 72 hours. Assessment:  Left pyelonephritis  Left-sided kidney stone  S/P cystoscopy with left-sided JJ stent insertion by urology on 08/15  Possible left-sided renal abscess  Previous history of Enterobacter cloacae UTI  Drop in H&H noted   Left kidney fluid gm pos cocci in pairs    Plan:    Continue cefepime and vancomycin  Urine & Blood Cx NG so far   Primary team planning for transfusion  ID will continue to follow\  WBC 13400  Talked to the critical care  ? Renal  abscess      Thank you for having us see this patient in consultation. I will be discussing this case with the treating physicians.       Electronically signed by Bud Foy MD on 8/19/2023 at 10:37 AM

## 2023-08-19 NOTE — PLAN OF CARE
Problem: Safety - Adult  Goal: Free from fall injury  Outcome: Progressing  Flowsheets (Taken 8/19/2023 5970)  Free From Fall Injury:   Instruct family/caregiver on patient safety   Based on caregiver fall risk screen, instruct family/caregiver to ask for assistance with transferring infant if caregiver noted to have fall risk factors     Problem: Skin/Tissue Integrity  Goal: Absence of new skin breakdown  Description: 1. Monitor for areas of redness and/or skin breakdown  2. Assess vascular access sites hourly  3. Every 4-6 hours minimum:  Change oxygen saturation probe site  4. Every 4-6 hours:  If on nasal continuous positive airway pressure, respiratory therapy assess nares and determine need for appliance change or resting period. Outcome: Progressing     Problem: Pain  Goal: Verbalizes/displays adequate comfort level or baseline comfort level  Outcome: Progressing  Flowsheets (Taken 8/18/2023 1951)  Verbalizes/displays adequate comfort level or baseline comfort level:   Assess pain using appropriate pain scale   Administer analgesics based on type and severity of pain and evaluate response     Problem: Safety - Medical Restraint  Goal: Remains free of injury from restraints (Restraint for Interference with Medical Device)  Description: INTERVENTIONS:  1. Determine that other, less restrictive measures have been tried or would not be effective before applying the restraint  2. Evaluate the patient's condition at the time of restraint application  3. Inform patient/family regarding the reason for restraint  4.  Q2H: Monitor safety, psychosocial status, comfort, nutrition and hydration  Outcome: Progressing     Problem: ABCDS Injury Assessment  Goal: Absence of physical injury  Outcome: Progressing     Problem: Discharge Planning  Goal: Discharge to home or other facility with appropriate resources  Outcome: Not Progressing  Flowsheets (Taken 8/18/2023 2000)  Discharge to home or other facility with

## 2023-08-20 ENCOUNTER — APPOINTMENT (OUTPATIENT)
Dept: GENERAL RADIOLOGY | Age: 78
End: 2023-08-20
Payer: MEDICARE

## 2023-08-20 LAB
AADO2: 139.7 MMHG
ANION GAP SERPL CALCULATED.3IONS-SCNC: 14 MMOL/L (ref 7–16)
B.E.: -5.8 MMOL/L (ref -3–3)
BASOPHILS # BLD: 0.02 K/UL (ref 0–0.2)
BASOPHILS NFR BLD: 0 % (ref 0–2)
BUN SERPL-MCNC: 23 MG/DL (ref 6–23)
CALCIUM SERPL-MCNC: 8.3 MG/DL (ref 8.6–10.2)
CHLORIDE SERPL-SCNC: 109 MMOL/L (ref 98–107)
CO2 SERPL-SCNC: 18 MMOL/L (ref 22–29)
COHB: 0.2 % (ref 0–1.5)
CREAT SERPL-MCNC: 0.6 MG/DL (ref 0.5–1)
CRITICAL: ABNORMAL
DATE ANALYZED: ABNORMAL
DATE OF COLLECTION: ABNORMAL
EOSINOPHIL # BLD: 0 K/UL (ref 0.05–0.5)
EOSINOPHILS RELATIVE PERCENT: 0 % (ref 0–6)
ERYTHROCYTE [DISTWIDTH] IN BLOOD BY AUTOMATED COUNT: 15.3 % (ref 11.5–15)
FIO2: 40 %
GFR SERPL CREATININE-BSD FRML MDRD: >60 ML/MIN/1.73M2
GLUCOSE BLD-MCNC: 152 MG/DL (ref 74–99)
GLUCOSE BLD-MCNC: 176 MG/DL (ref 74–99)
GLUCOSE BLD-MCNC: 198 MG/DL (ref 74–99)
GLUCOSE BLD-MCNC: 204 MG/DL (ref 74–99)
GLUCOSE BLD-MCNC: 215 MG/DL (ref 74–99)
GLUCOSE BLD-MCNC: 215 MG/DL (ref 74–99)
GLUCOSE SERPL-MCNC: 192 MG/DL (ref 74–99)
HCO3: 18.6 MMOL/L (ref 22–26)
HCT VFR BLD AUTO: 25.2 % (ref 34–48)
HGB BLD-MCNC: 8 G/DL (ref 11.5–15.5)
HHB: 2.6 % (ref 0–5)
IMM GRANULOCYTES # BLD AUTO: 0.13 K/UL (ref 0–0.58)
IMM GRANULOCYTES NFR BLD: 1 % (ref 0–5)
LAB: ABNORMAL
LYMPHOCYTES NFR BLD: 0.51 K/UL (ref 1.5–4)
LYMPHOCYTES RELATIVE PERCENT: 3 % (ref 20–42)
Lab: ABNORMAL
MCH RBC QN AUTO: 31.5 PG (ref 26–35)
MCHC RBC AUTO-ENTMCNC: 31.7 G/DL (ref 32–34.5)
MCV RBC AUTO: 99.2 FL (ref 80–99.9)
METHB: 0.4 % (ref 0–1.5)
MICROORGANISM SPEC CULT: ABNORMAL
MICROORGANISM SPEC CULT: NO GROWTH
MICROORGANISM SPEC CULT: NORMAL
MICROORGANISM SPEC CULT: NORMAL
MICROORGANISM/AGENT SPEC: ABNORMAL
MODE: AC
MONOCYTES NFR BLD: 0.76 K/UL (ref 0.1–0.95)
MONOCYTES NFR BLD: 4 % (ref 2–12)
NEUTROPHILS NFR BLD: 92 % (ref 43–80)
NEUTS SEG NFR BLD: 15.99 K/UL (ref 1.8–7.3)
O2 CONTENT: 12.6 ML/DL
O2 SATURATION: 97.4 % (ref 92–98.5)
O2HB: 96.8 % (ref 94–97)
OPERATOR ID: 7490
PATIENT TEMP: 37 C
PCO2: 32.5 MMHG (ref 35–45)
PEEP/CPAP: 5 CMH2O
PFO2: 2.45 MMHG/%
PH BLOOD GAS: 7.38 (ref 7.35–7.45)
PLATELET # BLD AUTO: 515 K/UL (ref 130–450)
PMV BLD AUTO: 10.6 FL (ref 7–12)
PO2: 98.1 MMHG (ref 75–100)
POTASSIUM SERPL-SCNC: 3.6 MMOL/L (ref 3.5–5)
RBC # BLD AUTO: 2.54 M/UL (ref 3.5–5.5)
RI(T): 1.42
RR MECHANICAL: 18 B/MIN
SERVICE CMNT-IMP: NORMAL
SODIUM SERPL-SCNC: 141 MMOL/L (ref 132–146)
SOURCE, BLOOD GAS: ABNORMAL
SPECIMEN DESCRIPTION: ABNORMAL
SPECIMEN DESCRIPTION: NORMAL
THB: 9.1 G/DL (ref 11.5–16.5)
TIME ANALYZED: 459
VT MECHANICAL: 300 ML
WBC OTHER # BLD: 17.4 K/UL (ref 4.5–11.5)

## 2023-08-20 PROCEDURE — A4216 STERILE WATER/SALINE, 10 ML: HCPCS

## 2023-08-20 PROCEDURE — 6370000000 HC RX 637 (ALT 250 FOR IP)

## 2023-08-20 PROCEDURE — 2580000003 HC RX 258

## 2023-08-20 PROCEDURE — 85025 COMPLETE CBC W/AUTO DIFF WBC: CPT

## 2023-08-20 PROCEDURE — C9113 INJ PANTOPRAZOLE SODIUM, VIA: HCPCS

## 2023-08-20 PROCEDURE — 6370000000 HC RX 637 (ALT 250 FOR IP): Performed by: UROLOGY

## 2023-08-20 PROCEDURE — 2580000003 HC RX 258: Performed by: INTERNAL MEDICINE

## 2023-08-20 PROCEDURE — 6360000002 HC RX W HCPCS: Performed by: UROLOGY

## 2023-08-20 PROCEDURE — 6370000000 HC RX 637 (ALT 250 FOR IP): Performed by: STUDENT IN AN ORGANIZED HEALTH CARE EDUCATION/TRAINING PROGRAM

## 2023-08-20 PROCEDURE — 6360000002 HC RX W HCPCS

## 2023-08-20 PROCEDURE — 2500000003 HC RX 250 WO HCPCS: Performed by: INTERNAL MEDICINE

## 2023-08-20 PROCEDURE — 71045 X-RAY EXAM CHEST 1 VIEW: CPT

## 2023-08-20 PROCEDURE — 2580000003 HC RX 258: Performed by: UROLOGY

## 2023-08-20 PROCEDURE — 82805 BLOOD GASES W/O2 SATURATION: CPT

## 2023-08-20 PROCEDURE — 94003 VENT MGMT INPAT SUBQ DAY: CPT

## 2023-08-20 PROCEDURE — 80048 BASIC METABOLIC PNL TOTAL CA: CPT

## 2023-08-20 PROCEDURE — 2000000000 HC ICU R&B

## 2023-08-20 PROCEDURE — 82962 GLUCOSE BLOOD TEST: CPT

## 2023-08-20 PROCEDURE — 94640 AIRWAY INHALATION TREATMENT: CPT

## 2023-08-20 PROCEDURE — 6370000000 HC RX 637 (ALT 250 FOR IP): Performed by: FAMILY MEDICINE

## 2023-08-20 PROCEDURE — 2500000003 HC RX 250 WO HCPCS

## 2023-08-20 RX ORDER — MIDAZOLAM HYDROCHLORIDE 2 MG/2ML
1 INJECTION, SOLUTION INTRAMUSCULAR; INTRAVENOUS
Status: DISCONTINUED | OUTPATIENT
Start: 2023-08-20 | End: 2023-08-21

## 2023-08-20 RX ORDER — FERROUS SULFATE 325(65) MG
325 TABLET ORAL
Status: DISCONTINUED | OUTPATIENT
Start: 2023-08-21 | End: 2023-08-25

## 2023-08-20 RX ORDER — POTASSIUM CHLORIDE 29.8 MG/ML
40 INJECTION INTRAVENOUS ONCE
Status: COMPLETED | OUTPATIENT
Start: 2023-08-20 | End: 2023-08-20

## 2023-08-20 RX ORDER — DOCUSATE SODIUM 100 MG/1
100 CAPSULE, LIQUID FILLED ORAL DAILY
Status: DISCONTINUED | OUTPATIENT
Start: 2023-08-20 | End: 2023-08-20

## 2023-08-20 RX ORDER — DOCUSATE SODIUM 50 MG/5ML
100 LIQUID ORAL DAILY
Status: DISCONTINUED | OUTPATIENT
Start: 2023-08-20 | End: 2023-08-25

## 2023-08-20 RX ADMIN — IPRATROPIUM BROMIDE AND ALBUTEROL SULFATE 1 DOSE: .5; 2.5 SOLUTION RESPIRATORY (INHALATION) at 12:00

## 2023-08-20 RX ADMIN — LEVETIRACETAM 500 MG: 500 TABLET, FILM COATED ORAL at 20:58

## 2023-08-20 RX ADMIN — POLYVINYL ALCOHOL 1 DROP: 14 SOLUTION/ DROPS OPHTHALMIC at 13:51

## 2023-08-20 RX ADMIN — SENNOSIDES 17.2 MG: 8.6 TABLET, FILM COATED ORAL at 20:58

## 2023-08-20 RX ADMIN — RANOLAZINE 1000 MG: 500 TABLET, FILM COATED, EXTENDED RELEASE ORAL at 08:34

## 2023-08-20 RX ADMIN — LEVETIRACETAM 500 MG: 500 TABLET, FILM COATED ORAL at 08:34

## 2023-08-20 RX ADMIN — IPRATROPIUM BROMIDE AND ALBUTEROL SULFATE 1 DOSE: .5; 2.5 SOLUTION RESPIRATORY (INHALATION) at 16:22

## 2023-08-20 RX ADMIN — POLYVINYL ALCOHOL 1 DROP: 14 SOLUTION/ DROPS OPHTHALMIC at 08:39

## 2023-08-20 RX ADMIN — BACLOFEN 40 MG: 10 TABLET ORAL at 08:33

## 2023-08-20 RX ADMIN — PETROLATUM: 420 OINTMENT TOPICAL at 21:01

## 2023-08-20 RX ADMIN — Medication 2 MG/HR: at 06:09

## 2023-08-20 RX ADMIN — SODIUM CHLORIDE 1 G: 1 TABLET ORAL at 08:34

## 2023-08-20 RX ADMIN — POLYVINYL ALCOHOL 1 DROP: 14 SOLUTION/ DROPS OPHTHALMIC at 17:47

## 2023-08-20 RX ADMIN — Medication 10 ML: at 08:39

## 2023-08-20 RX ADMIN — SODIUM CHLORIDE, PRESERVATIVE FREE 10 ML: 5 INJECTION INTRAVENOUS at 21:01

## 2023-08-20 RX ADMIN — DANTROLENE SODIUM 100 MG: 25 CAPSULE ORAL at 20:59

## 2023-08-20 RX ADMIN — CHLORHEXIDINE GLUCONATE 15 ML: 1.2 RINSE ORAL at 20:59

## 2023-08-20 RX ADMIN — INSULIN LISPRO 1 UNITS: 100 INJECTION, SOLUTION INTRAVENOUS; SUBCUTANEOUS at 08:48

## 2023-08-20 RX ADMIN — POLYVINYL ALCOHOL 1 DROP: 14 SOLUTION/ DROPS OPHTHALMIC at 05:57

## 2023-08-20 RX ADMIN — DANTROLENE SODIUM 100 MG: 25 CAPSULE ORAL at 08:33

## 2023-08-20 RX ADMIN — CYANOCOBALAMIN TAB 1000 MCG 500 MCG: 1000 TAB at 08:34

## 2023-08-20 RX ADMIN — Medication 400 MG: at 08:34

## 2023-08-20 RX ADMIN — BACLOFEN 40 MG: 10 TABLET ORAL at 20:58

## 2023-08-20 RX ADMIN — HYDROCORTISONE SODIUM SUCCINATE 100 MG: 100 INJECTION, POWDER, FOR SOLUTION INTRAMUSCULAR; INTRAVENOUS at 15:27

## 2023-08-20 RX ADMIN — VANCOMYCIN HYDROCHLORIDE 750 MG: 10 INJECTION, POWDER, LYOPHILIZED, FOR SOLUTION INTRAVENOUS at 17:46

## 2023-08-20 RX ADMIN — Medication 1000 UNITS: at 08:33

## 2023-08-20 RX ADMIN — RANOLAZINE 1000 MG: 500 TABLET, FILM COATED, EXTENDED RELEASE ORAL at 20:58

## 2023-08-20 RX ADMIN — SODIUM CHLORIDE, PRESERVATIVE FREE 10 ML: 5 INJECTION INTRAVENOUS at 08:39

## 2023-08-20 RX ADMIN — CHLORHEXIDINE GLUCONATE 15 ML: 1.2 RINSE ORAL at 08:38

## 2023-08-20 RX ADMIN — DEXMEDETOMIDINE 0.1 MCG/KG/HR: 100 INJECTION, SOLUTION INTRAVENOUS at 11:53

## 2023-08-20 RX ADMIN — POLYVINYL ALCOHOL 1 DROP: 14 SOLUTION/ DROPS OPHTHALMIC at 21:51

## 2023-08-20 RX ADMIN — POLYETHYLENE GLYCOL 3350 17 G: 17 POWDER, FOR SOLUTION ORAL at 08:33

## 2023-08-20 RX ADMIN — ATORVASTATIN CALCIUM 10 MG: 10 TABLET, FILM COATED ORAL at 20:58

## 2023-08-20 RX ADMIN — IPRATROPIUM BROMIDE AND ALBUTEROL SULFATE 1 DOSE: .5; 2.5 SOLUTION RESPIRATORY (INHALATION) at 06:32

## 2023-08-20 RX ADMIN — LEVOTHYROXINE SODIUM 88 MCG: 0.09 TABLET ORAL at 06:31

## 2023-08-20 RX ADMIN — DOCUSATE SODIUM 100 MG: 50 LIQUID ORAL at 10:34

## 2023-08-20 RX ADMIN — Medication 50 MCG/HR: at 22:05

## 2023-08-20 RX ADMIN — CALCIUM CARBONATE-VITAMIN D TAB 500 MG-200 UNIT 1 TABLET: 500-200 TAB at 21:51

## 2023-08-20 RX ADMIN — IPRATROPIUM BROMIDE AND ALBUTEROL SULFATE 1 DOSE: .5; 2.5 SOLUTION RESPIRATORY (INHALATION) at 19:46

## 2023-08-20 RX ADMIN — INSULIN LISPRO 1 UNITS: 100 INJECTION, SOLUTION INTRAVENOUS; SUBCUTANEOUS at 17:00

## 2023-08-20 RX ADMIN — CEFEPIME 2000 MG: 2 INJECTION, POWDER, FOR SOLUTION INTRAVENOUS at 15:27

## 2023-08-20 RX ADMIN — FERROUS SULFATE TAB 325 MG (65 MG ELEMENTAL FE) 325 MG: 325 (65 FE) TAB at 08:33

## 2023-08-20 RX ADMIN — SODIUM CHLORIDE, PRESERVATIVE FREE 40 MG: 5 INJECTION INTRAVENOUS at 08:37

## 2023-08-20 RX ADMIN — HYDROCORTISONE SODIUM SUCCINATE 100 MG: 100 INJECTION, POWDER, FOR SOLUTION INTRAMUSCULAR; INTRAVENOUS at 08:38

## 2023-08-20 RX ADMIN — PETROLATUM: 420 OINTMENT TOPICAL at 08:38

## 2023-08-20 RX ADMIN — CEFEPIME 2000 MG: 2 INJECTION, POWDER, FOR SOLUTION INTRAVENOUS at 03:24

## 2023-08-20 RX ADMIN — POLYVINYL ALCOHOL 1 DROP: 14 SOLUTION/ DROPS OPHTHALMIC at 02:18

## 2023-08-20 RX ADMIN — POTASSIUM CHLORIDE 40 MEQ: 29.8 INJECTION, SOLUTION INTRAVENOUS at 09:00

## 2023-08-20 ASSESSMENT — PULMONARY FUNCTION TESTS
PIF_VALUE: 22
PIF_VALUE: 24
PIF_VALUE: 23
PIF_VALUE: 27
PIF_VALUE: 22
PIF_VALUE: 25
PIF_VALUE: 22
PIF_VALUE: 24
PIF_VALUE: 22
PIF_VALUE: 25
PIF_VALUE: 26
PIF_VALUE: 25
PIF_VALUE: 26
PIF_VALUE: 25
PIF_VALUE: 25
PIF_VALUE: 23
PIF_VALUE: 22
PIF_VALUE: 22
PIF_VALUE: 26
PIF_VALUE: 24
PIF_VALUE: 26
PIF_VALUE: 22
PIF_VALUE: 22
PIF_VALUE: 23
PIF_VALUE: 22
PIF_VALUE: 24

## 2023-08-20 ASSESSMENT — PAIN SCALES - GENERAL
PAINLEVEL_OUTOF10: 0

## 2023-08-20 NOTE — PROGRESS NOTES
Pharmacy Consultation Note  (Antibiotic Dosing and Monitoring)    Initial consult date: 8/15/23  Consulting physician/provider: Darlene Avila  Drug: Vancomycin  Indication: UTI    Age/  Gender Height Weight IBW  Allergy Information   78 y.o./female 5' (152.4 cm) 111 lb (50.3 kg)     Ideal body weight: 45.5 kg (100 lb 4.9 oz)  Adjusted ideal body weight: 48.5 kg (106 lb 15.1 oz)   Augmentin [amoxicillin-pot clavulanate], Bactrim [sulfamethoxazole-trimethoprim], and Macrobid [nitrofurantoin]      Renal Function:  Recent Labs     08/18/23  0550 08/18/23  2328 08/19/23  0840   BUN 16 20 20   CREATININE 0.5 0.6 0.5         Intake/Output Summary (Last 24 hours) at 8/20/2023 0630  Last data filed at 8/20/2023 0600  Gross per 24 hour   Intake 1076.96 ml   Output 552 ml   Net 524.96 ml         CBC:  Lab Results   Component Value Date/Time    WBC 31.0 08/19/2023 04:23 AM       Vitals:    08/20/23 0609   BP:    Pulse: 68   Resp: 18   Temp:    SpO2:        Vancomycin Monitoring:  Trough:    Recent Labs     08/19/23  0840   VANCOTROUGH 19.4*     Random:  No results for input(s): VANCORANDOM in the last 72 hours. No results for input(s): Dierdre Dykes in the last 72 hours. Historical Cultures:  Organism   Date Value Ref Range Status   06/23/2023 Staphylococcus coagulase-negative (A)  Final     No results for input(s): BC in the last 72 hours. Vancomycin Administration Times:  Recent vancomycin administrations                     vancomycin (VANCOCIN) 750 mg in sodium chloride 0.9 % 250 mL IVPB (mg) 750 mg New Bag 08/19/23 1747     750 mg New Bag 08/18/23 1713     750 mg New Bag 08/17/23 1753                  Assessment:  Patient is a 69 y.o. female who has been initiated on vancomycin  Estimated Creatinine Clearance: 68 mL/min (based on SCr of 0.5 mg/dL). Goal AUC:YIN = 400-600 mcg*hr/mL.      Plan:  Continue Vancomycin 750 mg q 24h    Jean-Pierre DumontD, [facility] 8/20/2023 6:34 AM

## 2023-08-20 NOTE — FLOWSHEET NOTE
Called ex# [phone] and left VM for IR procedure request for percutaneous nephrostomy tube with fluid collection culture.

## 2023-08-20 NOTE — PROGRESS NOTES
planning for transfusion  ID will continue to follow\  WBC 46249  Talked to the critical care  ? Renal  abscess  Talked to dr Jaquelin Castro  Urology note noted  When more stable another catheter by IR      Thank you for having us see this patient in consultation. I will be discussing this case with the treating physicians.       Electronically signed by Riley Oropeza MD on 8/20/2023 at 3:02 PM

## 2023-08-20 NOTE — PLAN OF CARE
Problem: Discharge Planning  Goal: Discharge to home or other facility with appropriate resources  Outcome: Progressing     Problem: Safety - Adult  Goal: Free from fall injury  Outcome: Progressing     Problem: Chronic Conditions and Co-morbidities  Goal: Patient's chronic conditions and co-morbidity symptoms are monitored and maintained or improved  Outcome: Progressing     Problem: Skin/Tissue Integrity  Goal: Absence of new skin breakdown  Description: 1. Monitor for areas of redness and/or skin breakdown  2. Assess vascular access sites hourly  3. Every 4-6 hours minimum:  Change oxygen saturation probe site  4. Every 4-6 hours:  If on nasal continuous positive airway pressure, respiratory therapy assess nares and determine need for appliance change or resting period.   Outcome: Progressing     Problem: Pain  Goal: Verbalizes/displays adequate comfort level or baseline comfort level  Outcome: Progressing     Problem: Nutrition Deficit:  Goal: Optimize nutritional status  Outcome: Progressing     Problem: ABCDS Injury Assessment  Goal: Absence of physical injury  Outcome: Progressing

## 2023-08-21 ENCOUNTER — APPOINTMENT (OUTPATIENT)
Dept: INTERVENTIONAL RADIOLOGY/VASCULAR | Age: 78
End: 2023-08-21
Payer: MEDICARE

## 2023-08-21 ENCOUNTER — APPOINTMENT (OUTPATIENT)
Dept: GENERAL RADIOLOGY | Age: 78
End: 2023-08-21
Payer: MEDICARE

## 2023-08-21 DIAGNOSIS — S42.201A CLOSED FRACTURE OF PROXIMAL END OF RIGHT HUMERUS, UNSPECIFIED FRACTURE MORPHOLOGY, INITIAL ENCOUNTER: Primary | ICD-10-CM

## 2023-08-21 LAB
AADO2: 120 MMHG
ANION GAP SERPL CALCULATED.3IONS-SCNC: 10 MMOL/L (ref 7–16)
B.E.: 2.5 MMOL/L (ref -3–3)
BASOPHILS # BLD: 0 K/UL (ref 0–0.2)
BASOPHILS NFR BLD: 0 % (ref 0–2)
BUN SERPL-MCNC: 27 MG/DL (ref 6–23)
CALCIUM SERPL-MCNC: 8.6 MG/DL (ref 8.6–10.2)
CHLORIDE SERPL-SCNC: 114 MMOL/L (ref 98–107)
CO2 SERPL-SCNC: 19 MMOL/L (ref 22–29)
COHB: 0.4 % (ref 0–1.5)
CREAT SERPL-MCNC: 0.6 MG/DL (ref 0.5–1)
CRITICAL: ABNORMAL
DATE ANALYZED: ABNORMAL
DATE OF COLLECTION: ABNORMAL
EOSINOPHIL # BLD: 0 K/UL (ref 0.05–0.5)
EOSINOPHILS RELATIVE PERCENT: 0 % (ref 0–6)
ERYTHROCYTE [DISTWIDTH] IN BLOOD BY AUTOMATED COUNT: 15.4 % (ref 11.5–15)
FIO2: 40 %
GFR SERPL CREATININE-BSD FRML MDRD: >60 ML/MIN/1.73M2
GLUCOSE BLD-MCNC: 174 MG/DL (ref 74–99)
GLUCOSE BLD-MCNC: 185 MG/DL (ref 74–99)
GLUCOSE BLD-MCNC: 191 MG/DL (ref 74–99)
GLUCOSE BLD-MCNC: 219 MG/DL (ref 74–99)
GLUCOSE BLD-MCNC: 220 MG/DL (ref 74–99)
GLUCOSE SERPL-MCNC: 194 MG/DL (ref 74–99)
HCO3: 29.4 MMOL/L (ref 22–26)
HCT VFR BLD AUTO: 24 % (ref 34–48)
HGB BLD-MCNC: 7.4 G/DL (ref 11.5–15.5)
HHB: 5.6 % (ref 0–5)
LAB: ABNORMAL
LYMPHOCYTES NFR BLD: 0.24 K/UL (ref 1.5–4)
LYMPHOCYTES RELATIVE PERCENT: 2 % (ref 20–42)
Lab: ABNORMAL
MCH RBC QN AUTO: 30.8 PG (ref 26–35)
MCHC RBC AUTO-ENTMCNC: 30.8 G/DL (ref 32–34.5)
MCV RBC AUTO: 100 FL (ref 80–99.9)
METHB: 0.6 % (ref 0–1.5)
MICROORGANISM SPEC CULT: ABNORMAL
MICROORGANISM SPEC CULT: ABNORMAL
MICROORGANISM SPEC CULT: NORMAL
MICROORGANISM/AGENT SPEC: ABNORMAL
MODE: AC
MONOCYTES NFR BLD: 0.6 K/UL (ref 0.1–0.95)
MONOCYTES NFR BLD: 4 % (ref 2–12)
NEUTROPHILS NFR BLD: 94 % (ref 43–80)
NEUTS SEG NFR BLD: 12.76 K/UL (ref 1.8–7.3)
O2 CONTENT: 12.7 ML/DL
O2 SATURATION: 94.3 % (ref 92–98.5)
O2HB: 93.4 % (ref 94–97)
OPERATOR ID: 7221
PATIENT TEMP: 37 C
PCO2: 58.8 MMHG (ref 35–45)
PEEP/CPAP: 5 CMH2O
PFO2: 2.19 MMHG/%
PH BLOOD GAS: 7.32 (ref 7.35–7.45)
PLATELET # BLD AUTO: 481 K/UL (ref 130–450)
PMV BLD AUTO: 10.6 FL (ref 7–12)
PO2: 87.6 MMHG (ref 75–100)
POTASSIUM SERPL-SCNC: 4.8 MMOL/L (ref 3.5–5)
RBC # BLD AUTO: 2.4 M/UL (ref 3.5–5.5)
RBC # BLD: ABNORMAL 10*6/UL
RBC # BLD: ABNORMAL 10*6/UL
RI(T): 1.37
RR MECHANICAL: 18 B/MIN
SERVICE CMNT-IMP: NORMAL
SODIUM SERPL-SCNC: 143 MMOL/L (ref 132–146)
SOURCE, BLOOD GAS: ABNORMAL
SPECIMEN DESCRIPTION: ABNORMAL
SPECIMEN DESCRIPTION: ABNORMAL
SPECIMEN DESCRIPTION: NORMAL
THB: 9.6 G/DL (ref 11.5–16.5)
TIME ANALYZED: 527
VT MECHANICAL: 300 ML
WBC OTHER # BLD: 13.6 K/UL (ref 4.5–11.5)

## 2023-08-21 PROCEDURE — 6370000000 HC RX 637 (ALT 250 FOR IP): Performed by: FAMILY MEDICINE

## 2023-08-21 PROCEDURE — 6360000002 HC RX W HCPCS: Performed by: UROLOGY

## 2023-08-21 PROCEDURE — 6370000000 HC RX 637 (ALT 250 FOR IP): Performed by: UROLOGY

## 2023-08-21 PROCEDURE — 82962 GLUCOSE BLOOD TEST: CPT

## 2023-08-21 PROCEDURE — 2000000000 HC ICU R&B

## 2023-08-21 PROCEDURE — C9113 INJ PANTOPRAZOLE SODIUM, VIA: HCPCS

## 2023-08-21 PROCEDURE — 94640 AIRWAY INHALATION TREATMENT: CPT

## 2023-08-21 PROCEDURE — 2580000003 HC RX 258: Performed by: UROLOGY

## 2023-08-21 PROCEDURE — 99291 CRITICAL CARE FIRST HOUR: CPT | Performed by: INTERNAL MEDICINE

## 2023-08-21 PROCEDURE — 6370000000 HC RX 637 (ALT 250 FOR IP): Performed by: STUDENT IN AN ORGANIZED HEALTH CARE EDUCATION/TRAINING PROGRAM

## 2023-08-21 PROCEDURE — 6360000004 HC RX CONTRAST MEDICATION: Performed by: RADIOLOGY

## 2023-08-21 PROCEDURE — 71045 X-RAY EXAM CHEST 1 VIEW: CPT

## 2023-08-21 PROCEDURE — 2580000003 HC RX 258: Performed by: INTERNAL MEDICINE

## 2023-08-21 PROCEDURE — 6360000002 HC RX W HCPCS

## 2023-08-21 PROCEDURE — 87205 SMEAR GRAM STAIN: CPT

## 2023-08-21 PROCEDURE — A4216 STERILE WATER/SALINE, 10 ML: HCPCS

## 2023-08-21 PROCEDURE — 2580000003 HC RX 258

## 2023-08-21 PROCEDURE — 94003 VENT MGMT INPAT SUBQ DAY: CPT

## 2023-08-21 PROCEDURE — 87070 CULTURE OTHR SPECIMN AEROBIC: CPT

## 2023-08-21 PROCEDURE — 89051 BODY FLUID CELL COUNT: CPT

## 2023-08-21 PROCEDURE — 87075 CULTR BACTERIA EXCEPT BLOOD: CPT

## 2023-08-21 PROCEDURE — P9047 ALBUMIN (HUMAN), 25%, 50ML: HCPCS

## 2023-08-21 PROCEDURE — 6370000000 HC RX 637 (ALT 250 FOR IP): Performed by: INTERNAL MEDICINE

## 2023-08-21 PROCEDURE — 85025 COMPLETE CBC W/AUTO DIFF WBC: CPT

## 2023-08-21 PROCEDURE — 2500000003 HC RX 250 WO HCPCS: Performed by: INTERNAL MEDICINE

## 2023-08-21 PROCEDURE — 6370000000 HC RX 637 (ALT 250 FOR IP)

## 2023-08-21 PROCEDURE — 2500000003 HC RX 250 WO HCPCS

## 2023-08-21 PROCEDURE — 50431 NJX PX NFROSGRM &/URTRGRM: CPT

## 2023-08-21 PROCEDURE — 82805 BLOOD GASES W/O2 SATURATION: CPT

## 2023-08-21 PROCEDURE — 80048 BASIC METABOLIC PNL TOTAL CA: CPT

## 2023-08-21 RX ORDER — ALBUMIN (HUMAN) 12.5 G/50ML
25 SOLUTION INTRAVENOUS ONCE
Status: COMPLETED | OUTPATIENT
Start: 2023-08-21 | End: 2023-08-21

## 2023-08-21 RX ORDER — LEVETIRACETAM 100 MG/ML
500 SOLUTION ORAL 2 TIMES DAILY
Status: DISCONTINUED | OUTPATIENT
Start: 2023-08-21 | End: 2023-08-25

## 2023-08-21 RX ORDER — FUROSEMIDE 10 MG/ML
20 INJECTION INTRAMUSCULAR; INTRAVENOUS ONCE
Status: COMPLETED | OUTPATIENT
Start: 2023-08-21 | End: 2023-08-21

## 2023-08-21 RX ORDER — SENNOSIDES A AND B 8.6 MG/1
2 TABLET, FILM COATED ORAL 2 TIMES DAILY
Status: DISCONTINUED | OUTPATIENT
Start: 2023-08-21 | End: 2023-08-25

## 2023-08-21 RX ADMIN — DOCUSATE SODIUM 100 MG: 50 LIQUID ORAL at 09:07

## 2023-08-21 RX ADMIN — POLYVINYL ALCOHOL 1 DROP: 14 SOLUTION/ DROPS OPHTHALMIC at 21:12

## 2023-08-21 RX ADMIN — CALCIUM CARBONATE-VITAMIN D TAB 500 MG-200 UNIT 1 TABLET: 500-200 TAB at 21:11

## 2023-08-21 RX ADMIN — FUROSEMIDE 20 MG: 10 INJECTION, SOLUTION INTRAMUSCULAR; INTRAVENOUS at 16:11

## 2023-08-21 RX ADMIN — INSULIN LISPRO 1 UNITS: 100 INJECTION, SOLUTION INTRAVENOUS; SUBCUTANEOUS at 17:36

## 2023-08-21 RX ADMIN — FERROUS SULFATE TAB 325 MG (65 MG ELEMENTAL FE) 325 MG: 325 (65 FE) TAB at 09:08

## 2023-08-21 RX ADMIN — POLYVINYL ALCOHOL 1 DROP: 14 SOLUTION/ DROPS OPHTHALMIC at 10:42

## 2023-08-21 RX ADMIN — Medication 10 ML: at 09:10

## 2023-08-21 RX ADMIN — POLYVINYL ALCOHOL 1 DROP: 14 SOLUTION/ DROPS OPHTHALMIC at 01:56

## 2023-08-21 RX ADMIN — SODIUM CHLORIDE, PRESERVATIVE FREE 40 MG: 5 INJECTION INTRAVENOUS at 09:07

## 2023-08-21 RX ADMIN — CYANOCOBALAMIN TAB 1000 MCG 500 MCG: 1000 TAB at 09:10

## 2023-08-21 RX ADMIN — VANCOMYCIN HYDROCHLORIDE 750 MG: 10 INJECTION, POWDER, LYOPHILIZED, FOR SOLUTION INTRAVENOUS at 17:16

## 2023-08-21 RX ADMIN — POLYVINYL ALCOHOL 1 DROP: 14 SOLUTION/ DROPS OPHTHALMIC at 14:02

## 2023-08-21 RX ADMIN — LEVETIRACETAM 500 MG: 100 SOLUTION ORAL at 21:11

## 2023-08-21 RX ADMIN — POLYVINYL ALCOHOL 1 DROP: 14 SOLUTION/ DROPS OPHTHALMIC at 06:29

## 2023-08-21 RX ADMIN — DANTROLENE SODIUM 100 MG: 25 CAPSULE ORAL at 21:11

## 2023-08-21 RX ADMIN — Medication 8 MCG/MIN: at 00:58

## 2023-08-21 RX ADMIN — SODIUM CHLORIDE, PRESERVATIVE FREE 10 ML: 5 INJECTION INTRAVENOUS at 09:10

## 2023-08-21 RX ADMIN — SODIUM CHLORIDE 1 G: 1 TABLET ORAL at 10:38

## 2023-08-21 RX ADMIN — HYDROCORTISONE SODIUM SUCCINATE 100 MG: 100 INJECTION, POWDER, FOR SOLUTION INTRAMUSCULAR; INTRAVENOUS at 12:11

## 2023-08-21 RX ADMIN — Medication 10 ML: at 21:13

## 2023-08-21 RX ADMIN — IPRATROPIUM BROMIDE AND ALBUTEROL SULFATE 1 DOSE: .5; 2.5 SOLUTION RESPIRATORY (INHALATION) at 07:32

## 2023-08-21 RX ADMIN — IOPAMIDOL 10 ML: 755 INJECTION, SOLUTION INTRAVENOUS at 10:09

## 2023-08-21 RX ADMIN — CHLORHEXIDINE GLUCONATE 15 ML: 1.2 RINSE ORAL at 09:07

## 2023-08-21 RX ADMIN — SODIUM CHLORIDE, PRESERVATIVE FREE 10 ML: 5 INJECTION INTRAVENOUS at 21:12

## 2023-08-21 RX ADMIN — POLYVINYL ALCOHOL 1 DROP: 14 SOLUTION/ DROPS OPHTHALMIC at 18:19

## 2023-08-21 RX ADMIN — ALBUMIN (HUMAN) 25 G: 0.25 INJECTION, SOLUTION INTRAVENOUS at 16:14

## 2023-08-21 RX ADMIN — PETROLATUM: 420 OINTMENT TOPICAL at 21:12

## 2023-08-21 RX ADMIN — LEVOTHYROXINE SODIUM 88 MCG: 0.09 TABLET ORAL at 06:38

## 2023-08-21 RX ADMIN — CEFEPIME 2000 MG: 2 INJECTION, POWDER, FOR SOLUTION INTRAVENOUS at 16:03

## 2023-08-21 RX ADMIN — PETROLATUM: 420 OINTMENT TOPICAL at 11:30

## 2023-08-21 RX ADMIN — ATORVASTATIN CALCIUM 10 MG: 10 TABLET, FILM COATED ORAL at 21:11

## 2023-08-21 RX ADMIN — IPRATROPIUM BROMIDE AND ALBUTEROL SULFATE 1 DOSE: .5; 2.5 SOLUTION RESPIRATORY (INHALATION) at 12:22

## 2023-08-21 RX ADMIN — Medication 1000 UNITS: at 09:09

## 2023-08-21 RX ADMIN — IPRATROPIUM BROMIDE AND ALBUTEROL SULFATE 1 DOSE: .5; 2.5 SOLUTION RESPIRATORY (INHALATION) at 16:10

## 2023-08-21 RX ADMIN — CEFEPIME 2000 MG: 2 INJECTION, POWDER, FOR SOLUTION INTRAVENOUS at 03:22

## 2023-08-21 RX ADMIN — POLYETHYLENE GLYCOL 3350 17 G: 17 POWDER, FOR SOLUTION ORAL at 09:08

## 2023-08-21 RX ADMIN — CHLORHEXIDINE GLUCONATE 15 ML: 1.2 RINSE ORAL at 21:11

## 2023-08-21 RX ADMIN — DEXMEDETOMIDINE 0.2 MCG/KG/HR: 100 INJECTION, SOLUTION INTRAVENOUS at 17:46

## 2023-08-21 RX ADMIN — IPRATROPIUM BROMIDE AND ALBUTEROL SULFATE 1 DOSE: .5; 2.5 SOLUTION RESPIRATORY (INHALATION) at 20:28

## 2023-08-21 RX ADMIN — LEVETIRACETAM 500 MG: 100 SOLUTION ORAL at 10:38

## 2023-08-21 RX ADMIN — BACLOFEN 40 MG: 10 TABLET ORAL at 21:10

## 2023-08-21 RX ADMIN — Medication 400 MG: at 09:08

## 2023-08-21 RX ADMIN — DANTROLENE SODIUM 100 MG: 25 CAPSULE ORAL at 09:07

## 2023-08-21 RX ADMIN — SENNOSIDES 17.2 MG: 8.6 TABLET, FILM COATED ORAL at 21:11

## 2023-08-21 RX ADMIN — BACLOFEN 40 MG: 10 TABLET ORAL at 09:06

## 2023-08-21 RX ADMIN — HYDROCORTISONE SODIUM SUCCINATE 100 MG: 100 INJECTION, POWDER, FOR SOLUTION INTRAMUSCULAR; INTRAVENOUS at 00:06

## 2023-08-21 ASSESSMENT — PULMONARY FUNCTION TESTS
PIF_VALUE: 30
PIF_VALUE: 27
PIF_VALUE: 26
PIF_VALUE: 26
PIF_VALUE: 25
PIF_VALUE: 27
PIF_VALUE: 25
PIF_VALUE: 26
PIF_VALUE: 26
PIF_VALUE: 27
PIF_VALUE: 34
PIF_VALUE: 29
PIF_VALUE: 25
PIF_VALUE: 23
PIF_VALUE: 23
PIF_VALUE: 29
PIF_VALUE: 24
PIF_VALUE: 24
PIF_VALUE: 27
PIF_VALUE: 28
PIF_VALUE: 24
PIF_VALUE: 26
PIF_VALUE: 26
PIF_VALUE: 25
PIF_VALUE: 23
PIF_VALUE: 27

## 2023-08-21 ASSESSMENT — PAIN SCALES - GENERAL
PAINLEVEL_OUTOF10: 0

## 2023-08-21 NOTE — PROGRESS NOTES
[address] [facility]  [facility]  Progress note      HISTORY OF PRESENT ILLNESS:   46-year-old female with past medical history of CAD, HLD, HTN, hypothyroidism, MS, sarcoidosis, DM presented with left-sided kidney stone and left-sided pyelonephritis. She had a previous history of Enterobacter cloacae UTI. Urinalysis is positive for pyuria and bacteriuria. CT abdomen showing left-sided kidney stone with left-sided pyelonephritis and possible renal abscess. Status post cystoscopy and left-sided JJ stent insertion on 08/15 by urology. ID consulted for UTI. Past Medical History:        Diagnosis Date    Anemia     CAD (coronary artery disease) 04/21/2020    High cholesterol     Hypertension     Hypothyroidism     Malabsorption     MI (myocardial infarction) ([address]) 04/21/2020    MS (multiple sclerosis) ([address])     Osteoporosis     Sarcoidosis     Seizure ([address]) 02/05/2021    Type 2 diabetes mellitus with hyperglycemia, without long-term current use of insulin ([address]) 10/30/2019     Past Surgical History:        Procedure Laterality Date    BLADDER SURGERY Left 8/15/2023    CYSTOSCOPY RETROGRADE PYELOGRAM, URETEROSCOPY, LEFT STENT INSERTION, STRATTON CATHETER INSERTION performed by Milton Green MD at [address]  04/21/2020    Dr. Eugenie Landeros   3.0 x 22mm Resolute MAAME to Prox LAD.   No LV    CORONARY ANGIOPLASTY WITH STENT PLACEMENT  01/14/2021    3.0 x 30 Maame to the prox LAD and a 2.5 x 20 Maame to the Obtuse marginal by Dr. Dominique Daniels  8/17/2023    CT NEPHROSTOMY CATHETER PLACEMENT 8/17/2023 Michelle Macedo MD [facility]    CYSTOSCOPY INSERTION / REMOVAL STENT / STONE Left 4/23/2020    CYSTOSCOPY LEFT RETROGRADE PYELOGRAM STENT INSERTION, STRATTON CATHETER performed by Deon Avila DO at [address] Left 3/21/2019    LEFT FEMUR CEPHALLOMEDULLARY NAIL performed by Elizabeth Day MD at [address] Bilateral     HYSTERECTOMY (CERVIX STATUS UNKNOWN)      KNEE SURGERY      plate in lt knee    LITHOTRIPSY Left 11/23/2020    CYSTOSCOPY LEFT URETEROSCOPY,  LASER LITHOTRIPSY  BASKET EXTRACTION LEFT STONE, STENT EXCHANGE performed by Mariano Avila DO at [facility]    LITHOTRIPSY Left 12/30/2020    CYSTOSCOPY RETROGRADE URETEROSCOPY PYELOGRAM LASER LITHOTRIPSY LEFT J-STENT performed by Colby Avila DO at [facility] OR     Current Medications:   Scheduled Meds:   hydrocortisone sodium succinate PF  100 mg IntraVENous Q12H    levETIRAcetam  500 mg Oral BID    albumin human 25%  25 g IntraVENous Once    senna  2 tablet Oral BID    ferrous sulfate  325 mg Oral Once per day on Mon Wed Fri    docusate sodium  100 mg Oral Daily    lidocaine  1 patch TransDERmal Daily    polyvinyl alcohol  1 drop Both Eyes Q4H    Or    artificial tears   Both Eyes Q4H    chlorhexidine  15 mL Mouth/Throat BID    sodium chloride flush  5-40 mL IntraVENous 2 times per day    polyethylene glycol  17 g Oral Daily    pantoprazole (PROTONIX) 40 mg injection  40 mg IntraVENous Daily    vancomycin  750 mg IntraVENous Q24H    white petrolatum   Topical BID    [Held by provider] aspirin  81 mg Oral Daily    atorvastatin  10 mg Oral Nightly    baclofen  40 mg Oral BID    [Held by provider] bumetanide  1 mg Oral BID    oyster shell calcium w/D  1 tablet Oral Nightly    [Held by provider] clopidogrel  75 mg Oral Daily    vitamin B-12  500 mcg Oral Daily    ipratropium 0.5 mg-albuterol 2.5 mg  1 Dose Nebulization Q4H WA RT    levothyroxine  88 mcg Oral Daily    magnesium oxide  400 mg Oral QAM    ranolazine  1,000 mg Oral BID    sodium chloride  1 g Oral Daily    Vitamin D  1 tablet Oral QAM    dantrolene  100 mg Oral BID    sodium chloride flush  5-40 mL IntraVENous 2 times per day    [Held by provider] enoxaparin  30 mg SubCUTAneous Daily    insulin lispro  0-4 Units SubCUTAneous TID WC    insulin lispro  0-4 Units SubCUTAneous

## 2023-08-21 NOTE — PROGRESS NOTES
8/21/2023 4:02 PM  Tavares Douglas  [id number]    Subjective:    Remains in the ICU   On ventilator   Denita with yellow urine   Family present   She was taken to IR today for a nephrostomy tube check and culture per ICU     Review of Systems  Unable to obtain due to intubated       Scheduled Meds:   hydrocortisone sodium succinate PF  100 mg IntraVENous Q12H    levETIRAcetam  500 mg Oral BID    albumin human 25%  25 g IntraVENous Once    furosemide  20 mg IntraVENous Once    ferrous sulfate  325 mg Oral Once per day on Mon Wed Fri    docusate sodium  100 mg Oral Daily    lidocaine  1 patch TransDERmal Daily    polyvinyl alcohol  1 drop Both Eyes Q4H    Or    artificial tears   Both Eyes Q4H    chlorhexidine  15 mL Mouth/Throat BID    sodium chloride flush  5-40 mL IntraVENous 2 times per day    polyethylene glycol  17 g Oral Daily    senna  2 tablet Oral Nightly    pantoprazole (PROTONIX) 40 mg injection  40 mg IntraVENous Daily    vancomycin  750 mg IntraVENous Q24H    white petrolatum   Topical BID    [Held by provider] aspirin  81 mg Oral Daily    atorvastatin  10 mg Oral Nightly    baclofen  40 mg Oral BID    [Held by provider] bumetanide  1 mg Oral BID    oyster shell calcium w/D  1 tablet Oral Nightly    [Held by provider] clopidogrel  75 mg Oral Daily    vitamin B-12  500 mcg Oral Daily    ipratropium 0.5 mg-albuterol 2.5 mg  1 Dose Nebulization Q4H WA RT    levothyroxine  88 mcg Oral Daily    magnesium oxide  400 mg Oral QAM    ranolazine  1,000 mg Oral BID    sodium chloride  1 g Oral Daily    Vitamin D  1 tablet Oral QAM    dantrolene  100 mg Oral BID    sodium chloride flush  5-40 mL IntraVENous 2 times per day    [Held by provider] enoxaparin  30 mg SubCUTAneous Daily    insulin lispro  0-4 Units SubCUTAneous TID     insulin lispro  0-4 Units SubCUTAneous Nightly    cefepime  2,000 mg IntraVENous Q12H       Objective:  Vitals:    08/21/23 1430   BP: 94/60   Pulse: 72   Resp: 22   Temp:    SpO2:

## 2023-08-21 NOTE — PROGRESS NOTES
OT consult received and appreciated. Chart reviewed. Will hold evaluation d/t awaiting MRI results and NS recommendations. Will evaluate at a later time. Thank you.  Clarissa Christensen, OTR/L # [id number]

## 2023-08-21 NOTE — PROCEDURES
Patient arrived to IR for nephrostomy tube check. Was able to aspirate very little fluid from tube, however small sample obtained for culture. Tube evaluated and per Dr Alan Elliott will need intervention when angio suite available.

## 2023-08-21 NOTE — PROGRESS NOTES
Pharmacy Consultation Note  (Antibiotic Dosing and Monitoring)    Initial consult date: 8/15/23  Consulting physician/provider: Jose Celeste  Drug: Vancomycin  Indication: UTI    Age/  Gender Height Weight IBW  Allergy Information   78 y.o./female 5' (152.4 cm) 111 lb (50.3 kg)     Ideal body weight: 45.5 kg (100 lb 4.9 oz)  Adjusted ideal body weight: 49 kg (107 lb 15.1 oz)   Augmentin [amoxicillin-pot clavulanate], Bactrim [sulfamethoxazole-trimethoprim], and Macrobid [nitrofurantoin]      Renal Function:  Recent Labs     08/19/23  0840 08/20/23  0448 08/21/23  0458   BUN 20 23 27*   CREATININE 0.5 0.6 0.6         Intake/Output Summary (Last 24 hours) at 8/21/2023 1248  Last data filed at 8/21/2023 1240  Gross per 24 hour   Intake 1926.03 ml   Output 548 ml   Net 1378.03 ml         CBC:  Lab Results   Component Value Date/Time    WBC 13.6 08/21/2023 04:58 AM       Vitals:    08/21/23 1220   BP:    Pulse: 59   Resp: 22   Temp:    SpO2: 98%       Vancomycin Monitoring:  Trough:    Recent Labs     08/19/23  0840   VANCOTROUGH 19.4*       Random:  No results for input(s): VANCORANDOM in the last 72 hours. No results for input(s): [place] Coins in the last 72 hours. Historical Cultures:  Organism   Date Value Ref Range Status   06/23/2023 Staphylococcus coagulase-negative (A)  Final     No results for input(s): BC in the last 72 hours. Vancomycin Administration Times:    Recent vancomycin administrations                     vancomycin (VANCOCIN) 750 mg in sodium chloride 0.9 % 250 mL IVPB (mg) 750 mg New Bag 08/20/23 1746     750 mg New Bag 08/19/23 1747     750 mg New Bag 08/18/23 1713                  Assessment:  Patient is a 69 y.o. female who has been initiated on vancomycin  Estimated Creatinine Clearance: 56 mL/min (based on SCr of 0.6 mg/dL). Goal AUC:YIN = 400-600 mcg*hr/mL.    8/21: Scr stable, UOP 0.3 mL/kg/hr    Plan:  Continue Vancomycin 750 mg q 24h  Random level tomorrow am on 8/22    Thank you for

## 2023-08-21 NOTE — PROGRESS NOTES
DAILY VENTILATOR WEANING ASSESSMENT PERFORMED    P/FIO2 Ratio =   219       (<100= do not Wean)                  Cs =     23                     (<32= Instability)  Plat. Pressure = 18  MV = 6.54  RSBI =    Instabilities:       Cardiovascular =       CNS =       Respiratory =        Metabolic =    Parameters    no    Wean per protocol  no    Ask Physician for a weaning plan yes    Additional Comments:     Performed by Max Mas RRT      Reference Table:    Cardiovascular     CNS      1. Mean BP less than or equal to 75   1. Neuromuscular blockade  2. Heart Rate greater than 130   2. RASS of -3, -4, -5  3. Myocardial Ischemia    3. RASS of +3, +4  4. Mechanical Assist Device    4. ICP greater than 15 or             Intracranial Hypertension         Respiratory      Metabolic  1. PEEP equal to or greater than 10cm/H20  1. Temp. (8hrs) less than 95 or > 103  2. Respiratory Rate greater than 35   2. WBC < 5000 or > 88876  3. Minute Volume greater than 15L  4. pH less than 7.30  5.  Deteriorating chest X-ray

## 2023-08-21 NOTE — PROGRESS NOTES
Patient's  called, she is currently admitted and wants to reschedule her appointment. Appointment r/s.     Requesting XR shoulder order to be placed and completed in hospital while there

## 2023-08-21 NOTE — CARE COORDINATION
Care Coordination: LOS 7 days. Transfer from  on 8/18/23; ac hypoxic, hypercapnic respiratory failure. Hx of MS, WC bound.  cares for pt 24/7. Plan in place is to return home with  unless SNF is needed short term and they have provided choice to previous SW of [facility]. Intubated, Cefepime, fentanyl, Levophed. Therapy evals once medically appropriate to determine transition of care needs.     Electronically signed by Marina Leos RN on 8/21/2023 at 10:08 AM

## 2023-08-22 ENCOUNTER — APPOINTMENT (OUTPATIENT)
Dept: GENERAL RADIOLOGY | Age: 78
End: 2023-08-22
Payer: MEDICARE

## 2023-08-22 LAB
AADO2: 172.8 MMHG
ANION GAP SERPL CALCULATED.3IONS-SCNC: 11 MMOL/L (ref 7–16)
ANION GAP SERPL CALCULATED.3IONS-SCNC: 13 MMOL/L (ref 7–16)
B.E.: -3.1 MMOL/L (ref -3–3)
BASOPHILS # BLD: 0.01 K/UL (ref 0–0.2)
BASOPHILS NFR BLD: 0 % (ref 0–2)
BUN SERPL-MCNC: 30 MG/DL (ref 6–23)
BUN SERPL-MCNC: 33 MG/DL (ref 6–23)
CALCIUM SERPL-MCNC: 8.3 MG/DL (ref 8.6–10.2)
CALCIUM SERPL-MCNC: 8.5 MG/DL (ref 8.6–10.2)
CHLORIDE SERPL-SCNC: 111 MMOL/L (ref 98–107)
CHLORIDE SERPL-SCNC: 112 MMOL/L (ref 98–107)
CO2 SERPL-SCNC: 18 MMOL/L (ref 22–29)
CO2 SERPL-SCNC: 19 MMOL/L (ref 22–29)
COHB: 0.8 % (ref 0–1.5)
CREAT SERPL-MCNC: 0.6 MG/DL (ref 0.5–1)
CREAT SERPL-MCNC: 0.6 MG/DL (ref 0.5–1)
CRITICAL: ABNORMAL
DATE ANALYZED: ABNORMAL
DATE OF COLLECTION: ABNORMAL
EOSINOPHIL # BLD: 0.01 K/UL (ref 0.05–0.5)
EOSINOPHILS RELATIVE PERCENT: 0 % (ref 0–6)
ERYTHROCYTE [DISTWIDTH] IN BLOOD BY AUTOMATED COUNT: 15.5 % (ref 11.5–15)
FIO2: 40 %
GFR SERPL CREATININE-BSD FRML MDRD: >60 ML/MIN/1.73M2
GFR SERPL CREATININE-BSD FRML MDRD: >60 ML/MIN/1.73M2
GLUCOSE BLD-MCNC: 127 MG/DL (ref 74–99)
GLUCOSE BLD-MCNC: 128 MG/DL (ref 74–99)
GLUCOSE BLD-MCNC: 202 MG/DL (ref 74–99)
GLUCOSE BLD-MCNC: 242 MG/DL (ref 74–99)
GLUCOSE SERPL-MCNC: 130 MG/DL (ref 74–99)
GLUCOSE SERPL-MCNC: 223 MG/DL (ref 74–99)
HCO3: 20.6 MMOL/L (ref 22–26)
HCT VFR BLD AUTO: 21.8 % (ref 34–48)
HCT VFR BLD AUTO: 28.3 % (ref 34–48)
HGB BLD-MCNC: 6.7 G/DL (ref 11.5–15.5)
HGB BLD-MCNC: 9.2 G/DL (ref 11.5–15.5)
HHB: 6.6 % (ref 0–5)
IMM GRANULOCYTES # BLD AUTO: 0.1 K/UL (ref 0–0.58)
IMM GRANULOCYTES NFR BLD: 1 % (ref 0–5)
IMM RETICS NFR: 25.4 % (ref 3–15.9)
LAB: ABNORMAL
LYMPHOCYTES NFR BLD: 0.93 K/UL (ref 1.5–4)
LYMPHOCYTES RELATIVE PERCENT: 7 % (ref 20–42)
Lab: ABNORMAL
MCH RBC QN AUTO: 31 PG (ref 26–35)
MCHC RBC AUTO-ENTMCNC: 30.7 G/DL (ref 32–34.5)
MCV RBC AUTO: 100.9 FL (ref 80–99.9)
METHB: 0.5 % (ref 0–1.5)
MODE: AC
MONOCYTES NFR BLD: 1.21 K/UL (ref 0.1–0.95)
MONOCYTES NFR BLD: 9 % (ref 2–12)
NEUTROPHILS NFR BLD: 84 % (ref 43–80)
NEUTS SEG NFR BLD: 12.01 K/UL (ref 1.8–7.3)
O2 CONTENT: 10.3 ML/DL
O2 SATURATION: 93.3 % (ref 92–98.5)
O2HB: 92.1 % (ref 94–97)
OPERATOR ID: 8219
PATIENT TEMP: 37 C
PCO2: 31.1 MMHG (ref 35–45)
PEEP/CPAP: 5 CMH2O
PFO2: 1.66 MMHG/%
PH BLOOD GAS: 7.44 (ref 7.35–7.45)
PLATELET # BLD AUTO: 387 K/UL (ref 130–450)
PMV BLD AUTO: 10.4 FL (ref 7–12)
PO2: 66.6 MMHG (ref 75–100)
POTASSIUM SERPL-SCNC: 3.3 MMOL/L (ref 3.5–5)
POTASSIUM SERPL-SCNC: 3.8 MMOL/L (ref 3.5–5)
RBC # BLD AUTO: 2.16 M/UL (ref 3.5–5.5)
RETIC HEMOGLOBIN: 31.8 PG (ref 28.2–36.6)
RETICS # AUTO: 0.05 M/UL
RETICS/RBC NFR AUTO: 2.4 % (ref 0.4–1.9)
RI(T): 2.6
RR MECHANICAL: 22 B/MIN
SODIUM SERPL-SCNC: 141 MMOL/L (ref 132–146)
SODIUM SERPL-SCNC: 143 MMOL/L (ref 132–146)
SOURCE, BLOOD GAS: ABNORMAL
THB: 7.9 G/DL (ref 11.5–16.5)
TIME ANALYZED: 428
VANCOMYCIN SERPL-MCNC: 28.1 UG/ML (ref 5–40)
VT MECHANICAL: 300 ML
WBC OTHER # BLD: 14.3 K/UL (ref 4.5–11.5)

## 2023-08-22 PROCEDURE — 80048 BASIC METABOLIC PNL TOTAL CA: CPT

## 2023-08-22 PROCEDURE — 93005 ELECTROCARDIOGRAM TRACING: CPT

## 2023-08-22 PROCEDURE — 82805 BLOOD GASES W/O2 SATURATION: CPT

## 2023-08-22 PROCEDURE — 6360000002 HC RX W HCPCS: Performed by: UROLOGY

## 2023-08-22 PROCEDURE — 6370000000 HC RX 637 (ALT 250 FOR IP): Performed by: INTERNAL MEDICINE

## 2023-08-22 PROCEDURE — 2580000003 HC RX 258: Performed by: UROLOGY

## 2023-08-22 PROCEDURE — 85025 COMPLETE CBC W/AUTO DIFF WBC: CPT

## 2023-08-22 PROCEDURE — C9113 INJ PANTOPRAZOLE SODIUM, VIA: HCPCS

## 2023-08-22 PROCEDURE — 99291 CRITICAL CARE FIRST HOUR: CPT | Performed by: INTERNAL MEDICINE

## 2023-08-22 PROCEDURE — 6370000000 HC RX 637 (ALT 250 FOR IP)

## 2023-08-22 PROCEDURE — 94003 VENT MGMT INPAT SUBQ DAY: CPT

## 2023-08-22 PROCEDURE — 6370000000 HC RX 637 (ALT 250 FOR IP): Performed by: UROLOGY

## 2023-08-22 PROCEDURE — 2580000003 HC RX 258

## 2023-08-22 PROCEDURE — 2000000000 HC ICU R&B

## 2023-08-22 PROCEDURE — 86923 COMPATIBILITY TEST ELECTRIC: CPT

## 2023-08-22 PROCEDURE — 6360000002 HC RX W HCPCS

## 2023-08-22 PROCEDURE — 85045 AUTOMATED RETICULOCYTE COUNT: CPT

## 2023-08-22 PROCEDURE — 36430 TRANSFUSION BLD/BLD COMPNT: CPT

## 2023-08-22 PROCEDURE — 71045 X-RAY EXAM CHEST 1 VIEW: CPT

## 2023-08-22 PROCEDURE — 6360000002 HC RX W HCPCS: Performed by: STUDENT IN AN ORGANIZED HEALTH CARE EDUCATION/TRAINING PROGRAM

## 2023-08-22 PROCEDURE — 6370000000 HC RX 637 (ALT 250 FOR IP): Performed by: FAMILY MEDICINE

## 2023-08-22 PROCEDURE — 85014 HEMATOCRIT: CPT

## 2023-08-22 PROCEDURE — P9016 RBC LEUKOCYTES REDUCED: HCPCS

## 2023-08-22 PROCEDURE — 85018 HEMOGLOBIN: CPT

## 2023-08-22 PROCEDURE — 82962 GLUCOSE BLOOD TEST: CPT

## 2023-08-22 PROCEDURE — 80202 ASSAY OF VANCOMYCIN: CPT

## 2023-08-22 PROCEDURE — 2580000003 HC RX 258: Performed by: STUDENT IN AN ORGANIZED HEALTH CARE EDUCATION/TRAINING PROGRAM

## 2023-08-22 PROCEDURE — 86850 RBC ANTIBODY SCREEN: CPT

## 2023-08-22 PROCEDURE — 86900 BLOOD TYPING SEROLOGIC ABO: CPT

## 2023-08-22 PROCEDURE — 86901 BLOOD TYPING SEROLOGIC RH(D): CPT

## 2023-08-22 PROCEDURE — 94640 AIRWAY INHALATION TREATMENT: CPT

## 2023-08-22 PROCEDURE — A4216 STERILE WATER/SALINE, 10 ML: HCPCS

## 2023-08-22 PROCEDURE — 6370000000 HC RX 637 (ALT 250 FOR IP): Performed by: STUDENT IN AN ORGANIZED HEALTH CARE EDUCATION/TRAINING PROGRAM

## 2023-08-22 RX ORDER — FUROSEMIDE 10 MG/ML
20 INJECTION INTRAMUSCULAR; INTRAVENOUS ONCE
Status: COMPLETED | OUTPATIENT
Start: 2023-08-22 | End: 2023-08-22

## 2023-08-22 RX ORDER — POTASSIUM CHLORIDE 29.8 MG/ML
40 INJECTION INTRAVENOUS ONCE
Status: COMPLETED | OUTPATIENT
Start: 2023-08-22 | End: 2023-08-22

## 2023-08-22 RX ORDER — ADENOSINE 3 MG/ML
INJECTION, SOLUTION INTRAVENOUS
Status: COMPLETED
Start: 2023-08-22 | End: 2023-08-22

## 2023-08-22 RX ORDER — ADENOSINE 3 MG/ML
6 INJECTION, SOLUTION INTRAVENOUS ONCE
Status: DISCONTINUED | OUTPATIENT
Start: 2023-08-22 | End: 2023-08-25

## 2023-08-22 RX ORDER — ADENOSINE 3 MG/ML
12 INJECTION, SOLUTION INTRAVENOUS ONCE
Status: COMPLETED | OUTPATIENT
Start: 2023-08-22 | End: 2023-08-22

## 2023-08-22 RX ORDER — AMIODARONE HYDROCHLORIDE 50 MG/ML
INJECTION, SOLUTION INTRAVENOUS
Status: DISPENSED
Start: 2023-08-22 | End: 2023-08-23

## 2023-08-22 RX ORDER — SODIUM CHLORIDE 9 MG/ML
INJECTION, SOLUTION INTRAVENOUS PRN
Status: DISCONTINUED | OUTPATIENT
Start: 2023-08-22 | End: 2023-08-25

## 2023-08-22 RX ADMIN — PANTOPRAZOLE SODIUM 40 MG: 40 INJECTION, POWDER, FOR SOLUTION INTRAVENOUS at 20:07

## 2023-08-22 RX ADMIN — POLYETHYLENE GLYCOL 3350 17 G: 17 POWDER, FOR SOLUTION ORAL at 09:03

## 2023-08-22 RX ADMIN — HYDROCORTISONE SODIUM SUCCINATE 100 MG: 100 INJECTION, POWDER, FOR SOLUTION INTRAMUSCULAR; INTRAVENOUS at 11:45

## 2023-08-22 RX ADMIN — DANTROLENE SODIUM 100 MG: 25 CAPSULE ORAL at 08:59

## 2023-08-22 RX ADMIN — LEVETIRACETAM 500 MG: 100 SOLUTION ORAL at 20:08

## 2023-08-22 RX ADMIN — PETROLATUM: 420 OINTMENT TOPICAL at 20:09

## 2023-08-22 RX ADMIN — CALCIUM CARBONATE-VITAMIN D TAB 500 MG-200 UNIT 1 TABLET: 500-200 TAB at 20:08

## 2023-08-22 RX ADMIN — IPRATROPIUM BROMIDE AND ALBUTEROL SULFATE 1 DOSE: .5; 2.5 SOLUTION RESPIRATORY (INHALATION) at 08:02

## 2023-08-22 RX ADMIN — Medication 400 MG: at 09:03

## 2023-08-22 RX ADMIN — CEFEPIME 2000 MG: 2 INJECTION, POWDER, FOR SOLUTION INTRAVENOUS at 04:12

## 2023-08-22 RX ADMIN — DOCUSATE SODIUM 100 MG: 50 LIQUID ORAL at 09:00

## 2023-08-22 RX ADMIN — ATORVASTATIN CALCIUM 10 MG: 10 TABLET, FILM COATED ORAL at 20:07

## 2023-08-22 RX ADMIN — AMIODARONE HYDROCHLORIDE 0.5 MG/MIN: 50 INJECTION, SOLUTION INTRAVENOUS at 19:48

## 2023-08-22 RX ADMIN — POLYVINYL ALCOHOL 1 DROP: 14 SOLUTION/ DROPS OPHTHALMIC at 00:10

## 2023-08-22 RX ADMIN — CHLORHEXIDINE GLUCONATE 15 ML: 1.2 RINSE ORAL at 20:07

## 2023-08-22 RX ADMIN — POLYVINYL ALCOHOL 1 DROP: 14 SOLUTION/ DROPS OPHTHALMIC at 13:31

## 2023-08-22 RX ADMIN — SODIUM CHLORIDE, PRESERVATIVE FREE 10 ML: 5 INJECTION INTRAVENOUS at 20:12

## 2023-08-22 RX ADMIN — ADENOSINE 12 MG: 3 INJECTION, SOLUTION INTRAVENOUS at 13:06

## 2023-08-22 RX ADMIN — INSULIN LISPRO 1 UNITS: 100 INJECTION, SOLUTION INTRAVENOUS; SUBCUTANEOUS at 18:16

## 2023-08-22 RX ADMIN — IPRATROPIUM BROMIDE AND ALBUTEROL SULFATE 1 DOSE: .5; 2.5 SOLUTION RESPIRATORY (INHALATION) at 15:38

## 2023-08-22 RX ADMIN — POLYVINYL ALCOHOL 1 DROP: 14 SOLUTION/ DROPS OPHTHALMIC at 20:10

## 2023-08-22 RX ADMIN — DANTROLENE SODIUM 100 MG: 25 CAPSULE ORAL at 20:08

## 2023-08-22 RX ADMIN — POLYVINYL ALCOHOL 1 DROP: 14 SOLUTION/ DROPS OPHTHALMIC at 06:50

## 2023-08-22 RX ADMIN — AMIODARONE HYDROCHLORIDE 0.5 MG/MIN: 50 INJECTION, SOLUTION INTRAVENOUS at 22:50

## 2023-08-22 RX ADMIN — VANCOMYCIN HYDROCHLORIDE 500 MG: 500 INJECTION, POWDER, LYOPHILIZED, FOR SOLUTION INTRAVENOUS at 18:18

## 2023-08-22 RX ADMIN — BACLOFEN 40 MG: 10 TABLET ORAL at 08:59

## 2023-08-22 RX ADMIN — SENNOSIDES 17.2 MG: 8.6 TABLET, FILM COATED ORAL at 09:05

## 2023-08-22 RX ADMIN — ACETAMINOPHEN 650 MG: 325 TABLET ORAL at 13:52

## 2023-08-22 RX ADMIN — SODIUM CHLORIDE, PRESERVATIVE FREE 10 ML: 5 INJECTION INTRAVENOUS at 09:05

## 2023-08-22 RX ADMIN — PETROLATUM: 420 OINTMENT TOPICAL at 09:06

## 2023-08-22 RX ADMIN — SODIUM CHLORIDE 1 G: 1 TABLET ORAL at 09:05

## 2023-08-22 RX ADMIN — PANTOPRAZOLE SODIUM 40 MG: 40 INJECTION, POWDER, FOR SOLUTION INTRAVENOUS at 09:15

## 2023-08-22 RX ADMIN — CHLORHEXIDINE GLUCONATE 15 ML: 1.2 RINSE ORAL at 08:59

## 2023-08-22 RX ADMIN — Medication 1000 UNITS: at 09:06

## 2023-08-22 RX ADMIN — LEVOTHYROXINE SODIUM 88 MCG: 0.09 TABLET ORAL at 06:50

## 2023-08-22 RX ADMIN — FUROSEMIDE 20 MG: 10 INJECTION, SOLUTION INTRAMUSCULAR; INTRAVENOUS at 11:44

## 2023-08-22 RX ADMIN — DEXTROSE MONOHYDRATE 150 MG: 50 INJECTION, SOLUTION INTRAVENOUS at 13:13

## 2023-08-22 RX ADMIN — SENNOSIDES 17.2 MG: 8.6 TABLET, FILM COATED ORAL at 20:08

## 2023-08-22 RX ADMIN — ADENOSINE 12 MG: 3 INJECTION, SOLUTION INTRAVENOUS at 13:02

## 2023-08-22 RX ADMIN — POLYVINYL ALCOHOL 1 DROP: 14 SOLUTION/ DROPS OPHTHALMIC at 18:29

## 2023-08-22 RX ADMIN — HYDROCORTISONE SODIUM SUCCINATE 100 MG: 100 INJECTION, POWDER, FOR SOLUTION INTRAMUSCULAR; INTRAVENOUS at 00:10

## 2023-08-22 RX ADMIN — POTASSIUM CHLORIDE 40 MEQ: 29.8 INJECTION, SOLUTION INTRAVENOUS at 05:54

## 2023-08-22 RX ADMIN — LEVETIRACETAM 500 MG: 100 SOLUTION ORAL at 09:03

## 2023-08-22 RX ADMIN — IPRATROPIUM BROMIDE AND ALBUTEROL SULFATE 1 DOSE: .5; 2.5 SOLUTION RESPIRATORY (INHALATION) at 19:41

## 2023-08-22 RX ADMIN — BACLOFEN 40 MG: 10 TABLET ORAL at 20:07

## 2023-08-22 RX ADMIN — Medication 10 ML: at 09:05

## 2023-08-22 RX ADMIN — CYANOCOBALAMIN TAB 1000 MCG 500 MCG: 1000 TAB at 09:06

## 2023-08-22 RX ADMIN — AMIODARONE HYDROCHLORIDE 1 MG/MIN: 50 INJECTION, SOLUTION INTRAVENOUS at 13:29

## 2023-08-22 RX ADMIN — ADENOSINE 6 MG: 3 INJECTION, SOLUTION INTRAVENOUS at 12:59

## 2023-08-22 RX ADMIN — IPRATROPIUM BROMIDE AND ALBUTEROL SULFATE 1 DOSE: .5; 2.5 SOLUTION RESPIRATORY (INHALATION) at 12:07

## 2023-08-22 RX ADMIN — POLYVINYL ALCOHOL 1 DROP: 14 SOLUTION/ DROPS OPHTHALMIC at 09:04

## 2023-08-22 ASSESSMENT — PAIN SCALES - GENERAL
PAINLEVEL_OUTOF10: 0

## 2023-08-22 ASSESSMENT — PULMONARY FUNCTION TESTS
PIF_VALUE: 27
PIF_VALUE: 21
PIF_VALUE: 27
PIF_VALUE: 40
PIF_VALUE: 27
PIF_VALUE: 27
PIF_VALUE: 28
PIF_VALUE: 13
PIF_VALUE: 25
PIF_VALUE: 24
PIF_VALUE: 34
PIF_VALUE: 22
PIF_VALUE: 24
PIF_VALUE: 31
PIF_VALUE: 24
PIF_VALUE: 19
PIF_VALUE: 19
PIF_VALUE: 27
PIF_VALUE: 20
PIF_VALUE: 26
PIF_VALUE: 23
PIF_VALUE: 27
PIF_VALUE: 14
PIF_VALUE: 38
PIF_VALUE: 25
PIF_VALUE: 27
PIF_VALUE: 27
PIF_VALUE: 26
PIF_VALUE: 23
PIF_VALUE: 20
PIF_VALUE: 27

## 2023-08-22 NOTE — PROGRESS NOTES
Physician Progress Note      Barbara Cohen  CSN #:                  [id number]  :                       1945  ADMIT DATE:       2023 3:29 PM  [facility] DATE:  Orville Abramses  PROVIDER #:        Fernando Yang        QUERY TEXT:    Urosepsis - UTI vs Sepsis: Please provide further specificity, if known. Clinical indicators include: urosepsis  Options provided:  -- Urinary tract infection with sepsis  -- Urinary tract infection without sepsis  -- Other - I will add my own diagnosis  -- Disagree - Not applicable / Not valid  -- Disagree - Clinically Unable to determine / Unknown        PROVIDER RESPONSE TEXT:    The patient has a urinary tract infection with sepsis.       Electronically signed by:  Fernando Yang 2023 7:04 AM

## 2023-08-22 NOTE — PROGRESS NOTES
PROGRESS NOTE    Chief Complaint:   UTI/Left renal calculi/Bilateral hydronephrosis/Left proximal ureteral calculus/Left perinephric abscess/Left renal atrophy/Malpositioned left ureteral stent    HPI:   Stable in the ICU intubated. Her  is at her bedside    Vitals:    08/22/23 1000   BP: (!) 100/44   Pulse: 77   Resp: 24   Temp:    SpO2: 98%       Allergies: Augmentin [amoxicillin-pot clavulanate], Bactrim [sulfamethoxazole-trimethoprim], and Macrobid [nitrofurantoin]    PAST MEDICAL HISTORY:   Past Medical History:   Diagnosis Date    Anemia     CAD (coronary artery disease) 04/21/2020    High cholesterol     Hypertension     Hypothyroidism     Malabsorption     MI (myocardial infarction) ([address]) 04/21/2020    MS (multiple sclerosis) ([address])     Osteoporosis     Sarcoidosis     Seizure ([address]) 02/05/2021    Type 2 diabetes mellitus with hyperglycemia, without long-term current use of insulin ([address]) 10/30/2019       PAST SURGICAL HISTORY:   Past Surgical History:   Procedure Laterality Date    BLADDER SURGERY Left 8/15/2023    CYSTOSCOPY RETROGRADE PYELOGRAM, URETEROSCOPY, LEFT STENT INSERTION, STRATTON CATHETER INSERTION performed by Kaleb Lobo MD at [address]  04/21/2020    Dr. Lafleur Hannah   3.0 x 22mm Resolute MAAME to Prox LAD.   No LV    CORONARY ANGIOPLASTY WITH STENT PLACEMENT  01/14/2021    3.0 x 30 [place] to the prox LAD and a 2.5 x 20 Maame to the Obtuse marginal by Dr. Yann Aguayo  8/17/2023    CT NEPHROSTOMY CATHETER PLACEMENT 8/17/2023 Jacques Tariq MD [facility]    CYSTOSCOPY INSERTION / REMOVAL STENT / STONE Left 4/23/2020    CYSTOSCOPY LEFT RETROGRADE PYELOGRAM STENT INSERTION, STRATTON CATHETER performed by Nikki Avila,  at [address] Left 3/21/2019    LEFT FEMUR CEPHALLOMEDULLARY NAIL performed by Rosalinda Newell MD at [address] Bilateral     HYSTERECTOMY

## 2023-08-22 NOTE — CARE COORDINATION
Care Coordination: LOS 8 day, received a call from Veronica at [facility] nurses, pt was active up to last Friday. They received a call from  that he would like to resume services at discharge. Will need new hhc orders on chart at discharge. Met with  and daughter Damien Bustamante at bedside- they are adamant about plan for home. Damien Bustamante is a nurse and lives near by and can assist as needed. They would like to resume hhc services. All equipment needed including hospital bed, eliseo lift, etc.  Father cares for mom, has hhc and nurse aids. Is in process of working with [facility] for medicaid and additional services. Plan remains home at discharge as per previous SW note. Remains intubated, POD 6 Cysto retro pyelogram , L urethral stent  POD 4 IR PNT with drain to left flank. ÓSCAR drain, nephro tube, Precedex, Levo,Cefepime. Will follow for transition of care needs  Addendum: Back door referral to select to follow    Electronically signed by Ramon Mazariegos RN on 8/22/2023 at 10:07 AM     The Plan for Transition of Care is related to the following treatment goals: The Patient and/or patient representative Nerissa Runner was provided with a choice of provider and agrees   with the discharge plan. [x] Yes [] No    Freedom of choice list was provided with basic dialogue that supports the patient's individualized plan of care/goals, treatment preferences and shares the quality data associated with the providers.  [x] Yes [] No

## 2023-08-22 NOTE — PLAN OF CARE
Problem: Safety - Adult  Goal: Free from fall injury  8/22/2023 0811 by Niesha Bowman RN  Outcome: Progressing     Problem: Skin/Tissue Integrity  Goal: Absence of new skin breakdown  Description: 1. Monitor for areas of redness and/or skin breakdown  2. Assess vascular access sites hourly  3. Every 4-6 hours minimum:  Change oxygen saturation probe site  4. Every 4-6 hours:  If on nasal continuous positive airway pressure, respiratory therapy assess nares and determine need for appliance change or resting period. 8/22/2023 4616 by Niesha Bowman RN  Outcome: Progressing     Problem: Pain  Goal: Verbalizes/displays adequate comfort level or baseline comfort level  8/22/2023 0811 by Niesha Bowman RN  Outcome: Progressing     Problem: Nutrition Deficit:  Goal: Optimize nutritional status  8/22/2023 0811 by Niesha Bowman RN  Outcome: Progressing     Problem: ABCDS Injury Assessment  Goal: Absence of physical injury  8/22/2023 4233 by Niesha Bowman RN  Outcome: Progressing   Plan of care discussed with patient / family.

## 2023-08-22 NOTE — PROGRESS NOTES
Physician Progress Note      Hillary Trent  BRIAN #:                  [id number]  :                       1945  ADMIT DATE:       2023 3:29 PM  [facility] DATE:  RESPONDING  PROVIDER #:        Ana Alcantara DO        QUERY TEXT:    Type of Shock: Please provide further specificity, if known. Clinical indicators include:  Options provided:  -- Cardiogenic shock  -- Septic shock  -- Hypovolemic shock  -- Hemorrhagic shock due to trauma  -- Hemorrhagic shock related to surgery  -- Anaphylactic shock  -- Other - I will add my own diagnosis  -- Disagree - Not applicable / Not valid  -- Disagree - Clinically Unable to determine / Unknown        PROVIDER RESPONSE TEXT:    The patient has septic shock.       Electronically signed by:  Ana Alcantara DO 2023 10:04 AM

## 2023-08-22 NOTE — PLAN OF CARE
Discussed code status with patient's . Per patient's  he would like to update her CODE STATUS to limited. If she were to arrest he does not want chest compressions or debrillation; he would like medications. CODE STATUS updated in chart.       Electronically signed by Salomón Carcamo MD on 8/22/2023 at 3:19 PM

## 2023-08-22 NOTE — PROGRESS NOTES
Hospitalist Progress Note      PCP: Eugenio Saldivar MD    Date of Admission: 8/14/2023        Hospital Course:  69 y.o. female presented with HAD A TEST AS AN OP AND SHE WAS FOUND TO HAVE A LEFT RENAL CALCULI AN ABSCESS IN THE RETROPERITONEAL AREA **  AND ALSO FOUND TO HAVE A L4 COMPRESSION FRACTURE   HAD TO BE INTUBATED DUE TO HYPOXIA. AND HYPOTENSION.             Subjective:  WANTS DNR CC A,  NO CPR ONLY            Medications:  Reviewed    Infusion Medications    sodium chloride      amiodarone Stopped (08/22/23 1330)    Followed by    amiodarone      dexmedetomidine (PRECEDEX) IV infusion 0.3 mcg/kg/hr (08/22/23 1339)    norepinephrine 3 mcg/min (08/22/23 1447)    fentaNYL Stopped (08/21/23 1306)    sodium chloride      sodium chloride      dextrose      sodium chloride       Scheduled Medications    pantoprazole (PROTONIX) 40 mg injection  40 mg IntraVENous Q12H    adenosine  6 mg IntraVENous Once    amiodarone        hydrocortisone sodium succinate PF  100 mg IntraVENous Q12H    levETIRAcetam  500 mg Oral BID    senna  2 tablet Oral BID    ferrous sulfate  325 mg Oral Once per day on Mon Wed Fri    docusate sodium  100 mg Oral Daily    lidocaine  1 patch TransDERmal Daily    polyvinyl alcohol  1 drop Both Eyes Q4H    Or    artificial tears   Both Eyes Q4H    chlorhexidine  15 mL Mouth/Throat BID    sodium chloride flush  5-40 mL IntraVENous 2 times per day    polyethylene glycol  17 g Oral Daily    white petrolatum   Topical BID    [Held by provider] aspirin  81 mg Oral Daily    atorvastatin  10 mg Oral Nightly    baclofen  40 mg Oral BID    [Held by provider] bumetanide  1 mg Oral BID    oyster shell calcium w/D  1 tablet Oral Nightly    [Held by provider] clopidogrel  75 mg Oral Daily    vitamin B-12  500 mcg Oral Daily    ipratropium 0.5 mg-albuterol 2.5 mg  1 Dose Nebulization Q4H WA RT    levothyroxine  88 mcg Oral Daily    magnesium oxide  400 mg Oral QAM    ranolazine  1,000 mg Oral BID

## 2023-08-23 ENCOUNTER — APPOINTMENT (OUTPATIENT)
Dept: GENERAL RADIOLOGY | Age: 78
End: 2023-08-23
Payer: MEDICARE

## 2023-08-23 LAB
AADO2: 218.2 MMHG
ABO/RH: NORMAL
ANION GAP SERPL CALCULATED.3IONS-SCNC: 11 MMOL/L (ref 7–16)
ANTIBODY SCREEN: NEGATIVE
ARM BAND NUMBER: NORMAL
B.E.: -3.1 MMOL/L (ref -3–3)
BASOPHILS # BLD: 0 K/UL (ref 0–0.2)
BASOPHILS NFR BLD: 0 % (ref 0–2)
BLOOD BANK BLOOD PRODUCT EXPIRATION DATE: NORMAL
BLOOD BANK DISPENSE STATUS: NORMAL
BLOOD BANK ISBT PRODUCT BLOOD TYPE: 5100
BLOOD BANK PRODUCT CODE: NORMAL
BLOOD BANK SAMPLE EXPIRATION: NORMAL
BLOOD BANK UNIT TYPE AND RH: NORMAL
BPU ID: NORMAL
BUN SERPL-MCNC: 32 MG/DL (ref 6–23)
CALCIUM SERPL-MCNC: 8.6 MG/DL (ref 8.6–10.2)
CHLORIDE SERPL-SCNC: 112 MMOL/L (ref 98–107)
CO2 SERPL-SCNC: 20 MMOL/L (ref 22–29)
COHB: 0.1 % (ref 0–1.5)
COMPONENT: NORMAL
CREAT SERPL-MCNC: 0.6 MG/DL (ref 0.5–1)
CRITICAL: ABNORMAL
CROSSMATCH RESULT: NORMAL
DATE ANALYZED: ABNORMAL
DATE OF COLLECTION: ABNORMAL
EOSINOPHIL # BLD: 0.14 K/UL (ref 0.05–0.5)
EOSINOPHILS RELATIVE PERCENT: 1 % (ref 0–6)
ERYTHROCYTE [DISTWIDTH] IN BLOOD BY AUTOMATED COUNT: 15.6 % (ref 11.5–15)
FIO2: 50 %
GFR SERPL CREATININE-BSD FRML MDRD: >60 ML/MIN/1.73M2
GLUCOSE BLD-MCNC: 145 MG/DL (ref 74–99)
GLUCOSE BLD-MCNC: 169 MG/DL (ref 74–99)
GLUCOSE BLD-MCNC: 177 MG/DL (ref 74–99)
GLUCOSE BLD-MCNC: 180 MG/DL (ref 74–99)
GLUCOSE SERPL-MCNC: 131 MG/DL (ref 74–99)
HCO3: 20.6 MMOL/L (ref 22–26)
HCT VFR BLD AUTO: 28.1 % (ref 34–48)
HGB BLD-MCNC: 9.2 G/DL (ref 11.5–15.5)
HHB: 3 % (ref 0–5)
LAB: ABNORMAL
LYMPHOCYTES NFR BLD: 1 K/UL (ref 1.5–4)
LYMPHOCYTES RELATIVE PERCENT: 6 % (ref 20–42)
Lab: ABNORMAL
MCH RBC QN AUTO: 31.7 PG (ref 26–35)
MCHC RBC AUTO-ENTMCNC: 32.7 G/DL (ref 32–34.5)
MCV RBC AUTO: 96.9 FL (ref 80–99.9)
METHB: 0.4 % (ref 0–1.5)
MICROORGANISM SPEC CULT: NORMAL
MICROORGANISM SPEC CULT: NORMAL
MODE: AC
MONOCYTES NFR BLD: 0.57 K/UL (ref 0.1–0.95)
MONOCYTES NFR BLD: 4 % (ref 2–12)
NEUTROPHILS NFR BLD: 90 % (ref 43–80)
NEUTS SEG NFR BLD: 14.78 K/UL (ref 1.8–7.3)
NUCLEATED RED BLOOD CELLS: 1 PER 100 WBC
O2 CONTENT: 14.2 ML/DL
O2 SATURATION: 97 % (ref 92–98.5)
O2HB: 96.5 % (ref 94–97)
OPERATOR ID: 1893
PATIENT TEMP: 37 C
PCO2: 31.9 MMHG (ref 35–45)
PEEP/CPAP: 5 CMH2O
PFO2: 1.8 MMHG/%
PH BLOOD GAS: 7.43 (ref 7.35–7.45)
PLATELET # BLD AUTO: 299 K/UL (ref 130–450)
PMV BLD AUTO: 10.5 FL (ref 7–12)
PO2: 89.9 MMHG (ref 75–100)
POTASSIUM SERPL-SCNC: 3.6 MMOL/L (ref 3.5–5)
RBC # BLD AUTO: 2.9 M/UL (ref 3.5–5.5)
RBC # BLD: ABNORMAL 10*6/UL
RI(T): 2.43
RR MECHANICAL: 22 B/MIN
SERVICE CMNT-IMP: NORMAL
SERVICE CMNT-IMP: NORMAL
SODIUM SERPL-SCNC: 143 MMOL/L (ref 132–146)
SOURCE, BLOOD GAS: ABNORMAL
SPECIMEN DESCRIPTION: NORMAL
SPECIMEN DESCRIPTION: NORMAL
THB: 10.4 G/DL (ref 11.5–16.5)
TIME ANALYZED: 458
TRANSFUSION STATUS: NORMAL
UNIT DIVISION: 0
UNIT ISSUE DATE/TIME: NORMAL
VT MECHANICAL: 300 ML
WBC OTHER # BLD: 16.5 K/UL (ref 4.5–11.5)

## 2023-08-23 PROCEDURE — 94003 VENT MGMT INPAT SUBQ DAY: CPT

## 2023-08-23 PROCEDURE — 2000000000 HC ICU R&B

## 2023-08-23 PROCEDURE — P9047 ALBUMIN (HUMAN), 25%, 50ML: HCPCS

## 2023-08-23 PROCEDURE — 94640 AIRWAY INHALATION TREATMENT: CPT

## 2023-08-23 PROCEDURE — 6360000002 HC RX W HCPCS

## 2023-08-23 PROCEDURE — 6370000000 HC RX 637 (ALT 250 FOR IP): Performed by: INTERNAL MEDICINE

## 2023-08-23 PROCEDURE — 6370000000 HC RX 637 (ALT 250 FOR IP)

## 2023-08-23 PROCEDURE — 6370000000 HC RX 637 (ALT 250 FOR IP): Performed by: UROLOGY

## 2023-08-23 PROCEDURE — 2580000003 HC RX 258: Performed by: UROLOGY

## 2023-08-23 PROCEDURE — 2580000003 HC RX 258: Performed by: INTERNAL MEDICINE

## 2023-08-23 PROCEDURE — 80048 BASIC METABOLIC PNL TOTAL CA: CPT

## 2023-08-23 PROCEDURE — 82805 BLOOD GASES W/O2 SATURATION: CPT

## 2023-08-23 PROCEDURE — 71045 X-RAY EXAM CHEST 1 VIEW: CPT

## 2023-08-23 PROCEDURE — 6370000000 HC RX 637 (ALT 250 FOR IP): Performed by: STUDENT IN AN ORGANIZED HEALTH CARE EDUCATION/TRAINING PROGRAM

## 2023-08-23 PROCEDURE — 6360000002 HC RX W HCPCS: Performed by: STUDENT IN AN ORGANIZED HEALTH CARE EDUCATION/TRAINING PROGRAM

## 2023-08-23 PROCEDURE — 6360000002 HC RX W HCPCS: Performed by: UROLOGY

## 2023-08-23 PROCEDURE — 6370000000 HC RX 637 (ALT 250 FOR IP): Performed by: FAMILY MEDICINE

## 2023-08-23 PROCEDURE — C9113 INJ PANTOPRAZOLE SODIUM, VIA: HCPCS

## 2023-08-23 PROCEDURE — 2580000003 HC RX 258

## 2023-08-23 PROCEDURE — 99291 CRITICAL CARE FIRST HOUR: CPT | Performed by: INTERNAL MEDICINE

## 2023-08-23 PROCEDURE — A4216 STERILE WATER/SALINE, 10 ML: HCPCS

## 2023-08-23 PROCEDURE — 2500000003 HC RX 250 WO HCPCS: Performed by: INTERNAL MEDICINE

## 2023-08-23 PROCEDURE — 2500000003 HC RX 250 WO HCPCS

## 2023-08-23 PROCEDURE — 85025 COMPLETE CBC W/AUTO DIFF WBC: CPT

## 2023-08-23 PROCEDURE — 2580000003 HC RX 258: Performed by: STUDENT IN AN ORGANIZED HEALTH CARE EDUCATION/TRAINING PROGRAM

## 2023-08-23 PROCEDURE — 82962 GLUCOSE BLOOD TEST: CPT

## 2023-08-23 RX ORDER — POTASSIUM CHLORIDE 29.8 MG/ML
40 INJECTION INTRAVENOUS ONCE
Status: COMPLETED | OUTPATIENT
Start: 2023-08-23 | End: 2023-08-23

## 2023-08-23 RX ORDER — FUROSEMIDE 10 MG/ML
20 INJECTION INTRAMUSCULAR; INTRAVENOUS ONCE
Status: COMPLETED | OUTPATIENT
Start: 2023-08-23 | End: 2023-08-23

## 2023-08-23 RX ORDER — ENEMA 19; 7 G/133ML; G/133ML
1 ENEMA RECTAL ONCE
Status: COMPLETED | OUTPATIENT
Start: 2023-08-23 | End: 2023-08-23

## 2023-08-23 RX ORDER — ALBUMIN (HUMAN) 12.5 G/50ML
25 SOLUTION INTRAVENOUS ONCE
Status: COMPLETED | OUTPATIENT
Start: 2023-08-23 | End: 2023-08-23

## 2023-08-23 RX ADMIN — LEVETIRACETAM 500 MG: 100 SOLUTION ORAL at 20:05

## 2023-08-23 RX ADMIN — AMIODARONE HYDROCHLORIDE 0.5 MG/MIN: 50 INJECTION, SOLUTION INTRAVENOUS at 13:04

## 2023-08-23 RX ADMIN — VANCOMYCIN HYDROCHLORIDE 500 MG: 500 INJECTION, POWDER, LYOPHILIZED, FOR SOLUTION INTRAVENOUS at 17:16

## 2023-08-23 RX ADMIN — DANTROLENE SODIUM 100 MG: 25 CAPSULE ORAL at 08:47

## 2023-08-23 RX ADMIN — PANTOPRAZOLE SODIUM 40 MG: 40 INJECTION, POWDER, FOR SOLUTION INTRAVENOUS at 20:05

## 2023-08-23 RX ADMIN — DEXMEDETOMIDINE 0.2 MCG/KG/HR: 100 INJECTION, SOLUTION INTRAVENOUS at 10:06

## 2023-08-23 RX ADMIN — POLYVINYL ALCOHOL 1 DROP: 14 SOLUTION/ DROPS OPHTHALMIC at 17:14

## 2023-08-23 RX ADMIN — Medication 1000 UNITS: at 08:52

## 2023-08-23 RX ADMIN — CHLORHEXIDINE GLUCONATE 15 ML: 1.2 RINSE ORAL at 20:07

## 2023-08-23 RX ADMIN — PETROLATUM: 420 OINTMENT TOPICAL at 20:06

## 2023-08-23 RX ADMIN — POLYVINYL ALCOHOL 1 DROP: 14 SOLUTION/ DROPS OPHTHALMIC at 20:05

## 2023-08-23 RX ADMIN — IPRATROPIUM BROMIDE AND ALBUTEROL SULFATE 1 DOSE: .5; 2.5 SOLUTION RESPIRATORY (INHALATION) at 16:31

## 2023-08-23 RX ADMIN — INSULIN LISPRO 0 UNITS: 100 INJECTION, SOLUTION INTRAVENOUS; SUBCUTANEOUS at 17:47

## 2023-08-23 RX ADMIN — SODIUM CHLORIDE, PRESERVATIVE FREE 10 ML: 5 INJECTION INTRAVENOUS at 08:51

## 2023-08-23 RX ADMIN — POLYVINYL ALCOHOL 1 DROP: 14 SOLUTION/ DROPS OPHTHALMIC at 05:59

## 2023-08-23 RX ADMIN — DANTROLENE SODIUM 100 MG: 25 CAPSULE ORAL at 20:04

## 2023-08-23 RX ADMIN — POTASSIUM CHLORIDE 40 MEQ: 29.8 INJECTION, SOLUTION INTRAVENOUS at 07:06

## 2023-08-23 RX ADMIN — PANTOPRAZOLE SODIUM 40 MG: 40 INJECTION, POWDER, FOR SOLUTION INTRAVENOUS at 08:49

## 2023-08-23 RX ADMIN — SODIUM CHLORIDE 1 G: 1 TABLET ORAL at 08:51

## 2023-08-23 RX ADMIN — DOCUSATE SODIUM 100 MG: 50 LIQUID ORAL at 08:48

## 2023-08-23 RX ADMIN — CYANOCOBALAMIN TAB 1000 MCG 500 MCG: 1000 TAB at 08:52

## 2023-08-23 RX ADMIN — Medication 400 MG: at 08:49

## 2023-08-23 RX ADMIN — ATORVASTATIN CALCIUM 10 MG: 10 TABLET, FILM COATED ORAL at 20:05

## 2023-08-23 RX ADMIN — BACLOFEN 40 MG: 10 TABLET ORAL at 08:47

## 2023-08-23 RX ADMIN — IPRATROPIUM BROMIDE AND ALBUTEROL SULFATE 1 DOSE: .5; 2.5 SOLUTION RESPIRATORY (INHALATION) at 09:23

## 2023-08-23 RX ADMIN — Medication 10 ML: at 08:51

## 2023-08-23 RX ADMIN — BACLOFEN 40 MG: 10 TABLET ORAL at 20:05

## 2023-08-23 RX ADMIN — HYDROCORTISONE SODIUM SUCCINATE 100 MG: 100 INJECTION, POWDER, FOR SOLUTION INTRAMUSCULAR; INTRAVENOUS at 03:34

## 2023-08-23 RX ADMIN — ALBUMIN (HUMAN) 25 G: 0.25 INJECTION, SOLUTION INTRAVENOUS at 14:17

## 2023-08-23 RX ADMIN — Medication 1 ENEMA: at 11:38

## 2023-08-23 RX ADMIN — SENNOSIDES 17.2 MG: 8.6 TABLET, FILM COATED ORAL at 20:05

## 2023-08-23 RX ADMIN — CALCIUM CARBONATE-VITAMIN D TAB 500 MG-200 UNIT 1 TABLET: 500-200 TAB at 20:05

## 2023-08-23 RX ADMIN — POLYVINYL ALCOHOL 1 DROP: 14 SOLUTION/ DROPS OPHTHALMIC at 08:50

## 2023-08-23 RX ADMIN — POLYETHYLENE GLYCOL 3350 17 G: 17 POWDER, FOR SOLUTION ORAL at 08:49

## 2023-08-23 RX ADMIN — FERROUS SULFATE TAB 325 MG (65 MG ELEMENTAL FE) 325 MG: 325 (65 FE) TAB at 08:48

## 2023-08-23 RX ADMIN — IPRATROPIUM BROMIDE AND ALBUTEROL SULFATE 1 DOSE: .5; 2.5 SOLUTION RESPIRATORY (INHALATION) at 12:42

## 2023-08-23 RX ADMIN — POLYVINYL ALCOHOL 1 DROP: 14 SOLUTION/ DROPS OPHTHALMIC at 14:17

## 2023-08-23 RX ADMIN — SODIUM CHLORIDE, PRESERVATIVE FREE 10 ML: 5 INJECTION INTRAVENOUS at 20:06

## 2023-08-23 RX ADMIN — IPRATROPIUM BROMIDE AND ALBUTEROL SULFATE 1 DOSE: .5; 2.5 SOLUTION RESPIRATORY (INHALATION) at 19:55

## 2023-08-23 RX ADMIN — LEVETIRACETAM 500 MG: 100 SOLUTION ORAL at 09:09

## 2023-08-23 RX ADMIN — Medication 2 MCG/MIN: at 10:05

## 2023-08-23 RX ADMIN — LEVOTHYROXINE SODIUM 88 MCG: 0.09 TABLET ORAL at 07:01

## 2023-08-23 RX ADMIN — FUROSEMIDE 20 MG: 10 INJECTION, SOLUTION INTRAMUSCULAR; INTRAVENOUS at 14:27

## 2023-08-23 RX ADMIN — POLYVINYL ALCOHOL 1 DROP: 14 SOLUTION/ DROPS OPHTHALMIC at 03:34

## 2023-08-23 RX ADMIN — PETROLATUM: 420 OINTMENT TOPICAL at 08:52

## 2023-08-23 RX ADMIN — CHLORHEXIDINE GLUCONATE 15 ML: 1.2 RINSE ORAL at 08:47

## 2023-08-23 RX ADMIN — SENNOSIDES 17.2 MG: 8.6 TABLET, FILM COATED ORAL at 08:51

## 2023-08-23 ASSESSMENT — PULMONARY FUNCTION TESTS
PIF_VALUE: 25
PIF_VALUE: 24
PIF_VALUE: 27
PIF_VALUE: 26
PIF_VALUE: 24
PIF_VALUE: 26
PIF_VALUE: 23
PIF_VALUE: 25
PIF_VALUE: 14
PIF_VALUE: 13
PIF_VALUE: 48
PIF_VALUE: 26
PIF_VALUE: 25
PIF_VALUE: 24
PIF_VALUE: 27
PIF_VALUE: 24
PIF_VALUE: 27
PIF_VALUE: 14
PIF_VALUE: 23
PIF_VALUE: 27
PIF_VALUE: 23
PIF_VALUE: 26
PIF_VALUE: 13
PIF_VALUE: 25
PIF_VALUE: 23
PIF_VALUE: 31
PIF_VALUE: 24

## 2023-08-23 ASSESSMENT — PAIN SCALES - GENERAL
PAINLEVEL_OUTOF10: 0

## 2023-08-23 ASSESSMENT — PAIN DESCRIPTION - FREQUENCY: FREQUENCY: CONTINUOUS

## 2023-08-23 ASSESSMENT — PAIN DESCRIPTION - ORIENTATION: ORIENTATION: RIGHT

## 2023-08-23 ASSESSMENT — PAIN DESCRIPTION - PAIN TYPE: TYPE: ACUTE PAIN

## 2023-08-23 ASSESSMENT — PAIN DESCRIPTION - DESCRIPTORS: DESCRIPTORS: ACHING;DISCOMFORT

## 2023-08-23 ASSESSMENT — PAIN DESCRIPTION - LOCATION: LOCATION: SHOULDER

## 2023-08-23 NOTE — PROGRESS NOTES
8/23/2023 1:42 PM  Oni Douglas  [id number]    Subjective:    Remains in the ICU  On Levophed  She remains intubated but is awake today   Family at bedside     Review of Systems  Unable to obtain due to intubation       Scheduled Meds:   [START ON 8/24/2023] hydrocortisone sodium succinate PF  100 mg IntraVENous Daily    pantoprazole (PROTONIX) 40 mg injection  40 mg IntraVENous Q12H    adenosine  6 mg IntraVENous Once    vancomycin  500 mg IntraVENous Q24H    levETIRAcetam  500 mg Oral BID    senna  2 tablet Oral BID    ferrous sulfate  325 mg Oral Once per day on Mon Wed Fri    docusate sodium  100 mg Oral Daily    lidocaine  1 patch TransDERmal Daily    polyvinyl alcohol  1 drop Both Eyes Q4H    Or    artificial tears   Both Eyes Q4H    chlorhexidine  15 mL Mouth/Throat BID    sodium chloride flush  5-40 mL IntraVENous 2 times per day    polyethylene glycol  17 g Oral Daily    white petrolatum   Topical BID    [Held by provider] aspirin  81 mg Oral Daily    atorvastatin  10 mg Oral Nightly    baclofen  40 mg Oral BID    [Held by provider] bumetanide  1 mg Oral BID    oyster shell calcium w/D  1 tablet Oral Nightly    [Held by provider] clopidogrel  75 mg Oral Daily    vitamin B-12  500 mcg Oral Daily    ipratropium 0.5 mg-albuterol 2.5 mg  1 Dose Nebulization Q4H WA RT    levothyroxine  88 mcg Oral Daily    magnesium oxide  400 mg Oral QAM    ranolazine  1,000 mg Oral BID    sodium chloride  1 g Oral Daily    Vitamin D  1 tablet Oral QAM    dantrolene  100 mg Oral BID    sodium chloride flush  5-40 mL IntraVENous 2 times per day    [Held by provider] enoxaparin  30 mg SubCUTAneous Daily    insulin lispro  0-4 Units SubCUTAneous TID     insulin lispro  0-4 Units SubCUTAneous Nightly       Objective:  Vitals:    08/23/23 1305   BP:    Pulse: 97   Resp: 26   Temp:    SpO2:          Allergies: Augmentin [amoxicillin-pot clavulanate], Bactrim [sulfamethoxazole-trimethoprim], and Macrobid

## 2023-08-23 NOTE — PLAN OF CARE
Problem: Safety - Adult  Goal: Free from fall injury  Outcome: Progressing     Problem: Chronic Conditions and Co-morbidities  Goal: Patient's chronic conditions and co-morbidity symptoms are monitored and maintained or improved  Outcome: Progressing     Problem: Pain  Goal: Verbalizes/displays adequate comfort level or baseline comfort level  Outcome: Progressing     Problem: ABCDS Injury Assessment  Goal: Absence of physical injury  Outcome: Progressing     Problem: Skin/Tissue Integrity  Goal: Absence of new skin breakdown  Description: 1. Monitor for areas of redness and/or skin breakdown  2. Assess vascular access sites hourly  3. Every 4-6 hours minimum:  Change oxygen saturation probe site  4. Every 4-6 hours:  If on nasal continuous positive airway pressure, respiratory therapy assess nares and determine need for appliance change or resting period.   Outcome: Not Progressing

## 2023-08-23 NOTE — PROGRESS NOTES
Hospitalist Progress Note      PCP: Nicki Bacon MD    Date of Admission: 8/14/2023        Hospital Course:  69 y.o. female presented with HAD A TEST AS AN OP AND SHE WAS FOUND TO HAVE A LEFT RENAL CALCULI AN ABSCESS IN THE RETROPERITONEAL AREA **  AND ALSO FOUND TO HAVE A L4 COMPRESSION FRACTURE   HAD TO BE INTUBATED DUE TO HYPOXIA. AND HYPOTENSION. HAD Left lower quadrant abscess drains x2 were irrigated with 30 cc normal saline each. Suction grenade was removed from each catheter, irrigated and reconnected. Catheters remain patent. Fluid return emains bloody. Left nephrostomy tube was irrigated with 30 cc of normal saline with good return of flow into urostomy collection bag. Urostomy bag and tubing was removed, irrigated and reconnected. Fluid return remains bloody in nature.               Subjective:    SPOKE WITH DAUGHTER AND         Medications:  Reviewed    Infusion Medications    sodium chloride      amiodarone 0.5 mg/min (08/23/23 1412)    dexmedetomidine (PRECEDEX) IV infusion Stopped (08/23/23 1240)    norepinephrine 2 mcg/min (08/23/23 1005)    fentaNYL Stopped (08/21/23 1306)    sodium chloride      sodium chloride      dextrose      sodium chloride       Scheduled Medications    [START ON 8/24/2023] hydrocortisone sodium succinate PF  100 mg IntraVENous Daily    pantoprazole (PROTONIX) 40 mg injection  40 mg IntraVENous Q12H    adenosine  6 mg IntraVENous Once    vancomycin  500 mg IntraVENous Q24H    levETIRAcetam  500 mg Oral BID    senna  2 tablet Oral BID    ferrous sulfate  325 mg Oral Once per day on Mon Wed Fri    docusate sodium  100 mg Oral Daily    lidocaine  1 patch TransDERmal Daily    polyvinyl alcohol  1 drop Both Eyes Q4H    Or    artificial tears   Both Eyes Q4H    chlorhexidine  15 mL Mouth/Throat BID    sodium chloride flush  5-40 mL IntraVENous 2 times per day    polyethylene glycol  17 g Oral Daily    white petrolatum   Topical BID    [Held by

## 2023-08-23 NOTE — PROGRESS NOTES
Comprehensive Nutrition Assessment    Type and Reason for Visit:  Reassess    Nutrition Recommendations/Plan:   Continue NPO. Recommend to modify TF to better meet est needs. Recommended regimen at goal meets ~100% est energy needs and 100% est protein needs. Will continue to monitor. Recommend:   Bolus TF regimen: Diabetic Formula (glucerna 1.5)  240cc 3x daily + 1 protein mod once daily: To provide: 720ml TV, 1080kcal, 59g pro, (1184kcal, 85g pro total w/ pro mod x1) 546ml free water. flush per critical care. Pt. Is at risk for refeeding. Monitor electrolytes and replace PRN. Malnutrition Assessment:  Malnutrition Status:  Severe malnutrition (08/17/23 1045)    Context:  Chronic Illness     Findings of the 6 clinical characteristics of malnutrition:  Energy Intake:  75% or less estimated energy requirements for 1 month or longer  Weight Loss:  Unable to assess (lack of wt hx)     Body Fat Loss:  Severe body fat loss Orbital   Muscle Mass Loss:  Severe muscle mass loss Temples (temporalis), Clavicles (pectoralis & deltoids)  Fluid Accumulation:  No significant fluid accumulation     Strength:  Not Performed    Nutrition Assessment:    Pt. remains at risk d/t severe malnutrition and ongoing critical care + EN support. Noted s/p RRT w/ intubation and transfer to ICU on (8/18) 2/2 shock+ acute hypoxic/hypercapnic respiratory failure and hypotension. Admitted for evaluation of flank pain and a CT scan showing a kidney stone and possible abscess. Pt. also found to have L4 compression fx and UTI w/ sepsis. Pt. is S/p Cystoscopy, retrograde pyelography, left ureteral stent placement (8/15) (S/p Cysto/Left ureteral stent removal 8/21), S/p drain to superfiial left flank abscess with  Left percutaneous nephrostomy tube (8/17). PMHx of DM,CAD,MS. Hx of malnutrition. Noted wounds x2/increased nutrient needs for wound healing. Pt. is NPO w/ bolus TF.  Will provide TF recs to better meet est needs and poor intake prior to admission, Criteria as identified in malnutrition assessment    Nutrition Interventions:   Food and/or Nutrient Delivery: Modify Tube Feeding, Continue NPO (Recommend modfiy TF: Bolus TF regimen: Diabetic Formula (glucerna 1.5) 240cc 3x daily + 1 protein mod once daily: To provide: 720ml TV, 1080kcal, 59g pro, (1184kcal, 85g pro total w/ pro mod x1) 546ml free water. flush per critical care)  Nutrition Education/Counseling: No recommendation at this time  Coordination of Nutrition Care: Continue to monitor while inpatient       Goals:  Previous Goal Met: No Progress toward Goal(s) (pt. now intubated/NPO w/ EN support)  Goals: Tolerate nutrition support at goal rate, within 2 days       Nutrition Monitoring and Evaluation:   Behavioral-Environmental Outcomes: None Identified  Food/Nutrient Intake Outcomes: Enteral Nutrition Intake/Tolerance  Physical Signs/Symptoms Outcomes: Biochemical Data, GI Status, Fluid Status or Edema, Nutrition Focused Physical Findings, Skin, Weight    Discharge Planning:     Too soon to determine     Rubi Vargas RD  Contact: ext [phone]

## 2023-08-23 NOTE — PROGRESS NOTES
8/23/2023 1:39 PM  Service: Urology  Group: [facility] ([place])    Mary Monterroso Misael  [id number]    Chief urologic compliant: impacted ureteral stone   HPI:  Patient is vented  Her  is present    Review of Systems:  All other reviews are negative     Allergies: Augmentin [amoxicillin-pot clavulanate], Bactrim [sulfamethoxazole-trimethoprim], and Macrobid [nitrofurantoin]    Objective:   Vitals:    08/23/23 1305   BP:    Pulse: 97   Resp: 26   Temp:    SpO2:        Neuro: sedated  Respiratory: vented  ABD: soft non-tender, non-distended  : saldana clear, PNT clear  Ext: no clubbing, cyanosis, edema    Labs:   Recent Labs     08/21/23  0458 08/22/23  0418 08/22/23  0533 08/23/23  0431   WBC 13.6* 14.3*  --  16.5*   RBC 2.40* 2.16*  --  2.90*   HGB 7.4* 6.7* 9.2* 9.2*   HCT 24.0* 21.8* 28.3* 28.1*   .0* 100.9*  --  96.9   MCH 30.8 31.0  --  31.7   MCHC 30.8* 30.7*  --  32.7   RDW 15.4* 15.5*  --  15.6*   * 387  --  299   MPV 10.6 10.4  --  10.5       Recent Labs     08/22/23  0418 08/22/23  1822 08/23/23  0431   CREATININE 0.6 0.6 0.6       Assessment: Michaela SUSY Douglas 69 y.o. female     POD#8--S/P Cystoscopy, retrograde pyelography, left ureteral stent placement   POD#6--S/P Left IR nephrostomy tube placement    POD#6--S/P Left superficial abscess IR drain placement     POD#21--S/P Cysto/Left ureteral stent removal  Impacted ureter wth severe left hydronephrosis with resultant renal and psoas abscess  Respiratory failure  Leukocytosis   MS    Plan:    Cont the Saldana  Cont the IVF  Con the ICU care  Cont the antibiotics  Cont the drain  Cont the PNT  Would not recommend a nephrectomy, connie mortality rate  Check another CT  Her  is present, appreciate of our care  Previous notes were reviewed  The standard of care is being met and treatment has been expeditious       DO [facility] [place]  Urology

## 2023-08-23 NOTE — PLAN OF CARE
Discussed patient's CODE STATUS with patient's  and daughter at bedside. Yesterday code status was updated to limited, no to CPR and defibrillation. They would like to update the code status today to include no to re-intubation. Code status updated to LIMITED- no to CPR, defibrillation, and re-intubation.      Electronically signed by Fernando Yang MD on 8/23/2023 at 4:11 PM

## 2023-08-23 NOTE — PROGRESS NOTES
OT consult received and appreciated. Chart reviewed. Per RN pt is not appropriate for Occupational Therapy services. Will d/c orders. Please re-consult as indicated. Thank you.  [place] , OTR/L # [id number]

## 2023-08-23 NOTE — PROGRESS NOTES
[facility]  [facility]  Progress note      HISTORY OF PRESENT ILLNESS:   67-year-old female with past medical history of CAD, HLD, HTN, hypothyroidism, MS, sarcoidosis, DM presented with left-sided kidney stone and left-sided pyelonephritis. She had a previous history of Enterobacter cloacae UTI. Urinalysis is positive for pyuria and bacteriuria. CT abdomen showing left-sided kidney stone with left-sided pyelonephritis and possible renal abscess. Status post cystoscopy and left-sided JJ stent insertion on 08/15 by urology. ID consulted for UTI. Past Medical History:        Diagnosis Date    Anemia     CAD (coronary artery disease) 04/21/2020    High cholesterol     Hypertension     Hypothyroidism     Malabsorption     MI (myocardial infarction) ([address]) 04/21/2020    MS (multiple sclerosis) ([address])     Osteoporosis     Sarcoidosis     Seizure ([address]) 02/05/2021    Type 2 diabetes mellitus with hyperglycemia, without long-term current use of insulin ([address]) 10/30/2019     Past Surgical History:        Procedure Laterality Date    BLADDER SURGERY Left 8/15/2023    CYSTOSCOPY RETROGRADE PYELOGRAM, URETEROSCOPY, LEFT STENT INSERTION, STRATTON CATHETER INSERTION performed by Anni Hernandez MD at [address]  04/21/2020    Dr. Dayne Rodriguez   3.0 x 22mm Resolute MAAME to Prox LAD.   No LV    CORONARY ANGIOPLASTY WITH STENT PLACEMENT  01/14/2021    3.0 x 30 Maame to the prox LAD and a 2.5 x 20 Maame to the Obtuse marginal by Dr. Shi Wallace  8/17/2023    CT NEPHROSTOMY CATHETER PLACEMENT 8/17/2023 Troy Lagunas MD [facility]    CYSTOSCOPY INSERTION / REMOVAL STENT / STONE Left 4/23/2020    CYSTOSCOPY LEFT RETROGRADE PYELOGRAM STENT INSERTION, STRATTON CATHETER performed by Daisy Avila DO at [address] Left 3/21/2019    LEFT FEMUR CEPHALLOMEDULLARY NAIL performed by Natalia Alvarez MD at [address] Bilateral     HYSTERECTOMY (CERVIX STATUS UNKNOWN)      KNEE SURGERY      plate in lt knee    LITHOTRIPSY Left 11/23/2020    CYSTOSCOPY LEFT URETEROSCOPY,  LASER LITHOTRIPSY  BASKET EXTRACTION LEFT STONE, STENT EXCHANGE performed by Dejuan Avila DO at [facility]    LITHOTRIPSY Left 12/30/2020    CYSTOSCOPY RETROGRADE URETEROSCOPY PYELOGRAM LASER LITHOTRIPSY LEFT J-STENT performed by Eva Avila DO at [facility] OR     Current Medications:   Scheduled Meds:   [START ON 8/24/2023] hydrocortisone sodium succinate PF  100 mg IntraVENous Daily    albumin human 25%  25 g IntraVENous Once    pantoprazole (PROTONIX) 40 mg injection  40 mg IntraVENous Q12H    adenosine  6 mg IntraVENous Once    vancomycin  500 mg IntraVENous Q24H    levETIRAcetam  500 mg Oral BID    senna  2 tablet Oral BID    ferrous sulfate  325 mg Oral Once per day on Mon Wed Fri    docusate sodium  100 mg Oral Daily    lidocaine  1 patch TransDERmal Daily    polyvinyl alcohol  1 drop Both Eyes Q4H    Or    artificial tears   Both Eyes Q4H    chlorhexidine  15 mL Mouth/Throat BID    sodium chloride flush  5-40 mL IntraVENous 2 times per day    polyethylene glycol  17 g Oral Daily    white petrolatum   Topical BID    [Held by provider] aspirin  81 mg Oral Daily    atorvastatin  10 mg Oral Nightly    baclofen  40 mg Oral BID    [Held by provider] bumetanide  1 mg Oral BID    oyster shell calcium w/D  1 tablet Oral Nightly    [Held by provider] clopidogrel  75 mg Oral Daily    vitamin B-12  500 mcg Oral Daily    ipratropium 0.5 mg-albuterol 2.5 mg  1 Dose Nebulization Q4H WA RT    levothyroxine  88 mcg Oral Daily    magnesium oxide  400 mg Oral QAM    ranolazine  1,000 mg Oral BID    sodium chloride  1 g Oral Daily    Vitamin D  1 tablet Oral QAM    dantrolene  100 mg Oral BID    sodium chloride flush  5-40 mL IntraVENous 2 times per day    [Held by provider] enoxaparin  30 mg SubCUTAneous Daily    insulin lispro  0-4 Units

## 2023-08-23 NOTE — PLAN OF CARE
EKG performed by [facility] staff with no epic order on 8/22/2023. Iza served Dr. Travis Hodgkin to place order in care path. Spoke to Nicolasa that no repeat needed with this order at this time.

## 2023-08-23 NOTE — PLAN OF CARE
Problem: Safety - Adult  Goal: Free from fall injury  8/23/2023 0828 by Shruti Alejandre RN  Outcome: Progressing     Problem: Skin/Tissue Integrity  Goal: Absence of new skin breakdown  Description: 1. Monitor for areas of redness and/or skin breakdown  2. Assess vascular access sites hourly  3. Every 4-6 hours minimum:  Change oxygen saturation probe site  4. Every 4-6 hours:  If on nasal continuous positive airway pressure, respiratory therapy assess nares and determine need for appliance change or resting period. 8/23/2023 0828 by Shruti Alejandre RN  Outcome: Progressing     Problem: Pain  Goal: Verbalizes/displays adequate comfort level or baseline comfort level  8/23/2023 0828 by Shruti Alejandre RN  Outcome: Progressing     Problem: Nutrition Deficit:  Goal: Optimize nutritional status  Outcome: Progressing     Problem: ABCDS Injury Assessment  Goal: Absence of physical injury  8/23/2023 2967 by Shruti Alejandre RN  Outcome: Progressing   Plan of care discussed with patient / family. Problem: Skin/Tissue Integrity  Goal: Absence of new skin breakdown  Description: 1. Monitor for areas of redness and/or skin breakdown  2. Assess vascular access sites hourly  3. Every 4-6 hours minimum:  Change oxygen saturation probe site  4. Every 4-6 hours:  If on nasal continuous positive airway pressure, respiratory therapy assess nares and determine need for appliance change or resting period.   8/23/2023 0828 by Shruti Alejandre RN  Outcome: Progressing  8/23/2023 0021 by Sandra Toro RN  Outcome: Not Progressing

## 2023-08-23 NOTE — PROGRESS NOTES
Pharmacy Consultation Note  (Antibiotic Dosing and Monitoring)    Initial consult date: 8/15/23  Consulting physician/provider: Aidan Torres  Drug: Vancomycin  Indication: UTI    Age/  Gender Height Weight IBW  Allergy Information   78 y.o./female 5' (152.4 cm) 111 lb (50.3 kg)     Ideal body weight: 45.5 kg (100 lb 4.9 oz)  Adjusted ideal body weight: 49 kg (107 lb 15.1 oz)   Augmentin [amoxicillin-pot clavulanate], Bactrim [sulfamethoxazole-trimethoprim], and Macrobid [nitrofurantoin]      Renal Function:  Recent Labs     08/22/23  0418 08/22/23  1822 08/23/23  0431   BUN 30* 33* 32*   CREATININE 0.6 0.6 0.6         Intake/Output Summary (Last 24 hours) at 8/23/2023 1642  Last data filed at 8/23/2023 1412  Gross per 24 hour   Intake 982.7 ml   Output 840 ml   Net 142.7 ml         CBC:  Lab Results   Component Value Date/Time    WBC 16.5 08/23/2023 04:31 AM       Vitals:    08/23/23 1632   BP:    Pulse: (!) 105   Resp: 25   Temp:    SpO2: 97%       Vancomycin Monitoring:  Trough:  No results for input(s): VANCOTROUGH in the last 72 hours. Random:    Recent Labs     08/22/23 0418   VANCORANDOM 28.1         No results for input(s): Saldaña Zepeda in the last 72 hours. Historical Cultures:  Organism   Date Value Ref Range Status   06/23/2023 Staphylococcus coagulase-negative (A)  Final     No results for input(s): BC in the last 72 hours. Vancomycin Administration Times:    Recent vancomycin administrations                     vancomycin (VANCOCIN) 500 mg in sodium chloride 0.9 % 100 mL IVPB (mg) 500 mg New Bag 08/22/23 1818    vancomycin (VANCOCIN) 750 mg in sodium chloride 0.9 % 250 mL IVPB (mg) 750 mg New Bag 08/21/23 1716     750 mg New Bag 08/20/23 1746                  Assessment:  Patient is a 69 y.o. female who has been initiated on vancomycin  Estimated Creatinine Clearance: 56 mL/min (based on SCr of 0.6 mg/dL). Goal AUC:YIN = 400-600 mcg*hr/mL.    8/21: Scr stable, UOP 0.3 mL/kg/hr  8/22: Scr remains 0.6,

## 2023-08-24 ENCOUNTER — APPOINTMENT (OUTPATIENT)
Dept: GENERAL RADIOLOGY | Age: 78
End: 2023-08-24
Payer: MEDICARE

## 2023-08-24 PROBLEM — Z51.5 PALLIATIVE CARE ENCOUNTER: Status: ACTIVE | Noted: 2023-01-01

## 2023-08-24 LAB
AADO2: 169.8 MMHG
ANION GAP SERPL CALCULATED.3IONS-SCNC: 16 MMOL/L (ref 7–16)
B.E.: -0.4 MMOL/L (ref -3–3)
BASOPHILS # BLD: 0 K/UL (ref 0–0.2)
BASOPHILS NFR BLD: 0 % (ref 0–2)
BUN SERPL-MCNC: 29 MG/DL (ref 6–23)
CALCIUM SERPL-MCNC: 8.8 MG/DL (ref 8.6–10.2)
CHLORIDE SERPL-SCNC: 109 MMOL/L (ref 98–107)
CO2 SERPL-SCNC: 18 MMOL/L (ref 22–29)
COHB: 0.5 % (ref 0–1.5)
CREAT SERPL-MCNC: 0.6 MG/DL (ref 0.5–1)
CRITICAL: ABNORMAL
DATE ANALYZED: ABNORMAL
DATE OF COLLECTION: ABNORMAL
EOSINOPHIL # BLD: 0 K/UL (ref 0.05–0.5)
EOSINOPHILS RELATIVE PERCENT: 0 % (ref 0–6)
ERYTHROCYTE [DISTWIDTH] IN BLOOD BY AUTOMATED COUNT: 15.5 % (ref 11.5–15)
FIO2: 40 %
GFR SERPL CREATININE-BSD FRML MDRD: >60 ML/MIN/1.73M2
GLUCOSE BLD-MCNC: 163 MG/DL (ref 74–99)
GLUCOSE BLD-MCNC: 164 MG/DL (ref 74–99)
GLUCOSE BLD-MCNC: 166 MG/DL (ref 74–99)
GLUCOSE BLD-MCNC: 179 MG/DL (ref 74–99)
GLUCOSE SERPL-MCNC: 145 MG/DL (ref 74–99)
HCO3: 22.9 MMOL/L (ref 22–26)
HCT VFR BLD AUTO: 28.3 % (ref 34–48)
HGB BLD-MCNC: 9.2 G/DL (ref 11.5–15.5)
HHB: 5.3 % (ref 0–5)
LAB: ABNORMAL
LYMPHOCYTES NFR BLD: 0.47 K/UL (ref 1.5–4)
LYMPHOCYTES RELATIVE PERCENT: 3 % (ref 20–42)
Lab: ABNORMAL
MCH RBC QN AUTO: 31.7 PG (ref 26–35)
MCHC RBC AUTO-ENTMCNC: 32.5 G/DL (ref 32–34.5)
MCV RBC AUTO: 97.6 FL (ref 80–99.9)
METHB: 0.3 % (ref 0–1.5)
MICROORGANISM SPEC CULT: NO GROWTH
MICROORGANISM/AGENT SPEC: ABNORMAL
MODE: AC
MONOCYTES NFR BLD: 0.63 K/UL (ref 0.1–0.95)
MONOCYTES NFR BLD: 4 % (ref 2–12)
NEUTROPHILS NFR BLD: 94 % (ref 43–80)
NEUTS SEG NFR BLD: 16.9 K/UL (ref 1.8–7.3)
O2 CONTENT: 13.5 ML/DL
O2 SATURATION: 94.7 % (ref 92–98.5)
O2HB: 93.9 % (ref 94–97)
OPERATOR ID: ABNORMAL
PATIENT TEMP: 37 C
PCO2: 32.4 MMHG (ref 35–45)
PEEP/CPAP: 5 CMH2O
PFO2: 1.7 MMHG/%
PH BLOOD GAS: 7.47 (ref 7.35–7.45)
PLATELET # BLD AUTO: 285 K/UL (ref 130–450)
PMV BLD AUTO: 11.1 FL (ref 7–12)
PO2: 68.1 MMHG (ref 75–100)
POTASSIUM SERPL-SCNC: 3.5 MMOL/L (ref 3.5–5)
RBC # BLD AUTO: 2.9 M/UL (ref 3.5–5.5)
RBC # BLD: NORMAL 10*6/UL
RI(T): 2.49
RR MECHANICAL: 22 B/MIN
SODIUM SERPL-SCNC: 143 MMOL/L (ref 132–146)
SOURCE, BLOOD GAS: ABNORMAL
SPECIMEN DESCRIPTION: ABNORMAL
THB: 10.2 G/DL (ref 11.5–16.5)
TIME ANALYZED: 446
VT MECHANICAL: 300 ML
WBC OTHER # BLD: 18 K/UL (ref 4.5–11.5)

## 2023-08-24 PROCEDURE — 2580000003 HC RX 258

## 2023-08-24 PROCEDURE — 6370000000 HC RX 637 (ALT 250 FOR IP)

## 2023-08-24 PROCEDURE — 2580000003 HC RX 258: Performed by: STUDENT IN AN ORGANIZED HEALTH CARE EDUCATION/TRAINING PROGRAM

## 2023-08-24 PROCEDURE — 6370000000 HC RX 637 (ALT 250 FOR IP): Performed by: FAMILY MEDICINE

## 2023-08-24 PROCEDURE — 99222 1ST HOSP IP/OBS MODERATE 55: CPT | Performed by: CLINICAL NURSE SPECIALIST

## 2023-08-24 PROCEDURE — C9113 INJ PANTOPRAZOLE SODIUM, VIA: HCPCS

## 2023-08-24 PROCEDURE — 94640 AIRWAY INHALATION TREATMENT: CPT

## 2023-08-24 PROCEDURE — 2580000003 HC RX 258: Performed by: UROLOGY

## 2023-08-24 PROCEDURE — 6360000002 HC RX W HCPCS

## 2023-08-24 PROCEDURE — 80048 BASIC METABOLIC PNL TOTAL CA: CPT

## 2023-08-24 PROCEDURE — A4216 STERILE WATER/SALINE, 10 ML: HCPCS

## 2023-08-24 PROCEDURE — 6360000002 HC RX W HCPCS: Performed by: UROLOGY

## 2023-08-24 PROCEDURE — 6370000000 HC RX 637 (ALT 250 FOR IP): Performed by: UROLOGY

## 2023-08-24 PROCEDURE — 85025 COMPLETE CBC W/AUTO DIFF WBC: CPT

## 2023-08-24 PROCEDURE — 82805 BLOOD GASES W/O2 SATURATION: CPT

## 2023-08-24 PROCEDURE — 2000000000 HC ICU R&B

## 2023-08-24 PROCEDURE — 6370000000 HC RX 637 (ALT 250 FOR IP): Performed by: STUDENT IN AN ORGANIZED HEALTH CARE EDUCATION/TRAINING PROGRAM

## 2023-08-24 PROCEDURE — 94003 VENT MGMT INPAT SUBQ DAY: CPT

## 2023-08-24 PROCEDURE — 6360000002 HC RX W HCPCS: Performed by: STUDENT IN AN ORGANIZED HEALTH CARE EDUCATION/TRAINING PROGRAM

## 2023-08-24 PROCEDURE — 6370000000 HC RX 637 (ALT 250 FOR IP): Performed by: INTERNAL MEDICINE

## 2023-08-24 PROCEDURE — 82962 GLUCOSE BLOOD TEST: CPT

## 2023-08-24 PROCEDURE — 71045 X-RAY EXAM CHEST 1 VIEW: CPT

## 2023-08-24 PROCEDURE — 99291 CRITICAL CARE FIRST HOUR: CPT | Performed by: INTERNAL MEDICINE

## 2023-08-24 RX ORDER — DIMETHICONE, OXYBENZONE, AND PADIMATE O 2; 2.5; 6.6 G/100G; G/100G; G/100G
STICK TOPICAL PRN
Status: DISCONTINUED | OUTPATIENT
Start: 2023-08-24 | End: 2023-08-25 | Stop reason: HOSPADM

## 2023-08-24 RX ADMIN — POLYVINYL ALCOHOL 1 DROP: 14 SOLUTION/ DROPS OPHTHALMIC at 22:04

## 2023-08-24 RX ADMIN — AMIODARONE HYDROCHLORIDE 0.5 MG/MIN: 50 INJECTION, SOLUTION INTRAVENOUS at 04:21

## 2023-08-24 RX ADMIN — PANTOPRAZOLE SODIUM 40 MG: 40 INJECTION, POWDER, FOR SOLUTION INTRAVENOUS at 09:33

## 2023-08-24 RX ADMIN — HYDROCORTISONE SODIUM SUCCINATE 100 MG: 100 INJECTION, POWDER, FOR SOLUTION INTRAMUSCULAR; INTRAVENOUS at 09:34

## 2023-08-24 RX ADMIN — SENNOSIDES 17.2 MG: 8.6 TABLET, FILM COATED ORAL at 21:31

## 2023-08-24 RX ADMIN — CYANOCOBALAMIN TAB 1000 MCG 500 MCG: 1000 TAB at 09:33

## 2023-08-24 RX ADMIN — CEFEPIME 2000 MG: 2 INJECTION, POWDER, FOR SOLUTION INTRAVENOUS at 22:29

## 2023-08-24 RX ADMIN — POLYVINYL ALCOHOL 1 DROP: 14 SOLUTION/ DROPS OPHTHALMIC at 07:00

## 2023-08-24 RX ADMIN — BACLOFEN 40 MG: 10 TABLET ORAL at 09:35

## 2023-08-24 RX ADMIN — SODIUM CHLORIDE, PRESERVATIVE FREE 10 ML: 5 INJECTION INTRAVENOUS at 09:37

## 2023-08-24 RX ADMIN — ENOXAPARIN SODIUM 30 MG: 100 INJECTION SUBCUTANEOUS at 09:52

## 2023-08-24 RX ADMIN — PETROLATUM: 420 OINTMENT TOPICAL at 22:04

## 2023-08-24 RX ADMIN — IPRATROPIUM BROMIDE AND ALBUTEROL SULFATE 1 DOSE: .5; 2.5 SOLUTION RESPIRATORY (INHALATION) at 07:41

## 2023-08-24 RX ADMIN — VANCOMYCIN HYDROCHLORIDE 750 MG: 10 INJECTION, POWDER, LYOPHILIZED, FOR SOLUTION INTRAVENOUS at 17:04

## 2023-08-24 RX ADMIN — CALCIUM CARBONATE-VITAMIN D TAB 500 MG-200 UNIT 1 TABLET: 500-200 TAB at 21:32

## 2023-08-24 RX ADMIN — POLYVINYL ALCOHOL 1 DROP: 14 SOLUTION/ DROPS OPHTHALMIC at 13:36

## 2023-08-24 RX ADMIN — BACLOFEN 40 MG: 10 TABLET ORAL at 22:02

## 2023-08-24 RX ADMIN — Medication 10 ML: at 22:05

## 2023-08-24 RX ADMIN — CHLORHEXIDINE GLUCONATE 15 ML: 1.2 RINSE ORAL at 22:03

## 2023-08-24 RX ADMIN — IPRATROPIUM BROMIDE AND ALBUTEROL SULFATE 1 DOSE: .5; 2.5 SOLUTION RESPIRATORY (INHALATION) at 15:51

## 2023-08-24 RX ADMIN — DANTROLENE SODIUM 100 MG: 25 CAPSULE ORAL at 09:37

## 2023-08-24 RX ADMIN — PETROLATUM: 420 OINTMENT TOPICAL at 08:19

## 2023-08-24 RX ADMIN — POLYVINYL ALCOHOL 1 DROP: 14 SOLUTION/ DROPS OPHTHALMIC at 09:33

## 2023-08-24 RX ADMIN — IPRATROPIUM BROMIDE AND ALBUTEROL SULFATE 1 DOSE: .5; 2.5 SOLUTION RESPIRATORY (INHALATION) at 11:08

## 2023-08-24 RX ADMIN — LEVETIRACETAM 500 MG: 100 SOLUTION ORAL at 09:34

## 2023-08-24 RX ADMIN — POLYVINYL ALCOHOL 1 DROP: 14 SOLUTION/ DROPS OPHTHALMIC at 17:14

## 2023-08-24 RX ADMIN — DANTROLENE SODIUM 100 MG: 25 CAPSULE ORAL at 22:02

## 2023-08-24 RX ADMIN — POLYETHYLENE GLYCOL 3350 17 G: 17 POWDER, FOR SOLUTION ORAL at 09:36

## 2023-08-24 RX ADMIN — RANOLAZINE 1000 MG: 500 TABLET, FILM COATED, EXTENDED RELEASE ORAL at 09:34

## 2023-08-24 RX ADMIN — CEFEPIME 2000 MG: 2 INJECTION, POWDER, FOR SOLUTION INTRAVENOUS at 10:17

## 2023-08-24 RX ADMIN — SENNOSIDES 17.2 MG: 8.6 TABLET, FILM COATED ORAL at 09:33

## 2023-08-24 RX ADMIN — SODIUM CHLORIDE, PRESERVATIVE FREE 10 ML: 5 INJECTION INTRAVENOUS at 22:05

## 2023-08-24 RX ADMIN — Medication 400 MG: at 09:36

## 2023-08-24 RX ADMIN — PANTOPRAZOLE SODIUM 40 MG: 40 INJECTION, POWDER, FOR SOLUTION INTRAVENOUS at 22:04

## 2023-08-24 RX ADMIN — IPRATROPIUM BROMIDE AND ALBUTEROL SULFATE 1 DOSE: .5; 2.5 SOLUTION RESPIRATORY (INHALATION) at 19:52

## 2023-08-24 RX ADMIN — CHLORHEXIDINE GLUCONATE 15 ML: 1.2 RINSE ORAL at 09:53

## 2023-08-24 RX ADMIN — DOCUSATE SODIUM 100 MG: 50 LIQUID ORAL at 09:34

## 2023-08-24 RX ADMIN — RANOLAZINE 1000 MG: 500 TABLET, FILM COATED, EXTENDED RELEASE ORAL at 21:32

## 2023-08-24 RX ADMIN — LEVETIRACETAM 500 MG: 100 SOLUTION ORAL at 21:32

## 2023-08-24 RX ADMIN — Medication 1000 UNITS: at 09:33

## 2023-08-24 RX ADMIN — ATORVASTATIN CALCIUM 10 MG: 10 TABLET, FILM COATED ORAL at 21:31

## 2023-08-24 RX ADMIN — AMIODARONE HYDROCHLORIDE 0.5 MG/MIN: 50 INJECTION, SOLUTION INTRAVENOUS at 17:05

## 2023-08-24 RX ADMIN — POLYVINYL ALCOHOL 1 DROP: 14 SOLUTION/ DROPS OPHTHALMIC at 02:00

## 2023-08-24 RX ADMIN — SODIUM CHLORIDE 1 G: 1 TABLET ORAL at 09:36

## 2023-08-24 ASSESSMENT — PULMONARY FUNCTION TESTS
PIF_VALUE: 29
PIF_VALUE: 24
PIF_VALUE: 24
PIF_VALUE: 25
PIF_VALUE: 25
PIF_VALUE: 24
PIF_VALUE: 26
PIF_VALUE: 32
PIF_VALUE: 25
PIF_VALUE: 27
PIF_VALUE: 29
PIF_VALUE: 26
PIF_VALUE: 25
PIF_VALUE: 22
PIF_VALUE: 25
PIF_VALUE: 24
PIF_VALUE: 26
PIF_VALUE: 27
PIF_VALUE: 27
PIF_VALUE: 33
PIF_VALUE: 27
PIF_VALUE: 46
PIF_VALUE: 24
PIF_VALUE: 24
PIF_VALUE: 29
PIF_VALUE: 26
PIF_VALUE: 24
PIF_VALUE: 39

## 2023-08-24 ASSESSMENT — PAIN SCALES - GENERAL
PAINLEVEL_OUTOF10: 0

## 2023-08-24 NOTE — PROGRESS NOTES
Hospitalist Progress Note      PCP: Yulisa Harris MD    Date of Admission: 8/14/2023        Hospital Course:  69 y.o. female presented with HAD A TEST AS AN OP AND SHE WAS FOUND TO HAVE A LEFT RENAL CALCULI AN ABSCESS IN THE RETROPERITONEAL AREA **  AND ALSO FOUND TO HAVE A L4 COMPRESSION FRACTURE   HAD TO BE INTUBATED DUE TO HYPOXIA. AND HYPOTENSION. HAD Left lower quadrant abscess drains x2 were irrigated with 30 cc normal saline each. Suction grenade was removed from each catheter, irrigated and reconnected. Catheters remain patent. Fluid return emains bloody. Left nephrostomy tube was irrigated with 30 cc of normal saline with good return of flow into urostomy collection bag. Urostomy bag and tubing was removed, irrigated and reconnected. Fluid return remains bloody in nature.               Subjective:        As discussed with son at bedside  Plan for compassionate extubation tomorrow-family would like to take her home        Medications:  Reviewed    Infusion Medications    sodium chloride      amiodarone 0.5 mg/min (08/24/23 1705)    dexmedetomidine (PRECEDEX) IV infusion 0.4 mcg/kg/hr (08/23/23 1745)    norepinephrine 2 mcg/min (08/23/23 1005)    fentaNYL Stopped (08/21/23 1306)    sodium chloride      sodium chloride      dextrose      sodium chloride       Scheduled Medications    cefepime  2,000 mg IntraVENous Q12H    vancomycin  750 mg IntraVENous Q24H    pantoprazole (PROTONIX) 40 mg injection  40 mg IntraVENous Q12H    adenosine  6 mg IntraVENous Once    levETIRAcetam  500 mg Oral BID    senna  2 tablet Oral BID    ferrous sulfate  325 mg Oral Once per day on Mon Wed Fri    docusate sodium  100 mg Oral Daily    lidocaine  1 patch TransDERmal Daily    polyvinyl alcohol  1 drop Both Eyes Q4H    Or    artificial tears   Both Eyes Q4H    chlorhexidine  15 mL Mouth/Throat BID    sodium chloride flush  5-40 mL IntraVENous 2 times per day    polyethylene glycol  17 g Oral Daily    white

## 2023-08-24 NOTE — PROGRESS NOTES
DAILY VENTILATOR WEANING ASSESSMENT PERFORMED    P/FIO2 Ratio =          (<100= do not Wean)                  Cs =                          (<32= Instability)  Plat. Pressure =   MV =  RSBI =    Instabilities:       Cardiovascular = c1       CNS = 1       Respiratory =       Metabolic =    Parameters    no    Wean per protocol  yes    Ask Physician for a weaning plan yes    Additional Comments:     Performed by Keanu Ball RCP RRT      Reference Table:    Cardiovascular     CNS      1. Mean BP less than or equal to 75   1. Neuromuscular blockade  2. Heart Rate greater than 130   2. RASS of -3, -4, -5  3. Myocardial Ischemia    3. RASS of +3, +4  4. Mechanical Assist Device    4. ICP greater than 15 or             Intracranial Hypertension         Respiratory      Metabolic  1. PEEP equal to or greater than 10cm/H20  1. Temp. (8hrs) less than 95 or > 103  2. Respiratory Rate greater than 35   2. WBC < 5000 or > 69970  3. Minute Volume greater than 15L  4. pH less than 7.30  5.  Deteriorating chest X-ray          08/24/23 0747   Patient Observation   Pulse 86   Respirations 25   SpO2 99 %   Breath Sounds   Right Upper Lobe Rhonchi   Left Upper Lobe Rhonchi   Vent Information   Ventilator Day(s) 7   Ventilator -02   Vent Mode AC/VC   Ventilator Settings   Vt (Set, mL) 300 mL   Resp Rate (Set) 22 bmp   PEEP/CPAP (cmH2O) 5   FiO2  40 %   Pressure Support (cm H2O) 0 cm H2O   Peak Inspiratory Flow (Set) 60 L/sec   Vent Patient Data (Readings)   Vt (Measured) 285 mL   Peak Inspiratory Pressure (cmH2O) 25 cmH2O   Rate Measured 25 br/min   Minute Volume (L/min) 7.5 Liters   Peak Inspiratory Flow (lpm) 60 L/sec   Mean Airway Pressure (cmH2O) 9 cmH20   Plateau Pressure (cm H2O) 18 cm H2O   Driving Pressure 13   I:E Ratio 1:3.40   Flow Sensitivity 3 L/min   Static Compliance (L/cm H2O) 20   Backup Apnea On   Backup Vt 300   Vent Alarm Settings   High Pressure (cmH2O) 50 cmH2O   Low Minute Volume (lpm) 4 L/min   High

## 2023-08-24 NOTE — CARE COORDINATION
Care Coordination: LOS 10 day. Met with pt outside of room, feels wife is ready for hospice and would like to take her home. He is frustrated as she is still intubated and he would like to arrange for pt to transport intubated to his home and to extubate there. His goal is to get her home to pass  away there. He spoke with daughter on speaker phone and she feels it is too early to extubate and she is on her way up for them to discuss further. He states hx of hospice at home in past, does not recall company and was not happy with their services. He discussed [facility] as an option but would like to discuss further with daughter. Discussed hospice house and he states he would be agreeable with end plan to get her home. This is important to him as this is the patients request as well. Will discuss further once he has spoke to ICU team and his daughter. Electronically signed by Bel Ya RN on 8/24/2023 at 9:37 AM       Addendum: Daughter and  outside of room, plan is to extubate when able and take home with hospice, would like to speak to [facility], [facility].  Orders placed and referral called to [facility]    Electronically signed by Bel Ya RN on 8/24/2023 at 9:57 AM

## 2023-08-24 NOTE — PROGRESS NOTES
[facility]  [facility]  Progress note      HISTORY OF PRESENT ILLNESS:   70-year-old female with past medical history of CAD, HLD, HTN, hypothyroidism, MS, sarcoidosis, DM presented with left-sided kidney stone and left-sided pyelonephritis. She had a previous history of Enterobacter cloacae UTI. Urinalysis is positive for pyuria and bacteriuria. CT abdomen showing left-sided kidney stone with left-sided pyelonephritis and possible renal abscess. Status post cystoscopy and left-sided JJ stent insertion on 08/15 by urology. ID consulted for UTI. Past Medical History:        Diagnosis Date    Anemia     CAD (coronary artery disease) 04/21/2020    High cholesterol     Hypertension     Hypothyroidism     Malabsorption     MI (myocardial infarction) ([address]) 04/21/2020    MS (multiple sclerosis) ([address])     Osteoporosis     Sarcoidosis     Seizure ([address]) 02/05/2021    Type 2 diabetes mellitus with hyperglycemia, without long-term current use of insulin ([address]) 10/30/2019     Past Surgical History:        Procedure Laterality Date    BLADDER SURGERY Left 8/15/2023    CYSTOSCOPY RETROGRADE PYELOGRAM, URETEROSCOPY, LEFT STENT INSERTION, STRATTON CATHETER INSERTION performed by Nicky Neal MD at [address]  04/21/2020    Dr. Linda Patle   3.0 x 22mm Resolute MAAME to Prox LAD.   No LV    CORONARY ANGIOPLASTY WITH STENT PLACEMENT  01/14/2021    3.0 x 30 Maame to the prox LAD and a 2.5 x 20 [place] to the Obtuse marginal by Dr. Ann-Marie Bautista  8/17/2023    CT NEPHROSTOMY CATHETER PLACEMENT 8/17/2023 Romulo Arellano MD [facility]    CYSTOSCOPY INSERTION / REMOVAL STENT / STONE Left 4/23/2020    CYSTOSCOPY LEFT RETROGRADE PYELOGRAM STENT INSERTION, STRATTON CATHETER performed by Aidan Avila DO at [address] Left 3/21/2019    LEFT FEMUR CEPHALLOMEDULLARY NAIL performed by Jessica Mejias MD perihilar regions and lung bases with small bilateral pleural effusions. No   new focal parenchymal opacification present. XR CHEST PORTABLE   Final Result   Endotracheal tube within 1 cm of the marlen. Orogastric tube tip in the   stomach. Airspace disease in the perihilar regions bilaterally with   increased markings in the lung bases with small bilateral pleural effusions. CTA PULMONARY W CONTRAST   Final Result   Chest:      1. No evidence of acute pulmonary embolism. 2. Bilateral small to moderate pleural effusions. 3. Bilateral consolidative and ground-glass opacities, considerations in   primarily include multifocal pneumonia and pulmonary edema. There is also   likely a component of atelectasis. 4. Reflux of contrast into the IVC as can be seen with right heart   dysfunction. Abdomen and pelvis:      1. Multiple left renal abnormalities. Notably, there is a 3.7 x 3.4 cm   collection surrounding the lower pole which appears new from 08/16/2023 and   could reflect abscess, urinoma, etc.  There is hypoenhancement at the lower   pole of the left kidney which can be seen with pyelonephritis. The   left-sided ureteral stent again appears abnormal in position extending beyond   the renal pelvis. The renal pelvis and ureter again demonstrate urothelial   thickening/enhancement. The renal pelvis is again prominent in caliber. There has been interval placement of a nephrostomy tube at the mid to upper   pole. Also there has been interval placement of percutaneous drainage   catheter within a collection in the soft tissues overlying the left kidney,   which is decreased in size measuring approximately 4.6 x 1.3 cm compared to   6.1 x 2.9 cm.   2. Suspected 1.2 cm hypodense lesion in the dorsal pancreatic head/uncinate   process. Advise follow-up contrast enhanced MRI. 3. Moderate to large colonic stool burden, compatible with constipation.    Also, the rectum appears thickened,

## 2023-08-24 NOTE — PROGRESS NOTES
HOSPICE [facility]     Liaison Information Visit Note              Patient Name: Gabbie Lema      Code Status Order: Limited   Allergies:  Augmentin [amoxicillin-pot clavulanate], Bactrim [sulfamethoxazole-trimethoprim], and Macrobid [nitrofurantoin]    Meeting held with patient's  Lane laurent and patient's daughter Joi Guajardo. Best contact for [facility] is cell - [phone]. Information provided about Hospice philosophy and services. All questions answered. The hospice benefit and philosophy were explained including that hospice is end of life care in which, per Medicare, a patient has a terminal diagnosis that life expectancy would be 6 months or less. Hospice care is a service that is covered by most insurance plans. Explained hospice services at home, at [facility] with room/board private pay unless patient has Medicaid and the [facility]. Explained that once in hospice care, all aggressive treatments would be stopped and allow nature to takes its course with focus on comfort care for the patient. Patient's family are undecided about Hospice services and wish to speak with Palliative team before making any decisions. Patient's  stated that he does not want any further surgical intervention for patient. His goal is to bring the patient home. Education provided that patient would need to be weaned from the ventilator and from all IV gtts prior to Hospice care. Educated that patient may pass away during weaning from ventilator. Educated about option of [facility] for compassionate weaning. Family decline.  stated she \"isn't going anywhere but home\". Patient's  stated that is a deal breaker that patient can continue to use her NIV Trilogy with heated humidification. Hospice nurse will clarify husbands request for continued NIV use. Patient's  confirmed patient revoked from Hospice services 2 times in the past because they \"didn't offer much\". HOTV to continue to follow.

## 2023-08-24 NOTE — PROGRESS NOTES
Pharmacy Consultation Note  (Antibiotic Dosing and Monitoring)    Initial consult date: 8/15/23  Consulting physician/provider: Gia Calvillo  Drug: Vancomycin  Indication: UTI    Age/  Gender Height Weight IBW  Allergy Information   78 y.o./female 5' (152.4 cm) 111 lb (50.3 kg)     Ideal body weight: 45.5 kg (100 lb 4.9 oz)  Adjusted ideal body weight: 49 kg (107 lb 15.1 oz)   Augmentin [amoxicillin-pot clavulanate], Bactrim [sulfamethoxazole-trimethoprim], and Macrobid [nitrofurantoin]      Renal Function:  Recent Labs     08/22/23  1822 08/23/23  0431 08/24/23  0424   BUN 33* 32* 29*   CREATININE 0.6 0.6 0.6         Intake/Output Summary (Last 24 hours) at 8/24/2023 0932  Last data filed at 8/24/2023 0600  Gross per 24 hour   Intake 673.95 ml   Output 1670 ml   Net -996.05 ml         CBC:  Lab Results   Component Value Date/Time    WBC 18.0 08/24/2023 04:24 AM       Vitals:    08/24/23 0900   BP: 113/64   Pulse: 78   Resp: 22   Temp:    SpO2: 98%       Vancomycin Monitoring:  Trough:  No results for input(s): VANCOTROUGH in the last 72 hours. Random:    Recent Labs     08/22/23  0418   VANCORANDOM 28.1         No results for input(s): Beola Alu in the last 72 hours. Historical Cultures:  Organism   Date Value Ref Range Status   06/23/2023 Staphylococcus coagulase-negative (A)  Final     No results for input(s): BC in the last 72 hours. Vancomycin Administration Times:    Recent vancomycin administrations                     vancomycin (VANCOCIN) 500 mg in sodium chloride 0.9 % 100 mL IVPB (mg) 500 mg New Bag 08/23/23 1716     500 mg New Bag 08/22/23 1818    vancomycin (VANCOCIN) 750 mg in sodium chloride 0.9 % 250 mL IVPB (mg) 750 mg New Bag 08/21/23 1716                    Assessment:  Patient is a 69 y.o. female who has been initiated on vancomycin  Estimated Creatinine Clearance: 56 mL/min (based on SCr of 0.6 mg/dL). Goal AUC:YIN = 400-600 mcg*hr/mL.    8/21: Scr stable, UOP 0.3 mL/kg/hr  8/22: Scr remains 0.6, random level is 28.1 mcg/mL   8/23: Scr 0.6  8/24: Scr 0.6    Plan:  Increase to vancomycin 750 mg IV q 24 hr   Will obtain random vancomycin level tomorrow morning     Thank you for the consult,     Cindy Lema, PharmD, [address] [place] [address]  PGY1 Pharmacy Resident

## 2023-08-25 ENCOUNTER — APPOINTMENT (OUTPATIENT)
Dept: GENERAL RADIOLOGY | Age: 78
End: 2023-08-25
Payer: MEDICARE

## 2023-08-25 VITALS
DIASTOLIC BLOOD PRESSURE: 77 MMHG | WEIGHT: 119.4 LBS | SYSTOLIC BLOOD PRESSURE: 147 MMHG | RESPIRATION RATE: 24 BRPM | TEMPERATURE: 41 F | HEART RATE: 123 BPM | HEIGHT: 60 IN | BODY MASS INDEX: 23.44 KG/M2 | OXYGEN SATURATION: 89 %

## 2023-08-25 LAB
AADO2: 159.4 MMHG
ANION GAP SERPL CALCULATED.3IONS-SCNC: 10 MMOL/L (ref 7–16)
B.E.: -3.1 MMOL/L (ref -3–3)
BASOPHILS # BLD: 0.01 K/UL (ref 0–0.2)
BASOPHILS NFR BLD: 0 % (ref 0–2)
BUN SERPL-MCNC: 30 MG/DL (ref 6–23)
CALCIUM SERPL-MCNC: 8.3 MG/DL (ref 8.6–10.2)
CHLORIDE SERPL-SCNC: 106 MMOL/L (ref 98–107)
CO2 SERPL-SCNC: 20 MMOL/L (ref 22–29)
COHB: 0.4 % (ref 0–1.5)
CREAT SERPL-MCNC: 0.6 MG/DL (ref 0.5–1)
CRITICAL: ABNORMAL
DATE ANALYZED: ABNORMAL
DATE LAST DOSE: NORMAL
DATE OF COLLECTION: ABNORMAL
EKG ATRIAL RATE: 155 BPM
EKG Q-T INTERVAL: 282 MS
EKG QRS DURATION: 104 MS
EKG QTC CALCULATION (BAZETT): 453 MS
EKG R AXIS: -39 DEGREES
EKG T AXIS: 115 DEGREES
EKG VENTRICULAR RATE: 155 BPM
EOSINOPHIL # BLD: 0.1 K/UL (ref 0.05–0.5)
EOSINOPHILS RELATIVE PERCENT: 1 % (ref 0–6)
ERYTHROCYTE [DISTWIDTH] IN BLOOD BY AUTOMATED COUNT: 15.2 % (ref 11.5–15)
FIO2: 40 %
GFR SERPL CREATININE-BSD FRML MDRD: >60 ML/MIN/1.73M2
GLUCOSE SERPL-MCNC: 127 MG/DL (ref 74–99)
HCO3: 20.3 MMOL/L (ref 22–26)
HCT VFR BLD AUTO: 27.1 % (ref 34–48)
HGB BLD-MCNC: 8.8 G/DL (ref 11.5–15.5)
HHB: 4.1 % (ref 0–5)
IMM GRANULOCYTES # BLD AUTO: 0.19 K/UL (ref 0–0.58)
IMM GRANULOCYTES NFR BLD: 1 % (ref 0–5)
LAB: ABNORMAL
LYMPHOCYTES NFR BLD: 0.62 K/UL (ref 1.5–4)
LYMPHOCYTES RELATIVE PERCENT: 4 % (ref 20–42)
Lab: ABNORMAL
MCH RBC QN AUTO: 31.7 PG (ref 26–35)
MCHC RBC AUTO-ENTMCNC: 32.5 G/DL (ref 32–34.5)
MCV RBC AUTO: 97.5 FL (ref 80–99.9)
METHB: 0.3 % (ref 0–1.5)
MODE: AC
MONOCYTES NFR BLD: 1.21 K/UL (ref 0.1–0.95)
MONOCYTES NFR BLD: 7 % (ref 2–12)
NEUTROPHILS NFR BLD: 88 % (ref 43–80)
NEUTS SEG NFR BLD: 14.88 K/UL (ref 1.8–7.3)
O2 CONTENT: 13.1 ML/DL
O2 SATURATION: 95.9 % (ref 92–98.5)
O2HB: 95.2 % (ref 94–97)
OPERATOR ID: ABNORMAL
PATIENT TEMP: 37 C
PCO2: 30.4 MMHG (ref 35–45)
PEEP/CPAP: 5 CMH2O
PFO2: 2.02 MMHG/%
PH BLOOD GAS: 7.44 (ref 7.35–7.45)
PLATELET # BLD AUTO: 295 K/UL (ref 130–450)
PMV BLD AUTO: 11.1 FL (ref 7–12)
PO2: 80.8 MMHG (ref 75–100)
POTASSIUM SERPL-SCNC: 3.3 MMOL/L (ref 3.5–5)
RBC # BLD AUTO: 2.78 M/UL (ref 3.5–5.5)
RI(T): 1.97
RR MECHANICAL: 22 B/MIN
SODIUM SERPL-SCNC: 136 MMOL/L (ref 132–146)
SOURCE, BLOOD GAS: ABNORMAL
THB: 9.7 G/DL (ref 11.5–16.5)
TIME ANALYZED: 427
TME LAST DOSE: NORMAL H
VANCOMYCIN DOSE: NORMAL MG
VANCOMYCIN SERPL-MCNC: 23.8 UG/ML (ref 5–40)
VT MECHANICAL: 300 ML
WBC OTHER # BLD: 17 K/UL (ref 4.5–11.5)

## 2023-08-25 PROCEDURE — 6370000000 HC RX 637 (ALT 250 FOR IP)

## 2023-08-25 PROCEDURE — 82805 BLOOD GASES W/O2 SATURATION: CPT

## 2023-08-25 PROCEDURE — 6360000002 HC RX W HCPCS: Performed by: NURSE PRACTITIONER

## 2023-08-25 PROCEDURE — 6360000002 HC RX W HCPCS: Performed by: INTERNAL MEDICINE

## 2023-08-25 PROCEDURE — 94640 AIRWAY INHALATION TREATMENT: CPT

## 2023-08-25 PROCEDURE — 80202 ASSAY OF VANCOMYCIN: CPT

## 2023-08-25 PROCEDURE — 94003 VENT MGMT INPAT SUBQ DAY: CPT

## 2023-08-25 PROCEDURE — 6360000002 HC RX W HCPCS: Performed by: STUDENT IN AN ORGANIZED HEALTH CARE EDUCATION/TRAINING PROGRAM

## 2023-08-25 PROCEDURE — 99291 CRITICAL CARE FIRST HOUR: CPT | Performed by: INTERNAL MEDICINE

## 2023-08-25 PROCEDURE — 99231 SBSQ HOSP IP/OBS SF/LOW 25: CPT | Performed by: NURSE PRACTITIONER

## 2023-08-25 PROCEDURE — 85025 COMPLETE CBC W/AUTO DIFF WBC: CPT

## 2023-08-25 PROCEDURE — 6370000000 HC RX 637 (ALT 250 FOR IP): Performed by: UROLOGY

## 2023-08-25 PROCEDURE — 80048 BASIC METABOLIC PNL TOTAL CA: CPT

## 2023-08-25 PROCEDURE — 6360000002 HC RX W HCPCS

## 2023-08-25 PROCEDURE — 2500000003 HC RX 250 WO HCPCS

## 2023-08-25 RX ORDER — MORPHINE SULFATE 20 MG/5ML
2.5 SOLUTION ORAL
Qty: 25 ML | Refills: 0 | Status: SHIPPED | OUTPATIENT
Start: 2023-08-25 | End: 2023-08-25 | Stop reason: HOSPADM

## 2023-08-25 RX ORDER — POTASSIUM CHLORIDE 29.8 MG/ML
40 INJECTION INTRAVENOUS ONCE
Status: DISCONTINUED | OUTPATIENT
Start: 2023-08-25 | End: 2023-08-25

## 2023-08-25 RX ORDER — MORPHINE SULFATE 2 MG/ML
2 INJECTION, SOLUTION INTRAMUSCULAR; INTRAVENOUS
Status: DISCONTINUED | OUTPATIENT
Start: 2023-08-25 | End: 2023-08-25

## 2023-08-25 RX ORDER — MORPHINE SULFATE 4 MG/ML
4 INJECTION, SOLUTION INTRAMUSCULAR; INTRAVENOUS
Status: DISCONTINUED | OUTPATIENT
Start: 2023-08-25 | End: 2023-08-25

## 2023-08-25 RX ORDER — LORAZEPAM 2 MG/ML
1 INJECTION INTRAMUSCULAR EVERY 4 HOURS PRN
Status: DISCONTINUED | OUTPATIENT
Start: 2023-08-25 | End: 2023-08-25

## 2023-08-25 RX ORDER — SODIUM CHLORIDE 0.9 % (FLUSH) 0.9 %
5-40 SYRINGE (ML) INJECTION 2 TIMES DAILY
Status: DISCONTINUED | OUTPATIENT
Start: 2023-08-25 | End: 2023-08-25 | Stop reason: HOSPADM

## 2023-08-25 RX ORDER — GLYCOPYRROLATE 0.2 MG/ML
0.2 INJECTION INTRAMUSCULAR; INTRAVENOUS EVERY 4 HOURS PRN
Status: DISCONTINUED | OUTPATIENT
Start: 2023-08-25 | End: 2023-08-25 | Stop reason: HOSPADM

## 2023-08-25 RX ORDER — MORPHINE SULFATE 10 MG/5ML
10 SOLUTION ORAL
Qty: 120 ML | Refills: 0 | Status: SHIPPED | OUTPATIENT
Start: 2023-08-25 | End: 2023-08-25 | Stop reason: SDUPTHER

## 2023-08-25 RX ORDER — LORAZEPAM 1 MG/1
1 TABLET ORAL EVERY 8 HOURS PRN
Qty: 10 TABLET | Refills: 0 | Status: SHIPPED | OUTPATIENT
Start: 2023-08-25 | End: 2023-08-25 | Stop reason: SDUPTHER

## 2023-08-25 RX ORDER — LORAZEPAM 2 MG/ML
1 INJECTION INTRAMUSCULAR ONCE
Status: COMPLETED | OUTPATIENT
Start: 2023-08-25 | End: 2023-08-25

## 2023-08-25 RX ORDER — LORAZEPAM 2 MG/ML
1 INJECTION INTRAMUSCULAR
Status: DISCONTINUED | OUTPATIENT
Start: 2023-08-25 | End: 2023-08-25 | Stop reason: HOSPADM

## 2023-08-25 RX ORDER — MORPHINE SULFATE 2 MG/ML
2 INJECTION, SOLUTION INTRAMUSCULAR; INTRAVENOUS EVERY 30 MIN PRN
Status: DISCONTINUED | OUTPATIENT
Start: 2023-08-25 | End: 2023-08-25

## 2023-08-25 RX ORDER — MORPHINE SULFATE 2 MG/ML
2 INJECTION, SOLUTION INTRAMUSCULAR; INTRAVENOUS
Status: DISCONTINUED | OUTPATIENT
Start: 2023-08-25 | End: 2023-08-25 | Stop reason: HOSPADM

## 2023-08-25 RX ORDER — GLYCOPYRROLATE 1 MG/1
1 TABLET ORAL 3 TIMES DAILY
Qty: 90 TABLET | Refills: 3 | Status: SHIPPED | OUTPATIENT
Start: 2023-08-25 | End: 2023-08-25 | Stop reason: SDUPTHER

## 2023-08-25 RX ORDER — MORPHINE SULFATE 2 MG/ML
INJECTION, SOLUTION INTRAMUSCULAR; INTRAVENOUS
Status: DISCONTINUED
Start: 2023-08-25 | End: 2023-08-25

## 2023-08-25 RX ORDER — MORPHINE SULFATE 10 MG/5ML
10 SOLUTION ORAL
Qty: 120 ML | Refills: 0 | Status: SHIPPED | OUTPATIENT
Start: 2023-08-25 | End: 2023-08-28

## 2023-08-25 RX ORDER — LORAZEPAM 2 MG/ML
1 INJECTION INTRAMUSCULAR
Status: DISCONTINUED | OUTPATIENT
Start: 2023-08-25 | End: 2023-08-25

## 2023-08-25 RX ORDER — GLYCOPYRROLATE 1 MG/1
1 TABLET ORAL 3 TIMES DAILY
Qty: 9 TABLET | Refills: 0 | Status: SHIPPED | OUTPATIENT
Start: 2023-08-25 | End: 2023-08-28

## 2023-08-25 RX ORDER — MORPHINE SULFATE 100 MG/5ML
5 SOLUTION ORAL
Qty: 30 ML | Refills: 0 | Status: SHIPPED | OUTPATIENT
Start: 2023-08-25 | End: 2023-09-09

## 2023-08-25 RX ORDER — MORPHINE SULFATE 4 MG/ML
4 INJECTION, SOLUTION INTRAMUSCULAR; INTRAVENOUS
Status: DISCONTINUED | OUTPATIENT
Start: 2023-08-25 | End: 2023-08-25 | Stop reason: HOSPADM

## 2023-08-25 RX ORDER — LORAZEPAM 2 MG/ML
0.5 INJECTION INTRAMUSCULAR
Status: DISCONTINUED | OUTPATIENT
Start: 2023-08-25 | End: 2023-08-25 | Stop reason: HOSPADM

## 2023-08-25 RX ORDER — LORAZEPAM 1 MG/1
1 TABLET ORAL EVERY 8 HOURS PRN
Qty: 9 TABLET | Refills: 0 | Status: SHIPPED | OUTPATIENT
Start: 2023-08-25 | End: 2023-09-24

## 2023-08-25 RX ADMIN — MORPHINE SULFATE 4 MG: 4 INJECTION, SOLUTION INTRAMUSCULAR; INTRAVENOUS at 11:15

## 2023-08-25 RX ADMIN — MORPHINE SULFATE 4 MG: 4 INJECTION, SOLUTION INTRAMUSCULAR; INTRAVENOUS at 12:40

## 2023-08-25 RX ADMIN — MORPHINE SULFATE 2 MG: 2 INJECTION, SOLUTION INTRAMUSCULAR; INTRAVENOUS at 10:09

## 2023-08-25 RX ADMIN — POTASSIUM CHLORIDE 40 MEQ: 29.8 INJECTION, SOLUTION INTRAVENOUS at 06:23

## 2023-08-25 RX ADMIN — MORPHINE SULFATE 4 MG: 4 INJECTION, SOLUTION INTRAMUSCULAR; INTRAVENOUS at 15:06

## 2023-08-25 RX ADMIN — LORAZEPAM 1 MG: 2 INJECTION INTRAMUSCULAR; INTRAVENOUS at 10:13

## 2023-08-25 RX ADMIN — LORAZEPAM 1 MG: 2 INJECTION INTRAMUSCULAR; INTRAVENOUS at 15:05

## 2023-08-25 RX ADMIN — POLYVINYL ALCOHOL 1 DROP: 14 SOLUTION/ DROPS OPHTHALMIC at 02:15

## 2023-08-25 RX ADMIN — CHLORHEXIDINE GLUCONATE 15 ML: 1.2 RINSE ORAL at 09:41

## 2023-08-25 RX ADMIN — LEVOTHYROXINE SODIUM 88 MCG: 0.09 TABLET ORAL at 06:15

## 2023-08-25 RX ADMIN — LORAZEPAM 1 MG: 2 INJECTION INTRAMUSCULAR; INTRAVENOUS at 12:39

## 2023-08-25 RX ADMIN — GLYCOPYRROLATE 0.2 MG: 0.2 INJECTION INTRAMUSCULAR; INTRAVENOUS at 09:31

## 2023-08-25 RX ADMIN — LORAZEPAM 1 MG: 2 INJECTION INTRAMUSCULAR; INTRAVENOUS at 11:15

## 2023-08-25 RX ADMIN — POLYVINYL ALCOHOL 1 DROP: 14 SOLUTION/ DROPS OPHTHALMIC at 06:15

## 2023-08-25 RX ADMIN — IPRATROPIUM BROMIDE AND ALBUTEROL SULFATE 1 DOSE: .5; 2.5 SOLUTION RESPIRATORY (INHALATION) at 08:03

## 2023-08-25 RX ADMIN — LORAZEPAM 1 MG: 2 INJECTION INTRAMUSCULAR; INTRAVENOUS at 09:31

## 2023-08-25 RX ADMIN — MORPHINE SULFATE 2 MG: 2 INJECTION, SOLUTION INTRAMUSCULAR; INTRAVENOUS at 09:31

## 2023-08-25 ASSESSMENT — PAIN SCALES - GENERAL
PAINLEVEL_OUTOF10: 0
PAINLEVEL_OUTOF10: 0

## 2023-08-25 ASSESSMENT — PULMONARY FUNCTION TESTS
PIF_VALUE: 30
PIF_VALUE: 26
PIF_VALUE: 27
PIF_VALUE: 32
PIF_VALUE: 33
PIF_VALUE: 2
PIF_VALUE: 34
PIF_VALUE: 30
PIF_VALUE: 28
PIF_VALUE: 30
PIF_VALUE: 29
PIF_VALUE: 28
PIF_VALUE: 31

## 2023-08-25 NOTE — PROGRESS NOTES
Spoke with CM, Family does not wish to proceed with hospice at this time. HOTV will sign off. Please reconsult if the need arises. We appreciate the opportunity to participate in Michaela's care.     Electronically signed by Kimmie Frost RN on 8/25/2023 at 10:34 AM  [phone]

## 2023-08-25 NOTE — PLAN OF CARE
Problem: Safety - Adult  Goal: Free from fall injury  Outcome: Progressing     Problem: Skin/Tissue Integrity  Goal: Absence of new skin breakdown  Description: 1. Monitor for areas of redness and/or skin breakdown  2. Assess vascular access sites hourly  3. Every 4-6 hours minimum:  Change oxygen saturation probe site  4. Every 4-6 hours:  If on nasal continuous positive airway pressure, respiratory therapy assess nares and determine need for appliance change or resting period.   Outcome: Progressing     Problem: Pain  Goal: Verbalizes/displays adequate comfort level or baseline comfort level  Outcome: Progressing     Problem: Nutrition Deficit:  Goal: Optimize nutritional status  Outcome: Progressing     Problem: ABCDS Injury Assessment  Goal: Absence of physical injury  Outcome: Progressing

## 2023-08-25 NOTE — CARE COORDINATION
Care Coordination:  Pt was compassionately extubated, this am. Family at bedside, questioning possible dc to home with hospice.  [facility] called, spoke to Cori and she will send someone to speak to family    Electronically signed by Simone Cabello RN on 8/25/2023 at 3:52 PM

## 2023-08-25 NOTE — PROGRESS NOTES
Patient terminally extubated per family request.  and adult children at bedside. Patient medicated with morphine, robinul, and ativan for comfort and air hunger.

## 2023-08-25 NOTE — PROGRESS NOTES
iliac and femoral arteries are small in caliber, and   suspect wall thickening of the left femoral artery, would question   vasculitis, intramural hematoma, etc.   5. Anasarca. 6. Other findings above. XR CHEST PORTABLE   Final Result   Endotracheal tube unchanged in position heading within the right mainstem   bronchus in which retraction proximally 3 cm is recommended. The remainder   lines and tubes are stable. Patchy airspace disease again seen diffusely   throughout the right lung and left upper lobe. Small left pleural effusion   again present. XR ABDOMEN FOR NG/OG/NE TUBE PLACEMENT   Final Result   NG/OG is in the stomach. XR CHEST PORTABLE   Final Result   ET tube terminates near the orifice of right mainstem bronchus. NG tube is in appropriate anatomic position. Persistent diffuse pulmonary interstitial and alveolar edema. Small bilateral pleural effusions. Call report findings were verbalized to Jena Willson RN on 08/19/2023 at   2:40 a.m. EST. XR CHEST PORTABLE   Final Result   Interval intubation with endotracheal tube terminating less than 1 cm above   the level of the marlen in low position directed towards the right mainstem   bronchus. Interval development of bilateral interstitial opacifications of   likely interstitial pulmonary edema pattern without significant pleural   effusion. No pneumothorax. CT ABSCESS DRAINAGE W CATH PLACEMENT S&I   Final Result   1. Successful percutaneous nephrostomy tube placement      2. Antegrade pyelography      3. Left flank superior abscess drainage and catheter placement      4. Left flank inferior abscess drainage and catheter placement         CT NEPHROSTOMY CATHETER PLACEMENT   Final Result   1. Successful percutaneous nephrostomy tube placement      2. Antegrade pyelography      3. Left flank superior abscess drainage and catheter placement      4.   Left flank inferior abscess drainage FL RETROGRADE PYELOGRAM W WO KUB   Final Result   Left retrograde pyelogram and left ureteral stent insertion with fluoroscopy. Please see separate procedure report for details. Microbiology:  Pending  No results for input(s): BC in the last 72 hours. No results for input(s): ORG in the last 72 hours. No results for input(s): Buzzy Rue in the last 72 hours. No results for input(s): STREPNEUMAGU in the last 72 hours. No results for input(s): LP1UAG in the last 72 hours. No results for input(s): ASO in the last 72 hours. No results for input(s): CULTRESP in the last 72 hours. Assessment:  Left pyelonephritis  Left-sided kidney stone  S/P cystoscopy with left-sided JJ stent insertion by urology on 08/15  Possible left-sided renal abscess  Previous history of Enterobacter cloacae UTI  Left kidney fluid gram-positive cocci in pairs with diphtheroids  Intubated for airway protection    Plan:    DC cefepime and vancomycin  Urine & Blood Cx NG so far   Urology note noted  Interventional radiology flushing the drains and the percutaneous nephrostomy tubes  CODE STATUS: Limited  Patient is comfort care   Id will sign off     Thank you for having us see this patient in consultation. I will be discussing this case with the treating physicians.       Electronically signed by Zack Cannon MD on 8/25/2023 at 2:56 PM

## 2023-08-25 NOTE — PROGRESS NOTES
Pharmacy Consultation Note  (Antibiotic Dosing and Monitoring)    Vancomycin has been discontinued; pharmacy will sign-off. Please reconsult if needed.     Thank you,    Tung Ivey, PharmD, [address] [facility]  PGY1 Pharmacy Resident

## 2023-08-25 NOTE — CARE COORDINATION
Care Coordination: LOS 11 day. Compassionate extubation 9:00 am. Ambulance set up for 11 am for [facility] ambulance to take pt home. Spoke to [address] at physician and [facility] as well to have a medic on trip for bipap if pt is placed on bipap setting. Family is aware, out of pocket cost for transport. Verified [facility] address with daughter. Spiritual care notified.  does not want Hospice services as he plans to use Trilogy at home and hospice does not cover respiratory services.  He would like [facility] visiting nurses Resumed and will need orders and notified    Electronically signed by Alma Rosa Worthington RN on 8/25/2023 at 9:17 AM

## 2023-08-25 NOTE — CARE COORDINATION
Care coordination:  Pt actively dying, medicated for comfort, Family wishes to cancel Ambulance. Spoke to Magdaleno at [facility] ambulance and trip cancelled. Spoke to Veronica at [facility] nurses, who had already declined services as out of their scope of practice.        Electronically signed by Ran Davison RN on 8/25/2023 at 10:24 AM

## 2023-08-25 NOTE — PROGRESS NOTES
Called by CM to come to pt's room to discuss options for moving pt with or without hospice. Family would like to move pt while she is stable to move. Fear that if they wait till tomorrow she will not be stable enough to move. Hospice has no openings for admission at this time in the home setting or at hospice house. Family does not want pt moved to the hospice house. Bedside nurse will need to set up transport for pt. Pt is NOT in hospice care at this time.     Electronically signed by Rose Franklin RN on 8/25/2023 at 5:16 PM  [phone]

## 2023-08-25 NOTE — PLAN OF CARE
Met with family at bedside along with hospice nurse. Family would like the option of taking patient home tonight if possible. Per hospice nurse they do not have any availability tonight. Explained to family that we will need reach out to primary to see if they are able to prescribe her comfort medications tonight and if there is transport available. Explained that there is no guarantee of either. If this is not possible tonight, hospice will check for availability in the morning for home hospice vs hospice house. All questions answered.      Electronically signed by Johann Rios MD on 8/25/2023 at 5:17 PM.

## 2023-08-25 NOTE — PLAN OF CARE
Problem: Discharge Planning  Goal: Discharge to home or other facility with appropriate resources  Outcome: Not Progressing     Problem: Chronic Conditions and Co-morbidities  Goal: Patient's chronic conditions and co-morbidity symptoms are monitored and maintained or improved  Outcome: Not Progressing     Problem: Nutrition Deficit:  Goal: Optimize nutritional status  8/25/2023 1048 by Zina Holden RN  Outcome: Not Progressing  8/25/2023 0411 by Katia Recinos RN  Outcome: Progressing

## 2023-08-26 LAB
MICROORGANISM SPEC CULT: NORMAL
SPECIMEN DESCRIPTION: NORMAL

## 2023-08-28 ENCOUNTER — TELEPHONE (OUTPATIENT)
Dept: FAMILY MEDICINE CLINIC | Age: 78
End: 2023-08-28

## 2023-08-28 NOTE — TELEPHONE ENCOUNTER
home calling making sure you were okay with signing her death certificate,and what is a good email for them to send to?

## 2023-08-28 NOTE — TELEPHONE ENCOUNTER
Called and spoke w/ Samanta at [facility], she will send email to [place]. She will include date and time of death.     [phone]

## 2023-08-28 NOTE — TELEPHONE ENCOUNTER
Eloy Schwab  calling in to let you know that she passed away on Friday as soon as she got home from the hospital. I will cancel her upcoming appts.

## 2023-08-28 NOTE — TELEPHONE ENCOUNTER
I also spoke w/ Carly Rodriguez and relayed your condolences. He said thank you for all that you have done.

## 2023-08-31 NOTE — DISCHARGE SUMMARY
STOP taking these medications      aspirin 81 MG EC tablet     atorvastatin 10 MG tablet  Commonly known as: LIPITOR     baclofen 20 MG tablet  Commonly known as: LIORESAL     blood glucose test strips strip  Commonly known as: ASCENSIA AUTODISC VI;ONE TOUCH ULTRA TEST VI     bumetanide 1 MG tablet  Commonly known as: BUMEX     Calcium 600+D Plus Minerals 600-400 MG-UNIT Tabs     carvedilol 3.125 MG tablet  Commonly known as: COREG     citalopram 20 MG tablet  Commonly known as: CELEXA     clopidogrel 75 MG tablet  Commonly known as: PLAVIX     dantrolene 100 MG capsule  Commonly known as: DANTRIUM     ferrous sulfate 325 (65 Fe) MG tablet  Commonly known as: IRON 325     ipratropium 0.5 mg-albuterol 2.5 mg 0.5-2.5 (3) MG/3ML Soln nebulizer solution  Commonly known as: DUONEB     levETIRAcetam 500 MG tablet  Commonly known as: KEPPRA     levothyroxine 88 MCG tablet  Commonly known as: SYNTHROID     magnesium 250 MG Tabs tablet  Commonly known as: MAGNESIUM-OXIDE     metFORMIN 500 MG tablet  Commonly known as: GLUCOPHAGE     nitroGLYCERIN 0.4 MG SL tablet  Commonly known as: NITROSTAT     nystatin 500897 UNIT/ML suspension  Commonly known as: MYCOSTATIN     ondansetron 4 MG disintegrating tablet  Commonly known as: Zofran ODT     OXYGEN     polyethylene glycol 17 g Pack packet  Commonly known as: MIRALAX     ranolazine 1000 MG extended release tablet  Commonly known as: RANEXA     sodium chloride 1 g tablet     TYLENOL PO     VITAMIN B 12 PO     vitamin D3 25 MCG (1000 UT) Tabs tablet  Commonly known as: CHOLECALCIFEROL            ASK your doctor about these medications      * morphine 10 MG/5ML solution  Take 5 mLs by mouth every 3 hours as needed for Pain for up to 3 days. Max Daily Amount: 80 mg  Ask about: Should I take this medication? * This list has 1 medication(s) that are the same as other medications prescribed for you.  Read the directions carefully, and ask your doctor or other care provider

## 2024-04-03 NOTE — TELEPHONE ENCOUNTER
ISADORA fredis calling in to make sure  was aware of seizure. She states [facility] does have a call out to neurology. She is going to call them as well to discuss ativan. She states bottle in home is out dated; ativan 0.5mg Q4hrs as needed for seizures. I did inform [facility] of  response and he voiced understanding. Left message for pt to call office back regarding message. kaykay

## (undated) DEVICE — SUTURE VCRL + 0 L27IN ABSRB VLT CTX L48MM 1/2 CIR VCP364H

## (undated) DEVICE — BIT DRL L500MM DIA6X9MM CANN STP L QUIK CPL FOR DH DC TFN

## (undated) DEVICE — GUIDEWIRE URO L150CM DIA0.035IN TAPR 3CM NIT HYDRPHLC ANG

## (undated) DEVICE — CAMERA STRYKER 1488 HD GEN

## (undated) DEVICE — DILATOR/SHEATH SET: Brand: 8/10 DILATOR/SHEATH SET

## (undated) DEVICE — STANDARD HYPODERMIC NEEDLE,POLYPROPYLENE HUB: Brand: MONOJECT

## (undated) DEVICE — BAG DRAINAGE CONTAINER 15 LT FLUID COLLCTN

## (undated) DEVICE — PADDING,UNDERCAST,COTTON, 4"X4YD STERILE: Brand: MEDLINE

## (undated) DEVICE — GLOVE SURG SZ 75 STD WHT LTX SYN POLYMER BEAD REINF ANTI RL

## (undated) DEVICE — SOLUTION IV IRRIG WATER 1000ML POUR BRL 2F7114

## (undated) DEVICE — GOWN,SIRUS,POLYRNF,BRTHSLV,XLN/XL,20/CS: Brand: MEDLINE

## (undated) DEVICE — MEDIA CONTRAST RX ISOVUE-300 61% 30ML VIALS

## (undated) DEVICE — CYSTO PACK: Brand: MEDLINE INDUSTRIES, INC.

## (undated) DEVICE — BIT DRL L145MM DIA4.2MM ST 3 FLUT NDL PNT QUIK CPL FOR FEM

## (undated) DEVICE — Device

## (undated) DEVICE — CATHETER URET 5FR L70CM OPN END SGL LUMN INJ HUB FLEXIMA

## (undated) DEVICE — GOWN,SIRUS,FABRNF,XL,20/CS: Brand: MEDLINE

## (undated) DEVICE — GUIDEWIRE ENDOSCP L150CM DIA0.035IN TIP L15CM BENT PTFE

## (undated) DEVICE — GUIDEWIRE ORTH L400MM DIA3.2MM FOR TFN

## (undated) DEVICE — Z INACTIVE USE 2660664 SOLUTION IRRIG 3000ML 0.9% SOD CHL USP UROMATIC PLAS CONT

## (undated) DEVICE — Z DISCONTINUED PER MEDLINE USE 2741942 DRESSING AQUACEL 6 IN ALG W9XL15CM SIL CVR WTRPRF VIR BACT BARR ANTIMIC

## (undated) DEVICE — DRAINBAG,ANTI-REFLUX TOWER,L/F,2000ML,LL: Brand: MEDLINE

## (undated) DEVICE — DRAPE PATIENT ISOL IRRIG POUCH

## (undated) DEVICE — GUIDEWIRE VASC NITINOL HYDROPHIL STR 3 CM 0.035 INX150 CM STD NIT ZIPWIRE

## (undated) DEVICE — READY WET SKIN SCRUB TRAY-LF: Brand: MEDLINE INDUSTRIES, INC.

## (undated) DEVICE — INTENDED FOR TISSUE SEPARATION, AND OTHER PROCEDURES THAT REQUIRE A SHARP SURGICAL BLADE TO PUNCTURE OR CUT.: Brand: BARD-PARKER ® STAINLESS STEEL BLADES

## (undated) DEVICE — Z DUP USE 2139333 GUIDEWIRE UROLOGICAL STR STD 0.035 IN X150 CM REG ZIPWIRE LF

## (undated) DEVICE — BIT DRL L L266MM DIA16MM FEM FLX CANN QUIK CPL

## (undated) DEVICE — BAG DRNGE COMB PK

## (undated) DEVICE — GARMENT,MEDLINE,DVT,INT,CALF,MED, GEN2: Brand: MEDLINE

## (undated) DEVICE — TTL1LYR 16FR10ML 100%SIL TMPST TR: Brand: MEDLINE

## (undated) DEVICE — SYRINGE MED 20ML STD CLR PLAS LUERLOCK TIP N CTRL DISP

## (undated) DEVICE — 4-PORT MANIFOLD: Brand: NEPTUNE 2

## (undated) DEVICE — SOLUTION IRRIG 3000ML STRL H2O USP UROMATIC PLAS CONT

## (undated) DEVICE — 1.5 FR, 120 CM, NITI TIPLESS STONE BASKET: Brand: HALO

## (undated) DEVICE — HIGH POWER SINGLE-USE LASER FIBER: Brand: FLEXIVA™ ID

## (undated) DEVICE — DRAPE C ARM W41XL74IN UNIV MOB W RUBBERBAND CLP

## (undated) DEVICE — 3M™ COBAN™ NL STERILE NON-LATEX SELF-ADHERENT WRAP, 2084S, 4 IN X 5 YD (10 CM X 4,5 M), 18 ROLLS/CASE: Brand: 3M™ COBAN™

## (undated) DEVICE — SEAL ENDOSCP INSTR 7FR BX SELF SEAL

## (undated) DEVICE — PATIENT RETURN ELECTRODE, SINGLE-USE, CONTACT QUALITY MONITORING, ADULT, WITH 9FT CORD, FOR PATIENTS WEIGING OVER 33LBS. (15KG): Brand: MEGADYNE

## (undated) DEVICE — KIT BEDSIDE REVITAL OX 500ML

## (undated) DEVICE — 1000 S-DRAPE TOWEL DRAPE 10/BX: Brand: STERI-DRAPE™

## (undated) DEVICE — GLOVE ORANGE PI 7 1/2   MSG9075

## (undated) DEVICE — Z INACTIVE USE 2635503 SOLUTION IRRIG 3000ML ST H2O USP UROMATIC PLAS CONT

## (undated) DEVICE — GUIDEWIRE ENDOSCP L150CM DIA0.035IN TIP 3CM PTFE NIT

## (undated) DEVICE — ROD RMR L950MM DIA2.5MM W/ EXTN BALL TIP

## (undated) DEVICE — CHLORAPREP 26ML ORANGE

## (undated) DEVICE — CYSTO: Brand: MEDLINE INDUSTRIES, INC.

## (undated) DEVICE — CATHETER F BLLN 5CC 16FR 2 W HYDRGEL COAT LESS TRAUM LUB

## (undated) DEVICE — MEDIA CONTRAST ISOVUE 300 100ML

## (undated) DEVICE — URETERAL ACCESS SHEATH SET: Brand: NAVIGATOR HD

## (undated) DEVICE — DRESSING FOAM W22XL25CM FILVE LAYR FOAM DP DEF SAFETAC

## (undated) DEVICE — GOWN,SIRUS,FABRNF,L,20/CS: Brand: MEDLINE

## (undated) DEVICE — DOUBLE BASIN SET: Brand: MEDLINE INDUSTRIES, INC.

## (undated) DEVICE — GUIDEWIRE ENDOSCP L150CM DIA0.038IN TIP L3CM STD NIT POLYUR

## (undated) DEVICE — SOLUTION IRRIG 1000ML STRL H2O USP PLAS POUR BTL

## (undated) DEVICE — 3M™ STERI-DRAPE™ U-DRAPE 1015: Brand: STERI-DRAPE™

## (undated) DEVICE — MEDIA CONTRAST ISOVUE  300 10X50ML

## (undated) DEVICE — GLOVE SURG SIGNATURE LTX ESS LTX PF 7.5

## (undated) DEVICE — SOLUTION IRRIG 3000ML 0.9% SOD CHL USP UROMATIC PLAS CONT

## (undated) DEVICE — 3M™ IOBAN™ 2 ANTIMICROBIAL INCISE DRAPE 6640EZ: Brand: IOBAN™ 2

## (undated) DEVICE — ADAPTER URO SCP UROLOK LL

## (undated) DEVICE — SOLUTION IV IRRIG POUR BRL 0.9% SODIUM CHL 2F7124

## (undated) DEVICE — FIBER LSR 200U 50W ETFE SIL FLEXSHIELD HI PWR POLISHED OUTPT

## (undated) DEVICE — DEVICE TORQ KENDALL 0025IN 0038IN

## (undated) DEVICE — PACK PROCEDURE SURG GEN CUST

## (undated) DEVICE — BASIC SINGLE BASIN 1-LF: Brand: MEDLINE INDUSTRIES, INC.